# Patient Record
Sex: MALE | Race: WHITE | NOT HISPANIC OR LATINO | Employment: OTHER | URBAN - METROPOLITAN AREA
[De-identification: names, ages, dates, MRNs, and addresses within clinical notes are randomized per-mention and may not be internally consistent; named-entity substitution may affect disease eponyms.]

---

## 2017-01-10 ENCOUNTER — GENERIC CONVERSION - ENCOUNTER (OUTPATIENT)
Dept: OTHER | Facility: OTHER | Age: 72
End: 2017-01-10

## 2017-03-07 DIAGNOSIS — D64.9 ANEMIA: ICD-10-CM

## 2017-03-07 DIAGNOSIS — M85.80 OTHER SPECIFIED DISORDERS OF BONE DENSITY AND STRUCTURE, UNSPECIFIED SITE: ICD-10-CM

## 2017-03-07 DIAGNOSIS — Z12.5 ENCOUNTER FOR SCREENING FOR MALIGNANT NEOPLASM OF PROSTATE: ICD-10-CM

## 2017-03-07 DIAGNOSIS — M45.9 ANKYLOSING SPONDYLITIS OF SITE IN SPINE (HCC): ICD-10-CM

## 2017-03-07 DIAGNOSIS — I10 ESSENTIAL (PRIMARY) HYPERTENSION: ICD-10-CM

## 2017-04-06 ENCOUNTER — ALLSCRIPTS OFFICE VISIT (OUTPATIENT)
Dept: OTHER | Facility: OTHER | Age: 72
End: 2017-04-06

## 2017-05-01 ENCOUNTER — GENERIC CONVERSION - ENCOUNTER (OUTPATIENT)
Dept: OTHER | Facility: OTHER | Age: 72
End: 2017-05-01

## 2017-05-08 ENCOUNTER — GENERIC CONVERSION - ENCOUNTER (OUTPATIENT)
Dept: OTHER | Facility: OTHER | Age: 72
End: 2017-05-08

## 2017-07-12 ENCOUNTER — GENERIC CONVERSION - ENCOUNTER (OUTPATIENT)
Dept: OTHER | Facility: OTHER | Age: 72
End: 2017-07-12

## 2017-09-27 DIAGNOSIS — M25.562 PAIN IN LEFT KNEE: ICD-10-CM

## 2017-09-29 ENCOUNTER — GENERIC CONVERSION - ENCOUNTER (OUTPATIENT)
Dept: OTHER | Facility: OTHER | Age: 72
End: 2017-09-29

## 2017-10-11 ENCOUNTER — GENERIC CONVERSION - ENCOUNTER (OUTPATIENT)
Dept: OTHER | Facility: OTHER | Age: 72
End: 2017-10-11

## 2017-10-18 ENCOUNTER — APPOINTMENT (EMERGENCY)
Dept: RADIOLOGY | Facility: HOSPITAL | Age: 72
DRG: 444 | End: 2017-10-18
Payer: MEDICARE

## 2017-10-18 ENCOUNTER — HOSPITAL ENCOUNTER (INPATIENT)
Facility: HOSPITAL | Age: 72
LOS: 5 days | Discharge: HOME | DRG: 444 | End: 2017-10-24
Attending: EMERGENCY MEDICINE | Admitting: HOSPITALIST
Payer: MEDICARE

## 2017-10-18 DIAGNOSIS — R17 TOTAL BILIRUBIN, ELEVATED: ICD-10-CM

## 2017-10-18 DIAGNOSIS — I16.0 HYPERTENSIVE URGENCY: ICD-10-CM

## 2017-10-18 DIAGNOSIS — K81.9 CHOLECYSTITIS: Primary | ICD-10-CM

## 2017-10-18 DIAGNOSIS — I16.1 HYPERTENSIVE EMERGENCY: ICD-10-CM

## 2017-10-18 DIAGNOSIS — I10 HTN (HYPERTENSION): ICD-10-CM

## 2017-10-18 LAB
ALBUMIN SERPL BCP-MCNC: 3.8 G/DL (ref 3.5–5)
ALP SERPL-CCNC: 97 U/L (ref 46–116)
ALT SERPL W P-5'-P-CCNC: 50 U/L (ref 12–78)
ANION GAP SERPL CALCULATED.3IONS-SCNC: 8 MMOL/L (ref 4–13)
APTT PPP: 31 SECONDS (ref 23–35)
AST SERPL W P-5'-P-CCNC: 35 U/L (ref 5–45)
BASOPHILS # BLD AUTO: 0.01 THOUSANDS/ΜL (ref 0–0.1)
BASOPHILS NFR BLD AUTO: 0 % (ref 0–1)
BILIRUB SERPL-MCNC: 1.24 MG/DL (ref 0.2–1)
BUN SERPL-MCNC: 23 MG/DL (ref 5–25)
CALCIUM SERPL-MCNC: 8.6 MG/DL (ref 8.3–10.1)
CHLORIDE SERPL-SCNC: 105 MMOL/L (ref 100–108)
CO2 SERPL-SCNC: 28 MMOL/L (ref 21–32)
CREAT SERPL-MCNC: 0.87 MG/DL (ref 0.6–1.3)
EOSINOPHIL # BLD AUTO: 0.02 THOUSAND/ΜL (ref 0–0.61)
EOSINOPHIL NFR BLD AUTO: 0 % (ref 0–6)
ERYTHROCYTE [DISTWIDTH] IN BLOOD BY AUTOMATED COUNT: 13.5 % (ref 11.6–15.1)
GFR SERPL CREATININE-BSD FRML MDRD: 85 ML/MIN/1.73SQ M
GLUCOSE SERPL-MCNC: 129 MG/DL (ref 65–140)
HCT VFR BLD AUTO: 41.2 % (ref 36.5–49.3)
HGB BLD-MCNC: 13.8 G/DL (ref 12–17)
INR PPP: 0.99 (ref 0.86–1.16)
LIPASE SERPL-CCNC: 162 U/L (ref 73–393)
LYMPHOCYTES # BLD AUTO: 1.06 THOUSANDS/ΜL (ref 0.6–4.47)
LYMPHOCYTES NFR BLD AUTO: 9 % (ref 14–44)
MCH RBC QN AUTO: 29.7 PG (ref 26.8–34.3)
MCHC RBC AUTO-ENTMCNC: 33.5 G/DL (ref 31.4–37.4)
MCV RBC AUTO: 89 FL (ref 82–98)
MONOCYTES # BLD AUTO: 0.65 THOUSAND/ΜL (ref 0.17–1.22)
MONOCYTES NFR BLD AUTO: 6 % (ref 4–12)
NEUTROPHILS # BLD AUTO: 9.99 THOUSANDS/ΜL (ref 1.85–7.62)
NEUTS SEG NFR BLD AUTO: 85 % (ref 43–75)
NRBC BLD AUTO-RTO: 0 /100 WBCS
PLATELET # BLD AUTO: 173 THOUSANDS/UL (ref 149–390)
PMV BLD AUTO: 11.5 FL (ref 8.9–12.7)
POTASSIUM SERPL-SCNC: 4.3 MMOL/L (ref 3.5–5.3)
PROT SERPL-MCNC: 7.1 G/DL (ref 6.4–8.2)
PROTHROMBIN TIME: 13.1 SECONDS (ref 12.1–14.4)
RBC # BLD AUTO: 4.65 MILLION/UL (ref 3.88–5.62)
SODIUM SERPL-SCNC: 141 MMOL/L (ref 136–145)
SPECIMEN SOURCE: NORMAL
TROPONIN I BLD-MCNC: 0.04 NG/ML (ref 0–0.08)
WBC # BLD AUTO: 11.76 THOUSAND/UL (ref 4.31–10.16)

## 2017-10-18 PROCEDURE — 84443 ASSAY THYROID STIM HORMONE: CPT | Performed by: HOSPITALIST

## 2017-10-18 PROCEDURE — 96375 TX/PRO/DX INJ NEW DRUG ADDON: CPT

## 2017-10-18 PROCEDURE — 36415 COLL VENOUS BLD VENIPUNCTURE: CPT

## 2017-10-18 PROCEDURE — 85025 COMPLETE CBC W/AUTO DIFF WBC: CPT | Performed by: EMERGENCY MEDICINE

## 2017-10-18 PROCEDURE — 80053 COMPREHEN METABOLIC PANEL: CPT | Performed by: EMERGENCY MEDICINE

## 2017-10-18 PROCEDURE — 83690 ASSAY OF LIPASE: CPT | Performed by: EMERGENCY MEDICINE

## 2017-10-18 PROCEDURE — 85610 PROTHROMBIN TIME: CPT | Performed by: EMERGENCY MEDICINE

## 2017-10-18 PROCEDURE — 93005 ELECTROCARDIOGRAM TRACING: CPT | Performed by: EMERGENCY MEDICINE

## 2017-10-18 PROCEDURE — 85730 THROMBOPLASTIN TIME PARTIAL: CPT | Performed by: EMERGENCY MEDICINE

## 2017-10-18 PROCEDURE — 84484 ASSAY OF TROPONIN QUANT: CPT

## 2017-10-18 PROCEDURE — 71020 HB CHEST X-RAY 2VW FRONTAL&LATL: CPT

## 2017-10-18 PROCEDURE — 74177 CT ABD & PELVIS W/CONTRAST: CPT

## 2017-10-18 RX ORDER — MAGNESIUM HYDROXIDE/ALUMINUM HYDROXICE/SIMETHICONE 120; 1200; 1200 MG/30ML; MG/30ML; MG/30ML
30 SUSPENSION ORAL ONCE
Status: COMPLETED | OUTPATIENT
Start: 2017-10-18 | End: 2017-10-18

## 2017-10-18 RX ORDER — ASPIRIN 81 MG/1
324 TABLET, CHEWABLE ORAL ONCE
Status: COMPLETED | OUTPATIENT
Start: 2017-10-18 | End: 2017-10-18

## 2017-10-18 RX ORDER — ENALAPRIL MALEATE 5 MG/1
5 TABLET ORAL 2 TIMES DAILY
COMMUNITY
End: 2017-10-24 | Stop reason: HOSPADM

## 2017-10-18 RX ORDER — METOPROLOL SUCCINATE 50 MG/1
50 TABLET, EXTENDED RELEASE ORAL DAILY
COMMUNITY
End: 2017-10-24 | Stop reason: HOSPADM

## 2017-10-18 RX ORDER — POTASSIUM CHLORIDE 750 MG/1
10 CAPSULE, EXTENDED RELEASE ORAL 2 TIMES DAILY
COMMUNITY
End: 2018-06-25 | Stop reason: SDUPTHER

## 2017-10-18 RX ORDER — LABETALOL HYDROCHLORIDE 5 MG/ML
10 INJECTION, SOLUTION INTRAVENOUS ONCE
Status: COMPLETED | OUTPATIENT
Start: 2017-10-19 | End: 2017-10-19

## 2017-10-18 RX ORDER — CHLORTHALIDONE 25 MG/1
25 TABLET ORAL DAILY
COMMUNITY
End: 2017-10-24 | Stop reason: HOSPADM

## 2017-10-18 RX ORDER — LABETALOL HYDROCHLORIDE 5 MG/ML
10 INJECTION, SOLUTION INTRAVENOUS ONCE
Status: COMPLETED | OUTPATIENT
Start: 2017-10-18 | End: 2017-10-18

## 2017-10-18 RX ADMIN — ASPIRIN 81 MG 324 MG: 81 TABLET ORAL at 21:47

## 2017-10-18 RX ADMIN — IOHEXOL 100 ML: 350 INJECTION, SOLUTION INTRAVENOUS at 23:04

## 2017-10-18 RX ADMIN — LIDOCAINE HYDROCHLORIDE 15 ML: 20 SOLUTION ORAL; TOPICAL at 21:48

## 2017-10-18 RX ADMIN — ALUMINUM HYDROXIDE, MAGNESIUM HYDROXIDE, AND SIMETHICONE 30 ML: 200; 200; 20 SUSPENSION ORAL at 21:48

## 2017-10-18 RX ADMIN — LABETALOL 20 MG/4 ML (5 MG/ML) INTRAVENOUS SYRINGE 10 MG: at 23:08

## 2017-10-19 ENCOUNTER — APPOINTMENT (INPATIENT)
Dept: NON INVASIVE DIAGNOSTICS | Facility: HOSPITAL | Age: 72
DRG: 444 | End: 2017-10-19
Payer: MEDICARE

## 2017-10-19 ENCOUNTER — APPOINTMENT (INPATIENT)
Dept: RADIOLOGY | Facility: HOSPITAL | Age: 72
DRG: 444 | End: 2017-10-19
Payer: MEDICARE

## 2017-10-19 PROBLEM — I65.21 STENOSIS OF RIGHT CAROTID ARTERY: Status: ACTIVE | Noted: 2017-10-19

## 2017-10-19 PROBLEM — K80.10 CHOLECYSTITIS WITH CHOLELITHIASIS: Status: ACTIVE | Noted: 2017-10-19

## 2017-10-19 PROBLEM — R17 TOTAL BILIRUBIN, ELEVATED: Status: ACTIVE | Noted: 2017-10-19

## 2017-10-19 PROBLEM — D72.829 LEUKOCYTOSIS: Status: ACTIVE | Noted: 2017-10-19

## 2017-10-19 PROBLEM — I16.1 HYPERTENSIVE EMERGENCY: Status: ACTIVE | Noted: 2017-10-19

## 2017-10-19 PROBLEM — R07.9 CHEST PAIN: Status: RESOLVED | Noted: 2017-10-19 | Resolved: 2017-10-19

## 2017-10-19 PROBLEM — R10.9 ABDOMINAL PAIN: Status: ACTIVE | Noted: 2017-10-19

## 2017-10-19 PROBLEM — R07.9 CHEST PAIN: Status: ACTIVE | Noted: 2017-10-19

## 2017-10-19 LAB
ALBUMIN SERPL BCP-MCNC: 3.3 G/DL (ref 3.5–5)
ALP SERPL-CCNC: 84 U/L (ref 46–116)
ALT SERPL W P-5'-P-CCNC: 37 U/L (ref 12–78)
ANION GAP SERPL CALCULATED.3IONS-SCNC: 10 MMOL/L (ref 4–13)
AST SERPL W P-5'-P-CCNC: 12 U/L (ref 5–45)
BASOPHILS # BLD AUTO: 0.01 THOUSANDS/ΜL (ref 0–0.1)
BASOPHILS NFR BLD AUTO: 0 % (ref 0–1)
BILIRUB DIRECT SERPL-MCNC: 0.33 MG/DL (ref 0–0.2)
BILIRUB SERPL-MCNC: 1.55 MG/DL (ref 0.2–1)
BUN SERPL-MCNC: 18 MG/DL (ref 5–25)
CALCIUM SERPL-MCNC: 8.1 MG/DL (ref 8.3–10.1)
CHLORIDE SERPL-SCNC: 105 MMOL/L (ref 100–108)
CHOLEST SERPL-MCNC: 161 MG/DL (ref 50–200)
CO2 SERPL-SCNC: 26 MMOL/L (ref 21–32)
CREAT SERPL-MCNC: 0.72 MG/DL (ref 0.6–1.3)
EOSINOPHIL # BLD AUTO: 0.01 THOUSAND/ΜL (ref 0–0.61)
EOSINOPHIL NFR BLD AUTO: 0 % (ref 0–6)
ERYTHROCYTE [DISTWIDTH] IN BLOOD BY AUTOMATED COUNT: 13.6 % (ref 11.6–15.1)
EST. AVERAGE GLUCOSE BLD GHB EST-MCNC: 103 MG/DL
GFR SERPL CREATININE-BSD FRML MDRD: 92 ML/MIN/1.73SQ M
GLUCOSE SERPL-MCNC: 104 MG/DL (ref 65–140)
HBA1C MFR BLD: 5.2 % (ref 4.2–6.3)
HCT VFR BLD AUTO: 37.7 % (ref 36.5–49.3)
HDLC SERPL-MCNC: 49 MG/DL (ref 40–60)
HGB BLD-MCNC: 12.7 G/DL (ref 12–17)
LACTATE SERPL-SCNC: 1.8 MMOL/L (ref 0.5–2)
LDLC SERPL CALC-MCNC: 100 MG/DL (ref 0–100)
LYMPHOCYTES # BLD AUTO: 0.9 THOUSANDS/ΜL (ref 0.6–4.47)
LYMPHOCYTES NFR BLD AUTO: 8 % (ref 14–44)
MAGNESIUM SERPL-MCNC: 2.4 MG/DL (ref 1.6–2.6)
MCH RBC QN AUTO: 30 PG (ref 26.8–34.3)
MCHC RBC AUTO-ENTMCNC: 33.7 G/DL (ref 31.4–37.4)
MCV RBC AUTO: 89 FL (ref 82–98)
MONOCYTES # BLD AUTO: 0.8 THOUSAND/ΜL (ref 0.17–1.22)
MONOCYTES NFR BLD AUTO: 7 % (ref 4–12)
NEUTROPHILS # BLD AUTO: 9.33 THOUSANDS/ΜL (ref 1.85–7.62)
NEUTS SEG NFR BLD AUTO: 85 % (ref 43–75)
NRBC BLD AUTO-RTO: 0 /100 WBCS
PHOSPHATE SERPL-MCNC: 2.9 MG/DL (ref 2.3–4.1)
PLATELET # BLD AUTO: 150 THOUSANDS/UL (ref 149–390)
PMV BLD AUTO: 11.2 FL (ref 8.9–12.7)
POTASSIUM SERPL-SCNC: 3.4 MMOL/L (ref 3.5–5.3)
PROT SERPL-MCNC: 6 G/DL (ref 6.4–8.2)
RBC # BLD AUTO: 4.24 MILLION/UL (ref 3.88–5.62)
SODIUM SERPL-SCNC: 141 MMOL/L (ref 136–145)
TRIGL SERPL-MCNC: 62 MG/DL
TROPONIN I SERPL-MCNC: 0.05 NG/ML
TSH SERPL DL<=0.05 MIU/L-ACNC: 3.05 UIU/ML (ref 0.36–3.74)
WBC # BLD AUTO: 11.08 THOUSAND/UL (ref 4.31–10.16)

## 2017-10-19 PROCEDURE — 80048 BASIC METABOLIC PNL TOTAL CA: CPT | Performed by: NURSE PRACTITIONER

## 2017-10-19 PROCEDURE — 84484 ASSAY OF TROPONIN QUANT: CPT | Performed by: NURSE PRACTITIONER

## 2017-10-19 PROCEDURE — 83605 ASSAY OF LACTIC ACID: CPT | Performed by: NURSE PRACTITIONER

## 2017-10-19 PROCEDURE — 85025 COMPLETE CBC W/AUTO DIFF WBC: CPT | Performed by: SURGERY

## 2017-10-19 PROCEDURE — 99285 EMERGENCY DEPT VISIT HI MDM: CPT

## 2017-10-19 PROCEDURE — 93306 TTE W/DOPPLER COMPLETE: CPT

## 2017-10-19 PROCEDURE — 94760 N-INVAS EAR/PLS OXIMETRY 1: CPT

## 2017-10-19 PROCEDURE — C9113 INJ PANTOPRAZOLE SODIUM, VIA: HCPCS | Performed by: HOSPITALIST

## 2017-10-19 PROCEDURE — 83036 HEMOGLOBIN GLYCOSYLATED A1C: CPT | Performed by: NURSE PRACTITIONER

## 2017-10-19 PROCEDURE — 84100 ASSAY OF PHOSPHORUS: CPT | Performed by: NURSE PRACTITIONER

## 2017-10-19 PROCEDURE — 80076 HEPATIC FUNCTION PANEL: CPT | Performed by: SURGERY

## 2017-10-19 PROCEDURE — 70450 CT HEAD/BRAIN W/O DYE: CPT

## 2017-10-19 PROCEDURE — 83735 ASSAY OF MAGNESIUM: CPT | Performed by: NURSE PRACTITIONER

## 2017-10-19 PROCEDURE — 96376 TX/PRO/DX INJ SAME DRUG ADON: CPT

## 2017-10-19 PROCEDURE — 96365 THER/PROPH/DIAG IV INF INIT: CPT

## 2017-10-19 PROCEDURE — 80061 LIPID PANEL: CPT | Performed by: NURSE PRACTITIONER

## 2017-10-19 PROCEDURE — 96375 TX/PRO/DX INJ NEW DRUG ADDON: CPT

## 2017-10-19 RX ORDER — AMLODIPINE BESYLATE 10 MG/1
10 TABLET ORAL DAILY
Status: DISCONTINUED | OUTPATIENT
Start: 2017-10-19 | End: 2017-10-22

## 2017-10-19 RX ORDER — HYDRALAZINE HYDROCHLORIDE 20 MG/ML
5 INJECTION INTRAMUSCULAR; INTRAVENOUS EVERY 6 HOURS PRN
Status: DISCONTINUED | OUTPATIENT
Start: 2017-10-19 | End: 2017-10-24 | Stop reason: HOSPADM

## 2017-10-19 RX ORDER — ACETAMINOPHEN 325 MG/1
650 TABLET ORAL EVERY 6 HOURS PRN
Status: DISCONTINUED | OUTPATIENT
Start: 2017-10-19 | End: 2017-10-24 | Stop reason: HOSPADM

## 2017-10-19 RX ORDER — ATORVASTATIN CALCIUM 40 MG/1
40 TABLET, FILM COATED ORAL
Status: DISCONTINUED | OUTPATIENT
Start: 2017-10-19 | End: 2017-10-24 | Stop reason: HOSPADM

## 2017-10-19 RX ORDER — POTASSIUM CHLORIDE 750 MG/1
10 TABLET, EXTENDED RELEASE ORAL 2 TIMES DAILY
Status: DISCONTINUED | OUTPATIENT
Start: 2017-10-19 | End: 2017-10-24 | Stop reason: HOSPADM

## 2017-10-19 RX ORDER — CHLORTHALIDONE 25 MG/1
25 TABLET ORAL ONCE
Status: COMPLETED | OUTPATIENT
Start: 2017-10-19 | End: 2017-10-19

## 2017-10-19 RX ORDER — LABETALOL HYDROCHLORIDE 5 MG/ML
10 INJECTION, SOLUTION INTRAVENOUS EVERY 6 HOURS PRN
Status: DISCONTINUED | OUTPATIENT
Start: 2017-10-19 | End: 2017-10-19

## 2017-10-19 RX ORDER — NITROGLYCERIN 0.4 MG/1
0.4 TABLET SUBLINGUAL
Status: DISCONTINUED | OUTPATIENT
Start: 2017-10-19 | End: 2017-10-24 | Stop reason: HOSPADM

## 2017-10-19 RX ORDER — DOCUSATE SODIUM 100 MG/1
100 CAPSULE, LIQUID FILLED ORAL 2 TIMES DAILY
Status: DISCONTINUED | OUTPATIENT
Start: 2017-10-19 | End: 2017-10-24 | Stop reason: HOSPADM

## 2017-10-19 RX ORDER — ENALAPRIL MALEATE 10 MG/1
10 TABLET ORAL 2 TIMES DAILY
Status: DISCONTINUED | OUTPATIENT
Start: 2017-10-19 | End: 2017-10-20

## 2017-10-19 RX ORDER — ATORVASTATIN CALCIUM 40 MG/1
40 TABLET, FILM COATED ORAL
Status: DISCONTINUED | OUTPATIENT
Start: 2017-10-19 | End: 2017-10-19

## 2017-10-19 RX ORDER — ASPIRIN 81 MG/1
81 TABLET, CHEWABLE ORAL DAILY
Status: DISCONTINUED | OUTPATIENT
Start: 2017-10-19 | End: 2017-10-24 | Stop reason: HOSPADM

## 2017-10-19 RX ORDER — CLONIDINE HYDROCHLORIDE 0.2 MG/1
0.2 TABLET ORAL ONCE
Status: COMPLETED | OUTPATIENT
Start: 2017-10-19 | End: 2017-10-19

## 2017-10-19 RX ORDER — METOPROLOL SUCCINATE 50 MG/1
50 TABLET, EXTENDED RELEASE ORAL DAILY
Status: DISCONTINUED | OUTPATIENT
Start: 2017-10-19 | End: 2017-10-20

## 2017-10-19 RX ORDER — SODIUM CHLORIDE 9 MG/ML
50 INJECTION, SOLUTION INTRAVENOUS CONTINUOUS
Status: DISCONTINUED | OUTPATIENT
Start: 2017-10-19 | End: 2017-10-20

## 2017-10-19 RX ORDER — CHLORTHALIDONE 25 MG/1
50 TABLET ORAL DAILY
Status: DISCONTINUED | OUTPATIENT
Start: 2017-10-20 | End: 2017-10-23

## 2017-10-19 RX ORDER — PANTOPRAZOLE SODIUM 40 MG/1
40 INJECTION, POWDER, FOR SOLUTION INTRAVENOUS EVERY 12 HOURS SCHEDULED
Status: DISCONTINUED | OUTPATIENT
Start: 2017-10-19 | End: 2017-10-22

## 2017-10-19 RX ORDER — SENNOSIDES 8.6 MG
1 TABLET ORAL DAILY
Status: DISCONTINUED | OUTPATIENT
Start: 2017-10-19 | End: 2017-10-24 | Stop reason: HOSPADM

## 2017-10-19 RX ORDER — ONDANSETRON 2 MG/ML
4 INJECTION INTRAMUSCULAR; INTRAVENOUS EVERY 4 HOURS PRN
Status: DISCONTINUED | OUTPATIENT
Start: 2017-10-19 | End: 2017-10-24 | Stop reason: HOSPADM

## 2017-10-19 RX ORDER — CHLORTHALIDONE 25 MG/1
25 TABLET ORAL DAILY
Status: DISCONTINUED | OUTPATIENT
Start: 2017-10-19 | End: 2017-10-19

## 2017-10-19 RX ORDER — ENALAPRIL MALEATE 5 MG/1
5 TABLET ORAL 2 TIMES DAILY
Status: DISCONTINUED | OUTPATIENT
Start: 2017-10-19 | End: 2017-10-19

## 2017-10-19 RX ORDER — POLYETHYLENE GLYCOL 3350 17 G/17G
17 POWDER, FOR SOLUTION ORAL DAILY
Status: DISCONTINUED | OUTPATIENT
Start: 2017-10-19 | End: 2017-10-24 | Stop reason: HOSPADM

## 2017-10-19 RX ORDER — SODIUM CHLORIDE 9 MG/ML
75 INJECTION, SOLUTION INTRAVENOUS CONTINUOUS
Status: DISCONTINUED | OUTPATIENT
Start: 2017-10-19 | End: 2017-10-19

## 2017-10-19 RX ADMIN — ENOXAPARIN SODIUM 40 MG: 40 INJECTION SUBCUTANEOUS at 08:38

## 2017-10-19 RX ADMIN — SODIUM CHLORIDE 5 MG/HR: 0.9 INJECTION, SOLUTION INTRAVENOUS at 00:41

## 2017-10-19 RX ADMIN — CEFAZOLIN SODIUM 1000 MG: 1 SOLUTION INTRAVENOUS at 00:39

## 2017-10-19 RX ADMIN — CEFAZOLIN SODIUM 1000 MG: 1 SOLUTION INTRAVENOUS at 17:05

## 2017-10-19 RX ADMIN — POTASSIUM CHLORIDE 10 MEQ: 750 TABLET, EXTENDED RELEASE ORAL at 08:36

## 2017-10-19 RX ADMIN — ENALAPRIL MALEATE 10 MG: 10 TABLET ORAL at 08:40

## 2017-10-19 RX ADMIN — METRONIDAZOLE 500 MG: 500 INJECTION, SOLUTION INTRAVENOUS at 08:39

## 2017-10-19 RX ADMIN — SENNOSIDES 8.6 MG: 8.6 TABLET, FILM COATED ORAL at 08:38

## 2017-10-19 RX ADMIN — ATORVASTATIN CALCIUM 40 MG: 40 TABLET, FILM COATED ORAL at 17:05

## 2017-10-19 RX ADMIN — LABETALOL 20 MG/4 ML (5 MG/ML) INTRAVENOUS SYRINGE 10 MG: at 00:01

## 2017-10-19 RX ADMIN — ASPIRIN 81 MG 81 MG: 81 TABLET ORAL at 08:36

## 2017-10-19 RX ADMIN — METRONIDAZOLE 500 MG: 500 INJECTION, SOLUTION INTRAVENOUS at 01:15

## 2017-10-19 RX ADMIN — PANTOPRAZOLE SODIUM 40 MG: 40 INJECTION, POWDER, FOR SOLUTION INTRAVENOUS at 22:19

## 2017-10-19 RX ADMIN — CHLORTHALIDONE 25 MG: 25 TABLET ORAL at 17:05

## 2017-10-19 RX ADMIN — AMLODIPINE BESYLATE 10 MG: 10 TABLET ORAL at 10:06

## 2017-10-19 RX ADMIN — PANTOPRAZOLE SODIUM 40 MG: 40 INJECTION, POWDER, FOR SOLUTION INTRAVENOUS at 08:39

## 2017-10-19 RX ADMIN — POTASSIUM CHLORIDE 10 MEQ: 750 TABLET, EXTENDED RELEASE ORAL at 17:05

## 2017-10-19 RX ADMIN — PANTOPRAZOLE SODIUM 40 MG: 40 INJECTION, POWDER, FOR SOLUTION INTRAVENOUS at 02:49

## 2017-10-19 RX ADMIN — DOCUSATE SODIUM 100 MG: 100 CAPSULE, LIQUID FILLED ORAL at 17:05

## 2017-10-19 RX ADMIN — DOCUSATE SODIUM 100 MG: 100 CAPSULE, LIQUID FILLED ORAL at 08:35

## 2017-10-19 RX ADMIN — ENALAPRIL MALEATE 10 MG: 10 TABLET ORAL at 22:16

## 2017-10-19 RX ADMIN — SODIUM CHLORIDE 50 ML/HR: 0.9 INJECTION, SOLUTION INTRAVENOUS at 20:19

## 2017-10-19 RX ADMIN — CHLORTHALIDONE 25 MG: 25 TABLET ORAL at 08:40

## 2017-10-19 RX ADMIN — CEFAZOLIN SODIUM 1000 MG: 1 SOLUTION INTRAVENOUS at 08:39

## 2017-10-19 RX ADMIN — SODIUM CHLORIDE 75 ML/HR: 0.9 INJECTION, SOLUTION INTRAVENOUS at 02:38

## 2017-10-19 RX ADMIN — CLONIDINE HYDROCHLORIDE 0.2 MG: 0.2 TABLET ORAL at 02:49

## 2017-10-19 NOTE — PROGRESS NOTES
Progress Note - General Surgery   Dani Prater  74 y o  male MRN: [id number]  Unit/Bed#: [facility] 405-01 Encounter: [id number]    Assessment:  73 yo M with chest pain, ct scan with evidence of distended gb, pericholecystic fluid and elevated T bili     - had been on cardene gtt temporarily currently off   - ct head negative   - currently no abdominal pain or nausea  - t bili 1 55 today      Plan:  - Continue to monitor blood prsesure  - although patien currently with no abdominal pain or nausea, t bili 1 55 with ston in cystic duct  Hypothetically could have passed a stone  Recommend GI consultation, possible MRCP  Continue to trend T bili   - Although patient with Ct evidence of distended GB and stone within cystic duct patients story is not consistent with acute cholecystitis   - Please leave patient NPO while undergoing evaluation by surgery   - will start on antibiotics to cover for acute cholecystitis      Subjective/Objective   Chief Complaint:     Subjective: no overnight events  No nausea/vomiting  No shortness of braeth or chest pain  No headache or changes in vision    Objective:     Blood pressure (!) 157/47, pulse (!) 46, temperature 98 °F (36 7 °C), temperature source Oral, resp  rate 20, height 5' 10" (1 778 m), weight 87 3 kg (192 lb 7 4 oz), SpO2 96 %  ,Body mass index is 27 62 kg/m²  Intake/Output Summary (Last 24 hours) at 10/19/17 0572  Last data filed at 10/19/17 0301   Gross per 24 hour   Intake                0 ml   Output              300 ml   Net             -300 ml       Invasive Devices     Peripheral Intravenous Line            Peripheral IV 10/18/17 Right Forearm less than 1 day    Peripheral IV 10/19/17 Left Antecubital less than 1 day                Physical Exam: General: AAOx3  Respiratory: BS b/l  Abdomen: Soft, NT, no rebound/guarding  Heart: RRR, S1s2  Ext: Warm no cyanosis       Lab, Imaging and other studies:  I have personally reviewed pertinent lab results    , CBC:   Lab Results   Component Value Date    WBC 11 08 (H) 10/19/2017    HGB 12 7 10/19/2017    HCT 37 7 10/19/2017    MCV 89 10/19/2017     10/19/2017    MCH 30 0 10/19/2017    MCHC 33 7 10/19/2017    RDW 13 6 10/19/2017    MPV 11 2 10/19/2017    NRBC 0 10/19/2017   , CMP:   Lab Results   Component Value Date     10/18/2017    K 4 3 10/18/2017     10/18/2017    CO2 28 10/18/2017    ANIONGAP 8 10/18/2017    BUN 23 10/18/2017    CREATININE 0 87 10/18/2017    GLUCOSE 129 10/18/2017    CALCIUM 8 6 10/18/2017    AST 12 10/19/2017    ALT 37 10/19/2017    ALKPHOS 84 10/19/2017    PROT 6 0 (L) 10/19/2017    ALBUMIN 3 3 (L) 10/19/2017    BILITOT 1 55 (H) 10/19/2017    EGFR 85 10/18/2017     VTE Pharmacologic Prophylaxis: Sequential compression device (Venodyne)  and Heparin  VTE Mechanical Prophylaxis: sequential compression device

## 2017-10-19 NOTE — CASE MANAGEMENT
Initial Clinical Review    Admission: Date/Time/Statement: 10/19/17 @ 0119  Orders Placed This Encounter   Procedures    Inpatient Admission     Standing Status:   Standing     Number of Occurrences:   1     Order Specific Question:   Admitting Physician     Answer:   Amador Mancia     Order Specific Question:   Level of Care     Answer:   Level 1 Stepdown [13]     Order Specific Question:   Bed Type     Answer:   [place] [4]     Order Specific Question:   Estimated length of stay     Answer:   More than 2 Midnights     Order Specific Question:   Certification     Answer:   I certify that inpatient services are medically necessary for this patient for a duration of greater than two midnights  See H&P and MD Progress Notes for additional information about the patient's course of treatment  ED: Date/Time/Mode of Arrival:   ED Arrival Information     Expected Arrival Acuity Means of Arrival Escorted By Service Admission Type    - 10/18/2017 21:15 Emergent Walk-In Family Member General Medicine Emergency    Arrival Complaint    chest discomfort        Chief Complaint:   Chief Complaint   Patient presents with    Chest Pain     Patient started with chest pain yesterday which went away and started again today  Pt describes steady mid sternal chest pain, increased burping and gas  denies SOB, N/V      History of Illness:   Jacalyn Saint  is a 74 y o  male with history of HTN, spinal stenosis, Rt carotid artery stenosis who presented to the emergency room for evaluation of acute onset of nonradiating chest pain ( located under xiphoid process), associated with nausea  Patient reports that 1st episode of similar pain noted  yesterday, he thought it might be secondary to indigestion as pain relieved with Mylanta  In the emergency room blood pressure found to be extremely elevated up to 255/149    Patient admits to have so called "white coat syndrome", sometimes his  systolic blood pressure  goes up to 200 in PCP office  Imaging study showed distended gallbladder with calcified gallstones and trace pericholecystic fluid with inflammation of the pericholecystic fat    No CBD dilatation  Total bilirubin 1 24 WBC 11 76, 1st troponin 0 04,  patient remains afebrile  Denies headache vomiting neurologic deficit visual disturbance melena hematemesis fever chills  Given significantly elevated blood pressure patient was started on nicardipine drip, he underwent evaluation by intensivist and general surgeon  Admitted to step-down 1 level on an inpatient basis    ED Vital Signs:   ED Triage Vitals   Temperature Pulse Respirations Blood Pressure SpO2   10/18/17 2122 10/18/17 2122 10/18/17 2122 10/18/17 2137 10/18/17 2122   98 4 °F (36 9 °C) 71 20 (!) 192/90 95 %      Temp Source Heart Rate Source Patient Position - Orthostatic VS BP Location FiO2 (%)   10/19/17 0229 10/18/17 2248 10/18/17 2122 10/18/17 2122 --   Oral Monitor Lying Left arm       Pain Score       10/18/17 2122       4        Wt Readings from Last 1 Encounters:   10/19/17 87 3 kg (192 lb 7 4 oz)     Abnormal Labs/Diagnostic Test Results:     ED Treatment:   Medication Administration from 10/18/2017 2115 to 10/19/2017 1757    Date/Time Order Dose Route Action Action by Comments   10/18/2017 2147 aspirin chewable tablet 324 mg 324 mg Oral Given Belinda Wakefield RN    10/18/2017 2148 lidocaine viscous (XYLOCAINE) 2 % mucosal solution 15 mL 15 mL Swish & Spit Given Jaiden Quintero RN    10/18/2017 2148 aluminum-magnesium hydroxide-simethicone (MYLANTA) 200-200-20 mg/5 mL oral suspension 30 mL 30 mL Oral Given Belinda Wakefield RN    10/18/2017 2308 labetalol (NORMODYNE) injection 10 mg 10 mg Intravenous Given Belinda Wakefield RN    10/18/2017 2304 iohexol (OMNIPAQUE) 350 MG/ML injection (MULTI-DOSE) 100 mL 100 mL Intravenous Given Brina Domingo    10/19/2017 0001 labetalol (NORMODYNE) injection 10 mg 10 mg Intravenous Given Michelle Escamilla RN    10/19/2017 0142 niCARdipine (CARDENE) 25 mg (STANDARD CONCENTRATION) in sodium chloride 0 9% 250 mL 7 5 mg/hr Intravenous Rate/Dose Change Melanie Taylor RN double checked by Vernon Altamirano rn   10/19/2017 0041 niCARdipine (CARDENE) 25 mg (STANDARD CONCENTRATION) in sodium chloride 0 9% 250 mL 5 mg/hr Intravenous Given Melanie Taylor RN    10/19/2017 0039 ceFAZolin (ANCEF) IVPB (premix) 1,000 mg 1,000 mg Intravenous Given Melanie Taylor RN    10/19/2017 0115 metroNIDAZOLE (FLAGYL) IVPB (premix) 500 mg 500 mg Intravenous Given Melanie Taylor RN        Past Medical/Surgical History:   Past Medical History:   Diagnosis Date    Cancer ([facility] [address] )     Hypertension     Spinal stenosis        Admitting Diagnosis: Cholecystitis [K81 9]  Chest pain [R07 9]  Hypertensive urgency [I16 0]  Total bilirubin, elevated [R17]  Hypertensive emergency [I16 1]    Age/Sex: 74 y o  male    Assessment/Plan:   Hypertensive emergency  Active Problems:    Chest pain    Abdominal pain    Cholecystitis with cholelithiasis    Leukocytosis    Total bilirubin, elevated        Plan for the Primary Problem(s):  · Hypertensive emergency  Patient reports being compliant with his home Rx  · Admitted to telemetry, step down 1 level Given need to monitor her nicardipine drip  · Hydralazine and labetalol p r n  · Started on aspirin and statin will check lipid panel hemoglobin A1c  · Monitor trend of troponin  · Will obtain echocardiogram  · ICU follow-up  · Cardiology consult  ? Abnormal CT abdomen findings of cholecystitis with cholelithiasis  Patient afebrile ,WBC elevated   A seen by general surgeon started on IV antibiotic will defer further management to surgical and GI team  ? Keep NPO, IV hydration, antiemetic ,pain management  ? Pantoprazole b i d  IV     Plan for Additional Problems:   · Leukocytosis secondary to cholecystitis    Continue antibiotic  · Elevated TBbe secondary to cholecystitis with cholelithiasis     VTE Prophylaxis: Enoxaparin (Lovenox)  / sequential compression device   Anticipated Length of Stay:  Patient will be admitted on an Inpatient basis with an anticipated length of stay of  > 2 midnights     Justification for Hospital Stay:  IV antibiotic, nicardipine drip, multiple specialist consult       Admission Orders:  Scheduled Meds:   amLODIPine 10 mg Oral Daily   aspirin 81 mg Oral Daily   atorvastatin 40 mg Oral Daily With Dinner   cefazolin 1,000 mg Intravenous Q8H   chlorthalidone 25 mg Oral Daily   docusate sodium 100 mg Oral BID   enalapril 10 mg Oral BID   enoxaparin 40 mg Subcutaneous Daily   metoprolol succinate 50 mg Oral Daily   pantoprazole 40 mg Intravenous Q12H [address]   polyethylene glycol 17 g Oral Daily   potassium chloride 10 mEq Oral BID   senna 1 tablet Oral Daily     Continuous Infusions:   niCARdipine 1-15 mg/hr Last Rate: Stopped (10/19/17 0340)   sodium chloride 50 mL/hr Last Rate: 50 mL/hr (10/19/17 0911)     PRN Meds:   acetaminophen    hydrALAZINE    nitroglycerin    ondansetron

## 2017-10-19 NOTE — PROGRESS NOTES
[facility] Progress Note  Patient: Gill Vázquez  74 y o  male   MRN: [id number]  PCP: Mirza Cross DO  Unit/Bed#: [facility] 405-01 Encounter: [id number]  Date Of Visit: 10/19/17    Assessment:    Principal Problem:    Hypertensive emergency  Active Problems:    Abdominal pain    Cholecystitis with cholelithiasis    Leukocytosis    Total bilirubin, elevated    70% stenosis of right ICA      Plan:    · Hypertensive urgency:  · Not emergency, no end organ damage, CP could be from GI source, trops neg  · Restarted home meds chlorthalidone, enalapril 10 BID, jud from clonidine hence hold home toprol xl 50 mg & give amlodipine 10 mg for now  · Off cardene drip  · Keep step down I today  · Epigastric/chest pain:  · Suspect from biliary etiology  · Trop mild elevation, f/u echo  · NSTEMI type II: from accelerated HTN  · Cholelithiasis with suspected cholecystitis:  · LFT normal, t jonna going up  · CT showing GB inflammation & possible cystic duct stone  · Surgery on board, GI eval pending - f/u rec on further investigation  · Keep NPO till then, cont IV ancef & flagyl  · Symptom free now      VTE Pharmacologic Prophylaxis:   Pharmacologic: Enoxaparin (Lovenox)  Mechanical VTE Prophylaxis in Place: Yes    Patient Centered Rounds: I have performed bedside rounds with nursing staff today  Discussions with Specialists or Other Care Team Provider:     Education and Discussions with Family / Patient: patient    Time Spent for Care: 45 minutes  More than 50% of total time spent on counseling and coordination of care as described above      Current Length of Stay: 0 day(s)    Current Patient Status: Inpatient   Certification Statement: The patient will continue to require additional inpatient hospital stay due to not ready    Discharge Plan / Estimated Discharge Date: based on course    Code Status: Level 1 - Full Code      Subjective:   Feels good now, no more pain or n/v, or SOB    Objective: Vitals:   Temp (24hrs), Av 1 °F (36 7 °C), Min:97 4 °F (36 3 °C), Max:98 4 °F (36 9 °C)    HR:  [46-81] 49  Resp:  [16-22] 18  BP: (125-255)/() 182/61  SpO2:  [93 %-96 %] 93 %  Body mass index is 27 62 kg/m²  Input and Output Summary (last 24 hours): Intake/Output Summary (Last 24 hours) at 10/19/17 1045  Last data filed at 10/19/17 0800   Gross per 24 hour   Intake           571 25 ml   Output              550 ml   Net            21 25 ml       Physical Exam:     Physical Exam   Constitutional: He is oriented to person, place, and time  He appears well-developed and well-nourished  HENT:   Head: Normocephalic and atraumatic  Eyes: Conjunctivae are normal  No scleral icterus  Cardiovascular: Normal rate, regular rhythm and normal heart sounds  Exam reveals no gallop and no friction rub  No murmur heard  Pulmonary/Chest: Effort normal and breath sounds normal  No respiratory distress  He has no wheezes  Abdominal: Soft  He exhibits no distension  There is no tenderness  Musculoskeletal: He exhibits no edema  Neurological: He is alert and oriented to person, place, and time  Additional Data:     Labs:      Results from last 7 days  Lab Units 10/19/17  0441   WBC Thousand/uL 11 08*   HEMOGLOBIN g/dL 12 7   HEMATOCRIT % 37 7   PLATELETS Thousands/uL 150   NEUTROS PCT % 85*   LYMPHS PCT % 8*   MONOS PCT % 7   EOS PCT % 0       Results from last 7 days  Lab Units 10/19/17  0531 10/19/17  0441   SODIUM mmol/L 141  --    POTASSIUM mmol/L 3 4*  --    CHLORIDE mmol/L 105  --    CO2 mmol/L 26  --    BUN mg/dL 18  --    CREATININE mg/dL 0 72  --    CALCIUM mg/dL 8 1*  --    TOTAL PROTEIN g/dL  --  6 0*   BILIRUBIN TOTAL mg/dL  --  1 55*   ALK PHOS U/L  --  84   ALT U/L  --  37   AST U/L  --  12   GLUCOSE RANDOM mg/dL 104  --        Results from last 7 days  Lab Units 10/18/17  2133   INR  0 99       * I Have Reviewed All Lab Data Listed Above    * Additional Pertinent Lab Tests Reviewed: All Labs Within Last 24 Hours Reviewed    Imaging:    Imaging Reports Reviewed Today Include:   Imaging Personally Reviewed by Myself Includes:      Recent Cultures (last 7 days):           Last 24 Hours Medication List:     amLODIPine 10 mg Oral Daily   aspirin 81 mg Oral Daily   atorvastatin 40 mg Oral Daily With Dinner   cefazolin 1,000 mg Intravenous Q8H   chlorthalidone 25 mg Oral Daily   docusate sodium 100 mg Oral BID   enalapril 10 mg Oral BID   enoxaparin 40 mg Subcutaneous Daily   metoprolol succinate 50 mg Oral Daily   pantoprazole 40 mg Intravenous Q12H [address]   polyethylene glycol 17 g Oral Daily   potassium chloride 10 mEq Oral BID   senna 1 tablet Oral Daily        Today, Patient Was Seen By: Don Aguirre MD    ** Please Note: This note has been constructed using a voice recognition system   **

## 2017-10-19 NOTE — H&P
History and Physical - [facility] Internal Medicine    Patient Information: Lito Martínez  74 y o  male MRN: [id number]  Unit/Bed#: ED 01 Encounter: [id number]  Admitting Physician: Marysol Lew MD  PCP: Juaquin Alex DO  Date of Admission:  10/19/17    Assessment/Plan:    Hospital Problem List:     Principal Problem:    Hypertensive emergency  Active Problems:    Chest pain    Abdominal pain    Cholecystitis with cholelithiasis    Leukocytosis    Total bilirubin, elevated      Plan for the Primary Problem(s):  · Hypertensive emergency  Patient reports being compliant with his home Rx  · Admitted to telemetry, step down 1 level Given need to monitor her nicardipine drip  · Hydralazine and labetalol p r n  · Started on aspirin and statin will check lipid panel hemoglobin A1c  · Monitor trend of troponin  · Will obtain echocardiogram  · ICU follow-up  · Cardiology consult  · Abnormal CT abdomen findings of cholecystitis with cholelithiasis  Patient afebrile ,WBC elevated   A seen by general surgeon started on IV antibiotic will defer further management to surgical and GI team  · Keep NPO, IV hydration, antiemetic ,pain management  · Pantoprazole b i d  IV    Plan for Additional Problems:   · Leukocytosis secondary to cholecystitis  Continue antibiotic  · Elevated TBbe secondary to cholecystitis with cholelithiasis    VTE Prophylaxis: Enoxaparin (Lovenox)  / sequential compression device   Code Status: full  POLST: There is no POLST form on file for this patient (pre-hospital)    Anticipated Length of Stay:  Patient will be admitted on an Inpatient basis with an anticipated length of stay of  > 2 midnights  Justification for Hospital Stay:  IV antibiotic, nicardipine drip, multiple specialist consult    Total Time for Visit, including Counseling / Coordination of Care: 45 minutes  Greater than 50% of this total time spent on direct patient counseling and coordination of care      Chief Complaint: Chest pain  History of Present Illness:    Daya Horan  is a 74 y o  male with history of HTN, spinal stenosis, Rt carotid artery stenosis who presented to the emergency room for evaluation of acute onset of nonradiating chest pain ( located under xiphoid process), associated with nausea  Patient reports that 1st episode of similar pain noted  yesterday, he thought it might be secondary to indigestion as pain relieved with Mylanta  In the emergency room blood pressure found to be extremely elevated up to 255/149  Patient admits to have so called "white coat syndrome", sometimes his  systolic blood pressure  goes up to 200 in PCP office  Imaging study showed distended gallbladder with calcified gallstones and trace pericholecystic fluid with inflammation of the pericholecystic fat  No CBD dilatation  Total bilirubin 1 24 WBC 11 76, 1st troponin 0 04,  patient remains afebrile  Denies headache vomiting neurologic deficit visual disturbance melena hematemesis fever chills  Given significantly elevated blood pressure patient was started on nicardipine drip, he underwent evaluation by intensivist and general surgeon  Admitted to step-down 1 level on an inpatient basis        Review of Systems:    Review of Systems   Cardiovascular: Positive for chest pain  Gastrointestinal: Positive for abdominal pain  Past Medical and Surgical History:     Past Medical History:   Diagnosis Date    Cancer ([facility] [address] )     Hypertension     Spinal stenosis        History reviewed  No pertinent surgical history  Meds/Allergies:    Prior to Admission medications    Medication Sig Start Date End Date Taking?  Authorizing Provider   chlorthalidone 25 mg tablet Take 25 mg by mouth daily   Yes Historical Provider, MD   enalapril (VASOTEC) 5 mg tablet Take 5 mg by mouth 2 (two) times a day     Yes Historical Provider, MD   metoprolol succinate (TOPROL-XL) 50 mg 24 hr tablet Take 50 mg by mouth daily   Yes Historical Provider, MD   potassium chloride (MICRO-K) 10 MEQ CR capsule Take 10 mEq by mouth 2 (two) times a day   Yes Historical Provider, MD     I have reviewed home medications with a medical source (PCP, Pharmacy, other)  Allergies: No Known Allergies    Social History:     Marital Status: /Civil Union   Occupation: none  Patient Pre-hospital Living Situation: home  Patient Pre-hospital Level of Mobility: reg  Patient Pre-hospital Diet Restrictions: reg  Substance Use History:   History   Alcohol Use    Yes     Comment: social      History   Smoking Status    Never Smoker   Smokeless Tobacco    Not on file     History   Drug Use No       Family History:    non-contributory    Physical Exam:     Vitals:   Blood Pressure: (!) 203/84 (10/19/17 0141)  Pulse: 77 (10/19/17 0141)  Temperature: 98 4 °F (36 9 °C) (10/18/17 2122)  Respirations: 16 (10/19/17 0141)  Height: 5' 10" (177 8 cm) (10/18/17 2122)  Weight - Scale: 83 9 kg (185 lb) (10/18/17 2122)  SpO2: 95 % (10/19/17 0141)    Physical Exam   Constitutional: He is oriented to person, place, and time  He appears well-developed  HENT:   Head: Normocephalic  Eyes: Pupils are equal, round, and reactive to light  Neck: Normal range of motion  Cardiovascular: Regular rhythm  Pulmonary/Chest: No respiratory distress  Abdominal: There is tenderness  There is no rebound and no guarding  Musculoskeletal: He exhibits no edema  Neurological: He is alert and oriented to person, place, and time  No cranial nerve deficit  Coordination normal    Skin:   Flushing face   Psychiatric: He has a normal mood and affect  Additional Data:     Lab Results: I have personally reviewed pertinent reports          Results from last 7 days  Lab Units 10/18/17  2133   WBC Thousand/uL 11 76*   HEMOGLOBIN g/dL 13 8   HEMATOCRIT % 41 2   PLATELETS Thousands/uL 173   NEUTROS PCT % 85*   LYMPHS PCT % 9*   MONOS PCT % 6   EOS PCT % 0       Results from last 7 days  Lab Units 10/18/17  2133   SODIUM mmol/L 141   POTASSIUM mmol/L 4 3   CHLORIDE mmol/L 105   CO2 mmol/L 28   BUN mg/dL 23   CREATININE mg/dL 0 87   CALCIUM mg/dL 8 6   TOTAL PROTEIN g/dL 7 1   BILIRUBIN TOTAL mg/dL 1 24*   ALK PHOS U/L 97   ALT U/L 50   AST U/L 35   GLUCOSE RANDOM mg/dL 129       Results from last 7 days  Lab Units 10/18/17  2133   INR  0 99       Imaging: I have personally reviewed pertinent reports  Ct Abdomen Pelvis With Contrast    Result Date: 10/18/2017  Narrative: CT ABDOMEN AND PELVIS WITH IV CONTRAST INDICATION:  Epigastric abdominal pain  History of Crohn's disease  COMPARISON: None  TECHNIQUE:  CT examination of the abdomen and pelvis was performed  Reformatted images were created in axial, sagittal, and coronal planes  Radiation dose length product (DLP) for this visit:  573 7 mGy-cm   This examination, like all CT scans performed in the [facility], was performed utilizing techniques to minimize radiation dose exposure, including the use of iterative reconstruction and automated exposure control  IV Contrast:  100 mL of iohexol (OMNIPAQUE)  350 Enteric Contrast:  Enteric contrast was not administered  FINDINGS: ABDOMEN LOWER CHEST:  No significant abnormalities identified in the lower chest  LIVER/BILIARY TREE:  Mildly enlarged measuring more than 18 cm craniocaudally  No discrete hepatic lesions identified  No intrahepatic ductal dilatation  Common bile duct measures up to 7 mm, normal for patient's age  GALLBLADDER:  Gallbladder is slightly distended measuring more than 10 cm in length x 4 4 cm diameter  Trace pericholecystic fluid  Several tiny gallstones are seen layering dependently  Slight inflammation of pericholecystic fat  There appears to be  a 2 mm calculus in the cystic duct image 2/24/ and image 601/75 SPLEEN:  Unremarkable  PANCREAS:  Unremarkable  ADRENAL GLANDS:  Unremarkable  KIDNEYS/URETERS:  Right side interpolar cortical cyst measuring 8mm  STOMACH AND BOWEL:  Limited evaluation of GI tract without oral contrast   Stomach is mostly collapsed  Gastric wall appears somewhat thickened which is probably due to incomplete distention  The small bowel appears average caliber  There is a surgical anastomosis in the midabdomen anteriorly which this probably patent  Colonic diverticulosis without evidence of diverticulitis  APPENDIX:  No findings to suggest appendicitis  ABDOMINOPELVIC CAVITY:  No ascites or free intraperitoneal air  No lymphadenopathy  VESSELS:  Calcified atherosclerotic plaque  No aneurysm or dissection  PELVIS REPRODUCTIVE ORGANS:  Enlarged prostate indenting urinary bladder base  Seminal vesicles are symmetric  Partially obscured by beam hardening artifact from patient's left hip arthroplasty  URINARY BLADDER:  Partially obscured by beam hardening artifact from patient's left hip arthroplasty  ABDOMINAL WALL/INGUINAL REGIONS:  Small fat-containing inguinal hernias  OSSEOUS STRUCTURES:  Advanced degenerative changes right hip  Total left hip arthroplasty in satisfactory position  Degenerative changes throughout the lumbar spine  Impression: The gallbladder is mildly distended with several calcified gallstones visible as well as trace pericholecystic fluid and slight inflammation of the pericholecystic fat  There also appears to be a 2 mm calculus in the cystic duct image 2/24  Suspect cholecystitis  Biliary ducts are normal caliber for patient's age  Please correlate clinically  HIDA scan would be helpful for confirmation  ##cfslh I personally discussed this result with Solomon Soares on 10/18/2017 11:16 PM  ## Limited evaluation of GI tract without oral contrast   Gastric wall thickening which may be due to incomplete distention  Consider gastritis as a potential etiology  Small bowel anastomosis in the midabdomen appears unremarkable to the limits of visualization  No obvious active Crohn's disease  Mild hepatomegaly  Workstation performed: [id number]       EKG, Pathology, and Other Studies Reviewed on Admission:   · EKG: sinus    Allscripts / Epic Records Reviewed: Yes     ** Please Note: This note has been constructed using a voice recognition system   **

## 2017-10-19 NOTE — CONSULTS
Consultation - General Surgery   Shahram Rasheed  74 y o  male MRN: [id number]  Unit/Bed#: ED 01 Encounter: [id number]    Assessment/Plan     Assessment:  73 yo M with chest pain, ct scan with evidence of distended gb, pericholecystic fluid and elevated T bili    Patient also currently with HTN urgency, BP recorded SBP>250    Plan:  Admit to medicine, patient will need control of blood pressure and in the event that patient would need surgical intervention will need medicine and cardiology clearance  - Although patient with Ct evidence of distended GB and stone within cystic duct patients story is not consistent with acute cholecystitis   - Monitor LFTs in am   - Please leave patient NPO while undergoing evaluation by surgery   - will start on antibiotics to cover for acute cholecystitis   - patient would benefit from HIDA scan prior to any planned surgical intervention   - trend troponins         History of Present Illness HPI:  Shahram Rasheed  is a 74 y o  male who presents with chest pain of 1 day  Pain began earlier this evening, although he does report a brief similar episode yesterday  Pain was not associated with any nausea or vomiting  No changes in bowel habits  Patient denies any other abdominal pain  He denies ever having pain like this in the past    No headaches or changes in vision  Patient does report having high blood pressure occasionally, more consistant with "white coat syndrome" reports having blood pressures systolics near 469 when he geos to the PCP, however after a period of time the blood pressures decrease  In ED patient was found to have elevated blood pressures Systolics >528  Given labetalol x 2  Also given "GI cocktail" at the time of my examination patients pain has completely subsided, however he remains hypertensive  Consults    Review of Systems   Constitutional: Negative for appetite change and fever  HENT: Negative for congestion      Respiratory: Positive for chest tightness  Negative for cough and shortness of breath  Cardiovascular: Positive for chest pain  Negative for palpitations and leg swelling  Gastrointestinal: Negative for abdominal pain, constipation, diarrhea and nausea  Musculoskeletal: Negative for back pain  Neurological: Negative for headaches  Historical Information   Past Medical History:   Diagnosis Date    Cancer ([facility] [address]     Hypertension     Spinal stenosis      History reviewed  No pertinent surgical history  Social History   History   Alcohol Use    Yes     Comment: social      History   Drug Use No     History   Smoking Status    Never Smoker   Smokeless Tobacco    Not on file     Family History: non-contributory    Meds/Allergies   all current active meds have been reviewed and current meds:   Current Facility-Administered Medications   Medication Dose Route Frequency    niCARdipine (CARDENE) 25 mg (STANDARD CONCENTRATION) in sodium chloride 0 9% 250 mL  1-15 mg/hr Intravenous Titrated     No Known Allergies    Objective   First Vitals:   Blood Pressure: (!) 192/90 (10/18/17 2137)  Pulse: 71 (10/18/17 2122)  Temperature: 98 4 °F (36 9 °C) (10/18/17 2122)  Respirations: 20 (10/18/17 2122)  Height: 5' 10" (177 8 cm) (10/18/17 2122)  Weight - Scale: 83 9 kg (185 lb) (10/18/17 2122)  SpO2: 95 % (10/18/17 2122)    Current Vitals:   Blood Pressure: (!) 247/108 (10/18/17 2345)  Pulse: 69 (10/18/17 2345)  Temperature: 98 4 °F (36 9 °C) (10/18/17 2122)  Respirations: 16 (10/18/17 2345)  Height: 5' 10" (177 8 cm) (10/18/17 2122)  Weight - Scale: 83 9 kg (185 lb) (10/18/17 2122)  SpO2: 95 % (10/18/17 2345)    No intake or output data in the 24 hours ending 10/19/17 0016    Invasive Devices     Peripheral Intravenous Line            Peripheral IV 10/18/17 Right Forearm less than 1 day    Peripheral IV 10/19/17 Left Antecubital less than 1 day                Physical Exam   Constitutional: He is oriented to person, place, and time   He appears well-developed and well-nourished  HENT:   Head: Normocephalic  Eyes: Pupils are equal, round, and reactive to light  Neck: Normal range of motion  Cardiovascular: Normal rate and regular rhythm  Pulmonary/Chest: Effort normal    Abdominal: Soft  He exhibits no distension  There is no tenderness  There is no rebound  Musculoskeletal: Normal range of motion  Neurological: He is alert and oriented to person, place, and time  Skin: Skin is warm  Lab Results:   I have personally reviewed pertinent lab results  , CBC:   Lab Results   Component Value Date    WBC 11 76 (H) 10/18/2017    HGB 13 8 10/18/2017    HCT 41 2 10/18/2017    MCV 89 10/18/2017     10/18/2017    MCH 29 7 10/18/2017    MCHC 33 5 10/18/2017    RDW 13 5 10/18/2017    MPV 11 5 10/18/2017    NRBC 0 10/18/2017   , CMP:   Lab Results   Component Value Date     10/18/2017    K 4 3 10/18/2017     10/18/2017    CO2 28 10/18/2017    ANIONGAP 8 10/18/2017    BUN 23 10/18/2017    CREATININE 0 87 10/18/2017    GLUCOSE 129 10/18/2017    CALCIUM 8 6 10/18/2017    AST 35 10/18/2017    ALT 50 10/18/2017    ALKPHOS 97 10/18/2017    PROT 7 1 10/18/2017    ALBUMIN 3 8 10/18/2017    BILITOT 1 24 (H) 10/18/2017    EGFR 85 10/18/2017     Imaging: I have personally reviewed pertinent reports  EKG, Pathology, and Other Studies: I have personally reviewed pertinent reports  Counseling / Coordination of Care  Total floor / unit time spent today 30 minutes  Greater than 50% of total time was spent with the patient and / or family counseling and / or coordination of care  A description of the counseling / coordination of care: 30

## 2017-10-19 NOTE — CONSULTS
Consultation - GI   Delgado Hand  74 y o  male MRN: [id number]  Unit/Bed#: [facility] 405-01 Encounter: [id number]      Assessment/Plan     Assessment:  Patient is a 57-year-old male presents to the hospital with hypertensive emergency and the incidental finding of gallbladder distention and 2 mm stone in cystic duct  Plan:  Cholelithiasis/choledocholithiasis-2 mm stone seen and cystic duct with a mildly distended gallbladder, trace pericholecystic fluid, and slight inflammation seen on CT scan  Surgery following and started on antibiotics, made NPO  Patient without pain, only has an elevated bilirubin at 1 55  Possibility of a passed stone, may need further imaging with MRCP  History of Crohn's-says he does not take any medications for this and does not have any symptoms, has history of small-bowel resection due to disease  Last colonoscopy over 10 years ago  Hypertensive emergency-cardiology  following, still hypertensive with systolics of 338E   Primary care per [facility]    History of Present Illness   Physician Requesting Consult: Don Aguirre MD  Reason for Consult / Principal Problem: choledocholithiasis    HPI: Delgado Hand  is a 74y o  year old male with PMH hypertension, right carotid artery stenosis, 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 who presents with less than one day of full epigastric pain  He had a bout of this pain two days prior which was resolved with Pepto-Bismol  Upon eating yesterday the dull pain returned and was steady and was not relieved with antacids  It is not associated with nausea, or vomiting, the pain did not radiate, the pain subsided once he was in the emergency department and has not returned  He was found to be hypertensive to 255/100 in the emergency department  He does not have any right upper quadrant pain, nor has he ever  He has no history of gallbladder disease  He has not had any nausea, vomiting, constipation, diarrhea, bloody stools  He has a history of Crohn's disease, denies taking any medications for it  Had a small-bowel resection in the past due to an obstruction  Last colonoscopy was over 10 years ago and says his colon was perforated  CT scan was done and a 2 mm stone was seen in the cystic duct with a mildly distended gallbladder, trace pericholecystic fluid, with slight inflammation  Inpatient consult to gastroenterology  Performed by: Carin Lino  Authorized by: Cassondra Rinne           Review of Systems   Constitutional: Negative for appetite change, chills and fever  Respiratory: Negative for chest tightness and shortness of breath  Cardiovascular: Negative for chest pain and leg swelling  Gastrointestinal: Negative for abdominal distention, abdominal pain, blood in stool, constipation, diarrhea, nausea and vomiting  Skin: Negative for color change  Neurological: Negative for dizziness  Psychiatric/Behavioral: Negative for agitation  Historical Information   Past Medical History:   Diagnosis Date    Cancer ([facility] [address]     Hypertension     Spinal stenosis      History reviewed  No pertinent surgical history  Social History   History   Alcohol Use    Yes     Comment: social      History   Drug Use No     History   Smoking Status    Former Smoker    Packs/day: 2 00    Years: 3 00    Quit date: 1966   Smokeless Tobacco    Not on file     Family History: non-contributory    Meds/Allergies   all current active meds have been reviewed    No Known Allergies    Objective       Intake/Output Summary (Last 24 hours) at 10/19/17 0909  Last data filed at 10/19/17 0655   Gross per 24 hour   Intake              490 ml   Output              550 ml   Net              -60 ml       Invasive Devices:   Peripheral IV 10/18/17 Right Forearm (Active)   Site Assessment Clean;Dry; Intact 10/19/2017  3:00 AM   Dressing Type Transparent 10/19/2017  3:00 AM   Line Status Infusing 10/19/2017  3:00 AM   Dressing Status Clean; Intact;Dry 10/19/2017  3:00 AM       Peripheral IV 10/19/17 Left Antecubital (Active)   Site Assessment Clean;Dry; Intact 10/19/2017  3:00 AM   Dressing Type Transparent 10/19/2017  3:00 AM   Line Status Flushed; Infusing 10/19/2017  3:00 AM   Dressing Status Clean;Dry; Intact 10/19/2017  3:00 AM       Physical Exam   Constitutional: He is oriented to person, place, and time  He appears well-developed and well-nourished  HENT:   Head: Normocephalic and atraumatic  Eyes: Pupils are equal, round, and reactive to light  Cardiovascular: Normal rate and regular rhythm  hypertensive   Pulmonary/Chest: Effort normal  No respiratory distress  No chest wall tenderness   Abdominal: Soft  He exhibits no distension and no mass  There is no tenderness  There is no rebound and no guarding  Neurological: He is alert and oriented to person, place, and time  Skin: Skin is warm and dry  Psychiatric: He has a normal mood and affect  Lab Results: I have personally reviewed pertinent reports  Imaging Studies: I have personally reviewed pertinent reports  EKG, Pathology, and Other Studies: I have personally reviewed pertinent reports        VTE Prophylaxis: SCDs

## 2017-10-19 NOTE — CONSULTS
Consult - Critical Care Resource Service    Katie Angeles  74 y o  male MRN: [id number]  Unit/Bed#: ED 01 Encounter: [id number]    Inpatient consult to Karli Peguero performed by: Wilber Gill ordered by: Dusty Ledezma      Physician Requesting Consult: Priya Oden MD    Chief Complaint: "It hurt right over my breast bone, but it's gone now"    History of Present Illness      Katie Angeles  is a 74 y o  male who presents with a < 1 day history of chest pain and nausea  When asked to point to where the pain is the most severe, he points directly over his xiphoid process  He was incidentally noted to be severely hypertensive in the emergency department with systolics > 417  He states that he is compliant with his home antihypertensive medications, and took them today  He denies nausea, vomiting, shortness of breath, palpitations, fever, headache, blurry vision, or weakness  A CT of his abdomen was obtained which showed a distended gallbladder and pericholecystic fluid  The patient has a past medical history significant for hypertension (baseline -140), spinal stenosis, and right carotid stenosis which was diagnosed in 2014  When asked about this, the patient denies knowledge of being told he had carotid disease, and has not followed up  History obtained from chart review and the patient   ---------------------------------------------------------------------------------------------------------  Assessment and Plan  Principal Problem:    Hypertensive emergency  Active Problems:    Cholecystitis with cholelithiasis    Leukocytosis    Total bilirubin, elevated    70% stenosis of right ICA    Abdominal pain  Resolved Problems:    Chest pain      Neuro:   · 70% stenosis of right ICA- no focal deficits at this time  Patient was diagnosed in 2014, but denies knowledge of this and did not follow up    He was seen by Dr Rachele Glez at the time, and she recommended aspirin, statin, and repeat imaging in 6 months  Check CT head now to rule out evolving infarct  Q2hr neuro checks  May benefit from repeat CTA neck this admission  But also recommend starting statin and continuing aspirin at discharge      CV:   · Hypertensive emergency- resume home antihypertensive medications: Chlorthalidone, enalapril, and metoprolol  Cardene drip started in the emergency department, continue to maintain systolic blood pressures [phone] for tonight to avoid symptomatic hypotension  Labetalol and hydralazine ordered PRN  Echocardiogram ordered by Internal Medicine  Continue close telemetry monitoring  Pulm:  · No acute issues  Maintain SpO2 >92%  Continue pulmonary hygiene  Incentive spirometer q1h while awake, encourage coughing and deep breathing  GI:   · Acute calculous cholecystitis- general surgery consulted, appreciate recommendations  Continue antibiotics detail below  Patient given GI cocktail which alleviated abdominal pain  Continue BID Protonix for now, but can likely decrease to daily pending GI evaluation  Possible HIDA scan this admission  Continue to monitor LFTs and abdominal exam        :   · No acute issues  Last known creatinine 0 97 in 2014  No Quiroz   Strict q4h I/O monitoring  Continue to follow renal function tests  F/E/N:   · Gentle maintenance IV fluids  · Electrolytes WNL, repeat BMP in morning  · NPO at this time    Heme/Onc:   · No acute issues  Continue to trend hemoglobin and platelets  · PPx- Lovenox Q 24 hours and SCDs to bilateral lower extremities    Endo:   · No acute issues  Last A1c 5 2% in 2014  Check A1c in AM  Goal -180  ID:   · Leukocytosis secondary to of the acute cholecystitis- cefazolin and Flagyl  Continue to monitor fever and WBC curve  MSK/Skin:   · Reposition q2h, eliminate pressures points  · Close skin surveillance   · PT OT      Dispo:  Step-down level 1    Discussed the plan of care with patient and his wife  All questions addressed and answered  Critical Care will continue to follow until the patient blood pressure is better maintained off of Cardene infusion  Code Status: Level 1 - Full Code  ---------------------------------------------------------------------------------------------------------  Historical Information   Past Medical History:   Diagnosis Date    Cancer ([facility] [address] )     Hypertension     Spinal stenosis      History reviewed  No pertinent surgical history    Social History   History   Alcohol Use    Yes     Comment: social      History   Drug Use No     History   Smoking Status    Never Smoker   Smokeless Tobacco    Not on file     Exercise History: Independent ADL  Family History:   Family History   Problem Relation Age of Onset    Arthritis Mother     Heart attack Father        Meds/Allergies     (Not in a hospital admission)  Current Facility-Administered Medications   Medication Dose Route Frequency    acetaminophen (TYLENOL) tablet 650 mg  650 mg Oral Q6H PRN    aspirin chewable tablet 81 mg  81 mg Oral Daily    ceFAZolin (ANCEF) IVPB (premix) 1,000 mg  1,000 mg Intravenous Q8H    chlorthalidone tablet 25 mg  25 mg Oral Daily    cloNIDine (CATAPRES) tablet 0 2 mg  0 2 mg Oral Once    docusate sodium (COLACE) capsule 100 mg  100 mg Oral BID    enalapril (VASOTEC) tablet 10 mg  10 mg Oral BID    enoxaparin (LOVENOX) subcutaneous injection 40 mg  40 mg Subcutaneous Daily    metoprolol succinate (TOPROL-XL) 24 hr tablet 50 mg  50 mg Oral Daily    metroNIDAZOLE (FLAGYL) IVPB (premix) 500 mg  500 mg Intravenous Q8H    niCARdipine (CARDENE) 25 mg (STANDARD CONCENTRATION) in sodium chloride 0 9% 250 mL  1-15 mg/hr Intravenous Titrated    niCARdipine (CARDENE) 25 mg (STANDARD CONCENTRATION) in sodium chloride 0 9% 250 mL  1-15 mg/hr Intravenous Titrated    nitroglycerin (NITROSTAT) SL tablet 0 4 mg  0 4 mg Sublingual Q5 Min PRN    ondansetron (ZOFRAN) injection 4 mg  4 mg Intravenous Q4H PRN    pantoprazole (PROTONIX) injection 40 mg  40 mg Intravenous Q12H [address]    polyethylene glycol (MIRALAX) packet 17 g  17 g Oral Daily    potassium chloride (K-DUR,KLOR-CON) CR tablet 10 mEq  10 mEq Oral BID    senna (SENOKOT) tablet 8 6 mg  1 tablet Oral Daily    sodium chloride 0 9 % infusion  75 mL/hr Intravenous Continuous      No Known Allergies    Review of Systems   Constitutional: Negative  Negative for appetite change, chills, diaphoresis, fatigue and fever  HENT: Negative  Eyes: Negative  Negative for photophobia and visual disturbance  Respiratory: Positive for chest tightness  Negative for cough and shortness of breath  Cardiovascular: Positive for chest pain  Negative for palpitations and leg swelling  Gastrointestinal: Positive for abdominal pain  Negative for constipation, diarrhea, nausea and vomiting  Endocrine: Negative  Genitourinary: Negative  Musculoskeletal: Negative  Negative for neck pain and neck stiffness  Skin: Negative  Allergic/Immunologic: Negative  Neurological: Negative  Negative for dizziness, facial asymmetry, weakness, light-headedness, numbness and headaches  Hematological: Negative  Psychiatric/Behavioral: Negative          Laboratory and Diagnostics:    Results from last 7 days  Lab Units 10/18/17  2133   WBC Thousand/uL 11 76*   HEMOGLOBIN g/dL 13 8   HEMATOCRIT % 41 2   PLATELETS Thousands/uL 173   NEUTROS PCT % 85*   MONOS PCT % 6       Results from last 7 days  Lab Units 10/18/17  2133   SODIUM mmol/L 141   POTASSIUM mmol/L 4 3   CHLORIDE mmol/L 105   CO2 mmol/L 28   BUN mg/dL 23   CREATININE mg/dL 0 87   CALCIUM mg/dL 8 6   TOTAL PROTEIN g/dL 7 1   BILIRUBIN TOTAL mg/dL 1 24*   ALK PHOS U/L 97   ALT U/L 50   AST U/L 35   GLUCOSE RANDOM mg/dL 129       Results from last 7 days  Lab Units 10/18/17  2133   INR  0 99   PTT seconds 31       EKG: NSR  Imaging: I have personally reviewed pertinent films in PACS 10/18 CT A/P: GB mildly distended with several calcified gallstones visible with trace pericholecystic fluid and slight inflammation of pericholecystic fat  2 mm calculus in cystic duct  Suspect cholecystitis  Biliary ducts normal caliber  Vitals:   Vitals:    10/19/17 0052 10/19/17 0115 10/19/17 0130 10/19/17 0141   BP: (!) 196/89 (!) 205/91 (!) 206/94 (!) 203/84   Pulse: 66 66 74 77   Resp: 18 22 16 16   Temp:       SpO2: 94% 93% 95% 95%   Weight:       Height:         Temp  Min: 98 4 °F (36 9 °C)  Max: 98 4 °F (36 9 °C)  IBW: 73 kg  Body mass index is 26 54 kg/m²  Height: 5' 10" (177 8 cm)      Physical Exam   Constitutional: He is oriented to person, place, and time  He appears well-developed and well-nourished  He is cooperative  No distress  HENT:   Head: Normocephalic and atraumatic  Mouth/Throat: Mucous membranes are normal    Eyes: EOM are normal  Pupils are equal, round, and reactive to light  No scleral icterus  Neck: Neck supple  Normal carotid pulses and no JVD present  Carotid bruit is not present  Cardiovascular: Normal rate, regular rhythm and normal heart sounds  Pulses:       Radial pulses are 2+ on the right side, and 2+ on the left side  Dorsalis pedis pulses are 2+ on the right side, and 2+ on the left side  Posterior tibial pulses are 2+ on the right side, and 2+ on the left side  No peripheral edema    Pulmonary/Chest: Effort normal  No tachypnea  No respiratory distress  He has no decreased breath sounds  He has no wheezes  He has no rhonchi  He has no rales  Abdominal: Soft  Bowel sounds are normal  He exhibits no distension  There is no tenderness  Neurological: He is alert and oriented to person, place, and time  He has normal strength  He is not disoriented  No sensory deficit  GCS eye subscore is 4  GCS verbal subscore is 5  GCS motor subscore is 6  Non-focal    Skin: Skin is warm, dry and intact  Capillary refill takes less than 2 seconds   He is not diaphoretic  Nursing note and vitals reviewed  Counseling / Coordination of Care  Total Critical Care time spent 10 minutes excluding procedures, teaching and family updates  Paolo Maurer

## 2017-10-19 NOTE — ED ATTENDING ATTESTATION
Amina Leavitt MD, saw and evaluated the patient  I have discussed the patient with the resident/non-physician practitioner and agree with the resident's/non-physician practitioner's findings, Plan of Care, and MDM as documented in the resident's/non-physician practitioner's note, except where noted  All available labs and Radiology studies were reviewed  At this point I agree with the current assessment done in the Emergency Department  I have conducted an independent evaluation of this patient a history and physical is as follows:   Lower sternal  upper abd pain    For a day   No sob   Dull   No known history of cad     No  precip factors  Had episode yest relief with pepto bismal    Today episode steady  Until  Arrival in ED    Nausea and burping  psh colitis  With bowel resection  "chrons"   Still has galbladder     No radiation of pain to the back  No fever or urinary symptoms     Has been using advil for last 2 weeks for arthritis   pmh htn    Exam  NAD anicteric neck supple normal carotids   Lungs clear heart rrr no m abd soft mild epigastric tenderness nd pos bs  Ext normal pulses symmetric  EKG  No acute ischemia  RBBB   CXR NAD      Imp chest upper abd pain    ACS vs gastritis vs gb dz vs pancreatitis  Vs pud    Plan labs trop     rx elevated bp  Critical Care Time  CritCare Time

## 2017-10-19 NOTE — CONSULTS
Consultation - Cardiology   Jose Miguel Cordon  74 y o  male MRN: [id number]  Unit/Bed#: [facility] 405-01 Encounter: [id number]      Assessment:  Principal Problem:    Hypertensive emergency  Active Problems:    Abdominal pain    Cholecystitis with cholelithiasis    Leukocytosis    Total bilirubin, elevated    70% stenosis of right ICA      Plan:  1  Hypertensive urgency-  - on amlodipine 10 mg daily, chlorthalidone 25mg daily- increase to 50mg daily, enalapril 10mg twice daily, toprol 50mg daily  If still uncontrolled, we will change metoprolol to carvedilol   - will review 2D echo to evaluate for LVH, EF and valvular function  - discussed with the patient the importance of being compliant with his medications and eating a low-salt diet  2   Mild troponin elevation-  - in the setting of hypertension   - on aspirin statin  3  Dyslipidemia-  - Lipid panel reviewed- 10yr ASCVD>10%  - atorvastatin 40 mg daily    History of Present Illness   Physician Requesting Consult: Dasha Vargas MD  Reason for Consult / Principal Problem: Hypertension  HPI: Jose Miguel Cordon  is a 74y o  year old male with past medical history of hypertension, spinal stenosis presented to the ED with a chief complaint of right-sided chest pain located under the xiphoid process  Patient had a similar episode of pain yesterday which improved with Mylanta and he thought it was secondary to indigestion  However he had S similar episode yesterday evening which was associated with bloating and belching  He took Mylanta with no relief and then presented to the ED  In the ED, patient was given a GI cocktail which improved his symptoms significantly  Since then he had no further episodes of chest discomfort  However in the ED his blood pressure was noted to be significantly elevated at 255/100 and he was started on IV nicardipine drip    Patient reports to having white coat hypertension even in the past, however on all scripts review I do not see any high blood pressure readings  He was also found to have a 2 mm stone in the cystic duct with cholelithiasis and has been NPO and started on antibiotics and is being followed by General surgery and Gastroenterology  For his hypertension, he was given IV labetalol and started on a nicardipine drip which was weaned off the at 3:40 a m  this morning  Patient was restarted on home medications of enalapril 10 mg daily, chlorthalidone 25 mg daily and metoprolol  He was also started on amlodipine 10 mg daily  Currently the patient is asymptomatic and denies any symptoms of chest pain palpitations or shortness of breath  On further questioning, he says he sometimes skips his evening doses of antihypertensives  Also eat a lot of salt  Able to do his daily activities with no limitations  No smoking or alcohol history  Father with  with MI at age 79  EKG-sinus rhythm at a rate of 70  RBBB and LAFB  Telemetry-sinus rhythm with episodes of bradycardia to 45  Inpatient consult to Cardiology  Performed by: Earnestine Avila  Authorized by: Krystian Abbott           Review of Systems:  Review of Systems   Constitutional: Negative for activity change, appetite change, chills, diaphoresis, fatigue and fever  Respiratory: Negative for cough, chest tightness and shortness of breath  Cardiovascular: Positive for chest pain  Negative for palpitations and leg swelling  Gastrointestinal: Positive for abdominal pain  Negative for nausea and vomiting  Genitourinary: Negative for dysuria  Skin: Negative for pallor  Neurological: Negative for dizziness, syncope, facial asymmetry, light-headedness, numbness and headaches  14 systems reviewed and negative with the exception of the above and the following    Historical Information   Past Medical History:   Diagnosis Date    Cancer ([facility] [address]     Hypertension     Spinal stenosis      History reviewed   No pertinent surgical history  History   Alcohol Use    Yes     Comment: social      History   Drug Use No     History   Smoking Status    Former Smoker    Packs/day: 2 00    Years: 3 00    Quit date:    Smokeless Tobacco    Not on file     Family History:  Father with  with MI at age 79  Meds/Allergies   all current active meds have been reviewed  No Known Allergies    Objective   Vitals: Blood pressure (!) 182/61, pulse (!) 49, temperature (!) 97 4 °F (36 3 °C), temperature source Oral, resp  rate 18, height 5' 10" (1 778 m), weight 87 3 kg (192 lb 7 4 oz), SpO2 93 %  , Body mass index is 27 62 kg/m² , Orthostatic Blood Pressures    Flowsheet Row Most Recent Value   Blood Pressure   182/61 filed at 10/19/2017 4781   Patient Position - Orthostatic VS  Lying filed at 10/19/2017 4008            Intake/Output Summary (Last 24 hours) at 10/19/17 1042  Last data filed at 10/19/17 0800   Gross per 24 hour   Intake           571 25 ml   Output              550 ml   Net            21 25 ml       Invasive Devices     Peripheral Intravenous Line            Peripheral IV 10/18/17 Right Forearm less than 1 day    Peripheral IV 10/19/17 Left Antecubital less than 1 day                    Physical Exam:  Physical Exam   Constitutional: He is oriented to person, place, and time  He appears well-developed and well-nourished  No distress  HENT:   Head: Normocephalic and atraumatic  Eyes: Conjunctivae are normal  No scleral icterus  Neck: Neck supple  No JVD present  Cardiovascular: Normal rate, regular rhythm and intact distal pulses  Exam reveals no gallop and no friction rub  Murmur heard  2/6 ESM in the aortic area   Pulmonary/Chest: Effort normal and breath sounds normal  He has no wheezes  He has no rales  Abdominal: Soft  Bowel sounds are normal  There is no tenderness  There is no rebound  Musculoskeletal: Normal range of motion  He exhibits no edema     Neurological: He is alert and oriented to person, place, and time  Skin: Skin is warm and dry  Psychiatric: He has a normal mood and affect  Lab Results:     Lab Results   Component Value Date    TROPONINI 0 05 (H) 10/19/2017       Lab Results   Component Value Date    GLUCOSE 104 10/19/2017    CALCIUM 8 1 (L) 10/19/2017     10/19/2017    K 3 4 (L) 10/19/2017    CO2 26 10/19/2017     10/19/2017    BUN 18 10/19/2017    CREATININE 0 72 10/19/2017       Lab Results   Component Value Date    WBC 11 08 (H) 10/19/2017    HGB 12 7 10/19/2017    HCT 37 7 10/19/2017    MCV 89 10/19/2017     10/19/2017       Lab Results   Component Value Date    CHOL 161 10/19/2017     Lab Results   Component Value Date    HDL 49 10/19/2017     Lab Results   Component Value Date    LDLCALC 100 10/19/2017     Lab Results   Component Value Date    TRIG 62 10/19/2017       Lab Results   Component Value Date    ALT 37 10/19/2017    AST 12 10/19/2017         Results from last 7 days  Lab Units 10/18/17  2133   INR  0 99       Cardiac testing:   No results found for this or any previous visit  No results found for this or any previous visit  No procedure found  No results found for this or any previous visit  Imaging: I have personally reviewed pertinent reports  X-ray Chest 2 Views    Result Date: 10/19/2017  Narrative: CHEST - DUAL ENERGY INDICATION:  Chest pain  COMPARISON:  None VIEWS:  PA (including soft tissue/bone algorithms) and lateral projections IMAGES:  5 FINDINGS:     Cardiomediastinal silhouette appears unremarkable  The lungs are clear  No pneumothorax or pleural effusion  Visualized osseous structures appear within normal limits for the patient's age  Impression: No active pulmonary disease  Workstation performed: [id number]     Ct Head Wo Contrast    Result Date: 10/19/2017  Narrative: CT BRAIN - WITHOUT CONTRAST INDICATION:  Evolving infarct or hemorrhage  Hypertensive urgency   COMPARISON:  February 18, 2014 TECHNIQUE:  CT examination of the brain was performed  In addition to axial images, coronal reformatted images were created and submitted for interpretation  Radiation dose length product (DLP) for this visit:  1370 49 mGy-cm   This examination, like all CT scans performed in the [facility], was performed utilizing techniques to minimize radiation dose exposure, including the use of iterative reconstruction and automated exposure control  IMAGE QUALITY:  Initial exam excluded portion of the frontal lobes  Imaging was repeated  FINDINGS:  PARENCHYMA:  No intracranial mass, mass effect or midline shift  No CT signs of acute infarction  There is no parenchymal hemorrhage  VENTRICLES AND EXTRA-AXIAL SPACES:  Normal for patient's age  VISUALIZED ORBITS AND PARANASAL SINUSES:  Unremarkable  CALVARIUM AND EXTRACRANIAL SOFT TISSUES:   Normal      Impression: No acute intracranial abnormality  Findings are consistent with the preliminary report from [facility] which was provided shortly after completion of the exam              Workstation performed: [id number]     Ct Abdomen Pelvis With Contrast    Result Date: 10/18/2017  Narrative: CT ABDOMEN AND PELVIS WITH IV CONTRAST INDICATION:  Epigastric abdominal pain  History of Crohn's disease  COMPARISON: None  TECHNIQUE:  CT examination of the abdomen and pelvis was performed  Reformatted images were created in axial, sagittal, and coronal planes  Radiation dose length product (DLP) for this visit:  573 7 mGy-cm   This examination, like all CT scans performed in the [facility], was performed utilizing techniques to minimize radiation dose exposure, including the use of iterative reconstruction and automated exposure control  IV Contrast:  100 mL of iohexol (OMNIPAQUE)  350 Enteric Contrast:  Enteric contrast was not administered   FINDINGS: ABDOMEN LOWER CHEST:  No significant abnormalities identified in the lower chest  LIVER/BILIARY TREE:  Mildly enlarged measuring more than 18 cm craniocaudally  No discrete hepatic lesions identified  No intrahepatic ductal dilatation  Common bile duct measures up to 7 mm, normal for patient's age  GALLBLADDER:  Gallbladder is slightly distended measuring more than 10 cm in length x 4 4 cm diameter  Trace pericholecystic fluid  Several tiny gallstones are seen layering dependently  Slight inflammation of pericholecystic fat  There appears to be  a 2 mm calculus in the cystic duct image 2/24/ and image 601/75 SPLEEN:  Unremarkable  PANCREAS:  Unremarkable  ADRENAL GLANDS:  Unremarkable  KIDNEYS/URETERS:  Right side interpolar cortical cyst measuring 8mm  STOMACH AND BOWEL:  Limited evaluation of GI tract without oral contrast   Stomach is mostly collapsed  Gastric wall appears somewhat thickened which is probably due to incomplete distention  The small bowel appears average caliber  There is a surgical anastomosis in the midabdomen anteriorly which this probably patent  Colonic diverticulosis without evidence of diverticulitis  APPENDIX:  No findings to suggest appendicitis  ABDOMINOPELVIC CAVITY:  No ascites or free intraperitoneal air  No lymphadenopathy  VESSELS:  Calcified atherosclerotic plaque  No aneurysm or dissection  PELVIS REPRODUCTIVE ORGANS:  Enlarged prostate indenting urinary bladder base  Seminal vesicles are symmetric  Partially obscured by beam hardening artifact from patient's left hip arthroplasty  URINARY BLADDER:  Partially obscured by beam hardening artifact from patient's left hip arthroplasty  ABDOMINAL WALL/INGUINAL REGIONS:  Small fat-containing inguinal hernias  OSSEOUS STRUCTURES:  Advanced degenerative changes right hip  Total left hip arthroplasty in satisfactory position  Degenerative changes throughout the lumbar spine       Impression: The gallbladder is mildly distended with several calcified gallstones visible as well as trace pericholecystic fluid and slight inflammation of the pericholecystic fat  There also appears to be a 2 mm calculus in the cystic duct image 2/24  Suspect cholecystitis  Biliary ducts are normal caliber for patient's age  Please correlate clinically  HIDA scan would be helpful for confirmation  ##cfslh I personally discussed this result with Meeta Cm on 10/18/2017 11:16 PM  ## Limited evaluation of GI tract without oral contrast   Gastric wall thickening which may be due to incomplete distention  Consider gastritis as a potential etiology  Small bowel anastomosis in the midabdomen appears unremarkable to the limits of visualization  No obvious active Crohn's disease  Mild hepatomegaly   Workstation performed: [id number]

## 2017-10-19 NOTE — SOCIAL WORK
CM met w/ pt to obtain info  Pt reported he resides w/ his wife only in a 1-story ranch house w/ 12 steps to basement and 2 steps to enter house  Pt's wife Padmini Miller (c: [phone]) is primary contact  Pt reported he is independent at baseline w/ ambulating and performing his ADLS  Pt reported having DME in home consisting of hand rails in bath, unused cane, unused rolling walker, unused rollator, unused wheelchair, and unused portable commode  Pt reported no recent hx of [address] services  Pt reported no hx of i/p rehab placements  Pt reported his PCP is Dr Lynnette Kruse through [facility] located in [facility]  Pt reported he uses [facility] located in [facility] for his Rx needs  Pt reported he is retired and receives a pension along w/ SS benefits as his sources of income  Pt is enrolled in Medicare for healthcare coverage and has supplemental insurance through [facility]) for secondary coverage and Rx benefits  Pt reported he does not have a legally pre-designated medical POA appointed for himself  Pt reported his wife can provide transportation for him at time of d/c     CM reviewed d/c planning process including the following: identifying help at home, patient preference for d/c planning needs, Discharge Lounge, [facility] Meds to Bed program, availability of treatment team to discuss questions or concerns patient and/or family may have regarding understanding medications and recognizing signs and symptoms once discharged  CM also encouraged patient to follow up with all recommended appointments after discharge  Patient advised of importance for patient and family to participate in managing patients medical well being  CM will reassess pt for d/c needs once recommendations for his aftercare are made by the tx team  CM to follow

## 2017-10-19 NOTE — RESPIRATORY THERAPY NOTE
RT Protocol Note  Jose Miguel Cordon  74 y o  male MRN: [id number]  Unit/Bed#: [facility] 405-01 Encounter: [id number]    Assessment     Principal Problem:    Hypertensive emergency  Active Problems:    Abdominal pain    Cholecystitis with cholelithiasis    Leukocytosis    Total bilirubin, elevated    70% stenosis of right ICA      Home Pulmonary Medications:  reviewed    Past Medical History:   Diagnosis Date    Cancer ([address] )     Hypertension     Spinal stenosis      Social History     Social History    Marital status: /Civil Union     Spouse name: N/A    Number of children: N/A    Years of education: N/A     Social History Main Topics    Smoking status: Never Smoker    Smokeless tobacco: None    Alcohol use Yes      Comment: social     Drug use: No    Sexual activity: Not Asked     Other Topics Concern    None     Social History Narrative    None       Subjective          Objective     Physical Exam:   Assessment Type: Assess only  General Appearance: Awake, Alert  Respiratory Pattern: Normal  Chest Assessment: Chest expansion symmetrical  Bilateral Breath Sounds: Clear  R Breath Sounds: Clear  L Breath Sounds: Clear  Cough: None    Vitals:  Blood pressure (!) 175/67, pulse 81, temperature 98 4 °F (36 9 °C), temperature source Oral, resp  rate 18, height 5' 10" (1 778 m), weight 87 3 kg (192 lb 7 4 oz), SpO2 96 %  Imaging and other studies: I have personally reviewed pertinent reports  Plan     Respiratory Plan: Discontinue Protocol, No distress/Pulmonary history        Resp Comments: Pt assessed per respiratory protocol  Pt currently not in acute resp distress, pattern regular/unlabored and denies dyspnea  No pulmonary history or resp medication usage per pt  Nebulizer therapy or respiratory management isnt indicated at this time  Protocol discontinued

## 2017-10-19 NOTE — ED PROVIDER NOTES
History  Chief Complaint   Patient presents with    Chest Pain     Patient started with chest pain yesterday which went away and started again today  Pt describes steady mid sternal chest pain, increased burping and gas  denies SOB, N/V      Patient presenting for evaluation of chest pain, nausea  Onset of symptoms was 10 hours ago  Patient describes a constant, achy chest pain  Radiation of pain is not present  Pain is 4/10 in severity  Patient has associated symptoms of nausea, abdominal pain, abd discomfort  Patient does not have symptoms of vomiting, diaphoresis, shortness of breath, lightheadedness, leg swelling, leg pain  Aggravating symptoms include nothing  Treatment prior to arrival includes nothing  Patient has had no recent travel, no leg swelling/pain  Patient has history of hypertension  Chest Pain   Associated symptoms: no abdominal pain, no fatigue, no fever, no nausea and not vomiting        Prior to Admission Medications   Prescriptions Last Dose Informant Patient Reported? Taking?   chlorthalidone 25 mg tablet 10/17/2017 at [address] Yes Yes   Sig: Take 25 mg by mouth daily   enalapril (VASOTEC) 5 mg tablet 10/18/2017 at [address] Yes Yes   Sig: Take 5 mg by mouth 2 (two) times a day     metoprolol succinate (TOPROL-XL) 50 mg 24 hr tablet 10/18/2017 at [address] Self Yes Yes   Sig: Take 50 mg by mouth daily   potassium chloride (MICRO-K) 10 MEQ CR capsule 10/18/2017 at [address] Self Yes Yes   Sig: Take 10 mEq by mouth 2 (two) times a day      Facility-Administered Medications: None       Past Medical History:   Diagnosis Date    [address] [address] )     Hypertension     Spinal stenosis        History reviewed  No pertinent surgical history  Family History   Problem Relation Age of Onset    Arthritis Mother     Heart attack Father      I have reviewed and agree with the history as documented      Social History   Substance Use Topics    Smoking status: Former Smoker     Packs/day: 2 00     Years: 3 00     Quit date: 1966    Smokeless tobacco: Not on file    Alcohol use Yes      Comment: social         Review of Systems   Constitutional: Negative for activity change, appetite change, chills, fatigue and fever  HENT: Negative  Eyes: Negative  Respiratory: Negative  Cardiovascular: Positive for chest pain  Gastrointestinal: Negative for abdominal distention, abdominal pain, blood in stool, constipation, diarrhea, nausea and vomiting  Endocrine: Negative  Genitourinary: Negative for decreased urine volume, difficulty urinating, dysuria, enuresis, flank pain, frequency, hematuria, penile swelling, scrotal swelling, testicular pain and urgency  Musculoskeletal: Negative  Skin: Negative  Allergic/Immunologic: Negative  Neurological: Negative  Hematological: Negative  Psychiatric/Behavioral: Negative  All other systems reviewed and are negative  Physical Exam  ED Triage Vitals   Temperature Pulse Respirations Blood Pressure SpO2   10/18/17 2122 10/18/17 2122 10/18/17 2122 10/18/17 2137 10/18/17 2122   98 4 °F (36 9 °C) 71 20 (!) 192/90 95 %      Temp Source Heart Rate Source Patient Position - Orthostatic VS BP Location FiO2 (%)   10/19/17 0229 10/18/17 2248 10/18/17 2122 10/18/17 2122 --   Oral Monitor Lying Left arm       Pain Score       10/18/17 2122       4           Physical Exam   Constitutional: He is oriented to person, place, and time  He appears well-developed and well-nourished  HENT:   Head: Normocephalic and atraumatic  Right Ear: External ear normal    Left Ear: External ear normal    Nose: Nose normal    Mouth/Throat: Oropharynx is clear and moist    Eyes: Conjunctivae and EOM are normal  Pupils are equal, round, and reactive to light  Neck: Normal range of motion  Neck supple  No JVD present  No tracheal deviation present  No thyromegaly present  Cardiovascular: Normal rate, regular rhythm, normal heart sounds and intact distal pulses    Exam reveals no gallop and no friction rub  No murmur heard  Pulmonary/Chest: Effort normal and breath sounds normal  No stridor  No respiratory distress  He has no wheezes  He has no rales  He exhibits no tenderness  Abdominal: Soft  Bowel sounds are normal  He exhibits no distension and no mass  There is tenderness  There is no rebound and no guarding  No hernia  Musculoskeletal: Normal range of motion  He exhibits no edema, tenderness or deformity  Lymphadenopathy:     He has no cervical adenopathy  Neurological: He is alert and oriented to person, place, and time  He has normal reflexes  He displays normal reflexes  No cranial nerve deficit  He exhibits normal muscle tone  Coordination normal    Skin: Skin is warm  No rash noted  No erythema  No pallor  Psychiatric: He has a normal mood and affect  His behavior is normal  Judgment and thought content normal    Nursing note and vitals reviewed        ED Medications  Medications   ceFAZolin (ANCEF) IVPB (premix) 1,000 mg (1,000 mg Intravenous New Bag 10/21/17 1652)   potassium chloride (K-DUR,KLOR-CON) CR tablet 10 mEq (10 mEq Oral Given 10/21/17 1700)   docusate sodium (COLACE) capsule 100 mg (100 mg Oral Given 10/21/17 1700)   senna (SENOKOT) tablet 8 6 mg (8 6 mg Oral Given 10/21/17 0809)   polyethylene glycol (MIRALAX) packet 17 g (17 g Oral Not Given 10/21/17 0809)   ondansetron (ZOFRAN) injection 4 mg (not administered)   enoxaparin (LOVENOX) subcutaneous injection 40 mg (40 mg Subcutaneous Given 10/21/17 0809)   acetaminophen (TYLENOL) tablet 650 mg (not administered)   aspirin chewable tablet 81 mg (81 mg Oral Given 10/21/17 0809)   nitroglycerin (NITROSTAT) SL tablet 0 4 mg (not administered)   pantoprazole (PROTONIX) injection 40 mg (40 mg Intravenous Given 10/21/17 0809)   hydrALAZINE (APRESOLINE) injection 5 mg (5 mg Intravenous Given 10/20/17 2349)   atorvastatin (LIPITOR) tablet 40 mg (40 mg Oral Given 10/21/17 1652)   amLODIPine (NORVASC) tablet 10 mg (10 mg Oral Given 10/21/17 0809)   chlorthalidone tablet 50 mg (50 mg Oral Given 10/21/17 0808)   metroNIDAZOLE (FLAGYL) IVPB (premix) 500 mg (500 mg Intravenous New Bag 10/21/17 1230)   niCARdipine (CARDENE) 25 mg (STANDARD CONCENTRATION) in sodium chloride 0 9% 250 mL (0 mg/hr Intravenous Stopped 10/20/17 1000)   enalapril (VASOTEC) tablet 20 mg (20 mg Oral Given 10/21/17 0808)   carvedilol (COREG) tablet 12 5 mg (12 5 mg Oral Given 10/21/17 1652)   aspirin chewable tablet 324 mg (324 mg Oral Given 10/18/17 2147)   lidocaine viscous (XYLOCAINE) 2 % mucosal solution 15 mL (15 mL Swish & Spit Given 10/18/17 2148)   aluminum-magnesium hydroxide-simethicone (MYLANTA) 200-200-20 mg/5 mL oral suspension 30 mL (30 mL Oral Given 10/18/17 2148)   labetalol (NORMODYNE) injection 10 mg (10 mg Intravenous Given 10/18/17 2308)   iohexol (OMNIPAQUE) 350 MG/ML injection (MULTI-DOSE) 100 mL (100 mL Intravenous Given 10/18/17 2304)   labetalol (NORMODYNE) injection 10 mg (10 mg Intravenous Given 10/19/17 0001)   cloNIDine (CATAPRES) tablet 0 2 mg (0 2 mg Oral Given 10/19/17 0249)   chlorthalidone tablet 25 mg (25 mg Oral Given 10/19/17 1705)   labetalol (NORMODYNE) injection 10 mg (10 mg Intravenous Given 10/20/17 0543)   cloNIDine (CATAPRES) tablet 0 1 mg (0 1 mg Oral Given 10/21/17 0450)   carvedilol (COREG) tablet 6 25 mg (6 25 mg Oral Given 10/21/17 0906)       Diagnostic Studies  Labs Reviewed   COMPREHENSIVE METABOLIC PANEL - Abnormal        Result Value Ref Range Status    Total Bilirubin 1 24 (*) 0 20 - 1 00 mg/dL Final    Sodium 141  136 - 145 mmol/L Final    Potassium 4 3  3 5 - 5 3 mmol/L Final    Comment: Slightly Hemolyzed;  Results May be Affected    Chloride 105  100 - 108 mmol/L Final    CO2 28  21 - 32 mmol/L Final    Anion Gap 8  4 - 13 mmol/L Final    BUN 23  5 - 25 mg/dL Final    Creatinine 0 87  0 60 - 1 30 mg/dL Final    Comment: Standardized to IDMS reference method    Glucose 129  65 - 140 mg/dL Final    Comment:   If the patient is fasting, the ADA then defines impaired fasting glucose as > 100 mg/dL and diabetes as > or equal to 123 mg/dL  Specimen collection should occur prior to Sulfasalazine administration due to the potential for falsely depressed results  Specimen collection should occur prior to Sulfapyridine administration due to the potential for falsely elevated results  Calcium 8 6  8 3 - 10 1 mg/dL Final    AST 35  5 - 45 U/L Final    Comment: Slightly Hemolyzed; Results May be Affected  Specimen collection should occur prior to Sulfasalazine administration due to the potential for falsely depressed results  ALT 50  12 - 78 U/L Final    Comment:   Specimen collection should occur prior to Sulfasalazine and/or Sulfapyridine administration due to the potential for falsely depressed results  Alkaline Phosphatase 97  46 - 116 U/L Final    Total Protein 7 1  6 4 - 8 2 g/dL Final    Albumin 3 8  3 5 - 5 0 g/dL Final    eGFR 85  ml/min/1 73sq m Final    Narrative:     National Kidney Disease Education Program recommendations are as follows:  GFR calculation is accurate only with a steady state creatinine  Chronic Kidney disease less than 60 ml/min/1 73 sq  meters  Kidney failure less than 15 ml/min/1 73 sq  meters     CBC AND DIFFERENTIAL - Abnormal     WBC 11 76 (*) 4 31 - 10 16 Thousand/uL Final    Neutrophils Relative 85 (*) 43 - 75 % Final    Lymphocytes Relative 9 (*) 14 - 44 % Final    Neutrophils Absolute 9 99 (*) 1 85 - 7 62 Thousands/µL Final    RBC 4 65  3 88 - 5 62 Million/uL Final    Hemoglobin 13 8  12 0 - 17 0 g/dL Final    Hematocrit 41 2  36 5 - 49 3 % Final    MCV 89  82 - 98 fL Final    MCH 29 7  26 8 - 34 3 pg Final    MCHC 33 5  31 4 - 37 4 g/dL Final    RDW 13 5  11 6 - 15 1 % Final    MPV 11 5  8 9 - 12 7 fL Final    Platelets 121  857 - 390 Thousands/uL Final    nRBC 0  /100 WBCs Final    Monocytes Relative 6  4 - 12 % Final    Eosinophils Relative 0  0 - 6 % Final Basophils Relative 0  0 - 1 % Final    Lymphocytes Absolute 1 06  0 60 - 4 47 Thousands/µL Final    Monocytes Absolute 0 65  0 17 - 1 22 Thousand/µL Final    Eosinophils Absolute 0 02  0 00 - 0 61 Thousand/µL Final    Basophils Absolute 0 01  0 00 - 0 10 Thousands/µL Final   PROTIME-INR - Normal    Protime 13 1  12 1 - 14 4 seconds Final    INR 0 99  0 86 - 1 16 Final   APTT - Normal    PTT 31  23 - 35 seconds Final    Narrative: Therapeutic Heparin Range = 60-90 seconds   LIPASE - Normal    Lipase 162  73 - 393 u/L Final   TSH, 3RD GENERATION - Normal    TSH 3RD GENERATON 3 050  0 358 - 3 740 uIU/mL Final    Narrative:     Patients undergoing fluorescein dye angiography may retain small amounts of fluorescein in the body for 48-72 hours post procedure  Samples containing fluorescein can produce falsely depressed TSH values  If the patient had this procedure,a specimen should be resubmitted post fluorescein clearance  POCT TROPONIN - Normal    POC Troponin I 0 04  0 00 - 0 08 ng/ml Final    Specimen Type VENOUS   Final    Narrative:     [employer] i-Stat handheld analyzer 99% cutoff is > 0 08ng/mL in [facility] Emergency Departments    o cTnI 99% cutoff is useful only when applied to patients in the clinical setting of myocardial ischemia  o cTnI 99% cutoff should be interpreted in the context of clinical history, ECG findings and possibly cardiac imaging to establish correct diagnosis  o cTnI 99% cutoff may be suggestive but clearly not indicative of a coronary event without the clinical setting of myocardial ischemia  X-ray chest 2 views   Final Result      No active pulmonary disease  Workstation performed: [id number]         CT head wo contrast   Final Result   No acute intracranial abnormality        Findings are consistent with the preliminary report from [facility] which was provided shortly after completion of the exam                       Workstation performed: [id number]         CT abdomen pelvis with contrast   Final Result   The gallbladder is mildly distended with several calcified gallstones visible as well as trace pericholecystic fluid and slight inflammation of the pericholecystic fat  There also appears to be a 2 mm calculus in the cystic duct image 2/24  Suspect    cholecystitis  Biliary ducts are normal caliber for patient's age  Please correlate clinically  HIDA scan would be helpful for confirmation  ##cfslh   I personally discussed this result with Jose Kimbrough on 10/18/2017 11:16 PM    ##      Limited evaluation of GI tract without oral contrast   Gastric wall thickening which may be due to incomplete distention  Consider gastritis as a potential etiology  Small bowel anastomosis in the midabdomen appears unremarkable to the limits of    visualization  No obvious active Crohn's disease  Mild hepatomegaly  Workstation performed: [id number]         MRI abdomen w wo contrast and mrcp    (Results Pending)       Procedures  Procedures      Phone Consults  ED Phone Contact    ED Course  ED Course as of Oct 21 2108   Wed Oct 18, 2017   2244 Gallstones are visualized on visualized ultrasound  Will obtain CT abdomen pelvis with IV contrast   Patient remains hypertensive  Will give 10 of labetalol IV  After GI cocktail patient is currently pain-free  2316 WBC: (!) 11 76   2334 Given CT scan findings will consult General Surgery     2346 Total Bilirubin: (!) 1 24   2357 Blood pressure remains markedly elevated will try 2nd bolus of labetalol will proceed with Cardene drip if patient's blood pressure remains elevated greater than 220 systolic            Identification of Seniors at [facility] Most Recent Value   (ISAR) Identification of Seniors at Risk   Before the illness or injury that brought you to the Emergency, did you need someone to help you on a regular basis?   0 Filed at: 10/18/2017 2127   In the last 24 hours, have you needed more help than usual?  1 Filed at: 10/18/2017 2127   Have you been hospitalized for one or more nights during the past 6 months? 0 Filed at: 10/18/2017 2127   In general, do you see well?  0 Filed at: 10/18/2017 2127   In general, do you have serious problems with your memory? 0 Filed at: 10/18/2017 2127   Do you take more than three different medications every day? 1 Filed at: 10/18/2017 2127   ISAR Score  2 Filed at: 10/18/2017 2127                          MDM  Number of Diagnoses or Management Options  Cholecystitis: new and requires workup  Hypertensive urgency: new and requires workup     Amount and/or Complexity of Data Reviewed  Clinical lab tests: ordered and reviewed  Tests in the radiology section of CPT®: ordered and reviewed  Tests in the medicine section of CPT®: reviewed and ordered  Decide to obtain previous medical records or to obtain history from someone other than the patient: yes  Independent visualization of images, tracings, or specimens: yes    Patient Progress  Patient progress: stable    CritCare Time    Disposition  Final diagnoses:   Cholecystitis   Hypertensive urgency     ED Disposition     ED Disposition Condition Comment    Admit  Case was discussed with Hamilton Johnston and the patient's admission status was agreed to be Admission Status: inpatient status to the service of Dr Hamilton Johnston   Follow-up Information    None       Current Discharge Medication List      CONTINUE these medications which have NOT CHANGED    Details   chlorthalidone 25 mg tablet Take 25 mg by mouth daily      enalapril (VASOTEC) 5 mg tablet Take 5 mg by mouth 2 (two) times a day        metoprolol succinate (TOPROL-XL) 50 mg 24 hr tablet Take 50 mg by mouth daily      potassium chloride (MICRO-K) 10 MEQ CR capsule Take 10 mEq by mouth 2 (two) times a day           No discharge procedures on file  ED Provider  Attending physically available and evaluated Nirmal Petersen I managed the patient along with the ED Attending      Electronically Signed by       Paul Almanza DO  Resident  10/21/17 7693

## 2017-10-20 LAB
ALBUMIN SERPL BCP-MCNC: 3.6 G/DL (ref 3.5–5)
ALP SERPL-CCNC: 97 U/L (ref 46–116)
ALT SERPL W P-5'-P-CCNC: 32 U/L (ref 12–78)
ANION GAP SERPL CALCULATED.3IONS-SCNC: 7 MMOL/L (ref 4–13)
AST SERPL W P-5'-P-CCNC: 10 U/L (ref 5–45)
ATRIAL RATE: 68 BPM
BASOPHILS # BLD AUTO: 0.01 THOUSANDS/ΜL (ref 0–0.1)
BASOPHILS NFR BLD AUTO: 0 % (ref 0–1)
BILIRUB DIRECT SERPL-MCNC: 0.39 MG/DL (ref 0–0.2)
BILIRUB SERPL-MCNC: 2.21 MG/DL (ref 0.2–1)
BUN SERPL-MCNC: 15 MG/DL (ref 5–25)
CALCIUM SERPL-MCNC: 9.1 MG/DL (ref 8.3–10.1)
CHLORIDE SERPL-SCNC: 105 MMOL/L (ref 100–108)
CO2 SERPL-SCNC: 28 MMOL/L (ref 21–32)
CREAT SERPL-MCNC: 0.74 MG/DL (ref 0.6–1.3)
EOSINOPHIL # BLD AUTO: 0.05 THOUSAND/ΜL (ref 0–0.61)
EOSINOPHIL NFR BLD AUTO: 1 % (ref 0–6)
ERYTHROCYTE [DISTWIDTH] IN BLOOD BY AUTOMATED COUNT: 13.5 % (ref 11.6–15.1)
GFR SERPL CREATININE-BSD FRML MDRD: 91 ML/MIN/1.73SQ M
GLUCOSE SERPL-MCNC: 111 MG/DL (ref 65–140)
HCT VFR BLD AUTO: 41.8 % (ref 36.5–49.3)
HGB BLD-MCNC: 14.3 G/DL (ref 12–17)
LYMPHOCYTES # BLD AUTO: 1.2 THOUSANDS/ΜL (ref 0.6–4.47)
LYMPHOCYTES NFR BLD AUTO: 15 % (ref 14–44)
MCH RBC QN AUTO: 30.2 PG (ref 26.8–34.3)
MCHC RBC AUTO-ENTMCNC: 34.2 G/DL (ref 31.4–37.4)
MCV RBC AUTO: 88 FL (ref 82–98)
MONOCYTES # BLD AUTO: 0.6 THOUSAND/ΜL (ref 0.17–1.22)
MONOCYTES NFR BLD AUTO: 7 % (ref 4–12)
NEUTROPHILS # BLD AUTO: 6.26 THOUSANDS/ΜL (ref 1.85–7.62)
NEUTS SEG NFR BLD AUTO: 77 % (ref 43–75)
NRBC BLD AUTO-RTO: 0 /100 WBCS
P AXIS: 62 DEGREES
PLATELET # BLD AUTO: 156 THOUSANDS/UL (ref 149–390)
PMV BLD AUTO: 11.4 FL (ref 8.9–12.7)
POTASSIUM SERPL-SCNC: 3.7 MMOL/L (ref 3.5–5.3)
PR INTERVAL: 182 MS
PROT SERPL-MCNC: 6.9 G/DL (ref 6.4–8.2)
QRS AXIS: -19 DEGREES
QRSD INTERVAL: 138 MS
QT INTERVAL: 406 MS
QTC INTERVAL: 431 MS
RBC # BLD AUTO: 4.73 MILLION/UL (ref 3.88–5.62)
SODIUM SERPL-SCNC: 140 MMOL/L (ref 136–145)
T WAVE AXIS: 53 DEGREES
VENTRICULAR RATE: 68 BPM
WBC # BLD AUTO: 8.14 THOUSAND/UL (ref 4.31–10.16)

## 2017-10-20 PROCEDURE — C9113 INJ PANTOPRAZOLE SODIUM, VIA: HCPCS | Performed by: HOSPITALIST

## 2017-10-20 PROCEDURE — G8978 MOBILITY CURRENT STATUS: HCPCS

## 2017-10-20 PROCEDURE — G8979 MOBILITY GOAL STATUS: HCPCS

## 2017-10-20 PROCEDURE — 85025 COMPLETE CBC W/AUTO DIFF WBC: CPT | Performed by: NURSE PRACTITIONER

## 2017-10-20 PROCEDURE — 97163 PT EVAL HIGH COMPLEX 45 MIN: CPT

## 2017-10-20 PROCEDURE — 80076 HEPATIC FUNCTION PANEL: CPT | Performed by: NURSE PRACTITIONER

## 2017-10-20 PROCEDURE — 80048 BASIC METABOLIC PNL TOTAL CA: CPT | Performed by: HOSPITALIST

## 2017-10-20 RX ORDER — ENALAPRIL MALEATE 10 MG/1
20 TABLET ORAL 2 TIMES DAILY
Status: DISCONTINUED | OUTPATIENT
Start: 2017-10-20 | End: 2017-10-24 | Stop reason: HOSPADM

## 2017-10-20 RX ORDER — LABETALOL HYDROCHLORIDE 5 MG/ML
10 INJECTION, SOLUTION INTRAVENOUS ONCE
Status: COMPLETED | OUTPATIENT
Start: 2017-10-20 | End: 2017-10-20

## 2017-10-20 RX ORDER — CARVEDILOL 6.25 MG/1
6.25 TABLET ORAL 2 TIMES DAILY WITH MEALS
Status: DISCONTINUED | OUTPATIENT
Start: 2017-10-20 | End: 2017-10-21

## 2017-10-20 RX ADMIN — ENOXAPARIN SODIUM 40 MG: 40 INJECTION SUBCUTANEOUS at 08:42

## 2017-10-20 RX ADMIN — METOPROLOL SUCCINATE 50 MG: 50 TABLET, EXTENDED RELEASE ORAL at 08:43

## 2017-10-20 RX ADMIN — ENALAPRIL MALEATE 20 MG: 10 TABLET ORAL at 12:24

## 2017-10-20 RX ADMIN — ENALAPRIL MALEATE 20 MG: 10 TABLET ORAL at 21:01

## 2017-10-20 RX ADMIN — HYDRALAZINE HYDROCHLORIDE 5 MG: 20 INJECTION INTRAMUSCULAR; INTRAVENOUS at 04:34

## 2017-10-20 RX ADMIN — METRONIDAZOLE 500 MG: 500 INJECTION, SOLUTION INTRAVENOUS at 06:16

## 2017-10-20 RX ADMIN — DOCUSATE SODIUM 100 MG: 100 CAPSULE, LIQUID FILLED ORAL at 17:07

## 2017-10-20 RX ADMIN — CEFAZOLIN SODIUM 1000 MG: 1 SOLUTION INTRAVENOUS at 08:42

## 2017-10-20 RX ADMIN — ASPIRIN 81 MG 81 MG: 81 TABLET ORAL at 08:43

## 2017-10-20 RX ADMIN — POTASSIUM CHLORIDE 10 MEQ: 750 TABLET, EXTENDED RELEASE ORAL at 08:42

## 2017-10-20 RX ADMIN — POTASSIUM CHLORIDE 10 MEQ: 750 TABLET, EXTENDED RELEASE ORAL at 17:07

## 2017-10-20 RX ADMIN — LABETALOL 20 MG/4 ML (5 MG/ML) INTRAVENOUS SYRINGE 10 MG: at 05:43

## 2017-10-20 RX ADMIN — METRONIDAZOLE 500 MG: 500 INJECTION, SOLUTION INTRAVENOUS at 13:19

## 2017-10-20 RX ADMIN — HYDRALAZINE HYDROCHLORIDE 5 MG: 20 INJECTION INTRAMUSCULAR; INTRAVENOUS at 23:49

## 2017-10-20 RX ADMIN — CHLORTHALIDONE 50 MG: 25 TABLET ORAL at 08:44

## 2017-10-20 RX ADMIN — ATORVASTATIN CALCIUM 40 MG: 40 TABLET, FILM COATED ORAL at 17:07

## 2017-10-20 RX ADMIN — SODIUM CHLORIDE 2.5 MG/HR: 0.9 INJECTION, SOLUTION INTRAVENOUS at 06:50

## 2017-10-20 RX ADMIN — AMLODIPINE BESYLATE 10 MG: 10 TABLET ORAL at 08:43

## 2017-10-20 RX ADMIN — PANTOPRAZOLE SODIUM 40 MG: 40 INJECTION, POWDER, FOR SOLUTION INTRAVENOUS at 21:01

## 2017-10-20 RX ADMIN — CEFAZOLIN SODIUM 1000 MG: 1 SOLUTION INTRAVENOUS at 23:49

## 2017-10-20 RX ADMIN — CEFAZOLIN SODIUM 1000 MG: 1 SOLUTION INTRAVENOUS at 00:23

## 2017-10-20 RX ADMIN — CEFAZOLIN SODIUM 1000 MG: 1 SOLUTION INTRAVENOUS at 17:07

## 2017-10-20 RX ADMIN — METRONIDAZOLE 500 MG: 500 INJECTION, SOLUTION INTRAVENOUS at 21:01

## 2017-10-20 RX ADMIN — SENNOSIDES 8.6 MG: 8.6 TABLET, FILM COATED ORAL at 08:43

## 2017-10-20 RX ADMIN — CARVEDILOL 6.25 MG: 6.25 TABLET, FILM COATED ORAL at 17:07

## 2017-10-20 RX ADMIN — PANTOPRAZOLE SODIUM 40 MG: 40 INJECTION, POWDER, FOR SOLUTION INTRAVENOUS at 08:42

## 2017-10-20 NOTE — PLAN OF CARE
Problem: Potential for Falls  Goal: Patient will remain free of falls  INTERVENTIONS:  - Assess patient frequently for physical needs  -  Identify cognitive and physical deficits and behaviors that affect risk of falls    -  [place] fall precautions as indicated by assessment   - Educate patient/family on patient safety including physical limitations  - Instruct patient to call for assistance with activity based on assessment  - Modify environment to reduce risk of injury  - Consider OT/PT consult to assist with strengthening/mobility    Outcome: Progressing      Problem: PAIN - ADULT  Goal: Verbalizes/displays adequate comfort level or baseline comfort level  Interventions:  - Encourage patient to monitor pain and request assistance  - Assess pain using appropriate pain scale  - Administer analgesics based on type and severity of pain and evaluate response  - Implement non-pharmacological measures as appropriate and evaluate response  - Consider cultural and social influences on pain and pain management  - Notify physician/advanced practitioner if interventions unsuccessful or patient reports new pain   Outcome: Progressing      Problem: INFECTION - ADULT  Goal: Absence or prevention of progression during hospitalization  INTERVENTIONS:  - Assess and monitor for signs and symptoms of infection  - Monitor lab/diagnostic results  - Monitor all insertion sites, i e  indwelling lines, tubes, and drains  - Monitor endotracheal (as able) and nasal secretions for changes in amount and color  - [place] appropriate cooling/warming therapies per order  - Administer medications as ordered  - Instruct and encourage patient and family to use good hand hygiene technique  - Identify and instruct in appropriate isolation precautions for identified infection/condition   Outcome: Progressing    Goal: Absence of fever/infection during neutropenic period  INTERVENTIONS:  - Monitor WBC  - Implement neutropenic guidelines   Outcome: Progressing      Problem: SAFETY ADULT  Goal: Maintain or return to baseline ADL function  INTERVENTIONS:  -  Assess patient's ability to carry out ADLs; assess patient's baseline for ADL function and identify physical deficits which impact ability to perform ADLs (bathing, care of mouth/teeth, toileting, grooming, dressing, etc )  - Assess/evaluate cause of self-care deficits   - Assess range of motion  - Assess patient's mobility; develop plan if impaired  - Assess patient's need for assistive devices and provide as appropriate  - Encourage maximum independence but intervene and supervise when necessary  ¯ Involve family in performance of ADLs  ¯ Assess for home care needs following discharge   ¯ Request OT consult to assist with ADL evaluation and planning for discharge  ¯ Provide patient education as appropriate   Outcome: Progressing    Goal: Maintain or return mobility status to optimal level  INTERVENTIONS:  - Assess patient's baseline mobility status (ambulation, transfers, stairs, etc )    - Identify cognitive and physical deficits and behaviors that affect mobility  - Identify mobility aids required to assist with transfers and/or ambulation (gait belt, sit-to-stand, lift, walker, cane, etc )  - [place] fall precautions as indicated by assessment  - Record patient progress and toleration of activity level on Mobility SBAR; progress patient to next Phase/Stage  - Instruct patient to call for assistance with activity based on assessment  - Request Rehabilitation consult to assist with strengthening/weightbearing, etc    Outcome: Progressing    Goal: Patient will remain free of falls  INTERVENTIONS:  - Assess patient frequently for physical needs  -  Identify cognitive and physical deficits and behaviors that affect risk of falls    -  [place] fall precautions as indicated by assessment   - Educate patient/family on patient safety including physical limitations  - Instruct patient to call for assistance with activity based on assessment  - Modify environment to reduce risk of injury  - Consider OT/PT consult to assist with strengthening/mobility    Outcome: Progressing      Problem: DISCHARGE PLANNING  Goal: Discharge to home or other facility with appropriate resources  INTERVENTIONS:  - Identify barriers to discharge w/patient and caregiver  - Arrange for needed discharge resources and transportation as appropriate  - Identify discharge learning needs (meds, wound care, etc )  - Arrange for interpretive services to assist at discharge as needed  - Refer to Case Management Department for coordinating discharge planning if the patient needs post-hospital services based on physician/advanced practitioner order or complex needs related to functional status, cognitive ability, or social support system   Outcome: Progressing      Problem: Knowledge Deficit  Goal: Patient/family/caregiver demonstrates understanding of disease process, treatment plan, medications, and discharge instructions  Complete learning assessment and assess knowledge base    Interventions:  - Provide teaching at level of understanding  - Provide teaching via preferred learning methods   Outcome: Progressing      Problem: DISCHARGE PLANNING - CARE MANAGEMENT  Goal: Discharge to post-acute care or home with appropriate resources  INTERVENTIONS:  - Conduct assessment to determine patient/family and health care team treatment goals, and need for post-acute services based on payer coverage, community resources, and patient preferences, and barriers to discharge  - Address psychosocial, clinical, and financial barriers to discharge as identified in assessment in conjunction with the patient/family and health care team  - Arrange appropriate level of post-acute services according to patient's   needs and preference and payer coverage in collaboration with the physician and health care team  - Communicate with and update the patient/family, physician, and health care team regarding progress on the discharge plan  - Arrange appropriate transportation to post-acute venues   Outcome: Progressing

## 2017-10-20 NOTE — PROGRESS NOTES
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    Assessment:  57-year-old male presented to the hospital with hypertensive emergency and epigastric pain and finding of gallbladder distention a 2 mm stone in the cystic duct 2 days ago on CT scan  Plan: 1  Cholelithiasis/choledocholithiasis:    · 2 mm stone seen at the cystic duct with mild distended gallbladder, trace pericholecystic fluid, and slight inflammation seen on CT scan 2 days ago  · Surgery following currently on antibiotics  · Patient currently complains of no abdominal pain, nausea, vomiting  Patient is tolerating full diet without any nausea  White counts within normal range the patient is afebrile  · Of note bilirubin trended up from 1 55 to 2 21    · It was deemed reasonable to check MRCP to evaluate the biliary anatomy  · MRCP to be done today or tomorrow  2  History of Crohn's  · States does not take any medications for this and does not have any symptoms at this time  · History of small-bowel resection due to the disease  Last colonoscopy over 10 years ago  Possibly would benefit from another colonoscopy  3  Hypertensive emergency  · Cardiology following, on presentation systolic blood pressure greater than 234O now systolic blood pressure 613'W    Subjective:   Patient seen and examined  No acute overnight events  Patent patient states that he is feeling pretty good right now  He has eating a full diet without any nausea or vomiting  He denies any abdominal pain, constipation, diarrhea, melena, hematochezia, hematemesis, fevers, chills  Patient scheduled to have an MRCP today tomorrow  Vitals: Temp (24hrs), Av 3 °F (36 8 °C), Min:98 °F (36 7 °C), Max:98 5 °F (36 9 °C)  Current: Temperature: 98 5 °F (36 9 °C)  Vitals:    10/20/17 0751 10/20/17 0800 10/20/17 1117 10/20/17 1518   BP: 168/65 156/60 (!) 171/67 (!) 174/66   Pulse: 82  (!) 52 (!) 52   Resp: 20  18 21   Temp: 98 5 °F (36 9 °C)  98 4 °F (36 9 °C) 98 5 °F (36 9 °C)   TempSrc: Oral  Oral Oral   SpO2: 97%  97% 95%   Weight:       Height:        Body mass index is 27 62 kg/m²  I/O last 24 hours: In: 2442 1 [P O :1120; I V :1122 1; IV Piggyback:200]  Out: 5225 [Urine:5225]      Physical Exam: BP (!) 174/66 Comment: 129  Pulse (!) 52   Temp 98 5 °F (36 9 °C) (Oral)   Resp 21   Ht 5' 10" (1 778 m)   Wt 87 3 kg (192 lb 7 4 oz)   SpO2 95%   BMI 27 62 kg/m²     General Appearance:    Alert, cooperative, no distress, appears stated age   Head:    Normocephalic, without obvious abnormality, atraumatic   Eyes:    PERRL, conjunctiva/corneas clear, EOM's intact       Nose:   Nares normal, septum midline, mucosa normal, no drainage    or sinus tenderness   Throat:   Lips, mucosa, and tongue normal; teeth and gums normal   Neck:   Supple, symmetrical, trachea midline, no adenopathy;        no carotid bruit or JVD   Back:     Symmetric, no curvature, no CVA tenderness   Lungs:     Clear to auscultation bilaterally, respirations unlabored   Chest wall:    No tenderness or deformity   Heart:    Regular rate and rhythm, S1 and S2 normal, no murmur, rub   or gallop   Abdomen:     Soft, non-tender, bowel sounds active all four quadrants,     no masses, no organomegaly           Extremities:   Extremities normal, atraumatic, no cyanosis or edema   Pulses:   2+ and symmetric all extremities   Skin:   Skin color, texture, turgor normal, no rashes or lesions   Lymph nodes:   Cervical, supraclavicular, and axillary nodes normal   Neurologic:   CNII-XII intact   Normal strength, sensation and reflexes       throughout        Invasive Devices Peripheral Intravenous Line            Peripheral IV 10/18/17 Right Forearm 1 day    Peripheral IV 10/19/17 Left Antecubital 1 day                          Labs:   Recent Results (from the past 24 hour(s))   CBC and differential    Collection Time: 10/20/17  6:11 AM   Result Value Ref Range    WBC 8 14 4 31 - 10 16 Thousand/uL    RBC 4 73 3 88 - 5 62 Million/uL    Hemoglobin 14 3 12 0 - 17 0 g/dL    Hematocrit 41 8 36 5 - 49 3 %    MCV 88 82 - 98 fL    MCH 30 2 26 8 - 34 3 pg    MCHC 34 2 31 4 - 37 4 g/dL    RDW 13 5 11 6 - 15 1 %    MPV 11 4 8 9 - 12 7 fL    Platelets 391 029 - 567 Thousands/uL    nRBC 0 /100 WBCs    Neutrophils Relative 77 (H) 43 - 75 %    Lymphocytes Relative 15 14 - 44 %    Monocytes Relative 7 4 - 12 %    Eosinophils Relative 1 0 - 6 %    Basophils Relative 0 0 - 1 %    Neutrophils Absolute 6 26 1 85 - 7 62 Thousands/µL    Lymphocytes Absolute 1 20 0 60 - 4 47 Thousands/µL    Monocytes Absolute 0 60 0 17 - 1 22 Thousand/µL    Eosinophils Absolute 0 05 0 00 - 0 61 Thousand/µL    Basophils Absolute 0 01 0 00 - 0 10 Thousands/µL   Hepatic function panel    Collection Time: 10/20/17  6:11 AM   Result Value Ref Range    Total Bilirubin 2 21 (H) 0 20 - 1 00 mg/dL    Bilirubin, Direct 0 39 (H) 0 00 - 0 20 mg/dL    Alkaline Phosphatase 97 46 - 116 U/L    AST 10 5 - 45 U/L    ALT 32 12 - 78 U/L    Total Protein 6 9 6 4 - 8 2 g/dL    Albumin 3 6 3 5 - 5 0 g/dL   Basic metabolic panel    Collection Time: 10/20/17  6:11 AM   Result Value Ref Range    Sodium 140 136 - 145 mmol/L    Potassium 3 7 3 5 - 5 3 mmol/L    Chloride 105 100 - 108 mmol/L    CO2 28 21 - 32 mmol/L    Anion Gap 7 4 - 13 mmol/L    BUN 15 5 - 25 mg/dL    Creatinine 0 74 0 60 - 1 30 mg/dL    Glucose 111 65 - 140 mg/dL    Calcium 9 1 8 3 - 10 1 mg/dL    eGFR 91 ml/min/1 73sq m       Radiology Results: I have personally reviewed pertinent reports  Other Diagnostic Testing:   I have personally reviewed pertinent reports          Active Meds:   Current Facility-Administered Medications   Medication Dose Route Frequency    acetaminophen (TYLENOL) tablet 650 mg  650 mg Oral Q6H PRN    amLODIPine (NORVASC) tablet 10 mg  10 mg Oral Daily    aspirin chewable tablet 81 mg  81 mg Oral Daily    atorvastatin (LIPITOR) tablet 40 mg  40 mg Oral Daily With Dinner    carvedilol (COREG) tablet 6 25 mg  6 25 mg Oral BID With Meals    ceFAZolin (ANCEF) IVPB (premix) 1,000 mg  1,000 mg Intravenous Q8H    chlorthalidone tablet 50 mg  50 mg Oral Daily    docusate sodium (COLACE) capsule 100 mg  100 mg Oral BID    enalapril (VASOTEC) tablet 20 mg  20 mg Oral BID    enoxaparin (LOVENOX) subcutaneous injection 40 mg  40 mg Subcutaneous Daily    hydrALAZINE (APRESOLINE) injection 5 mg  5 mg Intravenous Q6H PRN    metroNIDAZOLE (FLAGYL) IVPB (premix) 500 mg  500 mg Intravenous Q8H    niCARdipine (CARDENE) 25 mg (STANDARD CONCENTRATION) in sodium chloride 0 9% 250 mL  1-15 mg/hr Intravenous Titrated    nitroglycerin (NITROSTAT) SL tablet 0 4 mg  0 4 mg Sublingual Q5 Min PRN    ondansetron (ZOFRAN) injection 4 mg  4 mg Intravenous Q4H PRN    pantoprazole (PROTONIX) injection 40 mg  40 mg Intravenous Q12H IRMA    polyethylene glycol (MIRALAX) packet 17 g  17 g Oral Daily    potassium chloride (K-DUR,KLOR-CON) CR tablet 10 mEq  10 mEq Oral BID    senna (SENOKOT) tablet 8 6 mg  1 tablet Oral Daily         VTE Pharmacologic Prophylaxis: Enoxaparin (Lovenox)  VTE Mechanical Prophylaxis: sequential compression device    Victor M Floyd MD

## 2017-10-20 NOTE — PROGRESS NOTES
Progress Note - General Surgery   Loren Cabrera  74 y o  male MRN: [id number]  Unit/Bed#: [facility] 405-01 Encounter: [id number]    Assessment:  74 M with hypertensive urgency, and possible cholecystitis    Plan:  Trending LFTs  Will follow-up MRCP  Continue Ancef/Flagyl  Likely outpatient cholecystectomy  Hypertension per primary/cardiology      Subjective/Objective     Subjective: No complaints  Blood pressure persistently elevated in the 200s, 230's this morning  Asymtomatic from this  Tolerated diet yesterday without nausea or vomiting  Passing gas but no bowel movement    Objective:    Blood pressure (!) 180/90, pulse (!) 45, temperature 98 °F (36 7 °C), temperature source Oral, resp  rate 17, height 5' 10" (1 778 m), weight 87 3 kg (192 lb 7 4 oz), SpO2 96 %  ,Body mass index is 27 62 kg/m²        Intake/Output Summary (Last 24 hours) at 10/20/17 0558  Last data filed at 10/20/17 0552   Gross per 24 hour   Intake          1742 09 ml   Output             4125 ml   Net         -2382 91 ml       Invasive Devices     Peripheral Intravenous Line            Peripheral IV 10/18/17 Right Forearm 1 day    Peripheral IV 10/19/17 Left Antecubital 1 day                Physical Exam:   General: NAD, AAOx3  CV: RRR +S1/S2  Chest: breath sounds bilaterally  Abdomen: soft, non-distended, minimally tender  Extremities: atraumatic, no edema        Results from last 7 days  Lab Units 10/19/17  0441 10/18/17  2133   WBC Thousand/uL 11 08* 11 76*   HEMOGLOBIN g/dL 12 7 13 8   HEMATOCRIT % 37 7 41 2   PLATELETS Thousands/uL 150 173       Results from last 7 days  Lab Units 10/19/17  0531 10/18/17  2133   SODIUM mmol/L 141 141   POTASSIUM mmol/L 3 4* 4 3   CHLORIDE mmol/L 105 105   CO2 mmol/L 26 28   BUN mg/dL 18 23   CREATININE mg/dL 0 72 0 87   GLUCOSE RANDOM mg/dL 104 129   CALCIUM mg/dL 8 1* 8 6       Results from last 7 days  Lab Units 10/18/17  2133   INR  0 99   PTT seconds 31

## 2017-10-20 NOTE — PROGRESS NOTES
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      Plan:    · Hypertensive urgency:  · S/p chlorthalidone 50 mg, enalapril 10 BID, amlodipine 10 mg yesterday - BP again rising, back on cardene drip  · Cont chlorthalidone 50 mg, increase enalapril to 20 BID, started on home toprol XL 50 mg, continue amlo 10 mg  · Keep step down I today  · Epigastric/chest pain:  · Suspect from biliary etiology  · Trop mild elevation, echo - no WMA  · NSTEMI type II: from accelerated HTN  · Cholelithiasis with suspected cholecystitis:  · LFT normal, t jonna going up - may have passed a stone  · Symptom free now  · CT showing GB inflammation & possible cystic duct stone  · MRCP per GI - is pending  · Surgery on board - may consider outpt cholecytectomy  · cont IV ancef & flagyl D-3    VTE Pharmacologic Prophylaxis:   Pharmacologic: Enoxaparin (Lovenox)  Mechanical VTE Prophylaxis in Place: Yes    Patient Centered Rounds: I have performed bedside rounds with nursing staff today  Discussions with Specialists or Other Care Team Provider:     Education and Discussions with Family / Patient: pateint    Time Spent for Care: 45 minutes  More than 50% of total time spent on counseling and coordination of care as described above      Current Length of Stay: 1 day(s)    Current Patient Status: Inpatient   Certification Statement: The patient will continue to require additional inpatient hospital stay due to control BP, MRCP    Discharge Plan / Estimated Discharge Date: based on course 1-3 days    Code Status: Level 1 - Full Code      Subjective:   Feels good, no CP/SOB/n/v, tolerating food    Objective:     Vitals:   Temp (24hrs), Av 1 °F (36 7 °C), Min:97 7 °F (36 5 °C), Max:98 5 °F (36 9 °C)    HR:  [44-82] 82  Resp:  [14-24] 20  BP: (150-223)/(46-90) 156/60  SpO2:  [94 %-98 %] 97 %  Body mass index is 27 62 kg/m²  Input and Output Summary (last 24 hours): Intake/Output Summary (Last 24 hours) at 10/20/17 0841  Last data filed at 10/20/17 5390   Gross per 24 hour   Intake          2460 84 ml   Output             4515 ml   Net         -2054 16 ml       Physical Exam:     Physical Exam   Constitutional: He is oriented to person, place, and time  He appears well-developed and well-nourished  HENT:   Head: Normocephalic and atraumatic  Eyes: Conjunctivae are normal  No scleral icterus  Cardiovascular: Normal rate, regular rhythm and normal heart sounds  Exam reveals no gallop and no friction rub  No murmur heard  Pulmonary/Chest: Effort normal and breath sounds normal  No respiratory distress  He has no wheezes  Abdominal: Soft  He exhibits no distension  There is no tenderness  Musculoskeletal: He exhibits no edema  Neurological: He is alert and oriented to person, place, and time  Additional Data:     Labs:      Results from last 7 days  Lab Units 10/20/17  0611   WBC Thousand/uL 8 14   HEMOGLOBIN g/dL 14 3   HEMATOCRIT % 41 8   PLATELETS Thousands/uL 156   NEUTROS PCT % 77*   LYMPHS PCT % 15   MONOS PCT % 7   EOS PCT % 1       Results from last 7 days  Lab Units 10/20/17  0611   SODIUM mmol/L 140   POTASSIUM mmol/L 3 7   CHLORIDE mmol/L 105   CO2 mmol/L 28   BUN mg/dL 15   CREATININE mg/dL 0 74   CALCIUM mg/dL 9 1   TOTAL PROTEIN g/dL 6 9   BILIRUBIN TOTAL mg/dL 2 21*   ALK PHOS U/L 97   ALT U/L 32   AST U/L 10   GLUCOSE RANDOM mg/dL 111       Results from last 7 days  Lab Units 10/18/17  2133   INR  0 99       * I Have Reviewed All Lab Data Listed Above  * Additional Pertinent Lab Tests Reviewed:  All Labs Within Last 24 Hours Reviewed    Imaging:    Imaging Reports Reviewed Today Include:   Imaging Personally Reviewed by Myself Includes:      Recent Cultures (last 7 days):           Last 24 Hours Medication List:     amLODIPine 10 mg Oral Daily   aspirin 81 mg Oral Daily   atorvastatin 40 mg Oral Daily With Dinner   cefazolin 1,000 mg Intravenous Q8H   chlorthalidone 50 mg Oral Daily   docusate sodium 100 mg Oral BID   enalapril 20 mg Oral BID   enoxaparin 40 mg Subcutaneous Daily   metoprolol succinate 50 mg Oral Daily   metroNIDAZOLE 500 mg Intravenous Q8H   pantoprazole 40 mg Intravenous Q12H IRMA   polyethylene glycol 17 g Oral Daily   potassium chloride 10 mEq Oral BID   senna 1 tablet Oral Daily        Today, Patient Was Seen By: Kamryn Jo MD    ** Please Note: This note has been constructed using a voice recognition system   **

## 2017-10-20 NOTE — PHYSICAL THERAPY NOTE
Physical Therapy Evaluation    Patient's Name: Tenisha Hernández  Admitting Diagnosis  Cholecystitis [K81 9]  Chest pain [R07 9]  Hypertensive urgency [I16 0]  Total bilirubin, elevated [R17]  Hypertensive emergency [I16 1]    Problem List  Patient Active Problem List   Diagnosis    Hypertensive emergency    Abdominal pain    Cholecystitis with cholelithiasis    Leukocytosis    Total bilirubin, elevated    70% stenosis of right ICA       Past Medical History  Past Medical History:   Diagnosis Date    Cancer ([facility] [address] )     Hypertension     Spinal stenosis        Past Surgical History  History reviewed  No pertinent surgical history  10/20/17 7158   Note Type   Note type Eval only   Pain Assessment   Pain Assessment No/denies pain   Home Living   Type of [address] [facility] One level  (2 [place]; 12 steps down to the basement)   Home Equipment Walker;Cane;Wheelchair-manual  [facility]; rollator)   Prior Function   Level of [place] Independent with ADLs and functional mobility  (amb w/o AD)   Lives With Spouse   Receives Help From Family   Restrictions/Precautions   Braces or Orthoses (denies)   Other Precautions Telemetry; Fall Risk   General   Additional Pertinent History cleared for assessment (spoke to nsg)   Cognition   Overall Cognitive Status WFL   Arousal/Participation Alert   Orientation Level Oriented to person;Oriented to place;Oriented to situation   Memory Within functional limits   Following Commands Follows one step commands without difficulty   Comments Pt is in bed; agreeable to try mobilizing OOB; pleasant and cooperative;   RUE Assessment   RUE Assessment WFL  (AROM)   LUE Assessment   LUE Assessment WFL  (AROM)   RLE Assessment   RLE Assessment WFL  (AROM)   Strength RLE   RLE Overall Strength 4-/5   LLE Assessment   LLE Assessment WFL  (AROM)   Strength LLE   LLE Overall Strength 4-/5   Bed Mobility   Supine to Sit 5  Supervision   Additional items Assist x 1;Verbal cues Transfers   Sit to Stand 4  Minimal assistance   Additional items Assist x 1;Verbal cues   Stand to Sit 4  Minimal assistance   Additional items Assist x 1;Verbal cues   Stand pivot 4  Minimal assistance   Additional items Verbal cues; Assist x 1  (bed to chair to the (R) side)   Additional Comments Pillows placed for (B) UE and head support   Ambulation/Elevation   Gait pattern Not appropriate; Not tested  (elevated BP while on Cardene drip)   Assistive Device None   Balance   Static Sitting Good   Static Standing Fair -   Dynamic Standing Poor +   Activity Tolerance   Activity Tolerance Patient limited by fatigue;Treatment limited secondary to medical complications (Comment)  (resting BP = 177/72)   Nurse Made Aware spoke to Romayne Milian, RN   Assessment   Prognosis Good   Problem List Decreased strength;Decreased endurance; Impaired balance;Decreased mobility   Assessment Pt is 74 y o  admitted with hx of CP and Dx of Hypertensive emergency, Epigastric/chest pain, NSTEMI, and Cholelithiasis with suspected cholecystitis  Pt 's comorbidities affecting POC include:  HTN, spinal stenosis, Rt carotid artery stenosis, 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 and personal factors of: [place]  Pt's clinical presentation is currently unstable/unpredictable which is evident in elevated BP while on Cardene drip, ongoing telem monitoring, abn lab values being monitored/trending and additional testing pending (MRI abd)  Pt presents w/ generalized weakness, decreased functional endurance, decreased activity tolerance, min impaired balance, inability to progress to amb due to med status and fall risk  Will cont to follow pt in PT for progressive mobilization to address above functional deficits and to max level of (I), endurance, and safety   Otherwise, anticipate pt will return home w/ family support upon D/C provided he cont improving w/ mobility skills, safety, and endurance and when medically cleared; home PT follow up may need to be considered  Will cont to follow until then  Barriers to Discharge (PARIS)   Goals   Patient Goals none expressed   STG Expiration Date 10/27/17   Short Term Goal #1 5-7 days  Pt will amb 2 x 150 ft w/ no AD, mod (I)  Pt will negotiate 2 steps w/ no hand rail,  mod (I)  Pt will achieve (I) level w/ bed mob  Pt will perform transfers w/ mod (I)  Plan   Treatment/Interventions Functional transfer training;LE strengthening/ROM; Elevations; Therapeutic exercise; Endurance training;Bed mobility;Gait training;Spoke to nursing   PT Frequency 5x/wk   Recommendation   Recommendation Other (Comment)  (likely home w/ family; r/o home PT)   Modified Cristian Scale   Modified Merrick Scale 4   Barthel Index   Feeding 10   Bathing 5   Grooming Score 5   Dressing Score 5   Bladder Score 10   Bowels Score 10   Toilet Use Score 5   Transfers (Bed/Chair) Score 10   Mobility (Level Surface) Score 0   Stairs Score 0   Barthel Index Score 60       Carlos A Patiño, PT

## 2017-10-20 NOTE — PLAN OF CARE
Problem: PHYSICAL THERAPY ADULT  Goal: Performs mobility at highest level of function for planned discharge setting  See evaluation for individualized goals  Treatment/Interventions: Functional transfer training, LE strengthening/ROM, Elevations, Therapeutic exercise, Endurance training, Bed mobility, Gait training, Spoke to nursing          See flowsheet documentation for full assessment, interventions and recommendations  Prognosis: Good  Problem List: Decreased strength, Decreased endurance, Impaired balance, Decreased mobility  Assessment: Pt is 74 y o  admitted with hx of CP and Dx of Hypertensive emergency, Epigastric/chest pain, NSTEMI, and Cholelithiasis with suspected cholecystitis  Pt 's comorbidities affecting POC include:  HTN, spinal stenosis, Rt carotid artery stenosis, 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 and personal factors of: [place]  Pt's clinical presentation is currently unstable/unpredictable which is evident in elevated BP while on Cardene drip, ongoing telem monitoring, abn lab values being monitored/trending and additional testing pending (MRI abd)  Pt presents w/ generalized weakness, decreased functional endurance, decreased activity tolerance, min impaired balance, inability to progress to amb due to med status and fall risk  Will cont to follow pt in PT for progressive mobilization to address above functional deficits and to max level of (I), endurance, and safety  Otherwise, anticipate pt will return home w/ family support upon D/C provided he cont improving w/ mobility skills, safety, and endurance and when medically cleared; home PT follow up may need to be considered  Will cont to follow until then  Barriers to Discharge:  ([place])     Recommendation: Other (Comment) (likely home w/ family; r/o home PT)          See flowsheet documentation for full assessment

## 2017-10-20 NOTE — PROGRESS NOTES
Cardiology Progress Note - Bibiana Templeton  74 y o  male MRN: [id number]    Unit/Bed#: [facility] 405-01 Encounter: [id number]      Assessment:  Principal Problem:    Hypertensive emergency  Active Problems:    Abdominal pain    Cholecystitis with cholelithiasis    Leukocytosis    Total bilirubin, elevated    70% stenosis of right ICA      Plan:  1  Hypertensive urgency-  - on amlodipine 10 mg daily, chlorthalidone 50mg daily, enalapril 10mg twice daily, toprol 50mg daily-will change to carvedilol 6 25 mg b i d  Lori Martínez Cardene drip as tolerated  It is still uncontrolled, may change amlodipine to nifedipine for better control   - echo reviewed-EF 60-65% with mild concentric LVH  Grade 2 diastolic dysfunction  Mild aortic stenosis         2  Mild troponin elevation- NSTEMI type II-  - in the setting of hypertension   - on aspirin statin      3  Dyslipidemia-  - Lipid panel reviewed- 10yr ASCVD>10%  - atorvastatin 40 mg daily       Subjective:   Patient seen and examined  Patient was hypertensive overnight requiring Cardene drip  Denies chest pain, palpitations or shortness of breath  Telemetry-sinus rhythm with episodes of sinus bradycardia to as low as 40  Objective:     Vitals: Blood pressure 156/60, pulse 82, temperature 98 5 °F (36 9 °C), temperature source Oral, resp  rate 20, height 5' 10" (1 778 m), weight 87 3 kg (192 lb 7 4 oz), SpO2 97 %  , Body mass index is 27 62 kg/m² , Orthostatic Blood Pressures    Flowsheet Row Most Recent Value   Blood Pressure  156/60 filed at 10/20/2017 0800   Patient Position - Orthostatic VS  Sitting filed at 10/20/2017 0751            Intake/Output Summary (Last 24 hours) at 10/20/17 1101  Last data filed at 10/20/17 0752   Gross per 24 hour   Intake          2160 84 ml   Output             4775 ml   Net         -2614 16 ml       Physical Exam:    GEN: Bibiana Templeton   appears well, alert and oriented x 3, pleasant and cooperative   HEENT: anicteric, mucous membranes moist  NECK:  Supple   HEART: regular rhythm, normal S1 and S2, 2/6 ejection systolic murmur in the aortic area   LUNGS: clear to auscultation bilaterally; no wheezes, rales, or rhonchi   ABDOMEN: normal bowel sounds, soft, no tenderness, no distention  EXTREMITIES: peripheral pulses normal; no clubbing, cyanosis, or edema  NEURO: no focal findings   SKIN: normal without suspicious lesions on exposed skin      Current Facility-Administered Medications:     acetaminophen (TYLENOL) tablet 650 mg, 650 mg, Oral, Q6H PRN, Kimber Oneill MD    amLODIPine (NORVASC) tablet 10 mg, 10 mg, Oral, Daily, Barbie Thompson MD, 10 mg at 10/20/17 0843    aspirin chewable tablet 81 mg, 81 mg, Oral, Daily, Kimber Oneill MD, 81 mg at 10/20/17 0843    atorvastatin (LIPITOR) tablet 40 mg, 40 mg, Oral, Daily With Maxwell Matta MD, 40 mg at 10/19/17 1705    carvedilol (COREG) tablet 6 25 mg, 6 25 mg, Oral, BID With Meals, Christian Jimenez MD    ceFAZolin (ANCEF) IVPB (premix) 1,000 mg, 1,000 mg, Intravenous, Q8H, Aspen Lime, Last Rate: 100 mL/hr at 10/20/17 0842, 1,000 mg at 10/20/17 0842    chlorthalidone tablet 50 mg, 50 mg, Oral, Daily, Christian Jimenez MD, 50 mg at 10/20/17 0844    docusate sodium (COLACE) capsule 100 mg, 100 mg, Oral, BID, Kimber Oneill MD, 100 mg at 10/19/17 1705    enalapril (VASOTEC) tablet 20 mg, 20 mg, Oral, BID, Adelia Willson MD    enoxaparin (LOVENOX) subcutaneous injection 40 mg, 40 mg, Subcutaneous, Daily, Kimber Oneill MD, 40 mg at 10/20/17 8054    hydrALAZINE (APRESOLINE) injection 5 mg, 5 mg, Intravenous, Q6H PRN, oRn Raines, MARIPOSANP, 5 mg at 10/20/17 0434    metroNIDAZOLE (FLAGYL) IVPB (premix) 500 mg, 500 mg, Intravenous, Q8H, Sid Georges MD, Last Rate: 200 mL/hr at 10/20/17 0616, 500 mg at 10/20/17 0616    niCARdipine (CARDENE) 25 mg (STANDARD CONCENTRATION) in sodium chloride 0 9% 250 mL, 1-15 mg/hr, Intravenous, Titrated, Magdalene Santillan PA-C, Last Rate: 25 mL/hr at 10/20/17 0650, 2 5 mg/hr at 10/20/17 0650    nitroglycerin (NITROSTAT) SL tablet 0 4 mg, 0 4 mg, Sublingual, Q5 Min PRN, Jesus Dean MD    ondansetron [facility]) injection 4 mg, 4 mg, Intravenous, Q4H PRN, Jesus Dean MD    pantoprazole (PROTONIX) injection 40 mg, 40 mg, Intravenous, Q12H [address], Jesus Dean MD, 40 mg at 10/20/17 0842    polyethylene glycol (MIRALAX) packet 17 g, 17 g, Oral, Daily, Jesus Dean MD, Stopped at 10/19/17 0838    potassium chloride (K-DUR,KLOR-CON) CR tablet 10 mEq, 10 mEq, Oral, BID, Jesus Dean MD, 10 mEq at 10/20/17 3340    senna (SENOKOT) tablet 8 6 mg, 1 tablet, Oral, Daily, Jesus Dean MD, 8 6 mg at 10/20/17 0843    Labs & Results:    Lab Results   Component Value Date    TROPONINI 0 05 (H) 10/19/2017       Lab Results   Component Value Date    GLUCOSE 111 10/20/2017    CALCIUM 9 1 10/20/2017     10/20/2017    K 3 7 10/20/2017    CO2 28 10/20/2017     10/20/2017    BUN 15 10/20/2017    CREATININE 0 74 10/20/2017       Lab Results   Component Value Date    WBC 8 14 10/20/2017    HGB 14 3 10/20/2017    HCT 41 8 10/20/2017    MCV 88 10/20/2017     10/20/2017       Results from last 7 days  Lab Units 10/18/17  2133   INR  0 99       Lab Results   Component Value Date    CHOL 161 10/19/2017     Lab Results   Component Value Date    HDL 49 10/19/2017     Lab Results   Component Value Date    LDLCALC 100 10/19/2017     Lab Results   Component Value Date    TRIG 62 10/19/2017       Lab Results   Component Value Date    ALT 32 10/20/2017    AST 10 10/20/2017

## 2017-10-21 LAB
ALBUMIN SERPL BCP-MCNC: 3.6 G/DL (ref 3.5–5)
ALP SERPL-CCNC: 88 U/L (ref 46–116)
ALT SERPL W P-5'-P-CCNC: 26 U/L (ref 12–78)
ANION GAP SERPL CALCULATED.3IONS-SCNC: 7 MMOL/L (ref 4–13)
AST SERPL W P-5'-P-CCNC: 10 U/L (ref 5–45)
BASOPHILS # BLD AUTO: 0.01 THOUSANDS/ΜL (ref 0–0.1)
BASOPHILS NFR BLD AUTO: 0 % (ref 0–1)
BILIRUB SERPL-MCNC: 1.77 MG/DL (ref 0.2–1)
BUN SERPL-MCNC: 21 MG/DL (ref 5–25)
CALCIUM SERPL-MCNC: 8.9 MG/DL (ref 8.3–10.1)
CHLORIDE SERPL-SCNC: 103 MMOL/L (ref 100–108)
CO2 SERPL-SCNC: 30 MMOL/L (ref 21–32)
CREAT SERPL-MCNC: 0.83 MG/DL (ref 0.6–1.3)
EOSINOPHIL # BLD AUTO: 0.08 THOUSAND/ΜL (ref 0–0.61)
EOSINOPHIL NFR BLD AUTO: 1 % (ref 0–6)
ERYTHROCYTE [DISTWIDTH] IN BLOOD BY AUTOMATED COUNT: 13.7 % (ref 11.6–15.1)
GFR SERPL CREATININE-BSD FRML MDRD: 87 ML/MIN/1.73SQ M
GLUCOSE SERPL-MCNC: 98 MG/DL (ref 65–140)
HCT VFR BLD AUTO: 43.8 % (ref 36.5–49.3)
HGB BLD-MCNC: 14.7 G/DL (ref 12–17)
LYMPHOCYTES # BLD AUTO: 1.02 THOUSANDS/ΜL (ref 0.6–4.47)
LYMPHOCYTES NFR BLD AUTO: 12 % (ref 14–44)
MCH RBC QN AUTO: 30.1 PG (ref 26.8–34.3)
MCHC RBC AUTO-ENTMCNC: 33.6 G/DL (ref 31.4–37.4)
MCV RBC AUTO: 90 FL (ref 82–98)
MONOCYTES # BLD AUTO: 0.7 THOUSAND/ΜL (ref 0.17–1.22)
MONOCYTES NFR BLD AUTO: 8 % (ref 4–12)
NEUTROPHILS # BLD AUTO: 6.88 THOUSANDS/ΜL (ref 1.85–7.62)
NEUTS SEG NFR BLD AUTO: 79 % (ref 43–75)
NRBC BLD AUTO-RTO: 0 /100 WBCS
PLATELET # BLD AUTO: 164 THOUSANDS/UL (ref 149–390)
PMV BLD AUTO: 11.2 FL (ref 8.9–12.7)
POTASSIUM SERPL-SCNC: 3.8 MMOL/L (ref 3.5–5.3)
PROT SERPL-MCNC: 6.8 G/DL (ref 6.4–8.2)
RBC # BLD AUTO: 4.89 MILLION/UL (ref 3.88–5.62)
SODIUM SERPL-SCNC: 140 MMOL/L (ref 136–145)
WBC # BLD AUTO: 8.71 THOUSAND/UL (ref 4.31–10.16)

## 2017-10-21 PROCEDURE — 85025 COMPLETE CBC W/AUTO DIFF WBC: CPT | Performed by: HOSPITALIST

## 2017-10-21 PROCEDURE — C9113 INJ PANTOPRAZOLE SODIUM, VIA: HCPCS | Performed by: HOSPITALIST

## 2017-10-21 PROCEDURE — 80053 COMPREHEN METABOLIC PANEL: CPT | Performed by: HOSPITALIST

## 2017-10-21 RX ORDER — CARVEDILOL 6.25 MG/1
6.25 TABLET ORAL 2 TIMES DAILY WITH MEALS
Status: COMPLETED | OUTPATIENT
Start: 2017-10-21 | End: 2017-10-21

## 2017-10-21 RX ORDER — CARVEDILOL 12.5 MG/1
12.5 TABLET ORAL 2 TIMES DAILY WITH MEALS
Status: DISCONTINUED | OUTPATIENT
Start: 2017-10-21 | End: 2017-10-22

## 2017-10-21 RX ORDER — CLONIDINE HYDROCHLORIDE 0.1 MG/1
0.1 TABLET ORAL ONCE
Status: COMPLETED | OUTPATIENT
Start: 2017-10-21 | End: 2017-10-21

## 2017-10-21 RX ADMIN — POTASSIUM CHLORIDE 10 MEQ: 750 TABLET, EXTENDED RELEASE ORAL at 08:09

## 2017-10-21 RX ADMIN — CEFAZOLIN SODIUM 1000 MG: 1 SOLUTION INTRAVENOUS at 16:52

## 2017-10-21 RX ADMIN — ENALAPRIL MALEATE 20 MG: 10 TABLET ORAL at 08:08

## 2017-10-21 RX ADMIN — ENOXAPARIN SODIUM 40 MG: 40 INJECTION SUBCUTANEOUS at 08:09

## 2017-10-21 RX ADMIN — AMLODIPINE BESYLATE 10 MG: 10 TABLET ORAL at 08:09

## 2017-10-21 RX ADMIN — HYDRALAZINE HYDROCHLORIDE 5 MG: 20 INJECTION INTRAMUSCULAR; INTRAVENOUS at 23:50

## 2017-10-21 RX ADMIN — CEFAZOLIN SODIUM 1000 MG: 1 SOLUTION INTRAVENOUS at 08:09

## 2017-10-21 RX ADMIN — POTASSIUM CHLORIDE 10 MEQ: 750 TABLET, EXTENDED RELEASE ORAL at 17:00

## 2017-10-21 RX ADMIN — METRONIDAZOLE 500 MG: 500 INJECTION, SOLUTION INTRAVENOUS at 12:30

## 2017-10-21 RX ADMIN — CARVEDILOL 6.25 MG: 6.25 TABLET, FILM COATED ORAL at 08:09

## 2017-10-21 RX ADMIN — ASPIRIN 81 MG 81 MG: 81 TABLET ORAL at 08:09

## 2017-10-21 RX ADMIN — CARVEDILOL 6.25 MG: 6.25 TABLET, FILM COATED ORAL at 09:06

## 2017-10-21 RX ADMIN — METRONIDAZOLE 500 MG: 500 INJECTION, SOLUTION INTRAVENOUS at 06:09

## 2017-10-21 RX ADMIN — PANTOPRAZOLE SODIUM 40 MG: 40 INJECTION, POWDER, FOR SOLUTION INTRAVENOUS at 21:32

## 2017-10-21 RX ADMIN — SENNOSIDES 8.6 MG: 8.6 TABLET, FILM COATED ORAL at 08:09

## 2017-10-21 RX ADMIN — ENALAPRIL MALEATE 20 MG: 10 TABLET ORAL at 21:32

## 2017-10-21 RX ADMIN — DOCUSATE SODIUM 100 MG: 100 CAPSULE, LIQUID FILLED ORAL at 17:00

## 2017-10-21 RX ADMIN — METRONIDAZOLE 500 MG: 500 INJECTION, SOLUTION INTRAVENOUS at 21:33

## 2017-10-21 RX ADMIN — CARVEDILOL 12.5 MG: 12.5 TABLET, FILM COATED ORAL at 16:52

## 2017-10-21 RX ADMIN — CHLORTHALIDONE 50 MG: 25 TABLET ORAL at 08:08

## 2017-10-21 RX ADMIN — DOCUSATE SODIUM 100 MG: 100 CAPSULE, LIQUID FILLED ORAL at 08:09

## 2017-10-21 RX ADMIN — PANTOPRAZOLE SODIUM 40 MG: 40 INJECTION, POWDER, FOR SOLUTION INTRAVENOUS at 08:09

## 2017-10-21 RX ADMIN — ATORVASTATIN CALCIUM 40 MG: 40 TABLET, FILM COATED ORAL at 16:52

## 2017-10-21 RX ADMIN — CLONIDINE HYDROCHLORIDE 0.1 MG: 0.1 TABLET ORAL at 04:50

## 2017-10-21 NOTE — PROGRESS NOTES
[facility] Internal Medicine Progress Note  Patient: Garett Alfredo  74 y o  male   MRN: [id number]  PCP: Rosana Slade DO  Unit/Bed#: [facility] 405-01 Encounter: [id number]  Date Of Visit: 10/21/17    Assessment:    Principal Problem:    Hypertensive emergency  Active Problems:    Abdominal pain    Cholecystitis with cholelithiasis    Leukocytosis    Total bilirubin, elevated    70% stenosis of right ICA      Plan:    · Hypertensive urgency:  ? Still some BP readings of 180-190  ? Cont chlorthalidone 50 mg, enalapril 20 BID, amlodipine 10 mg, toprol changed to coreg by cardio yesterday & increased to 12 5 BID today  off cardene drip for 24 hours  ? Monitor BP next 24 hours  · Epigastric/chest pain:  ? Suspect from biliary etiology  ? Trop mild elevation, echo - no WMA  · NSTEMI type II: from accelerated HTN  · Cholelithiasis with suspected cholecystitis:  ? LFT normal, t jonna downtending now  ? Continues to be Symptom free, suspect passed a stone, MRCP pending  ? Possible outpt cholecystectomy  ? GI & Sx on board  ? cont IV ancef & flagyl D-4      VTE Pharmacologic Prophylaxis:   Pharmacologic: Enoxaparin (Lovenox)  Mechanical VTE Prophylaxis in Place: Yes    Patient Centered Rounds: I have performed bedside rounds with nursing staff today  Discussions with Specialists or Other Care Team Provider:     Education and Discussions with Family / Patient: patient    Time Spent for Care: 45 minutes  More than 50% of total time spent on counseling and coordination of care as described above      Current Length of Stay: 2 day(s)    Current Patient Status: Inpatient   Certification Statement: The patient will continue to require additional inpatient hospital stay due to monitor BP, MRCP    Discharge Plan / Estimated Discharge Date: 2-3 dfays    Code Status: Level 1 - Full Code      Subjective:   Feels good, no CP/SOB/diziness or abdo pain/n/v    Objective:     Vitals:   Temp (24hrs), Av 1 °F (36 7 °C), Min:97 6 °F (36 4 °C), Max:98 5 °F (36 9 °C)    HR:  [46-53] 46  Resp:  [18-21] 18  BP: (142-191)/(51-84) 150/67  SpO2:  [94 %-97 %] 96 %  Body mass index is 27 62 kg/m²  Input and Output Summary (last 24 hours): Intake/Output Summary (Last 24 hours) at 10/21/17 0906  Last data filed at 10/21/17 0818   Gross per 24 hour   Intake             1220 ml   Output             1400 ml   Net             -180 ml       Physical Exam:     Physical Exam   Constitutional: He is oriented to person, place, and time  He appears well-developed and well-nourished  HENT:   Head: Normocephalic and atraumatic  Eyes: Conjunctivae are normal  No scleral icterus  Cardiovascular: Normal rate, regular rhythm and normal heart sounds  Exam reveals no gallop and no friction rub  No murmur heard  Pulmonary/Chest: Effort normal and breath sounds normal  No respiratory distress  He has no wheezes  Abdominal: Soft  He exhibits no distension  There is no tenderness  Musculoskeletal: He exhibits no edema  Neurological: He is alert and oriented to person, place, and time  Additional Data:     Labs:      Results from last 7 days  Lab Units 10/21/17  0442   WBC Thousand/uL 8 71   HEMOGLOBIN g/dL 14 7   HEMATOCRIT % 43 8   PLATELETS Thousands/uL 164   NEUTROS PCT % 79*   LYMPHS PCT % 12*   MONOS PCT % 8   EOS PCT % 1       Results from last 7 days  Lab Units 10/21/17  0442   SODIUM mmol/L 140   POTASSIUM mmol/L 3 8   CHLORIDE mmol/L 103   CO2 mmol/L 30   BUN mg/dL 21   CREATININE mg/dL 0 83   CALCIUM mg/dL 8 9   TOTAL PROTEIN g/dL 6 8   BILIRUBIN TOTAL mg/dL 1 77*   ALK PHOS U/L 88   ALT U/L 26   AST U/L 10   GLUCOSE RANDOM mg/dL 98       Results from last 7 days  Lab Units 10/18/17  2133   INR  0 99       * I Have Reviewed All Lab Data Listed Above  * Additional Pertinent Lab Tests Reviewed:  All Labs Within Last 24 Hours Reviewed    Imaging:    Imaging Reports Reviewed Today Include:   Imaging Personally Reviewed by Myself Includes:      Recent Cultures (last 7 days):           Last 24 Hours Medication List:     amLODIPine 10 mg Oral Daily   aspirin 81 mg Oral Daily   atorvastatin 40 mg Oral Daily With Dinner   carvedilol 12 5 mg Oral BID With Meals   carvedilol 6 25 mg Oral BID With Meals   cefazolin 1,000 mg Intravenous Q8H   chlorthalidone 50 mg Oral Daily   docusate sodium 100 mg Oral BID   enalapril 20 mg Oral BID   enoxaparin 40 mg Subcutaneous Daily   metroNIDAZOLE 500 mg Intravenous Q8H   pantoprazole 40 mg Intravenous Q12H [address]   polyethylene glycol 17 g Oral Daily   potassium chloride 10 mEq Oral BID   senna 1 tablet Oral Daily        Today, Patient Was Seen By: Marcia Mullins MD    ** Please Note: This note has been constructed using a voice recognition system   **

## 2017-10-21 NOTE — PROGRESS NOTES
Progress Note - General Surgery   Tenisha Hernández  74 y o  male MRN: [id number]  Unit/Bed#: [facility] 405-01 Encounter: [id number]    Assessment:  74 M with hypertensive urgency, and possible cholecystitis    Plan:  - follow up MRCP  - continue antibiotics  - trend LFTs  - HTN management per SLIM  - diet as tolerated    Subjective/Objective     Subjective: Patient pain free since admission  Tolerating regular diet  Objective:     Vitals: Blood pressure 142/51, pulse (!) 46, temperature 97 6 °F (36 4 °C), temperature source Oral, resp  rate 18, height 5' 10" (1 778 m), weight 87 3 kg (192 lb 7 4 oz), SpO2 96 %  ,Body mass index is 27 62 kg/m²  I/O       10/19 0701 - 10/20 0700 10/20 0701 - 10/21 0700    P  O  720 1120    I V  (mL/kg) 1122 1 (12 9)     IV Piggyback 200     Total Intake(mL/kg) 2042 1 (23 4) 1120 (12 8)    Urine (mL/kg/hr) 4275 (2) 1900 (0 9)    Total Output 4275 1900    Net -2237 9 -988                Physical Exam:  GEN: NAD  HEENT: MMM  CV: RRR  Lung: Normal effort  Ab: Soft, NT/ND  Extrem: No CCE  Neuro:  A+Ox3    Lab, Imaging and other studies:   CBC with diff:   Lab Results   Component Value Date    WBC 8 71 10/21/2017    HGB 14 7 10/21/2017    HCT 43 8 10/21/2017    MCV 90 10/21/2017     10/21/2017    MCH 30 1 10/21/2017    MCHC 33 6 10/21/2017    RDW 13 7 10/21/2017    MPV 11 2 10/21/2017    NRBC 0 10/21/2017   , BMP/CMP:   Lab Results   Component Value Date     10/21/2017    K 3 8 10/21/2017     10/21/2017    CO2 30 10/21/2017    ANIONGAP 7 10/21/2017    BUN 21 10/21/2017    CREATININE 0 83 10/21/2017    GLUCOSE 98 10/21/2017    CALCIUM 8 9 10/21/2017    AST 10 10/21/2017    ALT 26 10/21/2017    ALKPHOS 88 10/21/2017    PROT 6 8 10/21/2017    ALBUMIN 3 6 10/21/2017    BILITOT 1 77 (H) 10/21/2017    EGFR 87 10/21/2017   , Magnesium: No components found for: MAG  VTE Pharmacologic Prophylaxis: Enoxaparin (Lovenox)  VTE Mechanical Prophylaxis: sequential compression device

## 2017-10-21 NOTE — PROGRESS NOTES
Cardiology Progress Note - Daya Horan  74 y o  male MRN: [id number]    Unit/Bed#: [facility] 405-01 Encounter: [id number]      Assessment/Recommendations:  1) hypertensive urgency:  With continued elevated blood pressures, however improved from prior  Will continue to up titrate Coreg  Continue on amlodipine, chlorthalidone, and enalapril  Cardene drip as also been initiated, we will increase the Coreg and we will hope to titrate this off over the course of the day  2   Mild troponin elevation:  Non STEMI type 2, likely in the setting of hypertensive urgency  3   Dyslipidemia:  Continued on statin  4   Choledocholithiasis    Subjective:   Patient seen and examined  No significant events overnight   ; pertinent negatives - chest pain, chest pressure/discomfort, dyspnea, irregular heart beat and palpitations  Objective:     Vitals: Blood pressure 150/67, pulse (!) 46, temperature 97 8 °F (36 6 °C), temperature source Oral, resp  rate 18, height 5' 10" (1 778 m), weight 87 3 kg (192 lb 7 4 oz), SpO2 96 %  , Body mass index is 27 62 kg/m² , Orthostatic Blood Pressures    Flowsheet Row Most Recent Value   Blood Pressure  150/67 [Map 97] filed at 10/21/2017 0717   Patient Position - Orthostatic VS  Sitting filed at 10/21/2017 0717            Intake/Output Summary (Last 24 hours) at 10/21/17 0833  Last data filed at 10/21/17 0818   Gross per 24 hour   Intake             1220 ml   Output             1400 ml   Net             -180 ml       TELE: No significant arrhythmias seen  Physical Exam:    GEN: Daya Horan   appears well, alert and oriented x 3, pleasant and cooperative   HEENT: pupils equal, round, and reactive to light; extraocular muscles intact  NECK: supple, no carotid bruits   HEART: regular rhythm, normal S1 and S2, 2/6 systolic ejection murmur, no clicks, gallops or rubs   LUNGS: clear to auscultation bilaterally; no wheezes, rales, or rhonchi   ABDOMEN: normal bowel sounds, soft, no tenderness, no distention  EXTREMITIES: peripheral pulses normal; no clubbing, cyanosis, or edema  NEURO: no focal findings   SKIN: normal without suspicious lesions on exposed skin    Medications:      Current Facility-Administered Medications:     acetaminophen (TYLENOL) tablet 650 mg, 650 mg, Oral, Q6H PRN, Madeleine Chen MD    amLODIPine (NORVASC) tablet 10 mg, 10 mg, Oral, Daily, Oliver Garcia MD, 10 mg at 10/21/17 0809    aspirin chewable tablet 81 mg, 81 mg, Oral, Daily, Madeleine Chen MD, 81 mg at 10/21/17 0809    atorvastatin (LIPITOR) tablet 40 mg, 40 mg, Oral, Daily With Nilesh Craft MD, 40 mg at 10/20/17 1707    carvedilol (COREG) tablet 6 25 mg, 6 25 mg, Oral, BID With Meals, Daya Peterson MD, 6 25 mg at 10/21/17 0809    ceFAZolin (ANCEF) IVPB (premix) 1,000 mg, 1,000 mg, Intravenous, Q8H, Kavya Fierro, Last Rate: 100 mL/hr at 10/21/17 0809, 1,000 mg at 10/21/17 0809    chlorthalidone tablet 50 mg, 50 mg, Oral, Daily, Daya Peterson MD, 50 mg at 10/21/17 0808    docusate sodium (COLACE) capsule 100 mg, 100 mg, Oral, BID, Madeleine Chen MD, 100 mg at 10/21/17 0809    enalapril (VASOTEC) tablet 20 mg, 20 mg, Oral, BID, Oliver Garcia MD, 20 mg at 10/21/17 0808    enoxaparin (LOVENOX) subcutaneous injection 40 mg, 40 mg, Subcutaneous, Daily, Madeleine Chen MD, 40 mg at 10/21/17 0809    hydrALAZINE (APRESOLINE) injection 5 mg, 5 mg, Intravenous, Q6H PRN, MYNOR Olvera, 5 mg at 10/20/17 2349    metroNIDAZOLE (FLAGYL) IVPB (premix) 500 mg, 500 mg, Intravenous, Q8H, Nava Huerta MD, Last Rate: 200 mL/hr at 10/21/17 0609, 500 mg at 10/21/17 0609    niCARdipine (CARDENE) 25 mg (STANDARD CONCENTRATION) in sodium chloride 0 9% 250 mL, 1-15 mg/hr, Intravenous, Titrated, Mac Santillan PA-C, Last Rate: 25 mL/hr at 10/20/17 0650, 2 5 mg/hr at 10/20/17 0650    nitroglycerin (NITROSTAT) SL tablet 0 4 mg, 0 4 mg, Sublingual, Q5 Min PRN, Pippa Dickerson MD    ondansetron [facility]) injection 4 mg, 4 mg, Intravenous, Q4H PRN, Pippa Dickerson MD    pantoprazole (PROTONIX) injection 40 mg, 40 mg, Intravenous, Q12H [facility], Pippa Dickerson MD, 40 mg at 10/21/17 0809    polyethylene glycol (MIRALAX) packet 17 g, 17 g, Oral, Daily, Pippa Dickerson MD, Stopped at 10/19/17 7954    potassium chloride (K-DUR,KLOR-CON) CR tablet 10 mEq, 10 mEq, Oral, BID, Pippa Dickerson MD, 10 mEq at 10/21/17 0809    senna (SENOKOT) tablet 8 6 mg, 1 tablet, Oral, Daily, Pippa Dickerson MD, 8 6 mg at 10/21/17 0809     Labs & Results:      Results from last 7 days  Lab Units 10/19/17  0247   TROPONIN I ng/mL 0 05*       Results from last 7 days  Lab Units 10/21/17  0442 10/20/17  0611 10/19/17  0441   WBC Thousand/uL 8 71 8 14 11 08*   HEMOGLOBIN g/dL 14 7 14 3 12 7   HEMATOCRIT % 43 8 41 8 37 7   PLATELETS Thousands/uL 164 156 150       Results from last 7 days  Lab Units 10/19/17  0531   CHOLESTEROL mg/dL 161   TRIGLYCERIDES mg/dL 62   HDL mg/dL 49       Results from last 7 days  Lab Units 10/21/17  0442 10/20/17  0611 10/19/17  0531 10/19/17  0441   SODIUM mmol/L 140 140 141  --    POTASSIUM mmol/L 3 8 3 7 3 4*  --    CHLORIDE mmol/L 103 105 105  --    CO2 mmol/L 30 28 26  --    BUN mg/dL 21 15 18  --    CREATININE mg/dL 0 83 0 74 0 72  --    CALCIUM mg/dL 8 9 9 1 8 1*  --    TOTAL PROTEIN g/dL 6 8 6 9  --  6 0*   BILIRUBIN TOTAL mg/dL 1 77* 2 21*  --  1 55*   ALK PHOS U/L 88 97  --  84   ALT U/L 26 32  --  37   AST U/L 10 10  --  12   GLUCOSE RANDOM mg/dL 98 111 104  --        Results from last 7 days  Lab Units 10/18/17  2133   INR  0 99   PTT seconds 31       Results from last 7 days  Lab Units 10/19/17  0531   MAGNESIUM mg/dL 2 4       Echo:  Personally reviewed-normal ejection fraction grade 2 diastolic dysfunction, mild mild to moderately dilated left atrium      EKG personally reviewed by Mariano Mooney MD      Counseling / Coordination of Care  Total floor / unit time spent today 30 minutes  Greater than 50% of total time was spent with the patient and / or family counseling and / or coordination of care

## 2017-10-21 NOTE — PROGRESS NOTES
Spoke with Stu Moore in MRI to schedule MRCP  She stated that she may not be able to schedule for today since there are MICU MRI's, but maybe tomorrow

## 2017-10-22 ENCOUNTER — APPOINTMENT (INPATIENT)
Dept: RADIOLOGY | Facility: HOSPITAL | Age: 72
DRG: 444 | End: 2017-10-22
Payer: MEDICARE

## 2017-10-22 LAB
ALBUMIN SERPL BCP-MCNC: 3.8 G/DL (ref 3.5–5)
ALP SERPL-CCNC: 99 U/L (ref 46–116)
ALT SERPL W P-5'-P-CCNC: 26 U/L (ref 12–78)
AST SERPL W P-5'-P-CCNC: 14 U/L (ref 5–45)
BILIRUB DIRECT SERPL-MCNC: 0.34 MG/DL (ref 0–0.2)
BILIRUB SERPL-MCNC: 1.59 MG/DL (ref 0.2–1)
PROT SERPL-MCNC: 7.4 G/DL (ref 6.4–8.2)

## 2017-10-22 PROCEDURE — 80076 HEPATIC FUNCTION PANEL: CPT | Performed by: HOSPITALIST

## 2017-10-22 PROCEDURE — 74181 MRI ABDOMEN W/O CONTRAST: CPT

## 2017-10-22 PROCEDURE — 76376 3D RENDER W/INTRP POSTPROCES: CPT

## 2017-10-22 PROCEDURE — C9113 INJ PANTOPRAZOLE SODIUM, VIA: HCPCS | Performed by: HOSPITALIST

## 2017-10-22 RX ORDER — NIFEDIPINE 60 MG/1
60 TABLET, EXTENDED RELEASE ORAL DAILY
Status: DISCONTINUED | OUTPATIENT
Start: 2017-10-22 | End: 2017-10-23

## 2017-10-22 RX ORDER — CARVEDILOL 25 MG/1
25 TABLET ORAL 2 TIMES DAILY WITH MEALS
Status: DISCONTINUED | OUTPATIENT
Start: 2017-10-22 | End: 2017-10-24 | Stop reason: HOSPADM

## 2017-10-22 RX ORDER — HYDRALAZINE HYDROCHLORIDE 20 MG/ML
5 INJECTION INTRAMUSCULAR; INTRAVENOUS ONCE
Status: COMPLETED | OUTPATIENT
Start: 2017-10-22 | End: 2017-10-22

## 2017-10-22 RX ORDER — PANTOPRAZOLE SODIUM 40 MG/1
40 TABLET, DELAYED RELEASE ORAL
Status: DISCONTINUED | OUTPATIENT
Start: 2017-10-22 | End: 2017-10-24 | Stop reason: HOSPADM

## 2017-10-22 RX ADMIN — METRONIDAZOLE 500 MG: 500 INJECTION, SOLUTION INTRAVENOUS at 20:56

## 2017-10-22 RX ADMIN — HYDRALAZINE HYDROCHLORIDE 5 MG: 20 INJECTION INTRAMUSCULAR; INTRAVENOUS at 04:53

## 2017-10-22 RX ADMIN — DOCUSATE SODIUM 100 MG: 100 CAPSULE, LIQUID FILLED ORAL at 09:10

## 2017-10-22 RX ADMIN — METRONIDAZOLE 500 MG: 500 INJECTION, SOLUTION INTRAVENOUS at 13:05

## 2017-10-22 RX ADMIN — PANTOPRAZOLE SODIUM 40 MG: 40 INJECTION, POWDER, FOR SOLUTION INTRAVENOUS at 09:10

## 2017-10-22 RX ADMIN — ENOXAPARIN SODIUM 40 MG: 40 INJECTION SUBCUTANEOUS at 09:10

## 2017-10-22 RX ADMIN — POTASSIUM CHLORIDE 10 MEQ: 750 TABLET, EXTENDED RELEASE ORAL at 09:10

## 2017-10-22 RX ADMIN — CHLORTHALIDONE 50 MG: 25 TABLET ORAL at 09:10

## 2017-10-22 RX ADMIN — SENNOSIDES 8.6 MG: 8.6 TABLET, FILM COATED ORAL at 09:10

## 2017-10-22 RX ADMIN — DOCUSATE SODIUM 100 MG: 100 CAPSULE, LIQUID FILLED ORAL at 17:02

## 2017-10-22 RX ADMIN — CARVEDILOL 25 MG: 25 TABLET, FILM COATED ORAL at 17:02

## 2017-10-22 RX ADMIN — ENALAPRIL MALEATE 20 MG: 10 TABLET ORAL at 09:10

## 2017-10-22 RX ADMIN — NIFEDIPINE 60 MG: 60 TABLET, FILM COATED, EXTENDED RELEASE ORAL at 09:10

## 2017-10-22 RX ADMIN — HYDRALAZINE HYDROCHLORIDE 5 MG: 20 INJECTION INTRAMUSCULAR; INTRAVENOUS at 07:47

## 2017-10-22 RX ADMIN — ASPIRIN 81 MG 81 MG: 81 TABLET ORAL at 09:10

## 2017-10-22 RX ADMIN — CEFAZOLIN SODIUM 1000 MG: 1 SOLUTION INTRAVENOUS at 17:02

## 2017-10-22 RX ADMIN — CARVEDILOL 25 MG: 25 TABLET, FILM COATED ORAL at 07:34

## 2017-10-22 RX ADMIN — METRONIDAZOLE 500 MG: 500 INJECTION, SOLUTION INTRAVENOUS at 05:25

## 2017-10-22 RX ADMIN — CEFAZOLIN SODIUM 1000 MG: 1 SOLUTION INTRAVENOUS at 09:18

## 2017-10-22 RX ADMIN — ENALAPRIL MALEATE 20 MG: 10 TABLET ORAL at 20:56

## 2017-10-22 RX ADMIN — PANTOPRAZOLE SODIUM 40 MG: 40 TABLET, DELAYED RELEASE ORAL at 20:56

## 2017-10-22 RX ADMIN — POLYETHYLENE GLYCOL 3350 17 G: 17 POWDER, FOR SOLUTION ORAL at 09:10

## 2017-10-22 RX ADMIN — CEFAZOLIN SODIUM 1000 MG: 1 SOLUTION INTRAVENOUS at 00:52

## 2017-10-22 RX ADMIN — ATORVASTATIN CALCIUM 40 MG: 40 TABLET, FILM COATED ORAL at 17:02

## 2017-10-22 RX ADMIN — POTASSIUM CHLORIDE 10 MEQ: 750 TABLET, EXTENDED RELEASE ORAL at 17:02

## 2017-10-22 NOTE — PROGRESS NOTES
Pt's BP still high and hydralazine PRN not due yet so cardiology fellow, Dr Bozena Seymour, ordered to give a OT dose of 5mg iv hydralazine  Will monitor closely

## 2017-10-22 NOTE — PROGRESS NOTES
Cardiology Progress Note - Jacalyn Saint  74 y o  male MRN: [id number]    Unit/Bed#: [facility] 405-01 Encounter: [id number]      Assessment/Recommendations:  1) hypertensive urgency:  With continued elevated blood pressures, however improved from prior  Will continue to uptitrate Coreg  Will switch amlodipine to nifedipine, and continue chlorthalidone, and enalapril  2   Mild troponin elevation:  Non STEMI type 2, likely in the setting of hypertensive urgency  3   Dyslipidemia:  Continued on statin  4   Choledocholithiasis    Subjective:   Patient seen and examined  High BPs overnight requiring IV hydralazine  ; pertinent negatives - chest pain, chest pressure/discomfort, dyspnea, irregular heart beat and palpitations  Objective:     Vitals: Blood pressure (!) 173/81, pulse 62, temperature 98 °F (36 7 °C), resp  rate 18, height 5' 10" (1 778 m), weight 87 3 kg (192 lb 7 4 oz), SpO2 97 %  , Body mass index is 27 62 kg/m² , Orthostatic Blood Pressures    Flowsheet Row Most Recent Value   Blood Pressure   173/81 filed at 10/22/2017 3679   Patient Position - Orthostatic VS  Sitting filed at 10/22/2017 0629            Intake/Output Summary (Last 24 hours) at 10/22/17 0709  Last data filed at 10/22/17 4499   Gross per 24 hour   Intake             2000 ml   Output             1050 ml   Net              950 ml       TELE: No significant arrhythmias seen  Physical Exam:    GEN: Jacalyn Saint   appears well, alert and oriented x 3, pleasant and cooperative   HEENT: pupils equal, round, and reactive to light; extraocular muscles intact  NECK: supple, no carotid bruits   HEART: regular rhythm, normal S1 and S2, 2/6 systolic murmur, no clicks, gallops or rubs   LUNGS: clear to auscultation bilaterally; no wheezes, rales, or rhonchi   ABDOMEN: normal bowel sounds, soft, no tenderness, no distention  EXTREMITIES: peripheral pulses normal; no clubbing, cyanosis, or edema  NEURO: no focal findings   SKIN: normal without suspicious lesions on exposed skin    Medications:      Current Facility-Administered Medications:     acetaminophen (TYLENOL) tablet 650 mg, 650 mg, Oral, Q6H PRN, Alejandra Castro MD    amLODIPine (NORVASC) tablet 10 mg, 10 mg, Oral, Daily, Carolina Henley MD, 10 mg at 10/21/17 0809    aspirin chewable tablet 81 mg, 81 mg, Oral, Daily, Alejandra Castro MD, 81 mg at 10/21/17 0809    atorvastatin (LIPITOR) tablet 40 mg, 40 mg, Oral, Daily With Scout Mir MD, 40 mg at 10/21/17 1652    carvedilol (COREG) tablet 12 5 mg, 12 5 mg, Oral, BID With Meals, Niesha Rinaldi MD, 12 5 mg at 10/21/17 1652    ceFAZolin (ANCEF) IVPB (premix) 1,000 mg, 1,000 mg, Intravenous, Q8H, Natalia Valdez, Last Rate: 100 mL/hr at 10/22/17 0052, 1,000 mg at 10/22/17 0052    chlorthalidone tablet 50 mg, 50 mg, Oral, Daily, Jerardo Acharya MD, 50 mg at 10/21/17 0808    docusate sodium (COLACE) capsule 100 mg, 100 mg, Oral, BID, Alejandra Castro MD, 100 mg at 10/21/17 1700    enalapril (VASOTEC) tablet 20 mg, 20 mg, Oral, BID, Carolina Henley MD, 20 mg at 10/21/17 2132    enoxaparin (LOVENOX) subcutaneous injection 40 mg, 40 mg, Subcutaneous, Daily, Alejandra Castro MD, 40 mg at 10/21/17 0809    hydrALAZINE (APRESOLINE) injection 5 mg, 5 mg, Intravenous, Q6H PRN, MYNOR Perez, 5 mg at 10/21/17 2350    metroNIDAZOLE (FLAGYL) IVPB (premix) 500 mg, 500 mg, Intravenous, Q8H, Checo Scott MD, Stopped at 10/22/17 1341    niCARdipine (CARDENE) 25 mg (STANDARD CONCENTRATION) in sodium chloride 0 9% 250 mL, 1-15 mg/hr, Intravenous, Titrated, Mac Santillan PA-C, Stopped at 10/20/17 1000    nitroglycerin (NITROSTAT) SL tablet 0 4 mg, 0 4 mg, Sublingual, Q5 Min PRN, Alejandra Castro MD    ondansetron [facility]) injection 4 mg, 4 mg, Intravenous, Q4H PRN, Alejandra Castro MD    pantoprazole (PROTONIX) injection 40 mg, 40 mg, Intravenous, Q12H [address], Alejandra Castro MD, 40 mg at 10/21/17 2132    polyethylene glycol (MIRALAX) packet 17 g, 17 g, Oral, Daily, Murray Brand MD, Stopped at 10/19/17 2970    potassium chloride (K-DUR,KLOR-CON) CR tablet 10 mEq, 10 mEq, Oral, BID, Murray Brand MD, 10 mEq at 10/21/17 1700    senna (SENOKOT) tablet 8 6 mg, 1 tablet, Oral, Daily, Murray Brand MD, 8 6 mg at 10/21/17 0809     Labs & Results:      Results from last 7 days  Lab Units 10/19/17  0247   TROPONIN I ng/mL 0 05*       Results from last 7 days  Lab Units 10/21/17  0442 10/20/17  0611 10/19/17  0441   WBC Thousand/uL 8 71 8 14 11 08*   HEMOGLOBIN g/dL 14 7 14 3 12 7   HEMATOCRIT % 43 8 41 8 37 7   PLATELETS Thousands/uL 164 156 150       Results from last 7 days  Lab Units 10/19/17  0531   CHOLESTEROL mg/dL 161   TRIGLYCERIDES mg/dL 62   HDL mg/dL 49       Results from last 7 days  Lab Units 10/22/17  0455 10/21/17  0442 10/20/17  0611 10/19/17  0531   SODIUM mmol/L  --  140 140 141   POTASSIUM mmol/L  --  3 8 3 7 3 4*   CHLORIDE mmol/L  --  103 105 105   CO2 mmol/L  --  30 28 26   BUN mg/dL  --  21 15 18   CREATININE mg/dL  --  0 83 0 74 0 72   CALCIUM mg/dL  --  8 9 9 1 8 1*   TOTAL PROTEIN g/dL 7 4 6 8 6 9  --    BILIRUBIN TOTAL mg/dL 1 59* 1 77* 2 21*  --    ALK PHOS U/L 99 88 97  --    ALT U/L 26 26 32  --    AST U/L 14 10 10  --    GLUCOSE RANDOM mg/dL  --  98 111 104       Results from last 7 days  Lab Units 10/18/17  2133   INR  0 99   PTT seconds 31       Results from last 7 days  Lab Units 10/19/17  0531   MAGNESIUM mg/dL 2 4       Echo: Personally reviewed-normal ejection fraction grade 2 diastolic dysfunction, mild mild to moderately dilated left atrium  EKG personally reviewed by Mike Morgan MD      Counseling / Coordination of Care  Total floor / unit time spent today 30 minutes  Greater than 50% of total time was spent with the patient and / or family counseling and / or coordination of care

## 2017-10-22 NOTE — PROGRESS NOTES
Progress Note - General Surgery   Sin Espinoza  74 y o  male MRN: [id number]  Unit/Bed#: [facility] 405-01 Encounter: [id number]    Assessment:  74 M with hypertensive urgency, and possible cholecystitis    Plan:  Cardiac diet  Trend LFTs  Ancef/flagyl  GI-await MRCP  Primary care per slim    Subjective/Objective     Subjective: blood pressure continues to be elevated to systolic's high 162Q   No N/V  No abdominal pain  Tolerating PO, no N/V  Objective:     Vitals: Blood pressure 162/79, pulse 57, temperature 97 5 °F (36 4 °C), temperature source Oral, resp  rate 18, height 5' 10" (1 778 m), weight 87 3 kg (192 lb 7 4 oz), SpO2 96 %  ,Body mass index is 27 62 kg/m²  I/O       10/19 0701 - 10/20 0700 10/20 0701 - 10/21 0700    P  O  720 1120    I V  (mL/kg) 1122 1 (12 9)     IV Piggyback 200     Total Intake(mL/kg) 2042 1 (23 4) 1120 (12 8)    Urine (mL/kg/hr) 4275 (2) 1900 (0 9)    Total Output 4275 1900    Net -2232 9 -780                Physical Exam:  AAOX3  NAD  RRR  Normal respiratory effort  Soft, NT, ND  No c/c/e    Lab, Imaging and other studies:   CBC with diff:   Lab Results   Component Value Date    WBC 8 71 10/21/2017    HGB 14 7 10/21/2017    HCT 43 8 10/21/2017    MCV 90 10/21/2017     10/21/2017    MCH 30 1 10/21/2017    MCHC 33 6 10/21/2017    RDW 13 7 10/21/2017    MPV 11 2 10/21/2017    NRBC 0 10/21/2017   , BMP/CMP:   Lab Results   Component Value Date     10/21/2017    K 3 8 10/21/2017     10/21/2017    CO2 30 10/21/2017    ANIONGAP 7 10/21/2017    BUN 21 10/21/2017    CREATININE 0 83 10/21/2017    GLUCOSE 98 10/21/2017    CALCIUM 8 9 10/21/2017    AST 10 10/21/2017    ALT 26 10/21/2017    ALKPHOS 88 10/21/2017    PROT 6 8 10/21/2017    ALBUMIN 3 6 10/21/2017    BILITOT 1 77 (H) 10/21/2017    EGFR 87 10/21/2017   , Magnesium: No components found for: MAG  VTE Pharmacologic Prophylaxis: Enoxaparin (Lovenox)  VTE Mechanical Prophylaxis: sequential compression device

## 2017-10-22 NOTE — PROGRESS NOTES
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 Still elevated BP readings of > 180  ? Cont chlorthalidone 50 mg, enalapril 20 BID, coreg increased to 25 BID, amlo chanegd to procardia XL 60 mg  ? Monitor BP next 24 hours  · Epigastric/chest pain:  ? Suspect from biliary etiology  ? Trop mild elevation, echo - no WMA  · NSTEMI type II: from accelerated HTN  · Cholelithiasis with suspected cholecystitis:  ? LFT normal, t jonna downtending now  ? Continues to be Symptom free, suspect passed a stone, MRCP pending  ? Possible outpt cholecystectomy  ? GI & Sx on board  ? cont IV ancef & flagyl D-5      VTE Pharmacologic Prophylaxis:   Pharmacologic: Enoxaparin (Lovenox)  Mechanical VTE Prophylaxis in Place: Yes    Patient Centered Rounds: I have performed bedside rounds with nursing staff today  Discussions with Specialists or Other Care Team Provider:     Education and Discussions with Family / Patient: patient    Time Spent for Care: 45 minutes  More than 50% of total time spent on counseling and coordination of care as described above      Current Length of Stay: 3 day(s)    Current Patient Status: Inpatient   Certification Statement: The patient will continue to require additional inpatient hospital stay due to monitor BP    Discharge Plan / Estimated Discharge Date: once BP stable    Code Status: Level 1 - Full Code      Subjective:   Feels good, no CP/SOB/abdo pain/HA/diziness    Objective:     Vitals:   Temp (24hrs), Av 1 °F (36 7 °C), Min:97 5 °F (36 4 °C), Max:98 6 °F (37 °C)    HR:  [49-71] 71  Resp:  [18-28] 28  BP: (146-212)/() 170/78  SpO2:  [96 %-97 %] 97 %  Body mass index is 27 62 kg/m²  Input and Output Summary (last 24 hours): Intake/Output Summary (Last 24 hours) at 10/22/17 1020  Last data filed at 10/22/17 6305   Gross per 24 hour   Intake             2000 ml   Output             1050 ml   Net              950 ml       Physical Exam:     Physical Exam   Constitutional: He is oriented to person, place, and time  He appears well-developed and well-nourished  HENT:   Head: Normocephalic and atraumatic  Eyes: Conjunctivae are normal  No scleral icterus  Cardiovascular: Normal rate, regular rhythm and normal heart sounds  Exam reveals no gallop and no friction rub  No murmur heard  Pulmonary/Chest: Effort normal and breath sounds normal  No respiratory distress  He has no wheezes  Abdominal: Soft  He exhibits no distension  There is no tenderness  Musculoskeletal: He exhibits no edema  Neurological: He is alert and oriented to person, place, and time  Additional Data:     Labs:      Results from last 7 days  Lab Units 10/21/17  0442   WBC Thousand/uL 8 71   HEMOGLOBIN g/dL 14 7   HEMATOCRIT % 43 8   PLATELETS Thousands/uL 164   NEUTROS PCT % 79*   LYMPHS PCT % 12*   MONOS PCT % 8   EOS PCT % 1       Results from last 7 days  Lab Units 10/22/17  0455 10/21/17  0442   SODIUM mmol/L  --  140   POTASSIUM mmol/L  --  3 8   CHLORIDE mmol/L  --  103   CO2 mmol/L  --  30   BUN mg/dL  --  21   CREATININE mg/dL  --  0 83   CALCIUM mg/dL  --  8 9   TOTAL PROTEIN g/dL 7 4 6 8   BILIRUBIN TOTAL mg/dL 1 59* 1 77*   ALK PHOS U/L 99 88   ALT U/L 26 26   AST U/L 14 10   GLUCOSE RANDOM mg/dL  --  98       Results from last 7 days  Lab Units 10/18/17  2133   INR  0 99       * I Have Reviewed All Lab Data Listed Above  * Additional Pertinent Lab Tests Reviewed:  All Labs Within Last 24 Hours Reviewed    Imaging:    Imaging Reports Reviewed Today Include:   Imaging Personally Reviewed by Myself Includes:      Recent Cultures (last 7 days):           Last 24 Hours Medication List:     aspirin 81 mg Oral Daily   atorvastatin 40 mg Oral Daily With Dinner   carvedilol 25 mg Oral BID With Meals   cefazolin 1,000 mg Intravenous Q8H   chlorthalidone 50 mg Oral Daily   docusate sodium 100 mg Oral BID   enalapril 20 mg Oral BID   enoxaparin 40 mg Subcutaneous Daily   metroNIDAZOLE 500 mg Intravenous Q8H   NIFEdipine 60 mg Oral Daily   pantoprazole 40 mg Intravenous Q12H [address]   polyethylene glycol 17 g Oral Daily   potassium chloride 10 mEq Oral BID   senna 1 tablet Oral Daily        Today, Patient Was Seen By: Danielle Manzano MD    ** Please Note: This note has been constructed using a voice recognition system   **

## 2017-10-23 LAB
ALBUMIN SERPL BCP-MCNC: 4.3 G/DL (ref 3.5–5)
ALP SERPL-CCNC: 102 U/L (ref 46–116)
ALT SERPL W P-5'-P-CCNC: 35 U/L (ref 12–78)
AST SERPL W P-5'-P-CCNC: 31 U/L (ref 5–45)
BILIRUB DIRECT SERPL-MCNC: 0.38 MG/DL (ref 0–0.2)
BILIRUB SERPL-MCNC: 1.94 MG/DL (ref 0.2–1)
PROT SERPL-MCNC: 7.3 G/DL (ref 6.4–8.2)

## 2017-10-23 PROCEDURE — 83835 ASSAY OF METANEPHRINES: CPT | Performed by: HOSPITALIST

## 2017-10-23 PROCEDURE — 84244 ASSAY OF RENIN: CPT | Performed by: HOSPITALIST

## 2017-10-23 PROCEDURE — 82088 ASSAY OF ALDOSTERONE: CPT | Performed by: HOSPITALIST

## 2017-10-23 PROCEDURE — 80076 HEPATIC FUNCTION PANEL: CPT | Performed by: INTERNAL MEDICINE

## 2017-10-23 RX ORDER — HYDRALAZINE HYDROCHLORIDE 20 MG/ML
10 INJECTION INTRAMUSCULAR; INTRAVENOUS ONCE
Status: COMPLETED | OUTPATIENT
Start: 2017-10-23 | End: 2017-10-23

## 2017-10-23 RX ORDER — CHLORTHALIDONE 25 MG/1
100 TABLET ORAL DAILY
Status: DISCONTINUED | OUTPATIENT
Start: 2017-10-24 | End: 2017-10-24 | Stop reason: HOSPADM

## 2017-10-23 RX ORDER — NIFEDIPINE 90 MG/1
90 TABLET, EXTENDED RELEASE ORAL DAILY
Status: DISCONTINUED | OUTPATIENT
Start: 2017-10-24 | End: 2017-10-24 | Stop reason: HOSPADM

## 2017-10-23 RX ORDER — NIFEDIPINE 30 MG/1
30 TABLET, EXTENDED RELEASE ORAL ONCE
Status: COMPLETED | OUTPATIENT
Start: 2017-10-23 | End: 2017-10-23

## 2017-10-23 RX ORDER — CHLORTHALIDONE 25 MG/1
50 TABLET ORAL ONCE
Status: COMPLETED | OUTPATIENT
Start: 2017-10-23 | End: 2017-10-23

## 2017-10-23 RX ADMIN — PANTOPRAZOLE SODIUM 40 MG: 40 TABLET, DELAYED RELEASE ORAL at 17:09

## 2017-10-23 RX ADMIN — CARVEDILOL 25 MG: 25 TABLET, FILM COATED ORAL at 07:45

## 2017-10-23 RX ADMIN — METRONIDAZOLE 500 MG: 500 INJECTION, SOLUTION INTRAVENOUS at 05:50

## 2017-10-23 RX ADMIN — CEFAZOLIN SODIUM 1000 MG: 1 SOLUTION INTRAVENOUS at 01:08

## 2017-10-23 RX ADMIN — POLYETHYLENE GLYCOL 3350 17 G: 17 POWDER, FOR SOLUTION ORAL at 08:13

## 2017-10-23 RX ADMIN — HYDRALAZINE HYDROCHLORIDE 10 MG: 20 INJECTION INTRAMUSCULAR; INTRAVENOUS at 03:45

## 2017-10-23 RX ADMIN — SENNOSIDES 8.6 MG: 8.6 TABLET, FILM COATED ORAL at 08:05

## 2017-10-23 RX ADMIN — ASPIRIN 81 MG 81 MG: 81 TABLET ORAL at 08:03

## 2017-10-23 RX ADMIN — METRONIDAZOLE 500 MG: 500 INJECTION, SOLUTION INTRAVENOUS at 21:54

## 2017-10-23 RX ADMIN — ATORVASTATIN CALCIUM 40 MG: 40 TABLET, FILM COATED ORAL at 17:09

## 2017-10-23 RX ADMIN — PANTOPRAZOLE SODIUM 40 MG: 40 TABLET, DELAYED RELEASE ORAL at 06:05

## 2017-10-23 RX ADMIN — CARVEDILOL 25 MG: 25 TABLET, FILM COATED ORAL at 17:09

## 2017-10-23 RX ADMIN — NIFEDIPINE 60 MG: 60 TABLET, FILM COATED, EXTENDED RELEASE ORAL at 08:06

## 2017-10-23 RX ADMIN — CEFAZOLIN SODIUM 1000 MG: 1 SOLUTION INTRAVENOUS at 07:41

## 2017-10-23 RX ADMIN — ENALAPRIL MALEATE 20 MG: 10 TABLET ORAL at 21:54

## 2017-10-23 RX ADMIN — NIFEDIPINE 30 MG: 30 TABLET, FILM COATED, EXTENDED RELEASE ORAL at 11:04

## 2017-10-23 RX ADMIN — POTASSIUM CHLORIDE 10 MEQ: 750 TABLET, EXTENDED RELEASE ORAL at 08:06

## 2017-10-23 RX ADMIN — POTASSIUM CHLORIDE 10 MEQ: 750 TABLET, EXTENDED RELEASE ORAL at 17:10

## 2017-10-23 RX ADMIN — DOCUSATE SODIUM 100 MG: 100 CAPSULE, LIQUID FILLED ORAL at 08:03

## 2017-10-23 RX ADMIN — CHLORTHALIDONE 50 MG: 25 TABLET ORAL at 11:04

## 2017-10-23 RX ADMIN — CEFAZOLIN SODIUM 1000 MG: 1 SOLUTION INTRAVENOUS at 17:10

## 2017-10-23 RX ADMIN — CHLORTHALIDONE 50 MG: 25 TABLET ORAL at 08:04

## 2017-10-23 RX ADMIN — ENALAPRIL MALEATE 20 MG: 10 TABLET ORAL at 08:03

## 2017-10-23 RX ADMIN — DOCUSATE SODIUM 100 MG: 100 CAPSULE, LIQUID FILLED ORAL at 17:09

## 2017-10-23 RX ADMIN — ENOXAPARIN SODIUM 40 MG: 40 INJECTION SUBCUTANEOUS at 08:03

## 2017-10-23 RX ADMIN — METRONIDAZOLE 500 MG: 500 INJECTION, SOLUTION INTRAVENOUS at 13:10

## 2017-10-23 NOTE — CASE MANAGEMENT
Continued Stay Review    Date: 10/23/2017    Vital Signs: /59   Pulse 55   Temp 97 8 °F (36 6 °C) (Oral)   Resp 18   Ht 5' 10" (1 778 m)   Wt 87 3 kg (192 lb 7 4 oz)   SpO2 96%   BMI 27 62 kg/m²     Medications:   Scheduled Meds:   aspirin 81 mg Oral Daily   atorvastatin 40 mg Oral Daily With Dinner   carvedilol 25 mg Oral BID With Meals   cefazolin 1,000 mg Intravenous Q8H   [START ON 10/24/2017] chlorthalidone 100 mg Oral Daily   docusate sodium 100 mg Oral BID   enalapril 20 mg Oral BID   enoxaparin 40 mg Subcutaneous Daily   metroNIDAZOLE 500 mg Intravenous Q8H   [START ON 10/24/2017] NIFEdipine 90 mg Oral Daily   pantoprazole 40 mg Oral BID AC   polyethylene glycol 17 g Oral Daily   potassium chloride 10 mEq Oral BID   senna 1 tablet Oral Daily     Continuous Infusions:   niCARdipine 1-15 mg/hr Last Rate: Stopped (10/20/17 1000)     PRN Meds:   acetaminophen    hydrALAZINE    nitroglycerin    ondansetron    Abnormal Labs/Diagnostic Results:     Age/Sex: 74 y o  male     Assessment/Plan:    Hypertensive emergency  Active Problems:    Abdominal pain    Cholecystitis with cholelithiasis    Leukocytosis    Total bilirubin, elevated    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 Still elevated BP readings of > 180  ? Cont chlorthalidone 50 mg - increase to 100 mg, enalapril 20 BID, coreg increased to 25 BID, amlo chanegd to procardia XL - increase it to 90 mg  ? Monitor BP next 24 hours  · Epigastric/chest pain:  ? Suspect from biliary etiology  ? Trop mild elevation, echo - no WMA  · NSTEMI type II: from accelerated HTN  · Cholelithiasis with suspected cholecystitis:  ? LFT normal, t jonna downtending now  ? Continues to be Symptom free, suspect passed a stone, MRCP doesnot show CBD stones, shows cholecystitis  ? F/u GI & Sx rec based on MRCP  ?  cont IV ancef & flagyl D-6        VTE Pharmacologic Prophylaxis:   Pharmacologic: Enoxaparin (Lovenox)  Mechanical VTE Prophylaxis in Place: Yes     Patient Centered Rounds: I have performed bedside rounds with nursing staff today      Discussions with Specialists or Other Care Team Provider:      Education and Discussions with Family / Patient: patient     Time Spent for Care: 45 minutes    More than 50% of total time spent on counseling and coordination of care as described above      Current Length of Stay: 4 day(s)     Current Patient Status: Inpatient   Certification Statement: The patient will continue to require additional inpatient hospital stay due to monitor BP     Discharge Plan / Estimated Discharge Date: ocne BP stable, & plan in place about GB

## 2017-10-23 NOTE — PROGRESS NOTES
Received phone call from radiology stating MRCP showed significant findings  And results are in epic  [facility] phoned and made aware

## 2017-10-23 NOTE — PROGRESS NOTES
Cardiology Progress Note - Mary Zayas  74 y o  male MRN: [id number]    Unit/Bed#: [facility] 405-01 Encounter: [id number]      Assessment/Recommendations:  1) hypertensive urgency:  With continued elevated blood pressures, however improved from prior   Will continue Coreg   Switched amlodipine to nifedipine, and continued on chlorthalidone, and enalapril   Will increase nifedipine to 90mg today  2   Mild troponin elevation:  Non STEMI type 2, likely in the setting of hypertensive urgency  3   Dyslipidemia:  Continued on statin     4   Choledocholithiasis    Subjective:   Patient seen and examined  No significant events overnight   ; pertinent negatives - chest pain, chest pressure/discomfort, dyspnea, irregular heart beat and palpitations  Objective:     Vitals: Blood pressure (!) 172/79, pulse 72, temperature 98 2 °F (36 8 °C), temperature source Oral, resp  rate 18, height 5' 10" (1 778 m), weight 87 3 kg (192 lb 7 4 oz), SpO2 96 %  , Body mass index is 27 62 kg/m² , Orthostatic Blood Pressures    Flowsheet Row Most Recent Value   Blood Pressure   172/79 [Map 113] filed at 10/23/2017 0700   Patient Position - Orthostatic VS  Lying filed at 10/23/2017 0700            Intake/Output Summary (Last 24 hours) at 10/23/17 0944  Last data filed at 10/23/17 0901   Gross per 24 hour   Intake              820 ml   Output              400 ml   Net              420 ml       TELE: No significant arrhythmias seen  Physical Exam:    GEN: Mary Zayas   appears well, alert and oriented x 3, pleasant and cooperative   HEENT: pupils equal, round, and reactive to light; extraocular muscles intact  NECK: supple, no carotid bruits   HEART: regular rhythm, normal S1 and S2, no murmurs, clicks, gallops or rubs   LUNGS: clear to auscultation bilaterally; no wheezes, rales, or rhonchi   ABDOMEN: normal bowel sounds, soft, no tenderness, no distention  EXTREMITIES: peripheral pulses normal; no clubbing, cyanosis, or edema  NEURO: no focal findings   SKIN: normal without suspicious lesions on exposed skin    Medications:      Current Facility-Administered Medications:     acetaminophen (TYLENOL) tablet 650 mg, 650 mg, Oral, Q6H PRN, Ollen Homans, MD    aspirin chewable tablet 81 mg, 81 mg, Oral, Daily, Ollen Homans, MD, 81 mg at 10/23/17 0803    atorvastatin (LIPITOR) tablet 40 mg, 40 mg, Oral, Daily With Raheem Franklin MD, 40 mg at 10/22/17 1702    carvedilol (COREG) tablet 25 mg, 25 mg, Oral, BID With Meals, Zulma Branch MD, 25 mg at 10/23/17 0745    ceFAZolin (ANCEF) IVPB (premix) 1,000 mg, 1,000 mg, Intravenous, Q8H, Christina Ponce, Last Rate: 100 mL/hr at 10/23/17 0741, 1,000 mg at 10/23/17 0741    [START ON 10/24/2017] chlorthalidone tablet 100 mg, 100 mg, Oral, Daily, Satish Mays MD    chlorthalidone tablet 50 mg, 50 mg, Oral, Once, Satish Mays MD    docusate sodium (COLACE) capsule 100 mg, 100 mg, Oral, BID, Ollen Homans, MD, 100 mg at 10/23/17 0803    enalapril (VASOTEC) tablet 20 mg, 20 mg, Oral, BID, Satish Mays MD, 20 mg at 10/23/17 0803    enoxaparin (LOVENOX) subcutaneous injection 40 mg, 40 mg, Subcutaneous, Daily, Ollen Homans, MD, 40 mg at 10/23/17 0803    hydrALAZINE (APRESOLINE) injection 5 mg, 5 mg, Intravenous, Q6H PRN, MYNOR Canseco, 5 mg at 10/22/17 0747    metroNIDAZOLE (FLAGYL) IVPB (premix) 500 mg, 500 mg, Intravenous, Q8H, Gladis Martin MD, Last Rate: 200 mL/hr at 10/23/17 0550, 500 mg at 10/23/17 0550    niCARdipine (CARDENE) 25 mg (STANDARD CONCENTRATION) in sodium chloride 0 9% 250 mL, 1-15 mg/hr, Intravenous, Titrated, Mac Santillan PA-C, Stopped at 10/20/17 1000    NIFEdipine (PROCARDIA XL) 24 hr tablet 30 mg, 30 mg, Oral, Once, Satish Mays MD    [START ON 10/24/2017] NIFEdipine (PROCARDIA XL) 24 hr tablet 90 mg, 90 mg, Oral, Daily, Adelia Willson MD    nitroglycerin (NITROSTAT) SL tablet 0 4 mg, 0 4 mg, Sublingual, Q5 Min PRN, Manoj Peguero MD    ondansetron [facility]) injection 4 mg, 4 mg, Intravenous, Q4H PRN, Manoj Peguero MD    pantoprazole (PROTONIX) EC tablet 40 mg, 40 mg, Oral, BID AC, Charlotte Amador MD, 40 mg at 10/23/17 0605    polyethylene glycol (MIRALAX) packet 17 g, 17 g, Oral, Daily, Manoj Peguero MD, 17 g at 10/23/17 0813    potassium chloride (K-DUR,KLOR-CON) CR tablet 10 mEq, 10 mEq, Oral, BID, Manoj Peguero MD, 10 mEq at 10/23/17 0806    senna (SENOKOT) tablet 8 6 mg, 1 tablet, Oral, Daily, Manoj Peguero MD, 8 6 mg at 10/23/17 0805     Labs & Results:      Results from last 7 days  Lab Units 10/19/17  0247   TROPONIN I ng/mL 0 05*       Results from last 7 days  Lab Units 10/21/17  0442 10/20/17  0611 10/19/17  0441   WBC Thousand/uL 8 71 8 14 11 08*   HEMOGLOBIN g/dL 14 7 14 3 12 7   HEMATOCRIT % 43 8 41 8 37 7   PLATELETS Thousands/uL 164 156 150       Results from last 7 days  Lab Units 10/19/17  0531   CHOLESTEROL mg/dL 161   TRIGLYCERIDES mg/dL 62   HDL mg/dL 49       Results from last 7 days  Lab Units 10/22/17  0455 10/21/17  0442 10/20/17  0611 10/19/17  0531   SODIUM mmol/L  --  140 140 141   POTASSIUM mmol/L  --  3 8 3 7 3 4*   CHLORIDE mmol/L  --  103 105 105   CO2 mmol/L  --  30 28 26   BUN mg/dL  --  21 15 18   CREATININE mg/dL  --  0 83 0 74 0 72   CALCIUM mg/dL  --  8 9 9 1 8 1*   TOTAL PROTEIN g/dL 7 4 6 8 6 9  --    BILIRUBIN TOTAL mg/dL 1 59* 1 77* 2 21*  --    ALK PHOS U/L 99 88 97  --    ALT U/L 26 26 32  --    AST U/L 14 10 10  --    GLUCOSE RANDOM mg/dL  --  98 111 104       Results from last 7 days  Lab Units 10/18/17  2133   INR  0 99   PTT seconds 31       Results from last 7 days  Lab Units 10/19/17  0531   MAGNESIUM mg/dL 2 4       Echo:Personally reviewed-normal ejection fraction grade 2 diastolic dysfunction, mild mild to moderately dilated left atrium      EKG personally reviewed by Zaida Engle MD      Counseling / Coordination of Care  Total floor / unit time spent today 30 minutes  Greater than 50% of total time was spent with the patient and / or family counseling and / or coordination of care

## 2017-10-23 NOTE — PROGRESS NOTES
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      Assessment:  68-year-old male presented to the hospital with hypertensive emergency and epigastric pain and finding of gallbladder distention a 2 mm stone in the cystic duct 2 days ago on CT scan being evaluated due to gallstones with elevated bilirubin      Plan:     1  Gallstones with elevated bilirubin     · 2 mm stone seen at the cystic duct with mild distended gallbladder, trace pericholecystic fluid, and slight inflammation seen on CT scan on 10/18/17  · Surgery following currently on antibiotics  · Patient currently complains of no abdominal pain, nausea, vomiting  Patient is tolerating full diet without any nausea  White counts within normal range and the patient is afebrile  · Total Bilirubin trending down from 2 21 2 days ago now to 1 59    · MRCP performed yesterday showed no common bile duct stones, no dilation of the common bile duct  But showed distended gallbladder with pericholecystic fluid and cholelithiasis  Also of note there is dilation and clubbing of the pancreatic side branches in the region of the body and tail of the pancreas suggesting chronic pancreatitis   · Ultrasound of the abdomen with Doppler pending     2  History of Crohn's  · States does not take any medications for this and does not have any symptoms at this time  · History of small-bowel resection due to the disease  Last colonoscopy over 10 years ago  Possibly would benefit from another colonoscopy      3   Hypertensive emergency  · Cardiology following, on presentation systolic blood pressure greater than 792X now systolic blood pressure 170's        Subjective:   Patient seen examined  No acute overnight events  Patient without any complaints at this time  Denies fevers, chills, abdominal pain, nausea, vomiting, diarrhea, constipation, melena, hematochezia, hematemesis  Is tolerating diet without no problems  Vitals: Temp (24hrs), Av 3 °F (36 8 °C), Min:97 4 °F (36 3 °C), Max:98 7 °F (37 1 °C)  Current: Temperature: 98 2 °F (36 8 °C)  Vitals:    10/22/17 2300 10/23/17 0300 10/23/17 0457 10/23/17 0700   BP: (!) 180/81 (!) 226/101 (!) 175/77 (!) 172/79   Pulse: 61 66 72 72   Resp: 18 19  18   Temp: (!) 97 4 °F (36 3 °C) 98 4 °F (36 9 °C)  98 2 °F (36 8 °C)   TempSrc: Oral Oral  Oral   SpO2: 96% 96%  96%   Weight:       Height:        Body mass index is 27 62 kg/m²  I/O last 24 hours:   In: 1400 [P O :1100; IV Piggyback:300]  Out: 400 [Urine:400]      Physical Exam: BP (!) 172/79 Comment: Map 113  Pulse 72   Temp 98 2 °F (36 8 °C) (Oral)   Resp 18   Ht 5' 10" (1 778 m)   Wt 87 3 kg (192 lb 7 4 oz)   SpO2 96%   BMI 27 62 kg/m²     General Appearance:    Alert, cooperative, no distress, appears stated age   Head:    Normocephalic, without obvious abnormality, atraumatic   Eyes:    PERRL, conjunctiva/corneas clear, EOM's intact,           Nose:   Nares normal, septum midline, mucosa normal, no drainage    or sinus tenderness   Throat:   Lips, mucosa, and tongue normal; teeth and gums normal   Neck:   Supple, symmetrical, trachea midline, no adenopathy;      no carotid bruit or JVD   Back:     Symmetric, no curvature, no CVA tenderness   Lungs:     Clear to auscultation bilaterally, respirations unlabored   Chest wall:    No tenderness or deformity   Heart:    Regular rate and rhythm, S1 and S2 normal, no murmur, rub   or gallop   Abdomen:     Soft, non-tender, bowel sounds active all four quadrants,     no masses, no organomegaly           Extremities:   Extremities normal, atraumatic, no cyanosis or edema Pulses:   2+ and symmetric all extremities   Skin:   Skin color, texture, turgor normal, no rashes or lesions   Lymph nodes:   Cervical, supraclavicular, and axillary nodes normal   Neurologic:   CNII-XII intact  Normal strength, sensation and reflexes       throughout        Invasive Devices     Peripheral Intravenous Line            Peripheral IV 10/23/17 Right Forearm less than 1 day                          Labs: No results found for this or any previous visit (from the past 24 hour(s))  Radiology Results: I have personally reviewed pertinent reports  Other Diagnostic Testing:   I have personally reviewed pertinent reports          Active Meds:   Current Facility-Administered Medications   Medication Dose Route Frequency    acetaminophen (TYLENOL) tablet 650 mg  650 mg Oral Q6H PRN    aspirin chewable tablet 81 mg  81 mg Oral Daily    atorvastatin (LIPITOR) tablet 40 mg  40 mg Oral Daily With Dinner    carvedilol (COREG) tablet 25 mg  25 mg Oral BID With Meals    ceFAZolin (ANCEF) IVPB (premix) 1,000 mg  1,000 mg Intravenous Q8H    [START ON 10/24/2017] chlorthalidone tablet 100 mg  100 mg Oral Daily    chlorthalidone tablet 50 mg  50 mg Oral Once    docusate sodium (COLACE) capsule 100 mg  100 mg Oral BID    enalapril (VASOTEC) tablet 20 mg  20 mg Oral BID    enoxaparin (LOVENOX) subcutaneous injection 40 mg  40 mg Subcutaneous Daily    hydrALAZINE (APRESOLINE) injection 5 mg  5 mg Intravenous Q6H PRN    metroNIDAZOLE (FLAGYL) IVPB (premix) 500 mg  500 mg Intravenous Q8H    niCARdipine (CARDENE) 25 mg (STANDARD CONCENTRATION) in sodium chloride 0 9% 250 mL  1-15 mg/hr Intravenous Titrated    NIFEdipine (PROCARDIA XL) 24 hr tablet 30 mg  30 mg Oral Once    [START ON 10/24/2017] NIFEdipine (PROCARDIA XL) 24 hr tablet 90 mg  90 mg Oral Daily    nitroglycerin (NITROSTAT) SL tablet 0 4 mg  0 4 mg Sublingual Q5 Min PRN    ondansetron (ZOFRAN) injection 4 mg  4 mg Intravenous Q4H PRN    pantoprazole (PROTONIX) EC tablet 40 mg  40 mg Oral BID AC    polyethylene glycol (MIRALAX) packet 17 g  17 g Oral Daily    potassium chloride (K-DUR,KLOR-CON) CR tablet 10 mEq  10 mEq Oral BID    senna (SENOKOT) tablet 8 6 mg  1 tablet Oral Daily         VTE Pharmacologic Prophylaxis: Enoxaparin (Lovenox)  VTE Mechanical Prophylaxis: sequential compression device    Swathi Malik MD

## 2017-10-23 NOTE — PROGRESS NOTES
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 Still elevated BP readings of > 180  ? Cont chlorthalidone 50 mg - increase to 100 mg, enalapril 20 BID, coreg increased to 25 BID, amlo chanegd to procardia XL - increase it to 90 mg  ? Monitor BP next 24 hours  · Epigastric/chest pain:  ? Suspect from biliary etiology  ? Trop mild elevation, echo - no WMA  · NSTEMI type II: from accelerated HTN  · Cholelithiasis with suspected cholecystitis:  ? LFT normal, t jonna downtending now  ? Continues to be Symptom free, suspect passed a stone, MRCP doesnot show CBD stones, shows cholecystitis  ? F/u GI & Sx rec based on MRCP  ? cont IV ancef & flagyl D-6      VTE Pharmacologic Prophylaxis:   Pharmacologic: Enoxaparin (Lovenox)  Mechanical VTE Prophylaxis in Place: Yes    Patient Centered Rounds: I have performed bedside rounds with nursing staff today  Discussions with Specialists or Other Care Team Provider:     Education and Discussions with Family / Patient: patient    Time Spent for Care: 45 minutes  More than 50% of total time spent on counseling and coordination of care as described above      Current Length of Stay: 4 day(s)    Current Patient Status: Inpatient   Certification Statement: The patient will continue to require additional inpatient hospital stay due to monitor BP    Discharge Plan / Estimated Discharge Date: ocne BP stable, & plan in place about GB    Code Status: Level 1 - Full Code      Subjective:   Feels good, no symptoms    Objective:     Vitals:   Temp (24hrs), Av 2 °F (36 8 °C), Min:97 4 °F (36 3 °C), Max:98 7 °F (37 1 °C)    HR:  [59-72] 72  Resp:  [18-20] 18  BP: (159-226)/() 172/79  SpO2:  [96 %] 96 %  Body mass index is 27 62 kg/m²  Input and Output Summary (last 24 hours): Intake/Output Summary (Last 24 hours) at 10/23/17 1059  Last data filed at 10/23/17 0901   Gross per 24 hour   Intake             1100 ml   Output              400 ml   Net              700 ml       Physical Exam:     Physical Exam   Constitutional: He is oriented to person, place, and time  He appears well-developed and well-nourished  HENT:   Head: Normocephalic and atraumatic  Eyes: Conjunctivae are normal  No scleral icterus  Cardiovascular: Normal rate, regular rhythm and normal heart sounds  Exam reveals no gallop and no friction rub  No murmur heard  Pulmonary/Chest: Effort normal and breath sounds normal  No respiratory distress  He has no wheezes  Abdominal: Soft  He exhibits no distension  There is no tenderness  Musculoskeletal: He exhibits no edema  Neurological: He is alert and oriented to person, place, and time  Additional Data:     Labs:      Results from last 7 days  Lab Units 10/21/17  0442   WBC Thousand/uL 8 71   HEMOGLOBIN g/dL 14 7   HEMATOCRIT % 43 8   PLATELETS Thousands/uL 164   NEUTROS PCT % 79*   LYMPHS PCT % 12*   MONOS PCT % 8   EOS PCT % 1       Results from last 7 days  Lab Units 10/23/17  1041  10/21/17  0442   SODIUM mmol/L  --   --  140   POTASSIUM mmol/L  --   --  3 8   CHLORIDE mmol/L  --   --  103   CO2 mmol/L  --   --  30   BUN mg/dL  --   --  21   CREATININE mg/dL  --   --  0 83   CALCIUM mg/dL  --   --  8 9   TOTAL PROTEIN g/dL 7 3  < > 6 8   BILIRUBIN TOTAL mg/dL 1 94*  < > 1 77*   ALK PHOS U/L 102  < > 88   ALT U/L 35  < > 26   AST U/L 31  < > 10   GLUCOSE RANDOM mg/dL  --   --  98   < > = values in this interval not displayed  Results from last 7 days  Lab Units 10/18/17  2133   INR  0 99       * I Have Reviewed All Lab Data Listed Above    * Additional Pertinent Lab Tests Reviewed: All Labs Within Last 24 Hours Reviewed    Imaging:    Imaging Reports Reviewed Today Include:   Imaging Personally Reviewed by Myself Includes:      Recent Cultures (last 7 days):           Last 24 Hours Medication List:     aspirin 81 mg Oral Daily   atorvastatin 40 mg Oral Daily With Dinner   carvedilol 25 mg Oral BID With Meals   cefazolin 1,000 mg Intravenous Q8H   [START ON 10/24/2017] chlorthalidone 100 mg Oral Daily   chlorthalidone 50 mg Oral Once   docusate sodium 100 mg Oral BID   enalapril 20 mg Oral BID   enoxaparin 40 mg Subcutaneous Daily   metroNIDAZOLE 500 mg Intravenous Q8H   NIFEdipine 30 mg Oral Once   [START ON 10/24/2017] NIFEdipine 90 mg Oral Daily   pantoprazole 40 mg Oral BID AC   polyethylene glycol 17 g Oral Daily   potassium chloride 10 mEq Oral BID   senna 1 tablet Oral Daily        Today, Patient Was Seen By: Kristyn Lazcano MD    ** Please Note: This note has been constructed using a voice recognition system   **

## 2017-10-23 NOTE — PROGRESS NOTES
Progress Note - General Surgery   Gill Vázquez  74 y o  male MRN: [id number]  Unit/Bed#: [facility] 405-01 Encounter: [id number]    Assessment:  74 M with hypertensive urgency, choledocholithiasis with possible cholecystitis    Plan:  F/u LFTs  MRCP done yesterday, awaiting results  Continue Antibiotics  Outpatient cholecystectomy  HTN control per primary    Subjective/Objective     Subjective: No acute events  Hypertensive overnight but asymptomatic  Toleraing diet, no nausea/vomiting  Denies abdominal pain  Objective:    Blood pressure (!) 175/77, pulse 72, temperature 98 4 °F (36 9 °C), temperature source Oral, resp  rate 19, height 5' 10" (1 778 m), weight 87 3 kg (192 lb 7 4 oz), SpO2 96 %  ,Body mass index is 27 62 kg/m²        Intake/Output Summary (Last 24 hours) at 10/23/17 0603  Last data filed at 10/23/17 0138   Gross per 24 hour   Intake             1310 ml   Output              400 ml   Net              910 ml       Invasive Devices     Peripheral Intravenous Line            Peripheral IV 10/23/17 Right Forearm less than 1 day                Physical Exam:   General: NAD, AAOx3  CV: RRR +S1/S2  Chest: breath sounds bilaterally  Abdomen: soft, NT ND  Extremities: atraumatic, no edema        Results from last 7 days  Lab Units 10/21/17  0442 10/20/17  0611 10/19/17  0441   WBC Thousand/uL 8 71 8 14 11 08*   HEMOGLOBIN g/dL 14 7 14 3 12 7   HEMATOCRIT % 43 8 41 8 37 7   PLATELETS Thousands/uL 164 156 150       Results from last 7 days  Lab Units 10/21/17  0442 10/20/17  0611 10/19/17  0531   SODIUM mmol/L 140 140 141   POTASSIUM mmol/L 3 8 3 7 3 4*   CHLORIDE mmol/L 103 105 105   CO2 mmol/L 30 28 26   BUN mg/dL 21 15 18   CREATININE mg/dL 0 83 0 74 0 72   GLUCOSE RANDOM mg/dL 98 111 104   CALCIUM mg/dL 8 9 9 1 8 1*       Results from last 7 days  Lab Units 10/18/17  2133   INR  0 99   PTT seconds 31

## 2017-10-24 ENCOUNTER — APPOINTMENT (INPATIENT)
Dept: NON INVASIVE DIAGNOSTICS | Facility: HOSPITAL | Age: 72
DRG: 444 | End: 2017-10-24
Payer: MEDICARE

## 2017-10-24 VITALS
BODY MASS INDEX: 27.55 KG/M2 | TEMPERATURE: 98 F | DIASTOLIC BLOOD PRESSURE: 77 MMHG | HEART RATE: 56 BPM | SYSTOLIC BLOOD PRESSURE: 175 MMHG | WEIGHT: 192.46 LBS | HEIGHT: 70 IN | OXYGEN SATURATION: 96 % | RESPIRATION RATE: 18 BRPM

## 2017-10-24 PROBLEM — I16.1 HYPERTENSIVE EMERGENCY: Status: RESOLVED | Noted: 2017-10-19 | Resolved: 2017-10-24

## 2017-10-24 PROBLEM — R10.9 ABDOMINAL PAIN: Status: RESOLVED | Noted: 2017-10-19 | Resolved: 2017-10-24

## 2017-10-24 PROBLEM — D72.829 LEUKOCYTOSIS: Status: RESOLVED | Noted: 2017-10-19 | Resolved: 2017-10-24

## 2017-10-24 LAB
ANION GAP SERPL CALCULATED.3IONS-SCNC: 7 MMOL/L (ref 4–13)
BUN SERPL-MCNC: 32 MG/DL (ref 5–25)
CALCIUM SERPL-MCNC: 8.4 MG/DL (ref 8.3–10.1)
CHLORIDE SERPL-SCNC: 102 MMOL/L (ref 100–108)
CO2 SERPL-SCNC: 27 MMOL/L (ref 21–32)
CREAT SERPL-MCNC: 1.17 MG/DL (ref 0.6–1.3)
GFR SERPL CREATININE-BSD FRML MDRD: 61 ML/MIN/1.73SQ M
GLUCOSE SERPL-MCNC: 102 MG/DL (ref 65–140)
POTASSIUM SERPL-SCNC: 4.3 MMOL/L (ref 3.5–5.3)
SODIUM SERPL-SCNC: 136 MMOL/L (ref 136–145)

## 2017-10-24 PROCEDURE — G8987 SELF CARE CURRENT STATUS: HCPCS

## 2017-10-24 PROCEDURE — 97116 GAIT TRAINING THERAPY: CPT

## 2017-10-24 PROCEDURE — 93975 VASCULAR STUDY: CPT

## 2017-10-24 PROCEDURE — 97165 OT EVAL LOW COMPLEX 30 MIN: CPT

## 2017-10-24 PROCEDURE — G8988 SELF CARE GOAL STATUS: HCPCS

## 2017-10-24 PROCEDURE — G8979 MOBILITY GOAL STATUS: HCPCS

## 2017-10-24 PROCEDURE — G8989 SELF CARE D/C STATUS: HCPCS

## 2017-10-24 PROCEDURE — G8980 MOBILITY D/C STATUS: HCPCS

## 2017-10-24 PROCEDURE — G8978 MOBILITY CURRENT STATUS: HCPCS

## 2017-10-24 PROCEDURE — 80048 BASIC METABOLIC PNL TOTAL CA: CPT | Performed by: HOSPITALIST

## 2017-10-24 RX ORDER — METRONIDAZOLE 500 MG/1
500 TABLET ORAL EVERY 8 HOURS SCHEDULED
Qty: 15 TABLET | Refills: 0 | Status: SHIPPED | OUTPATIENT
Start: 2017-10-24 | End: 2017-10-29

## 2017-10-24 RX ORDER — ENALAPRIL MALEATE 20 MG/1
20 TABLET ORAL 2 TIMES DAILY
Qty: 60 TABLET | Refills: 0 | Status: SHIPPED | OUTPATIENT
Start: 2017-10-24 | End: 2018-12-18 | Stop reason: HOSPADM

## 2017-10-24 RX ORDER — ASPIRIN 81 MG/1
81 TABLET, CHEWABLE ORAL DAILY
Qty: 30 TABLET | Refills: 0 | Status: SHIPPED | OUTPATIENT
Start: 2017-10-25 | End: 2018-12-16

## 2017-10-24 RX ORDER — NIFEDIPINE 90 MG/1
90 TABLET, EXTENDED RELEASE ORAL DAILY
Qty: 30 TABLET | Refills: 0 | Status: SHIPPED | OUTPATIENT
Start: 2017-10-25 | End: 2018-12-24 | Stop reason: ALTCHOICE

## 2017-10-24 RX ORDER — CARVEDILOL 25 MG/1
25 TABLET ORAL 2 TIMES DAILY WITH MEALS
Qty: 60 TABLET | Refills: 0 | Status: SHIPPED | OUTPATIENT
Start: 2017-10-24 | End: 2018-09-06 | Stop reason: SDUPTHER

## 2017-10-24 RX ORDER — PANTOPRAZOLE SODIUM 40 MG/1
40 TABLET, DELAYED RELEASE ORAL DAILY
Qty: 30 TABLET | Refills: 0 | Status: SHIPPED | OUTPATIENT
Start: 2017-10-24 | End: 2018-09-06 | Stop reason: SDUPTHER

## 2017-10-24 RX ORDER — ATORVASTATIN CALCIUM 40 MG/1
40 TABLET, FILM COATED ORAL
Qty: 30 TABLET | Refills: 0 | Status: SHIPPED | OUTPATIENT
Start: 2017-10-24 | End: 2017-11-30 | Stop reason: ALTCHOICE

## 2017-10-24 RX ORDER — CEPHALEXIN 500 MG/1
500 CAPSULE ORAL EVERY 12 HOURS SCHEDULED
Qty: 10 CAPSULE | Refills: 0 | Status: SHIPPED | OUTPATIENT
Start: 2017-10-24 | End: 2017-10-29

## 2017-10-24 RX ADMIN — METRONIDAZOLE 500 MG: 500 INJECTION, SOLUTION INTRAVENOUS at 06:05

## 2017-10-24 RX ADMIN — ASPIRIN 81 MG 81 MG: 81 TABLET ORAL at 09:58

## 2017-10-24 RX ADMIN — SENNOSIDES 8.6 MG: 8.6 TABLET, FILM COATED ORAL at 09:58

## 2017-10-24 RX ADMIN — PANTOPRAZOLE SODIUM 40 MG: 40 TABLET, DELAYED RELEASE ORAL at 09:58

## 2017-10-24 RX ADMIN — CHLORTHALIDONE 100 MG: 25 TABLET ORAL at 08:00

## 2017-10-24 RX ADMIN — POTASSIUM CHLORIDE 10 MEQ: 750 TABLET, EXTENDED RELEASE ORAL at 09:58

## 2017-10-24 RX ADMIN — ENOXAPARIN SODIUM 40 MG: 40 INJECTION SUBCUTANEOUS at 09:58

## 2017-10-24 RX ADMIN — CEFAZOLIN SODIUM 1000 MG: 1 SOLUTION INTRAVENOUS at 01:15

## 2017-10-24 RX ADMIN — POLYETHYLENE GLYCOL 3350 17 G: 17 POWDER, FOR SOLUTION ORAL at 09:58

## 2017-10-24 RX ADMIN — ENALAPRIL MALEATE 20 MG: 10 TABLET ORAL at 08:00

## 2017-10-24 RX ADMIN — NIFEDIPINE 90 MG: 90 TABLET, FILM COATED, EXTENDED RELEASE ORAL at 08:01

## 2017-10-24 RX ADMIN — PANTOPRAZOLE SODIUM 40 MG: 40 TABLET, DELAYED RELEASE ORAL at 06:05

## 2017-10-24 RX ADMIN — DOCUSATE SODIUM 100 MG: 100 CAPSULE, LIQUID FILLED ORAL at 09:58

## 2017-10-24 RX ADMIN — CEFAZOLIN SODIUM 1000 MG: 1 SOLUTION INTRAVENOUS at 07:56

## 2017-10-24 RX ADMIN — CARVEDILOL 25 MG: 25 TABLET, FILM COATED ORAL at 07:57

## 2017-10-24 NOTE — PROGRESS NOTES
Addendum to morning note:  Surgery will be signing off for now  Follow up in 2 weeks with surgery as an outpatient to schedule elective procedure  Continue antibiotics for a total of 10 days

## 2017-10-24 NOTE — DISCHARGE INSTRUCTIONS
Gallstones   WHAT YOU NEED TO KNOW:   Gallstones, also called cholelithiasis, are hard substances that form in your gallbladder or bile duct  Your gallbladder and bile duct are located on the right side of your abdomen, near your liver  Your gallbladder stores bile, which helps break down the fat that you eat  Your gallbladder also helps remove certain chemicals from your body  DISCHARGE INSTRUCTIONS:   Medicines:   · Prescription pain medicine  may be given  Ask your healthcare provider how to take this medicine safely  · Take your medicine as directed  Contact your healthcare provider if you think your medicine is not helping or if you have side effects  Tell him if you are allergic to any medicine  Keep a list of the medicines, vitamins, and herbs you take  Include the amounts, and when and why you take them  Bring the list or the pill bottles to follow-up visits  Carry your medicine list with you in case of an emergency  Follow up with your healthcare provider as directed:  Write down your questions so you remember to ask them during your visits  Eat a variety of healthy foods: This may help you have more energy and heal faster  Healthy foods include fruit, vegetables, whole-grain breads, low-fat dairy products, beans, lean meat, and fish  Ask if you need to be on a special diet  Exercise as directed:  Talk to your healthcare provider about the best exercise plan for you  Exercise can help you lose weight and improve your health  Contact your healthcare provider if:   · You have nausea and are vomiting  · Your urine is dark  · You have meli-colored bowel movements  · You have questions or concerns about your condition or care  Seek care immediately or call 911 if:   · You have a fever and chills  · Your skin or eyes turn yellow  · You have severe pain in your upper abdomen, just below the right ribcage    © 2017 [employer] Information is for End User's use only and may not be sold, redistributed or otherwise used for commercial purposes  All illustrations and images included in CareNotes® are the copyrighted property of [employer]  or Matt Hurst  The above information is an  only  It is not intended as medical advice for individual conditions or treatments  Talk to your doctor, nurse or pharmacist before following any medical regimen to see if it is safe and effective for you  Hypertension   WHAT YOU NEED TO KNOW:   Hypertension is high blood pressure (BP)  Your BP is the force of your blood moving against the walls of your arteries  Normal BP is less than 120/80  Prehypertension is between 120/80 and 139/89  Hypertension is 140/90 or higher  Hypertension causes your BP to get so high that your heart has to work much harder than normal  This can damage your heart  You can control hypertension with a healthy lifestyle or medicines  A controlled blood pressure helps protect your organs, such as your heart, lungs, brain, and kidneys  DISCHARGE INSTRUCTIONS:   Call 911 for any of the following:   · You have discomfort in your chest that feels like squeezing, pressure, fullness, or pain  · You become confused or have difficulty speaking  · You suddenly feel lightheaded or have trouble breathing  · You have pain or discomfort in your back, neck, jaw, stomach, or arm  Seek care immediately if:   · You have a severe headache or vision loss  · You have weakness in an arm or leg  Contact your healthcare provider if:   · You feel faint, dizzy, confused, or drowsy  · You have been taking your BP medicine and your BP is still higher than your healthcare provider says it should be  · You have questions or concerns about your condition or care  Medicines: You may  need any of the following:  · Medicine  may be used to help lower your BP  You may need more than one type of medicine  Take the medicine exactly as directed  · Diuretics  help decrease extra fluid that collects in your body  This will help lower your BP  You may urinate more often while you take this medicine  · Cholesterol medicine  helps lower your cholesterol level  A low cholesterol level helps prevent heart disease and makes it easier to control your blood pressure  · Take your medicine as directed  Contact your healthcare provider if you think your medicine is not helping or if you have side effects  Tell him or her if you are allergic to any medicine  Keep a list of the medicines, vitamins, and herbs you take  Include the amounts, and when and why you take them  Bring the list or the pill bottles to follow-up visits  Carry your medicine list with you in case of an emergency  Follow up with your healthcare provider as directed: You will need to return to have your BP checked and to have other lab tests done  Write down your questions so you remember to ask them during your visits  Manage hypertension:  Talk with your healthcare provider about these and other ways to manage hypertension:  · Check your BP at home  Sit and rest for 5 minutes before you take your BP  Extend your arm and support it on a flat surface  Your arm should be at the same level as your heart  Follow the directions that came with your BP monitor  If possible, take at least 2 BP readings each time  Take your BP at least twice a day at the same times each day, such as morning and evening  Keep a record of your BP readings and bring it to your follow-up visits  Ask your healthcare provider what your BP should be  · Limit sodium (salt) as directed  Too much sodium can affect your fluid balance  Check labels to find low-sodium or no-salt-added foods  Some low-sodium foods use potassium salts for flavor  Too much potassium can also cause health problems  Your healthcare provider will tell you how much sodium and potassium are safe for you to have in a day   He or she may recommend that you limit sodium to 2,300 mg a day  · Follow the meal plan recommended by your healthcare provider  A dietitian or your provider can give you more information on low-sodium plans or the DASH (Dietary Approaches to Stop Hypertension) eating plan  The DASH plan is low in sodium, unhealthy fats, and total fat  It is high in potassium, calcium, and fiber  · Exercise to maintain a healthy weight  Exercise at least 30 minutes per day, on most days of the week  This will help decrease your blood pressure  Ask your healthcare provider about the best exercise plan for you  · Decrease stress  This may help lower your BP  Learn ways to relax, such as deep breathing or listening to music  · Limit alcohol  Women should limit alcohol to 1 drink a day  Men should limit alcohol to 2 drinks a day  A drink of alcohol is 12 ounces of beer, 5 ounces of wine, or 1½ ounces of liquor  · Do not smoke  Nicotine and other chemicals in cigarettes and cigars can increase your BP and also cause lung damage  Ask your healthcare provider for information if you currently smoke and need help to quit  E-cigarettes or smokeless tobacco still contain nicotine  Talk to your healthcare provider before you use these products  · Manage any other health conditions you have  Health conditions such as diabetes can increase your risk for hypertension  Follow your healthcare provider's instructions and take all your medicines as directed  © 2017 [employer] Information is for End User's use only and may not be sold, redistributed or otherwise used for commercial purposes  All illustrations and images included in CareNotes® are the copyrighted property of [employer]  or [employer] Hurst  The above information is an  only  It is not intended as medical advice for individual conditions or treatments   Talk to your doctor, nurse or pharmacist before following any medical regimen to see if it is safe and effective for you

## 2017-10-24 NOTE — PHYSICAL THERAPY NOTE
PT TREATMENT       10/24/17 1125   Pain Assessment   Pain Assessment 0-10   Pain Score No Pain   General   Chart Reviewed Yes   Response to Previous Treatment Patient with no complaints from previous session  Family/Caregiver Present No   Cognition   Overall Cognitive Status WFL   Bed Mobility   Supine to Sit 6  Modified independent   Transfers   Sit to Stand 7  Independent   Stand to Sit 7  Independent   Ambulation/Elevation   Gait pattern WNL   Gait Assistance 7  Independent   Assistive Device None   Distance 80 feet x2   Stair Management Assistance 7  Independent   Stair Management Technique One rail R;Foreward;Reciprocal   Number of Stairs 7   Balance   Static Sitting Good   Static Standing Fair +   Ambulatory Fair +   Endurance Deficit   Endurance Deficit No   Activity Tolerance   Activity Tolerance Patient tolerated treatment well   Nurse Made Aware ERNESTO TO SEE PER LEIGHTON URENA   Assessment   Prognosis Good   Problem List Impaired balance   Assessment PT INITIATED TREATMENT SESSION IN ORDER TO ASSIST PATIENT IN ACHIEIVING GOALS OF IMPROVED AMBULATION AND STAIR NAVIGATION IN ORDER TO FACILIATE TIMELY AND SAFE D/C HOME WHEN MED ERNESTO  UPON ARRIVAL PATIENT SUPINE PRESENTING W/O PAIN  HE PERFORMED BED MOBILITY MOD-I W/ HAND RAILS AND WAS I FOR SIT<-->STAND TRANSFER, AMBULATION, AND STAIR NAVIGATION  HE AMBULATED 160 FEET W/O AD, WNL (INCREASED TRUNK FLEXION HOWEVER THIS IS CHRONIC)  HE NAVIGATED 7 STEPS RECIPROCALLY W/O DIFFICULTY  PATIENT DEMONSTRATES SAFE/EFFECTIVE MOBILITY AND PRESENTS WITHOUT QUESTIONS/CONCERNS ABOUT D/C HOME WHEN MED ERNESTO  RECOMMEND CONTINUED SELF AMBULATION THIS ADMISSION  D/C INPT PT SERVICES      Goals   Patient Goals TO WALK    Treatment Day 0   Plan   Progress Discontinue PT   Recommendation   Recommendation Home with family support   Equipment Recommended (NONE)   PT - OK to Discharge Yes  (HOME W/ FAMILY SUPPORT PRN WHEN MED ERNESTO )   Chencho Mancera, PT

## 2017-10-24 NOTE — PLAN OF CARE
Problem: PHYSICAL THERAPY ADULT  Goal: Performs mobility at highest level of function for planned discharge setting  See evaluation for individualized goals  Treatment/Interventions: Functional transfer training, LE strengthening/ROM, Elevations, Therapeutic exercise, Endurance training, Bed mobility, Gait training, Spoke to nursing          See flowsheet documentation for full assessment, interventions and recommendations  Outcome: Completed Date Met: 10/24/17  Prognosis: Good  Problem List: Impaired balance  Assessment: PT INITIATED TREATMENT SESSION IN ORDER TO ASSIST PATIENT IN ACHIEIVING GOALS OF IMPROVED AMBULATION AND STAIR NAVIGATION IN ORDER TO FACILIATE TIMELY AND SAFE D/C HOME WHEN MED ERNESTO  UPON ARRIVAL PATIENT SUPINE PRESENTING W/O PAIN  HE PERFORMED BED MOBILITY MOD-I W/ HAND RAILS AND WAS I FOR SIT<-->STAND TRANSFER, AMBULATION, AND STAIR NAVIGATION  HE AMBULATED 160 FEET W/O AD, WNL (INCREASED TRUNK FLEXION HOWEVER THIS IS CHRONIC)  HE NAVIGATED 7 STEPS RECIPROCALLY W/O DIFFICULTY  PATIENT DEMONSTRATES SAFE/EFFECTIVE MOBILITY AND PRESENTS WITHOUT QUESTIONS/CONCERNS ABOUT D/C HOME WHEN MED ERNESTO  RECOMMEND CONTINUED SELF AMBULATION THIS ADMISSION  D/C INPT PT SERVICES  Barriers to Discharge:  (PARIS)     Recommendation: (S) Home with family support     PT - OK to Discharge: (S) Yes (HOME W/ FAMILY SUPPORT PRN WHEN MED ERNESTO )    See flowsheet documentation for full assessment     Vince Khan, PT

## 2017-10-24 NOTE — OCCUPATIONAL THERAPY NOTE
Occupational Therapy Evaluation      [facility]     10/24/2017    Patient Active Problem List   Diagnosis    Hypertensive emergency    Abdominal pain    Cholecystitis with cholelithiasis    Leukocytosis    Total bilirubin, elevated    70% stenosis of right ICA       Past Medical History:   Diagnosis Date    Cancer ([address] )     Hypertension     Spinal stenosis        History reviewed  No pertinent surgical history  10/24/17 0940   Note Type   Note type Eval only   Pain Assessment   Pain Assessment 0-10   Pain Score No Pain   Home Living   Type of [address] [place] [address] One level; Work area in basement;Able to live on main level with bedroom/bathroom   Bathroom Toilet Standard   Bathroom Equipment Grab bars in shower   Additional Comments pt did not use any DME    Prior Function   Level of [place] Independent with ADLs and functional mobility   Lives With [place] Help From Family   ADL Assistance Independent   IADLs Independent   Falls in the last 6 months 0   Vocational Retired   Lifestyle   Autonomy fully independent w self care, mobiltiy, home management as well as driving   Reciprocal Relationships supportive family   Lives w wife, son a couple of homes away   Intrinsic Gratification Keeping busy   Psychosocial   Psychosocial (WDL) WDL   ADL   Eating Assistance 7  Independent   Grooming Assistance 7  Independent   UB Bathing Assistance 7  Independent   LB [address] 7  Independent   UB Dressing Assistance 7  Independent    [address] 6  Modified independent   [address]  6  Modified independent   Bed Mobility   Rolling R 7  Independent   Rolling L 7  Independent   Supine to Sit 6  Modified independent   Sit to Supine 6  Modified independent   Transfers   Sit to Stand 7  Independent   Stand to Sit 7  Independent   Stand pivot 7  Independent   Toilet transfer 6  Modified independent   Functional Mobility   Functional Mobility 7  Independent   Balance Static Sitting Good   Dynamic Sitting Good   Static Standing Fair +   Dynamic Standing Fair +   Activity Tolerance   Activity Tolerance Patient tolerated treatment well   Nurse Made Aware LEIGHTON Fiore cleared for OT assessment   RUE Assessment   RUE Assessment WFL   LUE Assessment   LUE Assessment WFL   Hand Function   Gross Motor Coordination Functional   Fine Motor Coordination Functional   Sensation   Light Touch No apparent deficits   Vision - Complex Assessment   Tracking Able to track stimulus in all quads without difficulty   Additional Comments pt wears bifocal glasses  Has 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 spinal stenosis  Unable to turn head side to side  approximately 5-10 degrees in either direction from midline  Unable to flex head, unable to see own feet   Cognition   Overall Cognitive Status [facility]   Arousal/Participation Alert; Cooperative   Attention Within functional limits   Orientation Level Oriented X4   Memory Within functional limits   Following Commands Follows all commands and directions without difficulty   Assessment   Assessment Pt is a 74 y o  male seen for OT evaluation s/p admit to [address] [place] [address] on 10/18/2017 w/ Hypertensive emergency and acute onset chest pain  In the ED, BP was high 255/149  Comorbidities affecting pt's functional performance at time of assessment include: HTN and spinal stenosis, R carotid artery stenosis  Pt lives at home w his wife in a ranch style home w finished basement  Prior to admission, pt was fully independent w all self care, mobility, driving, home management etc  Pt does have limited neck mobility (5-10* ROM from midline)  He has dealt w this for many many years and is able to modify activities at home  He uses a sock aid for donning socks, and has extra mirrors for driving  Upon evaluation pt demonstrated good safety awareness, ability to complete self care independently, able to complete functional mobility in room without assistive device   At this time, further OT intervention is not indicated while in acute care  DC OT  Pt scored  90 /100 on the barthel index  Based on findings from OT evaluation and functional performance deficits, pt has been identified as a low complexity evaluation  From OT standpoint, recommendation at time of d/c would be home w family support      Plan   OT Frequency Eval only   Recommendation   OT Discharge Recommendation Home with family support   OT - OK to Discharge Yes   Barthel Index   Feeding 10   Bathing 5   Grooming Score 5   Dressing Score 10   Bladder Score 10   Bowels Score 10   Toilet Use Score 10   Transfers (Bed/Chair) Score 15   Mobility (Level Surface) Score 15   Stairs Score 0   Barthel Index Score 90

## 2017-10-24 NOTE — PROGRESS NOTES
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      Plan:  No significant issues overnight  The general surgery note is appreciated  Patient continues with hypertension but is now on an aggressive oral regimen and will give this time to work therapeutically  I have made no change today in his medical regimen  He is likely okay for discharge planning from my point of view  I would be happy to see him as an outpatient at this would be helpful otherwise he will follow-up with his primary physician Dr Tinnie Olszewski  He did have a renal artery Doppler today and the preliminary finding on this did not suggest significant renal artery stenosis but final report is pending  Subjective:   Patient seen and examined  No significant events overnight   negative  Objective:     Vitals: Blood pressure (!) 175/77, pulse 56, temperature 98 °F (36 7 °C), temperature source Oral, resp  rate 18, height 5' 10" (1 778 m), weight 87 3 kg (192 lb 7 4 oz), SpO2 96 %  , Body mass index is 27 62 kg/m² , Orthostatic Blood Pressures    Flowsheet Row Most Recent Value   Blood Pressure   175/77 [Map 111] filed at 10/24/2017 0701   Patient Position - Orthostatic VS  Lying filed at 10/24/2017 0701      ,      Intake/Output Summary (Last 24 hours) at 10/24/17 0910  Last data filed at 10/24/17 0718   Gross per 24 hour   Intake              910 ml   Output                0 ml   Net              910 ml         Physical Exam:    GEN: Benjamin Parrish   appears well, alert and oriented x 3, pleasant and cooperative   NECK: supple, no carotid bruits, no JVD or HJR  HEART: normal rate, regular rhythm, normal S1 and S2, no murmurs, clicks, gallops or rubs   LUNGS: clear to auscultation bilaterally; no wheezes, rales, or rhonchi   EXTREMITIES: peripheral pulses normal; no clubbing, cyanosis, or edema  SKIN: warm and well perfused, no suspicious lesions on exposed skin    Labs & Results:    Admission on 10/18/2017   Component Date Value    Sodium 10/18/2017 141     Potassium 10/18/2017 4 3     Chloride 10/18/2017 105     CO2 10/18/2017 28     Anion Gap 10/18/2017 8     BUN 10/18/2017 23     Creatinine 10/18/2017 0 87     Glucose 10/18/2017 129     Calcium 10/18/2017 8 6     AST 10/18/2017 35     ALT 10/18/2017 50     Alkaline Phosphatase 10/18/2017 97     Total Protein 10/18/2017 7 1     Albumin 10/18/2017 3 8     Total Bilirubin 10/18/2017 1 24*    eGFR 10/18/2017 85     WBC 10/18/2017 11 76*    RBC 10/18/2017 4 65     Hemoglobin 10/18/2017 13 8     Hematocrit 10/18/2017 41 2     MCV 10/18/2017 89     MCH 10/18/2017 29 7     MCHC 10/18/2017 33 5     RDW 10/18/2017 13 5     MPV 10/18/2017 11 5     Platelets 98/79/9170 173     nRBC 10/18/2017 0     Neutrophils Relative 10/18/2017 85*    Lymphocytes Relative 10/18/2017 9*    Monocytes Relative 10/18/2017 6     Eosinophils Relative 10/18/2017 0     Basophils Relative 10/18/2017 0     Neutrophils Absolute 10/18/2017 9 99*    Lymphocytes Absolute 10/18/2017 1 06     Monocytes Absolute 10/18/2017 0 65     Eosinophils Absolute 10/18/2017 0 02     Basophils Absolute 10/18/2017 0 01     Ventricular Rate 10/20/2017 68     Atrial Rate 10/20/2017 68     IN Interval 10/20/2017 182     QRSD Interval 10/20/2017 138     QT Interval 10/20/2017 406     QTC Interval 10/20/2017 431     P Axis 10/20/2017 62     QRS Axis 10/20/2017 -19     T Wave Axis 10/20/2017 53     Protime 10/18/2017 13 1     INR 10/18/2017 0 99     PTT 10/18/2017 31     POC Troponin I 10/18/2017 0 04     Specimen Type 10/18/2017 VENOUS     Lipase 10/18/2017 162     TSH 3RD GENERATON 10/19/2017 3 050     Cholesterol 10/19/2017 161     Triglycerides 10/19/2017 62     HDL, Direct 10/19/2017 49     LDL Calculated 10/19/2017 100     Sodium 10/19/2017 141     Potassium 10/19/2017 3 4*    Chloride 10/19/2017 105     CO2 10/19/2017 26     Anion Gap 10/19/2017 10     BUN 10/19/2017 18     Creatinine 10/19/2017 0 72     Glucose 10/19/2017 104     Calcium 10/19/2017 8 1*    eGFR 10/19/2017 92     Phosphorus 10/19/2017 2 9     Magnesium 10/19/2017 2 4     Hemoglobin A1C 10/19/2017 5 2     EAG 10/19/2017 103     LACTIC ACID 10/19/2017 1 8     Troponin I 10/19/2017 0 05*    Total Bilirubin 10/19/2017 1 55*    Bilirubin, Direct 10/19/2017 0 33*    Alkaline Phosphatase 10/19/2017 84     AST 10/19/2017 12     ALT 10/19/2017 37     Total Protein 10/19/2017 6 0*    Albumin 10/19/2017 3 3*    WBC 10/19/2017 11 08*    RBC 10/19/2017 4 24     Hemoglobin 10/19/2017 12 7     Hematocrit 10/19/2017 37 7     MCV 10/19/2017 89     MCH 10/19/2017 30 0     MCHC 10/19/2017 33 7     RDW 10/19/2017 13 6     MPV 10/19/2017 11 2     Platelets 73/44/9953 150     nRBC 10/19/2017 0     Neutrophils Relative 10/19/2017 85*    Lymphocytes Relative 10/19/2017 8*    Monocytes Relative 10/19/2017 7     Eosinophils Relative 10/19/2017 0     Basophils Relative 10/19/2017 0     Neutrophils Absolute 10/19/2017 9 33*    Lymphocytes Absolute 10/19/2017 0 90     Monocytes Absolute 10/19/2017 0 80     Eosinophils Absolute 10/19/2017 0 01     Basophils Absolute 10/19/2017 0 01     WBC 10/20/2017 8 14     RBC 10/20/2017 4 73     Hemoglobin 10/20/2017 14 3     Hematocrit 10/20/2017 41 8     MCV 10/20/2017 88     MCH 10/20/2017 30 2     MCHC 10/20/2017 34 2     RDW 10/20/2017 13 5     MPV 10/20/2017 11 4     Platelets 10/61/9579 156     nRBC 10/20/2017 0     Neutrophils Relative 10/20/2017 77*    Lymphocytes Relative 10/20/2017 15     Monocytes Relative 10/20/2017 7     Eosinophils Relative 10/20/2017 1     Basophils Relative 10/20/2017 0     Neutrophils Absolute 10/20/2017 6 26     Lymphocytes Absolute 10/20/2017 1 20     Monocytes Absolute 10/20/2017 0 60     Eosinophils Absolute 10/20/2017 0 05     Basophils Absolute 10/20/2017 0 01     Total Bilirubin 10/20/2017 2 21*    Bilirubin, Direct 10/20/2017 0 39*    Alkaline Phosphatase 10/20/2017 97     AST 10/20/2017 10     ALT 10/20/2017 32     Total Protein 10/20/2017 6 9     Albumin 10/20/2017 3 6     Sodium 10/20/2017 140     Potassium 10/20/2017 3 7     Chloride 10/20/2017 105     CO2 10/20/2017 28     Anion Gap 10/20/2017 7     BUN 10/20/2017 15     Creatinine 10/20/2017 0 74     Glucose 10/20/2017 111     Calcium 10/20/2017 9 1     eGFR 10/20/2017 91     Sodium 10/21/2017 140     Potassium 10/21/2017 3 8     Chloride 10/21/2017 103     CO2 10/21/2017 30     Anion Gap 10/21/2017 7     BUN 10/21/2017 21     Creatinine 10/21/2017 0 83     Glucose 10/21/2017 98     Calcium 10/21/2017 8 9     AST 10/21/2017 10     ALT 10/21/2017 26     Alkaline Phosphatase 10/21/2017 88     Total Protein 10/21/2017 6 8     Albumin 10/21/2017 3 6     Total Bilirubin 10/21/2017 1 77*    eGFR 10/21/2017 87     WBC 10/21/2017 8 71     RBC 10/21/2017 4 89     Hemoglobin 10/21/2017 14 7     Hematocrit 10/21/2017 43 8     MCV 10/21/2017 90     MCH 10/21/2017 30 1     MCHC 10/21/2017 33 6     RDW 10/21/2017 13 7     MPV 10/21/2017 11 2     Platelets 87/45/0157 164     nRBC 10/21/2017 0     Neutrophils Relative 10/21/2017 79*    Lymphocytes Relative 10/21/2017 12*    Monocytes Relative 10/21/2017 8     Eosinophils Relative 10/21/2017 1     Basophils Relative 10/21/2017 0     Neutrophils Absolute 10/21/2017 6 88     Lymphocytes Absolute 10/21/2017 1 02     Monocytes Absolute 10/21/2017 0 70     Eosinophils Absolute 10/21/2017 0 08     Basophils Absolute 10/21/2017 0 01     Total Bilirubin 10/22/2017 1 59*    Bilirubin, Direct 10/22/2017 0 34*  Alkaline Phosphatase 10/22/2017 99     AST 10/22/2017 14     ALT 10/22/2017 26     Total Protein 10/22/2017 7 4     Albumin 10/22/2017 3 8     Total Bilirubin 10/23/2017 1 94*    Bilirubin, Direct 10/23/2017 0 38*    Alkaline Phosphatase 10/23/2017 102     AST 10/23/2017 31     ALT 10/23/2017 35     Total Protein 10/23/2017 7 3     Albumin 10/23/2017 4 3     Sodium 10/24/2017 136     Potassium 10/24/2017 4 3     Chloride 10/24/2017 102     CO2 10/24/2017 27     Anion Gap 10/24/2017 7     BUN 10/24/2017 32*    Creatinine 10/24/2017 1 17     Glucose 10/24/2017 102     Calcium 10/24/2017 8 4     eGFR 10/24/2017 61        X-ray Chest 2 Views    Result Date: 10/19/2017  Narrative: CHEST - DUAL ENERGY INDICATION:  Chest pain  COMPARISON:  None VIEWS:  PA (including soft tissue/bone algorithms) and lateral projections IMAGES:  5 FINDINGS:     Cardiomediastinal silhouette appears unremarkable  The lungs are clear  No pneumothorax or pleural effusion  Visualized osseous structures appear within normal limits for the patient's age  Impression: No active pulmonary disease  Workstation performed: [id number]     Ct Head Wo Contrast    Result Date: 10/19/2017  Narrative: CT BRAIN - WITHOUT CONTRAST INDICATION:  Evolving infarct or hemorrhage  Hypertensive urgency  COMPARISON:  February 18, 2014 TECHNIQUE:  CT examination of the brain was performed  In addition to axial images, coronal reformatted images were created and submitted for interpretation  Radiation dose length product (DLP) for this visit:  1370 49 mGy-cm   This examination, like all CT scans performed in the [facility], was performed utilizing techniques to minimize radiation dose exposure, including the use of iterative reconstruction and automated exposure control  IMAGE QUALITY:  Initial exam excluded portion of the frontal lobes  Imaging was repeated   FINDINGS:  PARENCHYMA:  No intracranial mass, mass effect or midline shift  No CT signs of acute infarction  There is no parenchymal hemorrhage  VENTRICLES AND EXTRA-AXIAL SPACES:  Normal for patient's age  VISUALIZED ORBITS AND PARANASAL SINUSES:  Unremarkable  CALVARIUM AND EXTRACRANIAL SOFT TISSUES:   Normal      Impression: No acute intracranial abnormality  Findings are consistent with the preliminary report from [facility] which was provided shortly after completion of the exam              Workstation performed: [id number]     Mri Abdomen Wo Contrast And Mrcp    Result Date: 10/23/2017  Narrative: MRI OF THE ABDOMEN WITHOUT CONTRAST WITH MRCP INDICATION: Cystic duct stone COMPARISON: October 18, 2017 TECHNIQUE:  The following pulse sequences were obtained on a 1 5 T scanner: 3D MRCP with radial thick slabs and projections  Coronal and axial T2 with TE of 90 and 180 respectively, axial T1-weighted in-and-out-of phase, axial DWI/ADC, axial T2 with fat  saturation, axial FIESTA fat-sat, and axial T1 with fat saturation  3D rendering was performed from the acquisition scanner  Evaluation of the solid abdominal viscera is limited without intravenous contrast  FINDINGS: BILIARY/PANCREATIC DUCTS:  The common bile duct measures about 6 5 mm in its distal portion  There is no dilation of the intrahepatic ducts seen  There is tapering of the common bile duct noted  There is no evidence of choledocholithiasis LIVER:  Mild fatty infiltration of the liver noted  No focal liver lesion seen GALLBLADDER: There is distended There is gallbladder wall edema  There is pericholecystic fluid  There are multiple gallstones noted PANCREAS: There is no peripancreatic fluid collection  There is dilatation of the pancreatic side branches with clubbing predominantly involving the distal body and tail of the pancreas compatible with chronic pancreatitis  There is mild irregularity of the dorsal pancreatic duct  The dorsal pancreatic duct measures 3 3 mm   ADRENAL GLANDS: Normal  SPLEEN: Normal  KIDNEYS:  A T2 hyperintensity seen in the lower pole of the right kidney suggest cyst ABDOMINAL CAVITY:  No lymphadenopathy or mass  No Ascites  BOWEL:  No abnormal dilation of the bowel loops seen OSSEOUS STRUCTURES:  No acute compression collapse of the vertebra seen VASCULAR STRUCTURES:  No aneurysm  ABDOMINAL WALL:  A rounded T2 hyperintensity seen within the lower right anterior chest chest wall measuring about 1 7 cm probably a dermal inclusion cyst LOWER CHEST:  Unremarkable MRI appearance  Impression: No finding to suggest choledocholithiasis No dilation of the common bile duct seen Dilation and clubbing of the pancreatic side branches in the region of the body and tail of the pancreas suggest a chronic pancreatitis Distended gallbladder with pericholecystic fluid and cholelithiasis, evaluate for acute cholecystitis ##sigslh##sigslh Workstation performed: [id number]     Ct Abdomen Pelvis With Contrast    Result Date: 10/18/2017  Narrative: CT ABDOMEN AND PELVIS WITH IV CONTRAST INDICATION:  Epigastric abdominal pain  History of Crohn's disease  COMPARISON: None  TECHNIQUE:  CT examination of the abdomen and pelvis was performed  Reformatted images were created in axial, sagittal, and coronal planes  Radiation dose length product (DLP) for this visit:  573 7 mGy-cm   This examination, like all CT scans performed in the [facility], was performed utilizing techniques to minimize radiation dose exposure, including the use of iterative reconstruction and automated exposure control  IV Contrast:  100 mL of iohexol (OMNIPAQUE)  350 Enteric Contrast:  Enteric contrast was not administered  FINDINGS: ABDOMEN LOWER CHEST:  No significant abnormalities identified in the lower chest  LIVER/BILIARY TREE:  Mildly enlarged measuring more than 18 cm craniocaudally  No discrete hepatic lesions identified  No intrahepatic ductal dilatation    Common bile duct measures up to 7 mm, normal for patient's age  GALLBLADDER:  Gallbladder is slightly distended measuring more than 10 cm in length x 4 4 cm diameter  Trace pericholecystic fluid  Several tiny gallstones are seen layering dependently  Slight inflammation of pericholecystic fat  There appears to be  a 2 mm calculus in the cystic duct image 2/24/ and image 601/75 SPLEEN:  Unremarkable  PANCREAS:  Unremarkable  ADRENAL GLANDS:  Unremarkable  KIDNEYS/URETERS:  Right side interpolar cortical cyst measuring 8mm  STOMACH AND BOWEL:  Limited evaluation of GI tract without oral contrast   Stomach is mostly collapsed  Gastric wall appears somewhat thickened which is probably due to incomplete distention  The small bowel appears average caliber  There is a surgical anastomosis in the midabdomen anteriorly which this probably patent  Colonic diverticulosis without evidence of diverticulitis  APPENDIX:  No findings to suggest appendicitis  ABDOMINOPELVIC CAVITY:  No ascites or free intraperitoneal air  No lymphadenopathy  VESSELS:  Calcified atherosclerotic plaque  No aneurysm or dissection  PELVIS REPRODUCTIVE ORGANS:  Enlarged prostate indenting urinary bladder base  Seminal vesicles are symmetric  Partially obscured by beam hardening artifact from patient's left hip arthroplasty  URINARY BLADDER:  Partially obscured by beam hardening artifact from patient's left hip arthroplasty  ABDOMINAL WALL/INGUINAL REGIONS:  Small fat-containing inguinal hernias  OSSEOUS STRUCTURES:  Advanced degenerative changes right hip  Total left hip arthroplasty in satisfactory position  Degenerative changes throughout the lumbar spine  Impression: The gallbladder is mildly distended with several calcified gallstones visible as well as trace pericholecystic fluid and slight inflammation of the pericholecystic fat  There also appears to be a 2 mm calculus in the cystic duct image 2/24  Suspect cholecystitis    Biliary ducts are normal caliber for patient's age  Please correlate clinically  HIDA scan would be helpful for confirmation  ##cfslh I personally discussed this result with Zak Marcelo on 10/18/2017 11:16 PM  ## Limited evaluation of GI tract without oral contrast   Gastric wall thickening which may be due to incomplete distention  Consider gastritis as a potential etiology  Small bowel anastomosis in the midabdomen appears unremarkable to the limits of visualization  No obvious active Crohn's disease  Mild hepatomegaly  Workstation performed: [id number]       EKG personally reviewed by Miki Haskins MD      Counseling / Coordination of Care  Total floor / unit time spent today 30 minutes  Greater than 50% of total time was spent with the patient and / or family counseling and / or coordination of care

## 2017-10-25 NOTE — DISCHARGE SUMMARY
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  Resolved Problems:    Chest pain    Abdominal pain    Leukocytosis      Consultations During Hospital Stay:  · Dr Ila De Paz - surg  · Dr Sheila Woodruff - ICU  · Dr Manuel Dunbar - GI  · Dr Fabiola Giles - cardio    Procedures Performed:     · none    Significant Findings / Test Results:     · CTA&P: cholelithiasis w/ possible cholecysititis  · CThead: WNL  · MRCP: choleliothiasis, no choledocolithiasis, CBD not dilated, possible cholecystitis  · Renal dopplers WNL    Incidental Findings:   · none     Test Results Pending at Discharge (will require follow up): · Renin to aldosterone ratio  · Plasma metanephrines     Outpatient Tests Requested:  · CMP in 1 week    Complications:  none    Hospital Course:     Mary Zayas  is a 74 y o  male patient who originally presented to the hospital on 10/18/2017 due to hypertensive urgency  Originally on cardene GTT which was eventually stopped  Chlorthalidone increased, enalapril increased, Top[rolol XL switched to Coreg, and Procardia XL added  Eventually, cardene GTT weaned off  At d/c, SBP < 180 for 24 hours  2ndary HTN workup ordered as above and is pending  For cholelithiaisis and possible cholecystitis, pt tx conservatively with antibiotics  At d/c, he was tolerating regular diet, and was d/c with oral abx to complete 10 day course  Will need outpt elective radha when BP stable  Condition at Discharge: stable     Discharge Day Visit / Exam:     Subjective:  PT seen and examined  No acute complaints  Tolerating regular diet  Vitals: Blood Pressure: (!) 175/77 (Map 111) (10/24/17 0701)  Pulse: 56 (10/24/17 0701)  Temperature: 98 °F (36 7 °C) (10/24/17 0701)  Temp Source: Oral (10/24/17 0701)  Respirations: 18 (10/24/17 0701)  Height: 5' 10" (177 8 cm) (10/19/17 0229)  Weight - Scale: 87 3 kg (192 lb 7 4 oz) (10/19/17 0229)  SpO2: 96 % (10/24/17 0701)  Exam:   Physical Exam   Constitutional: He is oriented to person, place, and time  He appears well-developed and well-nourished  HENT:   Head: Normocephalic and atraumatic  Eyes: Conjunctivae and EOM are normal    Neck: Normal range of motion  Neck supple  Cardiovascular: Normal rate and regular rhythm  Pulmonary/Chest: Effort normal and breath sounds normal  He has no wheezes  He has no rales  Abdominal: Soft  Bowel sounds are normal  He exhibits no distension  There is no tenderness  Musculoskeletal: Normal range of motion  He exhibits no edema  Neurological: He is alert and oriented to person, place, and time  Skin: Skin is warm and dry  Discussion with Family: pt    Discharge instructions/Information to patient and family:   See after visit summary for information provided to patient and family  Provisions for Follow-Up Care:  See after visit summary for information related to follow-up care and any pertinent home health orders  Disposition:     Home    For Discharges to [facility]:   · Not Applicable to this Patient - Not Applicable to this Patient    Planned Readmission: possibly for elective lap radha     Discharge Statement:  I spent 40 minutes discharging the patient  This time was spent on the day of discharge  I had direct contact with the patient on the day of discharge   Greater than 50% of the total time was spent examining patient, answering all patient questions, arranging and discussing plan of care with patient as well as directly providing post-discharge instructions  Additional time then spent on discharge activities  Discharge Medications:  See after visit summary for reconciled discharge medications provided to patient and family        ** Please Note: This note has been constructed using a voice recognition system **

## 2017-10-26 ENCOUNTER — TRANSCRIBE ORDERS (OUTPATIENT)
Dept: LAB | Facility: CLINIC | Age: 72
End: 2017-10-26

## 2017-10-26 ENCOUNTER — APPOINTMENT (OUTPATIENT)
Dept: LAB | Facility: CLINIC | Age: 72
End: 2017-10-26
Payer: MEDICARE

## 2017-10-26 DIAGNOSIS — R17 TOTAL BILIRUBIN, ELEVATED: ICD-10-CM

## 2017-10-26 LAB
ALBUMIN SERPL BCP-MCNC: 3.7 G/DL (ref 3.5–5)
ALDOST SERPL-MCNC: 18.7 NG/DL (ref 0–30)
ALP SERPL-CCNC: 84 U/L (ref 46–116)
ALT SERPL W P-5'-P-CCNC: 28 U/L (ref 12–78)
ANION GAP SERPL CALCULATED.3IONS-SCNC: 6 MMOL/L (ref 4–13)
AST SERPL W P-5'-P-CCNC: 24 U/L (ref 5–45)
BILIRUB SERPL-MCNC: 1.12 MG/DL (ref 0.2–1)
BUN SERPL-MCNC: 37 MG/DL (ref 5–25)
CALCIUM SERPL-MCNC: 8.7 MG/DL (ref 8.3–10.1)
CHLORIDE SERPL-SCNC: 102 MMOL/L (ref 100–108)
CO2 SERPL-SCNC: 30 MMOL/L (ref 21–32)
CREAT SERPL-MCNC: 1.23 MG/DL (ref 0.6–1.3)
GFR SERPL CREATININE-BSD FRML MDRD: 57 ML/MIN/1.73SQ M
GLUCOSE SERPL-MCNC: 108 MG/DL (ref 65–140)
METANEPH FREE SERPL-MCNC: 47 PG/ML (ref 0–62)
NORMETANEPHRINE SERPL-MCNC: 170 PG/ML (ref 0–145)
POTASSIUM SERPL-SCNC: 4.5 MMOL/L (ref 3.5–5.3)
PROT SERPL-MCNC: 6.8 G/DL (ref 6.4–8.2)
SODIUM SERPL-SCNC: 138 MMOL/L (ref 136–145)

## 2017-10-26 PROCEDURE — 80053 COMPREHEN METABOLIC PANEL: CPT

## 2017-10-26 PROCEDURE — 36415 COLL VENOUS BLD VENIPUNCTURE: CPT

## 2017-10-27 ENCOUNTER — ALLSCRIPTS OFFICE VISIT (OUTPATIENT)
Dept: OTHER | Facility: OTHER | Age: 72
End: 2017-10-27

## 2017-11-02 ENCOUNTER — ALLSCRIPTS OFFICE VISIT (OUTPATIENT)
Dept: OTHER | Facility: OTHER | Age: 72
End: 2017-11-02

## 2017-11-02 DIAGNOSIS — I10 ESSENTIAL (PRIMARY) HYPERTENSION: ICD-10-CM

## 2017-11-02 NOTE — PROGRESS NOTES
Surgery Clinic Note  Nitza Riley  74 y o  male MRN: [id number]  Unit/Bed#:  Encounter: [id number]    Assessment:  75 yo M recently admitted for hypertensive urgency found to have cholelithiasis on CT scan  No abdominal pain at this time but would like to have gallbladder removed  BP controlled on medical regimen  Plan: Will schedule for laparoscopic cholecystectomy  Consent obtained, discussed risks/benefits/alternatives  Will have anesthesia preop eval completed w/ EKG prior to surgery  Continue BP meds, continue f/u with medical team    Subjective/Objective   Chief Complaint: "Here to schedule surgery"    Subjective: The patient is a 64yo M recently admitted 10/18-10/24 for epigastric/sternal pain and hypertensive emergency  He was found to have a distended gallbladder with cholelithiasis on CT imaging  Due to his elevated total bilirubin, he had undergone MRCP which was negative for choledocholithiasis  He returns today to discuss elective cholecystectomy  He states that since his discharge he has been adherent to his medication regimen for HTN and his BP has been well controlled, with systolic pressures in the 120s-130s  He is currently on Carvedilol (25 mg BID), Chlorthalidone (100mg daily), Enalapril (20mg BID), and Nifedipine (90 mg daily) for his blood pressure  He has not had any abdominal pain since discharge, he does take Protonix daily as well  Eating well  He completed his course of Ancef/Flagyl as prescribed  Objective: There were no vitals taken for this visit  ,There is no height or weight on file to calculate BMI  Physical Exam:   Gen: A&O, NAD  Cardio: RRR  Lungs: CTAB  Abd: Soft, non distended, non tender  Negative Larry's sign  No guarding    Ext: Warm, dry  Vasc: Intact b/l    Lab, Imaging and other studies:     10/26/2017 11:26   eGFR 57   Sodium 138   Potassium 4 5   Chloride 102   CO2 30   Anion Gap 6   BUN 37 (H)   Creatinine 1 23   Glucose 108   Calcium 8 7   AST 24   ALT 28   Alkaline Phosphatase 84   Total Protein 6 8   Albumin 3 7   Total Bilirubin 1 12 (H)     10/23: Normetanephrines free: 170 (elevated, normal 0-145)  10/23 Metanephrines: 42 (normal)  Renin assay: pending

## 2017-11-03 LAB — RENIN PLAS-CCNC: 13.14 NG/ML/HR (ref 0.17–5.38)

## 2017-11-03 NOTE — CONSULTS
Assessment  1  Hypertension (401 9) (I10)    Plan  Hypertension    · (1) CATECHOLAMINES, FRACTIONATED, URINARY FREE, RANDOM URINE;  Status:Active; Requested YYV:[id number];    Perform:[facility]; AOZ:04QIQ8981; Ordered;For:Hypertension; Ordered By:Reanna Katz;   · (1) METANEPHRINE, FRACTIONED, URINE, 24 HOUR; Status:Active; Requested  IBS:[id number];    Perform:[facility]; VFN:[id number]; Ordered;For:Hypertension; Ordered By:Reanna Katz;   · Follow-up visit in 3 months Evaluation and Treatment  Follow-up  Status: Complete   Done: 11BPY3050   Ordered; For: Hypertension; Ordered By: Katie Tolentino Performed:  Due: [id number]; Last Updated By: Aleksandr Pereira; 11/2/2017 1:18:37 PM   · Follow-Up With Advanced Practitioner Evaluation and Treatment  Follow-up  Status:  Complete  Done: [id number]   Ordered; For: Hypertension; Ordered By: Katie Tolentino Performed:  Due: 98AUH7593; Last Updated By: Aleksandr Pereira; 11/2/2017 1:18:37 PM    Discussion/Summary  Discussion Summary:   1  Hypertension-he did have an elevated plasma metanephrine while he was hospitalized  I suspect this is a false positive  In order to be certain, I have ordered a 24 urine catecholamines and metanephrines  Most likely, these will be normal and he can proceed with the cholecystectomy  Counseling Documentation With Imm: The patient was counseled regarding diagnostic results,-- impressions  Medication SE Review and Pt Understands Tx: The treatment plan was reviewed with the patient/guardian  The patient/guardian understands and agrees with the treatment plan      Chief Complaint  Chief Complaint Free Text Note Form: Consult      History of Present Illness  HPI: 66-year-old male who was being evaluated for cholecystectomy earlier today was referred for possible pheochromocytoma  He was hospitalized at [address] about two weeks ago for severe hypertension   At that time, his medications were changed and his blood pressure has improved  During the hospitalization, he had plasma metanephrines done which were slightly elevated  This prompted an evaluation by us for pheochromocytoma  He denies any headaches, chest pain, visual changes and flushing  He has had hypertension for several years  Review of Systems  Endo Adult ROS Male New Patient:   Constitutional/General: no change in ring size,-- no change in shoe size,-- no chills,-- no dizziness,-- no fainting,-- no fatigue,-- no fever,-- no forgetfulness,-- no headache,-- no loss of sleep,-- no recent weight loss,-- no nervousness,-- no numbness,-- no temperature intolerance,-- no excessive sweating-- and-- no weight gain  Muscle/Joint/Bone: no arm pain,-- no back pain,-- no hip pain,-- no leg pain,-- no foot pain,-- no neck pain,-- no hand pain,-- no shoulder pain,-- no arm weakness,-- no back weakness,-- no hip weakness,-- no leg weakness,-- no foot weakness,-- no neck weakness,-- no hand weakness,-- no shoulder weakness,-- no arm numbness,-- no back numbness,-- no hip numbness,-- no leg numbness,-- no foot numbness,-- no neck numbness,-- no hand numbness-- and-- no shoulder numbness  Gastrointestinal: no constipation,-- no diarrhea,-- no excessive hunger,-- no excessive thirst,-- no nausea,-- no poor appetite,-- no rectal bleeding,-- no stomach pain,-- no vomiting-- and-- no vomiting blood  Cardiovascular: no chest pain,-- no hypertension,-- no irregular heart beat,-- no hypotension,-- no poor circulation,-- no rapid heart beat-- and-- no ankle swelling  Eye/Ear/Nose/Throat: no bleeding gums,-- no blurred vision,-- no difficulty swallowing,-- no double vision,-- no gritty eyes,-- no hoarseness,-- no hearing loss,-- no persistent cough,-- no sinus problems,-- not seeing flashes-- and-- not seeing halos  Skin: no easy bruising,-- no hives,-- no itching,-- no change in a mole,-- no rashes,-- no scar-- and-- no slow healing sores     Genitourinary no blood in urine,-- no frequent urination,-- no night time urination-- and-- no painful urination  Genitourinary - Reproductive no breast lump-- and-- no erection difficulties  ROS Reviewed:   ROS reviewed  Active Problems  1  Accelerated hypertension (401 0) (I10)   2  Anemia (285 9) (D64 9)   3  Ankylosing spondylitis (720 0) (M45 9)   4  Asymptomatic carotid artery stenosis (433 10) (I65 29)   5  Cholecystitis with cholelithiasis (574 10) (K80 10)   6  Degenerative joint disease of right lower extremity (715 98) (M19 90)   7  Encounter for prostate cancer screening (V76 44) (Z12 5)   8  Esophageal reflux (530 81) (K21 9)   9  Exercise counseling (V65 41) (Z71 82)   10  Hypertension (401 9) (I10)   11  More than 50 percent stenosis of right internal carotid artery (433 10) (I65 21)   12  Osteopenia (733 90) (M85 80)    Past Medical History  Active Problems And Past Medical History Reviewed: The active problems and past medical history were reviewed and updated today  Surgical History  1  History of Diagnostic Esophagogastroduodenoscopy   2  History of Small Bowel Resection  Surgical History Reviewed: The surgical history was reviewed and updated today  Family History  Mother    1  Family history of Arthritis (716 90) (M19 90)  Father    2  Family history of Heart attack (410 90) (I21 9)  Family History    3  Family history of Coronary Artery Disease (V17 49)   4  Family history of Diabetes Mellitus (V18 0)  Family History Reviewed: The family history was reviewed and updated today  Social History   · Caffeine Use   · Dental care, regularly   · Feels safe at home   · Former smoker (I21 21) (H82 014)   · Lives independently with spouse   · No illicit drug use   · Social alcohol use (Z78 9)   · Sun Protection   · Uses Safety Equipment - Seatbelts  Social History Reviewed: The social history was reviewed and updated today  Current Meds   1   Aspirin 81 MG Oral Tablet Chewable; CHEW AND SWALLOW 1 TABLET DAILY; Therapy: (Recorded:35Ywh7781) to Recorded   2  Carvedilol 25 MG Oral Tablet; TAKE 1 TABLET TWICE DAILY WITH MEALS; Therapy: (Recorded:13Uuh7567) to Recorded   3  Chlorthalidone 100 MG TABS; TAKE 1 TABLET DAILY; Therapy: (DAJPSQSC:49QEO9277) to Recorded   4  Enalapril Maleate 20 MG Oral Tablet; TAKE 1 TABLET TWICE DAILY; Therapy: (RIAQWURP:64QFT8234) to Recorded   5  Potassium Chloride 10 MEQ TBCR; TAKE 1 TABLET TWICE DAILY; Therapy: (JFZZGLBC:00ZTK9322) to Recorded   6  Procardia XL 90 MG Oral Tablet Extended Release 24 Hour; TAKE 1 TABLET DAILY; Therapy: (HZYWZSIP:91ZII6909) to Recorded   7  Protonix 40 MG Oral Tablet Delayed Release; TAKE 1 TABLET DAILY; Therapy: (VRMAPHSS:62TKR0650) to Recorded   8  Vitamin D TABS; Therapy: (Recorded:47Ypo5755) to Recorded  Medication List Reviewed: The medication list was reviewed and updated today  Allergies  1  No Known Drug Allergies  2  No Known Environmental Allergies    Vitals  Vital Signs    Recorded: 42LHJ5963 12:46PM   Heart Rate 64   Systolic 457   Diastolic 50   Height 5 ft 9 5 in   Weight 182 lb    BMI Calculated 26 49   BSA Calculated 2     Physical Exam    Constitutional   General appearance: No acute distress, well appearing and well nourished  Eyes   Conjunctiva and lids: No swelling, erythema, or discharge  Pupils: Equal, round and reactive to light  The sclera are anicteric  Extraocular movements are intact  Ears, Nose, Mouth, and Throat   External inspection of ears, nose and lips: Normal     Oropharynx: Normal with no erythema, edema, exudate or lesions  Exam of Head: The head is atraumatic and normocephalic  Neck: The neck is supple  The thyroid is normal in size with no palpable nodules  Pulmonary   Auscultation of lungs: Clear to auscultation bilaterally with normal chest expansion  Cardiovascular   Auscultation of heart: Normal rate and rhythm with no murmurs, gallops or rubs      Abdomen   Abdomen: Abdomen is soft, non-tender with normal bowel sounds  Lymphatic   Palpation of lymph nodes: No supraclavicular or suboccipital lymphadenopathy  Musculoskeletal   Inspection/palpation of joints, bones, and muscles: Muscle bulk and tone is normal     Skin   Skin and subcutaneous tissue: Normal skin temperature and color  Neurologic   Reflexes: 2+ and symmetric  Motor Strength: Strength is 5/5 bilaterally  Psychiatric   Orientation to person, place and time: Normal     Mood and affect: Affect and attention span are normal        Results/Data  Office Record Review: I have reviewed the office records and my summary is as follows: I reviewed the notes from general surgery which talks about workup for pheochromocytoma  Prior notes dating back to 2014 from primary care physician talks about hypertension in the treatment of this  (1) ALDOSTERONE, BLOOD 23Oct2017 09:56AM EPIC, Provider   Test ordered by: Karan Ford     Test Name Result Flag Reference   ALDOSTER, BLOOD 18 7 ng/dL  0 0 - 30 0   This test was developed and its performance characteristics  determined by [facility]  It has not been cleared or approved  by the Food and Drug Administration  Performed at:  [address]  [id number]  : Letty Gutierrez MD, Phone:  [phone]     (1) Mary Jo De Witt, PLASMA [id number] 09:56AM EPIC, Provider   Test ordered by: Karan Ford     Test Name Result Flag Reference   METANEPHRINE, PLASMA 47 pg/mL  0 - 62   Concentrations of Normetanephrine between 146 and 487 pg/mL, and  Metanephrine between 63 and 255 pg/mL are considered indeterminate  Follow-up biochemical testing is recommended when patient levels fall  within this indeterminate range  These tests include repeat testing of  plasma/urinary fractionated metanephrines and plasma catecholamines    Performed at:  [address]  [id number]  : Gardenia Oseguera Ismael SAMSON, Phone:  [phone]   NORMETANEPHRINE, PLASMA 170 pg/mL H 0 - 145     Future Appointments    Date/Time Provider Specialty Site   02/05/2018 09:30 AM Ankita Foley [facility] Endocrinology [facility] ENDOCRINOLOGY     Signatures   Electronically signed by : LIONEL Yao ; Nov 2 2017  2:01PM EST                       (Author)

## 2017-11-07 ENCOUNTER — TRANSCRIBE ORDERS (OUTPATIENT)
Dept: LAB | Facility: CLINIC | Age: 72
End: 2017-11-07

## 2017-11-07 ENCOUNTER — APPOINTMENT (OUTPATIENT)
Dept: LAB | Facility: CLINIC | Age: 72
End: 2017-11-07
Payer: MEDICARE

## 2017-11-07 DIAGNOSIS — I10 ESSENTIAL (PRIMARY) HYPERTENSION: ICD-10-CM

## 2017-11-07 PROCEDURE — 82384 ASSAY THREE CATECHOLAMINES: CPT

## 2017-11-07 PROCEDURE — 83835 ASSAY OF METANEPHRINES: CPT

## 2017-11-07 PROCEDURE — 82570 ASSAY OF URINE CREATININE: CPT

## 2017-11-12 LAB
CREAT UR-MCNC: 90.1 MG/DL
DOPAMINE UR-MCNC: 136 UG/L
DOPAMINE/CREAT UR: 151 UG/G CREAT (ref 0–348)
EPINEPH UR-MCNC: 3 UG/L
EPINEPH/CREAT UR: 3 UG/G CREAT (ref 0–19)
NOREPINEPH UR-MCNC: 31 UG/L
NOREPINEPH/CREAT UR: 34 UG/G CREAT (ref 0–111)

## 2017-11-13 ENCOUNTER — GENERIC CONVERSION - ENCOUNTER (OUTPATIENT)
Dept: OTHER | Facility: OTHER | Age: 72
End: 2017-11-13

## 2017-11-13 LAB
METANEPH 24H UR-MRATE: 172 UG/24 HR (ref 45–290)
METANEPHS 24H UR-MCNC: 80 UG/L
NORMETANEPHRINE 24H UR-MCNC: 205 UG/L
NORMETANEPHRINE 24H UR-MRATE: 441 UG/24 HR (ref 82–500)

## 2017-11-14 ENCOUNTER — GENERIC CONVERSION - ENCOUNTER (OUTPATIENT)
Dept: OTHER | Facility: OTHER | Age: 72
End: 2017-11-14

## 2017-11-17 ENCOUNTER — GENERIC CONVERSION - ENCOUNTER (OUTPATIENT)
Dept: OTHER | Facility: OTHER | Age: 72
End: 2017-11-17

## 2017-11-22 ENCOUNTER — GENERIC CONVERSION - ENCOUNTER (OUTPATIENT)
Dept: OTHER | Facility: OTHER | Age: 72
End: 2017-11-22

## 2017-11-22 ENCOUNTER — GENERIC CONVERSION - ENCOUNTER (OUTPATIENT)
Dept: INTERNAL MEDICINE CLINIC | Facility: CLINIC | Age: 72
End: 2017-11-22

## 2017-11-25 NOTE — PROGRESS NOTES
Progress Note - General Surgery   Laceymaashley Iha  74 y o  male MRN: [id number]  Unit/Bed#: [facility] 405-01 Encounter: [id number]    Assessment:  73 yo M w/ hypertensive urgency and CT findings suspicious for acute cholecysitis/choledocholithiasis  No clinical evidence for either at this time  - MRCP negative   - No abdominal pain  - Off cardene gtt    Plan:  Continue regular diet  Complete course of Ancef/Flagyl  Elective outpatient lap radha  Hypertension tx per SLIM  GI input appreciated- repeat Cscope indicated      Subjective/Objective   Chief Complaint: None    Subjective: No complaints, states he is doing well  No pain, tolerating diet, having bowel function    Objective:     Blood pressure 148/70, pulse 64, temperature 98 2 °F (36 8 °C), temperature source Oral, resp  rate 18, height 5' 10" (1 778 m), weight 87 3 kg (192 lb 7 4 oz), SpO2 95 %  ,Body mass index is 27 62 kg/m²        Intake/Output Summary (Last 24 hours) at 10/24/17 0533  Last data filed at 10/24/17 0145   Gross per 24 hour   Intake             1340 ml   Output                0 ml   Net             1340 ml       Invasive Devices     Peripheral Intravenous Line            Peripheral IV 10/23/17 Right Forearm 1 day              Physical Exam:   Gen: A&O, NAD  Cardio: RRR  Lungs: CTAB  Abd: Soft, non distended, non tender      Lab, Imaging and other studies:  CBC: No results found for: WBC, HGB, HCT, MCV, PLT, ADJUSTEDWBC, MCH, MCHC, RDW, MPV, NRBC, CMP:   Lab Results   Component Value Date    AST 31 10/23/2017    ALT 35 10/23/2017    ALKPHOS 102 10/23/2017    PROT 7 3 10/23/2017    ALBUMIN 4 3 10/23/2017    BILITOT 1 94 (H) 10/23/2017     VTE Pharmacologic Prophylaxis: Enoxaparin (Lovenox)  VTE Mechanical Prophylaxis: sequential compression device 200

## 2017-11-30 ENCOUNTER — ANESTHESIA EVENT (OUTPATIENT)
Dept: PERIOP | Facility: HOSPITAL | Age: 72
End: 2017-11-30
Payer: MEDICARE

## 2017-11-30 NOTE — PRE-PROCEDURE INSTRUCTIONS
Pre-Surgery Instructions:   Medication Instructions    aspirin 81 mg chewable tablet Patient was instructed per "E-Preop"    carvedilol (COREG) 25 mg tablet Patient was instructed per "E-Preop"    chlorthalidone (HYGROTEN) 100 MG tablet Patient was instructed per "E-Preop"    enalapril (VASOTEC) 20 mg tablet Patient was instructed per "E-Preop"    NIFEdipine (PROCARDIA XL) 90 mg 24 hr tablet Patient was instructed per "E-Preop"    pantoprazole (PROTONIX) 40 mg tablet Patient was instructed per "E-Preop"    potassium chloride (MICRO-K) 10 MEQ CR capsule Patient was instructed per "E-Preop"      Medication Instruction (Aspirin - Blood Thinners)    Your surgeon may have you stop aspirin up to a week early if you are having intracranial, spine, middle ear, posterior eye or prostate surgery  If not please continue to take this medication on your normal schedule  If you take this in the morning you may do so with a sip of water  Aspirin 81 MG Oral Tablet Chewable            Calcium Blocker (Blood Pressure Medication)    Please continue to take this medication on your normal schedule  If this is an oral medication and you take in the morning, you may do so with a sip of water  Procardia XL 90 MG Oral Tablet Extended Release 24 Hour          Medication Instruction (Diuretics - Water Pills)    Please continue the following medications up to the evening before surgery, but do not take it on the day of surgery  Chlorthalidone 100 MG TABS          Medication Instruction (Antacids)    Please continue to take this medication on your normal schedule  If this is an oral medication and you take in the morning, you may do so with a sip of water  Protonix 40 MG Oral Tablet Delayed Release          NPO Instructions    Please do not eat or drink anything prior to your surgery as follows: For AM Surgery times = stop at midnight the night before   For PM Surgery times = Midnight to 8AM clear liquids only (Jello, broth, 7up, Sprite, apple juice, black coffee, black tea, Gatorade)  If you are supposed to take any of your medications, do so with a sip of water  Failure to follow these instructions can lead to an increased risk of lung complications and may result in a delay or cancellation of your procedure  If you have any questions, contact your institution for further instructions          Medical Procedure Risk

## 2017-12-05 ENCOUNTER — ANESTHESIA (OUTPATIENT)
Dept: PERIOP | Facility: HOSPITAL | Age: 72
End: 2017-12-05
Payer: MEDICARE

## 2017-12-05 ENCOUNTER — HOSPITAL ENCOUNTER (OUTPATIENT)
Facility: HOSPITAL | Age: 72
Setting detail: OUTPATIENT SURGERY
Discharge: HOME | End: 2017-12-05
Attending: SURGERY | Admitting: SURGERY
Payer: MEDICARE

## 2017-12-05 VITALS
BODY MASS INDEX: 26.2 KG/M2 | RESPIRATION RATE: 16 BRPM | DIASTOLIC BLOOD PRESSURE: 77 MMHG | OXYGEN SATURATION: 97 % | TEMPERATURE: 98.1 F | HEART RATE: 88 BPM | SYSTOLIC BLOOD PRESSURE: 151 MMHG | WEIGHT: 183 LBS | HEIGHT: 70 IN

## 2017-12-05 DIAGNOSIS — K80.10 CALCULUS OF GALLBLADDER WITH CHRONIC CHOLECYSTITIS WITHOUT OBSTRUCTION: ICD-10-CM

## 2017-12-05 PROCEDURE — 51798 US URINE CAPACITY MEASURE: CPT | Performed by: SURGERY

## 2017-12-05 PROCEDURE — 88304 TISSUE EXAM BY PATHOLOGIST: CPT | Performed by: SURGERY

## 2017-12-05 RX ORDER — MIDAZOLAM HYDROCHLORIDE 1 MG/ML
INJECTION INTRAMUSCULAR; INTRAVENOUS AS NEEDED
Status: DISCONTINUED | OUTPATIENT
Start: 2017-12-05 | End: 2017-12-05 | Stop reason: SURG

## 2017-12-05 RX ORDER — MEPERIDINE HYDROCHLORIDE 25 MG/ML
12.5 INJECTION INTRAMUSCULAR; INTRAVENOUS; SUBCUTANEOUS AS NEEDED
Status: DISCONTINUED | OUTPATIENT
Start: 2017-12-05 | End: 2017-12-05 | Stop reason: HOSPADM

## 2017-12-05 RX ORDER — FENTANYL CITRATE/PF 50 MCG/ML
25 SYRINGE (ML) INJECTION
Status: DISCONTINUED | OUTPATIENT
Start: 2017-12-05 | End: 2017-12-05 | Stop reason: HOSPADM

## 2017-12-05 RX ORDER — LABETALOL HYDROCHLORIDE 5 MG/ML
5 INJECTION, SOLUTION INTRAVENOUS
Status: DISCONTINUED | OUTPATIENT
Start: 2017-12-05 | End: 2017-12-05 | Stop reason: HOSPADM

## 2017-12-05 RX ORDER — GLYCOPYRROLATE 0.2 MG/ML
INJECTION INTRAMUSCULAR; INTRAVENOUS AS NEEDED
Status: DISCONTINUED | OUTPATIENT
Start: 2017-12-05 | End: 2017-12-05 | Stop reason: SURG

## 2017-12-05 RX ORDER — ACETAMINOPHEN 325 MG/1
650 TABLET ORAL EVERY 6 HOURS PRN
Status: DISCONTINUED | OUTPATIENT
Start: 2017-12-05 | End: 2017-12-05 | Stop reason: HOSPADM

## 2017-12-05 RX ORDER — ROPIVACAINE HYDROCHLORIDE 5 MG/ML
INJECTION, SOLUTION EPIDURAL; INFILTRATION; PERINEURAL AS NEEDED
Status: DISCONTINUED | OUTPATIENT
Start: 2017-12-05 | End: 2017-12-05

## 2017-12-05 RX ORDER — ROPIVACAINE HYDROCHLORIDE 2 MG/ML
INJECTION, SOLUTION EPIDURAL; INFILTRATION; PERINEURAL AS NEEDED
Status: DISCONTINUED | OUTPATIENT
Start: 2017-12-05 | End: 2017-12-05 | Stop reason: SURG

## 2017-12-05 RX ORDER — PROPOFOL 10 MG/ML
INJECTION, EMULSION INTRAVENOUS AS NEEDED
Status: DISCONTINUED | OUTPATIENT
Start: 2017-12-05 | End: 2017-12-05 | Stop reason: SURG

## 2017-12-05 RX ORDER — ONDANSETRON 2 MG/ML
4 INJECTION INTRAMUSCULAR; INTRAVENOUS ONCE AS NEEDED
Status: DISCONTINUED | OUTPATIENT
Start: 2017-12-05 | End: 2017-12-05 | Stop reason: HOSPADM

## 2017-12-05 RX ORDER — OXYCODONE HYDROCHLORIDE 10 MG/1
10 TABLET ORAL EVERY 4 HOURS PRN
Status: DISCONTINUED | OUTPATIENT
Start: 2017-12-05 | End: 2017-12-05 | Stop reason: HOSPADM

## 2017-12-05 RX ORDER — SODIUM CHLORIDE, SODIUM LACTATE, POTASSIUM CHLORIDE, CALCIUM CHLORIDE 600; 310; 30; 20 MG/100ML; MG/100ML; MG/100ML; MG/100ML
125 INJECTION, SOLUTION INTRAVENOUS CONTINUOUS
Status: DISCONTINUED | OUTPATIENT
Start: 2017-12-05 | End: 2017-12-05 | Stop reason: HOSPADM

## 2017-12-05 RX ORDER — ONDANSETRON 2 MG/ML
INJECTION INTRAMUSCULAR; INTRAVENOUS AS NEEDED
Status: DISCONTINUED | OUTPATIENT
Start: 2017-12-05 | End: 2017-12-05 | Stop reason: SURG

## 2017-12-05 RX ORDER — ROCURONIUM BROMIDE 10 MG/ML
INJECTION, SOLUTION INTRAVENOUS AS NEEDED
Status: DISCONTINUED | OUTPATIENT
Start: 2017-12-05 | End: 2017-12-05 | Stop reason: SURG

## 2017-12-05 RX ORDER — BUPIVACAINE HYDROCHLORIDE AND EPINEPHRINE 5; 5 MG/ML; UG/ML
INJECTION, SOLUTION PERINEURAL AS NEEDED
Status: DISCONTINUED | OUTPATIENT
Start: 2017-12-05 | End: 2017-12-05 | Stop reason: HOSPADM

## 2017-12-05 RX ORDER — LIDOCAINE HYDROCHLORIDE 10 MG/ML
INJECTION, SOLUTION INFILTRATION; PERINEURAL AS NEEDED
Status: DISCONTINUED | OUTPATIENT
Start: 2017-12-05 | End: 2017-12-05 | Stop reason: SURG

## 2017-12-05 RX ORDER — ONDANSETRON 2 MG/ML
4 INJECTION INTRAMUSCULAR; INTRAVENOUS EVERY 6 HOURS PRN
Status: DISCONTINUED | OUTPATIENT
Start: 2017-12-05 | End: 2017-12-05 | Stop reason: HOSPADM

## 2017-12-05 RX ORDER — OXYCODONE HYDROCHLORIDE 5 MG/1
5 TABLET ORAL EVERY 4 HOURS PRN
Status: DISCONTINUED | OUTPATIENT
Start: 2017-12-05 | End: 2017-12-05 | Stop reason: HOSPADM

## 2017-12-05 RX ORDER — ALBUTEROL SULFATE 2.5 MG/3ML
2.5 SOLUTION RESPIRATORY (INHALATION) ONCE AS NEEDED
Status: DISCONTINUED | OUTPATIENT
Start: 2017-12-05 | End: 2017-12-05 | Stop reason: HOSPADM

## 2017-12-05 RX ORDER — FENTANYL CITRATE 50 UG/ML
INJECTION, SOLUTION INTRAMUSCULAR; INTRAVENOUS AS NEEDED
Status: DISCONTINUED | OUTPATIENT
Start: 2017-12-05 | End: 2017-12-05 | Stop reason: SURG

## 2017-12-05 RX ORDER — MAGNESIUM HYDROXIDE 1200 MG/15ML
LIQUID ORAL AS NEEDED
Status: DISCONTINUED | OUTPATIENT
Start: 2017-12-05 | End: 2017-12-05 | Stop reason: HOSPADM

## 2017-12-05 RX ADMIN — ROCURONIUM BROMIDE 50 MG: 10 INJECTION INTRAVENOUS at 08:02

## 2017-12-05 RX ADMIN — FENTANYL CITRATE 50 MCG: 50 INJECTION, SOLUTION INTRAMUSCULAR; INTRAVENOUS at 09:18

## 2017-12-05 RX ADMIN — ROPIVACAINE HYDROCHLORIDE 40 ML: 2 INJECTION, SOLUTION EPIDURAL; INFILTRATION at 09:43

## 2017-12-05 RX ADMIN — SODIUM CHLORIDE, SODIUM LACTATE, POTASSIUM CHLORIDE, AND CALCIUM CHLORIDE: 600; 310; 30; 20 INJECTION, SOLUTION INTRAVENOUS at 09:46

## 2017-12-05 RX ADMIN — DEXAMETHASONE SODIUM PHOSPHATE 5 MG: 10 INJECTION INTRAMUSCULAR; INTRAVENOUS at 08:31

## 2017-12-05 RX ADMIN — PROPOFOL 200 MG: 10 INJECTION, EMULSION INTRAVENOUS at 08:02

## 2017-12-05 RX ADMIN — FENTANYL CITRATE 50 MCG: 50 INJECTION, SOLUTION INTRAMUSCULAR; INTRAVENOUS at 08:43

## 2017-12-05 RX ADMIN — FENTANYL CITRATE 100 MCG: 50 INJECTION, SOLUTION INTRAMUSCULAR; INTRAVENOUS at 08:02

## 2017-12-05 RX ADMIN — SODIUM CHLORIDE, SODIUM LACTATE, POTASSIUM CHLORIDE, AND CALCIUM CHLORIDE: 600; 310; 30; 20 INJECTION, SOLUTION INTRAVENOUS at 07:25

## 2017-12-05 RX ADMIN — NEOSTIGMINE METHYLSULFATE 3 MG: 1 INJECTION, SOLUTION INTRAMUSCULAR; INTRAVENOUS; SUBCUTANEOUS at 09:42

## 2017-12-05 RX ADMIN — MIDAZOLAM HYDROCHLORIDE 2 MG: 1 INJECTION, SOLUTION INTRAMUSCULAR; INTRAVENOUS at 07:55

## 2017-12-05 RX ADMIN — GLYCOPYRROLATE 0.2 MG: 0.2 INJECTION, SOLUTION INTRAMUSCULAR; INTRAVENOUS at 08:18

## 2017-12-05 RX ADMIN — GLYCOPYRROLATE 0.4 MG: 0.2 INJECTION, SOLUTION INTRAMUSCULAR; INTRAVENOUS at 09:42

## 2017-12-05 RX ADMIN — LIDOCAINE HYDROCHLORIDE 50 MG: 10 INJECTION, SOLUTION INFILTRATION; PERINEURAL at 08:02

## 2017-12-05 RX ADMIN — ONDANSETRON 4 MG: 2 INJECTION INTRAMUSCULAR; INTRAVENOUS at 09:20

## 2017-12-05 RX ADMIN — CEFAZOLIN SODIUM 2000 MG: 2 SOLUTION INTRAVENOUS at 08:11

## 2017-12-05 NOTE — ANESTHESIA PROCEDURE NOTES
Peripheral Block    Patient location during procedure: holding area  Start time: 12/5/2017 9:30 AM  Reason for block: at surgeon's request and post-op pain management  Staffing  Anesthesiologist: Tyler Knight  Performed: anesthesiologist   Preanesthetic Checklist  Completed: patient identified, site marked, surgical consent, pre-op evaluation, timeout performed, IV checked, risks and benefits discussed and monitors and equipment checked  Peripheral Block  Patient position: supine  Prep: ChloraPrep  Patient monitoring: continuous pulse ox, frequent blood pressure checks, cardiac monitor and heart rate  Block type: TAP  Laterality: bilateral  Injection technique: single-shot  Procedures: ultrasound guided  ultrasound permanent image saved      Needle  Needle type: Stimuplex   Needle gauge: 22 G  Needle length: 4    Needle localization: ultrasound guidance  Needle insertion depth: 2 cm  Assessment  Injection assessment: incremental injection, local visualized surrounding nerve on ultrasound, negative aspiration for heme and no paresthesia on injection  Paresthesia pain: none  Heart rate change: no  Slow fractionated injection: yes  Post-procedure:  site cleaned  patient tolerated the procedure well with no immediate complications

## 2017-12-05 NOTE — OP NOTE
OPERATIVE REPORT  PATIENT NAME: Verner Body  :  1945  MRN: [id number]  Pt Location: [facility] OR ROOM 08    SURGERY DATE: 2017    Surgeon(s) and Role:     * Albin Chavez MD - Primary     * Alison Carrizales MD - Assisting    Preop Diagnosis:  Calculus of gallbladder with chronic cholecystitis without obstruction [K80 10]    Post-Op Diagnosis Codes:     * Calculus of gallbladder with chronic cholecystitis without obstruction [K80 10]    Procedure(s) (LRB):  CHOLECYSTECTOMY LAPAROSCOPIC (N/A), lysis of adhesions    Specimen(s):  ID Type Source Tests Collected by Time Destination   1 :  Tissue Gallbladder TISSUE EXAM Albin Chavez MD 2017 [id number]        Estimated Blood Loss:   Minimal    Drains:  None    Anesthesia Type:   General    Operative Indications:  Calculus of gallbladder with chronic cholecystitis without obstruction [K80 10]  Intraabdominal adhesions    Operative Findings:  Chronically inflamed gallbladder  Periumbilical adhesions from previous ex lap    Complications:   None    Procedure and Technique:  The patient was identified in the preoperative holding area by name and armband  He was taken to the operating room, where general anesthesia was induced, and an endotracheal tube was placed by the anesthesiology team  He was placed in the supine position  His abdomen was prepped and draped in the usual sterile fashion  A brief time out was called, and all in the room were in agreement  Local anesthesia was used to infiltrate an area inferior to the umbilicus  An 11-blade was used to make a longitudinal incision, but no infraumbilical hernia was found  Therefore, a small incision was made in the left upper quadrant  A Veress needle was used to safely enter the abdomen with positive syringe test  The abdomen was inflated to a pressure of 15 mmHg  A 30-degree scope was inserted into the abdomen, and a moderate amount of periumbilical adhesions were noted   A right mid abdominal trocar was placed, and a grasper with cautery was used to gently lyse adhesions from the anterior abdominal wall  A 12-trocar was then placed safely through the infraumbilical incision  Additional 5-trocars were place in the right upper abdomen and subxyphoid areas  The patient was placed in the reverse Trendelenberg position  Locking graspers were used to retract the liver superiorly and the gallbladder anteriorly and laterally  Carmen graspers were used to strip the peritoneum off the gallbladder  The common bile duct was exposed, as well as the cystic artery with a posterior branch  The critical view of safety had been achieved  Two proximal and 1 distal clip was then applied to the common bile duct, and the common bile duct was transected using laparoscopic scissors  The cystic artery and posterior branch were divided in a similar fashion  The gallbladder was then retracted superiorly, in hook electrocautery was used to dissect the gallbladder off the liver bed  Hemostasis has been achieved with hook electrocautery  The gallbladder was then removed from the abdominal cavity using an Endo-Catch bag  The gallbladder bed was irrigated, with no further areas of bleeding noted the abdomen was then deflated  The fascia of the 12 trocar site was closed with an 0 Vicryl suture  Skin incisions were closed using 4 O Monocryl suture and Histoacryl  All sponge, instrument, and needle counts were correct x2  Dr Jenniffer Reynolds was present for the entire procedure      Patient Disposition:  PACU     SIGNATURE: Asif Paige  DATE: December 5, 2017  TIME: 9:39 AM

## 2017-12-05 NOTE — ANESTHESIA PREPROCEDURE EVALUATION
Review of Systems/Medical History  Patient summary reviewed  Chart reviewed      Cardiovascular  Hypertension controlled, CAD, ,    Pulmonary       GI/Hepatic            Endo/Other     GYN       Hematology   Musculoskeletal       Neurology   Psychology           Physical Exam    Airway    Mallampati score: II  TM Distance: >3 FB  Neck ROM: limited     Dental   No notable dental hx     Cardiovascular      Pulmonary      Other Findings        Anesthesia Plan  ASA Score- 2       Anesthesia Type- general with ASA Monitors  Additional Monitors:   Airway Plan: ETT  Comment: Patient seen and examined  History reviewed  Patient to be done under general anesthesia with ETT and routine monitors  Subcostal TAP block for post op pain  Risks discussed with the patient  Consent obtained          Induction- intravenous  Informed Consent- Anesthetic plan and risks discussed with patient  I personally reviewed this patient with the CRNA  Discussed and agreed on the Anesthesia Plan with the CRNA  Meena De La Fuente

## 2017-12-05 NOTE — PERIOPERATIVE NURSING NOTE
Report received by Nadine Sahu rn, pt needed to be str  cathed per dr Danny Santo verbal order and keep pt till voids  str cathed for 900 ml cl quinten urine  carraffe of tea and water given   Pt mariza procedure well w/o discomfort

## 2017-12-05 NOTE — DISCHARGE INSTRUCTIONS
Postoperative Instructions    Activity:    Do not lift more than 10 pounds (a gallon of milk) for 1-2 weeks post-operatively    Walking is encouraged  Normal daily activities including climbing steps are okay  Do not engage in strenuous activity or contact sports for 4-6 weeks post-operatively    Diet:    You may return to your normal heart healthy diet    Wound Care: Your wound is closed with Histoacryl  It is okay to shower  Wash incision gently with soap and water and pat dry  Do not soak incisions in bath water or swim for two weeks  Do not apply any creams or ointments  Ice as needed    Pain Medication:    Please take as directed  No driving while taking narcotic pain medications    Other:  If you have questions after discharge please call the office    If you have increased pain, fever >101 5, increased drainage, redness or a bad smell at your surgery site, please call us immediately or come directly to the Emergency Room

## 2017-12-05 NOTE — PERIOPERATIVE NURSING NOTE
Pt up and oob  Feels good  Attempted to void   Unable will drink fluid  Instructions reviewed in depth with pt and wife   Ready for disc]harge when voids

## 2018-01-12 NOTE — PROGRESS NOTES
Assessment    1  Never a smoker   2  Anemia (285 9) (D64 9)   3  Ankylosing spondylitis (720 0) (M45 9)   4  Hypertension (401 9) (I10)   5  Asymptomatic carotid artery stenosis (433 10) (I65 29)    Plan  Anemia    · (1) CBC/PLT/DIFF; Status:Active; Requested for:06Apr2017;    · (1) IRON; Status:Active; Requested for:06Apr2017; Ankylosing spondylitis, Esophageal reflux, Hypertension    · Follow-up visit in 1 year Evaluation and Treatment  Follow-up  Status: Hold For -  Scheduling  Requested for: 06Apr2017  Ankylosing spondylitis, Hypertension    · (1) COMPREHENSIVE METABOLIC PANEL; Status:Active; Requested for:06Apr2017;   Encounter for prostate cancer screening    · (1) PSA (SCREEN) (Dx V76 44 Screen for Prostate Cancer); Status:Active; Requested  for:06Apr2017; Health Maintenance    · *VB - Fall Risk Assessment  (Dx Z13 89 Screen for Neurologic Disorder);  Status:Complete;   Done: 07LYF8747 02:05PM   · *VB - Urinary Incontinence Screen (Dx Z13 89 Screen for UI); Status:Complete;   Done:  99XTV5430 02:05PM   · *VB-Depression Screening; Status:Complete;   Done: 45CBS1082 02:05PM   · Medicare Annual Wellness Visit ; every 1 year; Last 62SJK3522; Next 31ZWW7095;  Status:Active  Hypertension    · (1) LIPID PANEL, FASTING; Status:Active; Requested for:06Apr2017;   Osteopenia    · (1) VITAMIN D 25-HYDROXY; Status:Active; Requested for:06Apr2017;   Refused influenza vaccine    · Stop: Fluzone Quadrivalent 0 5 ML Intramuscular Suspension  Refused pneumococcal vaccination    · Medicare Annual Wellness Visit; Status:Active; Requested for:06Apr2017;    · Stop: Pneumo (Pneumovax)    Discussion/Summary    Self Referrals: No      Chief Complaint  Pt presents for Well Exam today  Pt feels well and without complaint at this time  No falls  No refills needed today  Appetite is normal per patient        Advance Directives  Advance Directive [facility]:   The patient is in agreement to receive the Advance Care Planning service YES - Patient has an advance health care directive  The patient has a living will located  in patient's home  Capacity/Competence: This patient has full decision making capacity for discussion of advance care planning and This patient has full decision making competency for discussion of advance care planning  History of Present Illness  Welcome to Medicare and Wellness Visits: The patient is being seen for the subsequent annual wellness visit  Drug and Alcohol Use: The patient has never smoked cigarettes  The patient reports never drinking alcohol  He has never used illicit drugs  Diet and Physical Activity: Current diet includes well balanced meals  He exercises daily  Exercise: walking  Advance Directives: Advance directives: living will and advance directives  Co-Managers and Medical Equipment/Suppliers: See Patient Care Team   Reviewed Updated [employer]:   Last Medicare Wellness Visit Information was reviewed, patient interviewed, no change since last AWV  Preventive Quality Program 65 and Older: Falls Risk: The patient fell 0 times in the past 12 months  The patient is currently asymptomatic Symptoms Include:  Associated symptoms:  No associated symptoms are reported  The patient currently has no urinary incontinence symptoms  Patient Care Team    Care Team Member Role Specialty Office Number   Neita Goodpasture MD  Colon and Rectal Surgery [phone]   Eileen ARGUETA MD  Rheumatology [phone]   [facility]  Vascular Surgery [phone]   [facility]  Internal Medicine [phone]     Active Problems    1  Anemia (285 9) (D64 9)   2  Ankylosing spondylitis (720 0) (M45 9)   3  Asymptomatic carotid artery stenosis (433 10) (I65 29)   4  Degenerative joint disease of right lower extremity (715 98) (M19 90)   5  Encounter for prostate cancer screening (V76 44) (Z12 5)   6  Esophageal reflux (530 81) (K21 9)   7  Hypertension (401 9) (I10)   8   Osteopenia (733 90) (M85 80)    Past Medical History    · History of Allergic rhinitis (477 9) (J30 9)   · History of Cough (786 2) (R05)   · History of Encounter for prostate cancer screening (V76 44) (Z12 5)   · History of Encounter for screening colonoscopy (V76 51) (Z12 11)   · History of Frequent bowel movements (787 99) (R19 4)    The active problems and past medical history were reviewed and updated today  Surgical History    · History of Diagnostic Esophagogastroduodenoscopy   · History of Small Bowel Resection    The surgical history was reviewed and updated today  Family History  Mother    · Family history of Arthritis (716 90) (M19 90)  Father    · Family history of Heart attack (410 90) (I21 3)  Family History    · Family history of Coronary Artery Disease (V17 49)   · Family history of Diabetes Mellitus (V18 0)    The family history was reviewed and updated today  Social History    · Caffeine Use   · Never a smoker   · Sun Protection   · Uses Safety Equipment - Seatbelts  The social history was reviewed and updated today  The social history was reviewed and is unchanged  Current Meds   1  Aspirin 325 MG Oral Tablet; Therapy: (Recorded:09Mah2701) to Recorded   2  Atenolol-Chlorthalidone 50-25 MG Oral Tablet; Take 1 tablet daily  Requested for:   61ING5065; Last Rx:30Mar2016 Ordered   3  Enalapril Maleate 5 MG Oral Tablet; take one tablet by mouth twice daily; Therapy: 99ASJ1183 to (Evaluate:25Jun2017)  Requested for: 62RYD5773; Last   Rx:22Sns8791 Ordered   4  Klor-Con M10 10 MEQ Oral Tablet Extended Release; TAKE ONE TABLET BY MOUTH   ONCE DAILY; Therapy: 10Acq1054 to (Evaluate:16Jan2017)  Requested for: 42Ujs3753; Last   Rx:38Hds4542 Ordered   5  Omeprazole 20 MG Oral Capsule Delayed Release; TAKE 1 CAPSULE DAILY    Requested for: 24DDU6123; Last XF:04GQZ2393 Ordered   6  Vitamin D TABS; Therapy: (Recorded:56Kko5156) to Recorded    The medication list was reviewed and updated today  Allergies    1  No Known Drug Allergies    2  No Known Environmental Allergies    Vitals  Signs    Systolic: 591  Diastolic: 72   Temperature: 97 7 F  Heart Rate: 74  Height: 5 ft 9 5 in  Weight: 207 lb 6 08 oz  BMI Calculated: 30 19  BSA Calculated: 2 11  O2 Saturation: 96    Results/Data  PHQ-2 Adult Depression Screening 84JKZ4372 02:08PM User, Ahs     Test Name Result Flag Reference   PHQ-2 Adult Depression Score 0     Over the last two weeks, how often have you been bothered by any of the following problems? Little interest or pleasure in doing things: Not at all - 0  Feeling down, depressed, or hopeless: Not at all - 0   PHQ-2 Adult Depression Screening Negative       Falls Risk Assessment (Dx Z13 89 Screen for Neurologic Disorder) 06Apr2017 02:08PM User, Ahs     Test Name Result Flag Reference   Falls Risk      No falls in the past year     Medicare Annual Wellness Visit 06Apr2017 02:07PM Abida Tsai     Test Name Result Flag Reference   [address] VISIT 14HUR2661       *VB - Fall Risk Assessment  (Dx Z13 89 Screen for Neurologic Disorder) 06Apr2017 02:05PM Abida Tsai     Test Name Result Flag Reference   Falls Risk      No falls in the past year     *VB - Urinary Incontinence Screen (Dx Z13 89 Screen for UI) 06Apr2017 02:05PM Abida Tsai     Test Name Result Flag Reference   Urinary Incontinence Assessment 06Apr2017       *VB-Depression Screening 06Apr2017 02:05PM Abida Tsai     Test Name Result Flag Reference   Depression Scale Result      Depression Screen - Negative For Symptoms       Signatures   Electronically signed by : Krystina Vargas DO;  Apr 6 2017  2:36PM EST                       (Author)

## 2018-01-13 ENCOUNTER — OFFICE VISIT (OUTPATIENT)
Dept: URGENT CARE | Facility: CLINIC | Age: 73
End: 2018-01-13
Payer: MEDICARE

## 2018-01-13 PROCEDURE — G0463 HOSPITAL OUTPT CLINIC VISIT: HCPCS

## 2018-01-13 PROCEDURE — 99213 OFFICE O/P EST LOW 20 MIN: CPT

## 2018-01-14 ENCOUNTER — HOSPITAL ENCOUNTER (INPATIENT)
Facility: HOSPITAL | Age: 73
LOS: 4 days | Discharge: HOME | DRG: 390 | End: 2018-01-18
Attending: EMERGENCY MEDICINE | Admitting: SURGERY
Payer: MEDICARE

## 2018-01-14 ENCOUNTER — APPOINTMENT (EMERGENCY)
Dept: RADIOLOGY | Facility: HOSPITAL | Age: 73
DRG: 390 | End: 2018-01-14
Payer: MEDICARE

## 2018-01-14 VITALS
BODY MASS INDEX: 26.48 KG/M2 | WEIGHT: 185 LBS | HEART RATE: 64 BPM | TEMPERATURE: 97.2 F | RESPIRATION RATE: 12 BRPM | HEIGHT: 70 IN | SYSTOLIC BLOOD PRESSURE: 122 MMHG | DIASTOLIC BLOOD PRESSURE: 56 MMHG

## 2018-01-14 VITALS
HEIGHT: 70 IN | WEIGHT: 182 LBS | BODY MASS INDEX: 26.05 KG/M2 | DIASTOLIC BLOOD PRESSURE: 50 MMHG | HEART RATE: 64 BPM | SYSTOLIC BLOOD PRESSURE: 122 MMHG

## 2018-01-14 VITALS
OXYGEN SATURATION: 96 % | HEIGHT: 70 IN | DIASTOLIC BLOOD PRESSURE: 72 MMHG | BODY MASS INDEX: 29.69 KG/M2 | HEART RATE: 74 BPM | WEIGHT: 207.38 LBS | SYSTOLIC BLOOD PRESSURE: 124 MMHG | TEMPERATURE: 97.7 F

## 2018-01-14 DIAGNOSIS — E86.0 DEHYDRATION: ICD-10-CM

## 2018-01-14 DIAGNOSIS — R11.2 NAUSEA & VOMITING: ICD-10-CM

## 2018-01-14 DIAGNOSIS — E87.6 HYPOKALEMIA: ICD-10-CM

## 2018-01-14 DIAGNOSIS — K56.609 SMALL BOWEL OBSTRUCTION (HCC): Primary | ICD-10-CM

## 2018-01-14 LAB
ALBUMIN SERPL BCP-MCNC: 4.1 G/DL (ref 3.5–5)
ALP SERPL-CCNC: 103 U/L (ref 46–116)
ALT SERPL W P-5'-P-CCNC: 32 U/L (ref 12–78)
ANION GAP SERPL CALCULATED.3IONS-SCNC: 7 MMOL/L (ref 4–13)
ANION GAP SERPL CALCULATED.3IONS-SCNC: 8 MMOL/L (ref 4–13)
AST SERPL W P-5'-P-CCNC: 25 U/L (ref 5–45)
BASOPHILS # BLD AUTO: 0.02 THOUSANDS/ΜL (ref 0–0.1)
BASOPHILS NFR BLD AUTO: 0 % (ref 0–1)
BILIRUB SERPL-MCNC: 2.57 MG/DL (ref 0.2–1)
BUN SERPL-MCNC: 32 MG/DL (ref 5–25)
BUN SERPL-MCNC: 35 MG/DL (ref 5–25)
CALCIUM SERPL-MCNC: 8.7 MG/DL (ref 8.3–10.1)
CALCIUM SERPL-MCNC: 9.7 MG/DL (ref 8.3–10.1)
CHLORIDE SERPL-SCNC: 83 MMOL/L (ref 100–108)
CHLORIDE SERPL-SCNC: 90 MMOL/L (ref 100–108)
CO2 SERPL-SCNC: 39 MMOL/L (ref 21–32)
CO2 SERPL-SCNC: 43 MMOL/L (ref 21–32)
CREAT SERPL-MCNC: 0.84 MG/DL (ref 0.6–1.3)
CREAT SERPL-MCNC: 1.06 MG/DL (ref 0.6–1.3)
EOSINOPHIL # BLD AUTO: 0.01 THOUSAND/ΜL (ref 0–0.61)
EOSINOPHIL NFR BLD AUTO: 0 % (ref 0–6)
ERYTHROCYTE [DISTWIDTH] IN BLOOD BY AUTOMATED COUNT: 13.3 % (ref 11.6–15.1)
GFR SERPL CREATININE-BSD FRML MDRD: 69 ML/MIN/1.73SQ M
GFR SERPL CREATININE-BSD FRML MDRD: 86 ML/MIN/1.73SQ M
GLUCOSE SERPL-MCNC: 101 MG/DL (ref 65–140)
GLUCOSE SERPL-MCNC: 143 MG/DL (ref 65–140)
HCT VFR BLD AUTO: 41 % (ref 36.5–49.3)
HGB BLD-MCNC: 15.3 G/DL (ref 12–17)
LACTATE SERPL-SCNC: 1.4 MMOL/L (ref 0.5–2)
LIPASE SERPL-CCNC: 118 U/L (ref 73–393)
LYMPHOCYTES # BLD AUTO: 1.2 THOUSANDS/ΜL (ref 0.6–4.47)
LYMPHOCYTES NFR BLD AUTO: 17 % (ref 14–44)
MCH RBC QN AUTO: 31.5 PG (ref 26.8–34.3)
MCHC RBC AUTO-ENTMCNC: 37.3 G/DL (ref 31.4–37.4)
MCV RBC AUTO: 85 FL (ref 82–98)
MONOCYTES # BLD AUTO: 0.75 THOUSAND/ΜL (ref 0.17–1.22)
MONOCYTES NFR BLD AUTO: 11 % (ref 4–12)
NEUTROPHILS # BLD AUTO: 5.17 THOUSANDS/ΜL (ref 1.85–7.62)
NEUTS SEG NFR BLD AUTO: 72 % (ref 43–75)
NRBC BLD AUTO-RTO: 0 /100 WBCS
PLATELET # BLD AUTO: 229 THOUSANDS/UL (ref 149–390)
PMV BLD AUTO: 10.6 FL (ref 8.9–12.7)
POTASSIUM SERPL-SCNC: 2.6 MMOL/L (ref 3.5–5.3)
POTASSIUM SERPL-SCNC: 2.6 MMOL/L (ref 3.5–5.3)
PROT SERPL-MCNC: 8.1 G/DL (ref 6.4–8.2)
RBC # BLD AUTO: 4.85 MILLION/UL (ref 3.88–5.62)
SODIUM SERPL-SCNC: 134 MMOL/L (ref 136–145)
SODIUM SERPL-SCNC: 136 MMOL/L (ref 136–145)
WBC # BLD AUTO: 7.16 THOUSAND/UL (ref 4.31–10.16)

## 2018-01-14 PROCEDURE — 96361 HYDRATE IV INFUSION ADD-ON: CPT

## 2018-01-14 PROCEDURE — 96365 THER/PROPH/DIAG IV INF INIT: CPT

## 2018-01-14 PROCEDURE — 83605 ASSAY OF LACTIC ACID: CPT | Performed by: EMERGENCY MEDICINE

## 2018-01-14 PROCEDURE — 96376 TX/PRO/DX INJ SAME DRUG ADON: CPT

## 2018-01-14 PROCEDURE — C9113 INJ PANTOPRAZOLE SODIUM, VIA: HCPCS | Performed by: SURGERY

## 2018-01-14 PROCEDURE — 83735 ASSAY OF MAGNESIUM: CPT | Performed by: SURGERY

## 2018-01-14 PROCEDURE — 96375 TX/PRO/DX INJ NEW DRUG ADDON: CPT

## 2018-01-14 PROCEDURE — 85025 COMPLETE CBC W/AUTO DIFF WBC: CPT | Performed by: EMERGENCY MEDICINE

## 2018-01-14 PROCEDURE — 99285 EMERGENCY DEPT VISIT HI MDM: CPT

## 2018-01-14 PROCEDURE — 80048 BASIC METABOLIC PNL TOTAL CA: CPT | Performed by: SURGERY

## 2018-01-14 PROCEDURE — 83690 ASSAY OF LIPASE: CPT | Performed by: EMERGENCY MEDICINE

## 2018-01-14 PROCEDURE — 36415 COLL VENOUS BLD VENIPUNCTURE: CPT | Performed by: EMERGENCY MEDICINE

## 2018-01-14 PROCEDURE — 74177 CT ABD & PELVIS W/CONTRAST: CPT

## 2018-01-14 PROCEDURE — 96366 THER/PROPH/DIAG IV INF ADDON: CPT

## 2018-01-14 PROCEDURE — 80053 COMPREHEN METABOLIC PANEL: CPT | Performed by: EMERGENCY MEDICINE

## 2018-01-14 RX ORDER — ONDANSETRON 2 MG/ML
4 INJECTION INTRAMUSCULAR; INTRAVENOUS ONCE
Status: COMPLETED | OUTPATIENT
Start: 2018-01-14 | End: 2018-01-14

## 2018-01-14 RX ORDER — METOCLOPRAMIDE HYDROCHLORIDE 5 MG/ML
10 INJECTION INTRAMUSCULAR; INTRAVENOUS ONCE
Status: COMPLETED | OUTPATIENT
Start: 2018-01-14 | End: 2018-01-14

## 2018-01-14 RX ORDER — POTASSIUM CHLORIDE 14.9 MG/ML
20 INJECTION INTRAVENOUS
Status: COMPLETED | OUTPATIENT
Start: 2018-01-14 | End: 2018-01-15

## 2018-01-14 RX ORDER — PANTOPRAZOLE SODIUM 40 MG/1
40 INJECTION, POWDER, FOR SOLUTION INTRAVENOUS
Status: DISCONTINUED | OUTPATIENT
Start: 2018-01-14 | End: 2018-01-15

## 2018-01-14 RX ORDER — HEPARIN SODIUM 5000 [USP'U]/ML
5000 INJECTION, SOLUTION INTRAVENOUS; SUBCUTANEOUS EVERY 8 HOURS SCHEDULED
Status: DISCONTINUED | OUTPATIENT
Start: 2018-01-14 | End: 2018-01-18 | Stop reason: HOSPADM

## 2018-01-14 RX ORDER — SODIUM CHLORIDE, SODIUM LACTATE, POTASSIUM CHLORIDE, CALCIUM CHLORIDE 600; 310; 30; 20 MG/100ML; MG/100ML; MG/100ML; MG/100ML
125 INJECTION, SOLUTION INTRAVENOUS CONTINUOUS
Status: DISCONTINUED | OUTPATIENT
Start: 2018-01-14 | End: 2018-01-15

## 2018-01-14 RX ORDER — METOPROLOL TARTRATE 5 MG/5ML
5 INJECTION INTRAVENOUS EVERY 6 HOURS
Status: DISCONTINUED | OUTPATIENT
Start: 2018-01-14 | End: 2018-01-18 | Stop reason: HOSPADM

## 2018-01-14 RX ORDER — POTASSIUM CHLORIDE 14.9 MG/ML
20 INJECTION INTRAVENOUS
Status: COMPLETED | OUTPATIENT
Start: 2018-01-14 | End: 2018-01-14

## 2018-01-14 RX ORDER — LABETALOL HYDROCHLORIDE 5 MG/ML
10 INJECTION, SOLUTION INTRAVENOUS EVERY 4 HOURS PRN
Status: DISCONTINUED | OUTPATIENT
Start: 2018-01-14 | End: 2018-01-16

## 2018-01-14 RX ORDER — ONDANSETRON 2 MG/ML
INJECTION INTRAMUSCULAR; INTRAVENOUS
Status: COMPLETED
Start: 2018-01-14 | End: 2018-01-14

## 2018-01-14 RX ADMIN — PANTOPRAZOLE SODIUM 40 MG: 40 INJECTION, POWDER, FOR SOLUTION INTRAVENOUS at 13:49

## 2018-01-14 RX ADMIN — POTASSIUM CHLORIDE 20 MEQ: 200 INJECTION, SOLUTION INTRAVENOUS at 21:47

## 2018-01-14 RX ADMIN — POTASSIUM CHLORIDE 20 MEQ: 200 INJECTION, SOLUTION INTRAVENOUS at 05:08

## 2018-01-14 RX ADMIN — ONDANSETRON 4 MG: 2 INJECTION INTRAMUSCULAR; INTRAVENOUS at 03:08

## 2018-01-14 RX ADMIN — POTASSIUM CHLORIDE 20 MEQ: 200 INJECTION, SOLUTION INTRAVENOUS at 23:57

## 2018-01-14 RX ADMIN — POTASSIUM CHLORIDE 20 MEQ: 200 INJECTION, SOLUTION INTRAVENOUS at 07:31

## 2018-01-14 RX ADMIN — Medication 1 SPRAY: at 12:48

## 2018-01-14 RX ADMIN — SODIUM CHLORIDE, SODIUM LACTATE, POTASSIUM CHLORIDE, AND CALCIUM CHLORIDE 125 ML/HR: .6; .31; .03; .02 INJECTION, SOLUTION INTRAVENOUS at 19:43

## 2018-01-14 RX ADMIN — SODIUM CHLORIDE, SODIUM LACTATE, POTASSIUM CHLORIDE, AND CALCIUM CHLORIDE 125 ML/HR: .6; .31; .03; .02 INJECTION, SOLUTION INTRAVENOUS at 09:58

## 2018-01-14 RX ADMIN — HEPARIN SODIUM 5000 UNITS: 5000 INJECTION, SOLUTION INTRAVENOUS; SUBCUTANEOUS at 13:49

## 2018-01-14 RX ADMIN — IOHEXOL 100 ML: 350 INJECTION, SOLUTION INTRAVENOUS at 05:29

## 2018-01-14 RX ADMIN — SODIUM CHLORIDE 1000 ML: 0.9 INJECTION, SOLUTION INTRAVENOUS at 03:08

## 2018-01-14 RX ADMIN — ONDANSETRON 4 MG: 2 INJECTION INTRAMUSCULAR; INTRAVENOUS at 04:19

## 2018-01-14 RX ADMIN — Medication 1 SPRAY: at 14:48

## 2018-01-14 RX ADMIN — HEPARIN SODIUM 5000 UNITS: 5000 INJECTION, SOLUTION INTRAVENOUS; SUBCUTANEOUS at 21:48

## 2018-01-14 RX ADMIN — METOCLOPRAMIDE 10 MG: 5 INJECTION, SOLUTION INTRAMUSCULAR; INTRAVENOUS at 05:00

## 2018-01-14 NOTE — ED NOTES
Patient complains of raw feeling in throat    Chloraseptic spray in orders- requested dose from pharmacy- patient aware        Matt Hull RN  01/14/18 0555

## 2018-01-14 NOTE — ED PROVIDER NOTES
History  Chief Complaint   Patient presents with    Vomiting     patient states he had the flu earlier in the week and ever since he has not been eating and vomiting; seen at Urgent care yesterday and given Zofran that is no longer working     HPI  This is a 10-year-old male that presents today with vomiting since Thursday  Patient states his family is sick with nausea vomiting and diarrhea  He states he started with vomiting and then had a few bouts of diarrhea  Did have a fever on Thursday and Friday which resolved  He did go to urgent care and received Zofran which is no longer working  He did follow up with his PCP on the 13th  Patient states today he just feels terrible and has not been able to keep any food or water down  He states he tried to drink some water and just vomits  He states no fevers currently  Passing gas  No longer diarrhea  No chest pain or shortness of breath  Denies any abdominal pain  10-year-old male presenting with vomiting  At bedside patient does not exhibit any tenderness to palpation  Wife states he usually does not have any tenderness even even when he had a bowel obstruction  Will do a CT scan of the abdomen labs and symptomatic treatment  Prior to Admission Medications   Prescriptions Last Dose Informant Patient Reported? Taking?    NIFEdipine (PROCARDIA XL) 90 mg 24 hr tablet   No No   Sig: Take 1 tablet by mouth daily   aspirin 81 mg chewable tablet   No No   Sig: Chew 1 tablet daily   carvedilol (COREG) 25 mg tablet   No No   Sig: Take 1 tablet by mouth 2 (two) times a day with meals   chlorthalidone (HYGROTEN) 100 MG tablet   No No   Sig: Take 1 tablet by mouth daily   enalapril (VASOTEC) 20 mg tablet   No No   Sig: Take 1 tablet by mouth 2 (two) times a day   pantoprazole (PROTONIX) 40 mg tablet   No No   Sig: Take 1 tablet by mouth daily   potassium chloride (MICRO-K) 10 MEQ CR capsule  Self Yes No   Sig: Take 10 mEq by mouth 2 (two) times a day Facility-Administered Medications: None       Past Medical History:   Diagnosis Date    Cholecystitis     Coronary artery disease     Hypertension     Spinal stenosis        Past Surgical History:   Procedure Laterality Date    CHOLECYSTECTOMY      COLON SURGERY      bwel resection    COLONOSCOPY      [place] LAP,CHOLECYSTECTOMY N/A 12/5/2017    Procedure: CHOLECYSTECTOMY LAPAROSCOPIC;  Surgeon: Shantal Ramirez MD;  Location: [facility];  Service: General       Family History   Problem Relation Age of Onset    Arthritis Mother     Heart attack Father      I have reviewed and agree with the history as documented      Social History   Substance Use Topics    Smoking status: Former Smoker     Packs/day: 2 00     Years: 3 00     Quit date: 1966    Smokeless tobacco: Never Used    Alcohol use Yes      Comment: social         Review of Systems  REVIEW OF SYSTEMS  Constitutional:  Denies fever or chills   Eyes:  Denies change in visual acuity   HENT:  Denies nasal congestion or sore throat   Respiratory:  Denies cough or shortness of breath   Cardiovascular:  Denies chest pain or edema   GI:  + nausea, vomiting, Denies: abdominal pain, bloody stools or diarrhea   :  Denies dysuria   Musculoskeletal:  Denies back pain or joint pain   Integument:  Denies rash   Neurologic:  Denies headache, focal weakness or sensory changes   Endocrine:  Denies polyuria or polydipsia   Lymphatic:  Denies swollen glands   Psychiatric:  Denies depression or anxiety     Physical Exam  ED Triage Vitals   Temperature Pulse Respirations Blood Pressure SpO2   01/14/18 0221 01/14/18 0221 01/14/18 0221 01/14/18 0221 01/14/18 0221   98 7 °F (37 1 °C) 82 18 166/78 97 %      Temp Source Heart Rate Source Patient Position - Orthostatic VS BP Location FiO2 (%)   01/14/18 0221 01/14/18 0341 01/14/18 0341 01/14/18 0341 --   Oral Monitor Lying Right arm       Pain Score       01/14/18 0221       No Pain           Orthostatic Vital Signs  Vitals: 01/14/18 1415 01/14/18 1445 01/14/18 1604 01/14/18 2300   BP: (!) 192/78 157/72 141/67 161/77   Pulse: 70 68 72 55   Patient Position - Orthostatic VS:   Lying Lying       Physical Exam  Constitutional:  Well developed, well nourished, moderate distress   distress, non-toxic appearance   Eyes:  PERRL, conjunctiva normal   HENT:  Atraumatic, external ears normal, nose normal, oropharynx moist, no pharyngeal exudates  Neck- normal range of motion, no tenderness, supple   Respiratory: no wheezes, no rhonchi and normal symmetric air entry normal breath sounds bilaterally,   Cardiovascular:  normal rate, normal rhythm, no murmurs, no gallops, no rubs   GI:  nontender, no rebound or guarding  , no organomegaly, no mass,    Musculoskeletal:  no CVA tenderness, no tenderness, no deformities   Back- no tenderness  Integument:  Well hydrated, no rash   Lymphatic:  No lymphadenopathy noted   Neurologic:  Alert & oriented x 3, CN 2-12 normal, normal motor function, normal sensory function, no focal deficits noted   Psychiatric:  Speech and behavior appropriate       ED Medications  Medications   heparin (porcine) subcutaneous injection 5,000 Units (5,000 Units Subcutaneous Given 1/14/18 2148)   metoprolol (LOPRESSOR) injection 5 mg (5 mg Intravenous Not Given 1/14/18 1802)   labetalol (NORMODYNE) injection 10 mg (not administered)   pantoprazole (PROTONIX) injection 40 mg (40 mg Intravenous Given 1/14/18 1349)   phenol (CHLORASEPTIC) 1 4 % mucosal liquid 1 spray (1 spray Mouth/Throat Given 1/14/18 1448)   lactated ringers infusion (125 mL/hr Intravenous New Bag 1/1943)   potassium chloride 20 mEq IVPB (premix) (20 mEq Intravenous New Bag 1/14/18 2147)   sodium chloride 0 9 % bolus 1,000 mL (0 mL Intravenous Stopped 1/14/18 0500)   ondansetron (ZOFRAN) injection 4 mg (4 mg Intravenous Given 1/14/18 0308)   ondansetron (ZOFRAN) injection 4 mg (4 mg Intravenous Given 1/14/18 0419)   metoclopramide (REGLAN) injection 10 mg (10 mg Intravenous Given 1/14/18 0500)   potassium chloride 20 mEq IVPB (premix) (0 mEq Intravenous Stopped 1/14/18 1123)   iohexol (OMNIPAQUE) 350 MG/ML injection (MULTI-DOSE) 100 mL (100 mL Intravenous Given 1/14/18 0558)       Diagnostic Studies  Results Reviewed     Procedure Component Value Units Date/Time    Platelet count [[id number]]     Lab Status:  No result Specimen:  Blood     Lactic acid, plasma [[id number]]  (Normal) Collected:  01/14/18 0741    Lab Status:  Final result Specimen:  Blood from Arm, Right Updated:  01/14/18 1185     LACTIC ACID 1 4 mmol/L     Narrative:         Result may be elevated if tourniquet was used during collection  Comprehensive metabolic panel [[id number]]  (Abnormal) Collected:  01/14/18 0308    Lab Status:  Final result Specimen:  Blood from Arm, Right Updated:  01/14/18 0453     Sodium 134 (L) mmol/L      Potassium 2 6 (LL) mmol/L      Chloride 83 (L) mmol/L      CO2 43 (H) mmol/L      Anion Gap 8 mmol/L      BUN 35 (H) mg/dL      Creatinine 1 06 mg/dL      Glucose 143 (H) mg/dL      Calcium 9 7 mg/dL      AST 25 U/L      ALT 32 U/L      Alkaline Phosphatase 103 U/L      Total Protein 8 1 g/dL      Albumin 4 1 g/dL      Total Bilirubin 2 57 (H) mg/dL      eGFR 69 ml/min/1 73sq m     Narrative:         National Kidney Disease Education Program recommendations are as follows:  GFR calculation is accurate only with a steady state creatinine  Chronic Kidney disease less than 60 ml/min/1 73 sq  meters  Kidney failure less than 15 ml/min/1 73 sq  meters      Lipase [[id number]]  (Normal) Collected:  01/14/18 0308    Lab Status:  Final result Specimen:  Blood from Arm, Right Updated:  01/14/18 0437     Lipase 118 u/L     CBC and differential [[id number]]  (Normal) Collected:  01/14/18 0308    Lab Status:  Final result Specimen:  Blood from Arm, Right Updated:  01/14/18 0314     WBC 7 16 Thousand/uL      RBC 4 85 Million/uL      Hemoglobin 15 3 g/dL      Hematocrit 41 0 %      MCV 85 fL MCH 31 5 pg      MCHC 37 3 g/dL      RDW 13 3 %      MPV 10 6 fL      Platelets 548 Thousands/uL      nRBC 0 /100 WBCs      Neutrophils Relative 72 %      Lymphocytes Relative 17 %      Monocytes Relative 11 %      Eosinophils Relative 0 %      Basophils Relative 0 %      Neutrophils Absolute 5 17 Thousands/µL      Lymphocytes Absolute 1 20 Thousands/µL      Monocytes Absolute 0 75 Thousand/µL      Eosinophils Absolute 0 01 Thousand/µL      Basophils Absolute 0 02 Thousands/µL                  CT abdomen pelvis with contrast   Final Result by Shane Lou MD (01/14 1023)   1  Dilated proximal and mid small bowel loops with decompressed distal loops, most compatible with obstruction  Possible pneumatosis in several loops which would be concerning for ischemia  Possible transition point in the right lower quadrant  On    series 2 image 49, there is a small high density focus along the posterior wall of a small bowel loop, of uncertain etiology, either intraluminal debris , enhancing mucosal lesion, or possibly extravasating contrast               I personally discussed this study with the ER staff on 1/14/2018 6:50 AM          Workstation performed: [id number]               Procedures  Procedures      Phone Consults  ED Phone Contact    ED Course  ED Course as of Jan 14 2330   Stewart Deleon Jan 14, 2018   0818 Paged REd surgery and spoke with Dr Dancia Le            Identification of Seniors at [facility] Most Recent Value   (ISAR) Identification of Seniors at Risk   Before the illness or injury that brought you to the Emergency, did you need someone to help you on a regular basis? 0 Filed at: 01/14/2018 0223   In the last 24 hours, have you needed more help than usual?  1 Filed at: 01/14/2018 5071   Have you been hospitalized for one or more nights during the past 6 months?   1 Filed at: 01/14/2018 5390   In general, do you see well?  0 Filed at: 01/14/2018 3424   In general, do you have serious problems with your memory? 0 Filed at: 01/14/2018 7088   Do you take more than three different medications every day? 1 Filed at: 01/14/2018 6870   ISAR Score  3 Filed at: 01/14/2018 0223                          [facility]  CritCare Time    Disposition  Final diagnoses:   Small bowel obstruction   Nausea & vomiting   Hypokalemia   Dehydration     Time reflects when diagnosis was documented in both MDM as applicable and the Disposition within this note     Time User Action Codes Description Comment    1/14/2018  6:54 AM Yoni Kub Add [F24 449] Small bowel obstruction     1/14/2018  6:54 AM MarcieRosalia Herkhdip Add [R11 2] Nausea & vomiting     1/14/2018  6:54 AM MarcieRosalia Herkhdip Add [E87 6] Hypokalemia     1/14/2018  6:56 AM MarcieRosalia Herkhdip Add [E86 0] Dehydration       ED Disposition     ED Disposition Condition Comment    Admit  Case was discussed with general surgery and the patient's admission status was agreed to be Admission Status: inpatient status to the service of Dr Suraj Kaur   Follow-up Information    None       Current Discharge Medication List      CONTINUE these medications which have NOT CHANGED    Details   aspirin 81 mg chewable tablet Chew 1 tablet daily  Qty: 30 tablet, Refills: 0      carvedilol (COREG) 25 mg tablet Take 1 tablet by mouth 2 (two) times a day with meals  Qty: 60 tablet, Refills: 0      chlorthalidone (HYGROTEN) 100 MG tablet Take 1 tablet by mouth daily  Qty: 30 tablet, Refills: 0      enalapril (VASOTEC) 20 mg tablet Take 1 tablet by mouth 2 (two) times a day  Qty: 60 tablet, Refills: 0      NIFEdipine (PROCARDIA XL) 90 mg 24 hr tablet Take 1 tablet by mouth daily  Qty: 30 tablet, Refills: 0      pantoprazole (PROTONIX) 40 mg tablet Take 1 tablet by mouth daily  Qty: 30 tablet, Refills: 0      potassium chloride (MICRO-K) 10 MEQ CR capsule Take 10 mEq by mouth 2 (two) times a day           No discharge procedures on file  ED Provider  Attending physically available and evaluated Jessica Nicole I managed the patient along with the ED Attending      Electronically Signed by         Diamond Vallejo MD  01/14/18 0840

## 2018-01-14 NOTE — ED NOTES
NGT draining much less bile from initial insertion  100ml in canister       Davian Reyes RN  01/14/18 4199

## 2018-01-14 NOTE — H&P
H&P Exam - General Surgery   Jessica Nicole  74 y o  male MRN: [id number]  Unit/Bed#: ED 01 Encounter: [id number]    Assessment/Plan     Assessment:  73 yo M w/ SBO with TP in RLQ likely secondary to adhesive disease  Plan:  NPO w/ NGT (placed in ED- 1600 cc bilious fluid immediately)  NS @ 125  Monitor abdominal exam  Low suspicion for ischemic disease given completely non-tender exam  Conservative measures at this time- if fails to improve may have to consider surgical intervention      History of Present Illness     HPI:  Jessica Nicole  is a 74 y o  male who presents with 4 days of intractable nausea and vomiting  His last BM was 2 days ago after taking a laxitive, but he still feels a little bloated from noraml  He denies any abdominal pain at any time, just intractable nausea  No fever/chills  He had these symptoms 7-8 yrs ago when he was treated conservatively with NGT decompression  1 year later his symptoms recurred and he underwent operative resection (he is unsure if it was SB or colon)  He is unsure but at some point it was mentioned the etiology may have been Crohns (?)   His last C-scope was > 10 yrs ago      Surg hx:  Ex lap, bowel resection (approx 6 years ago)  Lap radha (Dec 2017, Migel)    Review of Systems    Historical Information   Past Medical History:   Diagnosis Date    Cholecystitis     Coronary artery disease     Hypertension     Spinal stenosis      Past Surgical History:   Procedure Laterality Date    CHOLECYSTECTOMY      COLON SURGERY      bwel resection    COLONOSCOPY      IN LAP,CHOLECYSTECTOMY N/A 12/5/2017    Procedure: CHOLECYSTECTOMY LAPAROSCOPIC;  Surgeon: Toby Gonsales MD;  Location: [facility];  Service: General     Social History   History   Alcohol Use    Yes     Comment: social      History   Drug Use No     History   Smoking Status    Former Smoker    Packs/day: 2 00    Years: 3 00    Quit date: 1966   Smokeless Tobacco    Never Used     Family History:   Family History   Problem Relation Age of Onset    Arthritis Mother     Heart attack Father        Meds/Allergies   all medications and allergies reviewed  No Known Allergies    Objective   First Vitals:   Blood Pressure: 166/78 (01/14/18 0221)  Pulse: 82 (01/14/18 0221)  Temperature: 98 7 °F (37 1 °C) (01/14/18 0221)  Temp Source: Oral (01/14/18 0221)  Respirations: 18 (01/14/18 0221)  Weight - Scale: 80 3 kg (177 lb) (01/14/18 0221)  SpO2: 97 % (01/14/18 0221)    Current Vitals:   Blood Pressure: 150/64 (01/14/18 0800)  Pulse: 70 (01/14/18 0800)  Temperature: 98 7 °F (37 1 °C) (01/14/18 0221)  Temp Source: Oral (01/14/18 0221)  Respirations: (!) 25 (01/14/18 0800)  Weight - Scale: 80 3 kg (177 lb) (01/14/18 0221)  SpO2: 97 % (01/14/18 0800)      Intake/Output Summary (Last 24 hours) at 01/14/18 0818  Last data filed at 01/14/18 0752   Gross per 24 hour   Intake             1060 ml   Output             1600 ml   Net             -540 ml       Invasive Devices     Peripheral Intravenous Line            Peripheral IV 01/14/18 Right Antecubital less than 1 day          Drain            NG/OG/Enteral Tube Nasogastric 16 Fr Right nares -- days                Physical Exam     Gen: A&O, NAD  Neuro: GCS 15  Cardio: RRR  Lungs: CTAB  Abd: Soft, moderately distended, non tender in any quadrant    Ext: Warm, Dry  Vasc: Intact      Lab Results:   CBC:   Lab Results   Component Value Date    WBC 7 16 01/14/2018    HGB 15 3 01/14/2018    HCT 41 0 01/14/2018    MCV 85 01/14/2018     01/14/2018    MCH 31 5 01/14/2018    MCHC 37 3 01/14/2018    RDW 13 3 01/14/2018    MPV 10 6 01/14/2018    NRBC 0 01/14/2018   , CMP:   Lab Results   Component Value Date     (L) 01/14/2018    K 2 6 (LL) 01/14/2018    CL 83 (L) 01/14/2018    CO2 43 (H) 01/14/2018    ANIONGAP 8 01/14/2018    BUN 35 (H) 01/14/2018    CREATININE 1 06 01/14/2018    GLUCOSE 143 (H) 01/14/2018    CALCIUM 9 7 01/14/2018    AST 25 01/14/2018    ALT 32 01/14/2018    ALKPHOS 103 01/14/2018    PROT 8 1 01/14/2018    ALBUMIN 4 1 01/14/2018    BILITOT 2 57 (H) 01/14/2018    EGFR 69 01/14/2018   , Coagulation: No results found for: PT, INR, APTT  Imaging: I have personally reviewed pertinent reports  CT abdomen pelvis with contrast   Final Result by Mya Lerner MD (81/45 9870)   1  Dilated proximal and mid small bowel loops with decompressed distal loops, most compatible with obstruction  Possible pneumatosis in several loops which would be concerning for ischemia  Possible transition point in the right lower quadrant  On    series 2 image 49, there is a small high density focus along the posterior wall of a small bowel loop, of uncertain etiology, either intraluminal debris , enhancing mucosal lesion, or possibly extravasating contrast               I personally discussed this study with the ER staff on 1/14/2018 6:50 AM          Workstation performed: [id number]               EKG, Pathology, and Other Studies: I have personally reviewed pertinent reports  Code Status: Prior  Advance Directive and Living Will:      Power of :    POLST:      Counseling / Coordination of Care  Total floor / unit time spent today 30 minutes  Greater than 50% of total time was spent with the patient and / or family counseling and / or coordination of care

## 2018-01-14 NOTE — PROGRESS NOTES
Assessment   1  Gastroenteritis, acute (558 9) (K52 9)    Plan   Gastroenteritis, acute    · Ondansetron 4 MG Oral Tablet Disintegrating (Zofran ODT); TAKE 4 MG 3 times    daily PRN    Discussion/Summary   Discussion Summary:    I discussed mechanism of gastroenteritis and the use of Zofran patient will monitor his temperature and continue with small amounts of fluids of and no large amounts as this will cause him to continually vomit  Medication Side Effects Reviewed: Possible side effects of new medications were reviewed with the patient/guardian today  Understands and agrees with treatment plan: The treatment plan was reviewed with the patient/guardian  The patient/guardian understands and agrees with the treatment plan    Counseling Documentation With Imm: The patient was counseled regarding diagnostic results,-- instructions for management,-- prognosis  Follow Up Instructions: Follow Up with your Primary Care Provider in 2-3 days  If your symptoms worsen, go to the nearest [place] [address] Emergency Department  Chief Complaint   1  Nausea  Chief Complaint Free Text Note Form: Pt c/o nausea and a fever for three days  History of Present Illness   HPI: Patient is a 68-year-old gentleman exposed to his family who had a GI virus with vomiting nausea and fever  He began on Thursday with a fever but none on Friday or today and developed nausea and vomiting starting on Thursday and Friday and vomited while he was here today on Saturday  He denies abdominal pain but his stomach is sore from vomiting  Denies sore throat cough ear pain  He has had his gallbladder out but no history of appendectomy in the past    Hospital Based Practices Required Assessment:      Abuse And Domestic Violence Screen       Yes, the patient is safe at home  -- The patient states no one is hurting them  Depression And Suicide Screen  No, the patient has not had thoughts of hurting themself        No, the patient has not felt depressed in the past 7 days  Prefered Language is  [place]  Primary Language is  English  Readiness To Learn: Receptive  Barriers To Learning: none  Education Completed: disease/condition      Teaching Method: verbal      Person Taught: patient      Evaluation Of Learning: verbalized/demonstrated understanding      Review of Systems   Focused-Male:      Constitutional: feeling poorly,-- chills-- and-- feeling tired, but-- no fever  ENT: no complaints of earache, no loss of hearing, no nosebleeds or nasal discharge, no sore throat or hoarseness  Cardiovascular: no complaints of slow or fast heart rate, no chest pain, no palpitations, no leg claudication or lower extremity edema  Respiratory: no complaints of shortness of breath, no wheezing or cough, no dyspnea on exertion, no orthopnea or PND  Gastrointestinal: as noted in HPI  Musculoskeletal: no complaints of arthralgia, no myalgia, no joint swelling or stiffness, no limb pain or swelling  Integumentary: no complaints of skin rash or lesion, no itching or dry skin, no skin wounds  Neurological: no complaints of headache, no confusion, no numbness or tingling, no dizziness or fainting  ROS Reviewed:    ROS reviewed  Active Problems   1  Accelerated hypertension (401 0) (I10)   2  Anemia (285 9) (D64 9)   3  Ankylosing spondylitis (720 0) (M45 9)   4  Asymptomatic carotid artery stenosis (433 10) (I65 29)   5  Chronic cholecystitis (575 11) (K81 1)   6  Degenerative joint disease of right lower extremity (715 98) (M19 90)   7  Encounter for prostate cancer screening (V76 44) (Z12 5)   8  Esophageal reflux (530 81) (K21 9)   9  Exercise counseling (V65 41) (Z71 82)   10  Hypertension (401 9) (I10)   11  More than 50 percent stenosis of right internal carotid artery (433 10) (I65 21)   12  Osteopenia (733 90) (M85 80)    Past Medical History   1   History of Cholecystitis with cholelithiasis (574 10) (K80 10)  Active Problems And Past Medical History Reviewed: The active problems and past medical history were reviewed and updated today  Family History   Mother    1  Family history of Arthritis (716 90) (M19 90)  Father    2  Family history of Heart attack (410 90) (I21 9)  Family History    3  Family history of Coronary Artery Disease (V17 49)   4  Family history of Diabetes Mellitus (V18 0)  Family History Reviewed: The family history was reviewed and updated today  Social History    · Caffeine Use   · Dental care, regularly   · Feels safe at home   · Former smoker (L52 65) (D76 045)   · Lives independently with spouse   · No illicit drug use   · Social alcohol use (Z78 9)   · Sun Protection   · Uses Safety Equipment - Seatbelts  Social History Reviewed: The social history was reviewed and updated today  Surgical History   1  History of Cholecystectomy Laparoscopic   2  History of Diagnostic Esophagogastroduodenoscopy   3  History of Small Bowel Resection  Surgical History Reviewed: The surgical history was reviewed and updated today  Current Meds    1  Aspirin 81 MG Oral Tablet Chewable; CHEW AND SWALLOW 1 TABLET DAILY; Therapy: (Recorded:25Oct2017) to Recorded   2  Carvedilol 25 MG Oral Tablet; TAKE 1 TABLET TWICE DAILY WITH MEALS; Therapy: (Recorded:25Oct2017) to Recorded   3  Carvedilol 25 MG Oral Tablet; TAKE ONE (1) TABLET BY MOUTH TWICE DAILY; Therapy: [id number] to (Last Rx:22Nov2017)  Requested for: 22Nov2017 Ordered   4  Chlorthalidone 50 MG Oral Tablet; take 2 tablet daily  Requested for: 22Nov2017; Last     Rx:22Nov2017 Ordered   5  Enalapril Maleate 20 MG Oral Tablet; TAKE 1 TABLET TWICE DAILY; Therapy: (PFISSPCO:[id number]) to Recorded   6  Klor-Con 10 10 MEQ Oral Tablet Extended Release; TAKE 1 TABLET TWICE DAILY; Therapy: (Recorded:17Nov2017) to Recorded   7   NIFEdipine ER Osmotic Release 90 MG Oral Tablet Extended Release 24 Hour; TAKE 1 TABLET DAILY  Requested for: 22Nov2017; Last Rx:22Nov2017 Ordered   8  Pantoprazole Sodium 40 MG Oral Tablet Delayed Release; TAKE 1 TABLET DAILY      Requested for: [id number]; Last Rx:22Nov2017 Ordered   9  Potassium Chloride 10 MEQ TBCR; TAKE 1 TABLET TWICE DAILY; Therapy: (Recorded:25Oct2017) to Recorded   10  Vitamin D TABS; Therapy: (Recorded:82Suo7482) to Recorded    Allergies   1  No Known Drug Allergies  2  No Known Environmental Allergies    Vitals   Signs   Recorded: 61CXS8131 02:58PM   Temperature: 97 4 F  Heart Rate: 97  Respiration: 18  Systolic: 188  Diastolic: 82  O2 Saturation: 98    Physical Exam        Constitutional      General appearance: No acute distress, well appearing and well nourished  Eyes      Conjunctiva and lids: No swelling, erythema, or discharge  Pupils and irises: Equal, round and reactive to light  Ears, Nose, Mouth, and Throat      External inspection of ears and nose: Normal        Otoscopic examination: Tympanic membrance translucent with normal light reflex  Canals patent without erythema  Nasal mucosa, septum, and turbinates: Normal without edema or erythema  Oropharynx: Normal with no erythema, edema, exudate or lesions  Pulmonary      Auscultation of lungs: Clear to auscultation  Cardiovascular      Auscultation of heart: Normal rate and rhythm, normal S1 and S2, without murmurs  Skin      Skin and subcutaneous tissue: Normal without rashes or lesions         Psychiatric      Orientation to person, place and time: Normal        Mood and affect: Normal        Future Appointments      Date/Time Provider Specialty Site   01/23/2018 08:15 AM Halina Paget, DO Internal Medicine [facility]     Signatures    Electronically signed by : Debi Ferreira DO; Jan 13 2018  3:19PM EST                       (Author)

## 2018-01-14 NOTE — ED ATTENDING ATTESTATION
I, [address], DO, saw and evaluated the patient  I have discussed the patient with the resident/non-physician practitioner and agree with the resident's/non-physician practitioner's findings, Plan of Care, and MDM as documented in the resident's/non-physician practitioner's note, except where noted  All available labs and Radiology studies were reviewed  At this point I agree with the current assessment done in the Emergency Department  I have conducted an independent evaluation of this patient a history and physical is as follows:    66yo male presents with nausea and vomiting since Thursday  Had subjective fever on Thursday and multiple sick contacts  Started vomiting Thursday  Today did not tolerate po and has no appetite  Multiple episodes of dark greenish vomit  Wife reports he had similar symptoms in past with obstruction that required surgery and was diag with Crohns at that time  Denies pain  On exam - nad, heart reg, lungs clear, abd soft and nt    Plan - zofran, ivf, check labs and ct abd    Critical Care Time  CritCare Time    Procedures

## 2018-01-15 PROBLEM — E87.6 HYPOKALEMIA: Status: ACTIVE | Noted: 2018-01-15

## 2018-01-15 LAB
ANION GAP SERPL CALCULATED.3IONS-SCNC: 5 MMOL/L (ref 4–13)
ANION GAP SERPL CALCULATED.3IONS-SCNC: 6 MMOL/L (ref 4–13)
BUN SERPL-MCNC: 23 MG/DL (ref 5–25)
BUN SERPL-MCNC: 30 MG/DL (ref 5–25)
CALCIUM SERPL-MCNC: 8.4 MG/DL (ref 8.3–10.1)
CALCIUM SERPL-MCNC: 8.5 MG/DL (ref 8.3–10.1)
CHLORIDE SERPL-SCNC: 95 MMOL/L (ref 100–108)
CHLORIDE SERPL-SCNC: 98 MMOL/L (ref 100–108)
CO2 SERPL-SCNC: 33 MMOL/L (ref 21–32)
CO2 SERPL-SCNC: 36 MMOL/L (ref 21–32)
CREAT SERPL-MCNC: 0.62 MG/DL (ref 0.6–1.3)
CREAT SERPL-MCNC: 0.69 MG/DL (ref 0.6–1.3)
ERYTHROCYTE [DISTWIDTH] IN BLOOD BY AUTOMATED COUNT: 13.7 % (ref 11.6–15.1)
GFR SERPL CREATININE-BSD FRML MDRD: 94 ML/MIN/1.73SQ M
GFR SERPL CREATININE-BSD FRML MDRD: 98 ML/MIN/1.73SQ M
GLUCOSE SERPL-MCNC: 102 MG/DL (ref 65–140)
GLUCOSE SERPL-MCNC: 87 MG/DL (ref 65–140)
HCT VFR BLD AUTO: 33.9 % (ref 36.5–49.3)
HGB BLD-MCNC: 11.9 G/DL (ref 12–17)
MAGNESIUM SERPL-MCNC: 2.7 MG/DL (ref 1.6–2.6)
MAGNESIUM SERPL-MCNC: 2.8 MG/DL (ref 1.6–2.6)
MCH RBC QN AUTO: 30.7 PG (ref 26.8–34.3)
MCHC RBC AUTO-ENTMCNC: 35.1 G/DL (ref 31.4–37.4)
MCV RBC AUTO: 88 FL (ref 82–98)
PLATELET # BLD AUTO: 180 THOUSANDS/UL (ref 149–390)
PMV BLD AUTO: 11.1 FL (ref 8.9–12.7)
POTASSIUM SERPL-SCNC: 2.9 MMOL/L (ref 3.5–5.3)
POTASSIUM SERPL-SCNC: 3.4 MMOL/L (ref 3.5–5.3)
RBC # BLD AUTO: 3.87 MILLION/UL (ref 3.88–5.62)
SODIUM SERPL-SCNC: 136 MMOL/L (ref 136–145)
SODIUM SERPL-SCNC: 137 MMOL/L (ref 136–145)
WBC # BLD AUTO: 7.24 THOUSAND/UL (ref 4.31–10.16)

## 2018-01-15 PROCEDURE — 80048 BASIC METABOLIC PNL TOTAL CA: CPT | Performed by: SURGERY

## 2018-01-15 PROCEDURE — C9113 INJ PANTOPRAZOLE SODIUM, VIA: HCPCS | Performed by: SURGERY

## 2018-01-15 PROCEDURE — 83735 ASSAY OF MAGNESIUM: CPT | Performed by: SURGERY

## 2018-01-15 PROCEDURE — 85027 COMPLETE CBC AUTOMATED: CPT | Performed by: SURGERY

## 2018-01-15 RX ORDER — POTASSIUM CHLORIDE 14.9 MG/ML
20 INJECTION INTRAVENOUS
Status: DISCONTINUED | OUTPATIENT
Start: 2018-01-15 | End: 2018-01-15

## 2018-01-15 RX ORDER — POTASSIUM CHLORIDE 14.9 MG/ML
20 INJECTION INTRAVENOUS
Status: COMPLETED | OUTPATIENT
Start: 2018-01-15 | End: 2018-01-15

## 2018-01-15 RX ORDER — DEXTROSE, SODIUM CHLORIDE, AND POTASSIUM CHLORIDE 5; .45; .15 G/100ML; G/100ML; G/100ML
50 INJECTION INTRAVENOUS CONTINUOUS
Status: DISCONTINUED | OUTPATIENT
Start: 2018-01-15 | End: 2018-01-18

## 2018-01-15 RX ORDER — POTASSIUM CHLORIDE 14.9 MG/ML
20 INJECTION INTRAVENOUS
Status: COMPLETED | OUTPATIENT
Start: 2018-01-15 | End: 2018-01-17

## 2018-01-15 RX ADMIN — HEPARIN SODIUM 5000 UNITS: 5000 INJECTION, SOLUTION INTRAVENOUS; SUBCUTANEOUS at 13:51

## 2018-01-15 RX ADMIN — HEPARIN SODIUM 5000 UNITS: 5000 INJECTION, SOLUTION INTRAVENOUS; SUBCUTANEOUS at 21:34

## 2018-01-15 RX ADMIN — POTASSIUM CHLORIDE 20 MEQ: 200 INJECTION, SOLUTION INTRAVENOUS at 23:53

## 2018-01-15 RX ADMIN — HEPARIN SODIUM 5000 UNITS: 5000 INJECTION, SOLUTION INTRAVENOUS; SUBCUTANEOUS at 05:09

## 2018-01-15 RX ADMIN — POTASSIUM CHLORIDE 20 MEQ: 200 INJECTION, SOLUTION INTRAVENOUS at 15:58

## 2018-01-15 RX ADMIN — POTASSIUM CHLORIDE 20 MEQ: 200 INJECTION, SOLUTION INTRAVENOUS at 02:26

## 2018-01-15 RX ADMIN — POTASSIUM CHLORIDE 20 MEQ: 200 INJECTION, SOLUTION INTRAVENOUS at 20:53

## 2018-01-15 RX ADMIN — METOPROLOL TARTRATE 5 MG: 1 INJECTION, SOLUTION INTRAVENOUS at 11:59

## 2018-01-15 RX ADMIN — POTASSIUM CHLORIDE 20 MEQ: 200 INJECTION, SOLUTION INTRAVENOUS at 08:13

## 2018-01-15 RX ADMIN — METOPROLOL TARTRATE 5 MG: 1 INJECTION, SOLUTION INTRAVENOUS at 17:11

## 2018-01-15 RX ADMIN — METOPROLOL TARTRATE 5 MG: 1 INJECTION, SOLUTION INTRAVENOUS at 04:52

## 2018-01-15 RX ADMIN — POTASSIUM CHLORIDE 20 MEQ: 200 INJECTION, SOLUTION INTRAVENOUS at 13:51

## 2018-01-15 RX ADMIN — PANTOPRAZOLE SODIUM 40 MG: 40 INJECTION, POWDER, FOR SOLUTION INTRAVENOUS at 08:13

## 2018-01-15 RX ADMIN — DEXTROSE, SODIUM CHLORIDE, AND POTASSIUM CHLORIDE 125 ML/HR: 5; .45; .15 INJECTION INTRAVENOUS at 10:32

## 2018-01-15 RX ADMIN — SODIUM CHLORIDE, SODIUM LACTATE, POTASSIUM CHLORIDE, AND CALCIUM CHLORIDE 125 ML/HR: .6; .31; .03; .02 INJECTION, SOLUTION INTRAVENOUS at 04:12

## 2018-01-15 RX ADMIN — METOPROLOL TARTRATE 5 MG: 1 INJECTION, SOLUTION INTRAVENOUS at 00:11

## 2018-01-15 RX ADMIN — DEXTROSE, SODIUM CHLORIDE, AND POTASSIUM CHLORIDE 125 ML/HR: 5; .45; .15 INJECTION INTRAVENOUS at 18:28

## 2018-01-15 RX ADMIN — POTASSIUM CHLORIDE 20 MEQ: 200 INJECTION, SOLUTION INTRAVENOUS at 10:31

## 2018-01-15 NOTE — RESULT NOTES
Discussion/Summary   No evidence of pheochromocytoma  Verified Results  (1) CATECHOLAMINES, FRACTIONATED, URINARY FREE, RANDOM URINE [id number] 09:27AM Ladarius Smith Order Number: [id number]     Test Name Result Flag Reference   CREATININE, URINE 90 1 mg/dL  Not Estab     EPINEPHRINE, RAND UR 3 ug/L  Undefined   NOREPINEPH, RAND UR 31 ug/L  Undefined   NOREPINEPHRINE 34 ug/g Creat  0 - 111   DOPAMINE, RAND  ug/L  Undefined   DOPAMINE 151 ug/g Creat  0 - 348   EPINEPHRINE URINE 3 ug/g Creat  0 - 19   Performed at:  [address]  [id number]  : Osito Javier MD, Phone:  [phone]

## 2018-01-15 NOTE — PLAN OF CARE
Problem: DISCHARGE PLANNING - CARE MANAGEMENT  Goal: Discharge to post-acute care or home with appropriate resources  INTERVENTIONS:  - Conduct assessment to determine patient/family and health care team treatment goals, and need for post-acute services based on payer coverage, community resources, and patient preferences, and barriers to discharge  - Address psychosocial, clinical, and financial barriers to discharge as identified in assessment in conjunction with the patient/family and health care team  - Arrange appropriate level of post-acute services according to patient's   needs and preference and payer coverage in collaboration with the physician and health care team  - Communicate with and update the patient/family, physician, and health care team regarding progress on the discharge plan  - Arrange appropriate transportation to post-acute venues  When medically clear will discharge home with family    Outcome: Progressing

## 2018-01-15 NOTE — SOCIAL WORK
Social Work        []Hide copied text  []Irineo for attribution information  CM met w/ pt to obtain info  Pt reported he resides w/ his wife only in a 1-story ranch house w/ 12 steps to basement and 2 steps to enter house  Pt's wife Rosanna Lim (c: [phone]) is primary contact  Pt reported he is independent at baseline w/ ambulating and performing his ADLS  Pt reported having DME in home consisting of hand rails in bath, unused cane, unused rolling walker, unused rollator, unused wheelchair, and unused portable commode  Pt reported no recent hx of [address] services  Pt reported no hx of i/p rehab placements  Pt reported his PCP is Dr Dellar Fabry through [facility] located in [facility]  Pt reported he uses [address] located in [facility] for his Rx needs  Pt reported he is retired and receives a pension along w/ SS benefits as his sources of income  Pt is enrolled in Medicare for healthcare coverage and has supplemental insurance through [facility]) for secondary coverage and Rx benefits  Pt reported he does not have a legally pre-designated medical POA appointed for himself  Pt reported his wife can provide transportation for him at time of d/c      CM reviewed d/c planning process including the following: identifying help at home, patient preference for d/c planning needs, Discharge Lounge, [facility] Meds to Bed program, availability of treatment team to discuss questions or concerns patient and/or family may have regarding understanding medications and recognizing signs and symptoms once discharged  CM also encouraged patient to follow up with all recommended appointments after discharge  Patient advised of importance for patient and family to participate in managing patients medical well being      CM will reassess pt for d/c needs once recommendations for his aftercare are made by the tx team  CM to follow

## 2018-01-15 NOTE — CASE MANAGEMENT
Initial Clinical Review    Admission: Date/Time/Statement: 1/14/18 @ 0657     Orders Placed This Encounter   Procedures    Inpatient Admission (expected length of stay for this patient is greater than two midnights)     Standing Status:   Standing     Number of Occurrences:   1     Order Specific Question:   Admitting Physician     Answer:   Eddie Fields     Order Specific Question:   Level of Care     Answer:   Med Surg [16]     Order Specific Question:   Estimated length of stay     Answer:   More than 2 Midnights     Order Specific Question:   Certification     Answer:   I certify that inpatient services are medically necessary for this patient for a duration of greater than two midnights  See H&P and MD Progress Notes for additional information about the patient's course of treatment  ED: Date/Time/Mode of Arrival:   ED Arrival Information     Expected Arrival Acuity Means of Arrival Escorted By Service Admission Type    - 1/14/2018 02:12 Urgent Walk-In Family Member Surgery-General Urgent    Arrival Complaint    Vomiting        Chief Complaint:   Chief Complaint   Patient presents with    Vomiting     patient states he had the flu earlier in the week and ever since he has not been eating and vomiting; seen at Urgent care yesterday and given Zofran that is no longer working     History of Illness: Jessica Nicole  is a 74 y o  male who presents with 4 days of intractable nausea and vomiting  His last BM was 2 days ago after taking a laxitive, but he still feels a little bloated from noraml  He denies any abdominal pain at any time, just intractable nausea  No fever/chills  He had these symptoms 7-8 yrs ago when he was treated conservatively with NGT decompression  1 year later his symptoms recurred and he underwent operative resection (he is unsure if it was SB or colon)  He is unsure but at some point it was mentioned the etiology may have been Crohns (?)  His last C-scope was > 10 yrs ago  Surg hx:  Ex lap, bowel resection (approx 6 years ago)  Lap radha (Dec 2017, [facility])     ED Vital Signs:   ED Triage Vitals   Temperature Pulse Respirations Blood Pressure SpO2   01/14/18 0221 01/14/18 0221 01/14/18 0221 01/14/18 0221 01/14/18 0221   98 7 °F (37 1 °C) 82 18 166/78 97 %      Temp Source Heart Rate Source Patient Position - Orthostatic VS BP Location FiO2 (%)   01/14/18 0221 01/14/18 0341 01/14/18 0341 01/14/18 0341 --   Oral Monitor Lying Right arm       Pain Score       01/14/18 0221       No Pain        Wt Readings from Last 1 Encounters:   01/14/18 80 3 kg (177 lb)     Vital Signs (abnormal):     01/15/18 0011  --   51  --  --  --  --  --   01/14/18 1415  --  70  21   192/78  100 %  --  --   01/14/18 1315  --  70  16   189/83  95 %  --  --   01/14/18 0800  --  70   25  150/64  97 %  --  --     Abnormal Labs:   1/14 1/15    Sodium 134 136    137      Potassium 2 6 2 6 2 9   Chloride 83 90 95   CO2 43 39 36   BUN 35 32 30   Glucose 143 101 87   Total Bilirubin 2 57     Magnesium   2 8     Diagnostic Test Results:   1/15  RBC 3 88 - 5 62 Million/uL 3 87     Hemoglobin 12 0 - 17 0 g/dL 11 9     Hematocrit 36 5 - 49 3 % 33 9       ED Treatment:   Medication Administration from 01/14/2018 0212 to 01/14/2018 1542    Date/Time Order Dose Route Action   01/14/2018 0308 sodium chloride 0 9 % bolus 1,000 mL 1,000 mL Intravenous New Bag   01/14/2018 0308 ondansetron (ZOFRAN) injection 4 mg 4 mg Intravenous Given   01/14/2018 0419 ondansetron (ZOFRAN) injection 4 mg 4 mg Intravenous Given   01/14/2018 0500 metoclopramide (REGLAN) injection 10 mg 10 mg Intravenous Given   01/14/2018 0731 potassium chloride 20 mEq IVPB (premix) 20 mEq Intravenous New Bag   01/14/2018 0508 potassium chloride 20 mEq IVPB (premix) 20 mEq Intravenous New Bag   01/14/2018 0529 iohexol (OMNIPAQUE) 350 MG/ML injection (MULTI-DOSE) 100 mL 100 mL Intravenous Given   01/14/2018 1349 heparin (porcine) subcutaneous injection 5,000 Units 5,000 Units Subcutaneous Given   01/14/2018 1349 pantoprazole (PROTONIX) injection 40 mg 40 mg Intravenous Given   01/14/2018 1448 phenol (CHLORASEPTIC) 1 4 % mucosal liquid 1 spray 1 spray Mouth/Throat Given   01/14/2018 1248 phenol (CHLORASEPTIC) 1 4 % mucosal liquid 1 spray 1 spray Mouth/Throat Given   01/14/2018 0958 lactated ringers infusion 125 mL/hr Intravenous New Bag        Past Medical/Surgical History:    Active Ambulatory Problems     Diagnosis Date Noted    Cholecystitis with cholelithiasis 10/19/2017    Total bilirubin, elevated 10/19/2017    70% stenosis of right ICA 10/19/2017     Resolved Ambulatory Problems     Diagnosis Date Noted    Hypertensive emergency 10/19/2017    Chest pain 10/19/2017    Abdominal pain 10/19/2017    Leukocytosis 10/19/2017     Past Medical History:   Diagnosis Date    Cholecystitis     Coronary artery disease     Hypertension     Spinal stenosis      Admitting Diagnosis: Dehydration [E86 0]  Hypokalemia [E87 6]  Vomiting [R11 10]  Small bowel obstruction [K56 609]  Nausea & vomiting [R11 2]    Age/Sex: 74 y o  male    Assessment/Plan:     75 yo M w/ SBO with TP in RLQ likely secondary to adhesive disease      Plan:  NPO w/ NGT (placed in ED- 1600 cc bilious fluid immediately)  NS @ 125  Monitor abdominal exam  Low suspicion for ischemic disease given completely non-tender exam  Conservative measures at this time- if fails to improve may have to consider surgical intervention     Admission Orders:  Scheduled Meds:   heparin (porcine) 5,000 Units Subcutaneous Q8H [facility] HOME   metoprolol 5 mg Intravenous Q6H   pantoprazole 40 mg Intravenous Q24H [facility]   potassium chloride 20 mEq Intravenous Q2H     Continuous Infusions:   dextrose 5 % and sodium chloride 0 45 % with KCl 20 mEq/L 125 mL/hr Last Rate: 125 mL/hr (01/15/18 1032)     PRN Meds: labetalol    phenol  x2    Diet NPO   NGT to LCWS   OOP as mariza   SCDs   _______________________________  1/15 Surgery Progress Note  75 yo M w/ SBO     - NGT 1952  - K 2 6     Plan:  NPO/NGT to continuous low suction  OOB ambulation- very important  Lopressor 5 q6 scheduled while NPO/NGT (home meds held)  Will use hydralazine PRN if needed given low HR  Severe hypoK- repleted overnight, f/u AM value, likely needs further repletion  SQH DVT ppx

## 2018-01-15 NOTE — PROGRESS NOTES
Progress Note - General Surgery   Quentin Guzman  74 y o  male MRN: [id number]  [facility] Encounter: [id number]    Assessment:  75 yo M w/ SBO    - NGT 1950  - K 2 6    Plan:  NPO/NGT to continuous low suction  OOB ambulation- very important  Lopressor 5 q6 scheduled while NPO/NGT (home meds held)  Will use hydralazine PRN if needed given low HR  Severe hypoK- repleted overnight, f/u AM value, likely needs further repletion  SQH DVT ppx        Subjective/Objective   Chief Complaint: None    Subjective: No complaints, feels well  + flatus (more than prior)  No BM  Does not feel bloated    Objective:     Blood pressure 161/77, pulse (!) 51, temperature 98 °F (36 7 °C), temperature source Oral, resp  rate 18, height 5' 10" (1 778 m), weight 80 3 kg (177 lb), SpO2 96 %  ,Body mass index is 25 4 kg/m²  Intake/Output Summary (Last 24 hours) at 01/15/18 0649  Last data filed at 01/15/18 0410   Gross per 24 hour   Intake          2328 75 ml   Output             2250 ml   Net            78 75 ml       Invasive Devices     Peripheral Intravenous Line            Peripheral IV 01/14/18 Right Antecubital 1 day          Drain            NG/OG/Enteral Tube Nasogastric 16 Fr Right nares -- days                Physical Exam:   Gen: A&O, NAD  NGT- bilious  Cardio: RRR  Lungs: CTAB  Abd: Soft, non distended, mildly tympanic   Non tender      Lab, Imaging and other studies:  CBC: No results found for: WBC, HGB, HCT, MCV, PLT, ADJUSTEDWBC, MCH, MCHC, RDW, MPV, NRBC, CMP:   Lab Results   Component Value Date     01/14/2018    K 2 6 (LL) 01/14/2018    CL 90 (L) 01/14/2018    CO2 39 (H) 01/14/2018    ANIONGAP 7 01/14/2018    BUN 32 (H) 01/14/2018    CREATININE 0 84 01/14/2018    GLUCOSE 101 01/14/2018    CALCIUM 8 7 01/14/2018    EGFR 86 01/14/2018     VTE Pharmacologic Prophylaxis: Heparin  VTE Mechanical Prophylaxis: sequential compression device

## 2018-01-15 NOTE — PLAN OF CARE
Nutrition/Hydration-ADULT     Nutrient/Hydration intake appropriate for improving, restoring or maintaining nutritional needs Progressing        PAIN - ADULT     Verbalizes/displays adequate comfort level or baseline comfort level Progressing

## 2018-01-16 ENCOUNTER — APPOINTMENT (INPATIENT)
Dept: RADIOLOGY | Facility: HOSPITAL | Age: 73
DRG: 390 | End: 2018-01-16
Payer: MEDICARE

## 2018-01-16 LAB
ANION GAP SERPL CALCULATED.3IONS-SCNC: 4 MMOL/L (ref 4–13)
BASOPHILS # BLD AUTO: 0.01 THOUSANDS/ΜL (ref 0–0.1)
BASOPHILS NFR BLD AUTO: 0 % (ref 0–1)
BUN SERPL-MCNC: 16 MG/DL (ref 5–25)
CALCIUM SERPL-MCNC: 8.3 MG/DL (ref 8.3–10.1)
CHLORIDE SERPL-SCNC: 100 MMOL/L (ref 100–108)
CO2 SERPL-SCNC: 32 MMOL/L (ref 21–32)
CREAT SERPL-MCNC: 0.63 MG/DL (ref 0.6–1.3)
EOSINOPHIL # BLD AUTO: 0.09 THOUSAND/ΜL (ref 0–0.61)
EOSINOPHIL NFR BLD AUTO: 1 % (ref 0–6)
ERYTHROCYTE [DISTWIDTH] IN BLOOD BY AUTOMATED COUNT: 13.3 % (ref 11.6–15.1)
GFR SERPL CREATININE-BSD FRML MDRD: 97 ML/MIN/1.73SQ M
GLUCOSE SERPL-MCNC: 116 MG/DL (ref 65–140)
HCT VFR BLD AUTO: 34.2 % (ref 36.5–49.3)
HGB BLD-MCNC: 12.1 G/DL (ref 12–17)
LYMPHOCYTES # BLD AUTO: 0.93 THOUSANDS/ΜL (ref 0.6–4.47)
LYMPHOCYTES NFR BLD AUTO: 12 % (ref 14–44)
MCH RBC QN AUTO: 30.5 PG (ref 26.8–34.3)
MCHC RBC AUTO-ENTMCNC: 35.4 G/DL (ref 31.4–37.4)
MCV RBC AUTO: 86 FL (ref 82–98)
MONOCYTES # BLD AUTO: 0.81 THOUSAND/ΜL (ref 0.17–1.22)
MONOCYTES NFR BLD AUTO: 10 % (ref 4–12)
NEUTROPHILS # BLD AUTO: 6.18 THOUSANDS/ΜL (ref 1.85–7.62)
NEUTS SEG NFR BLD AUTO: 77 % (ref 43–75)
NRBC BLD AUTO-RTO: 0 /100 WBCS
PLATELET # BLD AUTO: 177 THOUSANDS/UL (ref 149–390)
PMV BLD AUTO: 10.5 FL (ref 8.9–12.7)
POTASSIUM SERPL-SCNC: 3.4 MMOL/L (ref 3.5–5.3)
RBC # BLD AUTO: 3.97 MILLION/UL (ref 3.88–5.62)
SODIUM SERPL-SCNC: 136 MMOL/L (ref 136–145)
WBC # BLD AUTO: 8.04 THOUSAND/UL (ref 4.31–10.16)

## 2018-01-16 PROCEDURE — 85025 COMPLETE CBC W/AUTO DIFF WBC: CPT | Performed by: SURGERY

## 2018-01-16 PROCEDURE — 74022 RADEX COMPL AQT ABD SERIES: CPT

## 2018-01-16 PROCEDURE — 80048 BASIC METABOLIC PNL TOTAL CA: CPT | Performed by: SURGERY

## 2018-01-16 RX ORDER — POTASSIUM CHLORIDE 14.9 MG/ML
20 INJECTION INTRAVENOUS
Status: DISCONTINUED | OUTPATIENT
Start: 2018-01-16 | End: 2018-01-16

## 2018-01-16 RX ORDER — HYDRALAZINE HYDROCHLORIDE 20 MG/ML
5 INJECTION INTRAMUSCULAR; INTRAVENOUS EVERY 6 HOURS PRN
Status: DISCONTINUED | OUTPATIENT
Start: 2018-01-16 | End: 2018-01-18 | Stop reason: HOSPADM

## 2018-01-16 RX ORDER — POTASSIUM CHLORIDE 14.9 MG/ML
20 INJECTION INTRAVENOUS
Status: COMPLETED | OUTPATIENT
Start: 2018-01-16 | End: 2018-01-16

## 2018-01-16 RX ADMIN — HEPARIN SODIUM 5000 UNITS: 5000 INJECTION, SOLUTION INTRAVENOUS; SUBCUTANEOUS at 05:41

## 2018-01-16 RX ADMIN — POTASSIUM CHLORIDE 20 MEQ: 200 INJECTION, SOLUTION INTRAVENOUS at 06:07

## 2018-01-16 RX ADMIN — HYDRALAZINE HYDROCHLORIDE 5 MG: 20 INJECTION INTRAMUSCULAR; INTRAVENOUS at 07:46

## 2018-01-16 RX ADMIN — METOPROLOL TARTRATE 5 MG: 1 INJECTION, SOLUTION INTRAVENOUS at 16:51

## 2018-01-16 RX ADMIN — POTASSIUM CHLORIDE 20 MEQ: 200 INJECTION, SOLUTION INTRAVENOUS at 08:21

## 2018-01-16 RX ADMIN — METOPROLOL TARTRATE 5 MG: 1 INJECTION, SOLUTION INTRAVENOUS at 05:41

## 2018-01-16 RX ADMIN — HEPARIN SODIUM 5000 UNITS: 5000 INJECTION, SOLUTION INTRAVENOUS; SUBCUTANEOUS at 23:00

## 2018-01-16 RX ADMIN — DEXTROSE, SODIUM CHLORIDE, AND POTASSIUM CHLORIDE 125 ML/HR: 5; .45; .15 INJECTION INTRAVENOUS at 02:24

## 2018-01-16 RX ADMIN — POTASSIUM CHLORIDE 20 MEQ: 200 INJECTION, SOLUTION INTRAVENOUS at 10:30

## 2018-01-16 RX ADMIN — METOPROLOL TARTRATE 5 MG: 1 INJECTION, SOLUTION INTRAVENOUS at 23:08

## 2018-01-16 RX ADMIN — HEPARIN SODIUM 5000 UNITS: 5000 INJECTION, SOLUTION INTRAVENOUS; SUBCUTANEOUS at 14:11

## 2018-01-16 RX ADMIN — DEXTROSE, SODIUM CHLORIDE, AND POTASSIUM CHLORIDE 125 ML/HR: 5; .45; .15 INJECTION INTRAVENOUS at 10:29

## 2018-01-16 RX ADMIN — METOPROLOL TARTRATE 5 MG: 1 INJECTION, SOLUTION INTRAVENOUS at 11:58

## 2018-01-16 RX ADMIN — DEXTROSE, SODIUM CHLORIDE, AND POTASSIUM CHLORIDE 125 ML/HR: 5; .45; .15 INJECTION INTRAVENOUS at 18:44

## 2018-01-16 NOTE — PLAN OF CARE

## 2018-01-16 NOTE — PROGRESS NOTES
Progress Note - General Surgery   Andres Choi  74 y o  male MRN: [id number]  Unit/Bed#: [facility] 819-01 Encounter: [id number]    Assessment:  73 yo M w/ recurrent SBO likely 2/2 adhesions    -     Plan:  Clamp trial today for 4 hours  OOB, ambulation- okay to have SCDs off when ambulating  K+ 3 4 today (2 9 yesterday)- give 60 meQ KCL  Hold home meds while NPO/NGT, will restart if tube removed  Lopressor Q6, hydralazine PRN for BP  SQH DVT ppx    Subjective/Objective   Chief Complaint: None    Subjective: No complaints, feels very well  Wondering when the NGT will come out  Passing lots of flatus, no BM  Denies abd pain    Objective:     Blood pressure (!) 186/78, pulse 56, temperature 98 7 °F (37 1 °C), temperature source Oral, resp  rate 18, height 5' 10" (1 778 m), weight 80 3 kg (177 lb), SpO2 93 %  ,Body mass index is 25 4 kg/m²        Intake/Output Summary (Last 24 hours) at 01/16/18 0547  Last data filed at 01/16/18 0300   Gross per 24 hour   Intake          2766 67 ml   Output             1525 ml   Net          1241 67 ml       Invasive Devices     Peripheral Intravenous Line            Peripheral IV 01/14/18 Right Antecubital 2 days          Drain            NG/OG/Enteral Tube Nasogastric 16 Fr Right nares -- days                Physical Exam:   Gen: A&O, NAD  Cardio: RRR  Lungs: CTAB  Abd: Soft, non distended, non tender      Lab, Imaging and other studies:  CBC:   Lab Results   Component Value Date    WBC 8 04 01/16/2018    HGB 12 1 01/16/2018    HCT 34 2 (L) 01/16/2018    MCV 86 01/16/2018     01/16/2018    MCH 30 5 01/16/2018    MCHC 35 4 01/16/2018    RDW 13 3 01/16/2018    MPV 10 5 01/16/2018    NRBC 0 01/16/2018   , CMP:   Lab Results   Component Value Date     01/16/2018    K 3 4 (L) 01/16/2018     01/16/2018    CO2 32 01/16/2018    ANIONGAP 4 01/16/2018    BUN 16 01/16/2018    CREATININE 0 63 01/16/2018    GLUCOSE 116 01/16/2018    CALCIUM 8 3 01/16/2018    EGFR 97 01/16/2018     VTE Pharmacologic Prophylaxis: Heparin  VTE Mechanical Prophylaxis: sequential compression device

## 2018-01-16 NOTE — PROGRESS NOTES
Patient resting comfortably in bed  Alert and oriented x4  No complaints of pain or shortness of breath  Lung sounds and heart sounds within normal limits  BL radial and pedal pulses +2  R nare NG tube measuring at 33cm  IV intact and infusing  Call bell within reach

## 2018-01-16 NOTE — MISCELLANEOUS
Assessment   1  Former smoker (V15 82) (D83 518)  2  Exercise counseling (V65 41) (Z71 82)  3  Accelerated hypertension (401 0) (I10)  4  Ankylosing spondylitis (720 0) (M45 9)  5  Cholecystitis with cholelithiasis (574 10) (K80 10)  6  Asymptomatic carotid artery stenosis (433 10) (I65 29)    Plan  Accelerated hypertension    · Eat a low fat and low cholesterol diet ; Status:Complete;   Done: [id number]  Ordered; For:Accelerated hypertension; Ordered By:Pop Macedo;    Discussion/Summary  Discussion Summary:   Patient BP more stable on present Rx  Increase fluids  He can monitor BP at home  Has surgery appt next week   Recent labs stable  increase fluids, bland diet  he will call after sx appt for followup  Medication SE Review and Pt Understands Tx: Possible side effects of new medications were reviewed with the patient/guardian today  The treatment plan was reviewed with the patient/guardian  The patient/guardian understands and agrees with the treatment plan   Self Referrals:   Self Referrals: No      Chief Complaint  Chief Complaint Free Text Note Form: Pt presents for [facility] f/up exam  Pt states he is feeling well since he is home  Denies headaches or SOB since he is home  No refills needed today  Appetite is normal per patient  Flu vaccine refused today  History of Present Illness  TCM Communication [facility]: The patient is being contacted for follow-up after hospitalization  Hospital records were reviewed  He was hospitalized at [facility]  The dates of hospitalization:, October 24, 2017 thru October 27, 2017  Diagnosis: hypertension, cholelithiasis with possible cholecystitis  He was discharged to home  Medications reviewed and updated today  He scheduled a follow up appointment  Follow-up appointments with other specialists: cardiology, gastroenterologist  The patient is currently asymptomatic     Communication performed and completed by   HPI: Went to ER for indigestion and was admitted for accelerated hypertension  He was admitted and neds adjusted  Also found to have chokeycystitus but sx held due to bp  He has followup w sx next week  His bp trend at home has been stable and pt asxs  No cp  Some upper abdominall discomfort  Review of Systems  Complete-Male:   Constitutional: no fever, no chills and not feeling tired  Eyes: No complaints of eye pain, no red eyes, no discharge from eyes, no itchy eyes  ENT: no complaints of earache, no hearing loss, no nosebleeds, no nasal discharge, no sore throat, no hoarseness  Cardiovascular: No complaints of slow heart rate, no fast heart rate, no chest pain, no palpitations, no leg claudication, no lower extremity  Respiratory: No complaints of shortness of breath, no wheezing, no cough, no SOB on exertion, no orthopnea or PND  Gastrointestinal: abdominal pain, but as noted in HPI  Genitourinary: No complaints of dysuria, no incontinence, no hesitancy, no nocturia, no genital lesion, no testicular pain  Musculoskeletal: arthralgias and myalgias  Integumentary: No complaints of skin rash or skin lesions, no itching, no skin wound, no dry skin  Neurological: No compliants of headache, no confusion, no convulsions, no numbness or tingling, no dizziness or fainting, no limb weakness, no difficulty walking  Psychiatric: Is not suicidal, no sleep disturbances, no anxiety or depression, no change in personality, no emotional problems  Endocrine: No complaints of proptosis, no hot flashes, no muscle weakness, no erectile dysfunction, no deepening of the voice, no feelings of weakness  Hematologic/Lymphatic: No complaints of swollen glands, no swollen glands in the neck, does not bleed easily, no easy bruising  Active Problems   1  Anemia (285 9) (D64 9)  2  Ankylosing spondylitis (720 0) (M45 9)  3  Asymptomatic carotid artery stenosis (433 10) (I65 29)  4   Degenerative joint disease of right lower extremity (715 98) (M19 90)  5  Encounter for prostate cancer screening (V76 44) (Z12 5)  6  Esophageal reflux (530 81) (K21 9)  7  Hypertension (401 9) (I10)  8  Osteopenia (733 90) (M85 80)    Surgical History   1  History of Diagnostic Esophagogastroduodenoscopy  2  History of Small Bowel Resection  Surgical History Reviewed: The surgical history was reviewed and updated today  Family History  Mother   1  Family history of Arthritis (716 90) (M19 90)  Father   2  Family history of Heart attack (410 90) (I21 9)  Family History   3  Family history of Coronary Artery Disease (V17 49)  4  Family history of Diabetes Mellitus (V18 0)  Family History Reviewed: The family history was reviewed and updated today  Social History     · Caffeine Use   · Dental care, regularly   · Feels safe at home   · Lives independently with spouse   · No illicit drug use   · Sun Protection   · Uses Safety Equipment - Seatbelts  Social History Reviewed: The social history was reviewed and updated today  The social history was reviewed and is unchanged  Never a smoker             Current Meds  1  Aspirin 325 MG Oral Tablet; Therapy: (Recorded:90Awc1811) to Recorded  2  Enalapril Maleate 5 MG Oral Tablet; take one tablet by mouth twice daily; Therapy: 49NRP9341 to (Mckenzie Pear)  Requested for: 02LGW9634; Last   Rx:12Oct2017 Ordered  3  Klor-Con M10 10 MEQ Oral Tablet Extended Release; TAKE ONE TABLET BY MOUTH   ONCE DAILY; Therapy: 02Div0015 to (Mckenzie Pear)  Requested for: 96JVO0746; Last   Rx:53Jto8095 Ordered  4  Metoprolol Succinate ER 50 MG Oral Tablet Extended Release 24 Hour; TAKE 1 TABLET   DAILY; Therapy: 63IAA2117 to (Evaluate:78Cbp8326)  Requested for: 10Dcy7447; Last   Rx:77Lgz7942 Ordered  5  Omeprazole 20 MG Oral Capsule Delayed Release; TAKE 1 CAPSULE DAILY    Requested for: 05AQL7593; Last IS:13ZQW2733 Ordered  6  Vitamin D TABS; Therapy: (Recorded:24Nye2603) to Recorded  Medication List Reviewed:    The medication list was reviewed and updated today  Allergies   1  No Known Drug Allergies   2  No Known Environmental Allergies    Vitals  Signs   Recorded: 50AQG0079 86:32XN   Systolic: 823  Diastolic: 72  Recorded: 85EFZ8415 12:30PM   Temperature: 94 4 F, Tympanic  Heart Rate: 53  Height: 5 ft 9 5 in  Weight: 185 lb 6 oz  BMI Calculated: 26 98  BSA Calculated: 2 01  O2 Saturation: 99, RA  Pain Scale: 0    Physical Exam    Constitutional   General appearance: No acute distress, well appearing and well nourished  comfortable appearing  Eyes   Conjunctiva and lids: No swelling, erythema, or discharge  Pupils and irises: Equal, round and reactive to light  Ears, Nose, Mouth, and Throat   External inspection of ears and nose: Normal     Otoscopic examination: Tympanic membrance translucent with normal light reflex  Canals patent without erythema  Nasal mucosa, septum, and turbinates: Normal without edema or erythema  Oropharynx: Normal with no erythema, edema, exudate or lesions  Pulmonary   Respiratory effort: No increased work of breathing or signs of respiratory distress  Auscultation of lungs: Clear to auscultation, equal breath sounds bilaterally, no wheezes, no rales, no rhonci  Cardiovascular   Palpation of heart: Normal PMI, no thrills  Auscultation of heart: Normal rate and rhythm, normal S1 and S2, without murmurs  Examination of extremities for edema and/or varicosities: Normal     Carotid pulses: Normal     Abdomen   Abdomen: Abnormal   mild ruq ttp  Liver and spleen: No hepatomegaly or splenomegaly  Lymphatic   Palpation of lymph nodes in neck: No lymphadenopathy  Musculoskeletal   Gait and station: Normal     Digits and nails: Normal without clubbing or cyanosis  Inspection/palpation of joints, bones, and muscles: Abnormal   soinal deformity  Skin   Skin and subcutaneous tissue: Normal without rashes or lesions      Neurologic   Cranial nerves: Cranial nerves 2-12 intact  Reflexes: 2+ and symmetric  Sensation: No sensory loss  Psychiatric   Orientation to person, place and time: Normal     Mood and affect: Normal          Health Management  Health Maintenance   Medicare Annual Wellness Visit; every 1 year; Last 44ODR6952; Next Due: 93RVF0776;  Active    Future Appointments    Date/Time Provider Specialty Site   11/02/2017 10:15 AM General Surgery, Schedule  [facility]     Signatures   Electronically signed by : Betty Morrow, ; Oct 25 2017  9:44AM EST                       (Co-author)    Electronically signed by : Russ Mcnamara DO; Oct 28 2017  8:50AM EST                       (Author)    Electronically signed by : Russ Mcnamara DO; Oct 28 2017  8:50AM EST                       (Author)

## 2018-01-16 NOTE — RESULT NOTES
Discussion/Summary   No evidence of pheochromocytoma    The testing is normal      Verified Results  (1) METANEPHRINE, FRACTIONED, URINE, 24 HOUR 21DLD8126 09:27AM Yana Kramer Order Number: [id number]     Test Name Result Flag Reference   METANEPHRINES TOTAL 24 HOUR URINE 172 ug/24 hr  45 - 290   (Hypertensive) >17 years 11 months:     35 -  460   METANEPHRINES URINE 80 ug/L  Undefined   NORMETANEPHRINE URINE 205 ug/L  Undefined   Total Volume: 2150 mL   NORMETANEPHRINE 24 HOUR URINE 441 ug/24 hr  82 - 500   (Hypertensive) >17 years 11 months:    110 - 1050    Performed at:  [address]  [id number]  : Marty Majano MD, Phone:  [phone]

## 2018-01-17 LAB
ANION GAP SERPL CALCULATED.3IONS-SCNC: 6 MMOL/L (ref 4–13)
BUN SERPL-MCNC: 9 MG/DL (ref 5–25)
CALCIUM SERPL-MCNC: 8.8 MG/DL (ref 8.3–10.1)
CHLORIDE SERPL-SCNC: 101 MMOL/L (ref 100–108)
CO2 SERPL-SCNC: 31 MMOL/L (ref 21–32)
CREAT SERPL-MCNC: 0.63 MG/DL (ref 0.6–1.3)
GFR SERPL CREATININE-BSD FRML MDRD: 97 ML/MIN/1.73SQ M
GLUCOSE SERPL-MCNC: 112 MG/DL (ref 65–140)
POTASSIUM SERPL-SCNC: 3.7 MMOL/L (ref 3.5–5.3)
SODIUM SERPL-SCNC: 138 MMOL/L (ref 136–145)

## 2018-01-17 PROCEDURE — 80048 BASIC METABOLIC PNL TOTAL CA: CPT | Performed by: SURGERY

## 2018-01-17 RX ORDER — ACETAMINOPHEN 325 MG/1
650 TABLET ORAL EVERY 4 HOURS PRN
Status: DISCONTINUED | OUTPATIENT
Start: 2018-01-17 | End: 2018-01-18 | Stop reason: HOSPADM

## 2018-01-17 RX ADMIN — METOPROLOL TARTRATE 5 MG: 1 INJECTION, SOLUTION INTRAVENOUS at 22:35

## 2018-01-17 RX ADMIN — METOPROLOL TARTRATE 5 MG: 1 INJECTION, SOLUTION INTRAVENOUS at 18:17

## 2018-01-17 RX ADMIN — METOPROLOL TARTRATE 5 MG: 1 INJECTION, SOLUTION INTRAVENOUS at 11:05

## 2018-01-17 RX ADMIN — HEPARIN SODIUM 5000 UNITS: 5000 INJECTION, SOLUTION INTRAVENOUS; SUBCUTANEOUS at 13:48

## 2018-01-17 RX ADMIN — METOPROLOL TARTRATE 5 MG: 1 INJECTION, SOLUTION INTRAVENOUS at 06:09

## 2018-01-17 RX ADMIN — HEPARIN SODIUM 5000 UNITS: 5000 INJECTION, SOLUTION INTRAVENOUS; SUBCUTANEOUS at 06:09

## 2018-01-17 RX ADMIN — Medication 1 SPRAY: at 10:58

## 2018-01-17 RX ADMIN — DEXTROSE, SODIUM CHLORIDE, AND POTASSIUM CHLORIDE 125 ML/HR: 5; .45; .15 INJECTION INTRAVENOUS at 10:58

## 2018-01-17 RX ADMIN — HEPARIN SODIUM 5000 UNITS: 5000 INJECTION, SOLUTION INTRAVENOUS; SUBCUTANEOUS at 22:35

## 2018-01-17 RX ADMIN — DEXTROSE, SODIUM CHLORIDE, AND POTASSIUM CHLORIDE 125 ML/HR: 5; .45; .15 INJECTION INTRAVENOUS at 02:50

## 2018-01-17 NOTE — PLAN OF CARE
DISCHARGE PLANNING - CARE MANAGEMENT     Discharge to post-acute care or home with appropriate resources Progressing        GASTROINTESTINAL - ADULT     Minimal or absence of nausea and/or vomiting Progressing     Maintains or returns to baseline bowel function Progressing     Maintains adequate nutritional intake Progressing        METABOLIC, FLUID AND ELECTROLYTES - ADULT     Electrolytes maintained within normal limits Progressing     Fluid balance maintained Progressing        Nutrition/Hydration-ADULT     Nutrient/Hydration intake appropriate for improving, restoring or maintaining nutritional needs Progressing        PAIN - ADULT     Verbalizes/displays adequate comfort level or baseline comfort level Progressing

## 2018-01-17 NOTE — PROGRESS NOTES
Progress Note - General Surgery   Quentin Guzman  74 y o  male MRN: [id number]  Unit/Bed#: [facility] 819-01 Encounter: [id number]    Assessment:  75 yo M w/ SBO secondary to adhesive disease    -   - Obs series showing improved/resolving SBO    Plan:  Monitor NGT through the AM - has put out only 50 in past 3 hours  Hopefully attempt clamp trial in PM  Continue OOB, ambulation  F/U AM BMP, replete electrolytes as necessary    Subjective/Objective   Chief Complaint: None    Subjective: No complaints- feels well and wants NGT out  + flatus, no BM    Objective:     Blood pressure 162/68, pulse 58, temperature 98 1 °F (36 7 °C), temperature source Oral, resp  rate 17, height 5' 10" (1 778 m), weight 80 3 kg (177 lb), SpO2 93 %  ,Body mass index is 25 4 kg/m²        Intake/Output Summary (Last 24 hours) at 01/17/18 0615  Last data filed at 01/17/18 0300   Gross per 24 hour   Intake           3397 1 ml   Output             3200 ml   Net            197 1 ml       Invasive Devices     Peripheral Intravenous Line            Peripheral IV 01/14/18 Right Antecubital 3 days    Peripheral IV 01/16/18 Left Forearm less than 1 day          Drain            NG/OG/Enteral Tube Nasogastric 16 Fr Right nares -- days                Physical Exam:   Gen: A&O, NAD  HEENT- NGT bilious  Cardio: RRR  Lungs: CTAB  Abd: Soft, non distended, non tender      Lab, Imaging and other studies:CBC: No results found for: WBC, HGB, HCT, MCV, PLT, ADJUSTEDWBC, MCH, MCHC, RDW, MPV, NRBC, CMP: No results found for: NA, K, CL, CO2, ANIONGAP, BUN, CREATININE, GLUCOSE, CALCIUM, AST, ALT, ALKPHOS, PROT, ALBUMIN, BILITOT, EGFR  VTE Pharmacologic Prophylaxis: Heparin  VTE Mechanical Prophylaxis: sequential compression device

## 2018-01-18 VITALS
TEMPERATURE: 98 F | HEART RATE: 56 BPM | BODY MASS INDEX: 25.34 KG/M2 | OXYGEN SATURATION: 95 % | RESPIRATION RATE: 16 BRPM | HEIGHT: 70 IN | SYSTOLIC BLOOD PRESSURE: 162 MMHG | DIASTOLIC BLOOD PRESSURE: 64 MMHG | WEIGHT: 177 LBS

## 2018-01-18 RX ADMIN — METOPROLOL TARTRATE 5 MG: 1 INJECTION, SOLUTION INTRAVENOUS at 10:30

## 2018-01-18 RX ADMIN — HEPARIN SODIUM 5000 UNITS: 5000 INJECTION, SOLUTION INTRAVENOUS; SUBCUTANEOUS at 05:13

## 2018-01-18 RX ADMIN — DEXTROSE, SODIUM CHLORIDE, AND POTASSIUM CHLORIDE 50 ML/HR: 5; .45; .15 INJECTION INTRAVENOUS at 04:19

## 2018-01-18 RX ADMIN — METOPROLOL TARTRATE 5 MG: 1 INJECTION, SOLUTION INTRAVENOUS at 05:13

## 2018-01-18 NOTE — DISCHARGE SUMMARY
Discharge Summary - General Surgery   Gayle Hernández  74 y o  male MRN: [id number]  Unit/Bed#: [facility] 189-61 Encounter: [id number]    Admission Date: 1/14/2018     Discharge Date: 1/18/18    Admitting Diagnosis: Dehydration [E86 0]  Hypokalemia [E87 6]  Vomiting [R11 10]  Small bowel obstruction [K56 609]  Nausea & vomiting [R11 2]    Discharge Diagnosis: Same as above      Attending: Dr Matthew Swain Physician(s): None     Procedures Performed: No orders of the defined types were placed in this encounter  Pathology: N/A    Hospital Course: Gayle Hernández  is a 74 y o  male who presented on 1/14/18 with 4 days of intractable nausea and vomiting  His last BM was 2 days prior to arrival after taking a laxitive, but he still felt bloated  He denied any abdominal pain, fevers or chills at any time his major complaint was intractable nausea  The patient said had these symptoms roughly 7-8 yrs ago at which time he was treated conservatively with NGT decompression  1 year later his symptoms recurred and he underwent operative resection (he is unsure if it was SB or colon)  At some point he mentioned the possible etiology of his previous bowel obstruction being due to Crohns although unlikely as this was a one time occurance and he has never been treated for Crohns disease since that one episode  His last C-scope was > 10 yrs ago  While in the emergency department a NG tube was placed and 1 6 L of bilious fluid was immediately evacuated from the patient's stomach  He was made NPO and was resuscitated with IV fluids  The  Team perform serial abdominal exams on the patient and continued with conservative management  He had an obstruction series preformed on 1/16/18 which showed a resolving small bowel obstruction  On 1/17/18 the patient felt much better, still denied abdominal pain and his nausea / vomiting had resolved   A clamp trial was preformed which he passed therefore the NG tube was pulled and he was started on a clear liquid diet  By the morning of 1/18/18 the patient was having normal bowel function  He was started on a regular diet which he was tolerating without nausea or vomiting  He was deemed stable for discharge  His disposition is to his home where he resides with his wife  He is to follow up with [facility] if needed  Condition at Discharge: stable     Discharge instructions/Information to patient and family:   See after visit summary for information provided to patient and family  Provisions for Follow-Up Care:  See after visit summary for information related to follow-up care and any pertinent home health orders  Disposition: Home    Planned Readmission: No     Discharge Statement   I spent 30 minutes discharging the patient  This time was spent on the day of discharge  I had direct contact with the patient on the day of discharge  Additional documentation is required if more than 30 minutes were spent on discharge  Discharge Medications:  See after visit summary for reconciled discharge medications provided to patient and family

## 2018-01-18 NOTE — DISCHARGE INSTRUCTIONS
Please call the office to schedule an appointment if you start to experience abdominal pain  Take tylenol as needed for pain

## 2018-01-18 NOTE — PROGRESS NOTES
Progress Note - General Surgery   Michael Bejarano  74 y o  male MRN: [id number]  Unit/Bed#: [facility] 819-01 Encounter: [id number]    Assessment:  73 yo M w/ recurrent SBO likely 2/2 adhesions    Plan:  - advance diet today  - OOB, ambulate  - IVF resuscitation  - replete lytes PRN  - DVT ppx -- SQH    Subjective/Objective     Subjective: Patient feels well  Hungry today  Passing gas, no BM  Denies pain at this time  Objective:     Vitals: Blood pressure 162/75, pulse 72, temperature 97 9 °F (36 6 °C), temperature source Oral, resp  rate 16, height 5' 10" (1 778 m), weight 80 3 kg (177 lb), SpO2 93 %  ,Body mass index is 25 4 kg/m²  I/O       01/16 0701 - 01/17 0700 01/17 0701 - 01/18 0700    P  O  0 0    I V  (mL/kg) 3047 9 (38) 2011 3 (25)    IV Piggyback 349 2     Total Intake(mL/kg) 3397 1 (42 3) 2011 3 (25)    Urine (mL/kg/hr) 2400 (1 2) 3325 (1 7)    Emesis/NG output 800 (0 4) 140 (0 1)    Stool 0 (0)     Total Output 3200 3465    Net +197 1 -1453 8          Unmeasured Stool Occurrence 0 x           Physical Exam:  GEN: NAD  HEENT: MMM  CV: RRR  Lung: Normal effort  Ab: Soft, NT/ND  Extrem: No CCE  Neuro:  A+Ox3    Lab, Imaging and other studies: CBC with diff: No results found for: WBC, HGB, HCT, MCV, PLT, ADJUSTEDWBC, MCH, MCHC, RDW, MPV, NRBC, BMP/CMP: No results found for: NA, K, CL, CO2, ANIONGAP, BUN, CREATININE, GLUCOSE, CALCIUM, AST, ALT, ALKPHOS, PROT, ALBUMIN, BILITOT, EGFR, Magnesium: No results found for: MAG  VTE Pharmacologic Prophylaxis: Heparin  VTE Mechanical Prophylaxis: sequential compression device

## 2018-01-19 ENCOUNTER — GENERIC CONVERSION - ENCOUNTER (OUTPATIENT)
Dept: OTHER | Facility: OTHER | Age: 73
End: 2018-01-19

## 2018-01-22 VITALS
HEIGHT: 70 IN | HEART RATE: 53 BPM | WEIGHT: 185.38 LBS | BODY MASS INDEX: 26.54 KG/M2 | SYSTOLIC BLOOD PRESSURE: 126 MMHG | DIASTOLIC BLOOD PRESSURE: 72 MMHG | TEMPERATURE: 94.4 F | OXYGEN SATURATION: 99 %

## 2018-01-22 VITALS
OXYGEN SATURATION: 96 % | TEMPERATURE: 95.2 F | HEIGHT: 70 IN | HEART RATE: 55 BPM | WEIGHT: 182.13 LBS | BODY MASS INDEX: 26.07 KG/M2

## 2018-01-22 VITALS
BODY MASS INDEX: 26.48 KG/M2 | DIASTOLIC BLOOD PRESSURE: 58 MMHG | WEIGHT: 185 LBS | SYSTOLIC BLOOD PRESSURE: 108 MMHG | SYSTOLIC BLOOD PRESSURE: 120 MMHG | HEIGHT: 70 IN | DIASTOLIC BLOOD PRESSURE: 70 MMHG

## 2018-01-23 ENCOUNTER — ALLSCRIPTS OFFICE VISIT (OUTPATIENT)
Dept: OTHER | Facility: OTHER | Age: 73
End: 2018-01-23

## 2018-01-23 VITALS
OXYGEN SATURATION: 98 % | TEMPERATURE: 97.4 F | DIASTOLIC BLOOD PRESSURE: 82 MMHG | RESPIRATION RATE: 18 BRPM | SYSTOLIC BLOOD PRESSURE: 145 MMHG | HEART RATE: 97 BPM

## 2018-01-24 NOTE — MISCELLANEOUS
Assessment    1  Gastroenteritis, acute (558 9) (K52 9)   2  Ankylosing spondylitis (720 0) (M45 9)   3  S/P cholecystectomy (V45 79) (Z90 49)   4  Partial small bowel obstruction (560 9) (K56 600)    Plan  Ankylosing spondylitis    · Follow-up visit in 3 months Evaluation and Treatment  Follow-up  Status: Hold For -  Scheduling  Requested for: [id number]   Ordered; For: Ankylosing spondylitis; Ordered By: Vi Tamayo Performed:  Due: 05JVZ8880    Discussion/Summary  Discussion Summary:   Increase water intake and exercise as able  Handicap placard  Continue present BP rx and scripts sent  He will do some home exercises for neck  RTo 3 months  Medication SE Review and Pt Understands Tx: Possible side effects of new medications were reviewed with the patient/guardian today  The treatment plan was reviewed with the patient/guardian  The patient/guardian understands and agrees with the treatment plan   Self Referrals:   Self Referrals: No      Chief Complaint  Chief Complaint Free Text Note Form: Pt presents for hosp f/up exam today  Pt states he is feeling better today  States his appetite is better and he is eating and drinking now  Med list updated and refills done as prescribed  No falls  Flu vaccine refused  History of Present Illness  TCM Communication [facility]: The patient is being contacted for follow-up after hospitalization  Hospital records were reviewed  He was hospitalized at [facility]  The dates of hospitalization: January 14, 2018 thru January 18, 2018  Diagnosis: nausea, vomiting, dehydration, hypokalemia, small bowel obstruction  He was discharged to home  Medications reviewed and updated today  He scheduled a follow up appointment  Counseling was provided to the patient and patient's family  Communication performed and completed by   HPI: Patient doing better now  He had his gallbladder out and then was readmitted with partial bowel obstruction   He had to have NGT placed and was in the hospital for several days  No further sxs now and dc from the surgeon  His BP has been stable and med list was updated today with patient  No chest pain or sob  Appetite getting better  Bowels normal       Review of Systems  Preventive Quality 65 Older:   Preventive Quality 65 and Older: Falls Risk: The patient fell 0 times in the past 12 months  The patient is currently asymptomatic Symptoms Include:  Associated symptoms:  No associated symptoms are reported  Active Problems    1  Anemia (285 9) (D64 9)   2  Asymptomatic carotid artery stenosis (433 10) (I65 29)   3  Chronic cholecystitis (575 11) (K81 1)   4  Degenerative joint disease of right lower extremity (715 98) (M19 90)   5  Encounter for prostate cancer screening (V76 44) (Z12 5)   6  Esophageal reflux (530 81) (K21 9)   7  Exercise counseling (V65 41) (Z71 82)   8  Gastroenteritis, acute (558 9) (K52 9)   9  More than 50 percent stenosis of right internal carotid artery (433 10) (I65 21)   10  Nausea and vomiting (787 01) (R11 2)   11  Osteopenia (733 90) (M85 80)    Past Medical History    1  History of Cholecystitis with cholelithiasis (574 10) (K80 10)    Surgical History    1  History of Cholecystectomy Laparoscopic   2  History of Diagnostic Esophagogastroduodenoscopy   3  History of Small Bowel Resection  Surgical History Reviewed: The surgical history was reviewed and updated today  Family History  Mother    1  Family history of Arthritis (716 90) (M19 90)  Father    2  Family history of Heart attack (410 90) (I21 9)  Family History    3  Family history of Coronary Artery Disease (V17 49)   4  Family history of Diabetes Mellitus (V18 0)  Family History Reviewed: The family history was reviewed and updated today         Social History    · Caffeine Use   · Dental care, regularly   · Feels safe at home   · Former smoker (D14 70) (A81 704)   · Lives independently with spouse   · No illicit drug use   · Social alcohol use (Z78 9)   · Sun Protection   · Uses Safety Equipment - Seatbelts  Social History Reviewed: The social history was reviewed and updated today  The social history was reviewed and is unchanged  Current Meds   1  Aspirin 81 MG Oral Tablet Chewable; CHEW AND SWALLOW 1 TABLET DAILY; Therapy: (Recorded:25Oct2017) to Recorded   2  Carvedilol 25 MG Oral Tablet; TAKE ONE (1) TABLET BY MOUTH TWICE DAILY; Therapy: 48YIE7077 to (Last Rx:22Nov2017)  Requested for: 22Nov2017 Ordered   3  Chlorthalidone 50 MG Oral Tablet; take 2 tablet daily  Requested for: 22Nov2017; Last   Rx:22Nov2017 Ordered   4  Enalapril Maleate 20 MG Oral Tablet; TAKE 1 TABLET TWICE DAILY; Therapy: (FGHMUMFE:93KCZ5420) to Recorded   5  Klor-Con 10 10 MEQ Oral Tablet Extended Release; TAKE 1 TABLET TWICE DAILY; Therapy: (Recorded:17Nov2017) to Recorded   6  NIFEdipine ER Osmotic Release 90 MG Oral Tablet Extended Release 24 Hour; TAKE 1   TABLET DAILY  Requested for: 22Nov2017; Last Rx:22Nov2017 Ordered   7  Pantoprazole Sodium 40 MG Oral Tablet Delayed Release; TAKE 1 TABLET DAILY    Requested for: 64HUS6615; Last Rx:22Nov2017 Ordered   8  Potassium Chloride 10 MEQ TBCR; TAKE 1 TABLET TWICE DAILY; Therapy: (ZIUUPZVS:07BCZ2642) to Recorded   9  Vitamin D TABS; Therapy: (Recorded:82Ycj0491) to Recorded  Medication List Reviewed: The medication list was reviewed and updated today  Allergies    1  No Known Drug Allergies    2  No Known Environmental Allergies    Vitals  Signs   Recorded: 71QKI8862 08:07AM   Temperature: 95 8 F, Tympanic  Heart Rate: 56  Height: 5 ft 9 5 in  Weight: 178 lb 2 oz  BMI Calculated: 25 93  BSA Calculated: 1 98  O2 Saturation: 92, RA    Physical Exam    Constitutional   General appearance: No acute distress, well appearing and well nourished   looks well  Eyes   Conjunctiva and lids: No swelling, erythema, or discharge  Pupils and irises: Equal, round and reactive to light      Ears, Nose, Mouth, and Throat External inspection of ears and nose: Normal     Otoscopic examination: Tympanic membrance translucent with normal light reflex  Canals patent without erythema  Nasal mucosa, septum, and turbinates: Normal without edema or erythema  Oropharynx: Normal with no erythema, edema, exudate or lesions  Pulmonary   Respiratory effort: No increased work of breathing or signs of respiratory distress  Auscultation of lungs: Clear to auscultation, equal breath sounds bilaterally, no wheezes, no rales, no rhonci  Cardiovascular   Palpation of heart: Normal PMI, no thrills  Auscultation of heart: Normal rate and rhythm, normal S1 and S2, without murmurs  Examination of extremities for edema and/or varicosities: Normal     Carotid pulses: Normal     Abdomen   Abdomen: Non-tender, no masses  Liver and spleen: No hepatomegaly or splenomegaly  Lymphatic   Palpation of lymph nodes in neck: No lymphadenopathy  Musculoskeletal   Gait and station: Abnormal     Digits and nails: Normal without clubbing or cyanosis  Inspection/palpation of joints, bones, and muscles: Abnormal   spinal deformity LS spine and restricted rom  Skin   Skin and subcutaneous tissue: Normal without rashes or lesions  Neurologic   Cranial nerves: Cranial nerves 2-12 intact  Reflexes: 2+ and symmetric  Sensation: No sensory loss  Psychiatric   Orientation to person, place and time: Normal     Mood and affect: Normal          Results/Data  PHQ-2 Adult Depression Screening 23Jan2018 08:17AM User, [employer]     Test Name Result Flag Reference   PHQ-2 Adult Depression Score 0     Over the last two weeks, how often have you been bothered by any of the following problems?   Little interest or pleasure in doing things: Not at all - 0  Feeling down, depressed, or hopeless: Not at all - 0   PHQ-2 Adult Depression Screening Negative       Falls Risk Assessment (Dx Z13 89 Screen for Neurologic Disorder) [id number] 08:16AM User, [employer] Test Name Result Flag Reference   Falls Risk      No falls in the past year     *VB - Fall Risk Assessment  (Dx Z13 89 Screen for Neurologic Disorder) 23Jan2018 08:16AM Paulineugadalupe Garciais     Test Name Result Flag Reference   Falls Risk      No falls in the past year     *VB-Depression Screening 23Jan2018 08:16AM Keren Donohue     Test Name Result Flag Reference   Depression Scale Result      Depression Screen - Negative For Symptoms       Health Management  Health Maintenance   Medicare Annual Wellness Visit; every 1 year; Last [id number]; Next Due: 60VAS8755;  Active    Signatures   Electronically signed by : Ck Ashley DO; Jan 23 2018  8:41AM EST                       (Author)

## 2018-03-12 ENCOUNTER — TELEPHONE (OUTPATIENT)
Dept: INTERNAL MEDICINE CLINIC | Facility: CLINIC | Age: 73
End: 2018-03-12

## 2018-03-12 DIAGNOSIS — R63.4 WEIGHT LOSS, UNINTENTIONAL: Primary | ICD-10-CM

## 2018-03-13 ENCOUNTER — APPOINTMENT (OUTPATIENT)
Dept: LAB | Facility: CLINIC | Age: 73
End: 2018-03-13
Payer: MEDICARE

## 2018-03-13 DIAGNOSIS — R63.4 WEIGHT LOSS, UNINTENTIONAL: ICD-10-CM

## 2018-03-13 LAB
BASOPHILS # BLD AUTO: 0.02 THOUSANDS/ΜL (ref 0–0.1)
BASOPHILS NFR BLD AUTO: 0 % (ref 0–1)
CEA SERPL-MCNC: <0.5 NG/ML (ref 0–3)
EOSINOPHIL # BLD AUTO: 0.06 THOUSAND/ΜL (ref 0–0.61)
EOSINOPHIL NFR BLD AUTO: 1 % (ref 0–6)
ERYTHROCYTE [DISTWIDTH] IN BLOOD BY AUTOMATED COUNT: 12.9 % (ref 11.6–15.1)
HCT VFR BLD AUTO: 39.9 % (ref 36.5–49.3)
HGB BLD-MCNC: 13.5 G/DL (ref 12–17)
IRON SERPL-MCNC: 89 UG/DL (ref 65–175)
LYMPHOCYTES # BLD AUTO: 1.01 THOUSANDS/ΜL (ref 0.6–4.47)
LYMPHOCYTES NFR BLD AUTO: 19 % (ref 14–44)
MCH RBC QN AUTO: 30 PG (ref 26.8–34.3)
MCHC RBC AUTO-ENTMCNC: 33.8 G/DL (ref 31.4–37.4)
MCV RBC AUTO: 89 FL (ref 82–98)
MONOCYTES # BLD AUTO: 0.41 THOUSAND/ΜL (ref 0.17–1.22)
MONOCYTES NFR BLD AUTO: 8 % (ref 4–12)
NEUTROPHILS # BLD AUTO: 3.8 THOUSANDS/ΜL (ref 1.85–7.62)
NEUTS SEG NFR BLD AUTO: 72 % (ref 43–75)
NRBC BLD AUTO-RTO: 0 /100 WBCS
PLATELET # BLD AUTO: 149 THOUSANDS/UL (ref 149–390)
PMV BLD AUTO: 11.2 FL (ref 8.9–12.7)
RBC # BLD AUTO: 4.5 MILLION/UL (ref 3.88–5.62)
TSH SERPL DL<=0.05 MIU/L-ACNC: 1.61 UIU/ML (ref 0.36–3.74)
VIT B12 SERPL-MCNC: 228 PG/ML (ref 100–900)
WBC # BLD AUTO: 5.31 THOUSAND/UL (ref 4.31–10.16)

## 2018-03-13 PROCEDURE — 84443 ASSAY THYROID STIM HORMONE: CPT

## 2018-03-13 PROCEDURE — 83540 ASSAY OF IRON: CPT

## 2018-03-13 PROCEDURE — 82607 VITAMIN B-12: CPT

## 2018-03-13 PROCEDURE — 82378 CARCINOEMBRYONIC ANTIGEN: CPT

## 2018-03-13 PROCEDURE — 85025 COMPLETE CBC W/AUTO DIFF WBC: CPT

## 2018-03-13 PROCEDURE — 36415 COLL VENOUS BLD VENIPUNCTURE: CPT

## 2018-03-15 RX ORDER — ONDANSETRON 4 MG/1
TABLET, ORALLY DISINTEGRATING ORAL EVERY 8 HOURS
COMMUNITY
Start: 2018-01-13 | End: 2018-04-18 | Stop reason: ALTCHOICE

## 2018-03-16 ENCOUNTER — IMMUNIZATION (OUTPATIENT)
Dept: INTERNAL MEDICINE CLINIC | Facility: CLINIC | Age: 73
End: 2018-03-16
Payer: MEDICARE

## 2018-03-16 DIAGNOSIS — D51.0 PERNICIOUS ANEMIA: ICD-10-CM

## 2018-03-16 DIAGNOSIS — D51.0 PERNICIOUS ANEMIA: Primary | ICD-10-CM

## 2018-03-16 PROCEDURE — 96372 THER/PROPH/DIAG INJ SC/IM: CPT

## 2018-03-16 RX ORDER — CYANOCOBALAMIN 1000 UG/ML
1000 INJECTION INTRAMUSCULAR; SUBCUTANEOUS
Status: DISCONTINUED | OUTPATIENT
Start: 2018-03-16 | End: 2020-10-13 | Stop reason: CLARIF

## 2018-03-16 RX ADMIN — CYANOCOBALAMIN 1000 MCG: 1000 INJECTION INTRAMUSCULAR; SUBCUTANEOUS at 08:25

## 2018-03-16 NOTE — PROGRESS NOTES
Pt presents today to begin monthly B12 injections  Pt tolerated the first injection well, and would like to continue with injections

## 2018-04-18 ENCOUNTER — OFFICE VISIT (OUTPATIENT)
Dept: INTERNAL MEDICINE CLINIC | Facility: CLINIC | Age: 73
End: 2018-04-18
Payer: MEDICARE

## 2018-04-18 VITALS
HEART RATE: 55 BPM | SYSTOLIC BLOOD PRESSURE: 128 MMHG | OXYGEN SATURATION: 98 % | DIASTOLIC BLOOD PRESSURE: 70 MMHG | HEIGHT: 70 IN | WEIGHT: 188.25 LBS | TEMPERATURE: 96.5 F | BODY MASS INDEX: 26.95 KG/M2

## 2018-04-18 DIAGNOSIS — D50.9 IRON DEFICIENCY ANEMIA, UNSPECIFIED IRON DEFICIENCY ANEMIA TYPE: ICD-10-CM

## 2018-04-18 DIAGNOSIS — M45.0 ANKYLOSING SPONDYLITIS OF MULTIPLE SITES IN SPINE (HCC): ICD-10-CM

## 2018-04-18 DIAGNOSIS — D51.0 PERNICIOUS ANEMIA: ICD-10-CM

## 2018-04-18 DIAGNOSIS — I10 ESSENTIAL HYPERTENSION: Primary | ICD-10-CM

## 2018-04-18 PROBLEM — K56.600 PARTIAL SMALL BOWEL OBSTRUCTION (HCC): Status: ACTIVE | Noted: 2018-01-14

## 2018-04-18 PROCEDURE — 99214 OFFICE O/P EST MOD 30 MIN: CPT | Performed by: INTERNAL MEDICINE

## 2018-04-18 PROCEDURE — 96372 THER/PROPH/DIAG INJ SC/IM: CPT | Performed by: INTERNAL MEDICINE

## 2018-04-18 RX ADMIN — CYANOCOBALAMIN 1000 MCG: 1000 INJECTION INTRAMUSCULAR; SUBCUTANEOUS at 08:36

## 2018-04-18 NOTE — PROGRESS NOTES
Assessment/Plan:         Diagnoses and all orders for this visit:    Essential hypertension  BP stable Continue present rx    Pernicious anemia  Continue B12 supplementation - he has had 2 injections thus far and thinks it has helped    Ankylosing spondylitis of multiple sites in spine [facility])  More limited due to pain and structural restriction    Iron deficiency anemia, unspecified iron deficiency anemia type  Hgb stable ,eating a more iron rich diet now and tolerating well    Labs reviewed and no changes other than B12 restarted  Rto 4 months       Patient ID: Gill Vázquez  is a 74 y o  male  HPI   Pt doing better overall  Patient restarted B12 injections and thinks they have helped  Appetite better and he has been gaining weight  Bowels better  No chest pain or sob  No falls  No dizzy spells  He is more functionally limited due to spinal restriction and pain in neck and back  No chest pain  BP has been stable  The following portions of the patient's history were reviewed and updated as appropriate:   Past Medical History:   Diagnosis Date    Cholecystitis     Coronary artery disease     Hypertension     Spinal stenosis      Past Surgical History:   Procedure Laterality Date    CHOLECYSTECTOMY      COLON SURGERY      bwel resection    COLONOSCOPY      ESOPHAGOGASTRODUODENOSCOPY      02/07/2007  ONSET    [place] LAP,CHOLECYSTECTOMY N/A 12/5/2017    Procedure: CHOLECYSTECTOMY LAPAROSCOPIC;  Surgeon: Jeanne Friedman MD;  Location: [facility];  Service: General    SMALL INTESTINE SURGERY      [address] [facility] [address] [facility] [address] 2013     Allergies  Social History     Social History    Marital status: /Civil Union     Spouse name: N/A    Number of children: N/A    Years of education: N/A     Occupational History    Not on file       Social History Main Topics    Smoking status: Former Smoker     Packs/day: 2 00     Years: 3 00     Quit date: 1966    Smokeless tobacco: Never Used    Alcohol use Yes      Comment: social     Drug use: No    Sexual activity: Not on file     Other Topics Concern    Not on file     Social History Narrative    CAFFEINE USE    DENTAL CARE,REGULARLY    FEELS SAFE  [address] PROTECTION      Family History   Problem Relation Age of Onset    Arthritis Mother     Heart attack Father     Coronary artery disease Family     Diabetes Family          Review of Systems   Constitutional: Negative  HENT: Negative  Eyes: Negative  Respiratory: Negative  Cardiovascular: Negative  Gastrointestinal: Negative  Endocrine: Negative  Genitourinary: Negative  Musculoskeletal: Positive for back pain, myalgias and neck pain  Skin: Negative  Allergic/Immunologic: Negative  Neurological: Negative for dizziness and light-headedness  Hematological: Negative  Psychiatric/Behavioral: Negative  /70   Pulse 55   Temp (!) 96 5 °F (35 8 °C) (Tympanic)   Ht 5' 10" (1 778 m)   Wt 85 4 kg (188 lb 4 oz)   SpO2 98%   BMI 27 01 kg/m²      Physical Exam   Constitutional: He is oriented to person, place, and time  He appears well-developed and well-nourished  No distress  HENT:   Head: Normocephalic and atraumatic  Right Ear: External ear normal    Left Ear: External ear normal    Nose: Nose normal    Mouth/Throat: Oropharynx is clear and moist  No oropharyngeal exudate  Eyes: Conjunctivae and EOM are normal  Pupils are equal, round, and reactive to light  No scleral icterus  Neck: Normal range of motion  Neck supple  No JVD present  Cardiovascular: Normal rate, regular rhythm, normal heart sounds and intact distal pulses  Pulmonary/Chest: Effort normal and breath sounds normal    Abdominal: Soft  Bowel sounds are normal  He exhibits no distension  There is no tenderness  There is no rebound and no guarding  Musculoskeletal: Normal range of motion  He exhibits tenderness and deformity   He exhibits no edema  Restricted rom of cervical spine and tenderness with movement forward and limited extension   Lymphadenopathy:     He has no cervical adenopathy  Neurological: He is alert and oriented to person, place, and time  He has normal reflexes  He displays normal reflexes  No cranial nerve deficit  He exhibits abnormal muscle tone  Coordination normal    Skin: Skin is warm and dry  No rash noted  He is not diaphoretic  No erythema  No pallor  Psychiatric: He has a normal mood and affect  His behavior is normal  Judgment and thought content normal    Nursing note and vitals reviewed

## 2018-04-30 DIAGNOSIS — I10 ESSENTIAL HYPERTENSION: Primary | ICD-10-CM

## 2018-04-30 RX ORDER — CHLORTHALIDONE 50 MG/1
50 TABLET ORAL 2 TIMES DAILY
Qty: 60 TABLET | Refills: 5 | Status: SHIPPED | OUTPATIENT
Start: 2018-04-30 | End: 2018-06-25 | Stop reason: SDUPTHER

## 2018-05-23 ENCOUNTER — CLINICAL SUPPORT (OUTPATIENT)
Dept: INTERNAL MEDICINE CLINIC | Facility: CLINIC | Age: 73
End: 2018-05-23
Payer: MEDICARE

## 2018-05-23 DIAGNOSIS — D51.0 PERNICIOUS ANEMIA: ICD-10-CM

## 2018-05-23 PROCEDURE — 96372 THER/PROPH/DIAG INJ SC/IM: CPT | Performed by: INTERNAL MEDICINE

## 2018-05-23 RX ADMIN — CYANOCOBALAMIN 1000 MCG: 1000 INJECTION INTRAMUSCULAR; SUBCUTANEOUS at 08:36

## 2018-05-23 NOTE — PROGRESS NOTES
Pt presents today for a monthly B12 injection  Pt feels well and would like to continue with injections

## 2018-06-14 ENCOUNTER — TELEPHONE (OUTPATIENT)
Dept: INTERNAL MEDICINE CLINIC | Facility: CLINIC | Age: 73
End: 2018-06-14

## 2018-06-14 NOTE — TELEPHONE ENCOUNTER
Are there other dates available - Monday is fairly full  Only time we could add at this time would be 7:30 if staff is here to do that

## 2018-06-25 DIAGNOSIS — E87.6 POTASSIUM DEFICIENCY: Primary | ICD-10-CM

## 2018-06-25 DIAGNOSIS — I10 ESSENTIAL HYPERTENSION: ICD-10-CM

## 2018-06-25 RX ORDER — POTASSIUM CHLORIDE 750 MG/1
10 CAPSULE, EXTENDED RELEASE ORAL 2 TIMES DAILY
Qty: 60 CAPSULE | Refills: 5 | Status: SHIPPED | OUTPATIENT
Start: 2018-06-25 | End: 2019-04-01 | Stop reason: SDUPTHER

## 2018-06-25 RX ORDER — CHLORTHALIDONE 50 MG/1
50 TABLET ORAL 2 TIMES DAILY
Qty: 60 TABLET | Refills: 0 | Status: SHIPPED | OUTPATIENT
Start: 2018-06-25 | End: 2018-12-16

## 2018-07-24 ENCOUNTER — APPOINTMENT (OUTPATIENT)
Dept: LAB | Facility: MEDICAL CENTER | Age: 73
End: 2018-07-24
Payer: MEDICARE

## 2018-07-24 ENCOUNTER — OFFICE VISIT (OUTPATIENT)
Dept: INTERNAL MEDICINE CLINIC | Facility: CLINIC | Age: 73
End: 2018-07-24
Payer: MEDICARE

## 2018-07-24 VITALS
HEIGHT: 70 IN | WEIGHT: 184.38 LBS | HEART RATE: 47 BPM | TEMPERATURE: 95.7 F | BODY MASS INDEX: 26.4 KG/M2 | OXYGEN SATURATION: 99 %

## 2018-07-24 DIAGNOSIS — E87.6 HYPOKALEMIA: ICD-10-CM

## 2018-07-24 DIAGNOSIS — D50.8 IRON DEFICIENCY ANEMIA SECONDARY TO INADEQUATE DIETARY IRON INTAKE: ICD-10-CM

## 2018-07-24 DIAGNOSIS — Z00.00 MEDICARE ANNUAL WELLNESS VISIT, SUBSEQUENT: Primary | ICD-10-CM

## 2018-07-24 DIAGNOSIS — D51.0 PERNICIOUS ANEMIA: ICD-10-CM

## 2018-07-24 PROBLEM — M75.30 CALCIFIC TENDINITIS OF SHOULDER: Status: ACTIVE | Noted: 2018-07-24

## 2018-07-24 PROBLEM — M22.40 CHONDROMALACIA PATELLAE: Status: ACTIVE | Noted: 2018-07-24

## 2018-07-24 PROBLEM — M54.12 CERVICAL RADICULOPATHY: Status: ACTIVE | Noted: 2018-07-24

## 2018-07-24 PROBLEM — M47.817 LUMBOSACRAL SPONDYLOSIS WITHOUT MYELOPATHY: Status: ACTIVE | Noted: 2018-07-24

## 2018-07-24 LAB
HGB BLD-MCNC: 13.6 G/DL (ref 12–17)
IRON SERPL-MCNC: 79 UG/DL (ref 65–175)
POTASSIUM SERPL-SCNC: 3.4 MMOL/L (ref 3.5–5.3)

## 2018-07-24 PROCEDURE — 85018 HEMOGLOBIN: CPT

## 2018-07-24 PROCEDURE — 36415 COLL VENOUS BLD VENIPUNCTURE: CPT

## 2018-07-24 PROCEDURE — 83540 ASSAY OF IRON: CPT

## 2018-07-24 PROCEDURE — 84132 ASSAY OF SERUM POTASSIUM: CPT

## 2018-07-24 PROCEDURE — G0439 PPPS, SUBSEQ VISIT: HCPCS | Performed by: INTERNAL MEDICINE

## 2018-07-24 PROCEDURE — 96372 THER/PROPH/DIAG INJ SC/IM: CPT | Performed by: INTERNAL MEDICINE

## 2018-07-24 RX ORDER — TRIPROLIDINE/PSEUDOEPHEDRINE 2.5MG-60MG
TABLET ORAL
COMMUNITY
Start: 2018-06-18 | End: 2018-12-16

## 2018-07-24 RX ORDER — FERROUS SULFATE 325(65) MG
325 TABLET ORAL
COMMUNITY

## 2018-07-24 RX ORDER — BROMFENAC SODIUM 0.7 MG/ML
SOLUTION/ DROPS OPHTHALMIC
COMMUNITY
Start: 2018-06-18 | End: 2018-12-16

## 2018-07-24 RX ORDER — BESIFLOXACIN 6 MG/ML
SUSPENSION OPHTHALMIC
COMMUNITY
Start: 2018-06-18 | End: 2018-12-16

## 2018-07-24 RX ADMIN — CYANOCOBALAMIN 1000 MCG: 1000 INJECTION INTRAMUSCULAR; SUBCUTANEOUS at 07:54

## 2018-07-24 NOTE — PROGRESS NOTES
Assessment and Plan:    Problem List Items Addressed This Visit     Hypokalemia    Relevant Orders    Potassium    Anemia    Relevant Orders    Hemoglobin    Iron    Medicare annual wellness visit, subsequent - Primary      Other Visit Diagnoses     Pernicious anemia            Health Maintenance Due   Topic Date Due    Medicare Annual Wellness Visit (AWV)  04/06/2018     Requested copy of AD from patient  Labs today   Did discuss nature of neuropathy and caution when walking and do not walk barefoot and caution for falls  Discussed shingrix   Rto 4months  B12 monthly    HPI:  Tabitha Guerra  is a 74 y o  male here for his Subsequent Wellness Visit      Patient Active Problem List   Diagnosis    Cholecystitis with cholelithiasis    Total bilirubin, elevated    70% stenosis of right ICA    Partial small bowel obstruction (HCC)    Hypokalemia    Anemia    Ankylosing spondylitis (HCC)    Asymptomatic carotid artery stenosis    Esophageal reflux    Hypertension    Osteopenia    Calcific tendinitis of shoulder    Cervical radiculopathy    Chondromalacia patellae    Lumbosacral spondylosis without myelopathy    Medicare annual wellness visit, subsequent     Past Medical History:   Diagnosis Date    Cholecystitis     Coronary artery disease     Hypertension     Spinal stenosis      Past Surgical History:   Procedure Laterality Date    CHOLECYSTECTOMY      COLON SURGERY      bwel resection    COLONOSCOPY      ESOPHAGOGASTRODUODENOSCOPY      02/07/2007  ONSET    ID LAP,CHOLECYSTECTOMY N/A 12/5/2017    Procedure: CHOLECYSTECTOMY LAPAROSCOPIC;  Surgeon: Marcellus Rushing MD;  [facility];  Service: General    SMALL INTESTINE SURGERY      ONSEC [address]     Family History   Problem Relation Age of Onset    Arthritis Mother     Heart attack Father     Coronary artery disease Family     Diabetes Family      History   Smoking Status    Former Smoker    Packs/day: 2 00    Years: 3 00    Quit date: 1966   Smokeless Tobacco    Never Used     History   Alcohol Use    Yes     Comment: social       History   Drug Use No       Current Outpatient Prescriptions   Medication Sig Dispense Refill    aspirin 81 mg chewable tablet Chew 1 tablet daily 30 tablet 0    BESIVANCE 0 6 % SUSP       carvedilol (COREG) 25 mg tablet Take 1 tablet by mouth 2 (two) times a day with meals 60 tablet 0    chlorthalidone (HYGROTEN) 50 MG tablet Take 1 tablet (50 mg total) by mouth 2 (two) times a day for 30 days 60 tablet 0    cholecalciferol (VITAMIN D3) 1,000 units tablet Take by mouth      DUREZOL 0 05 % EMUL       enalapril (VASOTEC) 20 mg tablet Take 1 tablet by mouth 2 (two) times a day 60 tablet 0    ferrous sulfate 325 (65 Fe) mg tablet Take 325 mg by mouth daily with breakfast      pantoprazole (PROTONIX) 40 mg tablet Take 1 tablet by mouth daily 30 tablet 0    potassium chloride (MICRO-K) 10 MEQ CR capsule Take 1 capsule (10 mEq total) by mouth 2 (two) times a day 60 capsule 5    PROLENSA 0 07 % SOLN       NIFEdipine (PROCARDIA XL) 90 mg 24 hr tablet Take 1 tablet by mouth daily 30 tablet 0     Current Facility-Administered Medications   Medication Dose Route Frequency Provider Last Rate Last Dose    cyanocobalamin injection 1,000 mcg  1,000 mcg Intramuscular Q30 Days Harshil Gagnon DO   1,000 mcg at 07/24/18 0754     No Known Allergies  Immunization History   Administered Date(s) Administered    Influenza 01/09/2006, 11/21/2007, 10/27/2008, 11/21/2009, 10/26/2010, 02/06/2012, 12/20/2012, 09/06/2013       Patient Care Team:  Harshil Gagnon DO as PCP - General  DO Josefa Perez MD Rolfe Pearl, MD (Ophthalmology)      Medicare Screening Tests and Risk Assessments:  AWV Clinical     ISAR:   Previous hospitalizations?:  Yes   How many hospitalizations have you had in the last year?:  1-2       Once in a Lifetime Medicare Screening:   EKG performed?:  No    AAA screening performed? (if performed, please add date to Health Maintenance):  No       Medicare Screening Tests and Risk Assessment:   AAA Risk Assessment    None Indicated:  Yes    Osteoporosis Risk Assessment    None indicated:  Yes    HIV Risk Assessment    None indicated:  Yes        Drug and Alcohol Use:   Tobacco use    Cigarettes:  never smoker    Smokeless:  never used smokeless tobacco    Tobacco use duration    Tobacco Cessation Readiness    Alcohol use    Alcohol use:  never    Alcohol Treatment Readiness   Illicit Drug Use    Drug use:  never    Drug type:  no sedative use       Diet & Exercise:   Diet   What is your diet?:  Regular   How many servings a day of the following:   Fruits and Vegetables:  3-4 Meat:  1-2   Whole Grains:  2 Simple Carbs:  1   Dairy:  3 Soda:  0   Coffee:  3 Tea:  1   Exercise    Do you currently exercise?:  yes    Frequency:  occasional   Times per week:  7     Type of exercise:  walking       Cognitive Impairment Screening:   Depression screening preformed:  Yes Depression screen score:  0    PHQ-9 Depression scale score:  0   Depression screening results:  negative for symptoms   Cognitive Impairment Screening    Do you have difficulty learning or retaining new information?:  No Do you have difficulty handling new tasks?:  No   Do you have difficulty with reasoning?:  No Do you have difficulty with spatial ability and orientation?:  No   Do you have difficulty with language?:  No Do you have difficulty with behavior?:  No       Functional Ability/Level of Safety:   Hearing    Hearing difficulties:  Yes Bilateral:  slightly decreased   Hearing aid:  No    Hearing Impairment Assessment    Hearing status:  Hearing loss in left ear, Hearing loss in right ear   Do your family members ever complain that you turn on the radio or T V  too loudly?:  Yes Do you find that other people have to repeat themselves when talking to you?:  Yes   Do you have difficulty hearing while talking on the phone?:  No Has anyone ever told you that you are speaking too loudly when talking with them?:  No   Do you have trouble hearing the doorbell or phone ringing?:  No Do you have difficulty hearing such that you feel frustrated talking to people?:  No   Do you feel sad because you cannot hear well?:  No Do you feel inconvienced due to your hearing problem?:  No   Do you think you would be a happier person if you could hear better?:  No Would you be willing to go for a hearing aid fitting if suggested?:  No   Current Activities    Status:  unlimited ADL's, unlimited driving, unlimited IADL's, unlimited social activities   Help needed with the folllowing:    Using the phone:  No Transportation:  No   Shopping:  No Preparing Meals:  No   Doing Housework:  No Doing Laundry:  No   Managing Medications:  No Managing Money:  No   ADL    Feeding:  Independant   Oral hygiene and Facial grooming:  Independant   Bathing:  Independant   Upper Body Dressing:  Independant   Lower Body Dressing:  Independant   Toileting:  Independant   Bed Mobility:  Independant   Fall Risk   Have you fallen in the last 12 months?:  No Are you unsteady on your feet?:  No    Are you taking any medications that may cause fatigue or dizziness?:  No    Do you rush to the bathroom potentially risking a fall?:  No   Injury History   Polypharmacy:  No Antidepressant Use:  No   Sedative Use:  No Antihypertensive Use:  No   Previous Fall:  No Alcohol Use:  No   Deconditioning:  No Visual Impairment:  No   Cogitive Impairment:  No Mmobility Impairment:  No   Postural Hypotension:  No Urinary Incontinence:  No       Home Safety:   Are there hazards in your environment?:  No   If you fell, would you need help to get back up from the ground?:  No Do you have problems or concerns getting in/out of a bed, chair, tub, or toilet?:  No   Do you feel unsteady when walking?:  Yes Is your activity limited by pain?:  No   Do you have handrails and grab-bars in the home?:  Yes Are emergency numbers kept by the phone and regularly updated?:  Yes   Are you and/or family members aware of the dangers of smoking in bed?:  Yes    Do you have working smoke alarms and fire extinguisher?:  Yes Do all household members know how to use them?:  Yes   Have you left the stove on unsupervised?:  No    Home Safety Risk Factors   Unfamilar with surroundings:  No Uneven floors:  No   Stairs or handrail saftey risk:  No Loose rugs:  No   Household clutter:  No Poor household lighting:  No   No grab bars in bathroom:  No Further evaluation needed:  No       Advanced Directives:   Advanced Directives    Living Will:  Yes Durable POA for healthcare:   Yes   Advanced directive:  Yes    Patient's End of Life Decisions    Reviewed with patient:  Yes Provider agrees with end of life decisions:  Yes   End of life decisions comments:  Patient has AD and requested copy for our chart       Urinary Incontinence:   Do you have urinary incontinence?:  No Do you have incomplete emptying?:  No   Do you urinate frequently?:  No Do you have urinary urgency?:  No   Do you have urinary hesitancy?:  No Do you have dysuria (painful and/or difficult urination)?:  No   Do you have nocturia (waking up to urinate)?:  No Do you strain when urinating (have to push to urinate)?:  No   Do you have a weak stream when urinating?:  No Do you have intermittent streaming when urinating?:  No   Do you dribble urine after finishing?:  No        Glaucoma:            Provider Screening     Preventative Screening/Counseling:   Cardiovascular Screening/Counseling:   (Labs Q5 years, EKG optional one-time)   General:  Screening Current Counseling:  Healthy Diet, Healthy Weight, Improve Cholesterol          Diabetes Screening/Counseling:   (2 tests/year if Pre-Diabetes or 1 test/year if no Diabetes)   General:  Screening Current Counseling:  Healthy Diet, Healthy Weight          Colorectal Cancer Screening/Counseling:   (FOBT Q1 yr; Flex Sig Q4 yrs or Q10 yrs after Screening Colonoscopy; Screening Colonoscpy Q2 yrs High Risk or Q10 yrs Low Risk; Barium Enema Q2 yrs High Risk or Q4 yrs Low Risk)   General:  Screening Not Indicated Counseling:  high fiber diet          Prostate Cancer Screening/Counseling:   (Annual)    General:  Screening Not Indicated          Breast Cancer Screening/Counseling:   (Baseline Age 28 - 43; Annual Age 36+)         Cervical Cancer Screening/Counseling:   (Annual for High Risk or Childbearing Age with Abnormal Pap in Last 3 yrs; Every 2 all others)         Osteoporosis Screening/Counseling:   (Every 2 Yrs if at risk or more if medically necessary)   General:  Screening Current Counseling:  Calcium and Vitamin D Intake, Regular Weightbearing Exercise          AAA Screening/Counseling:   (Once per Lifetime with risk factors)    General:  Screening Current           Glaucoma Screening/Counseling:   (Annual)   General:  Screening Current          HIV Screening/Counseling:   (Voluntary;  Once annually for high risk OR 3 times for Pregnancy at diagnosis of IUP; 3rd trimester; and at Labor   General:  Screening Not Indicated           Hepatitis C Screening:             Immunizations:   Influenza (annual):  Patient Declines   Pneumococcal (Once in a Lifetime):  Patient Declines   Hepatitis B Series (low risk patients):  Series Not Indicated   Zostavax (Medicare D Coverage, Pt >66 yo):  Risks & Benefits Discussed   TD (Non-Medicare Wellness  Visit required):  Vaccine Status Unknown   Tdap (Non-Medicare Wellness Visit required):  Vaccine Status Unknown       Other Preventative Couseling (Non-Medicare Wellness Visit Required):   fall prevention education provided       Referrals (Non-Medicare Wellness Visit Required):       Medical Equipment/Suppliers:

## 2018-07-24 NOTE — PATIENT INSTRUCTIONS

## 2018-09-06 DIAGNOSIS — I25.119 CORONARY ARTERY DISEASE WITH ANGINA PECTORIS, UNSPECIFIED VESSEL OR LESION TYPE, UNSPECIFIED WHETHER NATIVE OR TRANSPLANTED HEART (HCC): ICD-10-CM

## 2018-09-06 DIAGNOSIS — K21.9 GASTROESOPHAGEAL REFLUX DISEASE, ESOPHAGITIS PRESENCE NOT SPECIFIED: Primary | ICD-10-CM

## 2018-09-06 RX ORDER — CARVEDILOL 25 MG/1
25 TABLET ORAL 2 TIMES DAILY WITH MEALS
Qty: 60 TABLET | Refills: 5 | Status: SHIPPED | OUTPATIENT
Start: 2018-09-06 | End: 2019-09-18 | Stop reason: SDUPTHER

## 2018-09-06 RX ORDER — PANTOPRAZOLE SODIUM 40 MG/1
40 TABLET, DELAYED RELEASE ORAL DAILY
Qty: 30 TABLET | Refills: 5 | Status: SHIPPED | OUTPATIENT
Start: 2018-09-06 | End: 2018-12-16

## 2018-09-25 ENCOUNTER — CLINICAL SUPPORT (OUTPATIENT)
Dept: INTERNAL MEDICINE CLINIC | Facility: CLINIC | Age: 73
End: 2018-09-25
Payer: MEDICARE

## 2018-09-25 DIAGNOSIS — D50.9 IRON DEFICIENCY ANEMIA, UNSPECIFIED IRON DEFICIENCY ANEMIA TYPE: ICD-10-CM

## 2018-09-25 PROCEDURE — 96372 THER/PROPH/DIAG INJ SC/IM: CPT

## 2018-09-25 RX ADMIN — CYANOCOBALAMIN 1000 MCG: 1000 INJECTION INTRAMUSCULAR; SUBCUTANEOUS at 08:25

## 2018-11-15 ENCOUNTER — OFFICE VISIT (OUTPATIENT)
Dept: INTERNAL MEDICINE CLINIC | Facility: CLINIC | Age: 73
End: 2018-11-15
Payer: MEDICARE

## 2018-11-15 VITALS
SYSTOLIC BLOOD PRESSURE: 126 MMHG | BODY MASS INDEX: 27.08 KG/M2 | DIASTOLIC BLOOD PRESSURE: 68 MMHG | HEART RATE: 54 BPM | TEMPERATURE: 96.9 F | HEIGHT: 70 IN | WEIGHT: 189.13 LBS | OXYGEN SATURATION: 99 %

## 2018-11-15 DIAGNOSIS — M45.0 ANKYLOSING SPONDYLITIS OF MULTIPLE SITES IN SPINE (HCC): ICD-10-CM

## 2018-11-15 DIAGNOSIS — I10 ESSENTIAL HYPERTENSION: ICD-10-CM

## 2018-11-15 DIAGNOSIS — I65.29 ASYMPTOMATIC CAROTID ARTERY STENOSIS, UNSPECIFIED LATERALITY: ICD-10-CM

## 2018-11-15 DIAGNOSIS — E78.2 MIXED HYPERLIPIDEMIA: Primary | ICD-10-CM

## 2018-11-15 DIAGNOSIS — D50.9 IRON DEFICIENCY ANEMIA, UNSPECIFIED IRON DEFICIENCY ANEMIA TYPE: ICD-10-CM

## 2018-11-15 PROCEDURE — 99214 OFFICE O/P EST MOD 30 MIN: CPT | Performed by: INTERNAL MEDICINE

## 2018-11-15 PROCEDURE — 96372 THER/PROPH/DIAG INJ SC/IM: CPT

## 2018-11-15 RX ORDER — CHLORTHALIDONE 50 MG/1
50 TABLET ORAL DAILY
COMMUNITY
Start: 2018-11-13 | End: 2020-02-14 | Stop reason: SDUPTHER

## 2018-11-15 RX ADMIN — CYANOCOBALAMIN 1000 MCG: 1000 INJECTION INTRAMUSCULAR; SUBCUTANEOUS at 07:58

## 2018-11-15 NOTE — PROGRESS NOTES
Assessment/Plan:         Diagnoses and all orders for this visit:    Essential hypertension  Bp stable on present rx and he does go to the gym three times/week continue the same    Asymptomatic carotid artery stenosis, unspecified laterality  Rx for carotid doppler to follow right stenosis    Ankylosing spondylitis of multiple sites in spine [facility])  He has cervical restriction but no new sxs other than right hand numbness for which he has follow up    Iron deficiency anemia, unspecified iron deficiency anemia type  Re check hgb although in recent past has been stable    Other orders  -     chlorthalidone (HYGROTEN) 50 MG tablet;     Rto 4 months with labs       Patient ID: Gayle Hernánedz  is a 74 y o  male  HPI  Pt has been feeling well  No chest pain or sob  BP stable at home per patient  [facility]  He has had numbness to part of right hand - he thinks from his neck and has chiropractor appt upcoming  No worsening neck sxs  No headaches  No abdominal pain  No vision changes He has not had recent carotid study  Review of Systems   Constitutional: Negative  HENT: Negative  Eyes: Negative  Respiratory: Negative  Cardiovascular: Negative  Gastrointestinal: Negative  Endocrine: Negative  Genitourinary: Negative  Musculoskeletal: Positive for myalgias and neck pain  Skin: Negative  Allergic/Immunologic: Negative  Neurological: Negative  Hematological: Negative  Psychiatric/Behavioral: Negative        Past Medical History:   Diagnosis Date    Cholecystitis     Coronary artery disease     Hypertension     Spinal stenosis      Past Surgical History:   Procedure Laterality Date    CHOLECYSTECTOMY      COLON SURGERY      bwel resection    COLONOSCOPY      ESOPHAGOGASTRODUODENOSCOPY      02/07/2007  ONSET    AK LAP,CHOLECYSTECTOMY N/A 12/5/2017    Procedure: CHOLECYSTECTOMY LAPAROSCOPIC;  Surgeon: Dasia Pruett MD;  Location: [facility] OR;  Service: General    SMALL INTESTINE SURGERY      ONSEC DEC 2011     Social History     Social History    Marital status: /Civil Union     Spouse name: N/A    Number of children: N/A    Years of education: N/A     Occupational History    Not on file  Social History Main Topics    Smoking status: Former Smoker     Packs/day: 2 00     Years: 3 00     Quit date: 1966    Smokeless tobacco: Never Used    Alcohol use Yes      Comment: social     Drug use: No    Sexual activity: Not on file     Other Topics Concern    Not on file     Social History Narrative    CAFFEINE USE    DENTAL CARE,REGULARLY    FEELS SAFE AT HOME    LIVES INDEPENDENTLY WITH SPOUSE    USES SAFETY EQUIPMENT    SUN PROTECTION      No Known Allergies          /68   Pulse (!) 54   Temp (!) 96 9 °F (36 1 °C) (Tympanic)   Ht 5' 10" (1 778 m)   Wt 85 8 kg (189 lb 2 oz)   SpO2 99%   BMI 27 14 kg/m²          Physical Exam   Constitutional: He is oriented to person, place, and time  He appears well-developed and well-nourished  No distress  HENT:   Head: Normocephalic and atraumatic  Right Ear: External ear normal    Left Ear: External ear normal    Nose: Nose normal    Mouth/Throat: Oropharynx is clear and moist  No oropharyngeal exudate  Eyes: Pupils are equal, round, and reactive to light  Conjunctivae and EOM are normal  No scleral icterus  Neck: Normal range of motion  Neck supple  No JVD present  Cardiovascular: Normal rate, regular rhythm and intact distal pulses  Murmur heard  Pulmonary/Chest: Effort normal and breath sounds normal    Abdominal: Soft  Bowel sounds are normal    Musculoskeletal: Normal range of motion  He exhibits tenderness and deformity  He exhibits no edema  Decreased rom and tenderness base of neck   Lymphadenopathy:     He has no cervical adenopathy  Neurological: He is alert and oriented to person, place, and time  He displays abnormal reflex  No cranial nerve deficit   He exhibits abnormal muscle tone  Coordination abnormal    Skin: Skin is warm and dry  He is not diaphoretic  Psychiatric: He has a normal mood and affect  His behavior is normal  Judgment and thought content normal    Nursing note and vitals reviewed

## 2018-11-15 NOTE — PATIENT INSTRUCTIONS
Carotid Artery Disease   AMBULATORY CARE:   Carotid artery disease  is a condition that causes narrow or blocked carotid arteries  Your carotid arteries are the blood vessels that supply your brain with most of the blood it needs to work  You have 2 carotid arteries, one on each side of your neck  Call 911 for any of the following:   · You have any of the following signs of a stroke:      ¨ Numbness or drooping on one side of your face     ¨ Weakness in an arm or leg    ¨ Confusion or difficulty speaking    ¨ Dizziness, a severe headache, or vision loss    · You have any of the following signs of a heart attack:      ¨ Squeezing, pressure, or pain in your chest that lasts longer than 5 minutes or returns    ¨ Discomfort or pain in your back, neck, jaw, stomach, or arm     ¨ Trouble breathing    ¨ Nausea or vomiting    ¨ Lightheadedness or a sudden cold sweat, especially with chest pain or trouble breathing  Contact your healthcare provider if:   · You have questions or concerns about your condition or care  Signs and symptoms of carotid artery disease: You may have no signs or symptoms  Most commonly, carotid artery disease causes transient ischemic attacks (TIAs), or mini-strokes  You may have numbness, weakness, lack of movement, or vision or speech problems  A TIA goes away quickly and does not cause permanent damage  A TIA may be a warning sign that you are about to have a stroke  If you have any symptoms of a TIA or stroke, seek care immediately  Warning signs of a stroke: The word F A S T  can help you remember and recognize warning signs of a stroke  · F = Face:  One side of the face droops  · A = Arms:  One arm starts to drop when both arms are raised  · S = Speech:  Speech is slurred or sounds different than usual     · T = Time:  A person who is having a stroke needs to be seen immediately  A stroke is a medical emergency that needs immediate treatment   Some medicines and treatments work best if given within a few hours of a stroke  Treatment  for carotid artery disease depends on how narrow your arteries have become, your symptoms, and your general health  The goal of treatment is to lower your risk for a stroke  You may need any of the following:  · Take aspirin if directed  Your healthcare provider may suggest that you take an aspirin a day to prevent blood clots from forming in the carotid arteries  If your healthcare provider wants you to take aspirin daily, do not take acetaminophen or ibuprofen instead  · Control risk factors  High blood pressure, high cholesterol, heart disease, diabetes, and being overweight increase your risk for atherosclerosis  · Procedures can help open blocked arteries  A carotid endarterectomy is used to cut plaque out of the artery  An angioplasty is used to push the plaque against the artery wall with a balloon device  Sometimes a stent is placed during an angioplasty  A stent is a metal mesh tube that is placed in the artery to keep it open  Manage carotid artery disease:   · Eat a variety of healthy foods  Healthy foods include fruit, vegetables, whole-grain breads, low-fat dairy products, lean meat, and fish  Choose fish that are high in omega-3 fatty acids, such as salmon and fresh tuna  Ask your healthcare provider for more information on a heart healthy diet and the DASH eating plan  · Limit sodium (salt)  Sodium may increase your blood pressure  Add less table salt to your foods  Read food labels and choose foods that are low in sodium  Your healthcare provider may suggest you follow a low sodium diet  · Reach or maintain a healthy weight  Extra weight makes your heart work harder  Ask your healthcare provider how much you should weight  He can help you create a safe weight loss plan  Even a weight loss of 10% of your body weight can help your heart function better  · Exercise as directed    Exercise helps improve heart function and can help you manage your weight  Exercise can also help lower your cholesterol and blood sugar levels  Try to get at least 30 minutes of exercise at least 5 times each week  Try to be active every day  Your healthcare provider can help you create an exercise plan that works best for you  · Limit alcohol  Alcohol can increase your blood pressure and triglyceride levels  Men should limit alcohol to 2 drinks per day  Women should limit alcohol to 1 drink per day  A drink of alcohol is 12 ounces of beer, 5 ounces of wine, or 1½ ounces of liquor  · Do not smoke  Nicotine and other chemicals in cigarettes and cigars can cause heart and lung damage  Ask your healthcare provider for information if you currently smoke and need help to quit  E-cigarettes or smokeless tobacco still contain nicotine  Talk to your healthcare provider before you use these products  Follow up with your healthcare provider as directed:  Write down your questions so you remember to ask them during your visits  © 2017 [facility] Information is for End User's use only and may not be sold, redistributed or otherwise used for commercial purposes  All illustrations and images included in CareNotes® are the copyrighted property of [employer]  or Matt Hurst  The above information is an  only  It is not intended as medical advice for individual conditions or treatments  Talk to your doctor, nurse or pharmacist before following any medical regimen to see if it is safe and effective for you

## 2018-11-26 ENCOUNTER — APPOINTMENT (OUTPATIENT)
Dept: LAB | Facility: CLINIC | Age: 73
End: 2018-11-26
Payer: MEDICARE

## 2018-11-26 DIAGNOSIS — D50.9 IRON DEFICIENCY ANEMIA, UNSPECIFIED IRON DEFICIENCY ANEMIA TYPE: ICD-10-CM

## 2018-11-26 DIAGNOSIS — I10 ESSENTIAL HYPERTENSION: ICD-10-CM

## 2018-11-26 DIAGNOSIS — E78.2 MIXED HYPERLIPIDEMIA: ICD-10-CM

## 2018-11-26 LAB
ALBUMIN SERPL BCP-MCNC: 3.6 G/DL (ref 3.5–5)
ALP SERPL-CCNC: 77 U/L (ref 46–116)
ALT SERPL W P-5'-P-CCNC: 22 U/L (ref 12–78)
ANION GAP SERPL CALCULATED.3IONS-SCNC: 5 MMOL/L (ref 4–13)
AST SERPL W P-5'-P-CCNC: 15 U/L (ref 5–45)
BILIRUB SERPL-MCNC: 0.99 MG/DL (ref 0.2–1)
BUN SERPL-MCNC: 20 MG/DL (ref 5–25)
CALCIUM SERPL-MCNC: 8.8 MG/DL (ref 8.3–10.1)
CHLORIDE SERPL-SCNC: 103 MMOL/L (ref 100–108)
CHOLEST SERPL-MCNC: 238 MG/DL (ref 50–200)
CO2 SERPL-SCNC: 32 MMOL/L (ref 21–32)
CREAT SERPL-MCNC: 0.93 MG/DL (ref 0.6–1.3)
GFR SERPL CREATININE-BSD FRML MDRD: 80 ML/MIN/1.73SQ M
GLUCOSE P FAST SERPL-MCNC: 92 MG/DL (ref 65–99)
HDLC SERPL-MCNC: 48 MG/DL (ref 40–60)
HGB BLD-MCNC: 13 G/DL (ref 12–17)
LDLC SERPL CALC-MCNC: 162 MG/DL (ref 0–100)
NONHDLC SERPL-MCNC: 190 MG/DL
POTASSIUM SERPL-SCNC: 3.6 MMOL/L (ref 3.5–5.3)
PROT SERPL-MCNC: 6.8 G/DL (ref 6.4–8.2)
SODIUM SERPL-SCNC: 140 MMOL/L (ref 136–145)
TRIGL SERPL-MCNC: 139 MG/DL

## 2018-11-26 PROCEDURE — 36415 COLL VENOUS BLD VENIPUNCTURE: CPT

## 2018-11-26 PROCEDURE — 80061 LIPID PANEL: CPT

## 2018-11-26 PROCEDURE — 80053 COMPREHEN METABOLIC PANEL: CPT

## 2018-11-26 PROCEDURE — 85018 HEMOGLOBIN: CPT

## 2018-12-04 ENCOUNTER — HOSPITAL ENCOUNTER (OUTPATIENT)
Dept: NON INVASIVE DIAGNOSTICS | Facility: HOSPITAL | Age: 73
Discharge: HOME | End: 2018-12-04
Payer: MEDICARE

## 2018-12-04 DIAGNOSIS — I65.29 ASYMPTOMATIC CAROTID ARTERY STENOSIS, UNSPECIFIED LATERALITY: ICD-10-CM

## 2018-12-04 PROCEDURE — 93880 EXTRACRANIAL BILAT STUDY: CPT

## 2018-12-05 ENCOUNTER — TELEPHONE (OUTPATIENT)
Dept: INTERNAL MEDICINE CLINIC | Facility: CLINIC | Age: 73
End: 2018-12-05

## 2018-12-05 PROCEDURE — 93880 EXTRACRANIAL BILAT STUDY: CPT | Performed by: SURGERY

## 2018-12-05 NOTE — TELEPHONE ENCOUNTER
There does not appear to be change from prior - has blockage on one side as prior and they recommend recheck 6 months

## 2018-12-16 ENCOUNTER — HOSPITAL ENCOUNTER (INPATIENT)
Facility: HOSPITAL | Age: 73
LOS: 1 days | Discharge: HOME | DRG: 068 | End: 2018-12-18
Attending: EMERGENCY MEDICINE | Admitting: INTERNAL MEDICINE
Payer: MEDICARE

## 2018-12-16 ENCOUNTER — APPOINTMENT (EMERGENCY)
Dept: RADIOLOGY | Facility: HOSPITAL | Age: 73
DRG: 068 | End: 2018-12-16
Payer: MEDICARE

## 2018-12-16 DIAGNOSIS — R42 DIZZINESS AND GIDDINESS: ICD-10-CM

## 2018-12-16 DIAGNOSIS — I65.02 VERTEBRAL ARTERY STENOSIS, LEFT: ICD-10-CM

## 2018-12-16 DIAGNOSIS — I65.29 CAROTID ARTERY STENOSIS: ICD-10-CM

## 2018-12-16 DIAGNOSIS — I10 ACCELERATED HYPERTENSION: ICD-10-CM

## 2018-12-16 DIAGNOSIS — R29.90 STROKE-LIKE SYMPTOM: Primary | ICD-10-CM

## 2018-12-16 DIAGNOSIS — I10 HYPERTENSION: ICD-10-CM

## 2018-12-16 DIAGNOSIS — I65.21 STENOSIS OF RIGHT CAROTID ARTERY: ICD-10-CM

## 2018-12-16 DIAGNOSIS — I65.21 ASYMPTOMATIC STENOSIS OF RIGHT CAROTID ARTERY: ICD-10-CM

## 2018-12-16 DIAGNOSIS — I10 ESSENTIAL HYPERTENSION: ICD-10-CM

## 2018-12-16 LAB
ANION GAP BLD CALC-SCNC: 19 MMOL/L (ref 4–13)
ANION GAP SERPL CALCULATED.3IONS-SCNC: 6 MMOL/L (ref 4–13)
APTT PPP: 32 SECONDS (ref 26–38)
ATRIAL RATE: 52 BPM
BASE EXCESS BLDA CALC-SCNC: 3 MMOL/L (ref -2–3)
BUN BLD-MCNC: 15 MG/DL (ref 5–25)
BUN SERPL-MCNC: 16 MG/DL (ref 5–25)
CA-I BLD-SCNC: 1.18 MMOL/L (ref 1.12–1.32)
CA-I BLD-SCNC: 1.2 MMOL/L (ref 1.12–1.32)
CALCIUM SERPL-MCNC: 8.9 MG/DL (ref 8.3–10.1)
CHLORIDE BLD-SCNC: 100 MMOL/L (ref 100–108)
CHLORIDE SERPL-SCNC: 103 MMOL/L (ref 100–108)
CO2 SERPL-SCNC: 29 MMOL/L (ref 21–32)
CREAT BLD-MCNC: 0.8 MG/DL (ref 0.6–1.3)
CREAT SERPL-MCNC: 0.93 MG/DL (ref 0.6–1.3)
ERYTHROCYTE [DISTWIDTH] IN BLOOD BY AUTOMATED COUNT: 12.9 % (ref 11.6–15.1)
GFR SERPL CREATININE-BSD FRML MDRD: 80 ML/MIN/1.73SQ M
GFR SERPL CREATININE-BSD FRML MDRD: 87 ML/MIN/1.73SQ M
GLUCOSE SERPL-MCNC: 107 MG/DL (ref 65–140)
GLUCOSE SERPL-MCNC: 113 MG/DL (ref 65–140)
GLUCOSE SERPL-MCNC: 116 MG/DL (ref 65–140)
GLUCOSE SERPL-MCNC: 120 MG/DL (ref 65–140)
HCO3 BLDA-SCNC: 28.2 MMOL/L (ref 24–30)
HCT VFR BLD AUTO: 38.3 % (ref 36.5–49.3)
HCT VFR BLD CALC: 38 % (ref 36.5–49.3)
HCT VFR BLD CALC: 38 % (ref 36.5–49.3)
HGB BLD-MCNC: 13.1 G/DL (ref 12–17)
HGB BLDA-MCNC: 12.9 G/DL (ref 12–17)
HGB BLDA-MCNC: 12.9 G/DL (ref 12–17)
INR PPP: 0.95 (ref 0.86–1.17)
MCH RBC QN AUTO: 30.2 PG (ref 26.8–34.3)
MCHC RBC AUTO-ENTMCNC: 34.2 G/DL (ref 31.4–37.4)
MCV RBC AUTO: 88 FL (ref 82–98)
P AXIS: 56 DEGREES
PCO2 BLD: 27 MMOL/L (ref 21–32)
PCO2 BLD: 30 MMOL/L (ref 21–32)
PCO2 BLD: 45.9 MM HG (ref 42–50)
PH BLD: 7.4 [PH] (ref 7.3–7.4)
PLATELET # BLD AUTO: 160 THOUSANDS/UL (ref 149–390)
PMV BLD AUTO: 10.7 FL (ref 8.9–12.7)
PO2 BLD: 42 MM HG (ref 35–45)
POTASSIUM BLD-SCNC: 3.6 MMOL/L (ref 3.5–5.3)
POTASSIUM BLD-SCNC: 3.6 MMOL/L (ref 3.5–5.3)
POTASSIUM SERPL-SCNC: 3.5 MMOL/L (ref 3.5–5.3)
PR INTERVAL: 224 MS
PROTHROMBIN TIME: 12.8 SECONDS (ref 11.8–14.2)
QRS AXIS: -21 DEGREES
QRSD INTERVAL: 148 MS
QT INTERVAL: 472 MS
QTC INTERVAL: 438 MS
RBC # BLD AUTO: 4.34 MILLION/UL (ref 3.88–5.62)
SAO2 % BLD FROM PO2: 76 % (ref 95–98)
SODIUM BLD-SCNC: 141 MMOL/L (ref 136–145)
SODIUM BLD-SCNC: 142 MMOL/L (ref 136–145)
SODIUM SERPL-SCNC: 138 MMOL/L (ref 136–145)
SPECIMEN SOURCE: ABNORMAL
SPECIMEN SOURCE: ABNORMAL
T WAVE AXIS: 38 DEGREES
TROPONIN I SERPL-MCNC: <0.02 NG/ML
VENTRICULAR RATE: 52 BPM
WBC # BLD AUTO: 6.72 THOUSAND/UL (ref 4.31–10.16)

## 2018-12-16 PROCEDURE — 84295 ASSAY OF SERUM SODIUM: CPT

## 2018-12-16 PROCEDURE — 99285 EMERGENCY DEPT VISIT HI MDM: CPT

## 2018-12-16 PROCEDURE — 85730 THROMBOPLASTIN TIME PARTIAL: CPT | Performed by: EMERGENCY MEDICINE

## 2018-12-16 PROCEDURE — 85014 HEMATOCRIT: CPT

## 2018-12-16 PROCEDURE — 84484 ASSAY OF TROPONIN QUANT: CPT | Performed by: EMERGENCY MEDICINE

## 2018-12-16 PROCEDURE — 82330 ASSAY OF CALCIUM: CPT

## 2018-12-16 PROCEDURE — 71045 X-RAY EXAM CHEST 1 VIEW: CPT

## 2018-12-16 PROCEDURE — 70498 CT ANGIOGRAPHY NECK: CPT

## 2018-12-16 PROCEDURE — 82803 BLOOD GASES ANY COMBINATION: CPT

## 2018-12-16 PROCEDURE — 93010 ELECTROCARDIOGRAM REPORT: CPT | Performed by: INTERNAL MEDICINE

## 2018-12-16 PROCEDURE — 99223 1ST HOSP IP/OBS HIGH 75: CPT | Performed by: INTERNAL MEDICINE

## 2018-12-16 PROCEDURE — 85610 PROTHROMBIN TIME: CPT | Performed by: EMERGENCY MEDICINE

## 2018-12-16 PROCEDURE — 84132 ASSAY OF SERUM POTASSIUM: CPT

## 2018-12-16 PROCEDURE — 82947 ASSAY GLUCOSE BLOOD QUANT: CPT

## 2018-12-16 PROCEDURE — 70496 CT ANGIOGRAPHY HEAD: CPT

## 2018-12-16 PROCEDURE — 80048 BASIC METABOLIC PNL TOTAL CA: CPT | Performed by: EMERGENCY MEDICINE

## 2018-12-16 PROCEDURE — 1124F ACP DISCUSS-NO DSCNMKR DOCD: CPT | Performed by: INTERNAL MEDICINE

## 2018-12-16 PROCEDURE — 85027 COMPLETE CBC AUTOMATED: CPT | Performed by: EMERGENCY MEDICINE

## 2018-12-16 PROCEDURE — 99205 OFFICE O/P NEW HI 60 MIN: CPT | Performed by: PSYCHIATRY & NEUROLOGY

## 2018-12-16 PROCEDURE — 93005 ELECTROCARDIOGRAM TRACING: CPT

## 2018-12-16 PROCEDURE — 82948 REAGENT STRIP/BLOOD GLUCOSE: CPT

## 2018-12-16 PROCEDURE — 80047 BASIC METABLC PNL IONIZED CA: CPT

## 2018-12-16 PROCEDURE — 36415 COLL VENOUS BLD VENIPUNCTURE: CPT | Performed by: EMERGENCY MEDICINE

## 2018-12-16 RX ORDER — DOCUSATE SODIUM 100 MG/1
100 CAPSULE, LIQUID FILLED ORAL 2 TIMES DAILY
Status: DISCONTINUED | OUTPATIENT
Start: 2018-12-16 | End: 2018-12-18 | Stop reason: HOSPADM

## 2018-12-16 RX ORDER — LORAZEPAM 2 MG/ML
0.5 INJECTION INTRAMUSCULAR DAILY PRN
Status: DISCONTINUED | OUTPATIENT
Start: 2018-12-16 | End: 2018-12-18 | Stop reason: HOSPADM

## 2018-12-16 RX ORDER — ONDANSETRON 2 MG/ML
4 INJECTION INTRAMUSCULAR; INTRAVENOUS EVERY 6 HOURS PRN
Status: DISCONTINUED | OUTPATIENT
Start: 2018-12-16 | End: 2018-12-18 | Stop reason: HOSPADM

## 2018-12-16 RX ORDER — CARVEDILOL 25 MG/1
25 TABLET ORAL 2 TIMES DAILY WITH MEALS
Status: DISCONTINUED | OUTPATIENT
Start: 2018-12-17 | End: 2018-12-18 | Stop reason: HOSPADM

## 2018-12-16 RX ORDER — ACETAMINOPHEN 325 MG/1
650 TABLET ORAL EVERY 4 HOURS PRN
Status: DISCONTINUED | OUTPATIENT
Start: 2018-12-16 | End: 2018-12-18 | Stop reason: HOSPADM

## 2018-12-16 RX ORDER — ATORVASTATIN CALCIUM 40 MG/1
40 TABLET, FILM COATED ORAL EVERY EVENING
Status: DISCONTINUED | OUTPATIENT
Start: 2018-12-16 | End: 2018-12-18 | Stop reason: HOSPADM

## 2018-12-16 RX ORDER — CLOPIDOGREL BISULFATE 75 MG/1
75 TABLET ORAL DAILY
Status: DISCONTINUED | OUTPATIENT
Start: 2018-12-16 | End: 2018-12-18 | Stop reason: HOSPADM

## 2018-12-16 RX ORDER — LISINOPRIL 20 MG/1
40 TABLET ORAL DAILY
Status: DISCONTINUED | OUTPATIENT
Start: 2018-12-17 | End: 2018-12-16

## 2018-12-16 RX ORDER — ASPIRIN 81 MG/1
324 TABLET ORAL ONCE
Status: DISCONTINUED | OUTPATIENT
Start: 2018-12-16 | End: 2018-12-16

## 2018-12-16 RX ORDER — LISINOPRIL 20 MG/1
40 TABLET ORAL DAILY
Status: DISCONTINUED | OUTPATIENT
Start: 2018-12-16 | End: 2018-12-18 | Stop reason: HOSPADM

## 2018-12-16 RX ORDER — MELATONIN
1000 DAILY
Status: DISCONTINUED | OUTPATIENT
Start: 2018-12-17 | End: 2018-12-18 | Stop reason: HOSPADM

## 2018-12-16 RX ORDER — POTASSIUM CHLORIDE 750 MG/1
10 TABLET, EXTENDED RELEASE ORAL 2 TIMES DAILY
Status: DISCONTINUED | OUTPATIENT
Start: 2018-12-16 | End: 2018-12-18 | Stop reason: HOSPADM

## 2018-12-16 RX ORDER — CHLORTHALIDONE 25 MG/1
50 TABLET ORAL DAILY
Status: DISCONTINUED | OUTPATIENT
Start: 2018-12-17 | End: 2018-12-18 | Stop reason: HOSPADM

## 2018-12-16 RX ORDER — CALCIUM CARBONATE 200(500)MG
1000 TABLET,CHEWABLE ORAL DAILY PRN
Status: DISCONTINUED | OUTPATIENT
Start: 2018-12-16 | End: 2018-12-18 | Stop reason: HOSPADM

## 2018-12-16 RX ORDER — ASPIRIN 81 MG/1
81 TABLET, CHEWABLE ORAL DAILY
Status: DISCONTINUED | OUTPATIENT
Start: 2018-12-17 | End: 2018-12-18 | Stop reason: HOSPADM

## 2018-12-16 RX ORDER — ASPIRIN 81 MG/1
324 TABLET, CHEWABLE ORAL ONCE
Status: COMPLETED | OUTPATIENT
Start: 2018-12-16 | End: 2018-12-16

## 2018-12-16 RX ADMIN — DOCUSATE SODIUM 100 MG: 100 CAPSULE, LIQUID FILLED ORAL at 20:05

## 2018-12-16 RX ADMIN — POTASSIUM CHLORIDE 10 MEQ: 750 TABLET, EXTENDED RELEASE ORAL at 20:06

## 2018-12-16 RX ADMIN — ASPIRIN 324 MG: 81 TABLET, CHEWABLE ORAL at 13:34

## 2018-12-16 RX ADMIN — ATORVASTATIN CALCIUM 40 MG: 40 TABLET, FILM COATED ORAL at 20:05

## 2018-12-16 RX ADMIN — CLOPIDOGREL BISULFATE 75 MG: 75 TABLET ORAL at 13:20

## 2018-12-16 RX ADMIN — LISINOPRIL 40 MG: 20 TABLET ORAL at 20:05

## 2018-12-16 RX ADMIN — IOHEXOL 85 ML: 350 INJECTION, SOLUTION INTRAVENOUS at 11:02

## 2018-12-16 NOTE — ED ATTENDING ATTESTATION
Nghia Carlson MD, saw and evaluated the patient  I have discussed the patient with the resident/non-physician practitioner and agree with the resident's/non-physician practitioner's findings, Plan of Care, and MDM as documented in the resident's/non-physician practitioner's note, except where noted  All available labs and Radiology studies were reviewed  At this point I agree with the current assessment done in the Emergency Department  I have conducted an independent evaluation of this patient a history and physical is as follows:      Critical Care Time  CritCare Time    Procedures     Patient with the sensation of unsteadiness no his feet  He notes these symptoms for the past month  Patient also reports that the symptoms have worsened today  He feels like he is wildly and on a boat  No fevers, chills, sweats  No head trauma  No falls  He does have a history of hypertension and coronary artery disease  No fevers, chills, sweats, headache, vision changes, neck stiffness  Other than the unsteadiness on his feet, no focal neurological symptoms  He does have chronic lower extremity numbness which may be the cause of the symptoms but we cannot rule out a posterior stroke  Medical decision making:  Well-appearing 25-year-old male, will admit for stroke rule out

## 2018-12-16 NOTE — ASSESSMENT & PLAN NOTE
Noted on CTA today to be 80% stenosis  Consult vascular surgery  Check lipid panel    Continue aspirin, Plavix, statin

## 2018-12-16 NOTE — ED PROVIDER NOTES
History  Chief Complaint   Patient presents with    Dizziness     Pt states that he has been having dizziness on/off for about a month pt was worked up by PCP for it  HPI     15-year-old male presenting with chief complaint of unsteady gait  Patient states this been a problem for last 1 month  Patient states it became worse today  Patient felt like he was on a "boat"  Patient felt like he was swelling and had difficult time walking  Patient states he was walking off to the side  Patient states this was the worst and hemo spell  Patient needs to hold onto the side of his bed and his wife comes emergency room  Patient states he has seen his primary care physician had a workup for this  Patient states that symptoms are worse  Currently patient does not have a symptoms when he is sitting  Patient denies trauma head strike LOC  Patient did not fall  Patient states he has a history of hypertension CAD, spinal stenosis  Patient denies fever chills rigors headache vision changes neck pain neck stiffness chest pain palpitations shortness of breath cough pleurisy abdominal pain nausea vomiting diarrhea constipation urinary symptoms  Patient states that he has chronic lower extremity numbness and tingling that he has had for many years  Prior to Admission Medications   Prescriptions Last Dose Informant Patient Reported? Taking?    NIFEdipine (PROCARDIA XL) 90 mg 24 hr tablet 12/15/2018 at Unknown time  No Yes   Sig: Take 1 tablet by mouth daily   carvedilol (COREG) 25 mg tablet 12/15/2018 at Unknown time  No Yes   Sig: Take 1 tablet (25 mg total) by mouth 2 (two) times a day with meals   chlorthalidone (HYGROTEN) 50 MG tablet Unknown at Unknown time  Yes No   Sig: Take 50 mg by mouth daily     cholecalciferol (VITAMIN D3) 1,000 units tablet Past Week at Unknown time  Yes Yes   Sig: Take 1,000 Units by mouth daily     enalapril (VASOTEC) 20 mg tablet 12/15/2018 at Unknown time  No Yes   Sig: Take 1 tablet by mouth 2 (two) times a day   ferrous sulfate 325 (65 Fe) mg tablet 12/15/2018 at Unknown time Self Yes Yes   Sig: Take 325 mg by mouth daily with breakfast   potassium chloride (MICRO-K) 10 MEQ CR capsule 12/15/2018 at Unknown time  No Yes   Sig: Take 1 capsule (10 mEq total) by mouth 2 (two) times a day      Facility-Administered Medications Last Administration Doses Remaining   cyanocobalamin injection 1,000 mcg 11/15/2018  7:58 AM           Past Medical History:   Diagnosis Date    Arthritis     Cholecystitis     Coronary artery disease     Hypertension     Spinal stenosis        Past Surgical History:   Procedure Laterality Date    CHOLECYSTECTOMY      COLON SURGERY      bwel resection    COLONOSCOPY      ESOPHAGOGASTRODUODENOSCOPY      02/07/2007  ONSET    HI LAP,CHOLECYSTECTOMY N/A 12/5/2017    Procedure: Sean Francisco;  Surgeon: Marianna Rose MD;  Location: [facility];  Service: General    SMALL INTESTINE SURGERY      ON[facility] [address] [facility] [address] 2011       Family History   Problem Relation Age of Onset    Arthritis Mother     Heart attack Father     Coronary artery disease Family     Diabetes Family      I have reviewed and agree with the history as documented  Social History   Substance Use Topics    Smoking status: Former Smoker     Packs/day: 2 00     Years: 3 00     Quit date: 1966    Smokeless tobacco: Never Used    Alcohol use Yes      Comment: social         Review of Systems   Musculoskeletal: Positive for gait problem  Neurological: Positive for numbness  Negative for dizziness, tremors, seizures, syncope, facial asymmetry, speech difficulty, weakness, light-headedness and headaches  All other systems reviewed and are negative        Physical Exam  ED Triage Vitals [12/16/18 0936]   Temperature Pulse Respirations Blood Pressure SpO2   97 9 °F (36 6 °C) 57 18 (!) 198/92 96 %      Temp Source Heart Rate Source Patient Position - Orthostatic VS BP Location FiO2 (%)   Tympanic Monitor Sitting Right arm --      Pain Score       No Pain           Orthostatic Vital Signs  Vitals:    12/16/18 1900 12/16/18 1915 12/16/18 1916 12/16/18 1917   BP: 162/81 (!) 188/81 (!) 230/78 (!) 219/84   Pulse: (!) 54 60     Patient Position - Orthostatic VS: Lying Lying - Orthostatic VS Sitting - Orthostatic VS Standing - Orthostatic VS       Physical Exam   Constitutional: He is oriented to person, place, and time  He appears well-developed and well-nourished  No distress  HENT:   Head: Normocephalic and atraumatic  Right Ear: External ear normal    Left Ear: External ear normal    Nose: Nose normal    Mouth/Throat: Oropharynx is clear and moist  No oropharyngeal exudate  Eyes: Pupils are equal, round, and reactive to light  Conjunctivae and EOM are normal  Right eye exhibits no discharge  Left eye exhibits no discharge  No scleral icterus  Neck: Normal range of motion  Neck supple  No JVD present  No tracheal deviation present  No thyromegaly present  Cardiovascular: Normal rate, regular rhythm, normal heart sounds and intact distal pulses  No murmur heard  Pulmonary/Chest: Effort normal and breath sounds normal  No stridor  No respiratory distress  He has no wheezes  He has no rales  Abdominal: Soft  Bowel sounds are normal  He exhibits no distension and no mass  There is no tenderness  There is no rebound and no guarding  Musculoskeletal: Normal range of motion  He exhibits no edema, tenderness or deformity  Lymphadenopathy:     He has no cervical adenopathy  Neurological: He is alert and oriented to person, place, and time  No cranial nerve deficit or sensory deficit  He exhibits normal muscle tone  Coordination normal  GCS eye subscore is 4  GCS verbal subscore is 5  GCS motor subscore is 6  Reflex Scores:       Tricep reflexes are 2+ on the right side and 2+ on the left side  Bicep reflexes are 2+ on the right side and 2+ on the left side         Patellar reflexes are 2+ on the right side and 2+ on the left side  Achilles reflexes are 2+ on the right side and 2+ on the left side  Patient is 5/5 strength in upper lower extremities, sensation intact throughout, no pronator drift  Cerebellar testing including finger-to-nose heel-to-shin rapid alternating movements are intact  Patient Romberg positive  Patient has unsteady gait  Visual fields are intact  Speech is articulate  Follows commands without difficulty  Alert oriented x3  No lateralizing signs  No neglect on exam    Skin: Skin is warm and dry  No rash noted  He is not diaphoretic  No erythema  Psychiatric: He has a normal mood and affect  His behavior is normal  Judgment and thought content normal    Nursing note and vitals reviewed        ED Medications  Medications   clopidogrel (PLAVIX) tablet 75 mg (75 mg Oral Given 12/16/18 1320)   carvedilol (COREG) tablet 25 mg (not administered)   chlorthalidone tablet 50 mg (not administered)   cholecalciferol (VITAMIN D3) tablet 1,000 Units (not administered)   lisinopril (ZESTRIL) tablet 40 mg (not administered)   NIFEdipine (PROCARDIA XL) 24 hr tablet 90 mg (not administered)   acetaminophen (TYLENOL) tablet 650 mg (not administered)   ondansetron (ZOFRAN) injection 4 mg (not administered)   docusate sodium (COLACE) capsule 100 mg (not administered)   calcium carbonate (TUMS) chewable tablet 1,000 mg (not administered)   atorvastatin (LIPITOR) tablet 40 mg (not administered)   aspirin chewable tablet 81 mg (not administered)   enoxaparin (LOVENOX) subcutaneous injection 40 mg (not administered)   LORazepam (ATIVAN) 2 mg/mL injection 0 5 mg (not administered)   potassium chloride (K-DUR,KLOR-CON) CR tablet 10 mEq (not administered)   iohexol (OMNIPAQUE) 350 MG/ML injection (MULTI-DOSE) 85 mL (85 mL Intravenous Given 12/16/18 1102)   aspirin chewable tablet 324 mg (324 mg Oral Given 12/16/18 1334)       Diagnostic Studies  Results Reviewed     Procedure Component Value Units Date/Time    Troponin I [[id number]]  (Normal) Collected:  12/16/18 1024    Lab Status:  Final result Specimen:  Blood from Arm, Right Updated:  12/16/18 1103     Troponin I <0 02 ng/mL     Basic metabolic panel [[id number]] Collected:  12/16/18 1024    Lab Status:  Final result Specimen:  Blood from Arm, Right Updated:  12/16/18 1052     Sodium 138 mmol/L      Potassium 3 5 mmol/L      Chloride 103 mmol/L      CO2 29 mmol/L      ANION GAP 6 mmol/L      BUN 16 mg/dL      Creatinine 0 93 mg/dL      Glucose 113 mg/dL      Calcium 8 9 mg/dL      eGFR 80 ml/min/1 73sq m     Narrative:         National Kidney Disease Education Program recommendations are as follows:  GFR calculation is accurate only with a steady state creatinine  Chronic Kidney disease less than 60 ml/min/1 73 sq  meters  Kidney failure less than 15 ml/min/1 73 sq  meters      APTT [[id number]]  (Normal) Collected:  12/16/18 1024    Lab Status:  Final result Specimen:  Blood from Arm, Right Updated:  12/16/18 1048     PTT 32 seconds     Protime-INR [[id number]]  (Normal) Collected:  12/16/18 1024    Lab Status:  Final result Specimen:  Blood from Arm, Right Updated:  12/16/18 1048     Protime 12 8 seconds      INR 0 95    POCT Blood Gas (CG8+) [[id number]]  (Abnormal) Collected:  12/16/18 1031    Lab Status:  Final result Updated:  12/16/18 1036     ph, Catr[place]ito ISTAT 7 397     pCO2, [place] i-STAT 45 9 mm HG      pO2, Cat[place] i-STAT 42 0 mm HG      BE, i-STAT 3 mmol/L      HCO3, Catr[place] i-STAT 28 2 mmol/L      CO2, i-STAT 30 mmol/L      O2 Sat, i-STAT 76 (L) %      SODIUM, I-STAT 141 mmol/l      Potassium, i-STAT 3 6 mmol/L      Calcium, Ionized i-STAT 1 18 mmol/L      Hct, i-STAT 38 %      Hgb, i-STAT 12 9 g/dl      Glucose, i-STAT 116 mg/dl      Specimen Type VENOUS    POCT Chem 8+ [[id number]]  (Abnormal) Collected:  12/16/18 1030    Lab Status:  Final result Updated:  12/16/18 1034     SODIUM, I-STAT 142 mmol/l      Potassium, i-STAT 3 6 mmol/L      Chloride, istat 100 mmol/L      CO2, i-STAT 27 mmol/L      Anion Gap, i-STAT 19 (H) mmol/L      Calcium, Ionized i-STAT 1 20 mmol/L      BUN, I-STAT 15 mg/dl      Creatinine, i-STAT 0 8 mg/dl      eGFR 87 ml/min/1 73sq m      Glucose, i-STAT 120 mg/dl      Hct, i-STAT 38 %      Hgb, i-STAT 12 9 g/dl      Specimen Type VENOUS    CBC [[id number]]  (Normal) Collected:  12/16/18 1024    Lab Status:  Final result Specimen:  Blood from Arm, Right Updated:  12/16/18 1033     WBC 6 72 Thousand/uL      RBC 4 34 Million/uL      Hemoglobin 13 1 g/dL      Hematocrit 38 3 %      MCV 88 fL      MCH 30 2 pg      MCHC 34 2 g/dL      RDW 12 9 %      Platelets 500 Thousands/uL      MPV 10 7 fL     Fingerstick Glucose (POCT) [[id number]]  (Normal) Collected:  12/16/18 1022    Lab Status:  Final result Updated:  12/16/18 1025     POC Glucose 107 mg/dl     POCT urinalysis dipstick [[id number]]     Lab Status:  No result Specimen:  Urine                  CTA head and neck w wo contrast   Final Result by Cris Garcia MD (12/16 1231)         1  Hemodynamically significant, 80% stenosis of the proximal right internal carotid artery, 1 cm distal to the origin  Stenosis has progressed slightly from the prior study  Vascular surgery assessment recommended  2   Mild, chronic microangiopathy and chronic appearing bilateral lacunar infarctions are new from the prior study  3   Moderate intradural vertebral artery atherosclerotic disease in the dominant left vertebral artery and scattered moderate calcified plaque in the cavernous segments of both internal cerebral arteries noted, similar to the prior study  No large    vessel occlusion or vascular cut off                    Workstation performed: [id number]         X-ray chest 1 view portable   ED Interpretation by Elena Iyer DO (12/16 1228)   No effusions or consolidations bilaterally      MRI Inpatient Order    (Results Pending)         Procedures  Procedures      Phone Consults  ED Phone Contact    ED Course  ED Course as of Dec 16 1935   Rebeca Ocasio Dec 16, 2018   1149     EKG documentation my interpretation  Vent  rate 52 BPM  OK interval 224 ms  QRS duration 148 ms  QT/QTc 472/438 ms  P-R-T axes 56 -21 38  Sinus bradycardia with 1st degree A-V block  Right bundle branch block  Abnormal ECG  When compared with ECG of 18-OCT-2017 21:25,  OK interval has increased  Nonspecific T wave abnormality, worse in Lateral leads            Identification of Seniors at Risk      Most Recent Value   (ISAR) Identification of Seniors at Risk   Before the illness or injury that brought you to the Emergency, did you need someone to help you on a regular basis? 0 Filed at: 12/16/2018 0937   In the last 24 hours, have you needed more help than usual?  0 Filed at: 12/16/2018 1075   Have you been hospitalized for one or more nights during the past 6 months? 0 Filed at: 12/16/2018 0937   In general, do you see well?  0 Filed at: 12/16/2018 3330   In general, do you have serious problems with your memory? 0 Filed at: 12/16/2018 0113   Do you take more than three different medications every day? 1 Filed at: 12/16/2018 0552   ISAR Score  1 Filed at: 12/16/2018 1612              Stroke Assessment     Row Name 12/16/18 1148             NIH Stroke Scale    Interval Baseline      Level of Consciousness (1a ) 0      LOC Questions (1b ) 0      LOC Commands (1c ) 0      Best Gaze (2 ) 0      Visual (3 ) 0      Facial Palsy (4 ) 0      Motor Arm, Left (5a ) 0      Motor Arm, Right (5b ) 0      Motor Leg, Left (6a ) 0      Motor Leg, Right (6b ) 0      Limb Ataxia (7 ) 0      Sensory (8 ) 0      Best Language (9 ) 0      Dysarthria (10 ) 0      Extinction and Inattention (11 ) (Formerly Neglect) 0      Total 0                First Filed Value   TPA Decision  Patient not a TPA candidate  Patient is not a candidate options  Unclear time of onset outside appropriate time window                          MDM  Number of Diagnoses or Management Options  Asymptomatic stenosis of right carotid artery:   Dizziness and giddiness:   Hypertension:   Stroke-like symptom:   Vertebral artery stenosis, left:   Diagnosis management comments: The 40-year-old male presenting with unsteady gait  Patient has posterior findings on exam   On exam vital signs are significant for elevated blood pressure  Patient has unsteady gait  Patient has positive Romberg exam   Differential diagnosis includes vertebral basilar insufficiency versus posterior CVA intracranial hemorrhage, brain stab hemorrhage, hypertensive emergency  Differential diagnosis includes but not limited to  CTA head and neck showed the left vertebral artery stenosis dominant  Patient given aspirin Plavix Neurology contacted  Agreed with aspirin Plavix  Patient will be admitted to stroke pathway  Patient is not a tPA candidate secondary to time frame of symptoms have been going on for 1 month  NIH stroke scale of 0 on admission  Posterior circulation CNS symptoms  Patient admitted to stroke pathway under [facility] Internal Medicine service      CritCare Time    Disposition  Final diagnoses:   Stroke-like symptom   Dizziness and giddiness   Asymptomatic stenosis of right carotid artery   Hypertension   Vertebral artery stenosis, left     Time reflects when diagnosis was documented in both MDM as applicable and the Disposition within this note     Time User Action Codes Description Comment    12/16/2018  1:28 PM Bindu Griffith Add [R29 90] Stroke-like symptom     12/16/2018  2:32 PM Jaqueline Redd [address] Essential hypertension     12/16/2018  2:33 PM Bubba Krabbe Add [I65 29] Carotid artery stenosis     12/16/2018  7:32 PM Abram Venegas Add [R42] Dizziness and giddiness     12/16/2018  7:32 PM Bindu Griffith Add [I65 21] Asymptomatic stenosis of right carotid artery     12/16/2018  7:33 PM Bindu Griffith Add [I10] Hypertension     12/16/2018  7:33 PM Diaz Venegas [I65 02] Vertebral artery stenosis, left       ED Disposition     ED Disposition Condition Comment    Admit  Case was discussed with LAINEY and the patient's admission status was agreed to be Admission Status: observation status to the service of [facility]   Follow-up Information    None         Current Discharge Medication List      CONTINUE these medications which have NOT CHANGED    Details   carvedilol (COREG) 25 mg tablet Take 1 tablet (25 mg total) by mouth 2 (two) times a day with meals  Qty: 60 tablet, Refills: 5    Associated Diagnoses: Coronary artery disease with angina pectoris, unspecified vessel or lesion type, unspecified whether native or transplanted heart (HCC)      cholecalciferol (VITAMIN D3) 1,000 units tablet Take 1,000 Units by mouth daily        enalapril (VASOTEC) 20 mg tablet Take 1 tablet by mouth 2 (two) times a day  Qty: 60 tablet, Refills: 0      ferrous sulfate 325 (65 Fe) mg tablet Take 325 mg by mouth daily with breakfast      NIFEdipine (PROCARDIA XL) 90 mg 24 hr tablet Take 1 tablet by mouth daily  Qty: 30 tablet, Refills: 0      potassium chloride (MICRO-K) 10 MEQ CR capsule Take 1 capsule (10 mEq total) by mouth 2 (two) times a day  Qty: 60 capsule, Refills: 5    Associated Diagnoses: Potassium deficiency      chlorthalidone (HYGROTEN) 50 MG tablet Take 50 mg by mouth daily             No discharge procedures on file  ED Provider  Attending physically available and evaluated Janes Dana SHULTZ managed the patient along with the ED Attending      Electronically Signed by         Zayra Salcedo DO  12/16/18 9660

## 2018-12-16 NOTE — CONSULTS
PATIENT NAME: Michael Bejarano  YOB: 1945   MEDICAL RECORD NUMBER: [id number]  Chief Complaint/Reason for Consult: dizziness     HPI: Michael Bejarano  is a 74 y o  male with history of spinal stenosis, CAD, hypertensive urgency and HTN, right carotid stenosis who presented to the hospital after waking up and getting up feeling things moving and being very unsteady  No light headedness or visual changes  He has had this before much less severe when bending down  He has significant cervical stenosis with limitation in head movement  He has chronic lower extremity sensory loss and some limitation in movement in the 1st to 3rd right digits  He had this symptom last for some time and when he got up to go to the hospital it recurred  He was better now when amulating in the hallways  He had no other associated acute neurological findings  PAST MEDICAL HISTORY  Past Medical History:   Diagnosis Date    Arthritis     Cholecystitis     Coronary artery disease     Hypertension     Spinal stenosis         PAST SURGICAL HISTORY  Past Surgical History:   Procedure Laterality Date    CHOLECYSTECTOMY      COLON SURGERY      bwel resection    COLONOSCOPY      ESOPHAGOGASTRODUODENOSCOPY      02/07/2007  ONSET    DC LAP,CHOLECYSTECTOMY N/A 12/5/2017    Procedure: CHOLECYSTECTOMY LAPAROSCOPIC;  Surgeon: Judge Richa MD;  [facility];  Service: General    SMALL INTESTINE SURGERY      [facility] [address] [place] [address] [address]        ALLERGIES: Patient has no known allergies  CURRENT MEDICATIONS  Scheduled Meds:  Current Facility-Administered Medications:  clopidogrel 75 mg Oral Daily Clifton Venegas, DO   cyanocobalamin 1,000 mcg Intramuscular Q30 Days Kosta Tompkins, DO   LORazepam 0 5 mg Intravenous Daily PRN Aysha Avina MD   potassium chloride 10 mEq Oral BID Aysha Avina MD     Continuous Infusions:   PRN Meds:  LORazepam     SOCIAL HISTORY   reports that he quit smoking about 52 years ago   He has a 6 00 pack-year smoking history  He has never used smokeless tobacco  He reports that he drinks alcohol  He reports that he does not use drugs  FAMILY HISTORY  Family History   Problem Relation Age of Onset    Arthritis Mother     Heart attack Father     Coronary artery disease Family     Diabetes Family         REVIEW OF SYSTEMS   Extensive 12 point ROS conducted, negative except for HPI  PHYSICAL EXAMINATION  Temp:  [97 9 °F (36 6 °C)] 97 9 °F (36 6 °C)  HR:  [46-78] 60  Resp:  [15-20] 18  BP: (161-198)/(72-92) 161/72   General Examination: In no apparent distress, well developed and well nourished, and cooperative   HEENT: Normocephalic, Atraumatic  Moist mucus membranes  Anicteric  PPC    CVS: Regular rate and rhythm  S1 S2 noted  No audible murmurs  No carotids bruits  Peripheral pulses palpable throughout   Lungs: Clear to auscultation bilaterally  No rales, rhonchi, wheezing  Abdomen: Bowel sounds positive  Non- tender  Non-distended  No organomegaly  Ext: No edema   Psych: Thought content - No VH/AH  No delusions  Though Process - logical    Skin - No rash    Neurological Examination:   Mental Status: The patient was awake, alert, attentive, oriented to person, place, and time  Recent and remote memory intact to conversation with no evidence of language dysfunction  Satisfactory fund of knowledge  Able to name, repeat  Cranial Nerves:   I: smell Not tested   II: visual fields Full to confrontation  Pupils equal, round, reactive to light with normal accomodation  Fundus: benign fundus  III,IV,VI: extraocular muscles EOMI, + right beat end gaze nystagmus when looking to the right  V: masseter and pterygoid strength full  Sensation in the V1 through V3 distributions intact to   light touch bilaterally  VII: mild left NLF effacement but no volitional weakness  VIII: Audition intact to finger rub bilaterally  IX/X: Uvula midline  Soft palate elevation symmetric     XI: Trapezius and SCM strength 5/5 B/L  XII: Tongue midline with no atrophy or fasciculations with appropriate movement  Motor Examination:   No pronator drift  Bulk: Normal  No atrophy Tone: Normal  Fasciculations: None  Deltoid Biceps Triceps WE   WF   FF IO     Right        5         5          5         5      5      5   5        Left           5        5          5          5      5     5   5                       IP        Quad   Ham     TA       Gastroc   Right      5            5          5         5                5  Left         5            5         5         5                5       Reflexes:   Brisker on the left than right with suprapatellar on the left noted  Toes down  Coordination: Patient able to perform normal finger-to-nose and foot tapping with intention tremor, no dysmetria  Normal rapid alternating movements  Sensory: Non lateralizing  Gait: mildly hunched  Normal otherwise base, stridge length  Able to stand with feet together with no sway and eyes open       Labs:  Recent Results (from the past 24 hour(s))   Fingerstick Glucose (POCT)    Collection Time: 12/16/18 10:22 AM   Result Value Ref Range    POC Glucose 107 65 - 140 mg/dl   APTT    Collection Time: 12/16/18 10:24 AM   Result Value Ref Range    PTT 32 26 - 38 seconds   Basic metabolic panel    Collection Time: 12/16/18 10:24 AM   Result Value Ref Range    Sodium 138 136 - 145 mmol/L    Potassium 3 5 3 5 - 5 3 mmol/L    Chloride 103 100 - 108 mmol/L    CO2 29 21 - 32 mmol/L    ANION GAP 6 4 - 13 mmol/L    BUN 16 5 - 25 mg/dL    Creatinine 0 93 0 60 - 1 30 mg/dL    Glucose 113 65 - 140 mg/dL    Calcium 8 9 8 3 - 10 1 mg/dL    eGFR 80 ml/min/1 73sq m   CBC    Collection Time: 12/16/18 10:24 AM   Result Value Ref Range    WBC 6 72 4 31 - 10 16 Thousand/uL    RBC 4 34 3 88 - 5 62 Million/uL    Hemoglobin 13 1 12 0 - 17 0 g/dL    Hematocrit 38 3 36 5 - 49 3 %    MCV 88 82 - 98 fL    MCH 30 2 26 8 - 34 3 pg    MCHC 34 2 31 4 - 37 4 g/dL RDW 12 9 11 6 - 15 1 %    Platelets 338 783 - 979 Thousands/uL    MPV 10 7 8 9 - 12 7 fL   Protime-INR    Collection Time: 12/16/18 10:24 AM   Result Value Ref Range    Protime 12 8 11 8 - 14 2 seconds    INR 0 95 0 86 - 1 17   Troponin I    Collection Time: 12/16/18 10:24 AM   Result Value Ref Range    Troponin I <0 02 <=0 04 ng/mL   ECG 12 lead    Collection Time: 12/16/18 10:24 AM   Result Value Ref Range    Ventricular Rate 52 BPM    Atrial Rate 52 BPM    KY Interval 224 ms    QRSD Interval 148 ms    QT Interval 472 ms    QTC Interval 438 ms    P Axis 56 degrees    QRS Axis -21 degrees    T Wave Axis 38 degrees   POCT Chem 8+    Collection Time: 12/16/18 10:30 AM   Result Value Ref Range    SODIUM, I-STAT 142 136 - 145 mmol/l    Potassium, i-STAT 3 6 3 5 - 5 3 mmol/L    Chloride, istat 100 100 - 108 mmol/L    CO2, i-STAT 27 21 - 32 mmol/L    Anion Gap, i-STAT 19 (H) 4 - 13 mmol/L    Calcium, Ionized i-STAT 1 20 1  12 - 1 32 mmol/L    BUN, I-STAT 15 5 - 25 mg/dl    Creatinine, i-STAT 0 8 0 6 - 1 3 mg/dl    eGFR 87 ml/min/1 73sq m    Glucose, i-STAT 120 65 - 140 mg/dl    Hct, i-STAT 38 36 5 - 49 3 %    Hgb, i-STAT 12 9 12 0 - 17 0 g/dl    Specimen Type VENOUS    POCT Blood Gas (CG8+)    Collection Time: 12/16/18 10:31 AM   Result Value Ref Range    ph, [place] ISTAT 7 397 7 300 - 7 400    pCO2, [place] i-STAT 45 9 42 0 - 50 0 mm HG    pO2, [place] i-STAT 42 0 35 0 - 45 0 mm HG    BE, i-STAT 3 -2 - 3 mmol/L    HCO3, [place] i-STAT 28 2 24 0 - 30 0 mmol/L    CO2, i-STAT 30 21 - 32 mmol/L    O2 Sat, i-STAT 76 (L) 95 - 98 %    SODIUM, I-STAT 141 136 - 145 mmol/l    Potassium, i-STAT 3 6 3 5 - 5 3 mmol/L    Calcium, Ionized i-STAT 1 18 1 12 - 1 32 mmol/L    Hct, i-STAT 38 36 5 - 49 3 %    Hgb, i-STAT 12 9 12 0 - 17 0 g/dl    Glucose, i-STAT 116 65 - 140 mg/dl    Specimen Type VENOUS             panel  Results from last 7 days  Lab Units 12/16/18  1031  12/16/18  1024   POTASSIUM mmol/L  --   --  3 5   CHLORIDE mmol/L  --   --  103 GLUCOSE, ISTAT mg/dl 116  < >  --    BUN mg/dL  --   --  16   CREATININE mg/dL  --   --  0 93   < > = values in this interval not displayed  Results from last 7 days  Lab Units 12/16/18  1031  12/16/18  1024   HEMATOCRIT %  --   --  38 3   HEMATOCRIT, ISTAT % 38  < >  --    HEMOGLOBIN g/dL  --   --  13 1   I STAT HEMOGLOBIN g/dl 12 9  < >  --    MCH pg  --   --  30 2   MCHC g/dL  --   --  34 2   MCV fL  --   --  88   MPV fL  --   --  10 7   PLATELETS Thousands/uL  --   --  160   RDW %  --   --  12 9   WBC Thousand/uL  --   --  6 72   < > = values in this interval not displayed  Results from last 7 days  Lab Units 12/16/18  1024   INR  0 95   PTT seconds 32      Results from last 7 days  Lab Units 12/16/18  1031 12/16/18  1030  12/16/18  1024   SODIUM mmol/L  --   --   --  138   ISTAT CHLORIDE mmol/L  --  100  --   --    CO2 mmol/L  --   --   --  29   CO2, I-STAT mmol/L 30 27  < >  --    BUN mg/dL  --   --   --  16   CREATININE mg/dL  --   --   --  0 93   GLUCOSE, ISTAT mg/dl 116 120  < >  --    < > = values in this interval not displayed  Lab Results   Component Value Date    ALT 22 11/26/2018    AST 15 11/26/2018    ALKPHOS 77 11/26/2018    TBILI 0 99 11/26/2018          Invalid input(s): TRIGLYCERIDE Lab Results   Component Value Date    HGBA1C 5 2 10/19/2017    TSH i: No results found for: TSH Imaging: CT head         CTA h/n - 1  Hemodynamically significant, 80% stenosis of the proximal right internal carotid artery, 1 cm distal to the origin  Stenosis has progressed slightly from the prior study  Vascular surgery assessment recommended  2   Mild, chronic microangiopathy and chronic appearing bilateral lacunar infarctions are new from the prior study  3   Moderate intradural vertebral artery atherosclerotic disease in the dominant left vertebral artery and scattered moderate calcified plaque in the cavernous segments of both internal cerebral arteries noted, similar to the prior study    No large vessel occlusion or vascular cut off        CT head - No acute pathology  Multiple chronic lacunar strokes  ASSESSMENT/PLAN  75 yo male with cerebrovascular disease with dizziness which is more likely peripheral although cervical issues limited manuevers to help differentiate this definitively  1  Dizziness  - improved  Unlikely VBI as left vert still has pretty good flow  Admit for MRI brain, start aspirin, atorvastatin 40, stroke pathway  2  Right ICA disease - progressed a bit since last scan --> will see on MRI if any strokes in distribution, if so would definitely plan on non urgent CEA  3  Vertebral artery diease- not critical stenosis, still has flow  Should be on aspirin 81, atorvastatin 40, SBP goal, < 140  4  Chronic lacunar infarcts - management as per above  SBP goal < 140  Please call with questions

## 2018-12-16 NOTE — H&P
H&P- Callie Art 1945, 74 y o  male MRN: [id number]    Unit/Bed#: CRB Encounter: [id number]    Primary Care Provider: Ramón Campa DO   Date and time admitted to hospital: 12/16/2018  9:38 AM        * Dizziness and giddiness   Assessment & Plan    Possible CVA given his carotid artery disease  Place in observation status  Monitor on telemetry  BP control  Neuro checks  Check lipid panel  Check MRI  Neurology evaluation  Vascular surgery evaluation  Aspirin  Plavix     Hypertension   Assessment & Plan    Poor control today, denies taking his medications this morning  Restart home meds     Stenosis of right carotid artery   Assessment & Plan    Noted on CTA today to be 80% stenosis  Consult vascular surgery  Check lipid panel  Continue aspirin, Plavix, statin             History of Present Illness   HPI:  Callie Art  is a 74 y o  male who presents with dizziness  Patient states that symptoms have been present for approximately 1 month  Denies any change in vision, hearing loss, headache  He does report chronic neck pain and stiffness which is not new  He does reports some paresthesias in the right hand which is chronic as well  Denies any other focal numbness is weakness  States his symptoms are only present when he is up standing  Review of Systems   All other systems reviewed and are negative        Historical Information   Past Medical History:   Diagnosis Date    Arthritis     Cholecystitis     Coronary artery disease     Hypertension     Spinal stenosis      Past Surgical History:   Procedure Laterality Date    CHOLECYSTECTOMY      COLON SURGERY      bwel resection    COLONOSCOPY      ESOPHAGOGASTRODUODENOSCOPY      02/07/2007  [facility]    GA LAP,CHOLECYSTECTOMY N/A 12/5/2017    Procedure: CHOLECYSTECTOMY LAPAROSCOPIC;  Surgeon: Josselyn Veliz MD;  [facility];  Service: General    SMALL INTESTINE SURGERY      [address] [facility] [address] [facility] [address]     Social History   History Alcohol Use    Yes     Comment: social      History   Drug Use No     History   Smoking Status    Former Smoker    Packs/day: 2 00    Years: 3 00    Quit date: 1966   Smokeless Tobacco    Never Used     Family History: non-contributory    Meds/Allergies   all medications and allergies reviewed  No Known Allergies    Objective   Vitals: Blood pressure (!) 176/76, pulse (!) 52, temperature 97 9 °F (36 6 °C), temperature source Tympanic, resp  rate 15, weight 85 7 kg (189 lb), SpO2 97 %  No intake or output data in the 24 hours ending 12/16/18 1437    Invasive Devices     Peripheral Intravenous Line            Peripheral IV 12/16/18 Right Antecubital less than 1 day                Physical Exam   Constitutional: He is oriented to person, place, and time  He appears well-developed and well-nourished  HENT:   Head: Normocephalic and atraumatic  Eyes: Pupils are equal, round, and reactive to light  EOM are normal  No scleral icterus  Neck: No JVD present  No tracheal deviation present  Cardiovascular: Normal rate and regular rhythm  Pulmonary/Chest: Effort normal  He has no wheezes  He has no rales  Abdominal: Soft  There is no tenderness  There is no rebound  Musculoskeletal: Normal range of motion  He exhibits no edema  Neurological: He is alert and oriented to person, place, and time  No cranial nerve deficit  No facial asymmetry  Tongue is midline  No pronator drift  Normal finger to nose test   Normal heel to shin test   Skin: Skin is warm and dry  Psychiatric: He has a normal mood and affect  His behavior is normal        Lab Results: I have personally reviewed pertinent reports  Imaging: I have personally reviewed pertinent reports     and I have personally reviewed pertinent films in PACS  EKG, Pathology, and Other Studies: I have personally reviewed pertinent films in PACS    Code Status: Prior  Advance Directive and Living Will:      Power of :    POLST: Counseling / Coordination of Care  Total floor / unit time spent today 45 minutes  Greater than 50% of total time was spent with the patient and / or family counseling and / or coordination of care  A description of the counseling / coordination of care:  Discussed plan of care with the patient the bedside

## 2018-12-16 NOTE — ASSESSMENT & PLAN NOTE
Possible CVA given his carotid artery disease    Place in observation status  Monitor on telemetry  BP control  Neuro checks  Check lipid panel  Check MRI  Neurology evaluation  Vascular surgery evaluation  Aspirin  Plavix

## 2018-12-16 NOTE — CONSULTS
Consultation - Vascular Surgery   Luh Lainez  74 y o  male MRN: [id number]  Unit/Bed#: CRB Encounter: [id number]      Assessment/Plan      Assessment:  73M w/R ICA stenosis, found on CTA of the head/neck to have 80% stenosis, slightly increased from prior 70% in 2014  Plan:  - symptoms do not seem to be secondary to carotid stenosis  - continue ASA/plavix  - start lipitor  - will f/u outpatient      History of Present Illness   Physician Requesting Consult: Florecita You MD  Reason for Consult / Principal Problem: right carotid stenosis    HPI: Luh Lainez  is a 74y o  year old male who presents with dizziness which he states started earlier today  He has had intermittent symptoms of dizziness/vertigo over the last month but it became unbearable today  He denies any other symptoms  His right carotid stenosis has been known since 2014, when his last CTA was performed; at that time it demonstrated 70% stenosis  An ultrasound 6 mos ago demonstrated 70% stenosis as well, relatively unchanged from present  Inpatient consult to Vascular Surgery     Performed by  Mireille Kwok by Rodolfo Mackenzie              Review of Systems   Constitutional: Negative for chills and fever  HENT: Negative  Negative for trouble swallowing  Eyes: Negative  Negative for visual disturbance  Respiratory: Negative  Negative for cough and shortness of breath  Cardiovascular: Negative  Negative for chest pain and palpitations  Gastrointestinal: Negative  Negative for abdominal pain, constipation, diarrhea, nausea and vomiting  Endocrine: Negative  Genitourinary: Negative  Musculoskeletal: Negative  Skin: Negative  Allergic/Immunologic: Negative  Neurological: Positive for dizziness, weakness and light-headedness  Negative for headaches  Hematological: Negative  Psychiatric/Behavioral: Negative          Historical Information   Past Medical History:   Diagnosis Date    Arthritis     Cholecystitis     Coronary artery disease     Hypertension     Spinal stenosis      Past Surgical History:   Procedure Laterality Date    CHOLECYSTECTOMY      COLON SURGERY      bwel resection    COLONOSCOPY      ESOPHAGOGASTRODUODENOSCOPY      02/07/2007  [facility]    ME LAP,CHOLECYSTECTOMY N/A 12/5/2017    Procedure: CHOLECYSTECTOMY LAPAROSCOPIC;  Surgeon: Lb Monge MD;  [facility];  Service: General    SMALL INTESTINE SURGERY      [facility] [address] [facility] [address] [address]     Social History   History   Alcohol Use    Yes     Comment: social      History   Drug Use No     History   Smoking Status    Former Smoker    Packs/day: 2 00    Years: 3 00    Quit date: 1966   Smokeless Tobacco    Never Used     Family History: non-contributory}    Meds/Allergies   all current active meds have been reviewed  No Known Allergies    Objective   Vitals: Blood pressure (!) 178/83, pulse 78, temperature 97 9 °F (36 6 °C), temperature source Tympanic, resp  rate 18, weight 85 7 kg (189 lb), SpO2 97 %  ,Body mass index is 27 12 kg/m²  No intake or output data in the 24 hours ending 12/16/18 1612  Invasive Devices     Peripheral Intravenous Line            Peripheral IV 12/16/18 Right Antecubital less than 1 day                Physical Exam   Constitutional: He is oriented to person, place, and time  He appears well-developed and well-nourished  No distress  HENT:   Head: Normocephalic and atraumatic  Eyes: Pupils are equal, round, and reactive to light  EOM are normal  No scleral icterus  Neck: No JVD present  Cardiovascular: Normal rate, regular rhythm and normal heart sounds  Pulmonary/Chest: Effort normal and breath sounds normal  No stridor  No respiratory distress  Abdominal: Soft  He exhibits no distension  There is no tenderness  There is no rebound and no guarding  Musculoskeletal: He exhibits no edema  Neurological: He is alert and oriented to person, place, and time   No cranial nerve deficit  No focal neurological deficits   Skin: Skin is warm and dry  He is not diaphoretic  Psychiatric: He has a normal mood and affect  Lab Results:   CBC with diff:   Lab Results   Component Value Date    WBC 6 72 12/16/2018    HGB 12 9 12/16/2018    HCT 38 12/16/2018    MCV 88 12/16/2018     12/16/2018    MCH 30 2 12/16/2018    MCHC 34 2 12/16/2018    RDW 12 9 12/16/2018    MPV 10 7 12/16/2018   , BMP/CMP:   Lab Results   Component Value Date    SODIUM 138 12/16/2018    K 3 5 12/16/2018     12/16/2018    CO2 30 12/16/2018    BUN 16 12/16/2018    CREATININE 0 93 12/16/2018    GLUCOSE 116 12/16/2018    CALCIUM 8 9 12/16/2018    EGFR 87 12/16/2018     Imaging Studies: I have personally reviewed pertinent reports  Cta Head And Neck W Wo Contrast    Result Date: 12/16/2018  Impression: 1  Hemodynamically significant, 80% stenosis of the proximal right internal carotid artery, 1 cm distal to the origin  Stenosis has progressed slightly from the prior study  Vascular surgery assessment recommended  2   Mild, chronic microangiopathy and chronic appearing bilateral lacunar infarctions are new from the prior study  3   Moderate intradural vertebral artery atherosclerotic disease in the dominant left vertebral artery and scattered moderate calcified plaque in the cavernous segments of both internal cerebral arteries noted, similar to the prior study  No large vessel occlusion or vascular cut off  Workstation performed: [id number]     EKG, Pathology, and Other Studies: I have personally reviewed pertinent reports      VTE Prophylaxis: Enoxaparin (Lovenox)     Code Status: Prior  Advance Directive and Living Will:      Power of :    POLST:      Counseling / Coordination of Care  None

## 2018-12-17 ENCOUNTER — APPOINTMENT (INPATIENT)
Dept: RADIOLOGY | Facility: HOSPITAL | Age: 73
DRG: 068 | End: 2018-12-17
Payer: MEDICARE

## 2018-12-17 LAB
ANION GAP SERPL CALCULATED.3IONS-SCNC: 8 MMOL/L (ref 4–13)
BASOPHILS # BLD AUTO: 0.02 THOUSANDS/ΜL (ref 0–0.1)
BASOPHILS NFR BLD AUTO: 0 % (ref 0–1)
BUN SERPL-MCNC: 13 MG/DL (ref 5–25)
CALCIUM SERPL-MCNC: 9.1 MG/DL (ref 8.3–10.1)
CHLORIDE SERPL-SCNC: 103 MMOL/L (ref 100–108)
CHOLEST SERPL-MCNC: 230 MG/DL (ref 50–200)
CO2 SERPL-SCNC: 29 MMOL/L (ref 21–32)
CREAT SERPL-MCNC: 0.92 MG/DL (ref 0.6–1.3)
CREAT UR-MCNC: 84.4 MG/DL
EOSINOPHIL # BLD AUTO: 0.04 THOUSAND/ΜL (ref 0–0.61)
EOSINOPHIL NFR BLD AUTO: 1 % (ref 0–6)
ERYTHROCYTE [DISTWIDTH] IN BLOOD BY AUTOMATED COUNT: 12.8 % (ref 11.6–15.1)
EST. AVERAGE GLUCOSE BLD GHB EST-MCNC: 120 MG/DL
GFR SERPL CREATININE-BSD FRML MDRD: 81 ML/MIN/1.73SQ M
GLUCOSE SERPL-MCNC: 92 MG/DL (ref 65–140)
HBA1C MFR BLD: 5.8 % (ref 4.2–6.3)
HCT VFR BLD AUTO: 38.4 % (ref 36.5–49.3)
HDLC SERPL-MCNC: 38 MG/DL (ref 40–60)
HGB BLD-MCNC: 13 G/DL (ref 12–17)
IMM GRANULOCYTES # BLD AUTO: 0.01 THOUSAND/UL (ref 0–0.2)
IMM GRANULOCYTES NFR BLD AUTO: 0 % (ref 0–2)
LDLC SERPL CALC-MCNC: 156 MG/DL (ref 0–100)
LYMPHOCYTES # BLD AUTO: 1.02 THOUSANDS/ΜL (ref 0.6–4.47)
LYMPHOCYTES NFR BLD AUTO: 19 % (ref 14–44)
MAGNESIUM SERPL-MCNC: 2.2 MG/DL (ref 1.6–2.6)
MCH RBC QN AUTO: 30 PG (ref 26.8–34.3)
MCHC RBC AUTO-ENTMCNC: 33.9 G/DL (ref 31.4–37.4)
MCV RBC AUTO: 89 FL (ref 82–98)
MONOCYTES # BLD AUTO: 0.44 THOUSAND/ΜL (ref 0.17–1.22)
MONOCYTES NFR BLD AUTO: 8 % (ref 4–12)
NEUTROPHILS # BLD AUTO: 3.95 THOUSANDS/ΜL (ref 1.85–7.62)
NEUTS SEG NFR BLD AUTO: 72 % (ref 43–75)
NRBC BLD AUTO-RTO: 0 /100 WBCS
PLATELET # BLD AUTO: 163 THOUSANDS/UL (ref 149–390)
PMV BLD AUTO: 11.1 FL (ref 8.9–12.7)
POTASSIUM SERPL-SCNC: 3.3 MMOL/L (ref 3.5–5.3)
POTASSIUM UR-SCNC: 43.7 MMOL/L (ref 1–300)
RBC # BLD AUTO: 4.34 MILLION/UL (ref 3.88–5.62)
SODIUM SERPL-SCNC: 140 MMOL/L (ref 136–145)
TRIGL SERPL-MCNC: 178 MG/DL
WBC # BLD AUTO: 5.48 THOUSAND/UL (ref 4.31–10.16)

## 2018-12-17 PROCEDURE — 80048 BASIC METABOLIC PNL TOTAL CA: CPT | Performed by: INTERNAL MEDICINE

## 2018-12-17 PROCEDURE — 97166 OT EVAL MOD COMPLEX 45 MIN: CPT

## 2018-12-17 PROCEDURE — 83036 HEMOGLOBIN GLYCOSYLATED A1C: CPT | Performed by: INTERNAL MEDICINE

## 2018-12-17 PROCEDURE — G8979 MOBILITY GOAL STATUS: HCPCS

## 2018-12-17 PROCEDURE — 99221 1ST HOSP IP/OBS SF/LOW 40: CPT | Performed by: INTERNAL MEDICINE

## 2018-12-17 PROCEDURE — 99232 SBSQ HOSP IP/OBS MODERATE 35: CPT | Performed by: INTERNAL MEDICINE

## 2018-12-17 PROCEDURE — 83735 ASSAY OF MAGNESIUM: CPT | Performed by: INTERNAL MEDICINE

## 2018-12-17 PROCEDURE — 99222 1ST HOSP IP/OBS MODERATE 55: CPT | Performed by: SURGERY

## 2018-12-17 PROCEDURE — 84133 ASSAY OF URINE POTASSIUM: CPT | Performed by: INTERNAL MEDICINE

## 2018-12-17 PROCEDURE — 82570 ASSAY OF URINE CREATININE: CPT | Performed by: INTERNAL MEDICINE

## 2018-12-17 PROCEDURE — 97163 PT EVAL HIGH COMPLEX 45 MIN: CPT

## 2018-12-17 PROCEDURE — G8987 SELF CARE CURRENT STATUS: HCPCS

## 2018-12-17 PROCEDURE — 99222 1ST HOSP IP/OBS MODERATE 55: CPT | Performed by: INTERNAL MEDICINE

## 2018-12-17 PROCEDURE — 80061 LIPID PANEL: CPT | Performed by: INTERNAL MEDICINE

## 2018-12-17 PROCEDURE — G8988 SELF CARE GOAL STATUS: HCPCS

## 2018-12-17 PROCEDURE — 85025 COMPLETE CBC W/AUTO DIFF WBC: CPT | Performed by: INTERNAL MEDICINE

## 2018-12-17 PROCEDURE — G8978 MOBILITY CURRENT STATUS: HCPCS

## 2018-12-17 PROCEDURE — 70551 MRI BRAIN STEM W/O DYE: CPT

## 2018-12-17 RX ORDER — HYDRALAZINE HYDROCHLORIDE 20 MG/ML
10 INJECTION INTRAMUSCULAR; INTRAVENOUS EVERY 6 HOURS PRN
Status: DISCONTINUED | OUTPATIENT
Start: 2018-12-17 | End: 2018-12-18 | Stop reason: HOSPADM

## 2018-12-17 RX ADMIN — CARVEDILOL 25 MG: 25 TABLET, FILM COATED ORAL at 17:21

## 2018-12-17 RX ADMIN — NIFEDIPINE 90 MG: 60 TABLET, FILM COATED, EXTENDED RELEASE ORAL at 08:50

## 2018-12-17 RX ADMIN — CHLORTHALIDONE 50 MG: 25 TABLET ORAL at 08:49

## 2018-12-17 RX ADMIN — CLOPIDOGREL BISULFATE 75 MG: 75 TABLET ORAL at 08:47

## 2018-12-17 RX ADMIN — CARVEDILOL 25 MG: 25 TABLET, FILM COATED ORAL at 07:26

## 2018-12-17 RX ADMIN — VITAMIN D, TAB 1000IU (100/BT) 1000 UNITS: 25 TAB at 08:47

## 2018-12-17 RX ADMIN — POTASSIUM CHLORIDE 10 MEQ: 750 TABLET, EXTENDED RELEASE ORAL at 08:47

## 2018-12-17 RX ADMIN — ATORVASTATIN CALCIUM 40 MG: 40 TABLET, FILM COATED ORAL at 17:21

## 2018-12-17 RX ADMIN — DOCUSATE SODIUM 100 MG: 100 CAPSULE, LIQUID FILLED ORAL at 08:47

## 2018-12-17 RX ADMIN — POTASSIUM CHLORIDE 10 MEQ: 750 TABLET, EXTENDED RELEASE ORAL at 17:21

## 2018-12-17 RX ADMIN — HYDRALAZINE HYDROCHLORIDE 10 MG: 20 INJECTION INTRAMUSCULAR; INTRAVENOUS at 08:47

## 2018-12-17 RX ADMIN — ASPIRIN 81 MG 81 MG: 81 TABLET ORAL at 08:47

## 2018-12-17 RX ADMIN — ENOXAPARIN SODIUM 40 MG: 40 INJECTION SUBCUTANEOUS at 08:46

## 2018-12-17 RX ADMIN — DOCUSATE SODIUM 100 MG: 100 CAPSULE, LIQUID FILLED ORAL at 17:20

## 2018-12-17 RX ADMIN — LISINOPRIL 40 MG: 20 TABLET ORAL at 08:47

## 2018-12-17 NOTE — ASSESSMENT & PLAN NOTE
· Noted on CTA today to be 80% stenosis    · Continue aspirin, Plavix, statin (started on admission)  · Vascular does not feel there is any current need for surgical intervention

## 2018-12-17 NOTE — PHYSICAL THERAPY NOTE
Physical Therapy Evaluation     Patient Name: Dell Laboy  Today's Date: 12/17/2018     Problem List  Patient Active Problem List   Diagnosis    Stenosis of right carotid artery    Hypokalemia    Ankylosing spondylitis (HCC)    Esophageal reflux    Accelerated hypertension    Calcific tendinitis of shoulder    Cervical radiculopathy    Chondromalacia patellae    Lumbosacral spondylosis without myelopathy    Dizziness and giddiness        Past Medical History  Past Medical History:   Diagnosis Date    Arthritis     Cholecystitis     Coronary artery disease     Hypertension     Spinal stenosis         Past Surgical History  Past Surgical History:   Procedure Laterality Date    CHOLECYSTECTOMY      COLON SURGERY      bwel resection    COLONOSCOPY      ESOPHAGOGASTRODUODENOSCOPY      02/07/2007  ONSET    [place] LAP,CHOLECYSTECTOMY N/A 12/5/2017    Procedure: CHOLECYSTECTOMY LAPAROSCOPIC;  Surgeon: Verle Epley, MD;  [facility]: [facility];  Service: General    SMALL INTESTINE SURGERY      [address] [facility] [address] [facility] [address] 2011 12/17/18 1018   Note Type   Note type Eval only   Pain Assessment   Pain Assessment No/denies pain   Pain Score No Pain   Home Living   Type of [place] to enter with rails; One level; Laundry in basement  (1 PARIS- finished basement w/ PARIS)   Home Equipment Breana Nicely  (was his mother in laws- not using PTA)   Additional Comments pt lives w/ spouse in a ranch w/ finished basement- pt reports very active; gardening and working outside- (+) falls associated w/ spinal stenosis and prolonged standing - however not in 6 month period per pt-   PTA pt was not using AD ; was I and driving- both he and spouse are retired- I w/ ADL's 0 DME PTA    Prior Function   Level of [place] Independent with ADLs and functional mobility   Lives With [place]ka Help From Family   ADL Assistance Independent   IADLs Independent   Falls in the last 6 months 0   Vocational Retired   Comments see above- pt reports I and active PTA- hs hx of outpt rehab "awhile ago"    Restrictions/Precautions   Weight Bearing Precautions Per Order No   Other Precautions Impulsive; Chair Alarm;Multiple lines; Fall Risk   General   Additional Pertinent History MRI (P)    Family/Caregiver Present No   Cognition   Overall Cognitive Status WFL   Arousal/Participation Alert   Orientation Level Oriented X4   Memory Within functional limits;Decreased recall of precautions   Following Commands Follows all commands and directions without difficulty   Comments pt is impulsive- which is most likely baseline behavior per his report- he reports he is always doing things "fast" and is often told to slow down by spouse  -   RUE Assessment   RUE Assessment WFL   LUE Assessment   LUE Assessment WFL   RLE Assessment   RLE Assessment WFL  (5/5)   LLE Assessment   LLE Assessment WFL  (5/5)   Coordination   Movements are Fluid and Coordinated 1   Light Touch   RLE Light Touch Grossly intact   LLE Light Touch Grossly intact   Bed Mobility   Supine to Sit 5  Supervision   Transfers   Sit to Stand 4  Minimal assistance  (CGA for safety only - 0 dizziness reported on standing)   Stand to Sit 5  Supervision  (cues for safty adn controlled descent )   Ambulation/Elevation   Gait pattern Foward flexed;Decreased foot clearance; Forward Flexion  (fast sandoval- )   Gait Assistance 5  Supervision   Additional items Assist x 1;Verbal cues   Assistive Device Rolling walker  (pt unsteady w/ LOB when not using AD- RW then trialed)   Distance 360' w/ RW and SBA   Balance   Static Sitting Good   Dynamic Sitting Fair +   Static Standing Fair -   Dynamic Standing Poor +   Ambulatory Poor +  (rw)   Endurance Deficit   Endurance Deficit Yes   Activity Tolerance   Activity Tolerance Patient limited by fatigue  (distances  limited at baseline 2* hx of stenosis  )   Medical Staff Made Aware yes- CM/ MSW updated Nurse Made Aware yes- updated    Assessment   Prognosis Good   Problem List Decreased endurance; Impaired balance;Decreased mobility; Decreased coordination;Decreased cognition   Assessment Pt is 74 y o  male seen for PT evaluation s/p admit to [facility] on 12/16/2018  Pt presenting w/ dizziness w/ feeling that he is "on a boat"- dizziness has been going on for approx 1 month and he was being seen by PCP for same- sx were acutely worse today and presented to ED  MRI is pending ; CTA showing 80% stenosis of proximal R ICA; mild B/L lacunar infarcts- new from prior study  Current dx/ problem list includes: r/o CVA; stroke pathway  PT now consulted for assessment of mobility and d/c needs  PMhx is as above and significant for: spinal stenosis, CAD, hypertensive urgency and HTN, right carotid stenosis and  personal factors currently affecting pt's physical performance include: steps in home environment (1); limited home support- spouse unable to physically assist; advanced age; sensory loss B/L LE's chronic- declining use of AD on d/c; declining outpt therapies on d/c; impulsive at baseline  Prior to admission, pt reports living w/ elderly spouse in a ranch home w/ 1 PAR[place]; pt was I and not using AD; hx of falls >6 month ago - mostly while gardening; I w/ ADL's and driving  Upon evaluation, pt currently is requiring S A for bed skills; S for functional transfers and S for ambulation w/ ' - pt noted to be impulsive (most likely baseline) fast sandoval w/ LOB when not using AD- PT recommending RW use- to which pt is reluctant- but eventually agreeable    Pt presents functioning below baseline and currently w/ overall mobility deficits 2* to:limited flexibility; LE sensory deficits (chronic issues from spinal stenosis);  generalized weakness/ deconditioning; decreased endurance; decreased activity tolerance;  impaired balance; gait deviations; decreased safety awareness; impulsive;  limited insight into current deficits; bed/ chair alarms; multiple lines; hearing impairment/ visual impairments; Pt currently at risk for falls  (Please find additional objective findings from PT assessment regarding body systems outlined above ) Pt will continue to benefit from skilled PT interventions to address stated impairments; to maximize functional potential; for ongoing pt/ family training; and DME needs  Anticipate that with continued progress pt to d/c home with family support when medically cleared  PT did recommending outpt PT/ balance- however pt refusing at this time  Chair alarm activated post session    Barriers to Discharge Comments 1 PARIS   Goals   Patient Goals go home later    STG Expiration Date 12/27/18   Treatment Day 0   Plan   Treatment/Interventions Functional transfer training;LE strengthening/ROM; Elevations; Therapeutic exercise; Endurance training;Patient/family training;Equipment eval/education; Bed mobility;Gait training;Spoke to nursing;Spoke to case management;OT   PT Frequency (3-6x/wk )   Recommendation   Recommendation Outpatient PT  (RW- pt declining at present despite education )   Equipment Recommended Walker   Additional Comments chair alarm activated post sesssion-    Modified Hermitage Scale   Modified Cristian Scale 4   Barthel Index   Feeding 10   Bathing 5   Grooming Score 5   Dressing Score 5   Bladder Score 10   Bowels Score 10   Toilet Use Score 5   Transfers (Bed/Chair) Score 10   Mobility (Level Surface) Score 10   Stairs Score 5   Barthel Index Score 75        India Padilla, PT

## 2018-12-17 NOTE — SOCIAL WORK
CM met with pt at bedside and explained CM role  Pt lives with his wife in a 1 story home with 1 step to enter  PTA pt was independent with ADL's and did not require the use of DME for ambulation  Pt owns a cane, RW, WC and Rolator  Pt reports no hx of VNA/STR  Pt is able to drive and uses [address] in [place]  Pt sees PCP Dr Jeffrey Bassett  Pt reports no hx of drug/alcohol rehabilitation or mental health issues  Pt's primary contact is his wife Jim Iqbal [phone]  CM reviewed d/c planning process including the following: identifying help at home, patient preference for d/c planning needs, Discharge Lounge, [facility] Meds to Bed program, availability of treatment team to discuss questions or concerns patient and/or family may have regarding understanding medications and recognizing signs and symptoms once discharged  CM also encouraged patient to follow up with all recommended appointments after discharge  Patient advised of importance for patient and family to participate in managing patients medical well being

## 2018-12-17 NOTE — UTILIZATION REVIEW
Initial Clinical Review    ADMIT OBS  On   12/16  @  1328    CHANGED to INPT   Status on  12/17  @  [phone]      ED: Date/Time/Mode of Arrival:   ED Arrival Information     Expected Arrival Acuity Means of Arrival Escorted By Service Admission Type    - 12/16/2018 09:32 Urgent Wheelchair Family Member Hospitalist Urgent    Arrival Complaint    dizzy          Chief Complaint:   Chief Complaint   Patient presents with    Dizziness     Pt states that he has been having dizziness on/off for about a month pt was worked up by PCP for it  History of Illness:   74 y o  male who presents with dizziness  Patient states that symptoms have been present for approximately 1 month    Does report chronic neck pain and stiffness (which is not new)      some paresthesias in the right hand which is chronic as well  -  States his symptoms are only present when he is up standing      ED Vital Signs:   97 9  57   18   198/92    96%  ra   wgt 85 kg (187 lb 6 3 oz)    12/16/18 2200  98 3 °F (36 8 °C)  56  18   181/75  93 %  None (Room air)  Lying   12/16/18 2100  --  62  --   190/92  --  --  Lying   12/16/18 2000  --  57  --   203/74  --  --  --   12/16/18 1930  --  55  --   195/84  --  --  --   12/16/18 1917  --  --  --   219/84  --  --  Standing - Orthostatic VS   12/16/18 1916  --  --  --   230/78  --  --  Sitting - Orthostatic VS   12/16/18 1915  97 5 °F (36 4 °C)  60  18   188/81  92 %  None (Room air)  Lying - Orthostatic VS     Abnormal Labs/Diagnostic Test Results:   cta head/neck  cxr - nad  K  3 5  3 3  Chol  230  Tri  178  HCL  38  LDL  156  An gap  19    vBG  7 397/  45 9/  42 0/  28 2/  Oxygen sat 76    ED Treatment: plavix and ASA    Past Medical/Surgical History:    Active Ambulatory Problems     Diagnosis Date Noted    Total bilirubin, elevated 10/19/2017    Stenosis of right carotid artery 10/19/2017    Hypokalemia 01/15/2018    Ankylosing spondylitis ([facility] [address] ) 06/25/2015    Asymptomatic carotid artery stenosis 01/20/2014    Esophageal reflux 05/15/2012    Hypertension 05/15/2012    Osteopenia 06/25/2015    Calcific tendinitis of shoulder 07/24/2018    Cervical radiculopathy 07/24/2018    Chondromalacia patellae 07/24/2018    Lumbosacral spondylosis without myelopathy 07/24/2018    Medicare annual wellness visit, subsequent 07/24/2018     Resolved Ambulatory Problems     Diagnosis Date Noted    Hypertensive emergency 10/19/2017    Chest pain 10/19/2017    Abdominal pain 10/19/2017    Leukocytosis 10/19/2017    Anemia 05/15/2012     Past Medical History:   Diagnosis Date    Arthritis     Cholecystitis     Coronary artery disease     Hypertension     Spinal stenosis        Admitting Diagnosis: Carotid artery stenosis [I65 29]  Essential hypertension [I10]  Dizzy [R42]  Stroke-like symptom [R29 90]    Assessment/Plan:   * Dizziness and giddiness   Assessment & Plan     Possible CVA given his carotid artery disease  Place in observation status  Monitor on telemetry  BP control  Neuro checks  Check lipid panel  Check MRI  Neurology evaluation  Vascular surgery evaluation  Aspirin  Plavix      Hypertension   Assessment & Plan     Poor control today, denies taking his medications this morning  Restart home meds      Stenosis of right carotid artery   Assessment & Plan     Noted on CTA today to be 80% stenosis  Consult vascular surgery  Check lipid panel    Continue aspirin, Plavix, statin          Admission Orders:  Admit  Tele w/continuous pulse ox  VS/Neuro cks q 1 hr x4;  q 2 hr x4;  q 4 hrs   Dysphagia assessment prior to po  Baseline NIH scale  Orthostatic bp's  CTA head neck   CT head   MRI brain   MRA head/neck  I/O q shift   ECHO  Up w/assist  PT/OT eval and treat  Consults:  Cardiology, vascular surgery,  nephrology, neurology, nutrition  Oxygen prn   Sequential compression device to b/l LE      12/16  VASCULAR SURGERY  R ICA stenosis, found on CTA of the head/neck to have 80% stenosis, slightly increased from prior 70% in 2014  - symptoms do not seem to be secondary to carotid stenosis  - continue ASA/plavix,  Start lipitor,  fup outpatient     12/16  NEUROLOGY  73 yo male with cerebrovascular disease with dizziness which is more likely peripheral although cervical issues limited manuevers to help differentiate this definitively     1  Dizziness  - improved  Unlikely VBI as left vert still has pretty good flow  Admit for MRI brain, start aspirin, atorvastatin 40, stroke pathway  2  Right ICA disease - progressed a bit since last scan --> will see on MRI if any strokes in distribution, if so would definitely plan on non urgent CEA  3  Vertebral artery diease- not critical stenosis, still has flow  Should be on aspirin 81, atorvastatin 40, SBP goal, < 140  4  Chronic lacunar infarcts - management as per above   SBP goal < 140      12/17/2018    IV Hydralazine 10 mg given  @  91 11 64     12/17/2018  INTERNAL MEDICINE  Ataxia -  Vascular does not feel there is any current need for CEA-consider outpatient CEA pending MRI brain results   Accelerated HTN -  Poorly controlled Bps  is on 4 maximum dosed medications, Will ask for nephrology input  Cervical radiculopathy -  Chronic, cont OP fup  Hypokalemia - cont bid supplementation    Scheduled Meds:   Current Facility-Administered Medications:  acetaminophen 650 mg Oral Q4H PRN Xavier Sol MD   aspirin 81 mg Oral Daily Xavier Sol MD   atorvastatin 40 mg Oral QPM Xavier Sol MD   calcium carbonate 1,000 mg Oral Daily PRN Xavier Sol MD   carvedilol 25 mg Oral BID With Meals Xavier Sol MD   chlorthalidone 50 mg Oral Daily Xavier Sol MD   cholecalciferol 1,000 Units Oral Daily Xavier Sol MD   clopidogrel 75 mg Oral Daily Clifton Venegas DO   docusate sodium 100 mg Oral BID Xavier Sol MD   enoxaparin 40 mg Subcutaneous Daily Xavier Sol MD   hydrALAZINE 10 mg Intravenous Q6H PRN Kayy Willis PA-C   lisinopril 40 mg Oral Daily Roda Eisenmenger, PA-C   LORazepam 0 5 mg Intravenous Daily PRN Tray Abreu MD   NIFEdipine 90 mg Oral Daily Tray Abreu MD   ondansetron 4 mg Intravenous Q6H PRN Tray Abreu MD   potassium chloride 10 mEq Oral BID Tray Abreu MD

## 2018-12-17 NOTE — PLAN OF CARE
Problem: PHYSICAL THERAPY ADULT  Goal: Performs mobility at highest level of function for planned discharge setting  See evaluation for individualized goals  Treatment/Interventions: Functional transfer training, LE strengthening/ROM, Elevations, Therapeutic exercise, Endurance training, Patient/family training, Equipment eval/education, Bed mobility, Gait training, Spoke to nursing, Spoke to case management, OT  Equipment Recommended: Irish Socks       See flowsheet documentation for full assessment, interventions and recommendations  Prognosis: Good  Problem List: Decreased endurance, Impaired balance, Decreased mobility, Decreased coordination, Decreased cognition  Assessment: Pt is 74 y o  male seen for PT evaluation s/p admit to [facility] on 12/16/2018  Pt presenting w/ dizziness w/ feeling that he is "on a boat"- dizziness has been going on for approx 1 month and he was being seen by PCP for same- sx were acutely worse today and presented to ED  MRI is pending ; CTA showing 80% stenosis of proximal R ICA; mild B/L lacunar infarcts- new from prior study  Current dx/ problem list includes: r/o CVA; stroke pathway  PT now consulted for assessment of mobility and d/c needs  PMhx is as above and significant for: spinal stenosis, CAD, hypertensive urgency and HTN, right carotid stenosis and  personal factors currently affecting pt's physical performance include: steps in home environment (1); limited home support- spouse unable to physically assist; advanced age; sensory loss B/L LE's chronic- declining use of AD on d/c; declining outpt therapies on d/c; impulsive at baseline  Prior to admission, pt reports living w/ elderly spouse in a ranch home w/ 1 [place]; pt was I and not using AD; hx of falls >6 month ago - mostly while gardening; I w/ ADL's and driving   Upon evaluation, pt currently is requiring S A for bed skills; S for functional transfers and S for ambulation w/ ' - pt noted to be impulsive (most likely baseline) fast sandoval w/ LOB when not using AD- PT recommending RW use- to which pt is reluctant- but eventually agreeable  Pt presents functioning below baseline and currently w/ overall mobility deficits 2* to:limited flexibility; LE sensory deficits (chronic issues from spinal stenosis);  generalized weakness/ deconditioning; decreased endurance; decreased activity tolerance;  impaired balance; gait deviations; decreased safety awareness; impulsive;  limited insight into current deficits; bed/ chair alarms; multiple lines; hearing impairment/ visual impairments; Pt currently at risk for falls  (Please find additional objective findings from PT assessment regarding body systems outlined above ) Pt will continue to benefit from skilled PT interventions to address stated impairments; to maximize functional potential; for ongoing pt/ family training; and DME needs  Anticipate that with continued progress pt to d/c home with family support when medically cleared  PT did recommending outpt PT/ balance- however pt refusing at this time  Chair alarm activated post session      Barriers to Discharge Comments: 1 [place]  Recommendation: Outpatient PT (RW- pt declining at present despite education )          See flowsheet documentation for full assessment

## 2018-12-17 NOTE — RESTORATIVE TECHNICIAN NOTE
Restorative Specialist Mobility Note       Activity: Ambulate in tatum, Ambulate in room, Bathroom privileges, Chair, Dangle, Stand at bedside (Educated/encouraged pt to ambulate with assistance 3-4 x's/day  Bed alarm on   Pt callbell, phone/tray within reach )     Assistive Device: Front wheel walker       Olivia DIAS, Restorative Technician, [employer] [facility]

## 2018-12-17 NOTE — ASSESSMENT & PLAN NOTE
· Presented with unsteady sensation after awakening yesterday  No other associated symptoms  · Neurology and vascular following, input is appreciated  Vascular does not feel there is any current need for CEA-consider outpatient CEA pending MRI brain results   · CTA head and neck:   · 1  Hemodynamically significant, 80% stenosis of the proximal right internal carotid artery, 1 cm distal to the origin  Stenosis has progressed slightly from the prior study  Vascular surgery assessment recommended  2   Mild, chronic microangiopathy and chronic appearing bilateral lacunar infarctions are new from the prior study  3   Moderate intradural vertebral artery atherosclerotic disease in the dominant left vertebral artery and scattered moderate calcified plaque in the cavernous segments of both internal cerebral arteries noted, similar to the prior study  No large vessel occlusion or vascular cut off  · MRI brain is pending  · Continue asa/plavix/statin for now  · BP control difficult, ideally goal is SBP < 140, given that he is already on 4 antihypertensives at max dose, will ask for nephrology input   May need secondary HTN workup

## 2018-12-17 NOTE — PHYSICIAN ADVISOR
Current patient class: Inpatient  The patient is currently on Hospital Day: 2 at [facility]      The patient was admitted to the hospital at [phone] on 12/17/18 for the following diagnosis:  Carotid artery stenosis [I65 29]  Essential hypertension [I10]  Dizzy [R42]  Stroke-like symptom [R29 90]       There is documentation in the medical record of an expected length of stay of at least 2 midnights  The patient is therefore expected to satisfy the 2 midnight benchmark and given the 2 midnight presumption is appropriate for INPATIENT ADMISSION  Given this expectation of a satisfying stay, CMS instructs us that the patient is most often appropriate for inpatient admission under part A provided medical necessity is documented in the chart  After review of the relevant documentation, labs, vital signs and test results, the patient is appropriate for INPATIENT ADMISSION  Admission to the hospital as an inpatient is a complex decision making process which requires the practitioner to consider the patients presenting complaint, history and physical examination and all relevant testing  With this in mind, in this case, the patient was deemed appropriate for INPATIENT ADMISSION  After review of the documentation and testing available at the time of the admission I concur with this clinical determination of medical necessity  Rationale is as follows: The patient is a 74 yrs old Male who presented to the ED at 12/16/2018  9:38 AM with a chief complaint of Dizziness (Pt states that he has been having dizziness on/off for about a month pt was worked up by PCP for it )     Patient admitted with a report of dizziness and right hand paresthesias  There was a concern for a CVA  He has a medical history of HTN and stenosis of the right internal carotid artery  A CTA of the H/N revealed hemodynamically significant progressing right internal carotid stenosis    The patient was seen in consult by Vascular and they are considering a right carotid endarterectomy  He was also seen by Neurology who recommended he be on a stroke pathway and obtain an MRI  As noted, further ongoing acute treatment is ordered for this patient as well as attempting to control his elevated BPand a two night admission status to the hospital would be considered appropriate for him      The patients vitals on arrival were ED Triage Vitals [12/16/18 0936]   Temperature Pulse Respirations Blood Pressure SpO2   97 9 °F (36 6 °C) 57 18 (!) 198/92 96 %      Temp Source Heart Rate Source Patient Position - Orthostatic VS BP Location FiO2 (%)   Tympanic Monitor Sitting Right arm --      Pain Score       No Pain           Past Medical History:   Diagnosis Date    Arthritis     Cholecystitis     Coronary artery disease     Hypertension     Spinal stenosis      Past Surgical History:   Procedure Laterality Date    CHOLECYSTECTOMY      COLON SURGERY      bwel resection    COLONOSCOPY      ESOPHAGOGASTRODUODENOSCOPY      02/07/2007  ONSET    [place] LAP,CHOLECYSTECTOMY N/A 12/5/2017    Procedure: CHOLECYSTECTOMY LAPAROSCOPIC;  Surgeon: Devin Khoury MD;  [facility];  Service: General    SMALL INTESTINE SURGERY      [facility] [address] [facility] [address]           Consults have been placed to:   IP CONSULT TO VASCULAR SURGERY  IP CONSULT TO NEUROLOGY  IP CONSULT TO NEUROLOGY  IP CONSULT TO CASE MANAGEMENT  IP CONSULT TO NUTRITION SERVICES  IP CONSULT TO CASE MANAGEMENT  IP CONSULT TO CARDIOLOGY  IP CONSULT TO NEPHROLOGY    Vitals:    12/17/18 1200 12/17/18 1206 12/17/18 1208 12/17/18 1510   BP: 148/68 138/58 128/52 (!) 117/45   BP Location: Right arm Right arm Right arm Right arm   Pulse: (!) 51   (!) 54   Resp: 16   16   Temp: 98 1 °F (36 7 °C)   97 7 °F (36 5 °C)   TempSrc: Oral   Oral   SpO2: 97%   95%   Weight:       Height:           Most recent labs:    Recent Labs      12/16/18   1024   12/17/18   0607   WBC  6 72   --   5 48 HGB  13 1   < >  13 0   HCT  38 3   < >  38 4   PLT  160   --   163   K  3 5   --   3 3*   CALCIUM  8 9   --   9 1   BUN  16   --   13   CREATININE  0 93   --   0 92   INR  0 95   --    --    TROPONINI  <0 02   --    --     < > = values in this interval not displayed         Scheduled Meds:  Current Facility-Administered Medications:  acetaminophen 650 mg Oral Q4H PRN Yaneth Klein MD   aspirin 81 mg Oral Daily Yaneth Klein MD   atorvastatin 40 mg Oral QPM Yaneth Klein MD   calcium carbonate 1,000 mg Oral Daily PRN Yaneth Klein MD   carvedilol 25 mg Oral BID With Meals Yaneth Klein MD   chlorthalidone 50 mg Oral Daily Yaneth Klein MD   cholecalciferol 1,000 Units Oral Daily Yaneth Klein MD   clopidogrel 75 mg Oral Daily Nadja Hammer DO   docusate sodium 100 mg Oral BID Yaneth Klein MD   enoxaparin 40 mg Subcutaneous Daily Yaneth Klein MD   hydrALAZINE 10 mg Intravenous Q6H PRN Carmela Zafar PA-C   lisinopril 40 mg Oral Daily Alex Kelley PA-C   LORazepam 0 5 mg Intravenous Daily PRN Yaneth Klein MD   NIFEdipine 90 mg Oral Daily Yaneth Klein MD   ondansetron 4 mg Intravenous Q6H PRN Yaneth Klein MD   potassium chloride 10 mEq Oral BID Yaneth Klein MD     Continuous Infusions:   PRN Meds:   acetaminophen    calcium carbonate    hydrALAZINE    LORazepam    ondansetron    Surgical procedures (if appropriate):

## 2018-12-17 NOTE — PHYSICAL THERAPY NOTE
Physical Therapy Evaluation     Patient Name: Jessy Maya  Today's Date: 12/17/2018     Problem List  Patient Active Problem List   Diagnosis    Stenosis of right carotid artery    Hypokalemia    Ankylosing spondylitis (HCC)    Esophageal reflux    Accelerated hypertension    Calcific tendinitis of shoulder    Cervical radiculopathy    Chondromalacia patellae    Lumbosacral spondylosis without myelopathy    Dizziness and giddiness        Past Medical History  Past Medical History:   Diagnosis Date    Arthritis     Cholecystitis     Coronary artery disease     Hypertension     Spinal stenosis         Past Surgical History  Past Surgical History:   Procedure Laterality Date    CHOLECYSTECTOMY      COLON SURGERY      bwel resection    COLONOSCOPY      ESOPHAGOGASTRODUODENOSCOPY      02/07/2007  ONSET    [place] LAP,CHOLECYSTECTOMY N/A 12/5/2017    Procedure: CHOLECYSTECTOMY LAPAROSCOPIC;  Surgeon: Elisabeth Díaz MD;  [facility];  Service: General    SMALL INTESTINE SURGERY      [address] [facility] [address] [facility] [address] 2011 12/17/18 1018   Note Type   Note type Eval only   Pain Assessment   Pain Assessment No/denies pain   Pain Score No Pain   Home Living   Type of [facility] to enter with rails; One level; Laundry in basement  (1 PARIS- finished basement w/ PARIS)   Home Equipment Meghna Saha  (was his mother in laws- not using PTA)   Additional Comments pt lives w/ spouse in a ranch w/ finished basement- pt reports very active; gardening and working outside- (+) falls associated w/ spinal stenosis and prolonged standing - however not in 6 month period per pt-   PTA pt was not using AD ; was I and driving- both he and spouse are retired- I w/ ADL's 0 DME PTA    Prior Function   Level of [place] Independent with ADLs and functional mobility   Lives With Bianka Help From Family   ADL Assistance Independent   IADLs Independent   Falls in the last 6 months 0   Vocational Retired   Comments see above- pt reports I and active PTA- hs hx of outpt rehab "awhile ago"    Restrictions/Precautions   Weight Bearing Precautions Per Order No   Other Precautions Impulsive; Chair Alarm;Multiple lines; Fall Risk   General   Additional Pertinent History MRI (P)    Family/Caregiver Present No   Cognition   Overall Cognitive Status WFL   Arousal/Participation Alert   Orientation Level Oriented X4   Memory Within functional limits;Decreased recall of precautions   Following Commands Follows all commands and directions without difficulty   Comments pt is impulsive- which is most likely baseline behavior per his report- he reports he is always doing things "fast" and is often told to slow down by spouse  -   RUE Assessment   RUE Assessment WFL   LUE Assessment   LUE Assessment WFL   RLE Assessment   RLE Assessment WFL  (5/5)   LLE Assessment   LLE Assessment WFL  (5/5)   Coordination   Movements are Fluid and Coordinated 1   Light Touch   RLE Light Touch Grossly intact   LLE Light Touch Grossly intact   Bed Mobility   Supine to Sit 5  Supervision   Transfers   Sit to Stand 4  Minimal assistance  (CGA for safety only - 0 dizziness reported on standing)   Stand to Sit 5  Supervision  (cues for safty adn controlled descent )   Ambulation/Elevation   Gait pattern Foward flexed;Decreased foot clearance; Forward Flexion  (fast sandoval- )   Gait Assistance 5  Supervision   Additional items Assist x 1;Verbal cues   Assistive Device Rolling walker  (pt unsteady w/ LOB when not using AD- RW then trialed)   Distance 360' w/ RW and SBA   Balance   Static Sitting Good   Dynamic Sitting Fair +   Static Standing Fair -   Dynamic Standing Poor +   Ambulatory Poor +  (rw)   Endurance Deficit   Endurance Deficit Yes   Activity Tolerance   Activity Tolerance Patient limited by fatigue  (distances  limited at baseline 2* hx of stenosis  )   Medical Staff Made Aware yes- CM/ MSW updated Nurse Made Aware yes- updated    Assessment   Prognosis Good   Problem List Decreased endurance; Impaired balance;Decreased mobility; Decreased coordination;Decreased cognition   Assessment Pt is 74 y o  male seen for PT evaluation s/p admit to [facility] on 12/16/2018  Pt presenting w/ dizziness w/ feeling that he is "on a boat"- dizziness has been going on for approx 1 month and he was being seen by PCP for same- sx were acutely worse today and presented to ED  MRI is pending ; CTA showing 80% stenosis of proximal R ICA; mild B/L lacunar infarcts- new from prior study  Current dx/ problem list includes: r/o CVA; stroke pathway  PT now consulted for assessment of mobility and d/c needs  PMhx is as above and significant for: spinal stenosis, CAD, hypertensive urgency and HTN, right carotid stenosis and  personal factors currently affecting pt's physical performance include: steps in home environment (1); limited home support- spouse unable to physically assist; advanced age; sensory loss B/L LE's chronic- declining use of AD on d/c; declining outpt therapies on d/c; impulsive at baseline  Prior to admission, pt reports living w/ elderly spouse in a ranch home w/ 1 [place]; pt was I and not using AD; hx of falls >6 month ago - mostly while gardening; I w/ ADL's and driving  Upon evaluation, pt currently is requiring S A for bed skills; S for functional transfers and S for ambulation w/ ' - pt noted to be impulsive (most likely baseline) fast sandoval w/ LOB when not using AD- PT recommending RW use- to which pt is reluctant- but eventually agreeable    Pt presents functioning below baseline and currently w/ overall mobility deficits 2* to:limited flexibility; LE sensory deficits (chronic issues from spinal stenosis);  generalized weakness/ deconditioning; decreased endurance; decreased activity tolerance;  impaired balance; gait deviations; decreased safety awareness; impulsive;  limited insight into current deficits; bed/ chair alarms; multiple lines; hearing impairment/ visual impairments; Pt currently at risk for falls  (Please find additional objective findings from PT assessment regarding body systems outlined above ) Pt will continue to benefit from skilled PT interventions to address stated impairments; to maximize functional potential; for ongoing pt/ family training; and DME needs  Anticipate that with continued progress pt to d/c home with family support when medically cleared  PT did recommending outpt PT/ balance- however pt refusing at this time  Chair alarm activated post session    Barriers to Discharge Comments 1 [place]   Goals   Patient Goals go home later    STG Expiration Date 12/27/18   Short Term Goal #1 In 10 days pt will complete: 1) Bed mobility skills with indep for safe d/c to home environment 2) Functional transfers with MI/ indep for safe return to home environment I 3) Ambulation with least restrictive AD >500' without LOB and stable vitals on even / uneven surfaces for return to home/ community ambulation 1* goal of being able to return to gardening  4) Stair training up/ down 12 step/s with 1 rail/s  and S for safe access to home and to downstairs recreation room "[facility]"   5) Improve balance grades to Good 6) Improve LE strength grades by 1   7) LE HEP independently  8) PT for ongoing pt and family education; DME needs and D/C planning to promote highest level of function in least restrictive environment  Treatment Day 0   Plan   Treatment/Interventions Functional transfer training;LE strengthening/ROM; Elevations; Therapeutic exercise; Endurance training;Patient/family training;Equipment eval/education; Bed mobility;Gait training;Spoke to nursing;Spoke to case management;OT   PT Frequency (3-6x/wk )   Recommendation   Recommendation Outpatient PT  (RW- pt declining at present despite education )   Equipment Recommended Renaldo Pennington   Additional Comments chair alarm activated post sesssion-    Modified Bureau Scale   Modified Bureau Scale 4   Barthel Index   Feeding 10   Bathing 5   Grooming Score 5   Dressing Score 5   Bladder Score 10   Bowels Score 10   Toilet Use Score 5   Transfers (Bed/Chair) Score 10   Mobility (Level Surface) Score 10   Stairs Score 5   Barthel Index Score 75        Marilyn Blanton, PT

## 2018-12-17 NOTE — CONSULTS
Consultation - Cardiology Team One  Jessy Maya  74 y o  male MRN: [id number]  Unit/Bed#: [facility] 701-01 Encounter: [id number]    Inpatient consult to Cardiology  Consult performed by: [facility]  Consult ordered by: Vignesh Cruz      Physician Requesting Consult: Guero Hurtado MD  Reason for Consult / Principal Problem:  Preop cardiac risk assessment- right carotid endarterectomy    Assessment/ Plan    Pre-operative risk: Pt is an acceptable cardiac risk for possible R CEA  He denies any SOB, palpitations, or anginal symptoms  Patient is able to walk 2 blocks/multiple flights of stairs without chest pain or dyspnea, his physical abilities are only limited by pain secondary to spinal stenosis        HTN- avg 184/79  BP remains uncontrolled since admission, he has received 1 dose of IV hydralazine 10 mg in addition to his usual medications  Per neurology, SBP goal <140  He reports compliance with medications at home however his diet is excessively high in salt- we discussed the need to reduce his salt intake help improve his BP control  Per patient, his blood pressure runs in the 130-140s/60s to 70s at home    Mild aortic stenosis on echo 25/4736  Systolic murmur present on exam  Patient denies symptoms of possible worsening stenosis    Dyslipidemia  TC -230, triglycerides 170, HDL 38,   Started on atorvastatin 40 mg daily    Right carotid stenosis   Patient had previously documented >70% right ICA stenosis, present for the last few years  CTA head and neck performed on admission demonstrated 80% right ICA stenosis  Possible right CEA-per vascular surgery team  On ASA, plavix, statin    Unsteady gait/dizziness  Ongoing for the past month with increased frequency and persistence of symptoms  No focal neurological deficits on exam  Primary team and neurology ruling out CVA- CTA of the head/neck was negative for any large vessel occlusion or vascular cut off, an MRI is ordered    History of Present Illness   HPI: Stephen Cai  is a 74y o  year old male who has a history of hypertension, spinal stenosis, and right carotid stenosis  In October of last year, he presented to [facility] with an NSTEMI type 2 secondary to hypertensive urgency  At this time he was seen by Dr Rakan Ny and an aggressive oral regimen was started to improve his blood pressure control  Renal artery ultrasound also ruled out renal artery stenosis as a cause of his significant hypertension  At home he takes Coreg 25 mg twice a day, enalapril 20 mg twice a day, nifedipine 90 mg daily, and chlorthalidone 50 mg daily  He does not follow with an outpatient cardiologist     He presents with complaints of an unsteady gait and dizziness that has been a problem for the past month but the patient states it became worse yesterday which prompted him to come to the ED  He also reports chronic lower extremity numbness and tingling that he has had for many years  He is currently being worked up for CVA  CTA of the head/neck was negative for any large vessel occlusion or vascular cut off however it did find slight progression of his internal carotid stenosis to 80% as well as mild, chronic microangiopathy and chronic appearing bilateral lacunar infarctions  There was also moderate intradural vertebral artery atherosclerotic disease in dominant left vertebral artery care moderate calcified plaques within cavernous segments both internal cerebral arteries are to the prior study  An MRI is pending and vascular surgery has been consulted regarding the right ICA stenosis  On admission the patient was hypertensive with blood pressure 198/92 which was believed to be because he had not taken any of his home BP meds  However his blood pressure remains uncontrolled despite resumption of these medications  Initial labs unremarkable including CMP, CBC, and troponin    LDL was found to be elevated at 156 and the patient was started on atorvastatin 40 mg daily  Chest x-ray negative for any acute cardiopulmonary disease  Cardiology has been consulted for pre-operative risk evaluation for possible R CEA  Patient lives at home with his wife where he is very independent and ambulates without assistive device  He has a very remote history of tobacco use with a 2-3 pack-year history  He reports drinking 1-2 beers per week  EKG reviewed personally: NSR with first-degree AV block, RBBB     Telemetry reviewed personally:  Normal sinus rhythm, heart rate 60s    Echo 10/19/17- EF 65%, no regional motion abnormalities, mild to moderately increased wall thickness, concentric hypertrophy, grade 2 diastolic dysfunction  LA mild to moderately dilated, RA mildly dilated  Mild AS/AI with valve mean gradient 11 mmHg, estimated BYRON 1 9 centimeter squared, and mild TR    Review of Systems   Constitution: Negative for weakness, malaise/fatigue and weight gain  Cardiovascular: Negative for chest pain, claudication, dyspnea on exertion, irregular heartbeat, leg swelling, orthopnea, palpitations and syncope  Respiratory: Negative for cough, shortness of breath and sputum production  Hematologic/Lymphatic: Negative  Musculoskeletal: Positive for arthritis  Gastrointestinal: Negative for bloating, nausea and vomiting  Genitourinary: Negative  Neurological: Positive for dizziness and loss of balance  Negative for light-headedness and vertigo  Psychiatric/Behavioral: Negative for altered mental status  All other systems reviewed and are negative      Historical Information   Past Medical History:   Diagnosis Date    Arthritis     Cholecystitis     Coronary artery disease     Hypertension     Spinal stenosis      Past Surgical History:   Procedure Laterality Date    CHOLECYSTECTOMY      COLON SURGERY      bwel resection    COLONOSCOPY      ESOPHAGOGASTRODUODENOSCOPY      02/07/2007  ONSET    MS LAP,CHOLECYSTECTOMY N/A 12/5/2017 Procedure: CHOLECYSTECTOMY LAPAROSCOPIC;  Surgeon: Geena Beltran MD;  [facility]: [facility];  Service: General    SMALL INTESTINE SURGERY      ONSEC DEC 2011     History   Alcohol Use    Yes     Comment: social      History   Drug Use No     History   Smoking Status    Former Smoker    Packs/day: 2 00    Years: 3 00    Quit date: 1966   Smokeless Tobacco    Never Used     Family History:   Family History   Problem Relation Age of Onset    Arthritis Mother     Heart attack Father     Coronary artery disease Family     Diabetes Family        Meds/Allergies   all current active meds have been reviewed and current meds:   Current Facility-Administered Medications   Medication Dose Route Frequency    acetaminophen (TYLENOL) tablet 650 mg  650 mg Oral Q4H PRN    aspirin chewable tablet 81 mg  81 mg Oral Daily    atorvastatin (LIPITOR) tablet 40 mg  40 mg Oral QPM    calcium carbonate (TUMS) chewable tablet 1,000 mg  1,000 mg Oral Daily PRN    carvedilol (COREG) tablet 25 mg  25 mg Oral BID With Meals    chlorthalidone tablet 50 mg  50 mg Oral Daily    cholecalciferol (VITAMIN D3) tablet 1,000 Units  1,000 Units Oral Daily    clopidogrel (PLAVIX) tablet 75 mg  75 mg Oral Daily    docusate sodium (COLACE) capsule 100 mg  100 mg Oral BID    enoxaparin (LOVENOX) subcutaneous injection 40 mg  40 mg Subcutaneous Daily    hydrALAZINE (APRESOLINE) injection 10 mg  10 mg Intravenous Q6H PRN    lisinopril (ZESTRIL) tablet 40 mg  40 mg Oral Daily    LORazepam (ATIVAN) 2 mg/mL injection 0 5 mg  0 5 mg Intravenous Daily PRN    NIFEdipine (PROCARDIA XL) 24 hr tablet 90 mg  90 mg Oral Daily    ondansetron (ZOFRAN) injection 4 mg  4 mg Intravenous Q6H PRN    potassium chloride (K-DUR,KLOR-CON) CR tablet 10 mEq  10 mEq Oral BID        No Known Allergies    Objective   Vitals: Blood pressure (!) 195/90, pulse 80, temperature 98 2 °F (36 8 °C), temperature source Oral, resp   rate 16, height 5' 7" (1 702 m), weight 85 kg (187 lb 6 3 oz), SpO2 97 %  ,     Body mass index is 29 35 kg/m²  ,     Systolic (96CFX), JWQ:422 , Min:151 , VTZ:476     Diastolic (70QLY), BMK:09, Min:64, Max:92      Intake/Output Summary (Last 24 hours) at 12/17/18 0908  Last data filed at 12/17/18 5786   Gross per 24 hour   Intake              480 ml   Output              875 ml   Net             -395 ml     Weight (last 2 days)     Date/Time   Weight    12/16/18 2000  85 (187 39)    12/16/18 0936  85 7 (189)            Invasive Devices     Peripheral Intravenous Line            Peripheral IV 12/16/18 Right Antecubital less than 1 day              Physical Exam   Constitutional: He is oriented to person, place, and time  He appears well-developed and well-nourished  Neck: Neck supple  No JVD present  Decreased range of motion present  Cardiovascular: Normal rate, regular rhythm and intact distal pulses  Exam reveals no gallop and no friction rub  Murmur heard  Systolic murmur is present   Pulmonary/Chest: Effort normal  No respiratory distress  He has no rales  Abdominal: Soft  Bowel sounds are normal  He exhibits no distension  Musculoskeletal: He exhibits no edema  Neurological: He is alert and oriented to person, place, and time  Skin: Skin is warm and dry  Psychiatric: He has a normal mood and affect       LABORATORY RESULTS:    Results from last 7 days  Lab Units 12/16/18  1024   TROPONIN I ng/mL <0 02     CBC with diff:   Results from last 7 days  Lab Units 12/17/18  0607 12/16/18  1031 12/16/18  1030 12/16/18  1024   WBC Thousand/uL 5 48  --   --  6 72   HEMOGLOBIN g/dL 13 0  --   --  13 1   I STAT HEMOGLOBIN g/dl  --  12 9 12 9  --    HEMATOCRIT % 38 4  --   --  38 3   HEMATOCRIT, ISTAT %  --  38 38  --    MCV fL 89  --   --  88   PLATELETS Thousands/uL 163  --   --  160   MCH pg 30 0  --   --  30 2   MCHC g/dL 33 9  --   --  34 2   RDW % 12 8  --   --  12 9   MPV fL 11 1  --   --  10 7   NRBC AUTO /100 WBCs 0  --   --   -- CMP:  Results from last 7 days  Lab Units 18  0607 18  1031 18  1030 18  1024   POTASSIUM mmol/L 3 3*  --   --  3 5   CHLORIDE mmol/L 103  --   --  103   CO2 mmol/L 29  --   --  29   CO2, I-STAT mmol/L  --  30 27  --    BUN mg/dL 13  --   --  16   CREATININE mg/dL 0 92  --   --  0 93   GLUCOSE, ISTAT mg/dl  --  116 120  --    CALCIUM mg/dL 9 1  --   --  8 9   EGFR ml/min/1 73sq m 81  --  87 80     BMP:  Results from last 7 days  Lab Units 18  0607 18  1031 18  1030  18  1024   POTASSIUM mmol/L 3 3*  --   --   --  3 5   CHLORIDE mmol/L 103  --   --   --  103   CO2 mmol/L 29  --   --   --  29   CO2, I-STAT mmol/L  --  30 27  --   --    BUN mg/dL 13  --   --   --  16   CREATININE mg/dL 0 92  --   --   --  0 93   GLUCOSE, ISTAT mg/dl  --  116 120  < >  --    CALCIUM mg/dL 9 1  --   --   --  8 9   < > = values in this interval not displayed         Results from last 7 days  Lab Units 18  0607   HEMOGLOBIN A1C % 5 8       Results from last 7 days  Lab Units 18  1024   INR  0 95     Lipid Profile:   No results found for: CHOL  Lab Results   Component Value Date    HDL 38 (L) 2018    HDL 48 2018    HDL 49 10/19/2017     Lab Results   Component Value Date    LDLCALC 156 (H) 2018    LDLCALC 162 (H) 2018    LDLCALC 100 10/19/2017     Lab Results   Component Value Date    TRIG 178 (H) 2018    TRIG 139 2018    TRIG 62 10/19/2017     Cardiac testing:   Results for orders placed during the hospital encounter of 10/18/17   Echo complete with contrast if indicated    Narrative [address]  [phone]    Transthoracic Echocardiogram  2D, M-mode, Doppler, and Color Doppler    Study date:  19-Oct-2017    Patient: Lucinda Flores  MR number: [id number]  Account number: [de-identified]  : 1945  Age: 74 years  Gender: Male  Status: Inpatient  Location: Bedside  Height: 70 in  Weight: 191 6 lb  BP: 157/ 47 mmHg    Indications: Hypertensive Heart Disease    Diagnoses: I11 9 - Hypertensive heart disease without heart failure    Sonographer:  LARISSA Ziegler, RDCS  Primary Physician:  Richard Hernández DO  Referring Physician:  Angela Ching MD  Group:  [id number]  Interpreting Physician:  Adrian Hernandez MD    SUMMARY    LEFT VENTRICLE:  Systolic function was normal  Ejection fraction was estimated to be 65 %  There were no regional wall motion abnormalities  Wall thickness was mildly to moderately increased  There was mild concentric hypertrophy  Features were consistent with a pseudonormal left ventricular filling pattern, with concomitant abnormal relaxation and increased filling pressure (grade 2 diastolic dysfunction)  LEFT ATRIUM:  The atrium was mildly to moderately dilated  RIGHT ATRIUM:  The atrium was mildly dilated  AORTIC VALVE:  The valve was trileaflet  Leaflets exhibited moderately increased thickness, moderate calcification, and lower normal cuspal separation  There was very mild stenosis  There was mild regurgitation  Valve mean gradient was 11 mmHg  Estimated aortic valve area (by Vmax) was 1 99 cm squared  TRICUSPID VALVE:  There was mild regurgitation  HISTORY: PRIOR HISTORY: Hypertensive Emergency, Abdominal Pain, 70% stenosis of ICA    PROCEDURE: The procedure was performed at the bedside  This was a routine study  The transthoracic approach was used  The study included complete 2D imaging, M-mode, complete spectral Doppler, and color Doppler  The heart rate was 50 bpm,  at the start of the study  Images were obtained from the parasternal, apical, subcostal, and suprasternal notch acoustic windows  This was a technically difficult study  LEFT VENTRICLE: Size was normal  Systolic function was normal  Ejection fraction was estimated to be 65 %  There were no regional wall motion abnormalities  Wall thickness was mildly to moderately increased  There was mild concentric  hypertrophy  DOPPLER: Features were consistent with a pseudonormal left ventricular filling pattern, with concomitant abnormal relaxation and increased filling pressure (grade 2 diastolic dysfunction)  RIGHT VENTRICLE: The size was normal  Systolic function was normal  Wall thickness was normal     LEFT ATRIUM: The atrium was mildly to moderately dilated  RIGHT ATRIUM: The atrium was mildly dilated  MITRAL VALVE: Valve structure was normal  There was normal leaflet separation  DOPPLER: The transmitral velocity was within the normal range  There was no evidence for stenosis  There was no regurgitation  AORTIC VALVE: The valve was trileaflet  Leaflets exhibited moderately increased thickness, moderate calcification, and lower normal cuspal separation  DOPPLER: Transaortic velocity was minimally increased  There was very mild stenosis  There was mild regurgitation  TRICUSPID VALVE: The valve structure was normal  There was normal leaflet separation  DOPPLER: The transtricuspid velocity was within the normal range  There was no evidence for stenosis  There was mild regurgitation  Pulmonary artery  systolic pressure was within the normal range  PULMONIC VALVE: Leaflets exhibited normal thickness, no calcification, and normal cuspal separation  DOPPLER: The transpulmonic velocity was within the normal range  There was no regurgitation  PERICARDIUM: There was no pericardial effusion  The pericardium was normal in appearance  AORTA: The root exhibited normal size      MEASUREMENT TABLES    2D MEASUREMENTS  LVOT   (Reference normals)  Diam   23 mm   (--)    DOPPLER MEASUREMENTS  LVOT   (Reference normals)  Peak jean   110 cm/s   (--)  Mean jean   77 cm/s   (--)  VTI   26 cm   (--)  Stroke vol   108 02 ml   (--)  Aortic valve   (Reference normals)  Peak jean   229 cm/s   (--)  Mean jean   153 cm/s   (--)  VTI   46 cm   (--)  Peak gradient   21 mmHg   (--)  Mean gradient   11 mmHg   (--)  Obstr index, VTI   0 57    (--)  Valve area, VTI   2 37 cm squared   (--)  Obstr index, Vmax   0 48    (--)  Valve area, Vmax   1 99 cm squared   (--)  Obstr index, Vmean   0 5    (--)  Valve area, Vmean   2 08 cm squared   (--)    SYSTEM MEASUREMENT TABLES    2D  %FS: 32 98 %  Ao Diam: 2 71 cm  EDV(Teich): 116 48 ml  EF(Teich): 61 29 %  ESV(Teich): 45 09 ml  IVSd: 1 11 cm  LA Area: 21 75 cm2  LA Diam: 4 44 cm  LVEDV MOD A4C: 151 49 ml  LVEF MOD A4C: 59 15 %  LVESV MOD A4C: 61 89 ml  LVIDd: 4 97 cm  LVIDs: 3 33 cm  LVLd A4C: 9 2 cm  LVLs A4C: 7 56 cm  LVOT Diam: 2 25 cm  LVPWd: 0 98 cm  RA Area: 32 57 cm2  RVIDd: 4 98 cm  SV MOD A4C: 89 6 ml  SV(Teich): 71 39 ml    CW  AV Env  Ti: 301 42 ms  AV VTI: 46 11 cm  AV Vmax: 2 29 m/s  AV Vmean: 1 53 m/s  AV maxP 9 mmHg  AV meanP 2 mmHg  TR Vmax: 2 62 m/s  TR maxP 38 mmHg    MM  TAPSE: 3 05 cm    PW  BYRON (VTI): 2 23 cm2  BYRON Vmax: 1 93 cm2  AVC: 369 42 ms  E': 0 04 m/s  E/E': 24 16  LVOT Env  Ti: 336 79 ms  LVOT VTI: 25 81 cm  LVOT Vmax: 1 1 m/s  LVOT Vmean: 0 77 m/s  LVOT maxP 87 mmHg  LVOT meanP 66 mmHg  LVSI Dopp: 50 23 ml/m2  LVSV Dopp: 102 97 ml  MV A Lv: 0 8 m/s  MV Dec [place]: 5 49 m/s2  MV DecT: 185 8 ms  MV E Lv: 1 02 m/s  MV E/A Ratio: 1 28  MV PHT: 53 88 ms  MVA By PHT: 4 08 cm2    [facility]    Prepared and electronically signed by    Kiley Russell MD  Signed 19-Oct-2017 12:48:04       No results found for this or any previous visit  No procedure found  No results found for this or any previous visit  Imaging: I have personally reviewed pertinent reports  and I have personally reviewed pertinent films in PACS  Cta Head And Neck W Wo Contrast    Result Date: 2018  Narrative: CTA NECK AND BRAIN WITH AND WITHOUT CONTRAST INDICATION: Dizziness intermittent dizziness for last month   COMPARISON:   10/19/2017; 2014 TECHNIQUE:  Routine CT imaging of the Brain without contrast   Post contrast imaging was performed after administration of iodinated contrast through the neck and brain  Post contrast axial 0 625 mm images timed to opacify the arterial system  3D rendering was performed on an independent workstation  MIP reconstructions performed  Coronal reconstructions were performed of the noncontrast portion of the brain  Radiation dose length product (DLP) for this visit:  24 [id number] mGy-cm   This examination, like all CT scans performed in the [facility], was performed utilizing techniques to minimize radiation dose exposure, including the use of iterative reconstruction and automated exposure control  IV Contrast:  85 mL of iohexol (OMNIPAQUE)  IMAGE QUALITY:   Diagnostic FINDINGS: NONCONTRAST BRAIN PARENCHYMA:  No acute intracranial hemorrhage  Small bilateral subcortical hypodensities in the frontal centrum semiovale bilaterally, new from the prior study compatible with chronic lacunar infarctions and/or white matter disease  Atherosclerotic calcifications of the cavernous segment of the internal carotid artery are moderate  Dense calcified plaque in both intradural vertebral arteries noted as well    VENTRICLES AND EXTRA-AXIAL SPACES:  Right lens implant noted  VISUALIZED ORBITS AND PARANASAL SINUSES:  Unremarkable  CERVICAL VASCULATURE AORTIC ARCH AND GREAT VESSELS:  Mild athersclerotic disease of the arch, proximal great vessels and visualized subclavian vessels  No significant stenosis  RIGHT VERTEBRAL ARTERY CERVICAL SEGMENT:  Normal origin  The vessel is normal in caliber throughout the neck  LEFT VERTEBRAL ARTERY CERVICAL SEGMENT:  Normal origin  The vessel is normal in caliber throughout the neck  Left vertebral artery is congenitally dominant  RIGHT EXTRACRANIAL CAROTID SEGMENT:  Scattered calcified plaque in the common carotid artery noted, overall mild    In the proximal portion of the right internal carotid artery, approximately 1 cm cephalad to the origin there is dense circumferential atherosclerotic calcified plaque causing a pinpoint luminal stenosis perhaps 0 08 mm  Distally the vessel measures 3 9 mm, generating a stenosis of  approximately 80% by NASCET criteria  This is progressed from the prior CTA  Distally the vessel is patent  LEFT EXTRACRANIAL CAROTID SEGMENT:  Mild atherosclerotic disease of the distal common carotid artery and proximal cervical internal carotid artery without significant stenosis compared to the more distal ICA  NASCET criteria was used to determine the degree of internal carotid artery diameter stenosis  INTRACRANIAL VASCULATURE INTERNAL CAROTID ARTERIES: Atherosclerotic calcifications of the cavernous segment of the internal carotid artery are moderate on the left and mild on the right     Normal ophthalmic artery origins  Normal ICA terminus  ANTERIOR CIRCULATION:  Symmetric A1 segments and anterior cerebral arteries with normal enhancement  Normal anterior communicating artery  MIDDLE CEREBRAL ARTERY CIRCULATION:  M1 segment and middle cerebral artery branches demonstrate normal enhancement bilaterally  DISTAL VERTEBRAL ARTERIES:  Dense calcified plaque intradural left vertebral artery causing moderate stenosis immediately proximal to the left PICA which is patent  Moderate calcified plaque in the distal right vertebral artery noted  Posterior inferior cerebellar artery origins are normal  Normal vertebral basilar junction  BASILAR ARTERY:  Basilar artery is normal in caliber  Normal superior cerebellar arteries  POSTERIOR CEREBRAL ARTERIES: Both posterior cerebral arteries arises from the basilar tip  Both arteries demonstrate normal enhancement  Normal posterior communicating arteries  DURAL VENOUS SINUSES:  Normal  NON VASCULAR ANATOMY BONY STRUCTURES:  Advanced multilevel spondylosis noted    Exuberant anterior osteophyte formation at the C2-C3 articulation  SOFT TISSUES OF THE NECK: Incidental subcentimeter thyroid nodules noted, the largest measuring 5 mm in the right lobe of the thyroid gland identified on this CT  According to guidelines published in the February 2015 white paper on incidental thyroid nodules in the Journal of the [employer] of Radiology [facility]), because the nodule(s) are less than 1 5 cm in size and without suspicious feature, no further evaluation is recommended  THORACIC INLET:  Unremarkable  Impression: 1  Hemodynamically significant, 80% stenosis of the proximal right internal carotid artery, 1 cm distal to the origin  Stenosis has progressed slightly from the prior study  Vascular surgery assessment recommended  2   Mild, chronic microangiopathy and chronic appearing bilateral lacunar infarctions are new from the prior study  3   Moderate intradural vertebral artery atherosclerotic disease in the dominant left vertebral artery and scattered moderate calcified plaque in the cavernous segments of both internal cerebral arteries noted, similar to the prior study  No large vessel occlusion or vascular cut off  Workstation performed: [id number]     X-ray Chest 1 View Portable    Result Date: 12/17/2018  Narrative: CHEST INDICATION:   Strokelike symptoms  COMPARISON:  10/18/2017 EXAM PERFORMED/VIEWS:  XR CHEST PORTABLE FINDINGS:  Monitoring leads and clips project over the chest  Cardiomediastinal silhouette appears unremarkable  The lungs are clear  No pneumothorax or pleural effusion  Osseous structures appear within normal limits for patient age  Impression: No acute cardiopulmonary disease  Workstation performed: [id number]     Vas Carotid Complete Study    Result Date: 12/5/2018  Narrative:  [facility] REPORT CLINICAL: Indications:  Carotid disease w/o CVA [I65 29]  Patient with known >70% carotid stenosis of the right side  Patient is asymptomatic at this time   Operative History: No heart or vascular surgery Risk Factors The patient has history of HTN, CAD and previous smoking (quit >10yrs ago)  The patient current BMI is 27 12, Weight is 189 lb and height is 70 in  Clinical Right Pressure:  138/ mm Hg, Left Pressure:  134/ mm Hg  FINDINGS:  Right        Impression  PSV  EDV (cm/s)  Direction of Flow  Ratio  Dist  ICA                 91          13                      0 88  Mid  ICA     70%+        368          70                      3 56  Prox  ICA    70 - 99%    488         153                      4 73  Dist CCA                  79           9                            Mid CCA                  103          11                      0 94  Prox CCA                 110          11                            Ext Carotid              152           0                      1 47  Prox Vert                 41          10  Antegrade                 Subclavian               182           0                             Left         Impression  PSV  EDV (cm/s)  Direction of Flow  Ratio  Dist  ICA                119          26                      0 83  Mid  ICA                 111          18                      0 77  Prox  ICA    1 - 49%     113          14                      0 79  Dist CCA                 152          18                            Mid CCA                  144          16                      1 03  Prox CCA                 140          18                            Ext Carotid              151          10                      1 05  Prox Vert                 44          10  Antegrade                 Subclavian               249           0                               CONCLUSION:  Impression RIGHT: There is 70-99% stenosis noted in the internal carotid artery  Plaque is heterogenous and irregular  Vertebral artery flow is antegrade  There is no significant subclavian artery disease  LEFT: There is <50% stenosis noted in the internal carotid artery  Plaque is heterogenous and irregular   Vertebral artery flow is antegrade  There is no significant subclavian artery disease  No previous study to compare  Recommend repeat testing in 6 months as per protocol unless otherwise indicated  Called ordering physician's office x2 & left a message to give preliminary results with no call back  Internal carotid artery stenosis determination by consensus criteria from: Misty Stearns et al  Carotid Artery Stenosis: Gray-Scale and Doppler US Diagnosis - [employer] in [address] [place] [address] Drive, Radiology 2003; [phone]  SIGNATURE: Electronically Signed by: Courtney Argueta on 2018-12-05 09:18:50 AM    Assessment  Principal Problem:    Dizziness and giddiness  Active Problems:    Stenosis of right carotid artery    Hypokalemia    Hypertension    Thank you for allowing us to participate in this patient's care  Counseling / Coordination of Care  Total floor / unit time spent today 45 minutes  Greater than 50% of total time was spent with the patient and / or family counseling and / or coordination of care  A description of the counseling / coordination of care: Review of history, current assessment, development of a plan  Code Status: Level 1 - Full Code    ** Please Note: Dragon 360 Dictation voice to text software may have been used in the creation of this document   **

## 2018-12-17 NOTE — CONSULTS
Inpatient Consultation - Nephrology   Brennan Tovar  74 y o  male MRN: [id number]  Unit/Bed#: [facility] 701-01 Encounter: [id number]    ASSESSMENT and PLAN:      1 ) Hypertension  - patient reports his baseline blood pressures anywhere between 127-539 systolic  - blood pressure now most recently has improved ranging between 077-850 systolic  -continue carvedilol 25 mg every 12 hr  -chlorthalidone 50 mg daily  -lisinopril 40 mg daily  -nifedipine 90 mg daily  -will check a secondary workup for hypertension including aldosterone, renin, metanephrines, catecholamines  -check a renal artery Doppler, can be done as outpatient if needed    2 ) Hypokalemia  -he is on a thiazide like diuretic which can cause hypokalemia  -appears to be a persistent issue  -check a magnesium level  -check a urine potassium and urine creatinine  -secondary workup for hypertension as detailed above  -high potassium diet    SUMMARY OF RECOMMENDATIONS:    · Check catecholamines, metanephrines plasma  · Check renal artery Doppler  · Check serum aldosterone and renin  · Continue current antihypertensive regiment as blood pressure has improved  · Urinary studies    HISTORY OF PRESENT ILLNESS:  Requesting Physician: Basilio Smith MD  Reason for Consult:  Hypertension    Brennan Tovar  is a 74 y o  male who was admitted to [facility] on December 16th after presenting with dizziness  A renal consultation is requested today for assistance in the management of hypertension  Left reported dizziness for the past month and was also having some paresthesias in the right hand which has been chronic  He was found to have a CTA that showed stenosis of the right carotid artery around 80%  Vascular surgery has been consulted  Blood pressure was elevated on admission at 198/92 and had been persistently elevated up until today  He is currently maintained on 4 antihypertensives    Patient reports his baseline blood pressures between 161-926 systolic  Nephrology consulted for help management of the hypertension and for secondary workup      PAST MEDICAL HISTORY:  Past Medical History:   Diagnosis Date    Arthritis     Cholecystitis     Coronary artery disease     Hypertension     Spinal stenosis        PAST SURGICAL HISTORY:  Past Surgical History:   Procedure Laterality Date    CHOLECYSTECTOMY      COLON SURGERY      bwel resection    COLONOSCOPY      ESOPHAGOGASTRODUODENOSCOPY      02/07/2007  ONSET    GA LAP,CHOLECYSTECTOMY N/A 12/5/2017    Procedure: CHOLECYSTECTOMY LAPAROSCOPIC;  Surgeon: Nash Reddy MD;  [facility];  Service: General    SMALL INTESTINE SURGERY      ONSEC DEC 2011       ALLERGIES:  No Known Allergies    SOCIAL HISTORY:  History   Alcohol Use    Yes     Comment: social      History   Drug Use No     History   Smoking Status    Former Smoker    Packs/day: 2 00    Years: 3 00    Quit date: 1966   Smokeless Tobacco    Never Used       FAMILY HISTORY:  Family History   Problem Relation Age of Onset    Arthritis Mother     Heart attack Father     Coronary artery disease Family     Diabetes Family        MEDICATIONS:    Current Facility-Administered Medications:     acetaminophen (TYLENOL) tablet 650 mg, 650 mg, Oral, Q4H PRN, Sera Sandhu MD    aspirin chewable tablet 81 mg, 81 mg, Oral, Daily, Sera Sandhu MD, 81 mg at 12/17/18 0847    atorvastatin (LIPITOR) tablet 40 mg, 40 mg, Oral, QPM, Sera Sandhu MD, 40 mg at 12/16/18 2005    calcium carbonate (TUMS) chewable tablet 1,000 mg, 1,000 mg, Oral, Daily PRN, Sera Sandhu MD    carvedilol (COREG) tablet 25 mg, 25 mg, Oral, BID With Meals, Sera Sandhu MD, 25 mg at 12/17/18 0726    chlorthalidone tablet 50 mg, 50 mg, Oral, Daily, Sera Sandhu MD, 50 mg at 12/17/18 0849    cholecalciferol (VITAMIN D3) tablet 1,000 Units, 1,000 Units, Oral, Daily, Sera Sandhu MD, 1,000 Units at 12/17/18 0847    clopidogrel (PLAVIX) tablet 75 mg, 75 mg, Oral, Daily, Clifton Venegas DO, 75 mg at 12/17/18 0847    docusate sodium (COLACE) capsule 100 mg, 100 mg, Oral, BID, Sandy Salgado MD, 100 mg at 12/17/18 0847    enoxaparin (LOVENOX) subcutaneous injection 40 mg, 40 mg, Subcutaneous, Daily, Sandy Salgado MD, 40 mg at 12/17/18 0846    hydrALAZINE (APRESOLINE) injection 10 mg, 10 mg, Intravenous, Q6H PRN, Malu Daugherty PA-C, 10 mg at 12/17/18 0847    lisinopril (ZESTRIL) tablet 40 mg, 40 mg, Oral, Daily, Peri Olmos PA-C, 40 mg at 12/17/18 0847    LORazepam (ATIVAN) 2 mg/mL injection 0 5 mg, 0 5 mg, Intravenous, Daily PRN, Sandy Salgado MD    NIFEdipine (PROCARDIA XL) 24 hr tablet 90 mg, 90 mg, Oral, Daily, Sandy Salgado MD, 90 mg at 12/17/18 0850    ondansetron (ZOFRAN) injection 4 mg, 4 mg, Intravenous, Q6H PRN, Sandy Salgado MD    potassium chloride (K-DUR,KLOR-CON) CR tablet 10 mEq, 10 mEq, Oral, BID, Sandy Salgado MD, 10 mEq at 12/17/18 0847    REVIEW OF SYSTEMS:  Constitutional: Negative for fatigue, anorexia, fever, chills, diaphoresis  HENT: Negative for postnasal drip  Eyes: Negative for visual disturbance  Respiratory: Negative for cough, shortness of breath and wheezing  Cardiovascular: Negative for chest pain, palpitations and leg swelling  Gastrointestinal: Negative for abdominal pain, constipation, diarrhea, nausea and vomiting  Genitourinary: No dysuria, hematuria  Endocrine: Negative for polyuria  Musculoskeletal: Negative for arthralgias, back pain and joint swelling  Skin: Negative for rash  Neurological: Negative for focal weakness, headaches, dizziness  Hematological: Negative for easy bruising or bleeding  Psychiatric/Behavioral: Negative for confusion and sleep disturbance  All the systems were reviewed and were negative except as documented on the HPI      PHYSICAL EXAM:  Current Weight: Weight - Scale: 85 kg (187 lb 6 3 oz)  First Weight: Weight - Scale: 85 7 kg (189 lb)  Vitals: 12/17/18 1100 12/17/18 1200 12/17/18 1206 12/17/18 1208   BP: 130/68 148/68 138/58 128/52   BP Location: Left arm Right arm Right arm Right arm   Pulse: 62 (!) 51     Resp: 18 16     Temp:  98 1 °F (36 7 °C)     TempSrc:  Oral     SpO2:  97%     Weight:       Height:           Intake/Output Summary (Last 24 hours) at 12/17/18 1453  Last data filed at 12/17/18 0752   Gross per 24 hour   Intake              480 ml   Output              875 ml   Net             -395 ml     Physical Exam   Constitutional: He is oriented to person, place, and time  He appears well-developed and well-nourished  No distress  HENT:   Head: Normocephalic and atraumatic  Eyes: Pupils are equal, round, and reactive to light  No scleral icterus  Neck: Normal range of motion  Neck supple  Cardiovascular: Normal rate, regular rhythm and normal heart sounds  Exam reveals no gallop and no friction rub  No murmur heard  Pulmonary/Chest: Effort normal and breath sounds normal  No respiratory distress  He has no wheezes  He has no rales  He exhibits no tenderness  Abdominal: Soft  Bowel sounds are normal  He exhibits no distension  There is no tenderness  There is no rebound  Musculoskeletal: Normal range of motion  He exhibits no edema  Neurological: He is alert and oriented to person, place, and time  Skin: No rash noted  He is not diaphoretic  Psychiatric: He has a normal mood and affect  Nursing note and vitals reviewed          Invasive Devices:      Lab Results:     Results from last 7 days  Lab Units 12/17/18  0607 12/16/18  1031 12/16/18  1030 12/16/18  1024   WBC Thousand/uL 5 48  --   --  6 72   HEMOGLOBIN g/dL 13 0  --   --  13 1   I STAT HEMOGLOBIN g/dl  --  12 9 12 9  --    HEMATOCRIT % 38 4  --   --  38 3   HEMATOCRIT, ISTAT %  --  38 38  --    PLATELETS Thousands/uL 163  --   --  160   POTASSIUM mmol/L 3 3*  --   --  3 5   CHLORIDE mmol/L 103  --   --  103   CO2 mmol/L 29  --   --  29   CO2, I-STAT mmol/L  --  30 27  --    BUN mg/dL 13  --   --  16   CREATININE mg/dL 0 92  --   --  0 93   CALCIUM mg/dL 9 1  --   --  8 9   GLUCOSE, ISTAT mg/dl  --  116 120  --

## 2018-12-17 NOTE — PLAN OF CARE
Problem: OCCUPATIONAL THERAPY ADULT  Goal: Performs self-care activities at highest level of function for planned discharge setting  See evaluation for individualized goals  Treatment Interventions: ADL retraining, Functional transfer training, Endurance training, Patient/family training, Activityengagement          See flowsheet documentation for full assessment, interventions and recommendations  Limitation: Decreased ADL status, Decreased Safe judgement during ADL, Decreased endurance, Decreased self-care trans  Prognosis: Good  Assessment: Pt is a 74 y o  male who was admitted to [facility] on 12/16/2018 with Dizziness and giddiness   Pt's problem list also includes PMH of HTN, previous surgery and arthritis, cholecystitis, cad, spinal stenosis  At baseline pt was completing adls and mobility independently w/o ad - I iadls including driving - shares homemaking with spouse  Pt lives with wife in ranch home with FF to basement  Currently pt requires sba for overall ADLS and sba for functional mobility/transfers  Pt currently presents with impairments in the following categories -steps to enter environment, difficulty performing IADLS  and limited insight into deficits activity tolerance, endurance, standing balance/tolerance, insight and safety   These impairments, as well as pt's fatigue, impulsivity and risk for falls  limit pt's ability to safely engage in all baseline areas of occupation, includingbathing, dressing, toileting, functional mobility/transfers, community mobility, house maintenance, work/volunteer work , social participation  and leisure activities  From OT standpoint, recommend home with family support upon D/C  OT will continue to follow to address the below stated goals        OT Discharge Recommendation: Home with family support

## 2018-12-17 NOTE — OCCUPATIONAL THERAPY NOTE
[address] Evaluation     Patient Name: Luh Lainez  Today's Date: 12/17/2018  Problem List  Patient Active Problem List   Diagnosis    Stenosis of right carotid artery    Hypokalemia    Ankylosing spondylitis (HCC)    Esophageal reflux    Accelerated hypertension    Calcific tendinitis of shoulder    Cervical radiculopathy    Chondromalacia patellae    Lumbosacral spondylosis without myelopathy    Dizziness and giddiness     Past Medical History  Past Medical History:   Diagnosis Date    Arthritis     Cholecystitis     Coronary artery disease     Hypertension     Spinal stenosis      Past Surgical History  Past Surgical History:   Procedure Laterality Date    CHOLECYSTECTOMY      COLON SURGERY      bwel resection    COLONOSCOPY      ESOPHAGOGASTRODUODENOSCOPY      02/07/2007  ONSET    [place] LAP,CHOLECYSTECTOMY N/A 12/5/2017    Procedure: CHOLECYSTECTOMY LAPAROSCOPIC;  Surgeon: Rodger Caceres MD;  [facility];  Service: General    SMALL INTESTINE SURGERY      [address] [facility] [place] [facility] [address] 2011 12/17/18 1015   Note Type   Note type Eval/Treat   Restrictions/Precautions   Weight Bearing Precautions Per Order No   Other Precautions Impulsive; Chair Alarm; Fall Risk   Pain Assessment   Pain Assessment No/denies pain   Home Living   Type of [address] [place] [address] One level;Stairs to enter with rails  (FF TO BASEMENT)   Prior Function   Level of [place] Independent with ADLs and functional mobility   Lives With Spouse   Receives Help From Family   ADL Assistance Independent   IADLs Independent   Falls in the last 6 months 0   Vocational Retired   [facility] - +   Reciprocal Relationships SUPPORTIVE FAMILY   Service to Others RETIRED-CONTINUES TO TEND BAR PT  [facility] PTA   Subjective   Subjective OFFERS NO C/O    ADL   Eating Assistance 7  Independent   Grooming Assistance 5  Supervision/Setup   UB Bathing Assistance 5  Supervision/Setup   LB Bathing Assistance 5  Supervision/Setup   UB Dressing Assistance 5  Supervision/Setup   LB Dressing Assistance 5  Supervision/Setup   Toileting Assistance  5  Supervision/Setup   Bed Mobility   Supine to Sit 5  Supervision   Sit to Supine 5  Supervision   Transfers   Sit to Stand 5  Supervision   Stand to Sit 5  Supervision   Stand pivot 5  Supervision   Functional Mobility   Functional Mobility 5  Supervision   Additional items Rolling walker   Balance   Static Sitting Good   Dynamic Sitting Fair +   Static Standing Fair   Dynamic Standing Fair -   Ambulatory Fair -   Activity Tolerance   Activity Tolerance Patient limited by fatigue;Treatment limited secondary to medical complications (Comment)   RUE Assessment   RUE Assessment WFL   LUE Assessment   LUE Assessment WFL   Cognition   Arousal/Participation Alert   Attention Attends with cues to redirect   Orientation Level Oriented X4   Memory Decreased recall of precautions   Following Commands Follows all commands and directions without difficulty   Comments IMPULSIVE HOWEVER LIKELY BASELINE AS PT REPORTS HE IS ALWAYS "MOVING FAST" AND NEVER "SITS STILL"   Assessment   Limitation Decreased ADL status; Decreased Safe judgement during ADL;Decreased endurance;Decreased self-care trans   Prognosis Good   Assessment Pt is a 74 y o  male who was admitted to [facility] on 12/16/2018 with Dizziness and giddiness   Pt's problem list also includes PMH of HTN, previous surgery and arthritis, cholecystitis, cad, spinal stenosis  At baseline pt was completing adls and mobility independently w/o ad - I iadls including driving - shares homemaking with spouse  Pt lives with wife in ranch home with FF to basement  Currently pt requires sba for overall ADLS and sba for functional mobility/transfers   Pt currently presents with impairments in the following categories -steps to enter environment, difficulty performing IADLS  and limited insight into deficits activity tolerance, endurance, standing balance/tolerance, insight and safety   These impairments, as well as pt's fatigue, impulsivity and risk for falls  limit pt's ability to safely engage in all baseline areas of occupation, includingbathing, dressing, toileting, functional mobility/transfers, community mobility, house maintenance, work/volunteer work , social participation  and leisure activities  From OT standpoint, recommend home with family support upon D/C  OT will continue to follow to address the below stated goals  Goals   Patient Goals go home    LTG Time Frame 7-10   Long Term Goal #1 refer to established goals below   Plan   Treatment Interventions ADL retraining;Functional transfer training; Endurance training;Patient/family training; Activityengagement   Goal Expiration Date 12/27/18   OT Frequency 3-5x/wk   Recommendation   OT Discharge Recommendation Home with family support   Barthel Index   Feeding 10   Bathing 0   Grooming Score 5   Dressing Score 5   Bladder Score 10   Bowels Score 10   Toilet Use Score 5   Transfers (Bed/Chair) Score 10   Mobility (Level Surface) Score 10   Stairs Score 5   Barthel Index Score 70       OCCUPATIONAL THERAPY GOALS:    *MOD I ADLS AFTER SETUP WITH USE OF ADAPTIVE DEVICES PRN  *MOD I TOILETING AND CLOTHING MANAGEMENT   *MOD I FUNCTIONAL MOB AND TRANSFERS TO ALL SURFACES WITH [employer]IR+ TO GOOD BALANCE/SAFETY FOR PARTICIPATION IN DYNAMIC ADLS   *DEMONSTRATE GOOD CARRYOVER WITH SAFE USE OF RW DURING FUNCTIONAL TASKS  *ASSESS DME NEEDS  *INCREASE ACTIVITY TOLERANCE TO 40-45 MIN FOR PARTICIPATION IN ADLS AND ENJOYABLE ACTIVITIES     *PT TO PARTICIPATE IN ONGOING FUNCTIONAL COGNITIVE ASSESSMENT WITH GOOD ATTENTION/PARTICIPATION TO ASSIST WITH SAFE D/C RECOMMENDATIONS     Shar Lara, OT

## 2018-12-17 NOTE — ASSESSMENT & PLAN NOTE
· Poorly controlled BPs  Did not yet receive AM meds today   Check manual BPs  · Patient appears to have difficulty with BP control and is on 4 maximum dosed medications  · Will ask for nephrology input, may consider secondary HTN workup

## 2018-12-17 NOTE — QUICK NOTE
Gayle Hernández  is 74 y o  male with PMH HTN, CAD, cervical stenosis, and right carotid stenosis who originally presented with symptoms of intermittent dizziness and gait instability  Due to 80% stenosis in right ICA and moderate vertebral artery atherosclerosis, patient should follow up with vascular as an outpatient  MRI brain negative for acute abnormalities  Symptoms likely related to BPPV       - Reviewed MRI, negative for acute abnormalities  - Maintain normotensive blood pressures, SBP goal < 140  - Continue Aspirin 81 mg, atorvastatin 40 mg daily   - Discussed with attending neurologist

## 2018-12-17 NOTE — PROGRESS NOTES
Progress Note - Jessica Marino 1945, 74 y o  male MRN: [id number]    Unit/Bed#: [facility] 701-01 Encounter: [id number]    Primary Care Provider: Caitie Blanton DO   Date and time admitted to hospital: 12/16/2018  9:38 AM    * Dizziness and giddiness   Assessment & Plan    · Presented with unsteady sensation after awakening yesterday  No other associated symptoms  · Neurology and vascular following, input is appreciated  Vascular does not feel there is any current need for CEA-consider outpatient CEA pending MRI brain results   · CTA head and neck:   · 1  Hemodynamically significant, 80% stenosis of the proximal right internal carotid artery, 1 cm distal to the origin  Stenosis has progressed slightly from the prior study  Vascular surgery assessment recommended  2   Mild, chronic microangiopathy and chronic appearing bilateral lacunar infarctions are new from the prior study  3   Moderate intradural vertebral artery atherosclerotic disease in the dominant left vertebral artery and scattered moderate calcified plaque in the cavernous segments of both internal cerebral arteries noted, similar to the prior study  No large vessel occlusion or vascular cut off  · MRI brain is pending  · Continue asa/plavix/statin for now  · BP control difficult, ideally goal is SBP < 140, given that he is already on 4 antihypertensives at max dose, will ask for nephrology input  May need secondary HTN workup     Accelerated hypertension   Assessment & Plan    · Poorly controlled BPs  Did not yet receive AM meds today  Check manual BPs  · Patient appears to have difficulty with BP control and is on 4 maximum dosed medications  · Will ask for nephrology input, may consider secondary HTN workup      Stenosis of right carotid artery   Assessment & Plan    · Noted on CTA today to be 80% stenosis    · Continue aspirin, Plavix, statin (started on admission)  · Vascular does not feel there is any current need for surgical intervention     Cervical radiculopathy   Assessment & Plan    · Chronic, continue outpatient f/u       Hypokalemia   Assessment & Plan    · Continue BID supplementation        VTE Pharmacologic Prophylaxis:   Pharmacologic: Enoxaparin (Lovenox)  Mechanical VTE Prophylaxis in Place: Yes    Patient Centered Rounds: I have performed bedside rounds with nursing staff today  Discussions with Specialists or Other Care Team Provider: none, appreciate specialists  Education and Discussions with Family / Patient: patient  Offered to update wife when she arrives later today  Time Spent for Care: 30 minutes  More than 50% of total time spent on counseling and coordination of care as described above  Current Length of Stay: 0 day(s)    Current Patient Status: Observation   Certification Statement: await neuro/vascular input  not medically stable for dc today  will dw UR  Needs better BP control, MRI brain     Discharge Plan: not yet stable  Needs better BP control, MRI brain  Code Status: Level 1 - Full Code      Subjective:   Patient reports feeling much better today  Denies any dizziness, lightheadedness, palpitations, chest pain, shortness of breath, weakness or sensory deficits  Patient states that typically his blood pressure is better when it is taken manually  He states that at home his blood pressure is usually in the 717 systolic we  He states that in the past he has had difficulty with blood pressure control  Objective:     Vitals:   Temp (24hrs), Av °F (36 7 °C), Min:97 5 °F (36 4 °C), Max:98 3 °F (36 8 °C)    Temp:  [97 5 °F (36 4 °C)-98 3 °F (36 8 °C)] 98 2 °F (36 8 °C)  HR:  [46-80] 80  Resp:  [15-20] 16  BP: (151-230)/(64-92) 195/90  SpO2:  [92 %-97 %] 97 %  Body mass index is 29 35 kg/m²  Input and Output Summary (last 24 hours):        Intake/Output Summary (Last 24 hours) at 18 0918  Last data filed at 18 0752   Gross per 24 hour   Intake              480 ml   Output 875 ml   Net             -395 ml       Physical Exam:     Physical Exam   Constitutional: He is oriented to person, place, and time  No distress  Cardiovascular: Normal rate and regular rhythm  Pulmonary/Chest: Effort normal and breath sounds normal  No respiratory distress  He has no wheezes  Abdominal: Soft  Bowel sounds are normal  He exhibits no distension  Musculoskeletal: He exhibits no edema  Neurological: He is alert and oriented to person, place, and time  Skin: Skin is warm and dry  He is not diaphoretic  Psychiatric: He has a normal mood and affect  Nursing note and vitals reviewed  Additional Data:     Labs:      Results from last 7 days  Lab Units 12/17/18  0607   WBC Thousand/uL 5 48   HEMOGLOBIN g/dL 13 0   HEMATOCRIT % 38 4   PLATELETS Thousands/uL 163   NEUTROS PCT % 72   LYMPHS PCT % 19   MONOS PCT % 8   EOS PCT % 1       Results from last 7 days  Lab Units 12/17/18  0607 12/16/18  1031   POTASSIUM mmol/L 3 3*  --    CHLORIDE mmol/L 103  --    CO2 mmol/L 29  --    CO2, I-STAT mmol/L  --  30   BUN mg/dL 13  --    CREATININE mg/dL 0 92  --    CALCIUM mg/dL 9 1  --    GLUCOSE, ISTAT mg/dl  --  116       Results from last 7 days  Lab Units 12/16/18  1024   INR  0 95       * I Have Reviewed All Lab Data Listed Above  * Additional Pertinent Lab Tests Reviewed:  All Labs Within Last 24 Hours Reviewed    Imaging:    Imaging Reports Reviewed Today Include: all  Imaging Personally Reviewed by Myself Includes:  none    Recent Cultures (last 7 days):           Last 24 Hours Medication List:     Current Facility-Administered Medications:  acetaminophen 650 mg Oral Q4H PRN Tray Abreu MD   aspirin 81 mg Oral Daily Tray Abreu MD   atorvastatin 40 mg Oral QPM Tray Abreu MD   calcium carbonate 1,000 mg Oral Daily PRN Tray Abreu MD   carvedilol 25 mg Oral BID With Meals Tray Abreu MD   chlorthalidone 50 mg Oral Daily Tray Abreu MD   cholecalciferol 1,000 Units Oral Daily Sunday MD Cristofer   clopidogrel 75 mg Oral Daily Clifton Venegas DO   docusate sodium 100 mg Oral BID Sunday MD Cristofer   enoxaparin 40 mg Subcutaneous Daily Sunday MD Cristofer   hydrALAZINE 10 mg Intravenous Q6H PRN Donald Collado PA-C   lisinopril 40 mg Oral Daily Ingrid Villatoro PA-C   LORazepam 0 5 mg Intravenous Daily PRN Sunday MD Cristofer   NIFEdipine 90 mg Oral Daily Sunday MD Cristofer   ondansetron 4 mg Intravenous Q6H PRN Sunday MD Cristofer   potassium chloride 10 mEq Oral BID Sunday MD Cristofer        Today, Patient Was Seen By: Donald Collado PA-C    ** Please Note: Dictation voice to text software may have been used in the creation of this document   **

## 2018-12-18 ENCOUNTER — TRANSITIONAL CARE MANAGEMENT (OUTPATIENT)
Dept: INTERNAL MEDICINE CLINIC | Facility: CLINIC | Age: 73
End: 2018-12-18

## 2018-12-18 ENCOUNTER — APPOINTMENT (INPATIENT)
Dept: NON INVASIVE DIAGNOSTICS | Facility: HOSPITAL | Age: 73
DRG: 068 | End: 2018-12-18
Payer: MEDICARE

## 2018-12-18 VITALS
TEMPERATURE: 97.8 F | RESPIRATION RATE: 18 BRPM | SYSTOLIC BLOOD PRESSURE: 128 MMHG | DIASTOLIC BLOOD PRESSURE: 59 MMHG | HEART RATE: 57 BPM | HEIGHT: 67 IN | WEIGHT: 187.39 LBS | OXYGEN SATURATION: 96 % | BODY MASS INDEX: 29.41 KG/M2

## 2018-12-18 PROBLEM — E87.6 HYPOKALEMIA: Status: RESOLVED | Noted: 2018-01-15 | Resolved: 2018-12-18

## 2018-12-18 LAB — TSH SERPL DL<=0.05 MIU/L-ACNC: 2.85 UIU/ML (ref 0.36–3.74)

## 2018-12-18 PROCEDURE — 84244 ASSAY OF RENIN: CPT | Performed by: INTERNAL MEDICINE

## 2018-12-18 PROCEDURE — 93975 VASCULAR STUDY: CPT | Performed by: SURGERY

## 2018-12-18 PROCEDURE — 82088 ASSAY OF ALDOSTERONE: CPT | Performed by: INTERNAL MEDICINE

## 2018-12-18 PROCEDURE — 82384 ASSAY THREE CATECHOLAMINES: CPT | Performed by: INTERNAL MEDICINE

## 2018-12-18 PROCEDURE — 99232 SBSQ HOSP IP/OBS MODERATE 35: CPT | Performed by: INTERNAL MEDICINE

## 2018-12-18 PROCEDURE — 84443 ASSAY THYROID STIM HORMONE: CPT | Performed by: INTERNAL MEDICINE

## 2018-12-18 PROCEDURE — 99232 SBSQ HOSP IP/OBS MODERATE 35: CPT | Performed by: SURGERY

## 2018-12-18 PROCEDURE — 83835 ASSAY OF METANEPHRINES: CPT | Performed by: INTERNAL MEDICINE

## 2018-12-18 PROCEDURE — 99239 HOSP IP/OBS DSCHRG MGMT >30: CPT | Performed by: FAMILY MEDICINE

## 2018-12-18 PROCEDURE — 93975 VASCULAR STUDY: CPT

## 2018-12-18 RX ORDER — CLOPIDOGREL BISULFATE 75 MG/1
75 TABLET ORAL DAILY
Qty: 30 TABLET | Refills: 0 | Status: SHIPPED | OUTPATIENT
Start: 2018-12-19 | End: 2019-01-14 | Stop reason: SDUPTHER

## 2018-12-18 RX ORDER — ASPIRIN 81 MG/1
81 TABLET, CHEWABLE ORAL DAILY
Refills: 0
Start: 2018-12-19 | End: 2019-11-01 | Stop reason: ALTCHOICE

## 2018-12-18 RX ORDER — LISINOPRIL 40 MG/1
40 TABLET ORAL DAILY
Qty: 30 TABLET | Refills: 0 | Status: SHIPPED | OUTPATIENT
Start: 2018-12-19 | End: 2019-01-14 | Stop reason: SDUPTHER

## 2018-12-18 RX ORDER — ATORVASTATIN CALCIUM 40 MG/1
40 TABLET, FILM COATED ORAL EVERY EVENING
Qty: 30 TABLET | Refills: 0 | Status: SHIPPED | OUTPATIENT
Start: 2018-12-18 | End: 2019-01-14 | Stop reason: SDUPTHER

## 2018-12-18 RX ADMIN — POTASSIUM CHLORIDE 10 MEQ: 750 TABLET, EXTENDED RELEASE ORAL at 09:31

## 2018-12-18 RX ADMIN — CHLORTHALIDONE 50 MG: 25 TABLET ORAL at 09:32

## 2018-12-18 RX ADMIN — LISINOPRIL 40 MG: 20 TABLET ORAL at 09:31

## 2018-12-18 RX ADMIN — CLOPIDOGREL BISULFATE 75 MG: 75 TABLET ORAL at 09:31

## 2018-12-18 RX ADMIN — NIFEDIPINE 90 MG: 60 TABLET, FILM COATED, EXTENDED RELEASE ORAL at 09:33

## 2018-12-18 RX ADMIN — VITAMIN D, TAB 1000IU (100/BT) 1000 UNITS: 25 TAB at 09:31

## 2018-12-18 RX ADMIN — ENOXAPARIN SODIUM 40 MG: 40 INJECTION SUBCUTANEOUS at 09:31

## 2018-12-18 RX ADMIN — ASPIRIN 81 MG 81 MG: 81 TABLET ORAL at 09:31

## 2018-12-18 RX ADMIN — CARVEDILOL 25 MG: 25 TABLET, FILM COATED ORAL at 09:31

## 2018-12-18 RX ADMIN — DOCUSATE SODIUM 100 MG: 100 CAPSULE, LIQUID FILLED ORAL at 09:31

## 2018-12-18 NOTE — SOCIAL WORK
CM was informed that pt will benefit from outpt therapy; script and provider list given  Pt for dc to home; pt's wife will transport home

## 2018-12-18 NOTE — NURSING NOTE
IV site dc'd cath tip intact and dressing intact over site, telem dc'd, wife will be coming for him scripts sent to [facility] pharmacy, all d/c instructions reviewed with pt till ehr verbalized understanding of them, teaching provided on his new meds of plavix, lisinopril and atorvastatin, and questions encouraged

## 2018-12-18 NOTE — PROGRESS NOTES
NEPHROLOGY PROGRESS NOTE   Michelle Sullivan  74 y o  male MRN: [id number]  Unit/Bed#: [facility] 701-01 Encounter: [id number]  Reason for Consult: HTN    ASSESSMENT and PLAN:    1 ) Hypertension  - patient reports his baseline blood pressures anywhere between 811-683 systolic  - blood pressure now most recently has improved ranging between 697-492 systolic, goal systolic blood pressure less than 140  -continue carvedilol 25 mg every 12 hr  -chlorthalidone 50 mg daily  -lisinopril 40 mg daily  -nifedipine 90 mg daily  -secondary workup has been completed, results are pending can follow up as an outpatient     2 ) Hypokalemia  -he is on a thiazide like diuretic which can cause hypokalemia  -appears to be a persistent issue  -no blood work to comment on today  -urine potassium is on the higher side at 43 7 and the urine creatinine is 84 4 the fractional excretion of potassium secretion is high but patient is on a diuretic which would be expected  -secondary workup for hypertension as detailed above  -high potassium diet    Can follow up with Nephrology or his primary care physician for the results of his secondary workup  There is no blood work today to comment on, would recommend that he get a BMP at the end of the week or 1 week to be followed by his primary care physician     Blood pressure is much improved stable from a renal standpoint for discharge  At this point we will sign off call for any questions or concerns  Thank you      SUBJECTIVE / INTERVAL HISTORY:    Feeling much better      OBJECTIVE:  Current Weight: Weight - Scale: 85 kg (187 lb 6 3 oz)  Vitals:    12/18/18 0745 12/18/18 0746 12/18/18 0747 12/18/18 1047   BP: 140/65 143/58 149/69 128/59   BP Location: Left arm Left arm Left arm Left arm   Pulse: 61 64 58 57   Resp: 18 18 18 18   Temp: 97 8 °F (36 6 °C)      TempSrc: Oral      SpO2: 96% 97% 97% 96%   Weight:       Height:           Intake/Output Summary (Last 24 hours) at 12/18/18 1155  Last data filed at 12/18/18 0900   Gross per 24 hour   Intake              340 ml   Output                0 ml   Net              340 ml       Review of Systems:    12 point ROS has been reviewed  Physical Exam   Constitutional: He is oriented to person, place, and time  He appears well-developed and well-nourished  No distress  HENT:   Head: Normocephalic and atraumatic  Eyes: Pupils are equal, round, and reactive to light  No scleral icterus  Neck: Normal range of motion  Neck supple  Cardiovascular: Normal rate, regular rhythm and normal heart sounds  Exam reveals no gallop and no friction rub  No murmur heard  Pulmonary/Chest: Effort normal and breath sounds normal  No respiratory distress  He has no wheezes  He has no rales  He exhibits no tenderness  Abdominal: Soft  Bowel sounds are normal  He exhibits no distension  There is no tenderness  There is no rebound  Musculoskeletal: Normal range of motion  He exhibits no edema  Neurological: He is alert and oriented to person, place, and time  Skin: No rash noted  He is not diaphoretic  Psychiatric: He has a normal mood and affect  Nursing note and vitals reviewed        Medications:    Current Facility-Administered Medications:     acetaminophen (TYLENOL) tablet 650 mg, 650 mg, Oral, Q4H PRN, Veena Olguin MD    aspirin chewable tablet 81 mg, 81 mg, Oral, Daily, Veena Olguin MD, 81 mg at 12/18/18 0931    atorvastatin (LIPITOR) tablet 40 mg, 40 mg, Oral, QPM, Veena Olguin MD, 40 mg at 12/17/18 1721    calcium carbonate (TUMS) chewable tablet 1,000 mg, 1,000 mg, Oral, Daily PRN, Veena Olguin MD    carvedilol (COREG) tablet 25 mg, 25 mg, Oral, BID With Meals, Veena Olguin MD, 25 mg at 12/18/18 0931    chlorthalidone tablet 50 mg, 50 mg, Oral, Daily, Veena Olguin MD, 50 mg at 12/18/18 0932    cholecalciferol (VITAMIN D3) tablet 1,000 Units, 1,000 Units, Oral, Daily, Veena Olguin MD, 1,000 Units at 12/18/18 0931    clopidogrel (PLAVIX) tablet 75 mg, 75 mg, Oral, Daily, Clifton Venegas DO, 75 mg at 12/18/18 0931    docusate sodium (COLACE) capsule 100 mg, 100 mg, Oral, BID, Lashanda Rodriguez MD, 100 mg at 12/18/18 0931    enoxaparin (LOVENOX) subcutaneous injection 40 mg, 40 mg, Subcutaneous, Daily, Lashanda Rodriguez MD, 40 mg at 12/18/18 0931    hydrALAZINE (APRESOLINE) injection 10 mg, 10 mg, Intravenous, Q6H PRN, Yumi Winter PA-C, 10 mg at 12/17/18 0847    lisinopril (ZESTRIL) tablet 40 mg, 40 mg, Oral, Daily, Prieto Bunch PA-C, 40 mg at 12/18/18 0931    LORazepam (ATIVAN) 2 mg/mL injection 0 5 mg, 0 5 mg, Intravenous, Daily PRN, Lashanda Rodriguez MD    NIFEdipine (PROCARDIA XL) 24 hr tablet 90 mg, 90 mg, Oral, Daily, Lashanda Rodriguez MD, 90 mg at 12/18/18 0933    ondansetron (ZOFRAN) injection 4 mg, 4 mg, Intravenous, Q6H PRN, Lashanda Rodriguez MD    potassium chloride (K-DUR,KLOR-CON) CR tablet 10 mEq, 10 mEq, Oral, BID, Lashanda Rodriguez MD, 10 mEq at 12/18/18 0931    Laboratory Results:    Results from last 7 days  Lab Units 12/17/18  0607 12/16/18  1031 12/16/18  1030 12/16/18  1024   WBC Thousand/uL 5 48  --   --  6 72   HEMOGLOBIN g/dL 13 0  --   --  13 1   I STAT HEMOGLOBIN g/dl  --  12 9 12 9  --    HEMATOCRIT % 38 4  --   --  38 3   HEMATOCRIT, ISTAT %  --  38 38  --    PLATELETS Thousands/uL 163  --   --  160   POTASSIUM mmol/L 3 3*  --   --  3 5   CHLORIDE mmol/L 103  --   --  103   CO2 mmol/L 29  --   --  29   CO2, I-STAT mmol/L  --  30 27  --    BUN mg/dL 13  --   --  16   CREATININE mg/dL 0 92  --   --  0 93   CALCIUM mg/dL 9 1  --   --  8 9   MAGNESIUM mg/dL 2 2  --   --   --    GLUCOSE, ISTAT mg/dl  --  116 120  --

## 2018-12-18 NOTE — PROGRESS NOTES
Progress Note - Vascular Surgery   Stephen Cai  74 y o  male MRN: [id number]  Unit/Bed#: [facility] 701-01 Encounter: [id number]    Assessment:  73M w/R ICA stenosis, found on CTA of the head/neck to have 80% stenosis, slightly increased from prior 70% in 2014 12/17 MRI Brain - No acute ischemic disease  Mild chronic white matter microangiopathy    12/17 Cardiology Evaluation - Moderate surgical risk for Carotid Endarterectomy    Plan:  Continue care per primary  Appreciate Cardiology evaluation   - Moderate Risk  Continue ASA/Plavix/Statin  Plan for outpatient work-up and follow up for asymptomatic R carotid artery stenosis and elective R Carotid Endarterectomy    Subjective/Objective   Chief Complaint:     Subjective:  No acute events overnight  Patient sitting in chair comfortably  Patient waiting for final test and wanting to go home  Objective:     Blood pressure 126/59, pulse 61, temperature 99 8 °F (37 7 °C), temperature source Oral, resp  rate 16, height 5' 7" (1 702 m), weight 85 kg (187 lb 6 3 oz), SpO2 97 %  ,Body mass index is 29 35 kg/m²  Intake/Output Summary (Last 24 hours) at 12/18/18 0550  Last data filed at 12/17/18 1510   Gross per 24 hour   Intake              460 ml   Output              575 ml   Net             -115 ml       Invasive Devices     Peripheral Intravenous Line            Peripheral IV 12/16/18 Right Antecubital 1 day                Physical Exam: General: AAOx3  Respiratory: BS b/l  Abdomen: Soft, NT, no rebound/guarding  Heart: RRR, S1s2  Ext: B/l UE/LE strength 5/5  Neuro: CN II-XII intact  No focal neurological deficits     Lab, Imaging and other studies:  I have personally reviewed pertinent lab results    , CBC:   Lab Results   Component Value Date    WBC 5 48 12/17/2018    HGB 13 0 12/17/2018    HCT 38 4 12/17/2018    MCV 89 12/17/2018     12/17/2018    MCH 30 0 12/17/2018    MCHC 33 9 12/17/2018    RDW 12 8 12/17/2018    MPV 11 1 12/17/2018    NRBC 0 12/17/2018   , CMP:   Lab Results   Component Value Date    SODIUM 140 12/17/2018    K 3 3 (L) 12/17/2018     12/17/2018    CO2 29 12/17/2018    BUN 13 12/17/2018    CREATININE 0 92 12/17/2018    CALCIUM 9 1 12/17/2018    EGFR 81 12/17/2018     VTE Pharmacologic Prophylaxis: Lovenox  VTE Mechanical Prophylaxis: sequential compression device

## 2018-12-18 NOTE — PLAN OF CARE
Activity Intolerance/Impaired Mobility     Mobility/activity is maintained at optimum level for patient Progressing        Communication Impairment     Ability to express needs and understand communication [address] Discharge to post-acute care or home with appropriate resources Progressing        Neurological Deficit     Neurological status is stable or improving Progressing        Nutrition     Nutrition/Hydration status is improving Progressing        Nutrition/Hydration-ADULT     Nutrient/Hydration intake appropriate for improving, restoring or maintaining nutritional needs Progressing        Potential for Aspiration     Non-ventilated patient's risk of aspiration is minimized Progressing     Ventilated patient's risk of aspiration is minimized Progressing        Potential for Falls     Patient will remain free of falls Progressing

## 2018-12-18 NOTE — PLAN OF CARE
Activity Intolerance/Impaired Mobility     Mobility/activity is maintained at optimum level for patient Adequate for Discharge        Communication Impairment     Ability to express needs and understand communication Adequate for Discharge        Neurological Deficit     Neurological status is stable or improving Adequate for Discharge        Nutrition     Nutrition/Hydration status is improving Adequate for Discharge        Nutrition/Hydration-ADULT     Nutrient/Hydration intake appropriate for improving, restoring or maintaining nutritional needs Adequate for Discharge        Potential for Aspiration     Non-ventilated patient's risk of aspiration is minimized Adequate for Discharge     Ventilated patient's risk of aspiration is minimized Adequate for Discharge        Potential for Falls     Patient will remain free of falls Adequate for Discharge

## 2018-12-18 NOTE — PHYSICAL THERAPY NOTE
Physical Therapy Cancellation Note  Pt is off the floor for renal artery doppler  Will follow as appropriate      Kelley Caceres PTA

## 2018-12-18 NOTE — DISCHARGE INSTRUCTIONS
BMP within  a week  Contact have primary care physician for the blood work  Please call to schedule outpatient physical therapy appointment

## 2018-12-19 NOTE — DISCHARGE SUMMARY
Discharge Summary - [facility] Internal Medicine    Patient Information: Kelly Meraz  74 y o  male MRN: [id number]  Unit/Bed#: [facility] Encounter: [id number]    Discharging Physician / Practitioner: Sabrina Small MD  PCP: Surinder Desai DO  Admission Date: 12/16/2018  Discharge Date: 12/18/18  Disposition:     Home    Reason for Admission:  Dizziness  Discharge Diagnoses:     Principal Problem:    Dizziness and giddiness  Active Problems:    Stenosis of right carotid artery    Accelerated hypertension    Cervical radiculopathy  Resolved Problems:    Hypokalemia      Consultations During Hospital Stay:  Vascular surgery  Nephrology  Neurology  Cardiology    Procedures Performed:         Significant Findings / Test Results:   Renal artery Doppler-no significant stenosis  MRI brain-no acute ischemic disease mild chronic white matter microangiopathy  CT head and neck with contrast-hemodynamically significant 80% stenosis of the proximal right internal carotid artery 1 cm wrist distal to the origin  Stenosis has progressed slightly from the previous study  Mild chronic micro angiopathy and chronic appearing bilateral lacunar infarctions are new from prior study  Moderate intradural vertebral artery atherosclerotic disease in the dominant left vertebral artery and scattered moderate calcified plaque in the cavernous segment of both internal cerebral arteries  Chest x-ray-no acute cardiopulmonary disease  Incidental Findings:   ·     Test Results Pending at Discharge (will require follow up):   ·      Outpatient Tests Requested:  · BMP in a week    Complications:  none    Hospital Course:     Kelly Meraz  is a 74 y o  male patient who originally presented to the hospital on 12/16/2018 due to dizziness and lightheadedness  In the emergency room patient had a CT angiogram and there was concern for right internal carotid artert stenosis of 80%    Patient was evaluated by vascular surgery and Neurosurgery  Patient was evaluated by Neurology and felt that his symptoms are not related to his internal carotid artery stenosis  His MRI was negative for any acute CVA  Patient's symptoms resolved  Patient also found to have high blood pressure at the time of admission  Nephrology followed the patient and his blood pressure medications were adjusted  Patient was taken off of the Vasotec and added on lisinopril with improvement in his blood pressure  Cardiology evaluated the patient and recommended that the patient is at moderate risk for the surgery  Vascular surgery is planning for carotid endarterectomy in the near future  Patient was maintained on aspirin Plavix and statin given his significant atherosclerotic disease as per recommendation of vascular surgery  Patient remained hemodynamically stable and afebrile  Patient was discharged in a stable condition  PT OT evaluated the patient and recommended outpatient PT  For details refer to the chart  Condition at Discharge: good     Discharge Day Visit / Exam:     Subjective:  Patient seen and examined denies any specific complaints and he is eager to go home  Vitals: Blood Pressure: 128/59 (12/18/18 1047)  Pulse: 57 (12/18/18 1047)  Temperature: 97 8 °F (36 6 °C) (12/18/18 0745)  Temp Source: Oral (12/18/18 0745)  Respirations: 18 (12/18/18 1047)  Height: 5' 7" (170 2 cm) (12/16/18 2000)  Weight - Scale: 85 kg (187 lb 6 3 oz) (12/16/18 2000)  SpO2: 96 % (12/18/18 1047)  Exam:   Physical Exam   Constitutional: He appears well-developed and well-nourished  HENT:   Head: Normocephalic and atraumatic  Eyes: Pupils are equal, round, and reactive to light  EOM are normal    Neck: Normal range of motion  Neck supple  No thyromegaly present  Cardiovascular: Normal rate and regular rhythm  No murmur heard  Pulmonary/Chest: Effort normal and breath sounds normal  No respiratory distress  He has no wheezes  Abdominal: Soft   Bowel sounds are normal  He exhibits no distension  There is no tenderness  Musculoskeletal: Normal range of motion  He exhibits no edema  Neurological: He is alert  Skin: Skin is warm  Discussion with Family:  Updated patient    Discharge instructions/Information to patient and family:   See after visit summary for information provided to patient and family  Provisions for Follow-Up Care:  See after visit summary for information related to follow-up care and any pertinent home health orders  Planned Readmission:yes-for carotid endarterectomy     Discharge Statement:  I spent 45  minutes discharging the patient  This time was spent on the day of discharge  I had direct contact with the patient on the day of discharge  Greater than 50% of the total time was spent examining patient, answering all patient questions, arranging and discussing plan of care with patient as well as directly providing post-discharge instructions  Additional time then spent on discharge activities  Discharge Medications:  See after visit summary for reconciled discharge medications provided to patient and family        ** Please Note: This note has been constructed using a voice recognition system **

## 2018-12-21 LAB
DOPAMINE 24H UR-MRATE: <30 PG/ML (ref 0–48)
EPINEPH PLAS-MCNC: 35 PG/ML (ref 0–62)
NOREPINEPH PLAS-MCNC: 741 PG/ML (ref 0–874)

## 2018-12-22 LAB
ALDOST SERPL-MCNC: 10.3 NG/DL (ref 0–30)
METANEPH FREE SERPL-MCNC: 54 PG/ML (ref 0–62)
NORMETANEPHRINE SERPL-MCNC: 105 PG/ML (ref 0–145)

## 2018-12-24 ENCOUNTER — OFFICE VISIT (OUTPATIENT)
Dept: INTERNAL MEDICINE CLINIC | Facility: CLINIC | Age: 73
End: 2018-12-24
Payer: MEDICARE

## 2018-12-24 VITALS
TEMPERATURE: 96.6 F | DIASTOLIC BLOOD PRESSURE: 60 MMHG | OXYGEN SATURATION: 92 % | WEIGHT: 190.4 LBS | BODY MASS INDEX: 29.88 KG/M2 | HEIGHT: 67 IN | SYSTOLIC BLOOD PRESSURE: 134 MMHG | HEART RATE: 57 BPM

## 2018-12-24 DIAGNOSIS — I65.21 STENOSIS OF RIGHT CAROTID ARTERY: ICD-10-CM

## 2018-12-24 DIAGNOSIS — I10 ESSENTIAL HYPERTENSION: ICD-10-CM

## 2018-12-24 DIAGNOSIS — R42 DIZZINESS AND GIDDINESS: Primary | ICD-10-CM

## 2018-12-24 PROCEDURE — 99495 TRANSJ CARE MGMT MOD F2F 14D: CPT | Performed by: INTERNAL MEDICINE

## 2018-12-24 RX ADMIN — CYANOCOBALAMIN 1000 MCG: 1000 INJECTION INTRAMUSCULAR; SUBCUTANEOUS at 08:01

## 2018-12-24 NOTE — PATIENT INSTRUCTIONS
Carotid Artery Disease   AMBULATORY CARE:   Carotid artery disease  is a condition that causes narrow or blocked carotid arteries  Your carotid arteries are the blood vessels that supply your brain with most of the blood it needs to work  You have 2 carotid arteries, one on each side of your neck  Call 911 for any of the following:   · You have any of the following signs of a stroke:      ¨ Numbness or drooping on one side of your face     ¨ Weakness in an arm or leg    ¨ Confusion or difficulty speaking    ¨ Dizziness, a severe headache, or vision loss    · You have any of the following signs of a heart attack:      ¨ Squeezing, pressure, or pain in your chest that lasts longer than 5 minutes or returns    ¨ Discomfort or pain in your back, neck, jaw, stomach, or arm     ¨ Trouble breathing    ¨ Nausea or vomiting    ¨ Lightheadedness or a sudden cold sweat, especially with chest pain or trouble breathing  Contact your healthcare provider if:   · You have questions or concerns about your condition or care  Signs and symptoms of carotid artery disease: You may have no signs or symptoms  Most commonly, carotid artery disease causes transient ischemic attacks (TIAs), or mini-strokes  You may have numbness, weakness, lack of movement, or vision or speech problems  A TIA goes away quickly and does not cause permanent damage  A TIA may be a warning sign that you are about to have a stroke  If you have any symptoms of a TIA or stroke, seek care immediately  Warning signs of a stroke: The word F A S T  can help you remember and recognize warning signs of a stroke  · F = Face:  One side of the face droops  · A = Arms:  One arm starts to drop when both arms are raised  · S = Speech:  Speech is slurred or sounds different than usual     · T = Time:  A person who is having a stroke needs to be seen immediately  A stroke is a medical emergency that needs immediate treatment   Some medicines and treatments work best if given within a few hours of a stroke  Treatment  for carotid artery disease depends on how narrow your arteries have become, your symptoms, and your general health  The goal of treatment is to lower your risk for a stroke  You may need any of the following:  · Take aspirin if directed  Your healthcare provider may suggest that you take an aspirin a day to prevent blood clots from forming in the carotid arteries  If your healthcare provider wants you to take aspirin daily, do not take acetaminophen or ibuprofen instead  · Control risk factors  High blood pressure, high cholesterol, heart disease, diabetes, and being overweight increase your risk for atherosclerosis  · Procedures can help open blocked arteries  A carotid endarterectomy is used to cut plaque out of the artery  An angioplasty is used to push the plaque against the artery wall with a balloon device  Sometimes a stent is placed during an angioplasty  A stent is a metal mesh tube that is placed in the artery to keep it open  Manage carotid artery disease:   · Eat a variety of healthy foods  Healthy foods include fruit, vegetables, whole-grain breads, low-fat dairy products, lean meat, and fish  Choose fish that are high in omega-3 fatty acids, such as salmon and fresh tuna  Ask your healthcare provider for more information on a heart healthy diet and the DASH eating plan  · Limit sodium (salt)  Sodium may increase your blood pressure  Add less table salt to your foods  Read food labels and choose foods that are low in sodium  Your healthcare provider may suggest you follow a low sodium diet  · Reach or maintain a healthy weight  Extra weight makes your heart work harder  Ask your healthcare provider how much you should weight  He can help you create a safe weight loss plan  Even a weight loss of 10% of your body weight can help your heart function better  · Exercise as directed    Exercise helps improve heart function and can help you manage your weight  Exercise can also help lower your cholesterol and blood sugar levels  Try to get at least 30 minutes of exercise at least 5 times each week  Try to be active every day  Your healthcare provider can help you create an exercise plan that works best for you  · Limit alcohol  Alcohol can increase your blood pressure and triglyceride levels  Men should limit alcohol to 2 drinks per day  Women should limit alcohol to 1 drink per day  A drink of alcohol is 12 ounces of beer, 5 ounces of wine, or 1½ ounces of liquor  · Do not smoke  Nicotine and other chemicals in cigarettes and cigars can cause heart and lung damage  Ask your healthcare provider for information if you currently smoke and need help to quit  E-cigarettes or smokeless tobacco still contain nicotine  Talk to your healthcare provider before you use these products  Follow up with your healthcare provider as directed:  Write down your questions so you remember to ask them during your visits  © 2017 [facility] Information is for End User's use only and may not be sold, redistributed or otherwise used for commercial purposes  All illustrations and images included in CareNotes® are the copyrighted property of [employer]  or [employer]  The above information is an  only  It is not intended as medical advice for individual conditions or treatments  Talk to your doctor, nurse or pharmacist before following any medical regimen to see if it is safe and effective for you

## 2018-12-24 NOTE — PROGRESS NOTES
Assessment/Plan:         Diagnoses and all orders for this visit:    Dizziness and giddiness  sxs resolved but known carotid stenosis, right side  Monitor BP at home and continue adjusted rx from [facility]    Stenosis of right carotid artery  Has vascular appt at [facility] on jan 14 with Dr Leni Latham hypertension  No added salt diet and increase water intake  Monitor BP at home    Rto 1 month after vascular appt           Patient ID: Quentin Guzman  is a 74 y o  male  HPI   Pt recently admitted with dizziness and giddiness He has known right carotid stenosis  He has followup with vascular 1/14 at [facility]  He does salt foods and drink coffee often  No recurrent sxs  No chest pain or sob  He has not been checking BP  No falls  No headache or vision change He had recent ultrasound of carotid prior to admit and was to followup with vascular  He feels better today but is being more cautious and trying to eat healthier  Her does not drink much water  Sleeping better recently  Wants b12 injection today  TCM Call (since 11/23/2018)     Date and time call was made  12/18/2018  [facility] reviewed  Records reviewed    Patient was hospitialized at  [facility]    Date of Admission  12/16/18    Date of discharge  12/18/18    Diagnosis  stroke like symptoms, hypertension, carotid artery stenosis, dizziness    Disposition  Home    Were the patients medications reviewed and updated  No    Current Symptoms  None      TCM Call (since 11/23/2018)     Post hospital issues  None    Scheduled for follow up?   Yes    Did you obtain your prescribed medications  Yes    Do you need help managing your prescriptions or medications  No    Is transportation to your appointment needed  No    I have advised the patient to call PCP with any new or worsening symptoms  [facility] [phone] or [facility] other    Support System  Spouse    The type of support provided  Physical    Do you have social support  Yes, as much as I need    Are you recieving any outpatient services  No    Are you recieving home care services  No    Are you using any community resources  No    Current waiver services  No    Have you fallen in the last 12 months  No    Interperter language line needed  No    Counseling  Patient          The following portions of the patient's history were reviewed and updated as appropriate:   Past Medical History:   Diagnosis Date    Arthritis     Cholecystitis     Coronary artery disease     Hypertension     Spinal stenosis      Past Surgical History:   Procedure Laterality Date    CHOLECYSTECTOMY      COLON SURGERY      bwel resection    COLONOSCOPY      ESOPHAGOGASTRODUODENOSCOPY      02/07/2007  [facility]    IN LAP,CHOLECYSTECTOMY N/A 12/5/2017    Procedure: CHOLECYSTECTOMY LAPAROSCOPIC;  Surgeon: Fely Ibrahim MD;  [facility];  Service: General    SMALL INTESTINE SURGERY      [address]     Social History     Social History    Marital status: /Civil Union     Spouse name: N/A    Number of children: N/A    Years of education: N/A     Occupational History    Not on file  Social History Main Topics    Smoking status: Former Smoker     Packs/day: 2 00     Years: 3 00     Quit date: 1966    Smokeless tobacco: Never Used    Alcohol use Yes      Comment: social     Drug use: No    Sexual activity: Not on file     Other Topics Concern    Not on file     Social History Narrative    CAFFEINE USE    DENTAL CARE,REGULARLY    FEELS SAFE  E Milad Ciales      No Known Allergies      Review of Systems   Constitutional: Negative  HENT: Negative  Eyes: Negative  Respiratory: Negative  Cardiovascular: Negative  Gastrointestinal: Negative  Endocrine: Negative  Genitourinary: Negative  Musculoskeletal: Negative  Skin: Negative  Allergic/Immunologic: Negative  Neurological: Negative  Negative for dizziness and light-headedness  Hematological: Negative  Psychiatric/Behavioral: Negative  Past Medical History:   Diagnosis Date    Arthritis     Cholecystitis     Coronary artery disease     Hypertension     Spinal stenosis      Past Surgical History:   Procedure Laterality Date    CHOLECYSTECTOMY      COLON SURGERY      bwel resection    COLONOSCOPY      ESOPHAGOGASTRODUODENOSCOPY      02/07/2007  [facility]    MI LAP,CHOLECYSTECTOMY N/A 12/5/2017    Procedure: CHOLECYSTECTOMY LAPAROSCOPIC;  Surgeon: Ursula Araujo MD;  [facility];  Service: General    SMALL INTESTINE SURGERY      [facility] [address]     Social History     Social History    Marital status: /Civil Union     Spouse name: N/A    Number of children: N/A    Years of education: N/A     Occupational History    Not on file  Social History Main Topics    Smoking status: Former Smoker     Packs/day: 2 00     Years: 3 00     Quit date: 1966    Smokeless tobacco: Never Used    Alcohol use Yes      Comment: social     Drug use: No    Sexual activity: Not on file     Other Topics Concern    Not on file     Social History Narrative    CAFFEINE USE    DENTAL CARE,REGULARLY    FEELS SAFE AT HOME    LIVES INDEPENDENTLY WITH SPOUSE    USES SAFETY EQUIPMENT    SUN PROTECTION      No Known Allergies            /60   Pulse 57   Temp (!) 96 6 °F (35 9 °C) (Tympanic)   Ht 5' 7" (1 702 m)   Wt 86 4 kg (190 lb 6 4 oz)   SpO2 92%   BMI 29 82 kg/m²          Physical Exam   Constitutional: He is oriented to person, place, and time  He appears well-developed and well-nourished  No distress  HENT:   Head: Normocephalic and atraumatic  Right Ear: External ear normal    Left Ear: External ear normal    Nose: Nose normal    Mouth/Throat: Oropharynx is clear and moist  No oropharyngeal exudate  Eyes: Pupils are equal, round, and reactive to light   Conjunctivae and EOM are normal  No scleral icterus  Neck: Normal range of motion  Neck supple  No JVD present  Cardiovascular: Normal rate, regular rhythm, normal heart sounds and intact distal pulses  No murmur heard  Pulmonary/Chest: Effort normal and breath sounds normal    Abdominal: Soft  Bowel sounds are normal    Musculoskeletal: Normal range of motion  He exhibits no edema, tenderness or deformity  Lymphadenopathy:     He has no cervical adenopathy  Neurological: He is alert and oriented to person, place, and time  He has normal reflexes  He displays normal reflexes  No cranial nerve deficit  He exhibits normal muscle tone  Coordination normal    Skin: Skin is warm and dry  He is not diaphoretic  Psychiatric: He has a normal mood and affect  His behavior is normal  Judgment and thought content normal    Nursing note and vitals reviewed

## 2018-12-28 LAB — RENIN PLAS-CCNC: 9.69 NG/ML/HR (ref 0.17–5.38)

## 2019-01-13 PROBLEM — I65.09 VERTEBRAL ARTERY STENOSIS, ASYMPTOMATIC: Status: ACTIVE | Noted: 2019-01-13

## 2019-01-14 ENCOUNTER — OFFICE VISIT (OUTPATIENT)
Dept: VASCULAR SURGERY | Facility: HOSPITAL | Age: 74
End: 2019-01-14
Payer: MEDICARE

## 2019-01-14 VITALS
SYSTOLIC BLOOD PRESSURE: 174 MMHG | DIASTOLIC BLOOD PRESSURE: 85 MMHG | WEIGHT: 190 LBS | RESPIRATION RATE: 18 BRPM | BODY MASS INDEX: 29.82 KG/M2 | HEART RATE: 76 BPM | HEIGHT: 67 IN

## 2019-01-14 DIAGNOSIS — I10 ACCELERATED HYPERTENSION: ICD-10-CM

## 2019-01-14 DIAGNOSIS — I65.03: ICD-10-CM

## 2019-01-14 DIAGNOSIS — R42 DIZZINESS AND GIDDINESS: ICD-10-CM

## 2019-01-14 DIAGNOSIS — I65.21 STENOSIS OF RIGHT CAROTID ARTERY: ICD-10-CM

## 2019-01-14 DIAGNOSIS — M45.0 ANKYLOSING SPONDYLITIS OF MULTIPLE SITES IN SPINE (HCC): ICD-10-CM

## 2019-01-14 DIAGNOSIS — I65.21 STENOSIS OF RIGHT CAROTID ARTERY: Primary | ICD-10-CM

## 2019-01-14 PROCEDURE — 99204 OFFICE O/P NEW MOD 45 MIN: CPT | Performed by: SURGERY

## 2019-01-14 RX ORDER — ATORVASTATIN CALCIUM 40 MG/1
40 TABLET, FILM COATED ORAL EVERY EVENING
Qty: 90 TABLET | Refills: 3 | Status: SHIPPED | OUTPATIENT
Start: 2019-01-14 | End: 2020-03-13

## 2019-01-14 RX ORDER — LISINOPRIL 40 MG/1
40 TABLET ORAL DAILY
Qty: 90 TABLET | Refills: 3 | Status: SHIPPED | OUTPATIENT
Start: 2019-01-14 | End: 2019-12-13 | Stop reason: ALTCHOICE

## 2019-01-14 RX ORDER — CLOPIDOGREL BISULFATE 75 MG/1
75 TABLET ORAL DAILY
Qty: 90 TABLET | Refills: 3 | Status: SHIPPED | OUTPATIENT
Start: 2019-01-14 | End: 2020-01-31

## 2019-01-14 NOTE — PROGRESS NOTES
Assessment/Plan:    Stenosis of right carotid artery   Kaden Bender is a pleasant 78-year-old gentleman who was recently hospitalized for 2 days due to dizziness  During his workup he was noted to have a high-grade right carotid stenosis  He now follows up from his recent hospitalization to discuss carotid intervention  He denies any amaurosis fugax or focal neurologic deficits  He reports his dizziness has improved  He still continues to occasionally get episodes  High-grade asymptomatic right carotid stenosis with velocities  488/153   CTA head and neck reviewed-  Dense calcified plaque distal to the carotid bifurcation  We discussed signs and risk of stroke  We discussed the pros and cons of carotid stenting versus carotid endarterectomy  Unfortunately I do not feel his anatomy is favorable for carotid stenting due to the extensive bulky calcific plaque  Having said that patient has essentially a fixed cervical spine with minimal rotation and no extension  Due to his cervical spine immobility and the location of the carotid stenosis here to be quite challenging to achieve adequate exposure to perform endarterectomy in a safe manner  At this time recommend continued medical therapy with dual antiplatelet therapy and statin  Will review case with partners and if any changes in recommendations will call patient to update him  Vertebral artery stenosis, asymptomatic  Bilateral intradural  vertebral arteries  With atherosclerotic plaque; left vertebral artery with moderate stenosis however carotid duplex demonstrates antegrade flow bilateral  Vertebral arteries  No additional vascular workup / intervention warranted/indicated at present time  Diagnoses and all orders for this visit:    Stenosis of right carotid artery  -     VAS carotid complete study; Future          Subjective:      Patient ID: Arianne Be  is a 74 y o  male    Patient is new to our practice following up from hospital on 12/16/18  Patient had CTA of Head and neck on 12/16/18, CV Study 12/4/18  Patient denies TIA and stroke symptoms  Patient complains of dizziness  Patient has hx of HTN  Patient takes ASA Plavix and Statin  Ольга Mendez is a 33-year-old gentleman with history of hypertension, arthritis who was recently hospitalized due to dizziness  During the workup he was found to have a high-grade right carotid stenosis  He denies any amaurosis fugax or focal neurologic deficits  Patient reports his dizzy spells have improved  The following portions of the patient's history were reviewed and updated as appropriate: allergies, current medications, past family history, past medical history, past social history, past surgical history and problem list     Review of Systems   Constitutional: Negative  HENT: Negative  Eyes: Negative  Respiratory: Negative  Cardiovascular: Negative  Gastrointestinal: Negative  Endocrine: Negative  Genitourinary: Negative  Musculoskeletal: Positive for arthralgias, back pain, gait problem and neck stiffness  Leg pain   Skin: Negative  Allergic/Immunologic: Negative  Hematological: Negative  Psychiatric/Behavioral: Negative  I have personally reviewed the ROS entered by MA and agree as documented  Objective:      BP (!) 174/85 (BP Location: Left arm, Patient Position: Sitting)   Pulse 76   Resp 18   Ht 5' 7" (1 702 m)   Wt 86 2 kg (190 lb)   BMI 29 76 kg/m²          Physical Exam   Constitutional: He is oriented to person, place, and time  He appears well-developed and well-nourished  No distress  HENT:   Head: Normocephalic and atraumatic  Neck: No JVD present  No tracheal deviation present  Cardiovascular: Normal rate and regular rhythm  Pulmonary/Chest: Effort normal and breath sounds normal    Abdominal: Soft  Bowel sounds are normal  He exhibits no distension and no mass  There is no tenderness  There is no rebound  Musculoskeletal: He exhibits no edema  Essentially a fixed cervical spine with little rotation and no extension  Lymphadenopathy:     He has no cervical adenopathy  Neurological: He is alert and oriented to person, place, and time  No cranial nerve deficit  Skin: Skin is warm and dry  He is not diaphoretic  Psychiatric: He has a normal mood and affect  His behavior is normal  Judgment and thought content normal          Imaging:  CTA neck-  IMPRESSION:        1  Hemodynamically significant, 80% stenosis of the proximal right internal carotid artery, 1 cm distal to the origin  Stenosis has progressed slightly from the prior study  Vascular surgery assessment recommended  2   Mild, chronic microangiopathy and chronic appearing bilateral lacunar infarctions are new from the prior study  3   Moderate intradural vertebral artery atherosclerotic disease in the dominant left vertebral artery and scattered moderate calcified plaque in the cavernous segments of both internal cerebral arteries noted, similar to the prior study  No large   vessel occlusion or vascular cut off

## 2019-01-14 NOTE — ASSESSMENT & PLAN NOTE
Manuel Hernandes is a pleasant 77-year-old gentleman with history of ankylosing spondylitis who was recently hospitalized for 2 days due to dizziness  During his workup he was noted to have a high-grade right carotid stenosis  He now follows up from his recent hospitalization to discuss carotid intervention  He denies any amaurosis fugax or focal neurologic deficits  He reports his dizziness has improved  He still continues to occasionally get episodes  High-grade asymptomatic right carotid stenosis with velocities  488/153   CTA head and neck reviewed-  Dense calcified plaque distal to the carotid bifurcation  We discussed signs and risk of stroke  We discussed the pros and cons of carotid stenting versus carotid endarterectomy  Unfortunately I do not feel his anatomy is favorable for carotid stenting due to the extensive bulky calcific plaque  Having said that patient has essentially a fixed cervical spine with minimal rotation and no extension  Due to his cervical spine immobility and the location of the carotid stenosis here to be quite challenging to achieve adequate exposure to perform endarterectomy in a safe manner  At this time recommend continued medical therapy with dual antiplatelet therapy and statin  Will review case with partners and if any changes in recommendations will call patient to update him

## 2019-01-14 NOTE — PATIENT INSTRUCTIONS
Carotid Artery Disease   AMBULATORY CARE:   Carotid artery disease  is a condition that causes narrow or blocked carotid arteries  Your carotid arteries are the blood vessels that supply your brain with most of the blood it needs to work  You have 2 carotid arteries, one on each side of your neck  Call 911 for any of the following:   · You have any of the following signs of a stroke:      ¨ Numbness or drooping on one side of your face     ¨ Weakness in an arm or leg    ¨ Confusion or difficulty speaking    ¨ Dizziness, a severe headache, or vision loss    · You have any of the following signs of a heart attack:      ¨ Squeezing, pressure, or pain in your chest that lasts longer than 5 minutes or returns    ¨ Discomfort or pain in your back, neck, jaw, stomach, or arm     ¨ Trouble breathing    ¨ Nausea or vomiting    ¨ Lightheadedness or a sudden cold sweat, especially with chest pain or trouble breathing  Contact your healthcare provider if:   · You have questions or concerns about your condition or care  Signs and symptoms of carotid artery disease: You may have no signs or symptoms  Most commonly, carotid artery disease causes transient ischemic attacks (TIAs), or mini-strokes  You may have numbness, weakness, lack of movement, or vision or speech problems  A TIA goes away quickly and does not cause permanent damage  A TIA may be a warning sign that you are about to have a stroke  If you have any symptoms of a TIA or stroke, seek care immediately  Warning signs of a stroke: The word F A S T  can help you remember and recognize warning signs of a stroke  · F = Face:  One side of the face droops  · A = Arms:  One arm starts to drop when both arms are raised  · S = Speech:  Speech is slurred or sounds different than usual     · T = Time:  A person who is having a stroke needs to be seen immediately  A stroke is a medical emergency that needs immediate treatment   Some medicines and treatments work best if given within a few hours of a stroke  Treatment  for carotid artery disease depends on how narrow your arteries have become, your symptoms, and your general health  The goal of treatment is to lower your risk for a stroke  You may need any of the following:  · Take aspirin if directed  Your healthcare provider may suggest that you take an aspirin a day to prevent blood clots from forming in the carotid arteries  If your healthcare provider wants you to take aspirin daily, do not take acetaminophen or ibuprofen instead  · Control risk factors  High blood pressure, high cholesterol, heart disease, diabetes, and being overweight increase your risk for atherosclerosis  · Procedures can help open blocked arteries  A carotid endarterectomy is used to cut plaque out of the artery  An angioplasty is used to push the plaque against the artery wall with a balloon device  Sometimes a stent is placed during an angioplasty  A stent is a metal mesh tube that is placed in the artery to keep it open  Manage carotid artery disease:   · Eat a variety of healthy foods  Healthy foods include fruit, vegetables, whole-grain breads, low-fat dairy products, lean meat, and fish  Choose fish that are high in omega-3 fatty acids, such as salmon and fresh tuna  Ask your healthcare provider for more information on a heart healthy diet and the DASH eating plan  · Limit sodium (salt)  Sodium may increase your blood pressure  Add less table salt to your foods  Read food labels and choose foods that are low in sodium  Your healthcare provider may suggest you follow a low sodium diet  · Reach or maintain a healthy weight  Extra weight makes your heart work harder  Ask your healthcare provider how much you should weight  He can help you create a safe weight loss plan  Even a weight loss of 10% of your body weight can help your heart function better  · Exercise as directed    Exercise helps improve heart function and can help you manage your weight  Exercise can also help lower your cholesterol and blood sugar levels  Try to get at least 30 minutes of exercise at least 5 times each week  Try to be active every day  Your healthcare provider can help you create an exercise plan that works best for you  · Limit alcohol  Alcohol can increase your blood pressure and triglyceride levels  Men should limit alcohol to 2 drinks per day  Women should limit alcohol to 1 drink per day  A drink of alcohol is 12 ounces of beer, 5 ounces of wine, or 1½ ounces of liquor  · Do not smoke  Nicotine and other chemicals in cigarettes and cigars can cause heart and lung damage  Ask your healthcare provider for information if you currently smoke and need help to quit  E-cigarettes or smokeless tobacco still contain nicotine  Talk to your healthcare provider before you use these products  Follow up with your healthcare provider as directed:  Write down your questions so you remember to ask them during your visits  © 2017 [facility] Information is for End User's use only and may not be sold, redistributed or otherwise used for commercial purposes  All illustrations and images included in CareNotes® are the copyrighted property of [employer]  or [employer]  The above information is an  only  It is not intended as medical advice for individual conditions or treatments  Talk to your doctor, nurse or pharmacist before following any medical regimen to see if it is safe and effective for you

## 2019-01-14 NOTE — ASSESSMENT & PLAN NOTE
Bilateral intradural  vertebral arteries  With atherosclerotic plaque; left vertebral artery with moderate stenosis however carotid duplex demonstrates antegrade flow bilateral  Vertebral arteries  No additional vascular workup / intervention warranted/indicated at present time    Continue antihypertensive and antiplatelet therapy

## 2019-01-31 ENCOUNTER — OFFICE VISIT (OUTPATIENT)
Dept: INTERNAL MEDICINE CLINIC | Facility: CLINIC | Age: 74
End: 2019-01-31
Payer: MEDICARE

## 2019-01-31 VITALS
WEIGHT: 192.31 LBS | HEART RATE: 55 BPM | BODY MASS INDEX: 30.18 KG/M2 | TEMPERATURE: 95.3 F | SYSTOLIC BLOOD PRESSURE: 126 MMHG | OXYGEN SATURATION: 99 % | DIASTOLIC BLOOD PRESSURE: 68 MMHG | HEIGHT: 67 IN

## 2019-01-31 DIAGNOSIS — M54.12 CERVICAL RADICULOPATHY: ICD-10-CM

## 2019-01-31 DIAGNOSIS — D51.0 PERNICIOUS ANEMIA: ICD-10-CM

## 2019-01-31 DIAGNOSIS — E66.9 OBESITY (BMI 30.0-34.9): ICD-10-CM

## 2019-01-31 DIAGNOSIS — I65.21 STENOSIS OF RIGHT CAROTID ARTERY: Primary | ICD-10-CM

## 2019-01-31 DIAGNOSIS — I10 ESSENTIAL HYPERTENSION: ICD-10-CM

## 2019-01-31 PROBLEM — R42 DIZZINESS AND GIDDINESS: Status: RESOLVED | Noted: 2018-12-16 | Resolved: 2019-01-31

## 2019-01-31 PROCEDURE — 99214 OFFICE O/P EST MOD 30 MIN: CPT | Performed by: INTERNAL MEDICINE

## 2019-01-31 PROCEDURE — 96372 THER/PROPH/DIAG INJ SC/IM: CPT

## 2019-01-31 RX ADMIN — CYANOCOBALAMIN 1000 MCG: 1000 INJECTION INTRAMUSCULAR; SUBCUTANEOUS at 08:13

## 2019-01-31 NOTE — PROGRESS NOTES
Assessment/Plan:         Diagnoses and all orders for this visit:    Stenosis of right carotid artery  Pt saw vascular and at present will monitor  His sxs are resolved - no dizziness and BP better controlled    Cervical radiculopathy  He has been in touch with pain mgmt regarding injection at some point    Essential hypertension  BP stable at present time Continue regimen No added salt diet      Pernicious Anemia  B12 today and monthly    Monthly B12 Rto 3months       Patient ID: Lance Salazar  is a 74 y o  male  HPI   Pt feeling better No dizziness or headache  No chest pain or sob  He did see vascular and they did not feel intervention needed at present time and feel some recent sxs due to cervical radicular sxs  Bp stable He did call pain mgmt and will get injection when sxs flare  No chest pain or sob  No dizziness  B12 today due  No headache or sob  He will have repeat doppler in 6 months unless vascular calls after discussing case  Review of Systems   Constitutional: Negative  HENT: Negative  Eyes: Negative  Respiratory: Negative  Cardiovascular: Negative  Negative for chest pain and leg swelling  Gastrointestinal: Negative  Genitourinary: Negative  Musculoskeletal: Positive for arthralgias and neck pain  Skin: Negative  Neurological: Positive for numbness  Hematological: Negative  Psychiatric/Behavioral: Negative        Past Medical History:   Diagnosis Date    Arthritis     Cholecystitis     Coronary artery disease     Hypertension     Spinal stenosis      Past Surgical History:   Procedure Laterality Date    CHOLECYSTECTOMY      COLON SURGERY      bwel resection    COLONOSCOPY      ESOPHAGOGASTRODUODENOSCOPY      02/07/2007  ONSET    [place] LAP,CHOLECYSTECTOMY N/A 12/5/2017    Procedure: CHOLECYSTECTOMY LAPAROSCOPIC;  Surgeon: Elieser Schafer MD;  [facility];  Service: General    SMALL INTESTINE SURGERY      [address] [facility] [address] [facility] [address]     Social History     Social History    Marital status: /Civil Union     Spouse name: N/A    Number of children: N/A    Years of education: N/A     Occupational History    Not on file  Social History Main Topics    Smoking status: Former Smoker     Packs/day: 2 00     Years: 3 00     Quit date: 1966    Smokeless tobacco: Never Used    Alcohol use Yes      Comment: social     Drug use: No    Sexual activity: Not on file     Other Topics Concern    Not on file     Social History Narrative    CAFFEINE USE    DENTAL CARE,REGULARLY    FEELS SAFE AT HOME    LIVES INDEPENDENTLY WITH SPOUSE    USES SAFETY EQUIPMENT    SUN PROTECTION      No Known Allergies            /68   Pulse 55   Temp (!) 95 3 °F (35 2 °C) (Tympanic)   Ht 5' 7" (1 702 m)   Wt 87 2 kg (192 lb 5 oz)   SpO2 99%   BMI 30 12 kg/m²          Physical Exam   Constitutional: He is oriented to person, place, and time  He appears well-developed and well-nourished  No distress  HENT:   Head: Normocephalic and atraumatic  Mouth/Throat: No oropharyngeal exudate  Eyes: Pupils are equal, round, and reactive to light  Conjunctivae and EOM are normal  No scleral icterus  Neck: Normal range of motion  Neck supple  Cardiovascular: Normal rate, regular rhythm, normal heart sounds and intact distal pulses  No murmur heard  Pulmonary/Chest: Effort normal and breath sounds normal  No respiratory distress  He has no wheezes  He has no rales  Abdominal: Soft  Bowel sounds are normal    Musculoskeletal: Normal range of motion  He exhibits tenderness and deformity  He exhibits no edema  Restricted rom cervical spine limited to right most notable   Neurological: He is alert and oriented to person, place, and time  He displays abnormal reflex  No cranial nerve deficit  He exhibits abnormal muscle tone  Coordination abnormal    Skin: Skin is warm and dry  He is not diaphoretic  Psychiatric: He has a normal mood and affect   His behavior is normal  Judgment and thought content normal    Nursing note and vitals reviewed  BMI Counseling: Body mass index is 30 12 kg/m²  Discussed the patient's BMI with him  The BMI is above average  BMI counseling and education was provided to the patient  Exercise recommendations include moderate aerobic physical activity for 150 minutes/week

## 2019-01-31 NOTE — PATIENT INSTRUCTIONS

## 2019-02-28 ENCOUNTER — CLINICAL SUPPORT (OUTPATIENT)
Dept: INTERNAL MEDICINE CLINIC | Facility: CLINIC | Age: 74
End: 2019-02-28
Payer: MEDICARE

## 2019-02-28 DIAGNOSIS — D51.0 PERNICIOUS ANEMIA: ICD-10-CM

## 2019-02-28 PROCEDURE — 96372 THER/PROPH/DIAG INJ SC/IM: CPT

## 2019-02-28 RX ADMIN — CYANOCOBALAMIN 1000 MCG: 1000 INJECTION INTRAMUSCULAR; SUBCUTANEOUS at 09:40

## 2019-04-01 ENCOUNTER — TELEPHONE (OUTPATIENT)
Dept: INTERNAL MEDICINE CLINIC | Facility: CLINIC | Age: 74
End: 2019-04-01

## 2019-04-01 DIAGNOSIS — E87.6 POTASSIUM DEFICIENCY: ICD-10-CM

## 2019-04-01 RX ORDER — POTASSIUM CHLORIDE 750 MG/1
CAPSULE, EXTENDED RELEASE ORAL
Qty: 60 CAPSULE | Refills: 5 | Status: SHIPPED | OUTPATIENT
Start: 2019-04-01 | End: 2019-12-30 | Stop reason: SDUPTHER

## 2019-04-22 ENCOUNTER — OFFICE VISIT (OUTPATIENT)
Dept: INTERNAL MEDICINE CLINIC | Facility: CLINIC | Age: 74
End: 2019-04-22
Payer: MEDICARE

## 2019-04-22 VITALS
DIASTOLIC BLOOD PRESSURE: 78 MMHG | HEIGHT: 67 IN | WEIGHT: 187.38 LBS | SYSTOLIC BLOOD PRESSURE: 130 MMHG | HEART RATE: 65 BPM | TEMPERATURE: 96.4 F | OXYGEN SATURATION: 99 % | BODY MASS INDEX: 29.41 KG/M2

## 2019-04-22 DIAGNOSIS — I65.21 STENOSIS OF RIGHT CAROTID ARTERY: ICD-10-CM

## 2019-04-22 DIAGNOSIS — I10 ESSENTIAL HYPERTENSION: Primary | ICD-10-CM

## 2019-04-22 DIAGNOSIS — D51.0 PERNICIOUS ANEMIA: ICD-10-CM

## 2019-04-22 DIAGNOSIS — M54.12 CERVICAL RADICULOPATHY: ICD-10-CM

## 2019-04-22 DIAGNOSIS — M45.0 ANKYLOSING SPONDYLITIS OF MULTIPLE SITES IN SPINE (HCC): ICD-10-CM

## 2019-04-22 PROBLEM — M75.30 CALCIFIC TENDINITIS OF SHOULDER: Status: RESOLVED | Noted: 2018-07-24 | Resolved: 2019-04-22

## 2019-04-22 PROCEDURE — 96372 THER/PROPH/DIAG INJ SC/IM: CPT

## 2019-04-22 PROCEDURE — 99214 OFFICE O/P EST MOD 30 MIN: CPT | Performed by: INTERNAL MEDICINE

## 2019-04-22 RX ADMIN — CYANOCOBALAMIN 1000 MCG: 1000 INJECTION INTRAMUSCULAR; SUBCUTANEOUS at 08:20

## 2019-05-01 ENCOUNTER — TELEPHONE (OUTPATIENT)
Dept: INTERNAL MEDICINE CLINIC | Facility: CLINIC | Age: 74
End: 2019-05-01

## 2019-05-01 DIAGNOSIS — R20.0 NUMBNESS OF FINGERS OF BOTH HANDS: Primary | ICD-10-CM

## 2019-05-10 DIAGNOSIS — I10 ESSENTIAL HYPERTENSION: ICD-10-CM

## 2019-05-10 RX ORDER — CHLORTHALIDONE 50 MG/1
TABLET ORAL
Qty: 60 TABLET | Refills: 5 | Status: SHIPPED | OUTPATIENT
Start: 2019-05-10 | End: 2019-11-01 | Stop reason: ALTCHOICE

## 2019-05-20 ENCOUNTER — HOSPITAL ENCOUNTER (OUTPATIENT)
Dept: NEUROLOGY | Facility: AMBULATORY SURGERY CENTER | Age: 74
Discharge: HOME | End: 2019-05-20
Payer: MEDICARE

## 2019-05-20 DIAGNOSIS — R20.0 NUMBNESS OF FINGERS OF BOTH HANDS: ICD-10-CM

## 2019-05-20 PROCEDURE — 95911 NRV CNDJ TEST 9-10 STUDIES: CPT | Performed by: PSYCHIATRY & NEUROLOGY

## 2019-05-20 PROCEDURE — 95886 MUSC TEST DONE W/N TEST COMP: CPT | Performed by: PSYCHIATRY & NEUROLOGY

## 2019-05-29 ENCOUNTER — CLINICAL SUPPORT (OUTPATIENT)
Dept: INTERNAL MEDICINE CLINIC | Facility: CLINIC | Age: 74
End: 2019-05-29
Payer: MEDICARE

## 2019-05-29 DIAGNOSIS — D51.0 PERNICIOUS ANEMIA: ICD-10-CM

## 2019-05-29 PROCEDURE — 96372 THER/PROPH/DIAG INJ SC/IM: CPT

## 2019-05-29 RX ADMIN — CYANOCOBALAMIN 1000 MCG: 1000 INJECTION INTRAMUSCULAR; SUBCUTANEOUS at 09:40

## 2019-07-09 ENCOUNTER — CLINICAL SUPPORT (OUTPATIENT)
Dept: INTERNAL MEDICINE CLINIC | Facility: CLINIC | Age: 74
End: 2019-07-09
Payer: MEDICARE

## 2019-07-09 ENCOUNTER — TELEPHONE (OUTPATIENT)
Dept: INTERNAL MEDICINE CLINIC | Facility: CLINIC | Age: 74
End: 2019-07-09

## 2019-07-09 DIAGNOSIS — D51.0 PERNICIOUS ANEMIA: ICD-10-CM

## 2019-07-09 PROCEDURE — 96372 THER/PROPH/DIAG INJ SC/IM: CPT

## 2019-07-09 RX ADMIN — CYANOCOBALAMIN 1000 MCG: 1000 INJECTION INTRAMUSCULAR; SUBCUTANEOUS at 08:19

## 2019-07-09 NOTE — TELEPHONE ENCOUNTER
There is only one dose of plavix  I do not think he is taking aspirin along with - just confirm that  Also he should watch anitinflammatories like advil or ibuprofen which may further add to bruising with Plavix

## 2019-07-09 NOTE — TELEPHONE ENCOUNTER
Pt was here for B-12 injection and he wanted to ask due to his Plavix he is having bruising on his arms that happen very easy along with just a simple brush through his scrubs and they open and bleed very easily and a lot  He is wondering if that's ok or if maybe he should be cut back on his mediation

## 2019-07-10 NOTE — TELEPHONE ENCOUNTER
Pt is not taking aspirin along with Plavix and watches taking antiinflammatories also  He will try and be more aware and more careful to not bump thing or get scratches

## 2019-07-11 ENCOUNTER — HOSPITAL ENCOUNTER (OUTPATIENT)
Dept: NON INVASIVE DIAGNOSTICS | Facility: CLINIC | Age: 74
Discharge: HOME | End: 2019-07-11
Payer: MEDICARE

## 2019-07-11 DIAGNOSIS — I65.21 STENOSIS OF RIGHT CAROTID ARTERY: ICD-10-CM

## 2019-07-11 PROCEDURE — 93880 EXTRACRANIAL BILAT STUDY: CPT | Performed by: SURGERY

## 2019-07-11 PROCEDURE — 93880 EXTRACRANIAL BILAT STUDY: CPT

## 2019-08-07 ENCOUNTER — OFFICE VISIT (OUTPATIENT)
Dept: URGENT CARE | Facility: CLINIC | Age: 74
End: 2019-08-07
Payer: MEDICARE

## 2019-08-07 ENCOUNTER — APPOINTMENT (OUTPATIENT)
Dept: RADIOLOGY | Facility: CLINIC | Age: 74
End: 2019-08-07
Payer: MEDICARE

## 2019-08-07 VITALS
SYSTOLIC BLOOD PRESSURE: 168 MMHG | DIASTOLIC BLOOD PRESSURE: 72 MMHG | WEIGHT: 175 LBS | HEIGHT: 70 IN | RESPIRATION RATE: 20 BRPM | BODY MASS INDEX: 25.05 KG/M2 | OXYGEN SATURATION: 99 % | TEMPERATURE: 97 F | HEART RATE: 62 BPM

## 2019-08-07 DIAGNOSIS — M25.521 RIGHT ELBOW PAIN: Primary | ICD-10-CM

## 2019-08-07 DIAGNOSIS — M25.521 RIGHT ELBOW PAIN: ICD-10-CM

## 2019-08-07 PROCEDURE — 73080 X-RAY EXAM OF ELBOW: CPT

## 2019-08-07 PROCEDURE — G0463 HOSPITAL OUTPT CLINIC VISIT: HCPCS | Performed by: NURSE PRACTITIONER

## 2019-08-07 PROCEDURE — 99213 OFFICE O/P EST LOW 20 MIN: CPT | Performed by: NURSE PRACTITIONER

## 2019-08-07 RX ORDER — CEPHALEXIN 500 MG/1
500 CAPSULE ORAL 4 TIMES DAILY
Qty: 28 CAPSULE | Refills: 0 | Status: SHIPPED | OUTPATIENT
Start: 2019-08-07 | End: 2019-08-14

## 2019-08-07 RX ORDER — PREDNISONE 10 MG/1
TABLET ORAL
Qty: 15 TABLET | Refills: 0 | Status: SHIPPED | OUTPATIENT
Start: 2019-08-07 | End: 2019-10-17

## 2019-08-07 RX ORDER — PREDNISONE 10 MG/1
TABLET ORAL
Qty: 15 TABLET | Refills: 0 | Status: SHIPPED | OUTPATIENT
Start: 2019-08-07 | End: 2019-08-07

## 2019-08-07 NOTE — PROGRESS NOTES
[facility]        NAME: Dani Prater  is a 66 y o  male  : 1945    MRN: [id number]  DATE: 2019  TIME: 3:13 PM    Assessment and Plan   Right elbow pain [M25 521]  1  Right elbow pain  XR elbow 3+ vw right    predniSONE 10 mg tablet    cephalexin (KEFLEX) 500 mg capsule         Patient Instructions     X-ray of right elbow with extensive arthritic changes  Results were reviewed with patient  Follow up with PCP in 3-5 days  Proceed to  ER if symptoms worsen  Chief Complaint     Chief Complaint   Patient presents with    Joint Swelling     right elbow, red warm to touch since x 2days         History of Present Illness       57-year-old male with a chief complaint of right elbow pain, redness, swelling  He states that he bumped his right elbow about 4 times over the past week and it has been getting increasingly painful, red and swollen  He denies fevers  pain is increased by movement      Review of Systems   Review of Systems   Musculoskeletal: Positive for arthralgias (Right elbow  )           Current Medications       Current Outpatient Medications:     carvedilol (COREG) 25 mg tablet, Take 1 tablet (25 mg total) by mouth 2 (two) times a day with meals, Disp: 60 tablet, Rfl: 5    chlorthalidone (HYGROTEN) 50 MG tablet, Take 50 mg by mouth daily  , Disp: , Rfl:     chlorthalidone (HYGROTEN) 50 MG tablet, TAKE 1 TABLET BY MOUTH TWICE DAILY, Disp: 60 tablet, Rfl: 5    cholecalciferol (VITAMIN D3) 1,000 units tablet, Take 1,000 Units by mouth daily  , Disp: , Rfl:     ferrous sulfate 325 (65 Fe) mg tablet, Take 325 mg by mouth daily with breakfast, Disp: , Rfl:     potassium chloride (MICRO-K) 10 MEQ CR capsule, TAKE 1 CAPSULE BY MOUTH TWICE DAILY, Disp: 60 capsule, Rfl: 5    aspirin 81 mg chewable tablet, Chew 1 tablet (81 mg total) daily (Patient not taking: Reported on 2019), Disp: , Rfl: 0    atorvastatin (LIPITOR) 40 mg tablet, Take 1 tablet (40 mg total) by mouth every evening for 90 days, Disp: 90 tablet, Rfl: 3    cephalexin (KEFLEX) 500 mg capsule, Take 1 capsule (500 mg total) by mouth 4 (four) times a day for 7 days, Disp: 28 capsule, Rfl: 0    clopidogrel (PLAVIX) 75 mg tablet, Take 1 tablet (75 mg total) by mouth daily for 90 days, Disp: 90 tablet, Rfl: 3    lisinopril (ZESTRIL) 40 mg tablet, Take 1 tablet (40 mg total) by mouth daily for 90 days, Disp: 90 tablet, Rfl: 3    predniSONE 10 mg tablet, 3 tabs once daily x 3 days; 2 tabs once daily x 2 days; 1 tab daily x 2 days, Disp: 15 tablet, Rfl: 0    Current Facility-Administered Medications:     cyanocobalamin injection 1,000 mcg, 1,000 mcg, Intramuscular, Q30 Days, Reyes Cowing, DO, 1,000 mcg at 07/09/19 6184    Current Allergies     Allergies as of 08/07/2019    (No Known Allergies)            The following portions of the patient's history were reviewed and updated as appropriate: allergies, current medications, past family history, past medical history, past social history, past surgical history and problem list      Past Medical History:   Diagnosis Date    Arthritis     Cholecystitis     Coronary artery disease     Hypertension     Spinal stenosis        Past Surgical History:   Procedure Laterality Date    CHOLECYSTECTOMY      COLON SURGERY      bwel resection    COLONOSCOPY      ESOPHAGOGASTRODUODENOSCOPY      02/07/2007  ONSET    [place] LAP,CHOLECYSTECTOMY N/A 12/5/2017    Procedure: CHOLECYSTECTOMY LAPAROSCOPIC;  Surgeon: Tatianna Joshi MD;  [facility];  Service: General    SMALL INTESTINE SURGERY      [facility] [address] [facility] [address]       Family History   Problem Relation Age of Onset    Arthritis Mother     Heart attack Father     Coronary artery disease Family     Diabetes Family          Medications have been verified          Objective   /72   Pulse 62   Temp (!) 97 °F (36 1 °C)   Resp 20   Ht 5' 10" (1 778 m)   Wt 79 4 kg (175 lb)   SpO2 99%   BMI 25 11 kg/m² Physical Exam     Physical Exam   Constitutional: He is oriented to person, place, and time  He appears well-developed and well-nourished  No distress  Cardiovascular: Normal rate, regular rhythm and normal heart sounds  Pulmonary/Chest: Effort normal and breath sounds normal    Musculoskeletal:        Right elbow: He exhibits decreased range of motion and swelling (Over olecranon process  )  Tenderness found  Olecranon process tenderness noted  There is warmth and erythema over the posterior right elbow  Neurological: He is alert and oriented to person, place, and time  Skin: Skin is warm and dry  He is not diaphoretic  Nursing note and vitals reviewed

## 2019-08-07 NOTE — PATIENT INSTRUCTIONS
Arthralgia   WHAT YOU NEED TO KNOW:   Arthralgia is pain in one or more joints, with no inflammation  It may be short-term and get better within 6 to 8 weeks  Arthralgia can be an early sign of arthritis  Arthralgia may be caused by a medical condition, such as a hormone disorder or a tumor  It may also be caused by an infection or injury  DISCHARGE INSTRUCTIONS:   Medicines: The following medicines may  be ordered for you:  · Acetaminophen  decreases pain  Ask how much to take and how often to take it  Follow directions  Acetaminophen can cause liver damage if not taken correctly  · NSAIDs  decrease pain and prevent swelling  Ask your healthcare provider which medicine is right for you  Ask how much to take and when to take it  Take as directed  NSAIDs can cause stomach bleeding and kidney problems if not taken correctly  · Pain relief cream  decreases pain  Use this cream as directed  · Take your medicine as directed  Contact your healthcare provider if you think your medicine is not helping or if you have side effects  Tell him of her if you are allergic to any medicine  Keep a list of the medicines, vitamins, and herbs you take  Include the amounts, and when and why you take them  Bring the list or the pill bottles to follow-up visits  Carry your medicine list with you in case of an emergency  Follow up with your healthcare provider or specialist as directed:  Write down your questions so you remember to ask them during your visits  Self-care:   · Apply heat  to help decrease pain  Use a heating pad or heat wrap  Apply heat for 20 to 30 minutes every 2 hours for as many days as directed  · Rest  as much as possible  Avoid activities that cause joint pain  · Apply ice  to help decrease swelling and pain  Ice may also help prevent tissue damage  Use an ice pack, or put crushed ice in a plastic bag   Cover it with a towel and place it on your painful joint for 15 to 20 minutes every hour or as directed  · Support  the joint with a brace or elastic wrap as directed  · Elevate  your joint above the level of your heart as often as you can to help decrease swelling and pain  Prop your painful joint on pillows or blankets to keep it elevated comfortably  · Lose weight  if you are overweight  Extra weight can put pressure on your joints and cause more pain  Ask your healthcare provider how much you should weigh  Ask him to help you create a weight loss plan  · Exercise  regularly to help improve joint movement and to decrease pain  Ask about the best exercise plan for you  Low-impact exercises can help take the pressure off your joints  Examples are walking, swimming, and water aerobics  Physical therapy:  A physical therapist teaches you exercises to help improve movement and strength, and to decrease pain  Ask your healthcare provider if physical therapy is right for you  Contact your healthcare provider or specialist if:   · You have a fever  · You continue to have joint pain that cannot be relieved with heat, ice, or medicine  · You have pain and inflammation around your joint  · You have questions or concerns about your condition or care  Return to the emergency department if:   · You have sudden, severe pain when you move your joint  · You have a fever and shaking chills  · You cannot move your joint  · You lose feeling on the side of your body where you have the painful joint  © 2017 [employer]  Information is for End User's use only and may not be sold, redistributed or otherwise used for commercial purposes  All illustrations and images included in CareNotes® are the copyrighted property of A D A M [employer] Inc  or [employer] Hur[employer]  The above information is an  only  It is not intended as medical advice for individual conditions or treatments   Talk to your doctor, nurse or pharmacist before following any medical regimen to see if it is safe and effective for you

## 2019-08-26 ENCOUNTER — OFFICE VISIT (OUTPATIENT)
Dept: INTERNAL MEDICINE CLINIC | Facility: CLINIC | Age: 74
End: 2019-08-26
Payer: MEDICARE

## 2019-08-26 VITALS
DIASTOLIC BLOOD PRESSURE: 68 MMHG | SYSTOLIC BLOOD PRESSURE: 120 MMHG | BODY MASS INDEX: 25.62 KG/M2 | WEIGHT: 179 LBS | HEART RATE: 51 BPM | TEMPERATURE: 96.5 F | HEIGHT: 70 IN | OXYGEN SATURATION: 97 %

## 2019-08-26 DIAGNOSIS — D51.0 PERNICIOUS ANEMIA: ICD-10-CM

## 2019-08-26 DIAGNOSIS — I10 ESSENTIAL HYPERTENSION: ICD-10-CM

## 2019-08-26 DIAGNOSIS — Z00.00 MEDICARE ANNUAL WELLNESS VISIT, SUBSEQUENT: Primary | ICD-10-CM

## 2019-08-26 DIAGNOSIS — I65.21 STENOSIS OF RIGHT CAROTID ARTERY: ICD-10-CM

## 2019-08-26 DIAGNOSIS — E55.9 VITAMIN D DEFICIENCY: ICD-10-CM

## 2019-08-26 PROBLEM — G56.00 CARPAL TUNNEL SYNDROME: Status: ACTIVE | Noted: 2019-05-22

## 2019-08-26 PROCEDURE — 96372 THER/PROPH/DIAG INJ SC/IM: CPT

## 2019-08-26 PROCEDURE — 1124F ACP DISCUSS-NO DSCNMKR DOCD: CPT | Performed by: INTERNAL MEDICINE

## 2019-08-26 PROCEDURE — G0439 PPPS, SUBSEQ VISIT: HCPCS | Performed by: INTERNAL MEDICINE

## 2019-08-26 RX ADMIN — CYANOCOBALAMIN 1000 MCG: 1000 INJECTION INTRAMUSCULAR; SUBCUTANEOUS at 09:33

## 2019-08-26 NOTE — PROGRESS NOTES
Assessment and Plan:     Problem List Items Addressed This Visit        Cardiovascular and Mediastinum    Stenosis of right carotid artery    Relevant Orders    CBC and Platelet    Comprehensive metabolic panel    Essential hypertension    Relevant Orders    CBC and Platelet    Comprehensive metabolic panel    LDL cholesterol, direct       Other    Pernicious anemia    Relevant Orders    Vitamin B12    Medicare annual wellness visit, subsequent - Primary      Other Visit Diagnoses     Vitamin D deficiency        Relevant Orders    Vitamin D 25 hydroxy         History of Present Illness:   Labs ordered  Adequate hydration  Continue followup with pain mgmt  Flu shot and prevnar deferred  B12 monthly  Rto 3 months    Patient presents for Medicare Annual Wellness visit    Patient Care Team:  Juan Jose Iqbal DO as PCP - General  Barney Gails, DO Amy Brenda Boast, MD Emily Feast, MD (Ophthalmology)     Problem List:     Patient Active Problem List   Diagnosis    Stenosis of right carotid artery    Ankylosing spondylitis ([address] )    Esophageal reflux    Cervical radiculopathy    Chondromalacia patellae    Lumbosacral spondylosis without myelopathy    Vertebral artery stenosis, asymptomatic    Essential hypertension    Pernicious anemia    Bilateral carpal tunnel syndrome    Carpal tunnel syndrome    Medicare annual wellness visit, subsequent      Past Medical and Surgical History:     Past Medical History:   Diagnosis Date    Arthritis     Cholecystitis     Coronary artery disease     Hypertension     Spinal stenosis      Past Surgical History:   Procedure Laterality Date    CHOLECYSTECTOMY      COLON SURGERY      bwel resection    COLONOSCOPY      ESOPHAGOGASTRODUODENOSCOPY      02/07/2007  ONSET    [place] LAP,CHOLECYSTECTOMY N/A 12/5/2017    Procedure: CHOLECYSTECTOMY LAPAROSCOPIC;  Surgeon: David Hagen MD;  [facility];  Service: General    SMALL INTESTINE SURGERY [facility] DEC 2011      Family History:     Family History   Problem Relation Age of Onset    Arthritis Mother     Heart attack Father     Coronary artery disease Family     Diabetes Family       Social History:     Social History     Tobacco Use   Smoking Status Former Smoker    Packs/day: 2 00    Years: 3 00    Pack years: 6 00    Last attempt to quit: 1966    Years since quittin 6   Smokeless Tobacco Never Used     Social History     Substance and Sexual Activity   Alcohol Use Yes    Comment: social      Social History     Substance and Sexual Activity   Drug Use No      Medications and Allergies:     Current Outpatient Medications   Medication Sig Dispense Refill    atorvastatin (LIPITOR) 40 mg tablet Take 1 tablet (40 mg total) by mouth every evening for 90 days 90 tablet 3    carvedilol (COREG) 25 mg tablet Take 1 tablet (25 mg total) by mouth 2 (two) times a day with meals 60 tablet 5    chlorthalidone (HYGROTEN) 50 MG tablet Take 50 mg by mouth daily        cholecalciferol (VITAMIN D3) 1,000 units tablet Take 1,000 Units by mouth daily        clopidogrel (PLAVIX) 75 mg tablet Take 1 tablet (75 mg total) by mouth daily for 90 days 90 tablet 3    ferrous sulfate 325 (65 Fe) mg tablet Take 325 mg by mouth daily with breakfast      lisinopril (ZESTRIL) 40 mg tablet Take 1 tablet (40 mg total) by mouth daily for 90 days 90 tablet 3    potassium chloride (MICRO-K) 10 MEQ CR capsule TAKE 1 CAPSULE BY MOUTH TWICE DAILY 60 capsule 5    aspirin 81 mg chewable tablet Chew 1 tablet (81 mg total) daily (Patient not taking: Reported on 2019)  0    chlorthalidone (HYGROTEN) 50 MG tablet TAKE 1 TABLET BY MOUTH TWICE DAILY (Patient not taking: Reported on 2019) 60 tablet 5    predniSONE 10 mg tablet 3 tabs once daily x 3 days; 2 tabs once daily x 2 days; 1 tab daily x 2 days (Patient not taking: Reported on 2019) 15 tablet 0     Current Facility-Administered Medications   Medication Dose Route Frequency Provider Last Rate Last Dose    cyanocobalamin injection 1,000 mcg  1,000 mcg Intramuscular Q30 Days Jai Fitzgerald DO   1,000 mcg at 07/09/19 0272     No Known Allergies   Immunizations:     Immunization History   Administered Date(s) Administered    H1N1, All Formulations 12/16/2009    INFLUENZA 01/09/2006, 11/21/2007, 10/27/2008, 11/21/2009, 10/26/2010, 02/06/2012, 12/20/2012, 09/06/2013      Medicare Screening Tests and Risk Assessments:     Memo Patino is here for his Subsequent Wellness visit  Last Medicare Wellness visit information reviewed, patient interviewed, no change since last AWV  Health Risk Assessment:  Patient rates overall health as good  Patient feels that their physical health rating is Same  Eyesight was rated as Same  Hearing was rated as Same  Patient feels that their emotional and mental health rating is Same  Pain experienced by patient in the last 7 days has been Some  Patient's pain rating has been 4/10  Patient states that he has experienced no weight loss or gain in last 6 months  Emotional/Mental Health:  Patient has not been feeling nervous/anxious  PHQ-9 Depression Screening:    Frequency of the following problems over the past two weeks:      1  Little interest or pleasure in doing things: 0 - not at all      2  Feeling down, depressed, or hopeless: 0 - not at all  PHQ-2 Score: 0          Broken Bones/Falls: Fall Risk Assessment:    In the past year, patient has experienced: History of falling in past year    Number of falls: 2 or more    Injured during fall: No      Patient feels unsteady when standing or walking     Patient is not worried about falling  Bladder/Bowel:  Patient has not leaked urine accidently in the last six months  Patient reports no loss of bowel control  Immunizations:  Patient has not had a flu vaccination within the last year  Patient has not received a pneumonia shot  Patient has not received a shingles shot    Patient has not received tetanus/diphtheria shot  Home Safety:  Patient does not have trouble with stairs inside or outside of their home  Patient currently reports that there are no safety hazards present in home, working smoke alarms, working carbon monoxide detectors  Preventative Screenings:   no prostate cancer screen performed, no colon cancer screen completed, cholesterol screen completed, no glaucoma eye exam completed    Nutrition:  Current diet: Regular with servings of the following:    Medications:  Patient is currently taking over-the-counter supplements  List of OTC medications includes: see med list  Patient is able to manage medications  Lifestyle Choices:  Patient reports no tobacco use  Patient has smoked or used tobacco in the past   Patient has stopped his tobacco use  Tobacco use quit date: 1969  Patient reports no alcohol use  Patient drives a vehicle  Patient wears seat belt  Current level of exercise of physical activity described by patient as: walking, housework  Activities of Daily Living:  Can get out of bed by his or her self, able to dress self, able to make own meals, able to do own shopping, able to bathe self, can do own laundry/housekeeping, can manage own money, pay bills and track expenses    Previous Hospitalizations:  Hospitalization or ED visit in past 12 months  Number of hospitalizations within the last year: 1-2        Advanced Directives:  Patient has decided on a power of   Patient has spoken to designated power of   Patient has completed advanced directive          Preventative Screening/Counseling:      Cardiovascular:      General: Screening Current      Counseling: Healthy Diet, Healthy Weight and Improve Exercise Tolerance          Diabetes:      General: Screening Current      Counseling: Healthy Diet and Healthy Weight          Colorectal Cancer:      General: Screening Not Indicated      Counseling: high fiber diet          Prostate Cancer:      General: Screening Not Indicated          Osteoporosis:      General: Screening Not Indicated          AAA:      General: Screening Not Indicated          Glaucoma:      General: Screening Current          HIV:      General: Screening Not Indicated          Hepatitis C:      General: Screening Current        Advanced Directives:   Patient has living will for healthcare, has durable POA for healthcare, patient has an advanced directive  End of life assessment reviewed with patient  Provider agrees with end of life decisions   Immunizations:  Patient reviewed and up to date      Influenza: Patient Declines      Pneumococcal: Patient Declines      Shingrix: Risks & Benefits Discussed      Hepatitis B (Low risk patients): Series Not Indicated      Zostavax: Patient Declines      TD: Patient Declines      TDAP: Patient Declines      Other Preventative Counseling (Non-Medicare):   Fall Prevention and Increase physical activity

## 2019-08-28 ENCOUNTER — TELEPHONE (OUTPATIENT)
Dept: INTERNAL MEDICINE CLINIC | Facility: CLINIC | Age: 74
End: 2019-08-28

## 2019-08-28 NOTE — TELEPHONE ENCOUNTER
[facility] is requesting a note to be fax stating to hold Plavix for 7 days for pain management injection  Pls advise

## 2019-09-18 DIAGNOSIS — I25.119 CORONARY ARTERY DISEASE WITH ANGINA PECTORIS, UNSPECIFIED VESSEL OR LESION TYPE, UNSPECIFIED WHETHER NATIVE OR TRANSPLANTED HEART (HCC): ICD-10-CM

## 2019-09-18 RX ORDER — CARVEDILOL 25 MG/1
25 TABLET ORAL 2 TIMES DAILY WITH MEALS
Qty: 60 TABLET | Refills: 5 | Status: SHIPPED | OUTPATIENT
Start: 2019-09-18 | End: 2020-06-05 | Stop reason: SDUPTHER

## 2019-09-21 ENCOUNTER — APPOINTMENT (OUTPATIENT)
Dept: RADIOLOGY | Facility: CLINIC | Age: 74
End: 2019-09-21
Payer: MEDICARE

## 2019-09-21 ENCOUNTER — OFFICE VISIT (OUTPATIENT)
Dept: URGENT CARE | Facility: CLINIC | Age: 74
End: 2019-09-21
Payer: MEDICARE

## 2019-09-21 VITALS
SYSTOLIC BLOOD PRESSURE: 128 MMHG | HEIGHT: 70 IN | DIASTOLIC BLOOD PRESSURE: 60 MMHG | WEIGHT: 179 LBS | BODY MASS INDEX: 25.62 KG/M2

## 2019-09-21 DIAGNOSIS — S49.91XA INJURY OF RIGHT SHOULDER, INITIAL ENCOUNTER: ICD-10-CM

## 2019-09-21 DIAGNOSIS — M25.511 ACUTE PAIN OF RIGHT SHOULDER: Primary | ICD-10-CM

## 2019-09-21 PROCEDURE — 99213 OFFICE O/P EST LOW 20 MIN: CPT | Performed by: PHYSICIAN ASSISTANT

## 2019-09-21 PROCEDURE — G0463 HOSPITAL OUTPT CLINIC VISIT: HCPCS | Performed by: PHYSICIAN ASSISTANT

## 2019-09-21 PROCEDURE — 96372 THER/PROPH/DIAG INJ SC/IM: CPT | Performed by: PHYSICIAN ASSISTANT

## 2019-09-21 PROCEDURE — 73030 X-RAY EXAM OF SHOULDER: CPT

## 2019-09-21 RX ORDER — DEXAMETHASONE SODIUM PHOSPHATE 10 MG/ML
10 INJECTION, SOLUTION INTRAMUSCULAR; INTRAVENOUS ONCE
Status: COMPLETED | OUTPATIENT
Start: 2019-09-21 | End: 2019-09-21

## 2019-09-21 RX ADMIN — DEXAMETHASONE SODIUM PHOSPHATE 10 MG: 10 INJECTION, SOLUTION INTRAMUSCULAR; INTRAVENOUS at 09:29

## 2019-09-21 NOTE — PROGRESS NOTES
[facility]        NAME: Cally Gandara  is a 66 y o  male  : 1945    MRN: [id number]  DATE: 2019  TIME: 9:28 AM    Assessment and Plan   Acute pain of right shoulder [M25 511]  1  Acute pain of right shoulder  dexamethasone (PF) (DECADRON) injection 10 mg   2  Injury of right shoulder, initial encounter  XR shoulder 2+ vw right         Patient Instructions     Do not take an anti-inflammatory since were given injection of steroids  Follow up with Orthopedics if no improvement  Proceed to  ER if symptoms worsen  Chief Complaint     Chief Complaint   Patient presents with    Shoulder Pain     right shoulder pain after a fall yesterday, decreased movement of right arm         History of Present Illness       Patient fell yesterday injuring his right shoulder  He lost his balance and in the process of falling struck his right shoulder on a   The patient states he has minimal pain with resting however when he goes to move the right arm above his head he is unable to do so  He is able to lift the arm with minimal right shoulder pain  Review of Systems   Review of Systems   Constitutional: Negative for fever  Gastrointestinal: Negative for nausea and vomiting  Musculoskeletal: Positive for arthralgias and myalgias  Skin: Positive for wound (Abrasion right elbow)  Neurological: Negative for weakness and numbness  Hematological: Negative for adenopathy           Current Medications       Current Outpatient Medications:     carvedilol (COREG) 25 mg tablet, Take 1 tablet (25 mg total) by mouth 2 (two) times a day with meals, Disp: 60 tablet, Rfl: 5    chlorthalidone (HYGROTEN) 50 MG tablet, Take 50 mg by mouth daily  , Disp: , Rfl:     cholecalciferol (VITAMIN D3) 1,000 units tablet, Take 1,000 Units by mouth daily  , Disp: , Rfl:     ferrous sulfate 325 (65 Fe) mg tablet, Take 325 mg by mouth daily with breakfast, Disp: , Rfl:     potassium chloride (MICRO-K) 10 MEQ CR capsule, TAKE 1 CAPSULE BY MOUTH TWICE DAILY, Disp: 60 capsule, Rfl: 5    aspirin 81 mg chewable tablet, Chew 1 tablet (81 mg total) daily (Patient not taking: Reported on 9/21/2019), Disp: , Rfl: 0    atorvastatin (LIPITOR) 40 mg tablet, Take 1 tablet (40 mg total) by mouth every evening for 90 days, Disp: 90 tablet, Rfl: 3    chlorthalidone (HYGROTEN) 50 MG tablet, TAKE 1 TABLET BY MOUTH TWICE DAILY (Patient not taking: Reported on 8/26/2019), Disp: 60 tablet, Rfl: 5    clopidogrel (PLAVIX) 75 mg tablet, Take 1 tablet (75 mg total) by mouth daily for 90 days, Disp: 90 tablet, Rfl: 3    lisinopril (ZESTRIL) 40 mg tablet, Take 1 tablet (40 mg total) by mouth daily for 90 days, Disp: 90 tablet, Rfl: 3    predniSONE 10 mg tablet, 3 tabs once daily x 3 days; 2 tabs once daily x 2 days; 1 tab daily x 2 days (Patient not taking: Reported on 8/26/2019), Disp: 15 tablet, Rfl: 0    Current Facility-Administered Medications:     cyanocobalamin injection 1,000 mcg, 1,000 mcg, Intramuscular, Q30 Days, Kandy Flow, DO, 1,000 mcg at 08/26/19 0933    dexamethasone (PF) (DECADRON) injection 10 mg, 10 mg, Intramuscular, Once, Ursula Sarkar PA-C    Current Allergies     Allergies as of 09/21/2019    (No Known Allergies)            The following portions of the patient's history were reviewed and updated as appropriate: allergies, current medications, past family history, past medical history, past social history, past surgical history and problem list      Past Medical History:   Diagnosis Date    Arthritis     Cholecystitis     Coronary artery disease     Hypertension     Spinal stenosis        Past Surgical History:   Procedure Laterality Date    CHOLECYSTECTOMY      COLON SURGERY      bwel resection    COLONOSCOPY      ESOPHAGOGASTRODUODENOSCOPY      02/07/2007  [facility]    [place] LAP,CHOLECYSTECTOMY N/A 12/5/2017    Procedure: CHOLECYSTECTOMY LAPAROSCOPIC;  Surgeon: Kristin David MD;  [facility];  Service: General    SMALL INTESTINE SURGERY      [facility] DEC 2011       Family History   Problem Relation Age of Onset    Arthritis Mother     Heart attack Father     Coronary artery disease Family     Diabetes Family          Medications have been verified  Objective   /60   Ht 5' 10" (1 778 m)   Wt 81 2 kg (179 lb)   BMI 25 68 kg/m²        Physical Exam     Physical Exam   Constitutional: He is oriented to person, place, and time  He appears well-developed and well-nourished  HENT:   Head: Normocephalic  Neck:   Significantly reduced range of motion of the cervical spine secondary to degenerative changes   Cardiovascular: Normal rate and regular rhythm  Pulmonary/Chest: Effort normal    Musculoskeletal:   Abrasion right elbow  Full range of motion of right elbow  Right shoulder without any bony or soft tissue tenderness  Significantly reduced extension; positive empty can sign  Neurological: He is alert and oriented to person, place, and time  Skin: Skin is warm and dry  Psychiatric: He has a normal mood and affect  His behavior is normal    Nursing note and vitals reviewed  Right shoulder x-ray viewed by me and independently reviewed by me contemporaneously as no acute bony abnormality, significant degenerative changes

## 2019-09-29 DIAGNOSIS — K21.9 GASTROESOPHAGEAL REFLUX DISEASE, ESOPHAGITIS PRESENCE NOT SPECIFIED: ICD-10-CM

## 2019-09-29 RX ORDER — PANTOPRAZOLE SODIUM 40 MG/1
TABLET, DELAYED RELEASE ORAL
Qty: 30 TABLET | Refills: 5 | Status: SHIPPED | OUTPATIENT
Start: 2019-09-29 | End: 2020-07-22 | Stop reason: SDUPTHER

## 2019-09-30 ENCOUNTER — TELEPHONE (OUTPATIENT)
Dept: INTERNAL MEDICINE CLINIC | Facility: CLINIC | Age: 74
End: 2019-09-30

## 2019-10-17 ENCOUNTER — APPOINTMENT (OUTPATIENT)
Dept: RADIOLOGY | Facility: CLINIC | Age: 74
End: 2019-10-17
Payer: MEDICARE

## 2019-10-17 ENCOUNTER — OFFICE VISIT (OUTPATIENT)
Dept: URGENT CARE | Facility: CLINIC | Age: 74
End: 2019-10-17
Payer: MEDICARE

## 2019-10-17 VITALS
TEMPERATURE: 98.1 F | DIASTOLIC BLOOD PRESSURE: 68 MMHG | SYSTOLIC BLOOD PRESSURE: 155 MMHG | HEART RATE: 53 BPM | OXYGEN SATURATION: 97 % | RESPIRATION RATE: 16 BRPM

## 2019-10-17 DIAGNOSIS — M70.52 BURSITIS OF LEFT KNEE, UNSPECIFIED BURSA: ICD-10-CM

## 2019-10-17 DIAGNOSIS — S89.92XA LEFT KNEE INJURY, INITIAL ENCOUNTER: ICD-10-CM

## 2019-10-17 DIAGNOSIS — S89.92XA LEFT KNEE INJURY, INITIAL ENCOUNTER: Primary | ICD-10-CM

## 2019-10-17 PROCEDURE — G0463 HOSPITAL OUTPT CLINIC VISIT: HCPCS | Performed by: PHYSICIAN ASSISTANT

## 2019-10-17 PROCEDURE — 99213 OFFICE O/P EST LOW 20 MIN: CPT | Performed by: PHYSICIAN ASSISTANT

## 2019-10-17 PROCEDURE — 73564 X-RAY EXAM KNEE 4 OR MORE: CPT

## 2019-10-17 RX ORDER — PREDNISONE 10 MG/1
TABLET ORAL
Qty: 26 TABLET | Refills: 0 | Status: SHIPPED | OUTPATIENT
Start: 2019-10-17 | End: 2019-12-13 | Stop reason: ALTCHOICE

## 2019-10-17 RX ORDER — CEPHALEXIN 500 MG/1
500 CAPSULE ORAL EVERY 8 HOURS SCHEDULED
Qty: 21 CAPSULE | Refills: 0 | Status: SHIPPED | OUTPATIENT
Start: 2019-10-17 | End: 2019-10-24

## 2019-10-17 NOTE — PATIENT INSTRUCTIONS
Arthritic changes noted on xray  No acute fracture  Will follow up with radiologist report when available  Start antibiotic and prednisone for bursitis  If not improving over the next week, follow up with PCP or orthopedics

## 2019-10-17 NOTE — PROGRESS NOTES
[facility]    NAME: Melissa Lopez  is a 66 y o  male  : 1945    MRN: [id number]  DATE: 2019  TIME: 8:52 AM    Assessment and Plan   Left knee injury, initial encounter [S89 [id number]]  1  Left knee injury, initial encounter  XR knee 4+ vw left injury    cephalexin (KEFLEX) 500 mg capsule    predniSONE 10 mg tablet   2  Bursitis of left knee, unspecified bursa         Patient Instructions   Patient Instructions   Arthritic changes noted on xray  No acute fracture  Will follow up with radiologist report when available  Start antibiotic and prednisone for bursitis  If not improving over the next week, follow up with PCP or orthopedics  Chief Complaint     Chief Complaint   Patient presents with    Knee Pain     Pt c/o left knee pain after falling Friday  History of Present Illness   25-year-old male here after falling 6 days ago  Fabien Zavala onto his left knee and bumped it  Anterior aspect of left knee is painful, reddened and warm to touch  States that he normally has bony prominences on both of his knees but now is more swollen and is more tender and reddened  Review of Systems   Review of Systems   Constitutional: Negative for chills and fever  HENT: Negative for congestion  Respiratory: Negative for cough and shortness of breath  Cardiovascular: Negative for chest pain  Musculoskeletal: Positive for arthralgias (left knee pain and redness, swelling and increased warmth)         Current Medications     Current Outpatient Medications:     aspirin 81 mg chewable tablet, Chew 1 tablet (81 mg total) daily (Patient not taking: Reported on 2019), Disp: , Rfl: 0    atorvastatin (LIPITOR) 40 mg tablet, Take 1 tablet (40 mg total) by mouth every evening for 90 days, Disp: 90 tablet, Rfl: 3    carvedilol (COREG) 25 mg tablet, Take 1 tablet (25 mg total) by mouth 2 (two) times a day with meals, Disp: 60 tablet, Rfl: 5    cephalexin (KEFLEX) 500 mg capsule, Take 1 capsule (500 mg total) by mouth every 8 (eight) hours for 7 days, Disp: 21 capsule, Rfl: 0    chlorthalidone (HYGROTEN) 50 MG tablet, Take 50 mg by mouth daily  , Disp: , Rfl:     chlorthalidone (HYGROTEN) 50 MG tablet, TAKE 1 TABLET BY MOUTH TWICE DAILY (Patient not taking: Reported on 8/26/2019), Disp: 60 tablet, Rfl: 5    cholecalciferol (VITAMIN D3) 1,000 units tablet, Take 1,000 Units by mouth daily  , Disp: , Rfl:     clopidogrel (PLAVIX) 75 mg tablet, Take 1 tablet (75 mg total) by mouth daily for 90 days, Disp: 90 tablet, Rfl: 3    ferrous sulfate 325 (65 Fe) mg tablet, Take 325 mg by mouth daily with breakfast, Disp: , Rfl:     lisinopril (ZESTRIL) 40 mg tablet, Take 1 tablet (40 mg total) by mouth daily for 90 days, Disp: 90 tablet, Rfl: 3    pantoprazole (PROTONIX) 40 mg tablet, TAKE 1 TABLET BY MOUTH ONCE DAILY, Disp: 30 tablet, Rfl: 5    potassium chloride (MICRO-K) 10 MEQ CR capsule, TAKE 1 CAPSULE BY MOUTH TWICE DAILY, Disp: 60 capsule, Rfl: 5    predniSONE 10 mg tablet, Take 3 tabs BID X 2 days, 2 tabs BID X 2 days, 1 tab BID X 2 days, 1 tab daily X 2 days, Disp: 26 tablet, Rfl: 0    Current Facility-Administered Medications:     cyanocobalamin injection 1,000 mcg, 1,000 mcg, Intramuscular, Q30 Days, Christiano Quarles DO, 1,000 mcg at 08/26/19 4294    Current Allergies     Allergies as of 10/17/2019    (No Known Allergies)          The following portions of the patient's history were reviewed and updated as appropriate: allergies, current medications, past family history, past medical history, past social history, past surgical history and problem list    Past Medical History:   Diagnosis Date    Arthritis     Cholecystitis     Coronary artery disease     Hypertension     Spinal stenosis      Past Surgical History:   Procedure Laterality Date    CHOLECYSTECTOMY      COLON SURGERY      bwel resection    COLONOSCOPY      ESOPHAGOGASTRODUODENOSCOPY      02/07/2007  [facility]    [place] LAP,CHOLECYSTECTOMY N/A 2017    Procedure: CHOLECYSTECTOMY LAPAROSCOPIC;  Surgeon: Vj Juarez MD;  Location:  MAIN OR;  Service: General    SMALL INTESTINE SURGERY      [facility] DEC 2011     Family History   Problem Relation Age of Onset    Arthritis Mother     Heart attack Father     Coronary artery disease Family     Diabetes Family      Social History     Socioeconomic History    Marital status: /Civil Union     Spouse name: Not on file    Number of children: Not on file    Years of education: Not on file    Highest education level: Not on file   Occupational History    Not on file   Social Needs    Financial resource strain: Not on file    Food insecurity:     Worry: Not on file     Inability: Not on file    Transportation needs:     Medical: Not on file     Non-medical: Not on file   Tobacco Use    Smoking status: Former Smoker     Packs/day: 2 00     Years: 3 00     Pack years: 6 00     Last attempt to quit: 1966     Years since quittin 8    Smokeless tobacco: Never Used   Substance and Sexual Activity    Alcohol use: Yes     Comment: social     Drug use: No    Sexual activity: Not on file   Lifestyle    Physical activity:     Days per week: Not on file     Minutes per session: Not on file    Stress: Not on file   Relationships    Social connections:     Talks on phone: Not on file     Gets together: Not on file     Attends [employer] service: Not on file     Active member of club or organization: Not on file     Attends meetings of clubs or organizations: Not on file     Relationship status: Not on file    Intimate partner violence:     Fear of current or ex partner: Not on file     Emotionally abused: Not on file     Physically abused: Not on file     Forced sexual activity: Not on file   Other Topics Concern    Not on file   Social History Narrative    CAFFEINE USE    DENTAL CARE,REGULARLY    FEELS SAFE AT [facility] EQUIPMENT    SUN PROTECTION      Medications have been verified  Objective   /68   Pulse (!) 53   Temp 98 1 °F (36 7 °C)   Resp 16   SpO2 97%      Physical Exam   Physical Exam   Constitutional: He appears well-developed and well-nourished  No distress  HENT:   Head: Normocephalic and atraumatic  Mouth/Throat: No oropharyngeal exudate  Cardiovascular: Normal rate, regular rhythm and normal heart sounds  No murmur heard  Pulmonary/Chest: Effort normal and breath sounds normal  No respiratory distress  Musculoskeletal:        Left knee: He exhibits decreased range of motion, swelling and erythema  He exhibits no laceration  Tenderness (over patellar bursa with swelling, increased warmth and redness) found  Nursing note and vitals reviewed

## 2019-10-18 ENCOUNTER — TELEPHONE (OUTPATIENT)
Dept: URGENT CARE | Facility: CLINIC | Age: 74
End: 2019-10-18

## 2019-10-18 NOTE — TELEPHONE ENCOUNTER
Called and spoke with patient  He is feeling much better after being on the prednisone and keflex  Advised that if symptoms do not continue to improve then he should follow up with orthopedics  He understands instructions

## 2019-10-31 ENCOUNTER — TELEPHONE (OUTPATIENT)
Dept: INTERNAL MEDICINE CLINIC | Facility: CLINIC | Age: 74
End: 2019-10-31

## 2019-10-31 ENCOUNTER — CLINICAL SUPPORT (OUTPATIENT)
Dept: INTERNAL MEDICINE CLINIC | Facility: CLINIC | Age: 74
End: 2019-10-31
Payer: MEDICARE

## 2019-10-31 DIAGNOSIS — R25.1 SHAKINESS: Primary | ICD-10-CM

## 2019-10-31 DIAGNOSIS — D51.0 PERNICIOUS ANEMIA: ICD-10-CM

## 2019-10-31 PROCEDURE — 96372 THER/PROPH/DIAG INJ SC/IM: CPT

## 2019-10-31 RX ADMIN — CYANOCOBALAMIN 1000 MCG: 1000 INJECTION INTRAMUSCULAR; SUBCUTANEOUS at 11:24

## 2019-10-31 NOTE — TELEPHONE ENCOUNTER
Offer appt tomorrow at 9am    The prednisone likely caused the shaking  Prior to appt ask him to go to lab and have cbc and bmp drawn

## 2019-10-31 NOTE — TELEPHONE ENCOUNTER
Pt states that he was at the urgent care the other week because he fell and they put the pt on an antibiotic and prednisone  Pt states that he started feeling shaky after he started the medications and is still feeling that way even after he finished both meds  Pls advise

## 2019-11-01 ENCOUNTER — APPOINTMENT (OUTPATIENT)
Dept: LAB | Facility: CLINIC | Age: 74
End: 2019-11-01
Payer: MEDICARE

## 2019-11-01 ENCOUNTER — OFFICE VISIT (OUTPATIENT)
Dept: INTERNAL MEDICINE CLINIC | Facility: CLINIC | Age: 74
End: 2019-11-01
Payer: MEDICARE

## 2019-11-01 VITALS
TEMPERATURE: 97.4 F | BODY MASS INDEX: 23.91 KG/M2 | HEART RATE: 55 BPM | HEIGHT: 70 IN | DIASTOLIC BLOOD PRESSURE: 78 MMHG | SYSTOLIC BLOOD PRESSURE: 126 MMHG | OXYGEN SATURATION: 98 % | WEIGHT: 167 LBS

## 2019-11-01 DIAGNOSIS — M54.12 CERVICAL RADICULOPATHY: ICD-10-CM

## 2019-11-01 DIAGNOSIS — M45.0 ANKYLOSING SPONDYLITIS OF MULTIPLE SITES IN SPINE (HCC): ICD-10-CM

## 2019-11-01 DIAGNOSIS — I10 ESSENTIAL HYPERTENSION: ICD-10-CM
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
## 2019-11-01 DIAGNOSIS — R25.1 SHAKINESS: ICD-10-CM
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
## 2019-11-01 LAB
ANION GAP SERPL CALCULATED.3IONS-SCNC: 7 MMOL/L (ref 4–13)
BASOPHILS # BLD AUTO: 0.02 THOUSANDS/ΜL (ref 0–0.1)
BASOPHILS NFR BLD AUTO: 0 % (ref 0–1)
BUN SERPL-MCNC: 18 MG/DL (ref 5–25)
CALCIUM SERPL-MCNC: 9.3 MG/DL (ref 8.3–10.1)
CHLORIDE SERPL-SCNC: 106 MMOL/L (ref 100–108)
CO2 SERPL-SCNC: 29 MMOL/L (ref 21–32)
CREAT SERPL-MCNC: 0.9 MG/DL (ref 0.6–1.3)
EOSINOPHIL # BLD AUTO: 0.06 THOUSAND/ΜL (ref 0–0.61)
EOSINOPHIL NFR BLD AUTO: 1 % (ref 0–6)
ERYTHROCYTE [DISTWIDTH] IN BLOOD BY AUTOMATED COUNT: 13.2 % (ref 11.6–15.1)
GFR SERPL CREATININE-BSD FRML MDRD: 83 ML/MIN/1.73SQ M
GLUCOSE P FAST SERPL-MCNC: 99 MG/DL (ref 65–99)
HCT VFR BLD AUTO: 39.7 % (ref 36.5–49.3)
HGB BLD-MCNC: 13.3 G/DL (ref 12–17)
IMM GRANULOCYTES # BLD AUTO: 0.05 THOUSAND/UL (ref 0–0.2)
IMM GRANULOCYTES NFR BLD AUTO: 1 % (ref 0–2)
LYMPHOCYTES # BLD AUTO: 1.03 THOUSANDS/ΜL (ref 0.6–4.47)
LYMPHOCYTES NFR BLD AUTO: 11 % (ref 14–44)
MCH RBC QN AUTO: 31.2 PG (ref 26.8–34.3)
MCHC RBC AUTO-ENTMCNC: 33.5 G/DL (ref 31.4–37.4)
MCV RBC AUTO: 93 FL (ref 82–98)
MONOCYTES # BLD AUTO: 0.62 THOUSAND/ΜL (ref 0.17–1.22)
MONOCYTES NFR BLD AUTO: 7 % (ref 4–12)
NEUTROPHILS # BLD AUTO: 7.56 THOUSANDS/ΜL (ref 1.85–7.62)
NEUTS SEG NFR BLD AUTO: 80 % (ref 43–75)
NRBC BLD AUTO-RTO: 0 /100 WBCS
PLATELET # BLD AUTO: 149 THOUSANDS/UL (ref 149–390)
PMV BLD AUTO: 10.9 FL (ref 8.9–12.7)
POTASSIUM SERPL-SCNC: 3.6 MMOL/L (ref 3.5–5.3)
RBC # BLD AUTO: 4.26 MILLION/UL (ref 3.88–5.62)
SODIUM SERPL-SCNC: 142 MMOL/L (ref 136–145)
WBC # BLD AUTO: 9.34 THOUSAND/UL (ref 4.31–10.16)

## 2019-11-01 PROCEDURE — 80048 BASIC METABOLIC PNL TOTAL CA: CPT

## 2019-11-01 PROCEDURE — 85025 COMPLETE CBC W/AUTO DIFF WBC: CPT

## 2019-11-01 PROCEDURE — 36415 COLL VENOUS BLD VENIPUNCTURE: CPT

## 2019-11-01 PROCEDURE — 99214 OFFICE O/P EST MOD 30 MIN: CPT | Performed by: INTERNAL MEDICINE

## 2019-11-01 NOTE — PROGRESS NOTES
Assessment/Plan:         Diagnoses and all orders for this visit:    Fall, subsequent encounter  Pt recovering and did see Ortho for his shoulder and it is improving  Use cane for safety Deferred balance therapy    Cervical radiculopathy  He continues with injections thru pain mgmt    Ankylosing spondylitis of multiple sites in spine ([address] )  Pt will try to use cane for support and home stretching exercises    Essential hypertension  No added salt Continue present rx No s/s reported of hypotension    Rto 2 months         Patient ID: Vish Mccall  is a 66 y o  male  HPI   Pt fell recently seen in [facility] fell onto right shoulder after and did see Ortho Xray negative and sxs are improving presently He was put on antibx and prednisone per UC He tripped over his foot getting something out of huis trunk  No vision change or headache he was very shaky and had dark stools which he thinks was due to the antibx her felt very shaky Those sxs are better past 2 days He has a cane but does not use and has noted unsteady gait progressing for awhile which he attributes to his AS and cervical issues No numbness /tingling of legs but his neck rom is very restricted        Review of Systems   Constitutional: Positive for activity change  Negative for chills and fever  HENT: Negative  Respiratory: Negative  Cardiovascular: Negative  Gastrointestinal: Negative  Genitourinary: Negative  Musculoskeletal: Positive for arthralgias, gait problem, neck pain and neck stiffness  Skin: Negative  Neurological: Positive for dizziness and light-headedness  Hematological: Negative  Psychiatric/Behavioral: Negative            Past Medical History:   Diagnosis Date    Arthritis     Cholecystitis     Coronary artery disease     Hypertension     Spinal stenosis      Past Surgical History:   Procedure Laterality Date    CHOLECYSTECTOMY      COLON SURGERY      bwel resection    COLONOSCOPY      ESOPHAGOGASTRODUODENOSCOPY      2007  [facility]    [place] LAP,CHOLECYSTECTOMY N/A 2017    Procedure: CHOLECYSTECTOMY LAPAROSCOPIC;  Surgeon: Ivette Cox MD;  [facility];  Service: General    SMALL INTESTINE SURGERY      [facility] DEC 2011     Social History     Socioeconomic History    Marital status: /Civil Union     Spouse name: Not on file    Number of children: Not on file    Years of education: Not on file    Highest education level: Not on file   Occupational History    Not on file   Social Needs    Financial resource strain: Not on file    Food insecurity:     Worry: Not on file     Inability: Not on file    Transportation needs:     Medical: Not on file     Non-medical: Not on file   Tobacco Use    Smoking status: Former Smoker     Packs/day: 2 00     Years: 3 00     Pack years: 6 00     Last attempt to quit: 1966     Years since quittin 8    Smokeless tobacco: Never Used   Substance and Sexual Activity    Alcohol use: Yes     Comment: social     Drug use: No    Sexual activity: Not on file   Lifestyle    Physical activity:     Days per week: Not on file     Minutes per session: Not on file    Stress: Not on file   Relationships    Social connections:     Talks on phone: Not on file     Gets together: Not on file     Attends Yarsanism service: Not on file     Active member of club or organization: Not on file     Attends meetings of clubs or organizations: Not on file     Relationship status: Not on file    Intimate partner violence:     Fear of current or ex partner: Not on file     Emotionally abused: Not on file     Physically abused: Not on file     Forced sexual activity: Not on file   Other Topics Concern    Not on file   Social History Narrative    CAFFEINE USE    DENTAL CARE,REGULARLY    FEELS SAFE AT HOME    LIVES INDEPENDENTLY WITH SPOUSE    USES SAFETY EQUIPMENT    SUN PROTECTION      No Known Allergies        /78   Pulse 55   Temp (!) 97 4 °F (36 3 °C) (Tympanic)   Ht 5' 10" (1 778 m)   Wt 75 8 kg (167 lb)   SpO2 98%   BMI 23 96 kg/m²          Physical Exam   Constitutional: He is oriented to person, place, and time  He appears well-developed and well-nourished  No distress  HENT:   Head: Normocephalic and atraumatic  Mouth/Throat: Oropharynx is clear and moist  No oropharyngeal exudate  Eyes: Pupils are equal, round, and reactive to light  Conjunctivae and EOM are normal  No scleral icterus  Neck: Neck supple  No JVD present  Cardiovascular: Normal rate and regular rhythm  No murmur heard  Pulmonary/Chest: Effort normal and breath sounds normal  No respiratory distress  He has no wheezes  Abdominal: Soft  Bowel sounds are normal  He exhibits no distension  There is no tenderness  Musculoskeletal: Normal range of motion  He exhibits tenderness and deformity  Lymphadenopathy:     He has no cervical adenopathy  Neurological: He is alert and oriented to person, place, and time  A cranial nerve deficit and sensory deficit is present  He exhibits abnormal muscle tone  Coordination abnormal    Skin: Skin is warm and dry  Capillary refill takes less than 2 seconds  He is not diaphoretic  No erythema  Psychiatric: He has a normal mood and affect  His behavior is normal  Judgment and thought content normal    Nursing note and vitals reviewed  Falls Plan of Care: Balance, strength, and gait training instructions were provided

## 2019-11-01 NOTE — PATIENT INSTRUCTIONS
Fall Prevention   AMBULATORY CARE:   Fall prevention  includes ways to make your home and other areas safer  It also includes ways you can move more carefully to prevent a fall  Health conditions that cause changes in your blood pressure, vision, or muscle strength and coordination may increase your risk for falls  Medicines may also increase your risk for falls if they make you dizzy, weak, or sleepy  Call 911 or have someone else call if:   · You have fallen and are unconscious  · You have fallen and cannot move part of your body  Contact your healthcare provider if:   · You have fallen and have pain or a headache  · You have questions or concerns about your condition or care  Fall prevention tips:   · Stand or sit up slowly  This may help you keep your balance and prevent falls  · Use assistive devices as directed  Your healthcare provider may suggest that you use a cane or walker to help you keep your balance  You may need to have grab bars put in your bathroom near the toilet or in the shower  · Wear shoes that fit well and have soles that   Wear shoes both inside and outside  Use slippers with good   Do not wear shoes with high heels  · Wear a personal alarm  This is a device that allows you to call 911 if you fall and need help  Ask your healthcare provider for more information  · Stay active  Exercise can help strengthen your muscles and improve your balance  Your healthcare provider may recommend water aerobics or walking  He or she may also recommend physical therapy to improve your coordination  Never start an exercise program without talking to your healthcare provider first      · Manage your medical conditions  Keep all appointments with your healthcare providers  Visit your eye doctor as directed  Home safety tips:   · Add items to prevent falls in the bathroom  Put nonslip strips on your bath or shower floor to prevent you from slipping   Use a bath mat if you do not have carpet in the bathroom  This will prevent you from falling when you step out of the bath or shower  Use a shower seat so you do not need to stand while you shower  Sit on the toilet or a chair in your bathroom to dry yourself and put on clothing  This will prevent you from losing your balance from drying or dressing yourself while you are standing  · Keep paths clear  Remove books, shoes, and other objects from walkways and stairs  Place cords for telephones and lamps out of the way so that you do not need to walk over them  Tape them down if you cannot move them  Remove small rugs  If you cannot remove a rug, secure it with double-sided tape  This will prevent you from tripping  · Install bright lights in your home  Use night lights to help light paths to the bathroom or kitchen  Always turn on the light before you start walking  · Keep items you use often on shelves within reach  Do not use a step stool to help you reach an item  · Paint or place reflective tape on the edges of your stairs  This will help you see the stairs better  Follow up with your healthcare provider as directed:  Write down your questions so you remember to ask them during your visits  © 2017 [employer] Information is for End User's use only and may not be sold, redistributed or otherwise used for commercial purposes  All illustrations and images included in CareNotes® are the copyrighted property of [employer] , [employer]  or Matt Hurst  The above information is an  only  It is not intended as medical advice for individual conditions or treatments  Talk to your doctor, nurse or pharmacist before following any medical regimen to see if it is safe and effective for you

## 2019-12-13 ENCOUNTER — OFFICE VISIT (OUTPATIENT)
Dept: INTERNAL MEDICINE CLINIC | Facility: CLINIC | Age: 74
End: 2019-12-13
Payer: MEDICARE

## 2019-12-13 VITALS
DIASTOLIC BLOOD PRESSURE: 70 MMHG | OXYGEN SATURATION: 93 % | HEIGHT: 70 IN | HEART RATE: 60 BPM | TEMPERATURE: 97.1 F | SYSTOLIC BLOOD PRESSURE: 128 MMHG | BODY MASS INDEX: 24.97 KG/M2 | WEIGHT: 174.38 LBS

## 2019-12-13 DIAGNOSIS — I10 ESSENTIAL HYPERTENSION: Primary | ICD-10-CM

## 2019-12-13 DIAGNOSIS — M54.12 CERVICAL RADICULOPATHY: ICD-10-CM

## 2019-12-13 DIAGNOSIS — E55.9 VITAMIN D DEFICIENCY: ICD-10-CM

## 2019-12-13 DIAGNOSIS — D51.0 PERNICIOUS ANEMIA: ICD-10-CM

## 2019-12-13 DIAGNOSIS — E78.5 HYPERLIPIDEMIA, UNSPECIFIED HYPERLIPIDEMIA TYPE: ICD-10-CM

## 2019-12-13 DIAGNOSIS — M45.0 ANKYLOSING SPONDYLITIS OF MULTIPLE SITES IN SPINE (HCC): ICD-10-CM

## 2019-12-13 PROBLEM — G56.00 CARPAL TUNNEL SYNDROME: Status: RESOLVED | Noted: 2019-05-22 | Resolved: 2019-12-13

## 2019-12-13 PROCEDURE — 99214 OFFICE O/P EST MOD 30 MIN: CPT | Performed by: INTERNAL MEDICINE

## 2019-12-13 PROCEDURE — 96372 THER/PROPH/DIAG INJ SC/IM: CPT

## 2019-12-13 RX ADMIN — CYANOCOBALAMIN 1000 MCG: 1000 INJECTION INTRAMUSCULAR; SUBCUTANEOUS at 09:17

## 2019-12-13 NOTE — PROGRESS NOTES
Assessment/Plan:         Diagnoses and all orders for this visit:    Essential hypertension  -     Comprehensive metabolic panel; Future  Pt monitors and BP stable Increase water intake    Cervical radiculopathy  Pt followed by Dr Milan Mar and gets injections every few months with some relief    Pernicious anemia  -     Vitamin B12; Future  -     CBC and differential; Future  B12 today    Ankylosing spondylitis of multiple sites in spine ([address] )  Pt has noted slowing down and he is cutting back on community job duties as well  Fall precautions Continue followup with pain mgmt    Vitamin D deficiency  -     Vitamin D 25 hydroxy; Future  Pt no longer taking vit d Recheck level off supplement    Hyperlipidemia, unspecified hyperlipidemia type  -     Lipid panel; Future  Low fat diet     Rto 4 months       Patient ID: Melissa Lopez  is a 66 y o  male  HPI  Pt doing ok He had 2 injections in neck and back recently with some relief No chest pain or sob   No falls He is cutting back on community duties and notes he is slowing down  No change in bowels No abdominal pain BP stable and he monitors Due for B12 shot today No dizziness  Reviewed patient med list      Review of Systems   Constitutional: Positive for activity change  Negative for chills and fever  Generalized decrease activity   HENT: Negative  Respiratory: Negative  Cardiovascular: Negative  Gastrointestinal: Negative  Genitourinary: Negative  Musculoskeletal: Positive for arthralgias, back pain and gait problem  Skin: Negative  Negative for pallor  Neurological: Negative for dizziness and headaches  Hematological: Negative  Psychiatric/Behavioral: Negative          Past Medical History:   Diagnosis Date    Arthritis     Cholecystitis     Coronary artery disease     Hypertension     Spinal stenosis      Past Surgical History:   Procedure Laterality Date    CHOLECYSTECTOMY      COLON SURGERY      bwel resection    COLONOSCOPY      ESOPHAGOGASTRODUODENOSCOPY      2007  [facility]    CT LAP,CHOLECYSTECTOMY N/A 2017    Procedure: CHOLECYSTECTOMY LAPAROSCOPIC;  Surgeon: Aminata Carrillo MD;  [facility];  Service: General    SMALL INTESTINE SURGERY      [facility] DEC 2011     Social History     Socioeconomic History    Marital status: /Civil Union     Spouse name: Not on file    Number of children: Not on file    Years of education: Not on file    Highest education level: Not on file   Occupational History    Not on file   Social Needs    Financial resource strain: Not on file    Food insecurity:     Worry: Not on file     Inability: Not on file    Transportation needs:     Medical: Not on file     Non-medical: Not on file   Tobacco Use    Smoking status: Former Smoker     Packs/day: 2 00     Years: 3 00     Pack years: 6 00     Last attempt to quit: 1966     Years since quittin 9    Smokeless tobacco: Never Used   Substance and Sexual Activity    Alcohol use: Yes     Comment: social     Drug use: No    Sexual activity: Not on file   Lifestyle    Physical activity:     Days per week: Not on file     Minutes per session: Not on file    Stress: Not on file   Relationships    Social connections:     Talks on phone: Not on file     Gets together: Not on file     Attends [employer] service: Not on file     Active member of club or organization: Not on file     Attends meetings of clubs or organizations: Not on file     Relationship status: Not on file    Intimate partner violence:     Fear of current or ex partner: Not on file     Emotionally abused: Not on file     Physically abused: Not on file     Forced sexual activity: Not on file   Other Topics Concern    Not on file   Social History Narrative    CAFFEINE USE    DENTAL CARE,REGULARLY    FEELS SAFE AT HOME    LIVES INDEPENDENTLY WITH SPOUSE    USES SAFETY EQUIPMENT    SUN PROTECTION      No Known Allergies         /70   Pulse 60 Temp (!) 97 1 °F (36 2 °C) (Tympanic)   Ht 5' 10" (1 778 m)   Wt 79 1 kg (174 lb 6 oz)   SpO2 93%   BMI 25 02 kg/m²          Physical Exam   Constitutional: He is oriented to person, place, and time  He appears well-developed and well-nourished  No distress  HENT:   Head: Normocephalic and atraumatic  Mouth/Throat: Oropharynx is clear and moist  No oropharyngeal exudate  Eyes: Pupils are equal, round, and reactive to light  Conjunctivae and EOM are normal  No scleral icterus  Neck: Normal range of motion  Neck supple  No JVD present  Cardiovascular: Normal rate  Murmur heard  Pulmonary/Chest: Effort normal and breath sounds normal  No respiratory distress  He has no wheezes  Abdominal: Soft  Bowel sounds are normal  He exhibits no distension  There is no tenderness  Musculoskeletal: Normal range of motion  He exhibits deformity  He exhibits no edema  Lymphadenopathy:     He has no cervical adenopathy  Neurological: He is alert and oriented to person, place, and time  No cranial nerve deficit  He exhibits abnormal muscle tone  Coordination normal    Skin: Skin is warm and dry  Capillary refill takes less than 2 seconds  He is not diaphoretic  No erythema  Psychiatric: He has a normal mood and affect  His behavior is normal  Judgment and thought content normal    Nursing note and vitals reviewed

## 2019-12-23 ENCOUNTER — APPOINTMENT (OUTPATIENT)
Dept: LAB | Facility: CLINIC | Age: 74
End: 2019-12-23
Payer: MEDICARE

## 2019-12-23 DIAGNOSIS — E78.5 HYPERLIPIDEMIA, UNSPECIFIED HYPERLIPIDEMIA TYPE: ICD-10-CM

## 2019-12-23 DIAGNOSIS — E55.9 VITAMIN D DEFICIENCY: ICD-10-CM

## 2019-12-23 DIAGNOSIS — I10 ESSENTIAL HYPERTENSION: ICD-10-CM

## 2019-12-23 DIAGNOSIS — D51.0 PERNICIOUS ANEMIA: ICD-10-CM

## 2019-12-23 LAB
25(OH)D3 SERPL-MCNC: 31.7 NG/ML (ref 30–100)
ALBUMIN SERPL BCP-MCNC: 3.5 G/DL (ref 3.5–5)
ALP SERPL-CCNC: 66 U/L (ref 46–116)
ALT SERPL W P-5'-P-CCNC: 32 U/L (ref 12–78)
ANION GAP SERPL CALCULATED.3IONS-SCNC: 3 MMOL/L (ref 4–13)
AST SERPL W P-5'-P-CCNC: 15 U/L (ref 5–45)
BASOPHILS # BLD AUTO: 0.02 THOUSANDS/ΜL (ref 0–0.1)
BASOPHILS NFR BLD AUTO: 0 % (ref 0–1)
BILIRUB SERPL-MCNC: 1.36 MG/DL (ref 0.2–1)
BUN SERPL-MCNC: 32 MG/DL (ref 5–25)
CALCIUM SERPL-MCNC: 8.8 MG/DL (ref 8.3–10.1)
CHLORIDE SERPL-SCNC: 109 MMOL/L (ref 100–108)
CHOLEST SERPL-MCNC: 164 MG/DL (ref 50–200)
CO2 SERPL-SCNC: 33 MMOL/L (ref 21–32)
CREAT SERPL-MCNC: 0.99 MG/DL (ref 0.6–1.3)
EOSINOPHIL # BLD AUTO: 0.02 THOUSAND/ΜL (ref 0–0.61)
EOSINOPHIL NFR BLD AUTO: 0 % (ref 0–6)
ERYTHROCYTE [DISTWIDTH] IN BLOOD BY AUTOMATED COUNT: 13.3 % (ref 11.6–15.1)
GFR SERPL CREATININE-BSD FRML MDRD: 74 ML/MIN/1.73SQ M
GLUCOSE P FAST SERPL-MCNC: 103 MG/DL (ref 65–99)
HCT VFR BLD AUTO: 37.8 % (ref 36.5–49.3)
HDLC SERPL-MCNC: 49 MG/DL
HGB BLD-MCNC: 12.7 G/DL (ref 12–17)
IMM GRANULOCYTES # BLD AUTO: 0.03 THOUSAND/UL (ref 0–0.2)
IMM GRANULOCYTES NFR BLD AUTO: 0 % (ref 0–2)
LDLC SERPL CALC-MCNC: 94 MG/DL (ref 0–100)
LYMPHOCYTES # BLD AUTO: 1.19 THOUSANDS/ΜL (ref 0.6–4.47)
LYMPHOCYTES NFR BLD AUTO: 18 % (ref 14–44)
MCH RBC QN AUTO: 31.7 PG (ref 26.8–34.3)
MCHC RBC AUTO-ENTMCNC: 33.6 G/DL (ref 31.4–37.4)
MCV RBC AUTO: 94 FL (ref 82–98)
MONOCYTES # BLD AUTO: 0.37 THOUSAND/ΜL (ref 0.17–1.22)
MONOCYTES NFR BLD AUTO: 6 % (ref 4–12)
NEUTROPHILS # BLD AUTO: 5.15 THOUSANDS/ΜL (ref 1.85–7.62)
NEUTS SEG NFR BLD AUTO: 76 % (ref 43–75)
NONHDLC SERPL-MCNC: 115 MG/DL
NRBC BLD AUTO-RTO: 0 /100 WBCS
PLATELET # BLD AUTO: 158 THOUSANDS/UL (ref 149–390)
PMV BLD AUTO: 11.3 FL (ref 8.9–12.7)
POTASSIUM SERPL-SCNC: 3.5 MMOL/L (ref 3.5–5.3)
PROT SERPL-MCNC: 6 G/DL (ref 6.4–8.2)
RBC # BLD AUTO: 4.01 MILLION/UL (ref 3.88–5.62)
SODIUM SERPL-SCNC: 145 MMOL/L (ref 136–145)
TRIGL SERPL-MCNC: 104 MG/DL
VIT B12 SERPL-MCNC: 477 PG/ML (ref 100–900)
WBC # BLD AUTO: 6.78 THOUSAND/UL (ref 4.31–10.16)

## 2019-12-23 PROCEDURE — 82607 VITAMIN B-12: CPT

## 2019-12-23 PROCEDURE — 82306 VITAMIN D 25 HYDROXY: CPT

## 2019-12-23 PROCEDURE — 80053 COMPREHEN METABOLIC PANEL: CPT

## 2019-12-23 PROCEDURE — 85025 COMPLETE CBC W/AUTO DIFF WBC: CPT

## 2019-12-23 PROCEDURE — 80061 LIPID PANEL: CPT

## 2019-12-23 PROCEDURE — 36415 COLL VENOUS BLD VENIPUNCTURE: CPT

## 2019-12-30 DIAGNOSIS — E87.6 POTASSIUM DEFICIENCY: ICD-10-CM

## 2019-12-30 RX ORDER — POTASSIUM CHLORIDE 750 MG/1
10 CAPSULE, EXTENDED RELEASE ORAL 2 TIMES DAILY
Qty: 60 CAPSULE | Refills: 5 | Status: SHIPPED | OUTPATIENT
Start: 2019-12-30 | End: 2020-09-22 | Stop reason: SDUPTHER

## 2020-01-31 DIAGNOSIS — I65.21 STENOSIS OF RIGHT CAROTID ARTERY: ICD-10-CM

## 2020-01-31 RX ORDER — CLOPIDOGREL BISULFATE 75 MG/1
TABLET ORAL
Qty: 90 TABLET | Refills: 0 | Status: SHIPPED | OUTPATIENT
Start: 2020-01-31 | End: 2020-06-05 | Stop reason: SDUPTHER

## 2020-02-14 DIAGNOSIS — I10 ESSENTIAL HYPERTENSION: Primary | ICD-10-CM

## 2020-02-14 RX ORDER — CHLORTHALIDONE 50 MG/1
50 TABLET ORAL 2 TIMES DAILY
Qty: 60 TABLET | Refills: 5 | Status: ON HOLD | OUTPATIENT
Start: 2020-02-14 | End: 2020-10-13 | Stop reason: SDUPTHER

## 2020-03-13 DIAGNOSIS — I65.21 STENOSIS OF RIGHT CAROTID ARTERY: ICD-10-CM

## 2020-03-13 RX ORDER — ATORVASTATIN CALCIUM 40 MG/1
TABLET, FILM COATED ORAL
Qty: 90 TABLET | Refills: 0 | Status: SHIPPED | OUTPATIENT
Start: 2020-03-13 | End: 2020-07-09 | Stop reason: SDUPTHER

## 2020-04-17 ENCOUNTER — TELEPHONE (OUTPATIENT)
Dept: INTERNAL MEDICINE CLINIC | Facility: CLINIC | Age: 75
End: 2020-04-17

## 2020-04-17 ENCOUNTER — OFFICE VISIT (OUTPATIENT)
Dept: URGENT CARE | Facility: CLINIC | Age: 75
End: 2020-04-17
Payer: MEDICARE

## 2020-04-17 VITALS
TEMPERATURE: 97.7 F | DIASTOLIC BLOOD PRESSURE: 79 MMHG | WEIGHT: 174 LBS | BODY MASS INDEX: 24.91 KG/M2 | SYSTOLIC BLOOD PRESSURE: 182 MMHG | HEART RATE: 58 BPM | RESPIRATION RATE: 18 BRPM | OXYGEN SATURATION: 96 % | HEIGHT: 70 IN

## 2020-04-17 DIAGNOSIS — K92.1 MELENA: Primary | ICD-10-CM

## 2020-04-17 DIAGNOSIS — D50.8 IRON DEFICIENCY ANEMIA SECONDARY TO INADEQUATE DIETARY IRON INTAKE: Primary | ICD-10-CM

## 2020-04-17 DIAGNOSIS — R19.7 DIARRHEA, UNSPECIFIED TYPE: ICD-10-CM

## 2020-04-17 PROCEDURE — G0463 HOSPITAL OUTPT CLINIC VISIT: HCPCS | Performed by: PHYSICIAN ASSISTANT

## 2020-04-17 PROCEDURE — 99213 OFFICE O/P EST LOW 20 MIN: CPT | Performed by: PHYSICIAN ASSISTANT

## 2020-04-18 ENCOUNTER — APPOINTMENT (EMERGENCY)
Dept: RADIOLOGY | Facility: HOSPITAL | Age: 75
End: 2020-04-18
Payer: MEDICARE

## 2020-04-18 ENCOUNTER — HOSPITAL ENCOUNTER (EMERGENCY)
Facility: HOSPITAL | Age: 75
Discharge: HOME | End: 2020-04-18
Attending: EMERGENCY MEDICINE | Admitting: EMERGENCY MEDICINE
Payer: MEDICARE

## 2020-04-18 VITALS
DIASTOLIC BLOOD PRESSURE: 52 MMHG | HEART RATE: 58 BPM | TEMPERATURE: 98.3 F | SYSTOLIC BLOOD PRESSURE: 111 MMHG | WEIGHT: 174 LBS | RESPIRATION RATE: 17 BRPM | BODY MASS INDEX: 24.97 KG/M2 | OXYGEN SATURATION: 96 %

## 2020-04-18 DIAGNOSIS — R11.0 NAUSEA: ICD-10-CM

## 2020-04-18 DIAGNOSIS — R53.83 FATIGUE: ICD-10-CM

## 2020-04-18 DIAGNOSIS — R19.5 DARK STOOLS: Primary | ICD-10-CM

## 2020-04-18 LAB
ALBUMIN SERPL BCP-MCNC: 3.7 G/DL (ref 3.5–5)
ALP SERPL-CCNC: 94 U/L (ref 46–116)
ALT SERPL W P-5'-P-CCNC: 24 U/L (ref 12–78)
ANION GAP SERPL CALCULATED.3IONS-SCNC: 5 MMOL/L (ref 4–13)
AST SERPL W P-5'-P-CCNC: 19 U/L (ref 5–45)
BASOPHILS # BLD AUTO: 0.01 THOUSANDS/ΜL (ref 0–0.1)
BASOPHILS NFR BLD AUTO: 0 % (ref 0–1)
BILIRUB SERPL-MCNC: 1.84 MG/DL (ref 0.2–1)
BUN SERPL-MCNC: 12 MG/DL (ref 5–25)
CALCIUM SERPL-MCNC: 8.7 MG/DL (ref 8.3–10.1)
CHLORIDE SERPL-SCNC: 102 MMOL/L (ref 100–108)
CO2 SERPL-SCNC: 30 MMOL/L (ref 21–32)
CREAT SERPL-MCNC: 1.23 MG/DL (ref 0.6–1.3)
EOSINOPHIL # BLD AUTO: 0 THOUSAND/ΜL (ref 0–0.61)
EOSINOPHIL NFR BLD AUTO: 0 % (ref 0–6)
ERYTHROCYTE [DISTWIDTH] IN BLOOD BY AUTOMATED COUNT: 13 % (ref 11.6–15.1)
GFR SERPL CREATININE-BSD FRML MDRD: 56 ML/MIN/1.73SQ M
GLUCOSE SERPL-MCNC: 112 MG/DL (ref 65–140)
HCT VFR BLD AUTO: 39 % (ref 36.5–49.3)
HGB BLD-MCNC: 13.3 G/DL (ref 12–17)
IMM GRANULOCYTES # BLD AUTO: 0.01 THOUSAND/UL (ref 0–0.2)
IMM GRANULOCYTES NFR BLD AUTO: 0 % (ref 0–2)
LIPASE SERPL-CCNC: 106 U/L (ref 73–393)
LYMPHOCYTES # BLD AUTO: 0.68 THOUSANDS/ΜL (ref 0.6–4.47)
LYMPHOCYTES NFR BLD AUTO: 15 % (ref 14–44)
MCH RBC QN AUTO: 30.9 PG (ref 26.8–34.3)
MCHC RBC AUTO-ENTMCNC: 34.1 G/DL (ref 31.4–37.4)
MCV RBC AUTO: 91 FL (ref 82–98)
MONOCYTES # BLD AUTO: 0.4 THOUSAND/ΜL (ref 0.17–1.22)
MONOCYTES NFR BLD AUTO: 9 % (ref 4–12)
NEUTROPHILS # BLD AUTO: 3.56 THOUSANDS/ΜL (ref 1.85–7.62)
NEUTS SEG NFR BLD AUTO: 76 % (ref 43–75)
NRBC BLD AUTO-RTO: 0 /100 WBCS
PLATELET # BLD AUTO: 154 THOUSANDS/UL (ref 149–390)
PMV BLD AUTO: 10.7 FL (ref 8.9–12.7)
POTASSIUM SERPL-SCNC: 3.5 MMOL/L (ref 3.5–5.3)
PROT SERPL-MCNC: 6.8 G/DL (ref 6.4–8.2)
RBC # BLD AUTO: 4.31 MILLION/UL (ref 3.88–5.62)
SODIUM SERPL-SCNC: 137 MMOL/L (ref 136–145)
WBC # BLD AUTO: 4.66 THOUSAND/UL (ref 4.31–10.16)

## 2020-04-18 PROCEDURE — 99285 EMERGENCY DEPT VISIT HI MDM: CPT

## 2020-04-18 PROCEDURE — 80053 COMPREHEN METABOLIC PANEL: CPT | Performed by: EMERGENCY MEDICINE

## 2020-04-18 PROCEDURE — 85025 COMPLETE CBC W/AUTO DIFF WBC: CPT | Performed by: EMERGENCY MEDICINE

## 2020-04-18 PROCEDURE — 74177 CT ABD & PELVIS W/CONTRAST: CPT

## 2020-04-18 PROCEDURE — 82272 OCCULT BLD FECES 1-3 TESTS: CPT

## 2020-04-18 PROCEDURE — 96374 THER/PROPH/DIAG INJ IV PUSH: CPT

## 2020-04-18 PROCEDURE — 99284 EMERGENCY DEPT VISIT MOD MDM: CPT | Performed by: EMERGENCY MEDICINE

## 2020-04-18 PROCEDURE — 36415 COLL VENOUS BLD VENIPUNCTURE: CPT | Performed by: EMERGENCY MEDICINE

## 2020-04-18 PROCEDURE — 83690 ASSAY OF LIPASE: CPT | Performed by: EMERGENCY MEDICINE

## 2020-04-18 RX ORDER — ONDANSETRON 4 MG/1
4 TABLET, ORALLY DISINTEGRATING ORAL EVERY 6 HOURS PRN
Qty: 20 TABLET | Refills: 0 | Status: ON HOLD | OUTPATIENT
Start: 2020-04-18 | End: 2020-10-13 | Stop reason: CLARIF

## 2020-04-18 RX ORDER — ONDANSETRON 2 MG/ML
4 INJECTION INTRAMUSCULAR; INTRAVENOUS ONCE
Status: COMPLETED | OUTPATIENT
Start: 2020-04-18 | End: 2020-04-18

## 2020-04-18 RX ORDER — LISINOPRIL 40 MG/1
40 TABLET ORAL DAILY
COMMUNITY
End: 2020-05-10

## 2020-04-18 RX ADMIN — IOHEXOL 100 ML: 350 INJECTION, SOLUTION INTRAVENOUS at 10:11

## 2020-04-18 RX ADMIN — ONDANSETRON 4 MG: 2 INJECTION INTRAMUSCULAR; INTRAVENOUS at 08:49

## 2020-05-10 DIAGNOSIS — I10 ESSENTIAL HYPERTENSION: Primary | ICD-10-CM

## 2020-05-10 RX ORDER — LISINOPRIL 40 MG/1
TABLET ORAL
Qty: 90 TABLET | Refills: 0 | Status: SHIPPED | OUTPATIENT
Start: 2020-05-10 | End: 2020-09-10 | Stop reason: SDUPTHER

## 2020-06-05 DIAGNOSIS — I65.21 STENOSIS OF RIGHT CAROTID ARTERY: ICD-10-CM

## 2020-06-05 DIAGNOSIS — I25.119 CORONARY ARTERY DISEASE WITH ANGINA PECTORIS, UNSPECIFIED VESSEL OR LESION TYPE, UNSPECIFIED WHETHER NATIVE OR TRANSPLANTED HEART (HCC): ICD-10-CM

## 2020-06-05 RX ORDER — CARVEDILOL 25 MG/1
25 TABLET ORAL 2 TIMES DAILY WITH MEALS
Qty: 60 TABLET | Refills: 5 | Status: SHIPPED | OUTPATIENT
Start: 2020-06-05 | End: 2020-10-13 | Stop reason: HOSPADM

## 2020-06-05 RX ORDER — CLOPIDOGREL BISULFATE 75 MG/1
75 TABLET ORAL DAILY
Qty: 90 TABLET | Refills: 0 | Status: SHIPPED | OUTPATIENT
Start: 2020-06-05 | End: 2020-10-30 | Stop reason: SDUPTHER

## 2020-06-25 ENCOUNTER — OFFICE VISIT (OUTPATIENT)
Dept: INTERNAL MEDICINE CLINIC | Facility: CLINIC | Age: 75
End: 2020-06-25
Payer: MEDICARE

## 2020-06-25 VITALS
SYSTOLIC BLOOD PRESSURE: 122 MMHG | TEMPERATURE: 97.6 F | HEART RATE: 57 BPM | DIASTOLIC BLOOD PRESSURE: 78 MMHG | WEIGHT: 176.38 LBS | OXYGEN SATURATION: 91 % | HEIGHT: 70 IN | BODY MASS INDEX: 25.25 KG/M2

## 2020-06-25 DIAGNOSIS — M45.0 ANKYLOSING SPONDYLITIS OF MULTIPLE SITES IN SPINE (HCC): ICD-10-CM

## 2020-06-25 DIAGNOSIS — M54.12 CERVICAL RADICULOPATHY: Primary | ICD-10-CM

## 2020-06-25 DIAGNOSIS — R26.81 GAIT INSTABILITY: ICD-10-CM

## 2020-06-25 DIAGNOSIS — K21.9 GASTROESOPHAGEAL REFLUX DISEASE, ESOPHAGITIS PRESENCE NOT SPECIFIED: ICD-10-CM

## 2020-06-25 PROCEDURE — 3074F SYST BP LT 130 MM HG: CPT | Performed by: INTERNAL MEDICINE

## 2020-06-25 PROCEDURE — 3078F DIAST BP <80 MM HG: CPT | Performed by: INTERNAL MEDICINE

## 2020-06-25 PROCEDURE — 1160F RVW MEDS BY RX/DR IN RCRD: CPT | Performed by: INTERNAL MEDICINE

## 2020-06-25 PROCEDURE — 3008F BODY MASS INDEX DOCD: CPT | Performed by: INTERNAL MEDICINE

## 2020-06-25 PROCEDURE — 1036F TOBACCO NON-USER: CPT | Performed by: INTERNAL MEDICINE

## 2020-06-25 PROCEDURE — 99214 OFFICE O/P EST MOD 30 MIN: CPT | Performed by: INTERNAL MEDICINE

## 2020-07-09 DIAGNOSIS — I65.21 STENOSIS OF RIGHT CAROTID ARTERY: ICD-10-CM

## 2020-07-09 RX ORDER — ATORVASTATIN CALCIUM 40 MG/1
40 TABLET, FILM COATED ORAL EVERY EVENING
Qty: 90 TABLET | Refills: 3 | Status: SHIPPED | OUTPATIENT
Start: 2020-07-09 | End: 2022-04-13 | Stop reason: ALTCHOICE

## 2020-07-22 DIAGNOSIS — K21.9 GASTROESOPHAGEAL REFLUX DISEASE, ESOPHAGITIS PRESENCE NOT SPECIFIED: ICD-10-CM

## 2020-07-22 RX ORDER — PANTOPRAZOLE SODIUM 40 MG/1
40 TABLET, DELAYED RELEASE ORAL DAILY
Qty: 90 TABLET | Refills: 3 | Status: SHIPPED | OUTPATIENT
Start: 2020-07-22 | End: 2021-10-26 | Stop reason: SDUPTHER

## 2020-08-16 ENCOUNTER — APPOINTMENT (OUTPATIENT)
Dept: RADIOLOGY | Facility: CLINIC | Age: 75
End: 2020-08-16
Payer: MEDICARE

## 2020-08-16 ENCOUNTER — OFFICE VISIT (OUTPATIENT)
Dept: URGENT CARE | Facility: CLINIC | Age: 75
End: 2020-08-16
Payer: MEDICARE

## 2020-08-16 VITALS
RESPIRATION RATE: 16 BRPM | OXYGEN SATURATION: 96 % | DIASTOLIC BLOOD PRESSURE: 73 MMHG | TEMPERATURE: 97.6 F | HEART RATE: 54 BPM | SYSTOLIC BLOOD PRESSURE: 176 MMHG

## 2020-08-16 DIAGNOSIS — S69.91XA HAND INJURY, RIGHT, INITIAL ENCOUNTER: ICD-10-CM
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
## 2020-08-16 DIAGNOSIS — S60.221A CONTUSION OF MULTIPLE SITES OF RIGHT HAND AND FINGERS, INITIAL ENCOUNTER: Primary | ICD-10-CM

## 2020-08-16 DIAGNOSIS — S60.00XA CONTUSION OF MULTIPLE SITES OF RIGHT HAND AND FINGERS, INITIAL ENCOUNTER: Primary | ICD-10-CM

## 2020-08-16 PROCEDURE — 73130 X-RAY EXAM OF HAND: CPT

## 2020-08-16 PROCEDURE — G0463 HOSPITAL OUTPT CLINIC VISIT: HCPCS | Performed by: NURSE PRACTITIONER

## 2020-08-16 PROCEDURE — 99213 OFFICE O/P EST LOW 20 MIN: CPT | Performed by: NURSE PRACTITIONER

## 2020-08-16 NOTE — PATIENT INSTRUCTIONS
No fractures seen on x-ray; arthritic changes noted  Radiology does the final read; if they see anything I did not, we will call you  Rest, ice often, elevate at rest, use tylenol as needed for pain  Contusion in Adults   AMBULATORY CARE:   A contusion  is a bruise that appears on your skin after an injury  A bruise happens when small blood vessels tear but skin does not  When blood vessels tear, blood leaks into nearby tissue, such as soft tissue or muscle  Other signs and symptoms you may have with a contusion:   · Pain that increases when you touch the bruise, walk, or use the area around the bruise    · Swelling or a lump at the site of the bruise or near it    · Red, blue, or black skin that may change to green or yellow after a few days           · Stiffness or problems moving the bruised area of your body  Seek care immediately if:   · You have new trouble moving the injured area  · You have tingling or numbness in or near the injured area  · Your hand or foot below the bruise gets cold or turns pale  Contact your healthcare provider if:   · You find a new lump in the injured area  · Your symptoms do not improve with treatment after 4 to 5 days  · You have questions or concerns about your condition or care  Treatment for a contusion  may include any of the following:  · NSAIDs , such as ibuprofen, help decrease swelling, pain, and fever  This medicine is available with or without a doctor's order  NSAIDs can cause stomach bleeding or kidney problems in certain people  If you take blood thinner medicine, always ask your healthcare provider if NSAIDs are safe for you  Always read the medicine label and follow directions  · Prescription pain medicine  may be given  Do not wait until the pain is severe before you take your medicine  · Aspiration  is a procedure used to drain pooled blood in your muscle  This may help prevent increased pressure in the muscle      · Surgery  may be done to repair a tear in the muscle or relieve pressure in the muscle caused by swelling  Help a contusion heal:   · Rest the injured area  or use it less than usual  If you bruised your leg or foot, you may need crutches or a cane to help you walk  This will help you keep weight off your injured body part  · Apply ice  to decrease swelling and pain  Ice may also help prevent tissue damage  Use an ice pack, or put crushed ice in a plastic bag  Cover it with a towel and place it on your bruise for 15 to 20 minutes every hour or as directed  · Use compression  to support the area and decrease swelling  Wrap an elastic bandage around the area over the bruised muscle  Make sure the bandage is not too tight  You should be able to fit 1 finger between the bandage and your skin  · Elevate (raise) your injured body part  above the level of your heart to help decrease pain and swelling  Use pillows, blankets, or rolled towels to elevate the area as often as you can  · Do not drink alcohol  as directed  Alcohol may slow healing  · Do not stretch injured muscles  right after your injury  Ask your healthcare provider when and how you may safely stretch after your injury  Gentle stretches can help increase your flexibility  · Do not massage the area or put heating pads  on the bruise right after your injury  Heat and massage may slow healing  Your healthcare provider may tell you to apply heat after several days  At that time, heat will start to help the injury heal   Follow up with your healthcare provider as directed:  Write down your questions so you remember to ask them during your visits  © 2017 [employer] [address] [employer] Information is for End User's use only and may not be sold, redistributed or otherwise used for commercial purposes  All illustrations and images included in CareNotes® are the copyrighted property of [employer]  or Matt Hurst    The above information is an  only  It is not intended as medical advice for individual conditions or treatments  Talk to your doctor, nurse or pharmacist before following any medical regimen to see if it is safe and effective for you

## 2020-08-16 NOTE — PROGRESS NOTES
[facility]        NAME: Hernan Lemus  is a 66 y o  male  : 1945    MRN: [id number]  DATE: 2020  TIME: 12:18 PM    Assessment and Plan   Contusion of multiple sites of right hand and fingers, initial encounter [S60 221A, S60 00XA]  1  Contusion of multiple sites of right hand and fingers, initial encounter     2  Fall, initial encounter  XR hand 3+ vw right   3  Hand injury, right, initial encounter  XR hand 3+ vw right         Patient Instructions     Patient Instructions     No fractures seen on x-ray; arthritic changes noted  Radiology does the final read; if they see anything I did not, we will call you  Rest, ice often, elevate at rest, use tylenol as needed for pain  Contusion in Adults   AMBULATORY CARE:   A contusion  is a bruise that appears on your skin after an injury  A bruise happens when small blood vessels tear but skin does not  When blood vessels tear, blood leaks into nearby tissue, such as soft tissue or muscle  Other signs and symptoms you may have with a contusion:   · Pain that increases when you touch the bruise, walk, or use the area around the bruise    · Swelling or a lump at the site of the bruise or near it    · Red, blue, or black skin that may change to green or yellow after a few days           · Stiffness or problems moving the bruised area of your body  Seek care immediately if:   · You have new trouble moving the injured area  · You have tingling or numbness in or near the injured area  · Your hand or foot below the bruise gets cold or turns pale  Contact your healthcare provider if:   · You find a new lump in the injured area  · Your symptoms do not improve with treatment after 4 to 5 days  · You have questions or concerns about your condition or care  Treatment for a contusion  may include any of the following:  · NSAIDs , such as ibuprofen, help decrease swelling, pain, and fever   This medicine is available with or without a doctor's order  NSAIDs can cause stomach bleeding or kidney problems in certain people  If you take blood thinner medicine, always ask your healthcare provider if NSAIDs are safe for you  Always read the medicine label and follow directions  · Prescription pain medicine  may be given  Do not wait until the pain is severe before you take your medicine  · Aspiration  is a procedure used to drain pooled blood in your muscle  This may help prevent increased pressure in the muscle  · Surgery  may be done to repair a tear in the muscle or relieve pressure in the muscle caused by swelling  Help a contusion heal:   · Rest the injured area  or use it less than usual  If you bruised your leg or foot, you may need crutches or a cane to help you walk  This will help you keep weight off your injured body part  · Apply ice  to decrease swelling and pain  Ice may also help prevent tissue damage  Use an ice pack, or put crushed ice in a plastic bag  Cover it with a towel and place it on your bruise for 15 to 20 minutes every hour or as directed  · Use compression  to support the area and decrease swelling  Wrap an elastic bandage around the area over the bruised muscle  Make sure the bandage is not too tight  You should be able to fit 1 finger between the bandage and your skin  · Elevate (raise) your injured body part  above the level of your heart to help decrease pain and swelling  Use pillows, blankets, or rolled towels to elevate the area as often as you can  · Do not drink alcohol  as directed  Alcohol may slow healing  · Do not stretch injured muscles  right after your injury  Ask your healthcare provider when and how you may safely stretch after your injury  Gentle stretches can help increase your flexibility  · Do not massage the area or put heating pads  on the bruise right after your injury  Heat and massage may slow healing   Your healthcare provider may tell you to apply heat after several days  At that time, heat will start to help the injury heal   Follow up with your healthcare provider as directed:  Write down your questions so you remember to ask them during your visits  © 2017 [employer] Information is for End User's use only and may not be sold, redistributed or otherwise used for commercial purposes  All illustrations and images included in CareNotes® are the copyrighted property of A D A M [employer] Inc  or [employer] Hur[employer]  The above information is an  only  It is not intended as medical advice for individual conditions or treatments  Talk to your doctor, nurse or pharmacist before following any medical regimen to see if it is safe and effective for you  Follow up with PCP in 3-5 days  Proceed to  ER if symptoms worsen  Chief Complaint     Chief Complaint   Patient presents with    Hand Pain     Pt c/o right hand pain after falling yesterday  History of Present Illness       Patient reports that he fell yesterday onto black top landing on his right hand  He states his hand was slightly curled and his pinky knuckle and outer side of his hand took the main impact  He now has pain there as well as pain over the MCP joint of his thumb  He has a moderate amount of swelling over his entire hand but notes that he has arthritis  He has not taken anything for pain  He presents here to be seen to make sure there is no fracture  Review of Systems   Review of Systems   Musculoskeletal: Positive for arthralgias and joint swelling  All other systems reviewed and are negative          Current Medications       Current Outpatient Medications:     atorvastatin (LIPITOR) 40 mg tablet, Take 1 tablet (40 mg total) by mouth every evening, Disp: 90 tablet, Rfl: 3    carvedilol (COREG) 25 mg tablet, Take 1 tablet (25 mg total) by mouth 2 (two) times a day with meals, Disp: 60 tablet, Rfl: 5    chlorthalidone (HYGROTEN) 50 MG tablet, Take 1 tablet (50 mg total) by mouth 2 (two) times a day, Disp: 60 tablet, Rfl: 5    clopidogrel (PLAVIX) 75 mg tablet, Take 1 tablet (75 mg total) by mouth daily, Disp: 90 tablet, Rfl: 0    ferrous sulfate 325 (65 Fe) mg tablet, Take 325 mg by mouth daily with breakfast, Disp: , Rfl:     lisinopril (ZESTRIL) 40 mg tablet, Take 1 tablet by mouth once daily, Disp: 90 tablet, Rfl: 0    ondansetron (ZOFRAN-ODT) 4 mg disintegrating tablet, Take 1 tablet (4 mg total) by mouth every 6 (six) hours as needed for nausea or vomiting, Disp: 20 tablet, Rfl: 0    pantoprazole (PROTONIX) 40 mg tablet, Take 1 tablet (40 mg total) by mouth daily, Disp: 90 tablet, Rfl: 3    potassium chloride (MICRO-K) 10 MEQ CR capsule, Take 1 capsule (10 mEq total) by mouth 2 (two) times a day, Disp: 60 capsule, Rfl: 5    Current Facility-Administered Medications:     cyanocobalamin injection 1,000 mcg, 1,000 mcg, Intramuscular, Q30 Days, Lesly Grant DO, 1,000 mcg at 12/13/19 3828    Current Allergies     Allergies as of 08/16/2020    (No Known Allergies)            The following portions of the patient's history were reviewed and updated as appropriate: allergies, current medications, past family history, past medical history, past social history, past surgical history and problem list      Past Medical History:   Diagnosis Date    Arthritis     Cholecystitis     Coronary artery disease     Hypertension     Spinal stenosis        Past Surgical History:   Procedure Laterality Date    CHOLECYSTECTOMY      COLON SURGERY      bwel resection    COLONOSCOPY      ESOPHAGOGASTRODUODENOSCOPY      02/07/2007  [facility]    [place] LAP,CHOLECYSTECTOMY N/A 12/5/2017    Procedure: CHOLECYSTECTOMY LAPAROSCOPIC;  Surgeon: Juve Hayward MD;  [facility];  Service: General    SMALL INTESTINE SURGERY       [facility] [address] 2011       Family History   Problem Relation Age of Onset    Arthritis Mother     Heart attack Father     Coronary artery disease Family     Diabetes Family          Medications have been verified  Objective   BP (!) 176/73   Pulse (!) 54   Temp 97 6 °F (36 4 °C)   Resp 16   SpO2 96%        Physical Exam     Physical Exam  Vitals signs and nursing note reviewed  Constitutional:       General: He is not in acute distress  Appearance: He is well-developed  He is not diaphoretic  HENT:      Head: Normocephalic and atraumatic  Neck:      Musculoskeletal: Normal range of motion and neck supple  Pulmonary:      Effort: Pulmonary effort is normal  No respiratory distress  Abdominal:      General: There is no distension  Palpations: Abdomen is soft  Musculoskeletal:      Right hand: He exhibits decreased range of motion, tenderness, bony tenderness and swelling  He exhibits normal two-point discrimination, normal capillary refill, no deformity and no laceration  Normal sensation noted  Comments: Generalized swelling over entire right hand  Pain is primarily over 1st and 5th metacarpals, 1st and 5th MCP joints   Skin:     General: Skin is warm and dry  Capillary Refill: Capillary refill takes less than 2 seconds  Neurological:      Mental Status: He is alert and oriented to person, place, and time  Psychiatric:         Behavior: Behavior normal          Thought Content:  Thought content normal          Judgment: Judgment normal

## 2020-09-10 DIAGNOSIS — I10 ESSENTIAL HYPERTENSION: ICD-10-CM

## 2020-09-10 RX ORDER — LISINOPRIL 40 MG/1
40 TABLET ORAL DAILY
Qty: 90 TABLET | Refills: 3 | Status: SHIPPED | OUTPATIENT
Start: 2020-09-10 | End: 2021-12-13 | Stop reason: SDUPTHER

## 2020-09-16 ENCOUNTER — APPOINTMENT (OUTPATIENT)
Dept: LAB | Facility: CLINIC | Age: 75
End: 2020-09-16
Payer: MEDICARE

## 2020-09-16 ENCOUNTER — TELEPHONE (OUTPATIENT)
Dept: INTERNAL MEDICINE CLINIC | Facility: CLINIC | Age: 75
End: 2020-09-16

## 2020-09-16 DIAGNOSIS — R53.83 FATIGUE, UNSPECIFIED TYPE: ICD-10-CM

## 2020-09-16 DIAGNOSIS — I65.23 BILATERAL CAROTID ARTERY STENOSIS: ICD-10-CM

## 2020-09-16 DIAGNOSIS — D50.8 IRON DEFICIENCY ANEMIA SECONDARY TO INADEQUATE DIETARY IRON INTAKE: ICD-10-CM

## 2020-09-16 DIAGNOSIS — D51.0 PERNICIOUS ANEMIA: ICD-10-CM

## 2020-09-16 DIAGNOSIS — M45.0 ANKYLOSING SPONDYLITIS OF MULTIPLE SITES IN SPINE (HCC): ICD-10-CM

## 2020-09-16 DIAGNOSIS — E55.9 VITAMIN D DEFICIENCY: Primary | ICD-10-CM

## 2020-09-16 LAB
BASOPHILS # BLD AUTO: 0.02 THOUSANDS/ΜL (ref 0–0.1)
BASOPHILS NFR BLD AUTO: 0 % (ref 0–1)
EOSINOPHIL # BLD AUTO: 0.07 THOUSAND/ΜL (ref 0–0.61)
EOSINOPHIL NFR BLD AUTO: 1 % (ref 0–6)
ERYTHROCYTE [DISTWIDTH] IN BLOOD BY AUTOMATED COUNT: 13.1 % (ref 11.6–15.1)
FERRITIN SERPL-MCNC: 60 NG/ML (ref 8–388)
HCT VFR BLD AUTO: 41.3 % (ref 36.5–49.3)
HGB BLD-MCNC: 14.1 G/DL (ref 12–17)
IMM GRANULOCYTES # BLD AUTO: 0.05 THOUSAND/UL (ref 0–0.2)
IMM GRANULOCYTES NFR BLD AUTO: 1 % (ref 0–2)
IRON SATN MFR SERPL: 29 %
IRON SERPL-MCNC: 102 UG/DL (ref 65–175)
LYMPHOCYTES # BLD AUTO: 1.21 THOUSANDS/ΜL (ref 0.6–4.47)
LYMPHOCYTES NFR BLD AUTO: 14 % (ref 14–44)
MCH RBC QN AUTO: 31.8 PG (ref 26.8–34.3)
MCHC RBC AUTO-ENTMCNC: 34.1 G/DL (ref 31.4–37.4)
MCV RBC AUTO: 93 FL (ref 82–98)
MONOCYTES # BLD AUTO: 0.57 THOUSAND/ΜL (ref 0.17–1.22)
MONOCYTES NFR BLD AUTO: 6 % (ref 4–12)
NEUTROPHILS # BLD AUTO: 6.94 THOUSANDS/ΜL (ref 1.85–7.62)
NEUTS SEG NFR BLD AUTO: 78 % (ref 43–75)
NRBC BLD AUTO-RTO: 0 /100 WBCS
PLATELET # BLD AUTO: 170 THOUSANDS/UL (ref 149–390)
PMV BLD AUTO: 11.2 FL (ref 8.9–12.7)
RBC # BLD AUTO: 4.44 MILLION/UL (ref 3.88–5.62)
TIBC SERPL-MCNC: 348 UG/DL (ref 250–450)
WBC # BLD AUTO: 8.86 THOUSAND/UL (ref 4.31–10.16)

## 2020-09-16 PROCEDURE — 85025 COMPLETE CBC W/AUTO DIFF WBC: CPT

## 2020-09-16 PROCEDURE — 83540 ASSAY OF IRON: CPT

## 2020-09-16 PROCEDURE — 36415 COLL VENOUS BLD VENIPUNCTURE: CPT

## 2020-09-16 PROCEDURE — 83550 IRON BINDING TEST: CPT

## 2020-09-16 PROCEDURE — 82728 ASSAY OF FERRITIN: CPT

## 2020-09-16 NOTE — TELEPHONE ENCOUNTER
Cbc, iron panel, vit b12 tsh  Check when he is going and setup appt at least day after so results are available

## 2020-09-18 ENCOUNTER — OFFICE VISIT (OUTPATIENT)
Dept: INTERNAL MEDICINE CLINIC | Facility: CLINIC | Age: 75
End: 2020-09-18
Payer: MEDICARE

## 2020-09-18 VITALS
OXYGEN SATURATION: 98 % | HEART RATE: 65 BPM | WEIGHT: 180 LBS | SYSTOLIC BLOOD PRESSURE: 118 MMHG | BODY MASS INDEX: 25.77 KG/M2 | TEMPERATURE: 97 F | DIASTOLIC BLOOD PRESSURE: 70 MMHG | HEIGHT: 70 IN

## 2020-09-18 DIAGNOSIS — I65.23 BILATERAL CAROTID ARTERY STENOSIS: Primary | ICD-10-CM

## 2020-09-18 DIAGNOSIS — R55 NEAR SYNCOPE: ICD-10-CM

## 2020-09-18 DIAGNOSIS — Z23 NEEDS FLU SHOT: ICD-10-CM

## 2020-09-18 DIAGNOSIS — R42 DIZZINESS: ICD-10-CM

## 2020-09-18 DIAGNOSIS — D51.0 PERNICIOUS ANEMIA: ICD-10-CM

## 2020-09-18 PROCEDURE — 90662 IIV NO PRSV INCREASED AG IM: CPT

## 2020-09-18 PROCEDURE — G0008 ADMIN INFLUENZA VIRUS VAC: HCPCS

## 2020-09-18 PROCEDURE — 96372 THER/PROPH/DIAG INJ SC/IM: CPT | Performed by: INTERNAL MEDICINE

## 2020-09-18 PROCEDURE — 99214 OFFICE O/P EST MOD 30 MIN: CPT | Performed by: INTERNAL MEDICINE

## 2020-09-18 RX ORDER — CYANOCOBALAMIN 1000 UG/ML
1000 INJECTION INTRAMUSCULAR; SUBCUTANEOUS
Status: SHIPPED | OUTPATIENT
Start: 2020-09-18

## 2020-09-18 RX ORDER — AMOXICILLIN 500 MG/1
TABLET, FILM COATED ORAL
COMMUNITY
Start: 2020-09-06 | End: 2020-10-13 | Stop reason: HOSPADM

## 2020-09-18 RX ADMIN — CYANOCOBALAMIN 1000 MCG: 1000 INJECTION INTRAMUSCULAR; SUBCUTANEOUS at 15:10

## 2020-09-18 NOTE — PROGRESS NOTES
Assessment/Plan:         Diagnoses and all orders for this visit:    Bilateral carotid artery stenosis  -     VAS carotid complete study; Future  Known hx of stenosis but now has sxs and unclear if related to stenosis or not   Recheck carotid His right is occluded but unclear what the left is at presently     Needs flu shot  -     influenza vaccine, high-dose, PF 0 7 mL (FLUZONE HIGH-DOSE)  Flu shot today    Dizziness  -     ECG 12 lead; Future  Recheck   He will start moitoring BP and HR at least twice daily and report May need further test like holter or cardio followup  Would decrease hydrochlorithiadone if BP trend low     Near syncope  -     Echo complete with contrast if indicated; Future  Check echo - last echo seems to have been done in the \[facility]\"" R/o valvular heart disease    Pernicious anemia  -     cyanocobalamin injection 1,000 mcg  Hx of pernicious anemia B12 today given recent sxs of fatigue and dizziness      Other orders  -     amoxicillin (AMOXIL) 500 MG tablet; TAKE 1 TABLET BY MOUTH EVERY 6 HOURS UNTIL GONE  On for recent abscess and will have tooth extraction soon      Followup after testing     Patient ID: Michell Lawrence  is a 66 y o  male  HPI   Pt has had dizziness off and on and increased fatigue he has fallen but both falls were because he was doing things alone and likely should not have   No chest pain He has known carotid stenosis He has shoulder pain and does note increased stiffness of neck Had injection recently with temporary relief He cannot turn his head much in any direction now No double vision occasional headaches He is taking ferrex and has some dark stools but no foul smell or stick BMs  He has not passed out but feels woozy martinez in AM Appetite ok He does not dink water much His bp when checked has been normal range but not checking with sxs recently         Review of Systems   Constitutional: Positive for activity change and fatigue  Negative for chills and fever  HENT: Positive for dental problem  Has abcess and will have extraction upcoming   Respiratory: Negative for cough and shortness of breath  Cardiovascular: Negative for chest pain and leg swelling  Gastrointestinal: Negative for abdominal distention, abdominal pain, constipation and diarrhea  Genitourinary: Negative for difficulty urinating, flank pain and frequency  Musculoskeletal: Positive for arthralgias, gait problem, neck pain and neck stiffness  Increased neck stiffness   Skin: Negative for color change, pallor, rash and wound  Neurological: Positive for dizziness and numbness  Negative for light-headedness  Psychiatric/Behavioral: Positive for sleep disturbance         Past Medical History:   Diagnosis Date    Arthritis     Cholecystitis     Coronary artery disease     Hypertension     Spinal stenosis      Past Surgical History:   Procedure Laterality Date    CHOLECYSTECTOMY      COLON SURGERY      bwel resection    COLONOSCOPY      ESOPHAGOGASTRODUODENOSCOPY      2007  [facility]    [place] LAP,CHOLECYSTECTOMY N/A 2017    Procedure: CHOLECYSTECTOMY LAPAROSCOPIC;  Surgeon: Juan Orellana MD;  [facility];  Service: General    SMALL INTESTINE SURGERY      [facility]      Social History     Socioeconomic History    Marital status: /Civil Union     Spouse name: Not on file    Number of children: Not on file    Years of education: Not on file    Highest education level: Not on file   Occupational History    Not on file   Social Needs    Financial resource strain: Not on file    Food insecurity     Worry: Not on file     Inability: Not on file   [employer] needs     Medical: Not on file     Non-medical: Not on file   Tobacco Use    Smoking status: Former Smoker     Packs/day: 2 00     Years: 3 00     Pack years: 6 00     Last attempt to quit: 1966     Years since quittin 7    Smokeless tobacco: Never Used   Substance and Sexual Activity  Alcohol use: Yes     Comment: social     Drug use: No    Sexual activity: Not on file   Lifestyle    Physical activity     Days per week: Not on file     Minutes per session: Not on file    Stress: Not on file   Relationships    Social connections     Talks on phone: Not on file     Gets together: Not on file     Attends [employer] service: Not on file     Active member of club or organization: Not on file     Attends meetings of clubs or organizations: Not on file     Relationship status: Not on file    Intimate partner violence     Fear of current or ex partner: Not on file     Emotionally abused: Not on file     Physically abused: Not on file     Forced sexual activity: Not on file   Other Topics Concern    Not on file   Social History Narrative    CAFFEINE USE    DENTAL CARE,REGULARLY    FEELS SAFE  [address] [place]      No Known Allergies         /70   Pulse 65   Temp (!) 97 °F (36 1 °C) (Temporal)   Ht 5' 10" (1 778 m)   Wt 81 6 kg (180 lb)   SpO2 98%   BMI 25 83 kg/m²          Physical Exam  Vitals signs reviewed  Constitutional:       General: He is not in acute distress  Appearance: Normal appearance  He is normal weight  He is not ill-appearing or toxic-appearing  HENT:      Head: Normocephalic and atraumatic  Right Ear: Tympanic membrane normal       Left Ear: Tympanic membrane normal    Cardiovascular:      Rate and Rhythm: Normal rate and regular rhythm  Neurological:      Mental Status: He is alert         carotid bruit right

## 2020-09-22 ENCOUNTER — OFFICE VISIT (OUTPATIENT)
Dept: LAB | Facility: HOSPITAL | Age: 75
End: 2020-09-22
Payer: MEDICARE

## 2020-09-22 DIAGNOSIS — E87.6 POTASSIUM DEFICIENCY: ICD-10-CM

## 2020-09-22 DIAGNOSIS — R42 DIZZINESS: ICD-10-CM

## 2020-09-22 PROCEDURE — 93005 ELECTROCARDIOGRAM TRACING: CPT

## 2020-09-22 RX ORDER — POTASSIUM CHLORIDE 750 MG/1
10 CAPSULE, EXTENDED RELEASE ORAL 2 TIMES DAILY
Qty: 60 CAPSULE | Refills: 5 | Status: SHIPPED | OUTPATIENT
Start: 2020-09-22 | End: 2020-10-13 | Stop reason: HOSPADM

## 2020-09-23 LAB
ATRIAL RATE: 56 BPM
P AXIS: 94 DEGREES
PR INTERVAL: 212 MS
QRS AXIS: -30 DEGREES
QRSD INTERVAL: 142 MS
QT INTERVAL: 420 MS
QTC INTERVAL: 405 MS
T WAVE AXIS: 23 DEGREES
VENTRICULAR RATE: 56 BPM

## 2020-09-23 PROCEDURE — 93010 ELECTROCARDIOGRAM REPORT: CPT | Performed by: INTERNAL MEDICINE

## 2020-10-02 ENCOUNTER — HOSPITAL ENCOUNTER (OUTPATIENT)
Dept: NON INVASIVE DIAGNOSTICS | Facility: HOSPITAL | Age: 75
Discharge: HOME | End: 2020-10-02
Payer: MEDICARE

## 2020-10-02 DIAGNOSIS — R55 NEAR SYNCOPE: ICD-10-CM

## 2020-10-02 PROCEDURE — 93306 TTE W/DOPPLER COMPLETE: CPT | Performed by: INTERNAL MEDICINE

## 2020-10-02 PROCEDURE — 93306 TTE W/DOPPLER COMPLETE: CPT

## 2020-10-10 ENCOUNTER — HOSPITAL ENCOUNTER (INPATIENT)
Facility: HOSPITAL | Age: 75
LOS: 3 days | Discharge: HOME | DRG: 309 | End: 2020-10-13
Attending: EMERGENCY MEDICINE | Admitting: GENERAL PRACTICE
Payer: MEDICARE

## 2020-10-10 ENCOUNTER — APPOINTMENT (EMERGENCY)
Dept: RADIOLOGY | Facility: HOSPITAL | Age: 75
DRG: 309 | End: 2020-10-10
Payer: MEDICARE

## 2020-10-10 DIAGNOSIS — I48.92 NEW ONSET ATRIAL FLUTTER (HCC): Primary | ICD-10-CM

## 2020-10-10 DIAGNOSIS — K57.32 DIVERTICULITIS OF COLON: ICD-10-CM

## 2020-10-10 DIAGNOSIS — E87.6 POTASSIUM DEFICIENCY: ICD-10-CM

## 2020-10-10 DIAGNOSIS — R00.2 PALPITATIONS: ICD-10-CM

## 2020-10-10 DIAGNOSIS — R42 DIZZINESS: ICD-10-CM

## 2020-10-10 DIAGNOSIS — I10 ESSENTIAL HYPERTENSION: ICD-10-CM

## 2020-10-10 DIAGNOSIS — K57.92 DIVERTICULITIS: ICD-10-CM

## 2020-10-10 PROBLEM — E80.6 HYPERBILIRUBINEMIA: Status: ACTIVE | Noted: 2020-10-10

## 2020-10-10 PROBLEM — R19.7 DIARRHEA: Status: ACTIVE | Noted: 2020-10-10

## 2020-10-10 LAB
ALBUMIN SERPL BCP-MCNC: 3.4 G/DL (ref 3.5–5)
ALP SERPL-CCNC: 76 U/L (ref 46–116)
ALT SERPL W P-5'-P-CCNC: 32 U/L (ref 12–78)
ANION GAP SERPL CALCULATED.3IONS-SCNC: 3 MMOL/L (ref 4–13)
AST SERPL W P-5'-P-CCNC: 13 U/L (ref 5–45)
BASOPHILS # BLD AUTO: 0.03 THOUSANDS/ΜL (ref 0–0.1)
BASOPHILS NFR BLD AUTO: 0 % (ref 0–1)
BILIRUB SERPL-MCNC: 3.21 MG/DL (ref 0.2–1)
BUN SERPL-MCNC: 22 MG/DL (ref 5–25)
CALCIUM ALBUM COR SERPL-MCNC: 8.9 MG/DL (ref 8.3–10.1)
CALCIUM SERPL-MCNC: 8.4 MG/DL (ref 8.3–10.1)
CHLORIDE SERPL-SCNC: 102 MMOL/L (ref 100–108)
CO2 SERPL-SCNC: 34 MMOL/L (ref 21–32)
CREAT SERPL-MCNC: 1.25 MG/DL (ref 0.6–1.3)
EOSINOPHIL # BLD AUTO: 0.02 THOUSAND/ΜL (ref 0–0.61)
EOSINOPHIL NFR BLD AUTO: 0 % (ref 0–6)
ERYTHROCYTE [DISTWIDTH] IN BLOOD BY AUTOMATED COUNT: 13.3 % (ref 11.6–15.1)
GFR SERPL CREATININE-BSD FRML MDRD: 55 ML/MIN/1.73SQ M
GLUCOSE SERPL-MCNC: 133 MG/DL (ref 65–140)
HCT VFR BLD AUTO: 41.4 % (ref 36.5–49.3)
HGB BLD-MCNC: 13.7 G/DL (ref 12–17)
IMM GRANULOCYTES # BLD AUTO: 0.1 THOUSAND/UL (ref 0–0.2)
IMM GRANULOCYTES NFR BLD AUTO: 1 % (ref 0–2)
LYMPHOCYTES # BLD AUTO: 1.51 THOUSANDS/ΜL (ref 0.6–4.47)
LYMPHOCYTES NFR BLD AUTO: 10 % (ref 14–44)
MAGNESIUM SERPL-MCNC: 1.8 MG/DL (ref 1.6–2.6)
MCH RBC QN AUTO: 31.1 PG (ref 26.8–34.3)
MCHC RBC AUTO-ENTMCNC: 33.1 G/DL (ref 31.4–37.4)
MCV RBC AUTO: 94 FL (ref 82–98)
MONOCYTES # BLD AUTO: 0.96 THOUSAND/ΜL (ref 0.17–1.22)
MONOCYTES NFR BLD AUTO: 6 % (ref 4–12)
NEUTROPHILS # BLD AUTO: 12.82 THOUSANDS/ΜL (ref 1.85–7.62)
NEUTS SEG NFR BLD AUTO: 83 % (ref 43–75)
NRBC BLD AUTO-RTO: 0 /100 WBCS
PHOSPHATE SERPL-MCNC: 3 MG/DL (ref 2.3–4.1)
PLATELET # BLD AUTO: 166 THOUSANDS/UL (ref 149–390)
PMV BLD AUTO: 11 FL (ref 8.9–12.7)
POTASSIUM SERPL-SCNC: 3.6 MMOL/L (ref 3.5–5.3)
PROT SERPL-MCNC: 6.2 G/DL (ref 6.4–8.2)
RBC # BLD AUTO: 4.41 MILLION/UL (ref 3.88–5.62)
SODIUM SERPL-SCNC: 139 MMOL/L (ref 136–145)
TROPONIN I SERPL-MCNC: <0.02 NG/ML
WBC # BLD AUTO: 15.44 THOUSAND/UL (ref 4.31–10.16)

## 2020-10-10 PROCEDURE — 71045 X-RAY EXAM CHEST 1 VIEW: CPT

## 2020-10-10 PROCEDURE — 83735 ASSAY OF MAGNESIUM: CPT | Performed by: EMERGENCY MEDICINE

## 2020-10-10 PROCEDURE — 36415 COLL VENOUS BLD VENIPUNCTURE: CPT | Performed by: EMERGENCY MEDICINE

## 2020-10-10 PROCEDURE — 99285 EMERGENCY DEPT VISIT HI MDM: CPT

## 2020-10-10 PROCEDURE — 99285 EMERGENCY DEPT VISIT HI MDM: CPT | Performed by: EMERGENCY MEDICINE

## 2020-10-10 PROCEDURE — 84484 ASSAY OF TROPONIN QUANT: CPT | Performed by: EMERGENCY MEDICINE

## 2020-10-10 PROCEDURE — 85025 COMPLETE CBC W/AUTO DIFF WBC: CPT | Performed by: EMERGENCY MEDICINE

## 2020-10-10 PROCEDURE — G1004 CDSM NDSC: HCPCS

## 2020-10-10 PROCEDURE — 74177 CT ABD & PELVIS W/CONTRAST: CPT

## 2020-10-10 PROCEDURE — 84100 ASSAY OF PHOSPHORUS: CPT | Performed by: EMERGENCY MEDICINE

## 2020-10-10 PROCEDURE — 93005 ELECTROCARDIOGRAM TRACING: CPT

## 2020-10-10 PROCEDURE — 99222 1ST HOSP IP/OBS MODERATE 55: CPT | Performed by: GENERAL PRACTICE

## 2020-10-10 PROCEDURE — 1124F ACP DISCUSS-NO DSCNMKR DOCD: CPT | Performed by: EMERGENCY MEDICINE

## 2020-10-10 PROCEDURE — 80053 COMPREHEN METABOLIC PANEL: CPT | Performed by: EMERGENCY MEDICINE

## 2020-10-10 PROCEDURE — 87040 BLOOD CULTURE FOR BACTERIA: CPT | Performed by: GENERAL PRACTICE

## 2020-10-10 RX ORDER — LISINOPRIL 20 MG/1
40 TABLET ORAL DAILY
Status: DISCONTINUED | OUTPATIENT
Start: 2020-10-11 | End: 2020-10-13 | Stop reason: HOSPADM

## 2020-10-10 RX ORDER — ACETAMINOPHEN 325 MG/1
650 TABLET ORAL EVERY 6 HOURS PRN
Status: DISCONTINUED | OUTPATIENT
Start: 2020-10-10 | End: 2020-10-13 | Stop reason: HOSPADM

## 2020-10-10 RX ORDER — POTASSIUM CHLORIDE 20 MEQ/1
20 TABLET, EXTENDED RELEASE ORAL 2 TIMES DAILY
Status: DISCONTINUED | OUTPATIENT
Start: 2020-10-10 | End: 2020-10-13 | Stop reason: HOSPADM

## 2020-10-10 RX ORDER — ATORVASTATIN CALCIUM 40 MG/1
40 TABLET, FILM COATED ORAL EVERY EVENING
Status: DISCONTINUED | OUTPATIENT
Start: 2020-10-11 | End: 2020-10-13 | Stop reason: HOSPADM

## 2020-10-10 RX ORDER — PANTOPRAZOLE SODIUM 40 MG/1
40 TABLET, DELAYED RELEASE ORAL
Status: DISCONTINUED | OUTPATIENT
Start: 2020-10-11 | End: 2020-10-13 | Stop reason: HOSPADM

## 2020-10-10 RX ORDER — FERROUS SULFATE 325(65) MG
325 TABLET ORAL
Status: DISCONTINUED | OUTPATIENT
Start: 2020-10-11 | End: 2020-10-13 | Stop reason: HOSPADM

## 2020-10-10 RX ORDER — ONDANSETRON 2 MG/ML
4 INJECTION INTRAMUSCULAR; INTRAVENOUS EVERY 6 HOURS PRN
Status: DISCONTINUED | OUTPATIENT
Start: 2020-10-10 | End: 2020-10-13 | Stop reason: HOSPADM

## 2020-10-10 RX ORDER — CARVEDILOL 12.5 MG/1
12.5 TABLET ORAL 2 TIMES DAILY WITH MEALS
Status: DISCONTINUED | OUTPATIENT
Start: 2020-10-10 | End: 2020-10-10

## 2020-10-10 RX ORDER — CHLORTHALIDONE 25 MG/1
25 TABLET ORAL DAILY
Status: DISCONTINUED | OUTPATIENT
Start: 2020-10-11 | End: 2020-10-13 | Stop reason: HOSPADM

## 2020-10-10 RX ORDER — AMOXICILLIN AND CLAVULANATE POTASSIUM 875; 125 MG/1; MG/1
1 TABLET, FILM COATED ORAL ONCE
Status: DISCONTINUED | OUTPATIENT
Start: 2020-10-10 | End: 2020-10-10

## 2020-10-10 RX ORDER — CLOPIDOGREL BISULFATE 75 MG/1
75 TABLET ORAL DAILY
Status: DISCONTINUED | OUTPATIENT
Start: 2020-10-11 | End: 2020-10-13 | Stop reason: HOSPADM

## 2020-10-10 RX ORDER — MAGNESIUM SULFATE HEPTAHYDRATE 40 MG/ML
2 INJECTION, SOLUTION INTRAVENOUS ONCE
Status: COMPLETED | OUTPATIENT
Start: 2020-10-10 | End: 2020-10-10

## 2020-10-10 RX ORDER — METRONIDAZOLE 500 MG/1
500 TABLET ORAL EVERY 8 HOURS SCHEDULED
Status: DISCONTINUED | OUTPATIENT
Start: 2020-10-10 | End: 2020-10-13

## 2020-10-10 RX ADMIN — ENOXAPARIN SODIUM 80 MG: 80 INJECTION SUBCUTANEOUS at 17:40

## 2020-10-10 RX ADMIN — MAGNESIUM SULFATE IN WATER 2 G: 40 INJECTION, SOLUTION INTRAVENOUS at 17:13

## 2020-10-10 RX ADMIN — POTASSIUM CHLORIDE 20 MEQ: 1500 TABLET, EXTENDED RELEASE ORAL at 17:12

## 2020-10-10 RX ADMIN — CARVEDILOL 12.5 MG: 12.5 TABLET, FILM COATED ORAL at 17:12

## 2020-10-10 RX ADMIN — IOHEXOL 100 ML: 350 INJECTION, SOLUTION INTRAVENOUS at 13:55

## 2020-10-10 RX ADMIN — METRONIDAZOLE 500 MG: 500 TABLET ORAL at 16:29

## 2020-10-10 RX ADMIN — METRONIDAZOLE 500 MG: 500 TABLET ORAL at 21:19

## 2020-10-10 RX ADMIN — CEFTRIAXONE SODIUM 1000 MG: 10 INJECTION, POWDER, FOR SOLUTION INTRAVENOUS at 18:52

## 2020-10-11 LAB
ALBUMIN SERPL BCP-MCNC: 3.4 G/DL (ref 3.5–5)
ALP SERPL-CCNC: 75 U/L (ref 46–116)
ALT SERPL W P-5'-P-CCNC: 25 U/L (ref 12–78)
ANION GAP SERPL CALCULATED.3IONS-SCNC: 8 MMOL/L (ref 4–13)
AST SERPL W P-5'-P-CCNC: 10 U/L (ref 5–45)
ATRIAL RATE: 288 BPM
BASOPHILS # BLD AUTO: 0.02 THOUSANDS/ΜL (ref 0–0.1)
BASOPHILS NFR BLD AUTO: 0 % (ref 0–1)
BILIRUB SERPL-MCNC: 3.55 MG/DL (ref 0.2–1)
BUN SERPL-MCNC: 16 MG/DL (ref 5–25)
CALCIUM ALBUM COR SERPL-MCNC: 9.3 MG/DL (ref 8.3–10.1)
CALCIUM SERPL-MCNC: 8.8 MG/DL (ref 8.3–10.1)
CHLORIDE SERPL-SCNC: 101 MMOL/L (ref 100–108)
CO2 SERPL-SCNC: 29 MMOL/L (ref 21–32)
CREAT SERPL-MCNC: 1.02 MG/DL (ref 0.6–1.3)
EOSINOPHIL # BLD AUTO: 0.03 THOUSAND/ΜL (ref 0–0.61)
EOSINOPHIL NFR BLD AUTO: 0 % (ref 0–6)
ERYTHROCYTE [DISTWIDTH] IN BLOOD BY AUTOMATED COUNT: 13.6 % (ref 11.6–15.1)
GFR SERPL CREATININE-BSD FRML MDRD: 71 ML/MIN/1.73SQ M
GLUCOSE SERPL-MCNC: 149 MG/DL (ref 65–140)
HCT VFR BLD AUTO: 36.8 % (ref 36.5–49.3)
HGB BLD-MCNC: 12.6 G/DL (ref 12–17)
IMM GRANULOCYTES # BLD AUTO: 0.06 THOUSAND/UL (ref 0–0.2)
IMM GRANULOCYTES NFR BLD AUTO: 1 % (ref 0–2)
LYMPHOCYTES # BLD AUTO: 0.81 THOUSANDS/ΜL (ref 0.6–4.47)
LYMPHOCYTES NFR BLD AUTO: 8 % (ref 14–44)
MAGNESIUM SERPL-MCNC: 2.2 MG/DL (ref 1.6–2.6)
MCH RBC QN AUTO: 31.7 PG (ref 26.8–34.3)
MCHC RBC AUTO-ENTMCNC: 34.2 G/DL (ref 31.4–37.4)
MCV RBC AUTO: 93 FL (ref 82–98)
MONOCYTES # BLD AUTO: 0.51 THOUSAND/ΜL (ref 0.17–1.22)
MONOCYTES NFR BLD AUTO: 5 % (ref 4–12)
NEUTROPHILS # BLD AUTO: 9.18 THOUSANDS/ΜL (ref 1.85–7.62)
NEUTS SEG NFR BLD AUTO: 86 % (ref 43–75)
NRBC BLD AUTO-RTO: 0 /100 WBCS
P AXIS: 226 DEGREES
PHOSPHATE SERPL-MCNC: 2.5 MG/DL (ref 2.3–4.1)
PLATELET # BLD AUTO: 142 THOUSANDS/UL (ref 149–390)
PMV BLD AUTO: 10.7 FL (ref 8.9–12.7)
POTASSIUM SERPL-SCNC: 3.2 MMOL/L (ref 3.5–5.3)
PROCALCITONIN SERPL-MCNC: <0.05 NG/ML
PROT SERPL-MCNC: 6.5 G/DL (ref 6.4–8.2)
QRS AXIS: -35 DEGREES
QRSD INTERVAL: 136 MS
QT INTERVAL: 450 MS
QTC INTERVAL: 438 MS
RBC # BLD AUTO: 3.98 MILLION/UL (ref 3.88–5.62)
SODIUM SERPL-SCNC: 138 MMOL/L (ref 136–145)
T WAVE AXIS: 43 DEGREES
VENTRICULAR RATE: 57 BPM
WBC # BLD AUTO: 10.61 THOUSAND/UL (ref 4.31–10.16)

## 2020-10-11 PROCEDURE — 83735 ASSAY OF MAGNESIUM: CPT | Performed by: INTERNAL MEDICINE

## 2020-10-11 PROCEDURE — 84100 ASSAY OF PHOSPHORUS: CPT | Performed by: INTERNAL MEDICINE

## 2020-10-11 PROCEDURE — 84145 PROCALCITONIN (PCT): CPT | Performed by: INTERNAL MEDICINE

## 2020-10-11 PROCEDURE — 93010 ELECTROCARDIOGRAM REPORT: CPT | Performed by: INTERNAL MEDICINE

## 2020-10-11 PROCEDURE — 99222 1ST HOSP IP/OBS MODERATE 55: CPT | Performed by: INTERNAL MEDICINE

## 2020-10-11 PROCEDURE — 80053 COMPREHEN METABOLIC PANEL: CPT | Performed by: INTERNAL MEDICINE

## 2020-10-11 PROCEDURE — 99232 SBSQ HOSP IP/OBS MODERATE 35: CPT | Performed by: FAMILY MEDICINE

## 2020-10-11 PROCEDURE — 85025 COMPLETE CBC W/AUTO DIFF WBC: CPT | Performed by: INTERNAL MEDICINE

## 2020-10-11 RX ORDER — POTASSIUM CHLORIDE 20 MEQ/1
40 TABLET, EXTENDED RELEASE ORAL ONCE
Status: COMPLETED | OUTPATIENT
Start: 2020-10-11 | End: 2020-10-11

## 2020-10-11 RX ADMIN — METRONIDAZOLE 500 MG: 500 TABLET ORAL at 14:25

## 2020-10-11 RX ADMIN — POTASSIUM CHLORIDE 40 MEQ: 1500 TABLET, EXTENDED RELEASE ORAL at 14:25

## 2020-10-11 RX ADMIN — POTASSIUM CHLORIDE 20 MEQ: 1500 TABLET, EXTENDED RELEASE ORAL at 17:18

## 2020-10-11 RX ADMIN — METRONIDAZOLE 500 MG: 500 TABLET ORAL at 21:13

## 2020-10-11 RX ADMIN — FERROUS SULFATE TAB 325 MG (65 MG ELEMENTAL FE) 325 MG: 325 (65 FE) TAB at 08:30

## 2020-10-11 RX ADMIN — CLOPIDOGREL BISULFATE 75 MG: 75 TABLET ORAL at 08:30

## 2020-10-11 RX ADMIN — PANTOPRAZOLE SODIUM 40 MG: 40 TABLET, DELAYED RELEASE ORAL at 05:52

## 2020-10-11 RX ADMIN — CEFTRIAXONE SODIUM 1000 MG: 10 INJECTION, POWDER, FOR SOLUTION INTRAVENOUS at 16:41

## 2020-10-11 RX ADMIN — POTASSIUM CHLORIDE 20 MEQ: 1500 TABLET, EXTENDED RELEASE ORAL at 08:30

## 2020-10-11 RX ADMIN — ATORVASTATIN CALCIUM 40 MG: 40 TABLET, FILM COATED ORAL at 17:18

## 2020-10-11 RX ADMIN — LISINOPRIL 40 MG: 20 TABLET ORAL at 08:30

## 2020-10-11 RX ADMIN — METRONIDAZOLE 500 MG: 500 TABLET ORAL at 05:52

## 2020-10-11 RX ADMIN — ENOXAPARIN SODIUM 80 MG: 80 INJECTION SUBCUTANEOUS at 17:18

## 2020-10-11 RX ADMIN — ENOXAPARIN SODIUM 80 MG: 80 INJECTION SUBCUTANEOUS at 05:52

## 2020-10-11 RX ADMIN — CHLORTHALIDONE 25 MG: 25 TABLET ORAL at 08:30

## 2020-10-12 DIAGNOSIS — I48.92 NEW ONSET ATRIAL FLUTTER (HCC): ICD-10-CM

## 2020-10-12 PROBLEM — E87.6 HYPOKALEMIA: Status: RESOLVED | Noted: 2018-01-15 | Resolved: 2020-10-12

## 2020-10-12 PROBLEM — D72.829 LEUKOCYTOSIS: Status: RESOLVED | Noted: 2017-10-19 | Resolved: 2020-10-12

## 2020-10-12 LAB
ALBUMIN SERPL BCP-MCNC: 3.1 G/DL (ref 3.5–5)
ALP SERPL-CCNC: 65 U/L (ref 46–116)
ALT SERPL W P-5'-P-CCNC: 21 U/L (ref 12–78)
ANION GAP SERPL CALCULATED.3IONS-SCNC: 4 MMOL/L (ref 4–13)
AST SERPL W P-5'-P-CCNC: 10 U/L (ref 5–45)
BILIRUB SERPL-MCNC: 2.89 MG/DL (ref 0.2–1)
BUN SERPL-MCNC: 15 MG/DL (ref 5–25)
C DIFF TOX B TCDB STL QL NAA+PROBE: NEGATIVE
CALCIUM ALBUM COR SERPL-MCNC: 9.9 MG/DL (ref 8.3–10.1)
CALCIUM SERPL-MCNC: 9.2 MG/DL (ref 8.3–10.1)
CHLORIDE SERPL-SCNC: 105 MMOL/L (ref 100–108)
CO2 SERPL-SCNC: 31 MMOL/L (ref 21–32)
CREAT SERPL-MCNC: 0.83 MG/DL (ref 0.6–1.3)
ERYTHROCYTE [DISTWIDTH] IN BLOOD BY AUTOMATED COUNT: 13.4 % (ref 11.6–15.1)
GFR SERPL CREATININE-BSD FRML MDRD: 85 ML/MIN/1.73SQ M
GLUCOSE SERPL-MCNC: 97 MG/DL (ref 65–140)
HCT VFR BLD AUTO: 36.6 % (ref 36.5–49.3)
HGB BLD-MCNC: 12.1 G/DL (ref 12–17)
MCH RBC QN AUTO: 30.9 PG (ref 26.8–34.3)
MCHC RBC AUTO-ENTMCNC: 33.1 G/DL (ref 31.4–37.4)
MCV RBC AUTO: 93 FL (ref 82–98)
PLATELET # BLD AUTO: 119 THOUSANDS/UL (ref 149–390)
PMV BLD AUTO: 10.8 FL (ref 8.9–12.7)
POTASSIUM SERPL-SCNC: 3.5 MMOL/L (ref 3.5–5.3)
PROCALCITONIN SERPL-MCNC: <0.05 NG/ML
PROT SERPL-MCNC: 6.3 G/DL (ref 6.4–8.2)
RBC # BLD AUTO: 3.92 MILLION/UL (ref 3.88–5.62)
SODIUM SERPL-SCNC: 140 MMOL/L (ref 136–145)
WBC # BLD AUTO: 5.17 THOUSAND/UL (ref 4.31–10.16)

## 2020-10-12 PROCEDURE — 84145 PROCALCITONIN (PCT): CPT | Performed by: INTERNAL MEDICINE

## 2020-10-12 PROCEDURE — 80053 COMPREHEN METABOLIC PANEL: CPT | Performed by: FAMILY MEDICINE

## 2020-10-12 PROCEDURE — 99233 SBSQ HOSP IP/OBS HIGH 50: CPT | Performed by: INTERNAL MEDICINE

## 2020-10-12 PROCEDURE — 87493 C DIFF AMPLIFIED PROBE: CPT | Performed by: GENERAL PRACTICE

## 2020-10-12 PROCEDURE — 85027 COMPLETE CBC AUTOMATED: CPT | Performed by: FAMILY MEDICINE

## 2020-10-12 PROCEDURE — 97163 PT EVAL HIGH COMPLEX 45 MIN: CPT

## 2020-10-12 PROCEDURE — 99232 SBSQ HOSP IP/OBS MODERATE 35: CPT | Performed by: FAMILY MEDICINE

## 2020-10-12 RX ORDER — AMLODIPINE BESYLATE 5 MG/1
5 TABLET ORAL DAILY
Status: DISCONTINUED | OUTPATIENT
Start: 2020-10-12 | End: 2020-10-13 | Stop reason: HOSPADM

## 2020-10-12 RX ADMIN — AMLODIPINE BESYLATE 5 MG: 5 TABLET ORAL at 15:28

## 2020-10-12 RX ADMIN — CEFTRIAXONE SODIUM 1000 MG: 10 INJECTION, POWDER, FOR SOLUTION INTRAVENOUS at 15:29

## 2020-10-12 RX ADMIN — METRONIDAZOLE 500 MG: 500 TABLET ORAL at 15:28

## 2020-10-12 RX ADMIN — CLOPIDOGREL BISULFATE 75 MG: 75 TABLET ORAL at 09:22

## 2020-10-12 RX ADMIN — ATORVASTATIN CALCIUM 40 MG: 40 TABLET, FILM COATED ORAL at 18:19

## 2020-10-12 RX ADMIN — METRONIDAZOLE 500 MG: 500 TABLET ORAL at 05:43

## 2020-10-12 RX ADMIN — FERROUS SULFATE TAB 325 MG (65 MG ELEMENTAL FE) 325 MG: 325 (65 FE) TAB at 09:28

## 2020-10-12 RX ADMIN — LISINOPRIL 40 MG: 20 TABLET ORAL at 09:22

## 2020-10-12 RX ADMIN — CHLORTHALIDONE 25 MG: 25 TABLET ORAL at 09:28

## 2020-10-12 RX ADMIN — POTASSIUM CHLORIDE 20 MEQ: 1500 TABLET, EXTENDED RELEASE ORAL at 09:22

## 2020-10-12 RX ADMIN — APIXABAN 5 MG: 5 TABLET, FILM COATED ORAL at 18:19

## 2020-10-12 RX ADMIN — ENOXAPARIN SODIUM 80 MG: 80 INJECTION SUBCUTANEOUS at 05:43

## 2020-10-12 RX ADMIN — METRONIDAZOLE 500 MG: 500 TABLET ORAL at 21:08

## 2020-10-12 RX ADMIN — PANTOPRAZOLE SODIUM 40 MG: 40 TABLET, DELAYED RELEASE ORAL at 05:43

## 2020-10-12 RX ADMIN — POTASSIUM CHLORIDE 20 MEQ: 1500 TABLET, EXTENDED RELEASE ORAL at 18:19

## 2020-10-13 ENCOUNTER — TRANSITIONAL CARE MANAGEMENT (OUTPATIENT)
Dept: INTERNAL MEDICINE CLINIC | Facility: CLINIC | Age: 75
End: 2020-10-13

## 2020-10-13 VITALS
RESPIRATION RATE: 18 BRPM | BODY MASS INDEX: 25.05 KG/M2 | SYSTOLIC BLOOD PRESSURE: 132 MMHG | HEART RATE: 80 BPM | TEMPERATURE: 98.2 F | DIASTOLIC BLOOD PRESSURE: 70 MMHG | WEIGHT: 175 LBS | HEIGHT: 70 IN | OXYGEN SATURATION: 99 %

## 2020-10-13 PROCEDURE — 99239 HOSP IP/OBS DSCHRG MGMT >30: CPT | Performed by: NURSE PRACTITIONER

## 2020-10-13 RX ORDER — CEFUROXIME AXETIL 250 MG/1
500 TABLET ORAL EVERY 12 HOURS SCHEDULED
Status: DISCONTINUED | OUTPATIENT
Start: 2020-10-13 | End: 2020-10-13 | Stop reason: HOSPADM

## 2020-10-13 RX ORDER — CEFUROXIME AXETIL 500 MG/1
500 TABLET ORAL EVERY 12 HOURS SCHEDULED
Qty: 6 TABLET | Refills: 0 | Status: SHIPPED | OUTPATIENT
Start: 2020-10-13 | End: 2020-10-16

## 2020-10-13 RX ORDER — CHLORTHALIDONE 50 MG/1
25 TABLET ORAL DAILY
Qty: 60 TABLET | Refills: 0
Start: 2020-10-13 | End: 2021-01-04 | Stop reason: SDUPTHER

## 2020-10-13 RX ORDER — METRONIDAZOLE 500 MG/1
500 TABLET ORAL EVERY 8 HOURS SCHEDULED
Qty: 9 TABLET | Refills: 0 | Status: SHIPPED | OUTPATIENT
Start: 2020-10-13 | End: 2020-10-16

## 2020-10-13 RX ORDER — AMLODIPINE BESYLATE 5 MG/1
5 TABLET ORAL DAILY
Qty: 30 TABLET | Refills: 0 | Status: SHIPPED | OUTPATIENT
Start: 2020-10-14 | End: 2020-10-20 | Stop reason: SDUPTHER

## 2020-10-13 RX ORDER — METRONIDAZOLE 500 MG/1
500 TABLET ORAL EVERY 8 HOURS SCHEDULED
Status: DISCONTINUED | OUTPATIENT
Start: 2020-10-13 | End: 2020-10-13 | Stop reason: HOSPADM

## 2020-10-13 RX ORDER — POTASSIUM CHLORIDE 20 MEQ/1
20 TABLET, EXTENDED RELEASE ORAL 2 TIMES DAILY
Qty: 60 TABLET | Refills: 0 | Status: SHIPPED | OUTPATIENT
Start: 2020-10-13 | End: 2020-10-20 | Stop reason: SDUPTHER

## 2020-10-13 RX ADMIN — METRONIDAZOLE 500 MG: 500 TABLET ORAL at 06:22

## 2020-10-13 RX ADMIN — CHLORTHALIDONE 25 MG: 25 TABLET ORAL at 08:24

## 2020-10-13 RX ADMIN — APIXABAN 5 MG: 5 TABLET, FILM COATED ORAL at 08:23

## 2020-10-13 RX ADMIN — PANTOPRAZOLE SODIUM 40 MG: 40 TABLET, DELAYED RELEASE ORAL at 06:22

## 2020-10-13 RX ADMIN — CLOPIDOGREL BISULFATE 75 MG: 75 TABLET ORAL at 08:23

## 2020-10-13 RX ADMIN — POTASSIUM CHLORIDE 20 MEQ: 1500 TABLET, EXTENDED RELEASE ORAL at 08:23

## 2020-10-13 RX ADMIN — FERROUS SULFATE TAB 325 MG (65 MG ELEMENTAL FE) 325 MG: 325 (65 FE) TAB at 08:24

## 2020-10-13 RX ADMIN — LISINOPRIL 40 MG: 20 TABLET ORAL at 08:23

## 2020-10-13 RX ADMIN — AMLODIPINE BESYLATE 5 MG: 5 TABLET ORAL at 08:23

## 2020-10-16 ENCOUNTER — HOSPITAL ENCOUNTER (OUTPATIENT)
Dept: NON INVASIVE DIAGNOSTICS | Facility: HOSPITAL | Age: 75
Discharge: HOME | End: 2020-10-16
Payer: MEDICARE

## 2020-10-16 DIAGNOSIS — I65.23 BILATERAL CAROTID ARTERY STENOSIS: ICD-10-CM

## 2020-10-16 LAB — BACTERIA BLD CULT: NORMAL

## 2020-10-16 PROCEDURE — 93880 EXTRACRANIAL BILAT STUDY: CPT | Performed by: SURGERY

## 2020-10-16 PROCEDURE — 93880 EXTRACRANIAL BILAT STUDY: CPT

## 2020-10-20 ENCOUNTER — OFFICE VISIT (OUTPATIENT)
Dept: INTERNAL MEDICINE CLINIC | Facility: CLINIC | Age: 75
End: 2020-10-20
Payer: MEDICARE

## 2020-10-20 VITALS
DIASTOLIC BLOOD PRESSURE: 78 MMHG | OXYGEN SATURATION: 98 % | SYSTOLIC BLOOD PRESSURE: 124 MMHG | BODY MASS INDEX: 25.93 KG/M2 | TEMPERATURE: 97.4 F | HEIGHT: 70 IN | WEIGHT: 181.13 LBS | HEART RATE: 78 BPM

## 2020-10-20 DIAGNOSIS — K86.2 PANCREATIC CYST: ICD-10-CM

## 2020-10-20 DIAGNOSIS — I48.92 NEW ONSET ATRIAL FLUTTER (HCC): ICD-10-CM

## 2020-10-20 DIAGNOSIS — I48.92 NEW ONSET ATRIAL FLUTTER (HCC): Primary | ICD-10-CM

## 2020-10-20 DIAGNOSIS — R19.7 DIARRHEA OF PRESUMED INFECTIOUS ORIGIN: ICD-10-CM

## 2020-10-20 DIAGNOSIS — I65.23 BILATERAL CAROTID ARTERY STENOSIS: ICD-10-CM

## 2020-10-20 DIAGNOSIS — I10 ESSENTIAL HYPERTENSION: ICD-10-CM

## 2020-10-20 PROBLEM — R42 DIZZINESS: Status: RESOLVED | Noted: 2018-12-16 | Resolved: 2020-10-20

## 2020-10-20 PROBLEM — R55 NEAR SYNCOPE: Status: RESOLVED | Noted: 2020-09-18 | Resolved: 2020-10-20

## 2020-10-20 PROCEDURE — 99495 TRANSJ CARE MGMT MOD F2F 14D: CPT | Performed by: INTERNAL MEDICINE

## 2020-10-20 RX ORDER — AMLODIPINE BESYLATE 5 MG/1
5 TABLET ORAL DAILY
Qty: 30 TABLET | Refills: 5 | Status: SHIPPED | OUTPATIENT
Start: 2020-10-20 | End: 2021-06-10 | Stop reason: SDUPTHER

## 2020-10-20 RX ORDER — POTASSIUM CHLORIDE 20 MEQ/1
20 TABLET, EXTENDED RELEASE ORAL 2 TIMES DAILY
Qty: 60 TABLET | Refills: 5 | Status: SHIPPED | OUTPATIENT
Start: 2020-10-20 | End: 2021-06-10 | Stop reason: SDUPTHER

## 2020-10-29 ENCOUNTER — OFFICE VISIT (OUTPATIENT)
Dept: CARDIOLOGY CLINIC | Facility: CLINIC | Age: 75
End: 2020-10-29
Payer: MEDICARE

## 2020-10-29 VITALS
OXYGEN SATURATION: 96 % | TEMPERATURE: 97.9 F | WEIGHT: 177.6 LBS | HEIGHT: 70 IN | BODY MASS INDEX: 25.43 KG/M2 | HEART RATE: 82 BPM | DIASTOLIC BLOOD PRESSURE: 58 MMHG | SYSTOLIC BLOOD PRESSURE: 150 MMHG

## 2020-10-29 DIAGNOSIS — I67.9 CEREBRAL ARTERY DISEASE: ICD-10-CM

## 2020-10-29 DIAGNOSIS — I10 ESSENTIAL (PRIMARY) HYPERTENSION: ICD-10-CM

## 2020-10-29 DIAGNOSIS — I48.91 ATRIAL FIBRILLATION, UNSPECIFIED TYPE (HCC): Primary | ICD-10-CM

## 2020-10-29 PROCEDURE — 93000 ELECTROCARDIOGRAM COMPLETE: CPT | Performed by: NURSE PRACTITIONER

## 2020-10-29 PROCEDURE — 99215 OFFICE O/P EST HI 40 MIN: CPT | Performed by: NURSE PRACTITIONER

## 2020-10-30 DIAGNOSIS — I65.21 STENOSIS OF RIGHT CAROTID ARTERY: ICD-10-CM

## 2020-10-30 RX ORDER — CLOPIDOGREL BISULFATE 75 MG/1
75 TABLET ORAL DAILY
Qty: 90 TABLET | Refills: 0 | Status: SHIPPED | OUTPATIENT
Start: 2020-10-30 | End: 2020-12-21

## 2020-11-02 ENCOUNTER — HOSPITAL ENCOUNTER (OUTPATIENT)
Dept: NON INVASIVE DIAGNOSTICS | Facility: HOSPITAL | Age: 75
Discharge: HOME | End: 2020-11-02
Payer: MEDICARE

## 2020-11-02 DIAGNOSIS — I67.9 CEREBRAL ARTERY DISEASE: ICD-10-CM

## 2020-11-02 PROCEDURE — 93225 XTRNL ECG REC<48 HRS REC: CPT

## 2020-11-02 PROCEDURE — 93226 XTRNL ECG REC<48 HR SCAN A/R: CPT

## 2020-11-04 PROCEDURE — 93227 XTRNL ECG REC<48 HR R&I: CPT | Performed by: INTERNAL MEDICINE

## 2020-12-15 ENCOUNTER — OFFICE VISIT (OUTPATIENT)
Dept: INTERNAL MEDICINE CLINIC | Facility: CLINIC | Age: 75
End: 2020-12-15
Payer: MEDICARE

## 2020-12-15 ENCOUNTER — APPOINTMENT (OUTPATIENT)
Dept: LAB | Facility: MEDICAL CENTER | Age: 75
End: 2020-12-15
Payer: MEDICARE

## 2020-12-15 VITALS — WEIGHT: 182.5 LBS | HEIGHT: 70 IN | TEMPERATURE: 97.8 F | BODY MASS INDEX: 26.13 KG/M2

## 2020-12-15 DIAGNOSIS — R53.83 FATIGUE, UNSPECIFIED TYPE: ICD-10-CM

## 2020-12-15 DIAGNOSIS — I65.23 BILATERAL CAROTID ARTERY STENOSIS: ICD-10-CM

## 2020-12-15 DIAGNOSIS — Z11.59 ENCOUNTER FOR HEPATITIS C SCREENING TEST FOR LOW RISK PATIENT: ICD-10-CM

## 2020-12-15 DIAGNOSIS — Z00.00 MEDICARE ANNUAL WELLNESS VISIT, SUBSEQUENT: Primary | ICD-10-CM

## 2020-12-15 DIAGNOSIS — M45.0 ANKYLOSING SPONDYLITIS OF MULTIPLE SITES IN SPINE (HCC): ICD-10-CM

## 2020-12-15 DIAGNOSIS — D51.0 PERNICIOUS ANEMIA: ICD-10-CM

## 2020-12-15 LAB
ANION GAP SERPL CALCULATED.3IONS-SCNC: 9 MMOL/L (ref 4–13)
BASOPHILS # BLD AUTO: 0.03 THOUSANDS/ΜL (ref 0–0.1)
BASOPHILS NFR BLD AUTO: 1 % (ref 0–1)
BUN SERPL-MCNC: 12 MG/DL (ref 5–25)
CALCIUM SERPL-MCNC: 9.9 MG/DL (ref 8.3–10.1)
CHLORIDE SERPL-SCNC: 106 MMOL/L (ref 100–108)
CO2 SERPL-SCNC: 29 MMOL/L (ref 21–32)
CREAT SERPL-MCNC: 0.91 MG/DL (ref 0.6–1.3)
EOSINOPHIL # BLD AUTO: 0.07 THOUSAND/ΜL (ref 0–0.61)
EOSINOPHIL NFR BLD AUTO: 1 % (ref 0–6)
ERYTHROCYTE [DISTWIDTH] IN BLOOD BY AUTOMATED COUNT: 12.4 % (ref 11.6–15.1)
FERRITIN SERPL-MCNC: 58 NG/ML (ref 8–388)
GFR SERPL CREATININE-BSD FRML MDRD: 81 ML/MIN/1.73SQ M
GLUCOSE P FAST SERPL-MCNC: 133 MG/DL (ref 65–99)
HCT VFR BLD AUTO: 39.3 % (ref 36.5–49.3)
HCV AB SER QL: NORMAL
HGB BLD-MCNC: 13.2 G/DL (ref 12–17)
IMM GRANULOCYTES # BLD AUTO: 0.02 THOUSAND/UL (ref 0–0.2)
IMM GRANULOCYTES NFR BLD AUTO: 0 % (ref 0–2)
IRON SATN MFR SERPL: 16 %
IRON SERPL-MCNC: 58 UG/DL (ref 65–175)
LYMPHOCYTES # BLD AUTO: 1.3 THOUSANDS/ΜL (ref 0.6–4.47)
LYMPHOCYTES NFR BLD AUTO: 23 % (ref 14–44)
MAGNESIUM SERPL-MCNC: 1.8 MG/DL (ref 1.6–2.6)
MCH RBC QN AUTO: 30.9 PG (ref 26.8–34.3)
MCHC RBC AUTO-ENTMCNC: 33.6 G/DL (ref 31.4–37.4)
MCV RBC AUTO: 92 FL (ref 82–98)
MONOCYTES # BLD AUTO: 0.38 THOUSAND/ΜL (ref 0.17–1.22)
MONOCYTES NFR BLD AUTO: 7 % (ref 4–12)
NEUTROPHILS # BLD AUTO: 3.82 THOUSANDS/ΜL (ref 1.85–7.62)
NEUTS SEG NFR BLD AUTO: 68 % (ref 43–75)
NRBC BLD AUTO-RTO: 0 /100 WBCS
PLATELET # BLD AUTO: 199 THOUSANDS/UL (ref 149–390)
PMV BLD AUTO: 11.3 FL (ref 8.9–12.7)
POTASSIUM SERPL-SCNC: 3.4 MMOL/L (ref 3.5–5.3)
RBC # BLD AUTO: 4.27 MILLION/UL (ref 3.88–5.62)
SODIUM SERPL-SCNC: 144 MMOL/L (ref 136–145)
T4 FREE SERPL-MCNC: 0.93 NG/DL (ref 0.76–1.46)
TIBC SERPL-MCNC: 365 UG/DL (ref 250–450)
TSH SERPL DL<=0.05 MIU/L-ACNC: 1.2 UIU/ML (ref 0.36–3.74)
VIT B12 SERPL-MCNC: 315 PG/ML (ref 100–900)
WBC # BLD AUTO: 5.62 THOUSAND/UL (ref 4.31–10.16)

## 2020-12-15 PROCEDURE — 80048 BASIC METABOLIC PNL TOTAL CA: CPT | Performed by: NURSE PRACTITIONER

## 2020-12-15 PROCEDURE — 82728 ASSAY OF FERRITIN: CPT

## 2020-12-15 PROCEDURE — 36415 COLL VENOUS BLD VENIPUNCTURE: CPT | Performed by: NURSE PRACTITIONER

## 2020-12-15 PROCEDURE — 82607 VITAMIN B-12: CPT

## 2020-12-15 PROCEDURE — 83735 ASSAY OF MAGNESIUM: CPT | Performed by: NURSE PRACTITIONER

## 2020-12-15 PROCEDURE — 85025 COMPLETE CBC W/AUTO DIFF WBC: CPT

## 2020-12-15 PROCEDURE — 84439 ASSAY OF FREE THYROXINE: CPT | Performed by: NURSE PRACTITIONER

## 2020-12-15 PROCEDURE — 83540 ASSAY OF IRON: CPT

## 2020-12-15 PROCEDURE — 84443 ASSAY THYROID STIM HORMONE: CPT | Performed by: NURSE PRACTITIONER

## 2020-12-15 PROCEDURE — 83550 IRON BINDING TEST: CPT

## 2020-12-15 PROCEDURE — 86803 HEPATITIS C AB TEST: CPT

## 2020-12-15 PROCEDURE — G0439 PPPS, SUBSEQ VISIT: HCPCS | Performed by: INTERNAL MEDICINE

## 2020-12-18 ENCOUNTER — TELEPHONE (OUTPATIENT)
Dept: INTERNAL MEDICINE CLINIC | Facility: CLINIC | Age: 75
End: 2020-12-18

## 2020-12-18 DIAGNOSIS — D51.0 PERNICIOUS ANEMIA: Primary | ICD-10-CM

## 2020-12-21 ENCOUNTER — OFFICE VISIT (OUTPATIENT)
Dept: CARDIOLOGY CLINIC | Facility: CLINIC | Age: 75
End: 2020-12-21
Payer: MEDICARE

## 2020-12-21 VITALS
WEIGHT: 182.5 LBS | HEIGHT: 70 IN | BODY MASS INDEX: 26.13 KG/M2 | HEART RATE: 65 BPM | DIASTOLIC BLOOD PRESSURE: 62 MMHG | TEMPERATURE: 97.3 F | SYSTOLIC BLOOD PRESSURE: 166 MMHG

## 2020-12-21 DIAGNOSIS — I65.23 BILATERAL CAROTID ARTERY STENOSIS: ICD-10-CM

## 2020-12-21 DIAGNOSIS — I48.92 NEW ONSET ATRIAL FLUTTER (HCC): Primary | ICD-10-CM

## 2020-12-21 DIAGNOSIS — I10 ESSENTIAL HYPERTENSION: ICD-10-CM

## 2020-12-21 PROCEDURE — 93000 ELECTROCARDIOGRAM COMPLETE: CPT | Performed by: INTERNAL MEDICINE

## 2020-12-21 PROCEDURE — 99214 OFFICE O/P EST MOD 30 MIN: CPT | Performed by: INTERNAL MEDICINE

## 2020-12-21 RX ORDER — ASPIRIN 81 MG/1
81 TABLET ORAL DAILY
Qty: 90 TABLET | Refills: 3
Start: 2020-12-21

## 2021-01-04 ENCOUNTER — CLINICAL SUPPORT (OUTPATIENT)
Dept: INTERNAL MEDICINE CLINIC | Facility: CLINIC | Age: 76
End: 2021-01-04
Payer: MEDICARE

## 2021-01-04 DIAGNOSIS — D51.0 PERNICIOUS ANEMIA: ICD-10-CM

## 2021-01-04 DIAGNOSIS — I10 ESSENTIAL HYPERTENSION: ICD-10-CM

## 2021-01-04 PROCEDURE — 96372 THER/PROPH/DIAG INJ SC/IM: CPT | Performed by: INTERNAL MEDICINE

## 2021-01-04 RX ORDER — CHLORTHALIDONE 25 MG/1
25 TABLET ORAL DAILY
Qty: 90 TABLET | Refills: 3 | Status: SHIPPED | OUTPATIENT
Start: 2021-01-04 | End: 2022-05-05 | Stop reason: SDUPTHER

## 2021-01-04 RX ADMIN — CYANOCOBALAMIN 1000 MCG: 1000 INJECTION INTRAMUSCULAR; SUBCUTANEOUS at 09:35

## 2021-01-04 NOTE — PROGRESS NOTES
Patient presents for B12 injection  Patient tolerated injection well and would like to continue with monthly injections

## 2021-01-18 RX ORDER — APIXABAN 5 MG/1
TABLET, FILM COATED ORAL
Qty: 60 TABLET | Refills: 0 | OUTPATIENT
Start: 2021-01-18

## 2021-01-20 ENCOUNTER — IMMUNIZATIONS (OUTPATIENT)
Dept: FAMILY MEDICINE CLINIC | Facility: HOSPITAL | Age: 76
End: 2021-01-20

## 2021-01-20 DIAGNOSIS — Z23 ENCOUNTER FOR IMMUNIZATION: Primary | ICD-10-CM

## 2021-01-20 PROCEDURE — 91301 SARS-COV-2 / COVID-19 MRNA VACCINE (MODERNA) 100 MCG: CPT

## 2021-01-20 PROCEDURE — 0011A SARS-COV-2 / COVID-19 MRNA VACCINE (MODERNA) 100 MCG: CPT

## 2021-02-17 ENCOUNTER — IMMUNIZATIONS (OUTPATIENT)
Dept: FAMILY MEDICINE CLINIC | Facility: HOSPITAL | Age: 76
End: 2021-02-17

## 2021-02-17 DIAGNOSIS — Z23 ENCOUNTER FOR IMMUNIZATION: Primary | ICD-10-CM

## 2021-02-17 PROCEDURE — 0012A SARS-COV-2 / COVID-19 MRNA VACCINE (MODERNA) 100 MCG: CPT

## 2021-02-17 PROCEDURE — 91301 SARS-COV-2 / COVID-19 MRNA VACCINE (MODERNA) 100 MCG: CPT

## 2021-06-01 PROBLEM — I27.20 PULMONARY HYPERTENSION (HCC): Status: ACTIVE | Noted: 2021-06-01

## 2021-06-02 ENCOUNTER — OFFICE VISIT (OUTPATIENT)
Dept: INTERNAL MEDICINE CLINIC | Facility: CLINIC | Age: 76
End: 2021-06-02
Payer: MEDICARE

## 2021-06-02 VITALS
BODY MASS INDEX: 25.84 KG/M2 | WEIGHT: 180.5 LBS | TEMPERATURE: 97.6 F | HEIGHT: 70 IN | DIASTOLIC BLOOD PRESSURE: 72 MMHG | OXYGEN SATURATION: 97 % | HEART RATE: 68 BPM | SYSTOLIC BLOOD PRESSURE: 122 MMHG

## 2021-06-02 DIAGNOSIS — D51.0 PERNICIOUS ANEMIA: ICD-10-CM

## 2021-06-02 DIAGNOSIS — I10 ESSENTIAL HYPERTENSION: ICD-10-CM

## 2021-06-02 DIAGNOSIS — H25.9 SENILE CATARACT OF LEFT EYE, UNSPECIFIED AGE-RELATED CATARACT TYPE: Primary | ICD-10-CM

## 2021-06-02 DIAGNOSIS — M45.0 ANKYLOSING SPONDYLITIS OF MULTIPLE SITES IN SPINE (HCC): ICD-10-CM

## 2021-06-02 PROBLEM — R19.7 DIARRHEA: Status: RESOLVED | Noted: 2020-10-10 | Resolved: 2021-06-02

## 2021-06-02 PROBLEM — M22.40 CHONDROMALACIA PATELLAE: Status: RESOLVED | Noted: 2018-07-24 | Resolved: 2021-06-02

## 2021-06-02 PROBLEM — E80.6 HYPERBILIRUBINEMIA: Status: RESOLVED | Noted: 2020-10-10 | Resolved: 2021-06-02

## 2021-06-02 PROCEDURE — 99214 OFFICE O/P EST MOD 30 MIN: CPT | Performed by: INTERNAL MEDICINE

## 2021-06-02 PROCEDURE — 96372 THER/PROPH/DIAG INJ SC/IM: CPT | Performed by: INTERNAL MEDICINE

## 2021-06-02 RX ORDER — BROMFENAC SODIUM 0.7 MG/ML
SOLUTION/ DROPS OPHTHALMIC
COMMUNITY
Start: 2021-05-24

## 2021-06-02 RX ORDER — BESIFLOXACIN 6 MG/ML
SUSPENSION OPHTHALMIC
COMMUNITY
Start: 2021-05-25

## 2021-06-02 RX ORDER — TRIPROLIDINE/PSEUDOEPHEDRINE 2.5MG-60MG
TABLET ORAL
COMMUNITY
Start: 2021-05-24

## 2021-06-02 RX ORDER — FLUOROURACIL 50 MG/G
CREAM TOPICAL
COMMUNITY
Start: 2021-04-08

## 2021-06-02 RX ADMIN — CYANOCOBALAMIN 1000 MCG: 1000 INJECTION INTRAMUSCULAR; SUBCUTANEOUS at 08:52

## 2021-06-02 NOTE — PROGRESS NOTES
BMI Counseling: Body mass index is 25 9 kg/m²  The BMI is above normal  Nutrition recommendations include consuming healthier snacks and moderation in carbohydrate intake  Exercise recommendations include exercising 3-5 times per week  No pharmacotherapy was ordered  Falls Plan of Care: balance, strength, and gait training instructions were provided  Assessment/Plan:         Diagnoses and all orders for this visit:    Senile cataract of left eye, unspecified age-related cataract type  Pt stable for planned procedure  Form faxed     Pernicious anemia  B12 injection today Tolerated well    Ankylosing spondylitis of multiple sites in spine ([address] )  Pt has progressive sxs and is scheduled for MRI today thru pain mgmt     Essential hypertension  Bp stable Continue current rx and take am of cataract surgery     Other orders  -     Besivance 0 6 % SUSP; INSTILL 1 DROP INTO LEFT EYE TWICE DAILY  -     Prolensa 0 07 % SOLN; INSTILL 1 DROP INTO LEFT EYE NIGHTLY  -     Durezol 0 05 % EMUL; INSTILL 1 DROP INTO LEFT EYE TWICE DAILY  -     fluorouracil (EFUDEX) 5 % cream; APPLY ONCE A DAY FOR 4 WEEKS WASH HANDS AFTER APPLICATION WASH OFF IN THE MORNING      Pt refused MRI abdomen No sxs and weight stable He does not feel needed    Patient ID: Andres Choi  is a 64 y o  male  HPI  Pt for cataract surgery left eye 6/14 He has had progressive spinal issues and has MRI today He did well with his right eye surgery and hopes left will help as he does feel not as clear in the left eye with focus His biggest issue is ongoing arthritic sxs and now b/l rotator cuff tear No chest pain or sob No falls but balance not as good Has MRI this afternoon   Review of Systems   Constitutional: Positive for activity change and fatigue  Negative for diaphoresis  HENT: Negative  Eyes: Positive for visual disturbance  Respiratory: Negative for cough, chest tightness and shortness of breath      Cardiovascular: Negative for chest pain, palpitations and leg swelling  Gastrointestinal: Negative for abdominal distention, abdominal pain, constipation and diarrhea  Genitourinary: Negative for difficulty urinating and flank pain  Musculoskeletal: Positive for arthralgias, back pain, gait problem and neck pain  Neurological: Positive for weakness and light-headedness  Negative for dizziness and headaches  Psychiatric/Behavioral: Negative for sleep disturbance  The patient is not nervous/anxious          Past Medical History:   Diagnosis Date    Arthritis     Atrial flutter ([address] )     Cholecystitis     Coronary artery disease     Hypertension     RBBB     Spinal stenosis      Past Surgical History:   Procedure Laterality Date    CHOLECYSTECTOMY      COLON SURGERY      bwel resection    COLONOSCOPY      ESOPHAGOGASTRODUODENOSCOPY      2007  [facility]    AR LAP,CHOLECYSTECTOMY N/A 2017    Procedure: CHOLECYSTECTOMY LAPAROSCOPIC;  Surgeon: Juve Hayward MD;  [facility];  Service: General    SMALL INTESTINE SURGERY      [address]      Social History     Socioeconomic History    Marital status: /Civil Union     Spouse name: Not on file    Number of children: Not on file    Years of education: Not on file    Highest education level: Not on file   Occupational History    Not on file   Social Needs    Financial resource strain: Not on file    Food insecurity     Worry: Not on file     Inability: Not on file   [employer] needs     Medical: Not on file     Non-medical: Not on file   Tobacco Use    Smoking status: Former Smoker     Packs/day: 2 00     Years: 3 00     Pack years: 6 00     Quit date:      Years since quittin 4    Smokeless tobacco: Never Used   Substance and Sexual Activity    Alcohol use: Yes     Comment: social     Drug use: No    Sexual activity: Not on file   Lifestyle    Physical activity     Days per week: Not on file     Minutes per session: Not on file    Stress: Not on file   Relationships    Social connections     Talks on phone: Not on file     Gets together: Not on file     Attends [employer] service: Not on file     Active member of club or organization: Not on file     Attends meetings of clubs or organizations: Not on file     Relationship status: Not on file    Intimate partner violence     Fear of current or ex partner: Not on file     Emotionally abused: Not on file     Physically abused: Not on file     Forced sexual activity: Not on file   Other Topics Concern    Not on file   Social History Narrative    CAFFEINE USE    DENTAL CARE,REGULARLY    FEELS SAFE  E [facility]      No Known Allergies        /72   Pulse 68   Temp 97 6 °F (36 4 °C) (Temporal)   Ht 5' 10" (1 778 m)   Wt 81 9 kg (180 lb 8 oz)   SpO2 97%   BMI 25 90 kg/m²          Physical Exam  Vitals signs reviewed  Constitutional:       General: He is not in acute distress  Appearance: Normal appearance  He is not ill-appearing, toxic-appearing or diaphoretic  HENT:      Head: Normocephalic and atraumatic  Right Ear: Tympanic membrane and external ear normal       Left Ear: Tympanic membrane and external ear normal       Nose: Nose normal       Mouth/Throat:      Mouth: Mucous membranes are dry  Eyes:      General: No scleral icterus  Extraocular Movements: Extraocular movements intact  Conjunctiva/sclera: Conjunctivae normal       Pupils: Pupils are equal, round, and reactive to light  Neck:      Musculoskeletal: Normal range of motion and neck supple  Muscular tenderness present  No neck rigidity  Cardiovascular:      Rate and Rhythm: Normal rate and regular rhythm  Pulses: Normal pulses  Heart sounds: Normal heart sounds  Pulmonary:      Effort: Pulmonary effort is normal  No respiratory distress  Breath sounds: Normal breath sounds  No wheezing     Abdominal: General: Bowel sounds are normal  There is no distension  Palpations: Abdomen is soft  Tenderness: There is no abdominal tenderness  Musculoskeletal: Normal range of motion  General: Tenderness and deformity present  No swelling  Right lower leg: No edema  Left lower leg: No edema  Lymphadenopathy:      Cervical: No cervical adenopathy  Skin:     General: Skin is dry  Coloration: Skin is not jaundiced or pale  Findings: No erythema  Neurological:      Mental Status: He is alert and oriented to person, place, and time  Mental status is at baseline  Cranial Nerves: No cranial nerve deficit  Motor: Weakness present  Coordination: Coordination abnormal       Gait: Gait abnormal       Comments: Upper extremity weakness b/l shoulder restricted rom   lbp ttp    Psychiatric:         Mood and Affect: Mood normal          Behavior: Behavior normal          Thought Content:  Thought content normal          Judgment: Judgment normal

## 2021-06-08 ENCOUNTER — TELEPHONE (OUTPATIENT)
Dept: INTERNAL MEDICINE CLINIC | Facility: CLINIC | Age: 76
End: 2021-06-08

## 2021-06-10 DIAGNOSIS — I10 ESSENTIAL HYPERTENSION: ICD-10-CM

## 2021-06-10 DIAGNOSIS — I48.92 NEW ONSET ATRIAL FLUTTER (HCC): ICD-10-CM

## 2021-06-10 RX ORDER — POTASSIUM CHLORIDE 20 MEQ/1
20 TABLET, EXTENDED RELEASE ORAL 2 TIMES DAILY
Qty: 60 TABLET | Refills: 5 | Status: SHIPPED | OUTPATIENT
Start: 2021-06-10 | End: 2022-02-10 | Stop reason: SDUPTHER

## 2021-06-10 RX ORDER — AMLODIPINE BESYLATE 5 MG/1
5 TABLET ORAL DAILY
Qty: 30 TABLET | Refills: 5 | Status: SHIPPED | OUTPATIENT
Start: 2021-06-10 | End: 2021-06-14

## 2021-06-13 DIAGNOSIS — I10 ESSENTIAL HYPERTENSION: ICD-10-CM

## 2021-06-14 RX ORDER — AMLODIPINE BESYLATE 5 MG/1
TABLET ORAL
Qty: 30 TABLET | Refills: 0 | Status: SHIPPED | OUTPATIENT
Start: 2021-06-14 | End: 2022-02-10 | Stop reason: SDUPTHER

## 2021-06-24 DIAGNOSIS — I48.92 NEW ONSET ATRIAL FLUTTER (HCC): ICD-10-CM

## 2021-06-24 RX ORDER — APIXABAN 5 MG/1
TABLET, FILM COATED ORAL
Qty: 60 TABLET | Refills: 0 | Status: SHIPPED | OUTPATIENT
Start: 2021-06-24 | End: 2021-07-07 | Stop reason: SDUPTHER

## 2021-07-07 DIAGNOSIS — I48.92 NEW ONSET ATRIAL FLUTTER (HCC): ICD-10-CM

## 2021-10-26 DIAGNOSIS — K21.9 GASTROESOPHAGEAL REFLUX DISEASE: ICD-10-CM

## 2021-10-26 RX ORDER — PANTOPRAZOLE SODIUM 40 MG/1
40 TABLET, DELAYED RELEASE ORAL DAILY
Qty: 90 TABLET | Refills: 3 | Status: SHIPPED | OUTPATIENT
Start: 2021-10-26 | End: 2022-04-13

## 2021-11-03 ENCOUNTER — OFFICE VISIT (OUTPATIENT)
Dept: CARDIOLOGY CLINIC | Facility: CLINIC | Age: 76
End: 2021-11-03
Payer: MEDICARE

## 2021-11-03 VITALS
HEART RATE: 83 BPM | WEIGHT: 184 LBS | SYSTOLIC BLOOD PRESSURE: 156 MMHG | HEIGHT: 70 IN | DIASTOLIC BLOOD PRESSURE: 62 MMHG | BODY MASS INDEX: 26.34 KG/M2

## 2021-11-03 DIAGNOSIS — I65.03: ICD-10-CM

## 2021-11-03 DIAGNOSIS — I48.92 NEW ONSET ATRIAL FLUTTER (HCC): ICD-10-CM

## 2021-11-03 DIAGNOSIS — I48.91 ATRIAL FIBRILLATION, UNSPECIFIED TYPE (HCC): ICD-10-CM

## 2021-11-03 DIAGNOSIS — I27.20 PULMONARY HYPERTENSION (HCC): ICD-10-CM

## 2021-11-03 DIAGNOSIS — I10 ESSENTIAL HYPERTENSION: Primary | ICD-10-CM

## 2021-11-03 DIAGNOSIS — I65.23 BILATERAL CAROTID ARTERY STENOSIS: ICD-10-CM

## 2021-11-03 PROCEDURE — 99213 OFFICE O/P EST LOW 20 MIN: CPT | Performed by: INTERNAL MEDICINE

## 2021-11-03 PROCEDURE — 93000 ELECTROCARDIOGRAM COMPLETE: CPT | Performed by: INTERNAL MEDICINE

## 2021-12-13 DIAGNOSIS — I10 ESSENTIAL HYPERTENSION: ICD-10-CM

## 2021-12-13 RX ORDER — LISINOPRIL 40 MG/1
40 TABLET ORAL DAILY
Qty: 90 TABLET | Refills: 3 | Status: SHIPPED | OUTPATIENT
Start: 2021-12-13 | End: 2022-05-05 | Stop reason: SDUPTHER

## 2021-12-21 ENCOUNTER — APPOINTMENT (OUTPATIENT)
Dept: LAB | Facility: MEDICAL CENTER | Age: 76
End: 2021-12-21
Payer: MEDICARE

## 2021-12-21 ENCOUNTER — OFFICE VISIT (OUTPATIENT)
Dept: FAMILY MEDICINE CLINIC | Facility: CLINIC | Age: 76
End: 2021-12-21
Payer: MEDICARE

## 2021-12-21 VITALS
BODY MASS INDEX: 27.23 KG/M2 | TEMPERATURE: 98.1 F | WEIGHT: 190.2 LBS | HEIGHT: 70 IN | RESPIRATION RATE: 18 BRPM | SYSTOLIC BLOOD PRESSURE: 128 MMHG | HEART RATE: 76 BPM | DIASTOLIC BLOOD PRESSURE: 70 MMHG

## 2021-12-21 DIAGNOSIS — E03.9 ACQUIRED HYPOTHYROIDISM: ICD-10-CM

## 2021-12-21 DIAGNOSIS — D50.8 OTHER IRON DEFICIENCY ANEMIA: ICD-10-CM

## 2021-12-21 DIAGNOSIS — Z00.00 MEDICARE ANNUAL WELLNESS VISIT, SUBSEQUENT: ICD-10-CM

## 2021-12-21 DIAGNOSIS — Z00.00 MEDICARE ANNUAL WELLNESS VISIT, SUBSEQUENT: Primary | ICD-10-CM

## 2021-12-21 LAB
ALBUMIN SERPL BCP-MCNC: 4.1 G/DL (ref 3.5–5)
ALP SERPL-CCNC: 92 U/L (ref 46–116)
ALT SERPL W P-5'-P-CCNC: 20 U/L (ref 12–78)
ANION GAP SERPL CALCULATED.3IONS-SCNC: 5 MMOL/L (ref 4–13)
AST SERPL W P-5'-P-CCNC: 18 U/L (ref 5–45)
BASOPHILS # BLD AUTO: 0.02 THOUSANDS/ΜL (ref 0–0.1)
BASOPHILS NFR BLD AUTO: 0 % (ref 0–1)
BILIRUB SERPL-MCNC: 1.51 MG/DL (ref 0.2–1)
BUN SERPL-MCNC: 12 MG/DL (ref 5–25)
CALCIUM SERPL-MCNC: 9 MG/DL (ref 8.3–10.1)
CHLORIDE SERPL-SCNC: 103 MMOL/L (ref 100–108)
CHOLEST SERPL-MCNC: 236 MG/DL
CO2 SERPL-SCNC: 31 MMOL/L (ref 21–32)
CREAT SERPL-MCNC: 1.05 MG/DL (ref 0.6–1.3)
EOSINOPHIL # BLD AUTO: 0.07 THOUSAND/ΜL (ref 0–0.61)
EOSINOPHIL NFR BLD AUTO: 1 % (ref 0–6)
ERYTHROCYTE [DISTWIDTH] IN BLOOD BY AUTOMATED COUNT: 13.5 % (ref 11.6–15.1)
FERRITIN SERPL-MCNC: 27 NG/ML (ref 8–388)
GFR SERPL CREATININE-BSD FRML MDRD: 67 ML/MIN/1.73SQ M
GLUCOSE SERPL-MCNC: 78 MG/DL (ref 65–140)
HCT VFR BLD AUTO: 39.3 % (ref 36.5–49.3)
HDLC SERPL-MCNC: 46 MG/DL
HGB BLD-MCNC: 13.8 G/DL (ref 12–17)
IMM GRANULOCYTES # BLD AUTO: 0.02 THOUSAND/UL (ref 0–0.2)
IMM GRANULOCYTES NFR BLD AUTO: 0 % (ref 0–2)
IRON SATN MFR SERPL: 19 % (ref 20–50)
IRON SERPL-MCNC: 73 UG/DL (ref 65–175)
LDLC SERPL CALC-MCNC: 166 MG/DL (ref 0–100)
LYMPHOCYTES # BLD AUTO: 1.46 THOUSANDS/ΜL (ref 0.6–4.47)
LYMPHOCYTES NFR BLD AUTO: 26 % (ref 14–44)
MCH RBC QN AUTO: 30.8 PG (ref 26.8–34.3)
MCHC RBC AUTO-ENTMCNC: 35.1 G/DL (ref 31.4–37.4)
MCV RBC AUTO: 88 FL (ref 82–98)
MONOCYTES # BLD AUTO: 0.41 THOUSAND/ΜL (ref 0.17–1.22)
MONOCYTES NFR BLD AUTO: 7 % (ref 4–12)
NEUTROPHILS # BLD AUTO: 3.68 THOUSANDS/ΜL (ref 1.85–7.62)
NEUTS SEG NFR BLD AUTO: 66 % (ref 43–75)
NONHDLC SERPL-MCNC: 190 MG/DL
NRBC BLD AUTO-RTO: 0 /100 WBCS
PLATELET # BLD AUTO: 168 THOUSANDS/UL (ref 149–390)
PMV BLD AUTO: 11.6 FL (ref 8.9–12.7)
POTASSIUM SERPL-SCNC: 3.4 MMOL/L (ref 3.5–5.3)
PROT SERPL-MCNC: 7 G/DL (ref 6.4–8.2)
RBC # BLD AUTO: 4.48 MILLION/UL (ref 3.88–5.62)
SODIUM SERPL-SCNC: 139 MMOL/L (ref 136–145)
TIBC SERPL-MCNC: 394 UG/DL (ref 250–450)
TRIGL SERPL-MCNC: 122 MG/DL
TSH SERPL DL<=0.05 MIU/L-ACNC: 1.89 UIU/ML (ref 0.36–3.74)
WBC # BLD AUTO: 5.66 THOUSAND/UL (ref 4.31–10.16)

## 2021-12-21 PROCEDURE — 83550 IRON BINDING TEST: CPT

## 2021-12-21 PROCEDURE — 83540 ASSAY OF IRON: CPT

## 2021-12-21 PROCEDURE — 36415 COLL VENOUS BLD VENIPUNCTURE: CPT

## 2021-12-21 PROCEDURE — 80053 COMPREHEN METABOLIC PANEL: CPT

## 2021-12-21 PROCEDURE — G0438 PPPS, INITIAL VISIT: HCPCS | Performed by: INTERNAL MEDICINE

## 2021-12-21 PROCEDURE — 82728 ASSAY OF FERRITIN: CPT

## 2021-12-21 PROCEDURE — 80061 LIPID PANEL: CPT

## 2021-12-21 PROCEDURE — 84443 ASSAY THYROID STIM HORMONE: CPT

## 2021-12-21 PROCEDURE — 1123F ACP DISCUSS/DSCN MKR DOCD: CPT | Performed by: INTERNAL MEDICINE

## 2021-12-21 PROCEDURE — 85025 COMPLETE CBC W/AUTO DIFF WBC: CPT

## 2022-02-10 DIAGNOSIS — I48.92 NEW ONSET ATRIAL FLUTTER (HCC): ICD-10-CM

## 2022-02-10 DIAGNOSIS — I10 ESSENTIAL HYPERTENSION: ICD-10-CM

## 2022-02-10 RX ORDER — POTASSIUM CHLORIDE 20 MEQ/1
20 TABLET, EXTENDED RELEASE ORAL 2 TIMES DAILY
Qty: 60 TABLET | Refills: 5 | Status: SHIPPED | OUTPATIENT
Start: 2022-02-10

## 2022-02-10 RX ORDER — AMLODIPINE BESYLATE 5 MG/1
5 TABLET ORAL DAILY
Qty: 30 TABLET | Refills: 2 | Status: SHIPPED | OUTPATIENT
Start: 2022-02-10 | End: 2022-07-19

## 2022-04-13 ENCOUNTER — OFFICE VISIT (OUTPATIENT)
Dept: FAMILY MEDICINE CLINIC | Facility: CLINIC | Age: 77
End: 2022-04-13
Payer: MEDICARE

## 2022-04-13 VITALS
RESPIRATION RATE: 18 BRPM | HEART RATE: 76 BPM | SYSTOLIC BLOOD PRESSURE: 124 MMHG | HEIGHT: 70 IN | DIASTOLIC BLOOD PRESSURE: 78 MMHG | BODY MASS INDEX: 26.34 KG/M2 | TEMPERATURE: 97.9 F | WEIGHT: 184 LBS

## 2022-04-13 DIAGNOSIS — R26.81 GAIT INSTABILITY: Primary | ICD-10-CM

## 2022-04-13 DIAGNOSIS — M45.0 ANKYLOSING SPONDYLITIS OF MULTIPLE SITES IN SPINE (HCC): ICD-10-CM

## 2022-04-13 DIAGNOSIS — M54.12 CERVICAL RADICULOPATHY: ICD-10-CM

## 2022-04-13 DIAGNOSIS — I10 ESSENTIAL HYPERTENSION: ICD-10-CM

## 2022-04-13 DIAGNOSIS — D51.0 PERNICIOUS ANEMIA: ICD-10-CM
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
## 2022-04-13 PROCEDURE — 99214 OFFICE O/P EST MOD 30 MIN: CPT | Performed by: INTERNAL MEDICINE

## 2022-04-13 NOTE — PROGRESS NOTES
Assessment/Plan:         Diagnoses and all orders for this visit:    Gait instability  Use cane for safety   Has appt for injection upcoming    Ankylosing spondylitis of multiple sites in spine [facility])  He is reaching out to Dr Lula Dodson for another injection - he does get benefit for several weeks from injection  Add Tylenol scheduled up to TID and discussed gabapentin    Cervical radiculopathy  Awaiting next injection which has been helpful at least temporary for pain mgmt Add Tylenol since on Eliquis daily now     Pernicious anemia  B12 injection monthly     Essential hypertension  No added salt diet Continue current rx   Increase water intake       Rto 4 months     Patient ID: Shelby Mcneill  is a 76 y o  male  HPI   Pt ok but has been more limited due to pain and even hand pain/numbness Just had injection to left hand He is awaiting return of Dr Lula Dodson for next injection which does help temproarily for several weeks He is asking what he can add with taking Eliquis now No chest pain No falls He does use rollator or cane more often now He tires more easily No change in bowels or bladder function He had injection left hand for numbness by [facility] - minimal result         Review of Systems   Constitutional: Negative for chills and fever  HENT: Negative  Eyes: Negative for visual disturbance  Respiratory: Negative for cough and shortness of breath  Cardiovascular: Negative for chest pain, palpitations and leg swelling  Gastrointestinal: Negative for abdominal distention and abdominal pain  Genitourinary: Negative  Musculoskeletal: Positive for arthralgias and neck pain  Neurological: Negative for dizziness, light-headedness and headaches  Psychiatric/Behavioral: Negative for sleep disturbance  The patient is not nervous/anxious          Past Medical History:   Diagnosis Date    Arthritis     Atrial flutter ([address] )     Cholecystitis     Coronary artery disease     Hypertension     RBBB     Spinal stenosis      Past Surgical History:   Procedure Laterality Date    CHOLECYSTECTOMY      COLON SURGERY      bwel resection    COLONOSCOPY      ESOPHAGOGASTRODUODENOSCOPY      2007  [facility]    ID LAP,CHOLECYSTECTOMY N/A 2017    Procedure: CHOLECYSTECTOMY LAPAROSCOPIC;  Surgeon: Richie Templeton MD;  [facility];  Service: General    SMALL INTESTINE SURGERY      [facility] DEC 2011     Social History     Socioeconomic History    Marital status: /Civil Union     Spouse name: Not on file    Number of children: Not on file    Years of education: Not on file    Highest education level: Not on file   Occupational History    Not on file   Tobacco Use    Smoking status: Former Smoker     Packs/day: 2 00     Years: 3 00     Pack years: 6 00     Quit date:      Years since quittin 1    Smokeless tobacco: Never Used   Vaping Use    Vaping Use: Never used   Substance and Sexual Activity    Alcohol use: Yes     Comment: social     Drug use: No    Sexual activity: Not on file   Other Topics Concern    Not on file   Social History Narrative    [facility]    FEELS SAFE  [facility]      Social Determinants of Health     Financial Resource Strain: Not on file   Food Insecurity: Not on file   Transportation Needs: Not on file   Physical Activity: Not on file   Stress: Not on file   Social Connections: Not on file   Intimate Partner Violence: Not on file   Housing Stability: Not on file     No Known Allergies  BMI Counseling: Body mass index is 26 4 kg/m²  The BMI is above normal  Nutrition recommendations include decreasing portion sizes, consuming healthier snacks and moderation in carbohydrate intake  Exercise recommendations include exercising 3-5 times per week  Rationale for BMI follow-up plan is due to patient being overweight or obese             /78   Pulse 76   Temp 97 9 °F (36 6 °C) (Temporal)   Resp 18   Ht 5' 10" (1 778 m)   Wt 83 5 kg (184 lb)   BMI 26 40 kg/m²          Physical Exam  Vitals reviewed  Constitutional:       General: He is not in acute distress  Appearance: Normal appearance  He is not ill-appearing, toxic-appearing or diaphoretic  HENT:      Head: Normocephalic and atraumatic  Right Ear: External ear normal       Left Ear: External ear normal       Nose: Nose normal       Mouth/Throat:      Mouth: Mucous membranes are dry  Eyes:      General: No scleral icterus  Extraocular Movements: Extraocular movements intact  Conjunctiva/sclera: Conjunctivae normal       Pupils: Pupils are equal, round, and reactive to light  Cardiovascular:      Rate and Rhythm: Normal rate and regular rhythm  Pulses: Normal pulses  Heart sounds: Normal heart sounds  Pulmonary:      Effort: Pulmonary effort is normal  No respiratory distress  Breath sounds: Normal breath sounds  No wheezing  Abdominal:      General: Bowel sounds are normal  There is no distension  Palpations: Abdomen is soft  Tenderness: There is no abdominal tenderness  Musculoskeletal:      Cervical back: Normal range of motion and neck supple  Tenderness present  Right lower leg: No edema  Left lower leg: No edema  Lymphadenopathy:      Cervical: No cervical adenopathy  Skin:     General: Skin is dry  Coloration: Skin is not jaundiced or pale  Neurological:      General: No focal deficit present  Mental Status: He is alert and oriented to person, place, and time  Mental status is at baseline  Cranial Nerves: No cranial nerve deficit  Psychiatric:         Mood and Affect: Mood normal          Behavior: Behavior normal          Thought Content:  Thought content normal          Judgment: Judgment normal

## 2022-05-05 ENCOUNTER — TELEPHONE (OUTPATIENT)
Dept: FAMILY MEDICINE CLINIC | Facility: CLINIC | Age: 77
End: 2022-05-05

## 2022-05-05 DIAGNOSIS — I10 ESSENTIAL HYPERTENSION: ICD-10-CM

## 2022-05-05 RX ORDER — LISINOPRIL 40 MG/1
40 TABLET ORAL DAILY
Qty: 90 TABLET | Refills: 3 | Status: SHIPPED | OUTPATIENT
Start: 2022-05-05

## 2022-05-05 RX ORDER — CHLORTHALIDONE 25 MG/1
25 TABLET ORAL DAILY
Qty: 90 TABLET | Refills: 3 | Status: SHIPPED | OUTPATIENT
Start: 2022-05-05 | End: 2022-08-03

## 2022-07-18 ENCOUNTER — HOSPITAL ENCOUNTER (EMERGENCY)
Facility: HOSPITAL | Age: 77
Discharge: HOME | End: 2022-07-18
Attending: EMERGENCY MEDICINE
Payer: MEDICARE

## 2022-07-18 ENCOUNTER — TELEPHONE (OUTPATIENT)
Dept: FAMILY MEDICINE CLINIC | Facility: CLINIC | Age: 77
End: 2022-07-18

## 2022-07-18 VITALS
BODY MASS INDEX: 24.34 KG/M2 | SYSTOLIC BLOOD PRESSURE: 161 MMHG | HEART RATE: 66 BPM | DIASTOLIC BLOOD PRESSURE: 77 MMHG | WEIGHT: 170 LBS | OXYGEN SATURATION: 97 % | HEIGHT: 70 IN | TEMPERATURE: 97.9 F | RESPIRATION RATE: 20 BRPM

## 2022-07-18 DIAGNOSIS — R53.1 WEAKNESS: Primary | ICD-10-CM

## 2022-07-18 DIAGNOSIS — Z87.39 HISTORY OF ANKYLOSING SPONDYLITIS: ICD-10-CM

## 2022-07-18 LAB
ANION GAP SERPL CALCULATED.3IONS-SCNC: 10 MMOL/L (ref 4–13)
ATRIAL RATE: 75 BPM
BACTERIA UR QL AUTO: NORMAL /HPF
BASOPHILS # BLD AUTO: 0.02 THOUSANDS/ΜL (ref 0–0.1)
BASOPHILS NFR BLD AUTO: 0 % (ref 0–1)
BILIRUB UR QL STRIP: NEGATIVE
BUN SERPL-MCNC: 19 MG/DL (ref 5–25)
CALCIUM SERPL-MCNC: 9.3 MG/DL (ref 8.4–10.2)
CHLORIDE SERPL-SCNC: 102 MMOL/L (ref 96–108)
CLARITY UR: CLEAR
CO2 SERPL-SCNC: 28 MMOL/L (ref 21–32)
COLOR UR: YELLOW
CREAT SERPL-MCNC: 0.94 MG/DL (ref 0.6–1.3)
EOSINOPHIL # BLD AUTO: 0.03 THOUSAND/ΜL (ref 0–0.61)
EOSINOPHIL NFR BLD AUTO: 0 % (ref 0–6)
ERYTHROCYTE [DISTWIDTH] IN BLOOD BY AUTOMATED COUNT: 12.9 % (ref 11.6–15.1)
GFR SERPL CREATININE-BSD FRML MDRD: 76 ML/MIN/1.73SQ M
GLUCOSE SERPL-MCNC: 101 MG/DL (ref 65–140)
GLUCOSE UR STRIP-MCNC: NEGATIVE MG/DL
HCT VFR BLD AUTO: 40.6 % (ref 36.5–49.3)
HGB BLD-MCNC: 14 G/DL (ref 12–17)
HGB UR QL STRIP.AUTO: ABNORMAL
IMM GRANULOCYTES # BLD AUTO: 0.02 THOUSAND/UL (ref 0–0.2)
IMM GRANULOCYTES NFR BLD AUTO: 0 % (ref 0–2)
KETONES UR STRIP-MCNC: NEGATIVE MG/DL
LEUKOCYTE ESTERASE UR QL STRIP: NEGATIVE
LYMPHOCYTES # BLD AUTO: 1.32 THOUSANDS/ΜL (ref 0.6–4.47)
LYMPHOCYTES NFR BLD AUTO: 20 % (ref 14–44)
MCH RBC QN AUTO: 31 PG (ref 26.8–34.3)
MCHC RBC AUTO-ENTMCNC: 34.5 G/DL (ref 31.4–37.4)
MCV RBC AUTO: 90 FL (ref 82–98)
MONOCYTES # BLD AUTO: 0.52 THOUSAND/ΜL (ref 0.17–1.22)
MONOCYTES NFR BLD AUTO: 8 % (ref 4–12)
NEUTROPHILS # BLD AUTO: 4.84 THOUSANDS/ΜL (ref 1.85–7.62)
NEUTS SEG NFR BLD AUTO: 72 % (ref 43–75)
NITRITE UR QL STRIP: NEGATIVE
NON-SQ EPI CELLS URNS QL MICRO: NORMAL /HPF
NRBC BLD AUTO-RTO: 0 /100 WBCS
P AXIS: 73 DEGREES
PH UR STRIP.AUTO: 6 [PH]
PLATELET # BLD AUTO: 168 THOUSANDS/UL (ref 149–390)
PMV BLD AUTO: 10.7 FL (ref 8.9–12.7)
POTASSIUM SERPL-SCNC: 3.5 MMOL/L (ref 3.5–5.3)
PR INTERVAL: 224 MS
PROT UR STRIP-MCNC: NEGATIVE MG/DL
QRS AXIS: -45 DEGREES
QRSD INTERVAL: 144 MS
QT INTERVAL: 418 MS
QTC INTERVAL: 466 MS
RBC # BLD AUTO: 4.51 MILLION/UL (ref 3.88–5.62)
RBC #/AREA URNS AUTO: NORMAL /HPF
SODIUM SERPL-SCNC: 140 MMOL/L (ref 135–147)
SP GR UR STRIP.AUTO: 1.02 (ref 1–1.03)
T WAVE AXIS: 66 DEGREES
TSH SERPL DL<=0.05 MIU/L-ACNC: 1.72 UIU/ML (ref 0.45–4.5)
UROBILINOGEN UR QL STRIP.AUTO: 1 E.U./DL
VENTRICULAR RATE: 75 BPM
WBC # BLD AUTO: 6.75 THOUSAND/UL (ref 4.31–10.16)
WBC #/AREA URNS AUTO: NORMAL /HPF

## 2022-07-18 PROCEDURE — 80048 BASIC METABOLIC PNL TOTAL CA: CPT | Performed by: STUDENT IN AN ORGANIZED HEALTH CARE EDUCATION/TRAINING PROGRAM

## 2022-07-18 PROCEDURE — 93010 ELECTROCARDIOGRAM REPORT: CPT | Performed by: INTERNAL MEDICINE

## 2022-07-18 PROCEDURE — 99284 EMERGENCY DEPT VISIT MOD MDM: CPT

## 2022-07-18 PROCEDURE — 93005 ELECTROCARDIOGRAM TRACING: CPT

## 2022-07-18 PROCEDURE — 85025 COMPLETE CBC W/AUTO DIFF WBC: CPT | Performed by: STUDENT IN AN ORGANIZED HEALTH CARE EDUCATION/TRAINING PROGRAM

## 2022-07-18 PROCEDURE — 81001 URINALYSIS AUTO W/SCOPE: CPT | Performed by: STUDENT IN AN ORGANIZED HEALTH CARE EDUCATION/TRAINING PROGRAM

## 2022-07-18 PROCEDURE — 99285 EMERGENCY DEPT VISIT HI MDM: CPT | Performed by: EMERGENCY MEDICINE

## 2022-07-18 PROCEDURE — 36415 COLL VENOUS BLD VENIPUNCTURE: CPT | Performed by: STUDENT IN AN ORGANIZED HEALTH CARE EDUCATION/TRAINING PROGRAM

## 2022-07-18 PROCEDURE — 84443 ASSAY THYROID STIM HORMONE: CPT | Performed by: STUDENT IN AN ORGANIZED HEALTH CARE EDUCATION/TRAINING PROGRAM

## 2022-07-18 NOTE — TELEPHONE ENCOUNTER
Needs to go to er FOR EVAL - NEEDS LABS TO CHECK HGB ETC AS HE HAS BEEN ANEMIC IN PAST  There actually is standard dose of eliquis except reduction for kidney function so he may be having reaction or bleeding from it

## 2022-07-18 NOTE — DISCHARGE INSTRUCTIONS
You were seen in the emergency department for generalized weakness  Your evaluation did not reveal any emergent causes of your weakness  Please follow-up with your family doctor  Please also follow-up with orthopedics to receive another injection  It is recommended you participate in physical therapy  Return to the emergency department for any fevers, chills, inability to walk, headaches, dizziness, chest pain, shortness of breath, or any other new or concerning symptoms

## 2022-07-18 NOTE — ED ATTENDING ATTESTATION
7/18/2022  I, Rochelle Yu MD, saw and evaluated the patient  I have discussed the patient with the resident/non-physician practitioner and agree with the resident's/non-physician practitioner's findings, Plan of Care, and MDM as documented in the resident's/non-physician practitioner's note, except where noted  All available labs and Radiology studies were reviewed  I was present for key portions of any procedure(s) performed by the resident/non-physician practitioner and I was immediately available to provide assistance  At this point I agree with the current assessment done in the Emergency Department  I have conducted an independent evaluation of this patient a history and physical is as follows:    Subjective:  Patient is 64-year-old male with history of ankylosing spondylitis who presents with ongoing lower back pain and generalized weakness  The symptoms have been worsening over the past several months  He missed a back injection which she blames for his symptoms  No red flags including bowel bladder incontinence, saddle anesthesia  He still ambulatory with a walker  No recent falls  Objective:  Strength 5/5 lower extremities, sensation intact throughout    Assessment and plan:  Patient with ongoing chronic ankylosing spondylitis with associated in deconditioning  Advised physical therapy and outpatient follow-up with orthopedics and strict return precautions discussed      ED Course         Critical Care Time  Procedures

## 2022-07-18 NOTE — ED PROVIDER NOTES
History  Chief Complaint   Patient presents with    Difficulty Walking     Feels like he has progressively getting weaker  Has history of spinal stenosis and his physician has been out getting surgery  His injection is overdue but wife feels there's something more going on  Patient is a 73-year-old male, past medical history of ankylosing spondylitis, Crohn's, hypertension, and atrial flutter on Eliquis, who presents to the emergency department for weakness and difficulty ambulating  Patient states the symptoms have been present for several months  The reason he came in today is because he called his PCP and it was recommended to him to seek evaluation  Patient states that he thinks the reason he is weak because he has not had his spinal steroid injection (that he normally gets every couple of months) in a while  He has more recently tried tylenol (last dose yesterday) with minimal relief  There are no other modifying factors  Associated symptoms include fatigue  He denies any recent fells  He denies any chest pain, SOB, NVD, headaches, dizziness,urinary symptoms, back pain, abdominal pain, fevers, chills, or any other new or concerning symptoms  He denies any recent falls or head strikes  Per chart review, PCP sent patient to ER for eval and recommended checking hemoglobin as he has had anemia in the past       History provided by:  Patient and medical records   used: No        Prior to Admission Medications   Prescriptions Last Dose Informant Patient Reported? Taking?    Besivance 0 6 % SUSP  Self Yes No   Sig: INSTILL 1 DROP INTO LEFT EYE TWICE DAILY   Durezol 0 05 % EMUL  Self Yes No   Sig: INSTILL 1 DROP INTO LEFT EYE TWICE DAILY   Eliquis 5 MG   No No   Sig: Take 1 tablet by mouth twice daily   Prolensa 0 07 % SOLN  Self Yes No   Sig: INSTILL 1 DROP INTO LEFT EYE NIGHTLY   amLODIPine (NORVASC) 5 mg tablet   No No   Sig: Take 1 tablet (5 mg total) by mouth daily   aspirin (ECOTRIN LOW STRENGTH) 81 mg EC tablet  Self No No   Sig: Take 1 tablet (81 mg total) by mouth daily   chlorthalidone 25 mg tablet   No No   Sig: Take 1 tablet (25 mg total) by mouth daily   diphenhydrAMINE HCl (BENADRYL ALLERGY PO)  Self Yes No   Sig: Take by mouth as needed   ferrous sulfate 325 (65 Fe) mg tablet  Self Yes No   Sig: Take 325 mg by mouth daily with breakfast   fluorouracil (EFUDEX) 5 % cream  Self Yes No   Sig: APPLY ONCE A DAY FOR 4 WEEKS WASH HANDS AFTER APPLICATION WASH OFF IN THE MORNING   lisinopril (ZESTRIL) 40 mg tablet   No No   Sig: Take 1 tablet (40 mg total) by mouth daily   pantoprazole (PROTONIX) 40 mg tablet  Self No No   Sig: Take 1 tablet (40 mg total) by mouth daily   potassium chloride (K-DUR,KLOR-CON) 20 mEq tablet   No No   Sig: Take 1 tablet (20 mEq total) by mouth 2 (two) times a day      Facility-Administered Medications Last Administration Doses Remaining   cyanocobalamin injection 1,000 mcg 6/2/2021  8:52 AM           Past Medical History:   Diagnosis Date    Arthritis     Atrial flutter (HCC)     Cholecystitis     Coronary artery disease     Hypertension     RBBB     Spinal stenosis        Past Surgical History:   Procedure Laterality Date    CHOLECYSTECTOMY      COLON SURGERY      bwel resection    COLONOSCOPY      ESOPHAGOGASTRODUODENOSCOPY      02/07/2007  ONSET    [place] LAP,CHOLECYSTECTOMY N/A 12/5/2017    Procedure: CHOLECYSTECTOMY LAPAROSCOPIC;  Surgeon: Dunia Schmitz MD;  Location: [facility];  Service: General    SMALL INTESTINE SURGERY      ONSEC [address] [facility] [address] 2011       Family History   Problem Relation Age of Onset    Arthritis Mother     Heart attack Father     Coronary artery disease Family     Diabetes Family      I have reviewed and agree with the history as documented      E-Cigarette/Vaping    E-Cigarette Use Never User      E-Cigarette/Vaping Substances    Nicotine No     THC No     CBD No     Flavoring No     Other No     Unknown No      Social History     Tobacco Use    Smoking status: Former Smoker     Packs/day: 2 00     Years: 3 00     Pack years: 6 00     Quit date:      Years since quittin 8    Smokeless tobacco: Never Used   Vaping Use    Vaping Use: Never used   Substance Use Topics    Alcohol use: Yes     Comment: social     Drug use: No        Review of Systems   Constitutional: Negative for chills and fever  Respiratory: Negative for shortness of breath  Cardiovascular: Negative for chest pain  Gastrointestinal: Negative for abdominal pain, diarrhea, nausea and vomiting  Musculoskeletal: Positive for gait problem  Negative for back pain and neck pain  Neurological: Positive for weakness  All other systems reviewed and are negative  Physical Exam  ED Triage Vitals [22 1002]   Temperature Pulse Respirations Blood Pressure SpO2   97 9 °F (36 6 °C) 66 20 161/77 97 %      Temp Source Heart Rate Source Patient Position - Orthostatic VS BP Location FiO2 (%)   Tympanic Monitor Sitting Left arm --      Pain Score       No Pain             Orthostatic Vital Signs  Vitals:    22 1002   BP: 161/77   Pulse: 66   Patient Position - Orthostatic VS: Sitting       Physical Exam  Vitals and nursing note reviewed  Constitutional:       General: He is not in acute distress  Appearance: He is well-developed  He is not diaphoretic  HENT:      Head: Normocephalic and atraumatic  Comments: There is no conjunctival palor     Right Ear: External ear normal       Left Ear: External ear normal       Nose: Nose normal    Eyes:      General: Lids are normal  No scleral icterus  Cardiovascular:      Rate and Rhythm: Normal rate and regular rhythm  Heart sounds: Normal heart sounds  No murmur heard  No friction rub  No gallop  Pulmonary:      Effort: Pulmonary effort is normal  No respiratory distress  Breath sounds: Normal breath sounds  No wheezing or rales     Abdominal:      Palpations: Abdomen is soft       Tenderness: There is no abdominal tenderness  There is no guarding or rebound  Musculoskeletal:         General: No deformity  Normal range of motion  Cervical back: Normal range of motion and neck supple  Skin:     General: Skin is warm and dry  Comments: There is bruising to the top of the distal right foot  Neurological:      General: No focal deficit present  Mental Status: He is alert and oriented to person, place, and time  GCS: GCS eye subscore is 4  GCS verbal subscore is 5  GCS motor subscore is 6  Cranial Nerves: Cranial nerves are intact  No cranial nerve deficit, dysarthria or facial asymmetry  Sensory: Sensation is intact  No sensory deficit  Motor: Motor function is intact  No weakness  Comments: Strength 5 out 5 globally  No focal neurologic deficits     Psychiatric:         Mood and Affect: Mood normal          Behavior: Behavior normal          ED Medications  Medications - No data to display    Diagnostic Studies  Results Reviewed     Procedure Component Value Units Date/Time    Urine Microscopic [[id number]]  (Normal) Collected: 07/18/22 1133    Lab Status: Final result Specimen: Urine, Clean Catch Updated: 07/18/22 1154     RBC, UA 2-4 /hpf      WBC, UA 0-1 /hpf      Epithelial Cells Occasional /hpf      Bacteria, UA Occasional /hpf     UA w Reflex to Microscopic w Reflex to Culture [[id number]]  (Abnormal) Collected: 07/18/22 1133    Lab Status: Final result Specimen: Urine, Clean Catch Updated: 07/18/22 1140     Color, UA Yellow     Clarity, UA Clear     Specific [place], UA 1 020     pH, UA 6 0     Leukocytes, UA Negative     Nitrite, UA Negative     Protein, UA Negative mg/dl      Glucose, UA Negative mg/dl      Ketones, UA Negative mg/dl      Urobilinogen, UA 1 0 E U /dl      Bilirubin, UA Negative     Occult Blood, UA Trace-Intact    Basic metabolic panel [[id number]] Collected: 07/18/22 1035    Lab Status: Final result Specimen: Blood from Arm, Right Updated: 07/18/22 1115     Sodium 140 mmol/L      Potassium 3 5 mmol/L      Chloride 102 mmol/L      CO2 28 mmol/L      ANION GAP 10 mmol/L      BUN 19 mg/dL      Creatinine 0 94 mg/dL      Glucose 101 mg/dL      Calcium 9 3 mg/dL      eGFR 76 ml/min/1 73sq m     Narrative:      [place] guidelines for Chronic Kidney Disease (CKD):     Stage 1 with normal or high GFR (GFR > 90 mL/min/1 73 square meters)    Stage 2 Mild CKD (GFR = 60-89 mL/min/1 73 square meters)    Stage 3A Moderate CKD (GFR = 45-59 mL/min/1 73 square meters)    Stage 3B Moderate CKD (GFR = 30-44 mL/min/1 73 square meters)    Stage 4 Severe CKD (GFR = 15-29 mL/min/1 73 square meters)    Stage 5 End Stage CKD (GFR <15 mL/min/1 73 square meters)  Note: GFR calculation is accurate only with a steady state creatinine    TSH, 3rd generation with Free T4 reflex [[id number]]  (Normal) Collected: 07/18/22 1035    Lab Status: Final result Specimen: Blood from Arm, Right Updated: 07/18/22 1115     TSH 3RD GENERATON 1 720 uIU/mL     Narrative:      Patients undergoing fluorescein dye angiography may retain small amounts of fluorescein in the body for 48-72 hours post procedure  Samples containing fluorescein can produce falsely depressed TSH values  If the patient had this procedure,a specimen should be resubmitted post fluorescein clearance        CBC and differential [[id number]] Collected: 07/18/22 1035    Lab Status: Final result Specimen: Blood from Arm, Right Updated: 07/18/22 1043     WBC 6 75 Thousand/uL      RBC 4 51 Million/uL      Hemoglobin 14 0 g/dL      Hematocrit 40 6 %      MCV 90 fL      MCH 31 0 pg      MCHC 34 5 g/dL      RDW 12 9 %      MPV 10 7 fL      Platelets 826 Thousands/uL      nRBC 0 /100 WBCs      Neutrophils Relative 72 %      Immat GRANS % 0 %      Lymphocytes Relative 20 %      Monocytes Relative 8 %      Eosinophils Relative 0 %      Basophils Relative 0 %      Neutrophils Absolute 4 84 Thousands/µL      Immature Grans Absolute 0 02 Thousand/uL      Lymphocytes Absolute 1 32 Thousands/µL      Monocytes Absolute 0 52 Thousand/µL      Eosinophils Absolute 0 03 Thousand/µL      Basophils Absolute 0 02 Thousands/µL                  No orders to display         Procedures  ECG 12 Lead Documentation Only    Date/Time: 7/18/2022 10:36 AM  Performed by: Efraín Becerra DO  Authorized by: Efraín Becerra DO     ECG reviewed by me, the ED Provider: yes    Patient location:  ED  Interpretation:     Interpretation: normal    Rate:     ECG rate:  75    ECG rate assessment: normal    Rhythm:     Rhythm: sinus rhythm and A-V block    Ectopy:     Ectopy: none    QRS:     QRS axis:  Left  Conduction:     Conduction: abnormal      Abnormal conduction: complete RBBB and 1st degree    ST segments:     ST segments:  Normal  T waves:     T waves: inverted      Inverted:  AVR and V1          ED Course  ED Course as of 07/18/22 1215   Mon Jul 18, 2022   1044 Hemoglobin: 14 0  No anemia   1120 TSH 3RD GENERATON: 1 720   8067 Basic metabolic panel   6013 Leukocytes, UA: Negative   1144 Nitrite, UA: Negative   1146 Patient re-evaluated  Resting comfortably  Discussed negative laboratory workup  Will discharge  Recommended orthopedic and PCP follow-up  Return precautions discussed  Patient verbalized understanding and agreed to plan of care  SBIRT 20yo+    Flowsheet Row Most Recent Value   SBIRT (23 yo +)    In order to provide better care to our patients, we are screening all of our patients for alcohol and drug use  Would it be okay to ask you these screening questions? Yes Filed at: 07/18/2022 1146   Initial Alcohol Screen: US AUDIT-C     1  How often do you have a drink containing alcohol? 0 Filed at: 07/18/2022 1146   2  How many drinks containing alcohol do you have on a typical day you are drinking? 0 Filed at: 07/18/2022 1146   3a  Male UNDER 65:  How often do you have five or more drinks on one occasion? 0 Filed at: 07/18/2022 1146   3b  FEMALE Any Age, or MALE 65+: How often do you have 4 or more drinks on one occassion? 0 Filed at: 07/18/2022 1146   Audit-C Score 0 Filed at: 07/18/2022 1146   SAMANTHA: How many times in the past year have you    Used an illegal drug or used a prescription medication for non-medical reasons? Never Filed at: 07/18/2022 1146                MDM  Number of Diagnoses or Management Options  History of ankylosing spondylitis  Weakness  Diagnosis management comments: Patient is a 76 y o  male who presents to the ED for generalized weakness and difficulty walking  I suspect patient's symptoms are secondary to known ankylosing spondylitis and expected disease progression  Low suspicion for anemia, UTI, dysrhythmia, electrolyte abnormality, or any other emergent causes of weakness  Presentation not consistent with CVA or intracranial bleed  Plan:  Labs, reassessment                 Portions of the record may have been created with voice recognition software  Occasional wrong word or "sound a like" substitutions may have occurred due to the inherent limitations of voice recognition software  Read the chart carefully and recognize, using context, where substitutions have occurred           Amount and/or Complexity of Data Reviewed  Clinical lab tests: ordered  Tests in the medicine section of CPT®: ordered    Risk of Complications, Morbidity, and/or Mortality  Presenting problems: low  Diagnostic procedures: low  Management options: minimal    Patient Progress  Patient progress: stable      Disposition  Final diagnoses:   Weakness   History of ankylosing spondylitis     Time reflects when diagnosis was documented in both MDM as applicable and the Disposition within this note     Time User Action Codes Description Comment    7/18/2022 10:29 AM Reginal Kayser Add [R53 1] Weakness     7/18/2022 10:33 AM Ra Kayser Add [Z87 39] History of ankylosing spondylitis       ED Disposition     ED Disposition   Discharge    Condition   Stable    Date/Time   Mon Jul 18, 2022 11:55 AM    Comment   Kristine Leiva  discharge to home/self care                 Follow-up Information     Follow up With Specialties Details Why Contact Info Additional DO [facility]   [address] Emergency Department Emergency Medicine  As needed [address] Alvar[address] [place] [id number]  [facility] Emergency Department, [address] [place], [place], [address] [facility]          Discharge Medication List as of 7/18/2022 11:56 AM      CONTINUE these medications which have NOT CHANGED    Details   amLODIPine (NORVASC) 5 mg tablet Take 1 tablet (5 mg total) by mouth daily, Starting Thu 2/10/2022, Normal      aspirin (ECOTRIN LOW STRENGTH) 81 mg EC tablet Take 1 tablet (81 mg total) by mouth daily, Starting Mon 12/21/2020, No Print      Besivance 0 6 % SUSP INSTILL 1 DROP INTO LEFT EYE TWICE DAILY, Historical Med      chlorthalidone 25 mg tablet Take 1 tablet (25 mg total) by mouth daily, Starting Thu 5/5/2022, Until Wed 8/3/2022, Normal      diphenhydrAMINE HCl (BENADRYL ALLERGY PO) Take by mouth as needed, Historical Med      Durezol 0 05 % EMUL INSTILL 1 DROP INTO LEFT EYE TWICE DAILY, Historical Med      Eliquis 5 MG Take 1 tablet by mouth twice daily, Normal      ferrous sulfate 325 (65 Fe) mg tablet Take 325 mg by mouth daily with breakfast, Historical Med      fluorouracil (EFUDEX) 5 % cream APPLY ONCE A DAY FOR 4 WEEKS WASH HANDS AFTER APPLICATION WASH OFF IN THE MORNING, Historical Med      lisinopril (ZESTRIL) 40 mg tablet Take 1 tablet (40 mg total) by mouth daily, Starting Thu 5/5/2022, Normal      pantoprazole (PROTONIX) 40 mg tablet Take 1 tablet (40 mg total) by mouth daily, Starting Tue 10/26/2021, Until Wed 4/13/2022, Normal      potassium chloride (K-DUR,KLOR-CON) 20 mEq tablet Take 1 tablet (20 mEq total) by mouth 2 (two) times a day, Starting Thu 2/10/2022, Normal      Prolensa 0 07 % SOLN INSTILL 1 DROP INTO LEFT EYE NIGHTLY, Historical Med           No discharge procedures on file  PDMP Review     None           ED Provider  Attending physically available and evaluated Denisa Leisa SHULTZ managed the patient along with the ED Attending      Electronically Signed by         Dona Doan DO  07/18/22 5680

## 2022-07-19 DIAGNOSIS — I10 ESSENTIAL HYPERTENSION: ICD-10-CM

## 2022-07-19 RX ORDER — AMLODIPINE BESYLATE 5 MG/1
5 TABLET ORAL DAILY
Qty: 30 TABLET | Refills: 5 | Status: SHIPPED | OUTPATIENT
Start: 2022-07-19

## 2022-07-19 RX ORDER — AMLODIPINE BESYLATE 5 MG/1
TABLET ORAL
Qty: 30 TABLET | Refills: 5 | Status: SHIPPED | OUTPATIENT
Start: 2022-07-19 | End: 2022-07-19 | Stop reason: SDUPTHER

## 2022-10-17 ENCOUNTER — OFFICE VISIT (OUTPATIENT)
Dept: CARDIOLOGY CLINIC | Facility: HOSPITAL | Age: 77
End: 2022-10-17
Payer: MEDICARE

## 2022-10-17 VITALS
DIASTOLIC BLOOD PRESSURE: 70 MMHG | WEIGHT: 179 LBS | BODY MASS INDEX: 25.62 KG/M2 | SYSTOLIC BLOOD PRESSURE: 150 MMHG | HEIGHT: 70 IN | HEART RATE: 73 BPM

## 2022-10-17 DIAGNOSIS — I65.03: ICD-10-CM

## 2022-10-17 DIAGNOSIS — I48.92 NEW ONSET ATRIAL FLUTTER (HCC): Primary | ICD-10-CM

## 2022-10-17 DIAGNOSIS — I65.23 BILATERAL CAROTID ARTERY STENOSIS: ICD-10-CM

## 2022-10-17 DIAGNOSIS — M45.0 ANKYLOSING SPONDYLITIS OF MULTIPLE SITES IN SPINE (HCC): ICD-10-CM

## 2022-10-17 DIAGNOSIS — I27.20 PULMONARY HYPERTENSION (HCC): ICD-10-CM

## 2022-10-17 DIAGNOSIS — I10 ESSENTIAL HYPERTENSION: ICD-10-CM

## 2022-10-17 PROCEDURE — 99214 OFFICE O/P EST MOD 30 MIN: CPT | Performed by: INTERNAL MEDICINE

## 2022-10-17 NOTE — PROGRESS NOTES
Hernan Lemus   1945  [id number]  [facility]  [address]  [address] [address]  [phone]    1  New onset atrial flutter ([address] )     2  Essential hypertension     3  Asymptomatic stenosis of both vertebral arteries     4  Bilateral carotid artery stenosis  VAS carotid complete study   5  Pulmonary hypertension ([facility] [address] )     6  Ankylosing spondylitis of multiple sites in spine [facility])         Discussion/Summary:  Overall he has been doing well  He has paroxysms of atrial flutter remains on anticoagulation  Blood pressure manual recheck did come down continue current antihypertensives  For some reason he was taken off of Lipitor I have asked him to speak to his primary physician if there is no reason that he should not be on I recommend he go back on Lipitor 40 mg LDL is now significantly elevated  He is due for carotid Doppler to follow-up on bilateral carotid stenosis with occlusion on the right  Interval History:   40-year-old gentleman with hypertension carotid stenosis, hyperlipidemia presented with new onset atrial flutter to the hospital   Along with some bradycardia  He is now maintaining sinus rhythm overall he has been doing well  Functional capacity is good for 66year-old gentleman  He denies any chest pain, shortness of breath, palpitations, lightheadedness, dizziness, or syncope  There has been no lower extremity edema, PND, orthopnea  Overall he is doing well  He has had some increased bruising and bleeding particular when he is working outside any cuts his arms  Overall he has been doing well he does some odd jobs around the house functional capacity has been good he is mainly limited by spinal stenosis  Denies any chest pain, palpitations, shortness of breath, lightheadedness, dizziness, or syncope  There has been no lower extremity edema, PND, orthopnea      Problem List     Bilateral carotid artery stenosis Ankylosing spondylitis (HCC)    Esophageal reflux    Cervical radiculopathy    Chondromalacia patellae    Lumbosacral spondylosis without myelopathy    Vertebral artery stenosis, asymptomatic    Overview Signed 2019  9:55 PM by Rafita Bland DO               Essential hypertension    Pernicious anemia    Medicare annual wellness visit, subsequent    Fall    Gait instability    Needs flu shot    New onset atrial flutter ([address] )    Diverticulitis    Diarrhea    Hyperbilirubinemia    Pancreatic cyst        Past Medical History:   Diagnosis Date   • Arthritis    • Atrial flutter ([address] )    • Cholecystitis    • Coronary artery disease    • Hypertension    • RBBB    • Spinal stenosis      Social History     Socioeconomic History   • Marital status: /Civil Union     Spouse name: Not on file   • Number of children: Not on file   • Years of education: Not on file   • Highest education level: Not on file   Occupational History   • Not on file   Tobacco Use   • Smoking status: Former Smoker     Packs/day: 2 00     Years: 3 00     Pack years: 6 00     Quit date:      Years since quittin 8   • Smokeless tobacco: Never Used   Vaping Use   • Vaping Use: Never used   Substance and Sexual Activity   • Alcohol use: Yes     Comment: social    • Drug use: No   • Sexual activity: Not on file   Other Topics Concern   • Not on file   Social History Narrative    CAFFEINE USE    DENTAL CARE,REGULARLY    FEELS SAFE  [facility]      Social Determinants of Health     Financial Resource Strain: Not on file   Food Insecurity: Not on file   Transportation Needs: Not on file   Physical Activity: Not on file   Stress: Not on file   Social Connections: Not on file   Intimate Partner Violence: Not on file   Housing Stability: Not on file      Family History   Problem Relation Age of Onset   • Arthritis Mother    • Heart attack Father    • Coronary artery disease Family    • Diabetes Family      Past Surgical History:   Procedure Laterality Date   • CHOLECYSTECTOMY     • COLON SURGERY      bwel resection   • COLONOSCOPY     • ESOPHAGOGASTRODUODENOSCOPY      02/07/2007  ONSET   • [place] LAP,CHOLECYSTECTOMY N/A 12/5/2017    Procedure: CHOLECYSTECTOMY LAPAROSCOPIC;  Surgeon: Oren Barclay MD;  Location[facility];  Service: General   • SMALL INTESTINE SURGERY      ONSEC DEC 2011       Current Outpatient Medications:   •  amLODIPine (NORVASC) 5 mg tablet, Take 1 tablet (5 mg total) by mouth daily, Disp: 30 tablet, Rfl: 5  •  chlorthalidone 25 mg tablet, Take 1 tablet (25 mg total) by mouth daily, Disp: 90 tablet, Rfl: 3  •  Eliquis 5 MG, Take 1 tablet by mouth twice daily, Disp: 60 tablet, Rfl: 5  •  ferrous sulfate 325 (65 Fe) mg tablet, Take 325 mg by mouth daily with breakfast, Disp: , Rfl:   •  lisinopril (ZESTRIL) 40 mg tablet, Take 1 tablet (40 mg total) by mouth daily, Disp: 90 tablet, Rfl: 3  •  pantoprazole (PROTONIX) 40 mg tablet, Take 1 tablet (40 mg total) by mouth daily, Disp: 90 tablet, Rfl: 3  •  potassium chloride (K-DUR,KLOR-CON) 20 mEq tablet, Take 1 tablet (20 mEq total) by mouth 2 (two) times a day, Disp: 60 tablet, Rfl: 5  •  aspirin (ECOTRIN LOW STRENGTH) 81 mg EC tablet, Take 1 tablet (81 mg total) by mouth daily, Disp: 90 tablet, Rfl: 3  •  Besivance 0 6 % SUSP, INSTILL 1 DROP INTO LEFT EYE TWICE DAILY, Disp: , Rfl:   •  diphenhydrAMINE HCl (BENADRYL ALLERGY PO), Take by mouth as needed, Disp: , Rfl:   •  Durezol 0 05 % EMUL, INSTILL 1 DROP INTO LEFT EYE TWICE DAILY, Disp: , Rfl:   •  fluorouracil (EFUDEX) 5 % cream, APPLY ONCE A DAY FOR 4 WEEKS WASH HANDS AFTER APPLICATION WASH OFF IN THE MORNING, Disp: , Rfl:   •  Prolensa 0 07 % SOLN, INSTILL 1 DROP INTO LEFT EYE NIGHTLY, Disp: , Rfl:     Current Facility-Administered Medications:   •  cyanocobalamin injection 1,000 mcg, 1,000 mcg, Intramuscular, Q30 Days, Allan Marion DO, 1,000 mcg at 06/02/21 0852  No Known Allergies    Labs:     Chemistry        Component Value Date/Time    K 3 5 07/18/2022 1035     07/18/2022 1035    CO2 28 07/18/2022 1035    CO2 30 12/16/2018 1031    BUN 19 07/18/2022 1035    CREATININE 0 94 07/18/2022 1035        Component Value Date/Time    CALCIUM 9 3 07/18/2022 1035    ALKPHOS 92 12/21/2021 0826    AST 18 12/21/2021 0826    ALT 20 12/21/2021 0826            No results found for: CHOL  Lab Results   Component Value Date    HDL 46 12/21/2021    HDL 49 12/23/2019    HDL 38 (L) 12/17/2018     Lab Results   Component Value Date    LDLCALC 166 (H) 12/21/2021    LDLCALC 94 12/23/2019    LDLCALC 156 (H) 12/17/2018     Lab Results   Component Value Date    TRIG 122 12/21/2021    TRIG 104 12/23/2019    TRIG 178 (H) 12/17/2018     No results found for: CHOLHDL    Imaging: No results found  ECG:    Sinus bradycardia      Review of Systems   Constitutional: Negative  HENT: Negative  Eyes: Negative  Cardiovascular: Negative  Respiratory: Negative  Endocrine: Negative  Hematologic/Lymphatic: Negative  Skin: Negative  Musculoskeletal: Negative  Gastrointestinal: Negative  Genitourinary: Negative  Neurological: Negative  Psychiatric/Behavioral: Negative  All other systems reviewed and are negative  Vitals:    10/17/22 1321   BP: (!) 184/88   Pulse: 73     Vitals:    10/17/22 1321   Weight: 81 2 kg (179 lb)     Height: 5' 10" (177 8 cm)   Body mass index is 25 68 kg/m²  Physical Exam:  Vital signs reviewed  General:  Alert and cooperative, appears stated age, no acute distress  HEENT:  PERRLA, EOMI, no scleral icterus, no conjunctival pallor  Neck:  No lymphadenopathy, no thyromegaly, no carotid bruits, no elevated JVP  Heart:  Regular rate and rhythm, normal S1/S2, no S3/S4, no murmur, rubs or gallops    PMI nondisplaced  Lungs:  Clear to auscultation bilaterally, no wheezes rales or rhonchi  Abdomen:  Soft, non-tender, positive bowel sounds, no rebound or guarding,   no organomegaly   Extremities:  Normal range of motion    No clubbing, cyanosis or edema   Vascular:  2+ pedal pulses  Skin:  No rashes or lesions on exposed skin  Neurologic:  Cranial nerves II-XII grossly intact without focal deficits  Psych:  Normal mood and affect

## 2022-10-21 DIAGNOSIS — I48.92 NEW ONSET ATRIAL FLUTTER (HCC): ICD-10-CM

## 2022-10-21 DIAGNOSIS — I65.21 STENOSIS OF RIGHT CAROTID ARTERY: Primary | ICD-10-CM

## 2022-10-21 RX ORDER — ATORVASTATIN CALCIUM 40 MG/1
40 TABLET, FILM COATED ORAL DAILY
Qty: 90 TABLET | Refills: 3 | Status: SHIPPED | OUTPATIENT
Start: 2022-10-21

## 2022-10-21 RX ORDER — ATORVASTATIN CALCIUM 40 MG/1
40 TABLET, FILM COATED ORAL DAILY
COMMUNITY
End: 2022-10-21 | Stop reason: SDUPTHER

## 2022-11-12 DIAGNOSIS — I48.92 NEW ONSET ATRIAL FLUTTER (HCC): ICD-10-CM

## 2022-11-13 RX ORDER — APIXABAN 5 MG/1
TABLET, FILM COATED ORAL
Qty: 60 TABLET | Refills: 0 | Status: SHIPPED | OUTPATIENT
Start: 2022-11-13

## 2022-11-21 DIAGNOSIS — I48.92 NEW ONSET ATRIAL FLUTTER (HCC): ICD-10-CM

## 2022-11-21 RX ORDER — POTASSIUM CHLORIDE 20 MEQ/1
20 TABLET, EXTENDED RELEASE ORAL 2 TIMES DAILY
Qty: 60 TABLET | Refills: 5 | Status: SHIPPED | OUTPATIENT
Start: 2022-11-21

## 2022-12-27 ENCOUNTER — OFFICE VISIT (OUTPATIENT)
Dept: FAMILY MEDICINE CLINIC | Facility: CLINIC | Age: 77
End: 2022-12-27

## 2022-12-27 VITALS
DIASTOLIC BLOOD PRESSURE: 80 MMHG | HEART RATE: 80 BPM | SYSTOLIC BLOOD PRESSURE: 128 MMHG | RESPIRATION RATE: 18 BRPM | BODY MASS INDEX: 26.57 KG/M2 | HEIGHT: 70 IN | TEMPERATURE: 97.3 F | WEIGHT: 185.6 LBS

## 2022-12-27 DIAGNOSIS — I48.92 NEW ONSET ATRIAL FLUTTER (HCC): ICD-10-CM

## 2022-12-27 DIAGNOSIS — Z00.00 MEDICARE ANNUAL WELLNESS VISIT, SUBSEQUENT: Primary | ICD-10-CM

## 2022-12-27 NOTE — PROGRESS NOTES
Assessment and Plan:     Problem List Items Addressed This Visit        Cardiovascular and Mediastinum    New onset atrial flutter (HCC)    Relevant Medications    apixaban (Eliquis) 5 mg       Other    Medicare annual wellness visit, subsequent - Primary   Pt will check cost of xarelto to see if less expensive  Stay hydrated  Fall precautions  Pt states his wife does the paperowrk/thinks has acp documents and requested he checks with his wife  Rto 6months      Preventive health issues were discussed with patient, and age appropriate screening tests were ordered as noted in patient's After Visit Summary  Personalized health advice and appropriate referrals for health education or preventive services given if needed, as noted in patient's After Visit Summary  History of Present Illness:     Patient presents for a Medicare Wellness Visit    HPI   Pt has had progressive decline overall but remains active as able He uses rollator/cane No falls but is unsteady at times No sob Cost of eliquis is increasing for pt   Patient Care Team:  Mehrdad Curtis DO as PCP - General  DO Marleni Swenson MD [place], MD (Ophthalmology)     Review of Systems:     Review of Systems   Constitutional: Positive for activity change  Negative for chills and fever  HENT: Negative  Eyes: Negative for visual disturbance  Respiratory: Negative for cough and shortness of breath  Cardiovascular: Negative for chest pain, palpitations and leg swelling  Gastrointestinal: Negative for abdominal distention and abdominal pain  Genitourinary: Negative  Musculoskeletal: Positive for arthralgias, back pain and gait problem  Neurological: Negative for dizziness, light-headedness and headaches  Psychiatric/Behavioral: Negative for sleep disturbance  The patient is not nervous/anxious           Problem List:     Patient Active Problem List   Diagnosis   • Bilateral carotid artery stenosis   • Ankylosing spondylitis (HCC)   • Esophageal reflux   • Cervical radiculopathy   • Lumbosacral spondylosis without myelopathy   • Vertebral artery stenosis, asymptomatic   • Essential hypertension   • Pernicious anemia   • Medicare annual wellness visit, subsequent   • Gait instability   • Needs flu shot   • New onset atrial flutter ([address] )   • Diverticulitis   • Pancreatic cyst   • Pulmonary hypertension (HCC)   • Age-related cataract of left eye      Past Medical and Surgical History:     Past Medical History:   Diagnosis Date   • Arthritis    • Atrial flutter ([address] )    • Cholecystitis    • Coronary artery disease    • Hypertension    • RBBB    • Spinal stenosis      Past Surgical History:   Procedure Laterality Date   • CHOLECYSTECTOMY     • COLON SURGERY      bwel resection   • COLONOSCOPY     • ESOPHAGOGASTRODUODENOSCOPY      2007  ON[facility]   • PA LAPAROSCOPY SURG CHOLECYSTECTOMY N/A 2017    Procedure: CHOLECYSTECTOMY LAPAROSCOPIC;  Surgeon: Sachin Bautista MD;  Location: [facility];  Service: General   • SMALL INTESTINE SURGERY      [facility] [address]       Family History:     Family History   Problem Relation Age of Onset   • Arthritis Mother    • Heart attack Father    • Coronary artery disease Family    • Diabetes Family       Social History:     Social History     Socioeconomic History   • Marital status: /Civil Union     Spouse name: None   • Number of children: None   • Years of education: None   • Highest education level: None   Occupational History   • None   Tobacco Use   • Smoking status: Former     Packs/day: 2 00     Years: 3 00     Pack years: 6 00     Types: Cigarettes     Quit date:      Years since quittin 0   • Smokeless tobacco: Never   Vaping Use   • Vaping Use: Never used   Substance and Sexual Activity   • Alcohol use: Yes     Comment: social    • Drug use: No   • Sexual activity: None   Other Topics Concern   • None   Social History Narrative    CAFFEINE USE    DENTAL CARE,REGULARLY    FEELS SAFE AT HOME    LIVES INDEPENDENTLY WITH SPOUSE    USES SAFETY EQUIPMENT    SUN PROTECTION      Social Determinants of Health     Financial Resource Strain: Low Risk    • Difficulty of Paying Living Expenses: Not very hard   Food Insecurity: Not on file   Transportation Needs: No Transportation Needs   • Lack of Transportation (Medical): No   • Lack of Transportation (Non-Medical):  No   Physical Activity: Not on file   Stress: Not on file   Social Connections: Not on file   Intimate Partner Violence: Not on file   Housing Stability: Not on file      Medications and Allergies:     Current Outpatient Medications   Medication Sig Dispense Refill   • amLODIPine (NORVASC) 5 mg tablet Take 1 tablet (5 mg total) by mouth daily 30 tablet 5   • apixaban (Eliquis) 5 mg Take 1 tablet (5 mg total) by mouth 2 (two) times a day 60 tablet 5   • atorvastatin (LIPITOR) 40 mg tablet Take 1 tablet (40 mg total) by mouth daily 90 tablet 3   • chlorthalidone 25 mg tablet Take 1 tablet (25 mg total) by mouth daily 90 tablet 3   • ferrous sulfate 325 (65 Fe) mg tablet Take 325 mg by mouth daily with breakfast     • lisinopril (ZESTRIL) 40 mg tablet Take 1 tablet (40 mg total) by mouth daily 90 tablet 3   • pantoprazole (PROTONIX) 40 mg tablet Take 1 tablet (40 mg total) by mouth daily 90 tablet 3   • potassium chloride (K-DUR,KLOR-CON) 20 mEq tablet Take 1 tablet (20 mEq total) by mouth 2 (two) times a day 60 tablet 5   • aspirin (ECOTRIN LOW STRENGTH) 81 mg EC tablet Take 1 tablet (81 mg total) by mouth daily 90 tablet 3   • Besivance 0 6 % SUSP INSTILL 1 DROP INTO LEFT EYE TWICE DAILY     • diphenhydrAMINE HCl (BENADRYL ALLERGY PO) Take by mouth as needed     • Durezol 0 05 % EMUL INSTILL 1 DROP INTO LEFT EYE TWICE DAILY     • fluorouracil (EFUDEX) 5 % cream APPLY ONCE A DAY FOR 4 WEEKS WASH HANDS AFTER APPLICATION WASH OFF IN THE MORNING     • Prolensa 0 07 % SOLN INSTILL 1 DROP INTO LEFT EYE NIGHTLY Current Facility-Administered Medications   Medication Dose Route Frequency Provider Last Rate Last Admin   • cyanocobalamin injection 1,000 mcg  1,000 mcg Intramuscular Q30 Days Josetayler MckinneyDO   1,000 mcg at 06/02/21 0973     No Known Allergies   Immunizations:     Immunization History   Administered Date(s) Administered   • COVID-19 MODERNA VACC 0 5 ML IM 01/20/2021, 02/17/2021   • H1N1, All Formulations 12/16/2009   • INFLUENZA 01/09/2006, 11/21/2007, 10/27/2008, 11/21/2009, 10/26/2010, 02/06/2012, 12/20/2012, 09/06/2013   • Influenza, high dose seasonal 0 7 mL 09/18/2020      Health Maintenance:         Topic Date Due   • Hepatitis C Screening  Completed         Topic Date Due   • Hepatitis B Vaccine (1 of 3 - 3-dose series) Never done   • Pneumococcal Vaccine: 65+ Years (1 - PCV) Never done   • COVID-19 Vaccine (3 - Booster for [facility] series) 04/14/2021   • Influenza Vaccine (1) 09/01/2022      Medicare Screening Tests and Risk Assessments:     Kisha Hernandez is here for his Subsequent Wellness visit  Last Medicare Wellness visit information reviewed, patient interviewed, no change since last AWV  Health Risk Assessment:   Patient rates overall health as fair  Patient feels that their physical health rating is same  Patient is satisfied with their life  Eyesight was rated as same  Hearing was rated as same  Patient feels that their emotional and mental health rating is same  Patients states they are never, rarely angry  Patient states they are sometimes unusually tired/fatigued  Pain experienced in the last 7 days has been some  Patient's pain rating has been 4/10  Patient states that he has experienced no weight loss or gain in last 6 months  Fall Risk Screening: In the past year, patient has experienced: no history of falling in past year      Home Safety:  Patient has trouble with stairs inside or outside of their home  Patient has working smoke alarms and has working carbon monoxide detector  Home safety hazards include: none  Nutrition:   Current diet is No Added Salt  Medications:   Patient is currently taking over-the-counter supplements  OTC medications include: see medication list  Patient is able to manage medications  Activities of Daily Living (ADLs)/Instrumental Activities of Daily Living (IADLs):   Walk and transfer into and out of bed and chair?: Yes  Dress and groom yourself?: Yes    Bathe or shower yourself?: Yes    Feed yourself? Yes  Do your laundry/housekeeping?: Yes  Manage your money, pay your bills and track your expenses?: Yes  Make your own meals?: Yes    Do your own shopping?: Yes    Previous Hospitalizations:   Any hospitalizations or ED visits within the last 12 months?: No      Advance Care Planning:   Living will: Yes    Durable POA for healthcare: Yes    Advanced directive: Yes    End of Life Decisions reviewed with patient: Yes    Provider agrees with end of life decisions: Yes      Cognitive Screening:   Provider or family/friend/caregiver concerned regarding cognition?: No    PREVENTIVE SCREENINGS      Cardiovascular Screening:    General: Screening Current      Diabetes Screening:     General: Screening Current      Colorectal Cancer Screening:     General: Screening Not Indicated      Prostate Cancer Screening:    General: Screening Not Indicated      Osteoporosis Screening:    General: Patient Declines      Abdominal Aortic Aneurysm (AAA) Screening:    Risk factors include: tobacco use        Lung Cancer Screening:     General: Screening Not Indicated      Hepatitis C Screening:    General: Screening Current    Screening, Brief Intervention, and Referral to Treatment (SBIRT)    Screening  Typical number of drinks in a day: 0  Typical number of drinks in a week: 0  Interpretation: Low risk drinking behavior      AUDIT-C Screenin) How often did you have a drink containing alcohol in the past year? never  2) How many drinks did you have on a typical day when you were drinking in the past year? 0  3) How often did you have 6 or more drinks on one occasion in the past year? never    AUDIT-C Score: 0  Interpretation: Score 0-3 (male): Negative screen for alcohol misuse    Single Item Drug Screening:  How often have you used an illegal drug (including marijuana) or a prescription medication for non-medical reasons in the past year? never    Single Item Drug Screen Score: 0  Interpretation: Negative screen for possible drug use disorder    Brief Intervention  Alcohol & drug use screenings were reviewed  No concerns regarding substance use disorder identified  Other Counseling Topics:   Regular weightbearing exercise and calcium and vitamin D intake  No results found  Physical Exam:     /80   Pulse 80   Temp (!) 97 3 °F (36 3 °C) (Temporal)   Resp 18   Ht 5' 10" (1 778 m)   Wt 84 2 kg (185 lb 9 6 oz)   BMI 26 63 kg/m²     Physical Exam  Vitals reviewed  Constitutional:       General: He is not in acute distress  Appearance: Normal appearance  He is not toxic-appearing  HENT:      Head: Normocephalic and atraumatic  Right Ear: External ear normal       Left Ear: External ear normal       Nose: Nose normal       Mouth/Throat:      Mouth: Mucous membranes are dry  Eyes:      General: No scleral icterus  Extraocular Movements: Extraocular movements intact  Conjunctiva/sclera: Conjunctivae normal       Pupils: Pupils are equal, round, and reactive to light  Cardiovascular:      Rate and Rhythm: Normal rate  Rhythm irregular  Pulses: Normal pulses  Pulmonary:      Effort: Pulmonary effort is normal  No respiratory distress  Breath sounds: Normal breath sounds  No wheezing  Abdominal:      General: Bowel sounds are normal  There is no distension  Palpations: Abdomen is soft  Tenderness: There is no abdominal tenderness  Musculoskeletal:         General: Tenderness and deformity present        Cervical back: Normal range of motion and neck supple  Tenderness present  Right lower leg: No edema  Left lower leg: No edema  Lymphadenopathy:      Cervical: No cervical adenopathy  Skin:     General: Skin is dry  Coloration: Skin is not jaundiced or pale  Neurological:      General: No focal deficit present  Mental Status: He is alert and oriented to person, place, and time  Mental status is at baseline  Cranial Nerves: No cranial nerve deficit  Sensory: No sensory deficit  Gait: Gait abnormal    Psychiatric:         Mood and Affect: Mood normal          Behavior: Behavior normal          Thought Content:  Thought content normal          Judgment: Judgment normal           Pineda Simeon DO

## 2022-12-27 NOTE — PATIENT INSTRUCTIONS
Medicare Preventive Visit Patient Instructions  Thank you for completing your Welcome to Medicare Visit or Medicare Annual Wellness Visit today  Your next wellness visit will be due in one year (12/28/2023)  The screening/preventive services that you may require over the next 5-10 years are detailed below  Some tests may not apply to you based off risk factors and/or age  Screening tests ordered at today's visit but not completed yet may show as past due  Also, please note that scanned in results may not display below  Preventive Screenings:  Service Recommendations Previous Testing/Comments   Colorectal Cancer Screening  · Colonoscopy    · Fecal Occult Blood Test (FOBT)/Fecal Immunochemical Test (FIT)  · Fecal DNA/Cologuard Test  · Flexible Sigmoidoscopy Age: 39-70 years old   Colonoscopy: every 10 years (May be performed more frequently if at higher risk)  OR  FOBT/FIT: every 1 year  OR  Cologuard: every 3 years  OR  Sigmoidoscopy: every 5 years  Screening may be recommended earlier than age 39 if at higher risk for colorectal cancer  Also, an individualized decision between you and your healthcare provider will decide whether screening between the ages of 74-80 would be appropriate   Colonoscopy: Not on file  FOBT/FIT: Not on file  Cologuard: Not on file  Sigmoidoscopy: Not on file          Prostate Cancer Screening Individualized decision between patient and health care provider in men between ages of 53-78   Medicare will cover every 12 months beginning on the day after your 50th birthday PSA: No results in last 5 years     Screening Not Indicated     Hepatitis C Screening Once for adults born between 1945 and 1965  More frequently in patients at high risk for Hepatitis C Hep C Antibody: 12/15/2020    Screening Current   Diabetes Screening 1-2 times per year if you're at risk for diabetes or have pre-diabetes Fasting glucose: 133 mg/dL (12/15/2020)  A1C: 5 8 % (12/17/2018)  Screening Current   Cholesterol Screening Once every 5 years if you don't have a lipid disorder  May order more often based on risk factors  Lipid panel: 12/21/2021  Screening Current      Other Preventive Screenings Covered by Medicare:  1  Abdominal Aortic Aneurysm (AAA) Screening: covered once if your at risk  You're considered to be at risk if you have a family history of AAA or a male between the age of 73-68 who smoking at least 100 cigarettes in your lifetime  2  Lung Cancer Screening: covers low dose CT scan once per year if you meet all of the following conditions: (1) Age 50-69; (2) No signs or symptoms of lung cancer; (3) Current smoker or have quit smoking within the last 15 years; (4) You have a tobacco smoking history of at least 20 pack years (packs per day x number of years you smoked); (5) You get a written order from a healthcare provider  3  Glaucoma Screening: covered annually if you're considered high risk: (1) You have diabetes OR (2) Family history of glaucoma OR (3)  aged 48 and older OR (3)  American aged 72 and older  3  Osteoporosis Screening: covered every 2 years if you meet one of the following conditions: (1) Have a vertebral abnormality; (2) On glucocorticoid therapy for more than 3 months; (3) Have primary hyperparathyroidism; (4) On osteoporosis medications and need to assess response to drug therapy  5  HIV Screening: covered annually if you're between the age of 12-76  Also covered annually if you are younger than 13 and older than 72 with risk factors for HIV infection  For pregnant patients, it is covered up to 3 times per pregnancy      Immunizations:  Immunization Recommendations   Influenza Vaccine Annual influenza vaccination during flu season is recommended for all persons aged >= 6 months who do not have contraindications   Pneumococcal Vaccine   * Pneumococcal conjugate vaccine = PCV13 (Prevnar 13), PCV15 (Vaxneuvance), PCV20 (Prevnar 20)  * Pneumococcal polysaccharide vaccine = PPSV23 (Pneumovax) Adults 2364 years old: 1-3 doses may be recommended based on certain risk factors  Adults 72 years old: 1-2 doses may be recommended based off what pneumonia vaccine you previously received   Hepatitis B Vaccine 3 dose series if at intermediate or high risk (ex: diabetes, end stage renal disease, liver disease)   Tetanus (Td) Vaccine - COST NOT COVERED BY MEDICARE PART B Following completion of primary series, a booster dose should be given every 10 years to maintain immunity against tetanus  Td may also be given as tetanus wound prophylaxis  Tdap Vaccine - COST NOT COVERED BY MEDICARE PART B Recommended at least once for all adults  For pregnant patients, recommended with each pregnancy  Shingles Vaccine (Shingrix) - COST NOT COVERED BY MEDICARE PART B  2 shot series recommended in those aged 48 and above     Health Maintenance Due:      Topic Date Due   • Hepatitis C Screening  Completed     Immunizations Due:      Topic Date Due   • Hepatitis B Vaccine (1 of 3 - 3-dose series) Never done   • Pneumococcal Vaccine: 65+ Years (1 - PCV) Never done   • COVID-19 Vaccine (3 - Booster for Moderna series) 04/14/2021   • Influenza Vaccine (1) 09/01/2022     Advance Directives   What are advance directives? Advance directives are legal documents that state your wishes and plans for medical care  These plans are made ahead of time in case you lose your ability to make decisions for yourself  Advance directives can apply to any medical decision, such as the treatments you want, and if you want to donate organs  What are the types of advance directives? There are many types of advance directives, and each state has rules about how to use them  You may choose a combination of any of the following:  · Living will: This is a written record of the treatment you want  You can also choose which treatments you do not want, which to limit, and which to stop at a certain time   This includes surgery, medicine, IV fluid, and tube feedings  · Durable power of  for healthcare [facility]): This is a written record that states who you want to make healthcare choices for you when you are unable to make them for yourself  This person, called a proxy, is usually a family member or a friend  You may choose more than 1 proxy  · Do not resuscitate (DNR) order:  A DNR order is used in case your heart stops beating or you stop breathing  It is a request not to have certain forms of treatment, such as CPR  A DNR order may be included in other types of advance directives  · Medical directive: This covers the care that you want if you are in a coma, near death, or unable to make decisions for yourself  You can list the treatments you want for each condition  Treatment may include pain medicine, surgery, blood transfusions, dialysis, IV or tube feedings, and a ventilator (breathing machine)  · Values history: This document has questions about your views, beliefs, and how you feel and think about life  This information can help others choose the care that you would choose  Why are advance directives important? An advance directive helps you control your care  Although spoken wishes may be used, it is better to have your wishes written down  Spoken wishes can be misunderstood, or not followed  Treatments may be given even if you do not want them  An advance directive may make it easier for your family to make difficult choices about your care  Weight Management   Why it is important to manage your weight:  Being overweight increases your risk of health conditions such as heart disease, high blood pressure, type 2 diabetes, and certain types of cancer  It can also increase your risk for osteoarthritis, sleep apnea, and other respiratory problems  Aim for a slow, steady weight loss  Even a small amount of weight loss can lower your risk of health problems    How to lose weight safely:  A safe and healthy way to lose weight is to eat fewer calories and get regular exercise  You can lose up about 1 pound a week by decreasing the number of calories you eat by 500 calories each day  Healthy meal plan for weight management:  A healthy meal plan includes a variety of foods, contains fewer calories, and helps you stay healthy  A healthy meal plan includes the following:  · Eat whole-grain foods more often  A healthy meal plan should contain fiber  Fiber is the part of grains, fruits, and vegetables that is not broken down by your body  Whole-grain foods are healthy and provide extra fiber in your diet  Some examples of whole-grain foods are whole-wheat breads and pastas, oatmeal, brown rice, and bulgur  · Eat a variety of vegetables every day  Include dark, leafy greens such as spinach, kale, nick greens, and mustard greens  Eat yellow and orange vegetables such as carrots, sweet potatoes, and winter squash  · Eat a variety of fruits every day  Choose fresh or canned fruit (canned in its own juice or light syrup) instead of juice  Fruit juice has very little or no fiber  · Eat low-fat dairy foods  Drink fat-free (skim) milk or 1% milk  Eat fat-free yogurt and low-fat cottage cheese  Try low-fat cheeses such as mozzarella and other reduced-fat cheeses  · Choose meat and other protein foods that are low in fat  Choose beans or other legumes such as split peas or lentils  Choose fish, skinless poultry (chicken or turkey), or lean cuts of red meat (beef or pork)  Before you cook meat or poultry, cut off any visible fat  · Use less fat and oil  Try baking foods instead of frying them  Add less fat, such as margarine, sour cream, regular salad dressing and mayonnaise to foods  Eat fewer high-fat foods  Some examples of high-fat foods include french fries, doughnuts, ice cream, and cakes  · Eat fewer sweets  Limit foods and drinks that are high in sugar   This includes candy, cookies, regular soda, and sweetened drinks  Exercise:  Exercise at least 30 minutes per day on most days of the week  Some examples of exercise include walking, biking, dancing, and swimming  You can also fit in more physical activity by taking the stairs instead of the elevator or parking farther away from stores  Ask your healthcare provider about the best exercise plan for you  © Copyright [employer] 2018 Information is for End User's use only and may not be sold, redistributed or otherwise used for commercial purposes   All illustrations and images included in CareNotes® are the copyrighted property of A D A M , [employer]  or [address] [place] [address] [employer]

## 2023-02-13 DIAGNOSIS — K21.9 GASTROESOPHAGEAL REFLUX DISEASE: ICD-10-CM

## 2023-02-13 RX ORDER — PANTOPRAZOLE SODIUM 40 MG/1
40 TABLET, DELAYED RELEASE ORAL DAILY
Qty: 90 TABLET | Refills: 3 | Status: SHIPPED | OUTPATIENT
Start: 2023-02-13 | End: 2023-05-14

## 2023-02-16 ENCOUNTER — APPOINTMENT (OUTPATIENT)
Dept: LAB | Facility: MEDICAL CENTER | Age: 78
End: 2023-02-16

## 2023-02-16 ENCOUNTER — TRANSCRIBE ORDERS (OUTPATIENT)
Dept: LAB | Facility: MEDICAL CENTER | Age: 78
End: 2023-02-16

## 2023-02-16 DIAGNOSIS — M45.0 ANKYLOSING SPONDYLITIS OF MULTIPLE SITES IN SPINE (HCC): Primary | ICD-10-CM

## 2023-02-16 DIAGNOSIS — M25.50 PAIN IN JOINT, MULTIPLE SITES: ICD-10-CM

## 2023-02-16 DIAGNOSIS — M45.0 ANKYLOSING SPONDYLITIS OF MULTIPLE SITES IN SPINE (HCC): ICD-10-CM

## 2023-02-16 LAB
CRP SERPL QL: <3 MG/L
ERYTHROCYTE [SEDIMENTATION RATE] IN BLOOD: 5 MM/HOUR (ref 0–19)
URATE SERPL-MCNC: 7.7 MG/DL (ref 3.5–8.5)

## 2023-02-27 LAB — HLA-B27 QL NAA+PROBE: NORMAL

## 2023-05-22 DIAGNOSIS — I10 ESSENTIAL HYPERTENSION: ICD-10-CM

## 2023-05-22 RX ORDER — CHLORTHALIDONE 25 MG/1
25 TABLET ORAL DAILY
Qty: 90 TABLET | Refills: 0 | Status: SHIPPED | OUTPATIENT
Start: 2023-05-22 | End: 2023-08-20

## 2023-07-08 DIAGNOSIS — I10 ESSENTIAL HYPERTENSION: ICD-10-CM

## 2023-07-09 RX ORDER — LISINOPRIL 40 MG/1
40 TABLET ORAL DAILY
Qty: 90 TABLET | Refills: 0 | Status: SHIPPED | OUTPATIENT
Start: 2023-07-09

## 2023-08-26 DIAGNOSIS — I10 ESSENTIAL HYPERTENSION: ICD-10-CM

## 2023-08-26 DIAGNOSIS — I48.92 NEW ONSET ATRIAL FLUTTER (HCC): ICD-10-CM

## 2023-08-26 RX ORDER — AMLODIPINE BESYLATE 5 MG/1
5 TABLET ORAL DAILY
Qty: 30 TABLET | Refills: 0 | Status: SHIPPED | OUTPATIENT
Start: 2023-08-26

## 2023-08-26 RX ORDER — POTASSIUM CHLORIDE 20 MEQ/1
20 TABLET, EXTENDED RELEASE ORAL 2 TIMES DAILY
Qty: 60 TABLET | Refills: 0 | Status: SHIPPED | OUTPATIENT
Start: 2023-08-26

## 2023-08-27 NOTE — ANESTHESIA POSTPROCEDURE EVALUATION
Post-Op Assessment Note      CV Status:  Stable    Mental Status:  Alert and awake    Hydration Status:  Euvolemic    PONV Controlled:  Controlled    Airway Patency:  Patent    Post Op Vitals Reviewed: Yes          Staff: CRNA           BP (P) 153/72 (12/05/17 0956)    Temp (P) 97 7 °F (36 5 °C) (12/05/17 0956)    Pulse (P) 63 (12/05/17 0956)   Resp      SpO2 Dr Stevens- code sepsis

## 2023-09-30 DIAGNOSIS — I10 ESSENTIAL HYPERTENSION: ICD-10-CM

## 2023-09-30 RX ORDER — CHLORTHALIDONE 25 MG/1
25 TABLET ORAL DAILY
Qty: 90 TABLET | Refills: 0 | Status: SHIPPED | OUTPATIENT
Start: 2023-09-30 | End: 2023-09-30 | Stop reason: SDUPTHER

## 2023-09-30 RX ORDER — CHLORTHALIDONE 25 MG/1
25 TABLET ORAL DAILY
Qty: 90 TABLET | Refills: 3 | Status: SHIPPED | OUTPATIENT
Start: 2023-09-30 | End: 2023-12-29

## 2023-10-16 DIAGNOSIS — I48.92 NEW ONSET ATRIAL FLUTTER (HCC): ICD-10-CM

## 2023-10-16 RX ORDER — POTASSIUM CHLORIDE 20 MEQ/1
20 TABLET, EXTENDED RELEASE ORAL 2 TIMES DAILY
Qty: 60 TABLET | Refills: 0 | Status: SHIPPED | OUTPATIENT
Start: 2023-10-16

## 2023-10-18 NOTE — PROGRESS NOTES
Cardiology Follow Up    Ernestine Luke  1945  [id number]  [address] [place] [address] [facility] [address]  [place] [address] [address]  [phone]    1. Paroxysmal atrial flutter ([address])        2. Bilateral carotid artery stenosis        3.  Essential hypertension            Discussion/Summary:    Paroxysmal aflutter  Known hx of paroxysmal aflutter; had been in NSR at last visit in October 2022  He is not on an AVN blocker  He is on eliquis for stroke prevention with no bleeding issues  EKG today does show Aflutter with rate of 58; he is asymptomatic with no complaints of dyspnea or fatigue    Essential HTN  BP is elevated today at 140/90 and remains elevated on recheck  He is maintained on amlodipine 5 mg QD, chlorthalidone 25 mg QD and lisinopril 40 mg QD  He does admit to a high sodium diet  I have asked him to reduce his sodium intake and monitor his blood pressure twice daily for 1 week and contact our office with results at which time we may need to alter medications    Mild AS  Mild aortic stenosis with mean gradient 12 mmHg on echocardiogram from 2020  Patient does have 2/6 systolic murmur with radiation to the carotids  I did offer updated echocardiogram to further evaluate aortic valve but given patient is asymptomatic he wishes to hold off at this time  I reviewed symptoms of AS such as fatigue, dyspnea, chest pressure and overall decreased functional capacity and have asked him to contact our office if he notices any of these symptoms prior to his next visit    Hyperlipidemia  He has not had a lipid panel since 2021 which showed:  Triglycerides 122  HDL 46    He is on Lipitor 40 mg QD  Patient does not wish to recheck lipid panel; I did review that it is imperative that his LDL be at goal of 70 or less given his known carotid disease and aortic stenosis but patient states that he does not wish to have his lipid panel rechecked    Carotid stenosis  Last carotid ultrasound in 2020 showed know occluded mid-distal right ICA; <50% stenosis of left ICA; no change from 2019  Dr. Evangelina Wheeler ordered repeat study in October 2022 but patient did not have done  Patient does not wish to have this procedure done    He will return to the office in one year to follow up with Dr. Evangelina Wheeler    Interval History:   Brenna Trinidad is a 78 y.o. male with PMH of carotid stenosis, ankylosing spondylitis, GERD, HTN, HLD, and paroxysmal Aflutter who returns to the office today for routine follow up visit. Overall, the patient states he feels well from a cardiac standpoint. He states he is very active throughout the day and through his daily activities has no chest discomfort, dyspnea on exertion or palpitations. He has no lower extremity edema orthopnea and also has no dizziness/lightheadedness or near syncope/syncope. The patient's blood pressure is elevated today and this remains elevated on recheck. He does admit to high sodium diet and I have asked him to monitor his sodium more closely and tend towards a more heart healthy/low-fat/low-cholesterol diet as he states his diet is not very healthy at this time. He will attempt to reduce his sodium intake and will monitor his blood pressure for 1 week, twice daily and will contact our office with results at which time we may need to alter his antihypertensive medications. I did review that his LDL from 2021 was 166 and that his goal should be 70 or less given his carotid stenosis and aortic stenosis. The patient states he will continue taking his Lipitor but does not want to recheck his cholesterol level at this time. We again did review his carotid study from 2020 and I stated that Dr. Evangelina Wheeler did order a repeat study in 2022 but the patient states he does not wish to have this done.   I also do hear a 2/6 systolic murmur on exam and note the patient did have mild aortic stenosis during echocardiogram 2020 and I have offered to order updated echo but the patient states that he is feeling well and does not wish to have this done. I did review symptoms of AS with the patient such as shortness of breath, fatigue and potential chest pressure and overall just generalized reduction in exercise capacity and I asked the patient to contact our office if he notices any of these before his next visit.     Medical Problems       Problem List       Bilateral carotid artery stenosis    Ankylosing spondylitis (HCC)    Esophageal reflux    Cervical radiculopathy    Lumbosacral spondylosis without myelopathy    Vertebral artery stenosis, asymptomatic    Overview Signed 2019  9:55 PM by Parish Cheema DO               Essential hypertension    Pernicious anemia    Medicare annual wellness visit, subsequent    Gait instability    Needs flu shot    New onset atrial flutter ([address])    Diverticulitis    Pancreatic cyst    Pulmonary hypertension ([address])    Age-related cataract of left eye        Past Medical History:   Diagnosis Date    Arthritis     Atrial flutter ([address])     Cholecystitis     Coronary artery disease     Hypertension     RBBB     Spinal stenosis      Social History     Socioeconomic History    Marital status: /Civil Union     Spouse name: Not on file    Number of children: Not on file    Years of education: Not on file    Highest education level: Not on file   Occupational History    Not on file   Tobacco Use    Smoking status: Former     Packs/day: 2.00     Years: 3.00     Total pack years: 6.00     Types: Cigarettes     Quit date: 1966     Years since quittin.8    Smokeless tobacco: Never   Vaping Use    Vaping Use: Never used   Substance and Sexual Activity    Alcohol use: Yes     Comment: social     Drug use: No    Sexual activity: Not on file   Other Topics Concern    Not on file   Social History Narrative    [address] [facility]    FEELS SAFE AT HOME    LIVES INDEPENDENTLY WITH SPOUSE    USES SAFETY EQUIPMENT    SUN PROTECTION      Social Determinants of Health     Financial Resource Strain: Low Risk  (12/20/2022)    Overall Financial Resource Strain (CARDIA)     Difficulty of Paying Living Expenses: Not very hard   Food Insecurity: Not on file   Transportation Needs: No Transportation Needs (12/20/2022)    PRAPARE - Transportation     Lack of Transportation (Medical): No     Lack of Transportation (Non-Medical):  No   Physical Activity: Not on file   Stress: Not on file   Social Connections: Not on file   Intimate Partner Violence: Not on file   Housing Stability: Not on file      Family History   Problem Relation Age of Onset    Arthritis Mother     Heart attack Father     Coronary artery disease Family     Diabetes Family      Past Surgical History:   Procedure Laterality Date    CHOLECYSTECTOMY      COLON SURGERY      bwel resection    COLONOSCOPY      ESOPHAGOGASTRODUODENOSCOPY      02/07/2007  ONSET    [place] LAPAROSCOPY SURG CHOLECYSTECTOMY N/A 12/5/2017    Procedure: CHOLECYSTECTOMY LAPAROSCOPIC;  Surgeon: Justino Kenney MD;  Location: [facility];  Service: General    SMALL INTESTINE SURGERY      ONSEC [address] [facility] [id number] 2011       Current Outpatient Medications:     amLODIPine (NORVASC) 5 mg tablet, Take 1 tablet (5 mg total) by mouth daily, Disp: 90 tablet, Rfl: 3    apixaban (Eliquis) 5 mg, Take 1 tablet (5 mg total) by mouth 2 (two) times a day, Disp: 60 tablet, Rfl: 5    atorvastatin (LIPITOR) 40 mg tablet, Take 1 tablet (40 mg total) by mouth daily, Disp: 90 tablet, Rfl: 3    chlorthalidone 25 mg tablet, Take 1 tablet (25 mg total) by mouth daily, Disp: 90 tablet, Rfl: 3    ferrous sulfate 325 (65 Fe) mg tablet, Take 325 mg by mouth daily with breakfast, Disp: , Rfl:     lisinopril (ZESTRIL) 40 mg tablet, Take 1 tablet (40 mg total) by mouth daily, Disp: 90 tablet, Rfl: 0    pantoprazole (PROTONIX) 40 mg tablet, Take 1 tablet (40 mg total) by mouth daily, Disp: 90 tablet, Rfl: 3    potassium chloride (K-DUR,KLOR-CON) 20 mEq tablet, Take 1 tablet (20 mEq total) by mouth 2 (two) times a day, Disp: 60 tablet, Rfl: 0    Current Facility-Administered Medications:     cyanocobalamin injection 1,000 mcg, 1,000 mcg, Intramuscular, Q30 Days, Sher Whitmore DO, 1,000 mcg at 06/02/21 0852  No Known Allergies    Labs:     Chemistry        Component Value Date/Time    K 3.5 07/18/2022 1035     07/18/2022 1035    CO2 28 07/18/2022 1035    CO2 30 12/16/2018 1031    BUN 19 07/18/2022 1035    CREATININE 0.94 07/18/2022 1035        Component Value Date/Time    CALCIUM 9.3 07/18/2022 1035    ALKPHOS 92 12/21/2021 0826    AST 18 12/21/2021 0826    ALT 20 12/21/2021 0826            No results found for: "CHOL"  Lab Results   Component Value Date    HDL 46 12/21/2021    HDL 49 12/23/2019    HDL 38 (L) 12/17/2018     Lab Results   Component Value Date    LDLCALC 166 (H) 12/21/2021    LDLCALC 94 12/23/2019    LDLCALC 156 (H) 12/17/2018     Lab Results   Component Value Date    TRIG 122 12/21/2021    TRIG 104 12/23/2019    TRIG 178 (H) 12/17/2018     No results found for: "CHOLHDL"    Imaging: No results found. ECG:  Aflutter with rate of 58      Review of Systems   Constitutional: Negative for chills, fever and malaise/fatigue. HENT:  Negative for congestion. Cardiovascular:  Negative for chest pain, dyspnea on exertion, leg swelling, orthopnea and palpitations. Respiratory:  Negative for cough, shortness of breath (no SOB at rest) and wheezing. Endocrine: Negative. Hematologic/Lymphatic: Negative. Skin: Negative. Musculoskeletal:  Positive for joint pain (osteoarthritis). Gastrointestinal:  Negative for bloating, abdominal pain, nausea and vomiting. Genitourinary: Negative. Neurological:  Negative for dizziness and light-headedness. Neuropathy in B/L feet   Psychiatric/Behavioral: Negative. All other systems reviewed and are negative.       Vitals:    10/26/23 0917   BP: 140/90   Pulse: 58     Vitals:    10/26/23 0917   Weight: 81.8 kg (180 lb 6.4 oz)     Height: 5' 10" (177.8 cm)   Body mass index is 25.88 kg/m². Physical Exam:  Physical Exam  Vitals reviewed. Constitutional:       General: He is not in acute distress. HENT:      Head: Normocephalic and atraumatic. Mouth/Throat:      Mouth: Mucous membranes are moist.   Cardiovascular:      Rate and Rhythm: Normal rate. Rhythm irregularly irregular. Heart sounds: S1 normal and S2 normal. Murmur (wtih radiation to carotids) heard. Systolic murmur is present with a grade of 2/6. Pulmonary:      Effort: Pulmonary effort is normal. No respiratory distress. Breath sounds: Normal breath sounds. Abdominal:      General: Bowel sounds are normal. There is no distension. Palpations: Abdomen is soft. Musculoskeletal:         General: Normal range of motion. Cervical back: Normal range of motion and neck supple. Right lower leg: No edema. Left lower leg: No edema. Skin:     General: Skin is warm and dry. Neurological:      Mental Status: He is alert and oriented to person, place, and time.    Psychiatric:         Mood and Affect: Mood normal.

## 2023-10-26 ENCOUNTER — OFFICE VISIT (OUTPATIENT)
Dept: CARDIOLOGY CLINIC | Facility: HOSPITAL | Age: 78
End: 2023-10-26
Payer: MEDICARE

## 2023-10-26 VITALS
SYSTOLIC BLOOD PRESSURE: 140 MMHG | DIASTOLIC BLOOD PRESSURE: 90 MMHG | BODY MASS INDEX: 25.83 KG/M2 | HEIGHT: 70 IN | HEART RATE: 58 BPM | WEIGHT: 180.4 LBS

## 2023-10-26 DIAGNOSIS — I65.23 BILATERAL CAROTID ARTERY STENOSIS: ICD-10-CM

## 2023-10-26 DIAGNOSIS — I10 ESSENTIAL HYPERTENSION: ICD-10-CM

## 2023-10-26 DIAGNOSIS — I48.92 PAROXYSMAL ATRIAL FLUTTER (HCC): Primary | ICD-10-CM

## 2023-10-26 PROCEDURE — 99214 OFFICE O/P EST MOD 30 MIN: CPT | Performed by: NURSE PRACTITIONER

## 2023-10-26 PROCEDURE — 93000 ELECTROCARDIOGRAM COMPLETE: CPT | Performed by: NURSE PRACTITIONER

## 2023-10-26 NOTE — PATIENT INSTRUCTIONS
Follow a low salt diet  Monitor your blood pressure twice a day; take your blood pressure in the morning at least one hour after you have taken your morning meds and after you have been resting for at least 5 minutes; and then take blood pressure again in the afternoon/evening and call our office in one week with results [phone]

## 2023-11-25 DIAGNOSIS — I10 ESSENTIAL HYPERTENSION: ICD-10-CM

## 2023-11-25 DIAGNOSIS — I48.92 NEW ONSET ATRIAL FLUTTER (HCC): ICD-10-CM

## 2023-11-26 RX ORDER — APIXABAN 5 MG/1
5 TABLET, FILM COATED ORAL 2 TIMES DAILY
Qty: 60 TABLET | Refills: 5 | Status: SHIPPED | OUTPATIENT
Start: 2023-11-26

## 2023-11-26 RX ORDER — LISINOPRIL 40 MG/1
40 TABLET ORAL DAILY
Qty: 90 TABLET | Refills: 3 | Status: SHIPPED | OUTPATIENT
Start: 2023-11-26

## 2023-11-30 DIAGNOSIS — I48.92 NEW ONSET ATRIAL FLUTTER (HCC): ICD-10-CM

## 2023-11-30 RX ORDER — POTASSIUM CHLORIDE 20 MEQ/1
20 TABLET, EXTENDED RELEASE ORAL 2 TIMES DAILY
Qty: 60 TABLET | Refills: 5 | Status: SHIPPED | OUTPATIENT
Start: 2023-11-30

## 2023-12-21 ENCOUNTER — RA CDI HCC (OUTPATIENT)
Dept: OTHER | Facility: HOSPITAL | Age: 78
End: 2023-12-21

## 2023-12-28 ENCOUNTER — HOSPITAL ENCOUNTER (EMERGENCY)
Facility: HOSPITAL | Age: 78
Discharge: HOME | End: 2023-12-28
Attending: EMERGENCY MEDICINE
Payer: MEDICARE

## 2023-12-28 ENCOUNTER — APPOINTMENT (EMERGENCY)
Dept: RADIOLOGY | Facility: HOSPITAL | Age: 78
End: 2023-12-28
Payer: MEDICARE

## 2023-12-28 VITALS
HEART RATE: 59 BPM | OXYGEN SATURATION: 95 % | DIASTOLIC BLOOD PRESSURE: 84 MMHG | SYSTOLIC BLOOD PRESSURE: 196 MMHG | RESPIRATION RATE: 16 BRPM | TEMPERATURE: 99 F

## 2023-12-28 DIAGNOSIS — M70.40 PREPATELLAR BURSITIS: Primary | ICD-10-CM

## 2023-12-28 LAB
ALBUMIN SERPL BCP-MCNC: 4.1 G/DL (ref 3.5–5)
ALP SERPL-CCNC: 95 U/L (ref 34–104)
ALT SERPL W P-5'-P-CCNC: 7 U/L (ref 7–52)
ANION GAP SERPL CALCULATED.3IONS-SCNC: 10 MMOL/L
AST SERPL W P-5'-P-CCNC: 12 U/L (ref 13–39)
B BURGDOR IGG+IGM SER QL IA: NEGATIVE
BASOPHILS # BLD AUTO: 0.01 THOUSANDS/ÂΜL (ref 0–0.1)
BASOPHILS NFR BLD AUTO: 0 % (ref 0–1)
BILIRUB SERPL-MCNC: 2.68 MG/DL (ref 0.2–1)
BUN SERPL-MCNC: 17 MG/DL (ref 5–25)
CALCIUM SERPL-MCNC: 8.9 MG/DL (ref 8.4–10.2)
CHLORIDE SERPL-SCNC: 101 MMOL/L (ref 96–108)
CO2 SERPL-SCNC: 26 MMOL/L (ref 21–32)
CREAT SERPL-MCNC: 0.88 MG/DL (ref 0.6–1.3)
CRP SERPL QL: 16.5 MG/L
EOSINOPHIL # BLD AUTO: 0.01 THOUSAND/ÂΜL (ref 0–0.61)
EOSINOPHIL NFR BLD AUTO: 0 % (ref 0–6)
ERYTHROCYTE [DISTWIDTH] IN BLOOD BY AUTOMATED COUNT: 12.8 % (ref 11.6–15.1)
ERYTHROCYTE [SEDIMENTATION RATE] IN BLOOD: 12 MM/HOUR (ref 0–19)
GFR SERPL CREATININE-BSD FRML MDRD: 81 ML/MIN/1.73SQ M
GLUCOSE SERPL-MCNC: 109 MG/DL (ref 65–140)
HCT VFR BLD AUTO: 39.8 % (ref 36.5–49.3)
HGB BLD-MCNC: 13.3 G/DL (ref 12–17)
IMM GRANULOCYTES # BLD AUTO: 0.03 THOUSAND/UL (ref 0–0.2)
IMM GRANULOCYTES NFR BLD AUTO: 0 % (ref 0–2)
LYMPHOCYTES # BLD AUTO: 0.85 THOUSANDS/ÂΜL (ref 0.6–4.47)
LYMPHOCYTES NFR BLD AUTO: 10 % (ref 14–44)
MCH RBC QN AUTO: 30.7 PG (ref 26.8–34.3)
MCHC RBC AUTO-ENTMCNC: 33.4 G/DL (ref 31.4–37.4)
MCV RBC AUTO: 92 FL (ref 82–98)
MONOCYTES # BLD AUTO: 0.57 THOUSAND/ÂΜL (ref 0.17–1.22)
MONOCYTES NFR BLD AUTO: 7 % (ref 4–12)
NEUTROPHILS # BLD AUTO: 6.85 THOUSANDS/ÂΜL (ref 1.85–7.62)
NEUTS SEG NFR BLD AUTO: 83 % (ref 43–75)
NRBC BLD AUTO-RTO: 0 /100 WBCS
PLATELET # BLD AUTO: 167 THOUSANDS/UL (ref 149–390)
PMV BLD AUTO: 10.8 FL (ref 8.9–12.7)
POTASSIUM SERPL-SCNC: 3.2 MMOL/L (ref 3.5–5.3)
PROT SERPL-MCNC: 6.5 G/DL (ref 6.4–8.4)
RBC # BLD AUTO: 4.33 MILLION/UL (ref 3.88–5.62)
SODIUM SERPL-SCNC: 137 MMOL/L (ref 135–147)
URATE SERPL-MCNC: 6.2 MG/DL (ref 3.5–8.5)
WBC # BLD AUTO: 8.32 THOUSAND/UL (ref 4.31–10.16)

## 2023-12-28 PROCEDURE — 99283 EMERGENCY DEPT VISIT LOW MDM: CPT

## 2023-12-28 PROCEDURE — 87040 BLOOD CULTURE FOR BACTERIA: CPT | Performed by: EMERGENCY MEDICINE

## 2023-12-28 PROCEDURE — 80053 COMPREHEN METABOLIC PANEL: CPT | Performed by: EMERGENCY MEDICINE

## 2023-12-28 PROCEDURE — 99284 EMERGENCY DEPT VISIT MOD MDM: CPT | Performed by: EMERGENCY MEDICINE

## 2023-12-28 PROCEDURE — 85025 COMPLETE CBC W/AUTO DIFF WBC: CPT | Performed by: EMERGENCY MEDICINE

## 2023-12-28 PROCEDURE — 73562 X-RAY EXAM OF KNEE 3: CPT

## 2023-12-28 PROCEDURE — 86618 LYME DISEASE ANTIBODY: CPT | Performed by: EMERGENCY MEDICINE

## 2023-12-28 PROCEDURE — 85652 RBC SED RATE AUTOMATED: CPT | Performed by: EMERGENCY MEDICINE

## 2023-12-28 PROCEDURE — 36415 COLL VENOUS BLD VENIPUNCTURE: CPT | Performed by: EMERGENCY MEDICINE

## 2023-12-28 PROCEDURE — 96374 THER/PROPH/DIAG INJ IV PUSH: CPT

## 2023-12-28 PROCEDURE — 96372 THER/PROPH/DIAG INJ SC/IM: CPT

## 2023-12-28 PROCEDURE — 84550 ASSAY OF BLOOD/URIC ACID: CPT | Performed by: EMERGENCY MEDICINE

## 2023-12-28 PROCEDURE — 86140 C-REACTIVE PROTEIN: CPT | Performed by: EMERGENCY MEDICINE

## 2023-12-28 RX ORDER — CELECOXIB 200 MG/1
200 CAPSULE ORAL 2 TIMES DAILY PRN
Qty: 30 CAPSULE | Refills: 0 | Status: SHIPPED | OUTPATIENT
Start: 2023-12-28

## 2023-12-28 RX ORDER — KETOROLAC TROMETHAMINE 30 MG/ML
15 INJECTION, SOLUTION INTRAMUSCULAR; INTRAVENOUS ONCE
Status: COMPLETED | OUTPATIENT
Start: 2023-12-28 | End: 2023-12-28

## 2023-12-28 RX ORDER — POTASSIUM CHLORIDE 20 MEQ/1
40 TABLET, EXTENDED RELEASE ORAL ONCE
Status: COMPLETED | OUTPATIENT
Start: 2023-12-28 | End: 2023-12-28

## 2023-12-28 RX ORDER — KETOROLAC TROMETHAMINE 10 MG/1
10 TABLET, FILM COATED ORAL EVERY 6 HOURS PRN
Qty: 4 TABLET | Refills: 0 | Status: SHIPPED | OUTPATIENT
Start: 2023-12-28

## 2023-12-28 RX ORDER — DEXAMETHASONE SODIUM PHOSPHATE 10 MG/ML
8 INJECTION, SOLUTION INTRAMUSCULAR; INTRAVENOUS ONCE
Status: COMPLETED | OUTPATIENT
Start: 2023-12-28 | End: 2023-12-28

## 2023-12-28 RX ADMIN — POTASSIUM CHLORIDE 40 MEQ: 1500 TABLET, EXTENDED RELEASE ORAL at 06:09

## 2023-12-28 RX ADMIN — DEXAMETHASONE SODIUM PHOSPHATE 8 MG: 10 INJECTION, SOLUTION INTRAMUSCULAR; INTRAVENOUS at 06:34

## 2023-12-28 RX ADMIN — KETOROLAC TROMETHAMINE 15 MG: 30 INJECTION, SOLUTION INTRAMUSCULAR at 05:35

## 2023-12-28 NOTE — ED NOTES
"Patient ambulated approximately 70 ft. Patient stated his knee pain \"much better\".     Becca Siegel RN  12/28/23 0644    "

## 2023-12-28 NOTE — DISCHARGE INSTRUCTIONS
Return to the ER for any new, concerning, or worsening issues.    I have made a referral to the orthopedic surgeons for you to have your knee reevaluated in the office.  Our schedulers will be contacting you to set up an appointment.    Use Toradol 1 pill every 6 hours as needed for pain over the next 24 to 30 hours. Do not take Advil, Motrin, ibuprofen, Aleve, Naprosyn, or aspirin while on Toradol.  You may take Tylenol if necessary.     Use ice to the knee 4-6 times a day for 10 to 15 minutes at a time.    Return to the ER for any signs of infection such as increasing redness increasing pain or fever or chills.

## 2023-12-28 NOTE — ED PROVIDER NOTES
History  Chief Complaint   Patient presents with    Knee Pain     Swollen x3 days, redness, hurts to stand, warm to touch     78-year-old male presents emergency department complaining of swelling to the left knee.  Patient notes a history of arthritis as well as atrial flutter and is currently on Eliquis patient notes he got up to go to the bathroom today and he felt that he was having more pain and swelling in the knee.  Patient denies any fever chills or direct trauma.  Patient states that he has had symptoms for about 10 days and he continues to get worse.  The patient denies any fever chills, or direct trauma.  The patient notes that he has an appointment with his doctor in 4 hours however he notes a decreased confidence in their ability to evaluate the issue so he came to the hospital.        Prior to Admission Medications   Prescriptions Last Dose Informant Patient Reported? Taking?   amLODIPine (NORVASC) 5 mg tablet 12/27/2023  No Yes   Sig: Take 1 tablet (5 mg total) by mouth daily   apixaban (Eliquis) 5 mg 12/27/2023  No Yes   Sig: Take 1 tablet by mouth twice daily   atorvastatin (LIPITOR) 40 mg tablet 12/27/2023  No Yes   Sig: Take 1 tablet (40 mg total) by mouth daily   chlorthalidone 25 mg tablet 12/27/2023  No Yes   Sig: Take 1 tablet (25 mg total) by mouth daily   ferrous sulfate 325 (65 Fe) mg tablet 12/27/2023 Self Yes Yes   Sig: Take 325 mg by mouth daily with breakfast   lisinopril (ZESTRIL) 40 mg tablet 12/27/2023  No Yes   Sig: Take 1 tablet by mouth once daily   pantoprazole (PROTONIX) 40 mg tablet   No No   Sig: Take 1 tablet (40 mg total) by mouth daily   potassium chloride (K-DUR,KLOR-CON) 20 mEq tablet 12/27/2023  No Yes   Sig: Take 1 tablet by mouth twice daily      Facility-Administered Medications Last Administration Doses Remaining   cyanocobalamin injection 1,000 mcg 6/2/2021  8:52 AM           Past Medical History:   Diagnosis Date    Arthritis     Atrial flutter (HCC)      Cholecystitis     Coronary artery disease     Hypertension     RBBB     Spinal stenosis        Past Surgical History:   Procedure Laterality Date    CHOLECYSTECTOMY      COLON SURGERY      bwel resection    COLONOSCOPY      ESOPHAGOGASTRODUODENOSCOPY      2007  ONSET    [place] LAPAROSCOPY SURG CHOLECYSTECTOMY N/A 2017    Procedure: CHOLECYSTECTOMY LAPAROSCOPIC;  Surgeon: Ez Lainez MD;  Location: [facility];  Service: General    SMALL INTESTINE SURGERY      ONSEC DEC 2011       Family History   Problem Relation Age of Onset    Arthritis Mother     Heart attack Father     Coronary artery disease Family     Diabetes Family      I have reviewed and agree with the history as documented.    E-Cigarette/Vaping    E-Cigarette Use Never User      E-Cigarette/Vaping Substances    Nicotine No     THC No     CBD No     Flavoring No     Other No     Unknown No      Social History     Tobacco Use    Smoking status: Former     Current packs/day: 0.00     Average packs/day: 2.0 packs/day for 3.0 years (6.0 ttl pk-yrs)     Types: Cigarettes     Start date:      Quit date:      Years since quittin.0    Smokeless tobacco: Never   Vaping Use    Vaping status: Never Used   Substance Use Topics    Alcohol use: Yes     Comment: social     Drug use: No       Review of Systems   Constitutional:  Positive for activity change. Negative for chills and fever.   HENT:  Negative for ear pain and sore throat.    Eyes:  Negative for pain and visual disturbance.   Respiratory:  Negative for cough and shortness of breath.    Cardiovascular:  Negative for chest pain and palpitations.   Gastrointestinal:  Negative for abdominal pain and vomiting.   Genitourinary:  Negative for dysuria and hematuria.   Musculoskeletal:  Positive for arthralgias, gait problem and myalgias. Negative for back pain.   Skin:  Negative for color change and rash.   Neurological:  Negative for seizures and syncope.   All other systems reviewed and are  negative.      Physical Exam  Physical Exam  Vitals and nursing note reviewed.   Constitutional:       General: He is not in acute distress.     Appearance: He is well-developed.   HENT:      Head: Normocephalic and atraumatic.   Eyes:      Conjunctiva/sclera: Conjunctivae normal.   Cardiovascular:      Rate and Rhythm: Normal rate and regular rhythm.      Heart sounds: No murmur heard.  Pulmonary:      Effort: Pulmonary effort is normal. No respiratory distress.      Breath sounds: Normal breath sounds.   Abdominal:      Palpations: Abdomen is soft.      Tenderness: There is no abdominal tenderness.   Musculoskeletal:         General: Swelling and tenderness present.      Cervical back: Neck supple.      Comments: Patient has swelling to the bursa of the left knee.  It is hot and red.  The patient however has no evidence of knee effusion.  The patient has a range of motion of to the knee but has increased pain with utilizing it.  The patient is without any ecchymosis or lymphangitic streaks.   Skin:     General: Skin is warm and dry.      Capillary Refill: Capillary refill takes less than 2 seconds.   Neurological:      Mental Status: He is alert.   Psychiatric:         Mood and Affect: Mood normal.         Vital Signs  ED Triage Vitals   Temperature Pulse Respirations Blood Pressure SpO2   12/28/23 0409 12/28/23 0409 12/28/23 0409 12/28/23 0409 12/28/23 0409   99 °F (37.2 °C) 74 16 (!) 213/97 96 %      Temp Source Heart Rate Source Patient Position - Orthostatic VS BP Location FiO2 (%)   12/28/23 0409 12/28/23 0409 12/28/23 0409 12/28/23 0409 --   Temporal Monitor Lying Left arm       Pain Score       12/28/23 0535       No Pain           Vitals:    12/28/23 0409 12/28/23 0430 12/28/23 0530 12/28/23 0630   BP: (!) 213/97 (!) 184/85 (!) 174/81 (!) 196/84   Pulse: 74 68 66 59   Patient Position - Orthostatic VS: Lying Lying Lying Lying         Visual Acuity      ED Medications  Medications   ketorolac (TORADOL)  injection 15 mg (15 mg Intravenous Given 12/28/23 0535)   potassium chloride (K-DUR,KLOR-CON) CR tablet 40 mEq (40 mEq Oral Given 12/28/23 0609)   dexamethasone (PF) (DECADRON) injection 8 mg (8 mg Intramuscular Given 12/28/23 0634)       Diagnostic Studies  Results Reviewed       Procedure Component Value Units Date/Time    Blood culture #2 [[id number]] Collected: 12/28/23 0522    Lab Status: In process Specimen: Blood from Arm, Right Updated: 12/28/23 0533    Blood culture #1 [[id number]] Collected: 12/28/23 0528    Lab Status: In process Specimen: Blood from Hand, Right Updated: 12/28/23 0533    Uric acid [[id number]]  (Normal) Collected: 12/28/23 0441    Lab Status: Final result Specimen: Blood from Arm, Right Updated: 12/28/23 0531     Uric Acid 6.2 mg/dL     Narrative:      N-acetyl-p-benzoquinone imine (metabolite of Acetaminophen) will generate erroneously low results in samples for patients that have taken an overdose of Acetaminophen.    Comprehensive metabolic panel [[id number]]  (Abnormal) Collected: 12/28/23 0441    Lab Status: Final result Specimen: Blood from Arm, Right Updated: 12/28/23 0513     Sodium 137 mmol/L      Potassium 3.2 mmol/L      Chloride 101 mmol/L      CO2 26 mmol/L      ANION GAP 10 mmol/L      BUN 17 mg/dL      Creatinine 0.88 mg/dL      Glucose 109 mg/dL      Calcium 8.9 mg/dL      AST 12 U/L      ALT 7 U/L      Alkaline Phosphatase 95 U/L      Total Protein 6.5 g/dL      Albumin 4.1 g/dL      Total Bilirubin 2.68 mg/dL      eGFR 81 ml/min/1.73sq m     Narrative:      [employer] Kid[employer] guidelines for Chronic Kidney Disease (CKD):     Stage 1 with normal or high GFR (GFR > 90 mL/min/1.73 square meters)    Stage 2 Mild CKD (GFR = 60-89 mL/min/1.73 square meters)    Stage 3A Moderate CKD (GFR = 45-59 mL/min/1.73 square meters)    Stage 3B Moderate CKD (GFR = 30-44 mL/min/1.73 square meters)    Stage 4 Severe CKD (GFR = 15-29 mL/min/1.73 square meters)    Stage 5 End Stage  CKD (GFR <15 mL/min/1.73 square meters)  Note: GFR calculation is accurate only with a steady state creatinine    C-reactive protein [[id number]]  (Abnormal) Collected: 12/28/23 0441    Lab Status: Final result Specimen: Blood from Arm, Right Updated: 12/28/23 0513     CRP 16.5 mg/L     Sedimentation rate, automated [[id number]]  (Normal) Collected: 12/28/23 0441    Lab Status: Final result Specimen: Blood from Arm, Right Updated: 12/28/23 0453     Sed Rate 12 mm/hour     CBC and differential [[id number]]  (Abnormal) Collected: 12/28/23 0441    Lab Status: Final result Specimen: Blood from Arm, Right Updated: 12/28/23 0453     WBC 8.32 Thousand/uL      RBC 4.33 Million/uL      Hemoglobin 13.3 g/dL      Hematocrit 39.8 %      MCV 92 fL      MCH 30.7 pg      MCHC 33.4 g/dL      RDW 12.8 %      MPV 10.8 fL      Platelets 167 Thousands/uL      nRBC 0 /100 WBCs      Neutrophils Relative 83 %      Immat GRANS % 0 %      Lymphocytes Relative 10 %      Monocytes Relative 7 %      Eosinophils Relative 0 %      Basophils Relative 0 %      Neutrophils Absolute 6.85 Thousands/µL      Immature Grans Absolute 0.03 Thousand/uL      Lymphocytes Absolute 0.85 Thousands/µL      Monocytes Absolute 0.57 Thousand/µL      Eosinophils Absolute 0.01 Thousand/µL      Basophils Absolute 0.01 Thousands/µL     Lyme Total AB W Reflex to IGM/IGG [[id number]] Collected: 12/28/23 0441    Lab Status: In process Specimen: Blood from Arm, Right Updated: 12/28/23 0449    Narrative:      The following orders were created for panel order Lyme Total AB W Reflex to IGM/IGG.  Procedure                               Abnormality         Status                     ---------                               -----------         ------                     Lyme Total AB W Reflex t...[[id number]]                      In process                   Please view results for these tests on the individual orders.    Lyme Total AB W Reflex to IGM/IGG [[id number]] Collected:  12/28/23 0441    Lab Status: In process Specimen: Blood from Arm, Right Updated: 12/28/23 0449                   XR knee 3 views left non injury   ED Interpretation by Dylan Christian Jr.,  (12/28 0449)   Knee degenerative changes noted.                 Procedures  Procedures         ED Course  ED Course as of 12/28/23 0657   Thu Dec 28, 2023   0650 Patient admits to increased improvement in ambulation and pain after Toradol.  Patient's symptoms are likely due to an inflammatory bursitis as he has had symptoms for a week without worsening toxic symptoms, as well as no fever or leukocytosis.  The patient has no evidence of significant trauma.  The patient has no evidence of effusion and has full range of motion of the knee.  The patient be placed on Toradol with a orthopedic follow-up.  The patient was told to return for any infectious symptoms like spreading redness or fever or chills.                               SBIRT 22yo+      Flowsheet Row Most Recent Value   Initial Alcohol Screen: US AUDIT-C     1. How often do you have a drink containing alcohol? 0 Filed at: 12/28/2023 0411   2. How many drinks containing alcohol do you have on a typical day you are drinking?  0 Filed at: 12/28/2023 0411   3a. Male UNDER 65: How often do you have five or more drinks on one occasion? 0 Filed at: 12/28/2023 0411   3b. FEMALE Any Age, or MALE 65+: How often do you have 4 or more drinks on one occassion? 0 Filed at: 12/28/2023 0411   Audit-C Score 0 Filed at: 12/28/2023 0411   SAMANTHA: How many times in the past year have you...    Used an illegal drug or used a prescription medication for non-medical reasons? Never Filed at: 12/28/2023 0411                      Medical Decision Making  78-year-old male presents emergency department status post injury/pain to his left knee.  The patient denies any direct trauma to the knee But states that he has had increasing redness and pain and swelling to the skin overlying the knee for  the last 10 days.  The patient denies any fever or chills and admits to increasing pain with walking, which prompted him to come into the emergency department for evaluation, despite the patient having an appointment in 4 hours with his family doctor.  The patient has no evidence of fever on my evaluation and examination of the left knee shows no evidence of joint effusion.  With redness and swelling over the skin of the patella, the patient has symptoms that are most consistent with prepatellar bursitis.  The patient does not have any lymphangitic streaking and the patient had lab work done in the emergency department with a normal white count.  The patient has an elevation of CRP which is nonspecific, and uric acid is nonelevated.  The patient was given a dose of Toradol as well as steroid while in the emergency department the patient is able to walk much better and states his pain is significantly improved.  With the patient having symptoms over 10 days and no worsening toxic symptoms.  I believe the patient's problem is likely inflammatory over septic.  Patient however was told to return for any signs of increasing infectious symptomatology such as progressive redness, lymphangitic streaks, fever chills or generalized malaise.  Patient be referred to outpatient orthopedics for repeat evaluation and was given 4 doses of Toradol and prescription form for home.    Amount and/or Complexity of Data Reviewed  Labs: ordered.  Radiology: ordered and independent interpretation performed.    Risk  Prescription drug management.             Disposition  Final diagnoses:   Prepatellar bursitis     Time reflects when diagnosis was documented in both MDM as applicable and the Disposition within this note       Time User Action Codes Description Comment    12/28/2023  6:51 AM Dylan Christian Add [M70.40] Prepatellar bursitis           ED Disposition       ED Disposition   Discharge    Condition   Stable    Date/Time   Thu Dec  28, 2023 0651    Comment   Tee Forrest Jr. discharge to home/self care.                   Follow-up Information       Follow up With Specialties Details Why Contact Info    Arlen Macedo DO Internal Medicine, [facility] On 1/2/2024  [address] [facility] [address]  [phone]              Patient's Medications   Discharge Prescriptions    KETOROLAC (TORADOL) 10 MG TABLET    Take 1 tablet (10 mg total) by mouth every 6 (six) hours as needed for moderate pain for up to 4 doses       Start Date: 12/28/2023End Date: --       Order Dose: 10 mg       Quantity: 4 tablet    Refills: 0           PDMP Review       None            ED Provider  Electronically Signed by             Dylan Christian Jr.,   12/28/23 0657

## 2023-12-31 LAB
BACTERIA BLD CULT: NORMAL
BACTERIA BLD CULT: NORMAL

## 2024-01-02 LAB
BACTERIA BLD CULT: NORMAL
BACTERIA BLD CULT: NORMAL

## 2024-01-17 ENCOUNTER — APPOINTMENT (OUTPATIENT)
Dept: RADIOLOGY | Facility: CLINIC | Age: 79
End: 2024-01-17
Payer: MEDICARE

## 2024-01-17 ENCOUNTER — OFFICE VISIT (OUTPATIENT)
Dept: OBGYN CLINIC | Facility: CLINIC | Age: 79
End: 2024-01-17
Payer: MEDICARE

## 2024-01-17 VITALS
WEIGHT: 176 LBS | SYSTOLIC BLOOD PRESSURE: 162 MMHG | HEIGHT: 70 IN | HEART RATE: 96 BPM | BODY MASS INDEX: 25.2 KG/M2 | DIASTOLIC BLOOD PRESSURE: 83 MMHG

## 2024-01-17 DIAGNOSIS — M70.22 OLECRANON BURSITIS, LEFT ELBOW: ICD-10-CM

## 2024-01-17 DIAGNOSIS — M70.40 PREPATELLAR BURSITIS: ICD-10-CM

## 2024-01-17 DIAGNOSIS — M10.9 GOUTY BURSITIS OF LEFT OLECRANON: Primary | ICD-10-CM

## 2024-01-17 PROCEDURE — 99204 OFFICE O/P NEW MOD 45 MIN: CPT | Performed by: STUDENT IN AN ORGANIZED HEALTH CARE EDUCATION/TRAINING PROGRAM

## 2024-01-17 PROCEDURE — 73070 X-RAY EXAM OF ELBOW: CPT

## 2024-01-17 NOTE — PROGRESS NOTES
Ortho Sports Medicine New Patient Elbow Visit     Assesment:   78 y.o.male with left elbow pain, and swelling ongoing for 2 weeks.  Imaging and exam consistent with gouty bursitis of the left olecranon.     Plan:  I reviewed the history, exam, and imaging with the patient in clinic today.  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  On exam, he does have olecranon bursitis but the swelling does feel more firm consistent with calcium deposition rather than fluid only.  The patient states that he is having minimal pain in the elbow and no pain with range of motion that would suggest septic bursitis.  He did take Celebrex which she states helped significantly with the symptoms.  The patient is currently on Eliquis for atrial fibrillation.  He has a follow-up appoint with his PCP tomorrow.  I did discuss that the Celebrex can interact with Eliquis and that I would recommend that he discuss treatment for gout with his PCP when he follows up this week.  I did discuss the possibility of aspiration but I recommended against it given the evidence of calcium deposit and the fact that did not feel like he had much fluid that we could aspirate on exam.  I did discuss there is a risk of an infection with an aspiration and that I am not sure that we would be able to get much fluid out.  Patient demonstrated understanding discussion was agreed with the plan.  All of his questions were answered.  I recommend he discuss appropriate treatment for gout with his primary care physician particularly in the setting of being on Eliquis.  I recommended the patient follow-up in 4 weeks for repeat evaluation.  I did discuss with the patient that if his symptoms do change including increasing pain, pain with elbow range of motion, erythema, or drainage around the elbow, that he should reach out immediately for evaluation or go to the emergency  department for evaluation of septic bursitis.  Patient demonstrated understanding of the return precautions.    Conservative treatment:  Continue with the Celebrex until seen by PCP in 2 days  Recommend follow up with PCP to discuss treatments for gout.  Discussed return precautions for septic bursitis.    Imaging:  All imaging from today was reviewed by myself and explained to the patient.     Injection:  No Injection planned at this time.    Surgery:  No surgery is recommended at this point, continue with conservative treatment plan as noted.    Follow up:  Return in about 1 month (around 2/17/2024).        Chief Complaint   Patient presents with    Left Elbow - Pain, Swelling       History of Present Illness:  The patient is a 78 y.o. RHD male seen in clinic for left elbow pain. The pain started about 2 weeks ago without any known injury.  He states he had Celebrex from the ED for prepatellar bursitis that occurred about a month ago. He states he took the Celebrex for his current symptoms and this did resolve his symptoms again.  He states that the swelling did return intensely about 4-5 days ago.  The patient states he took Celebrex today and now he has no pain, but swelling persists. He does note limited ROM as well.  He denies ever having gout/pseudogout or olecranon bursiitis in the past. Symptoms are aggravated by movement. The patient has tried Celebrex only. Symptoms have improved since the onset.     Occupation: retired teacher and   The patient has the following co-morbidities: HTN      Hand/wrist Surgical History:  None    Past Medical, Social and Family History:  Past Medical History:   Diagnosis Date    Arthritis     Atrial flutter (HCC)     Cholecystitis     Coronary artery disease     Hypertension     RBBB     Spinal stenosis      Past Surgical History:   Procedure Laterality Date    CHOLECYSTECTOMY      COLON SURGERY      bwel resection    COLONOSCOPY      ESOPHAGOGASTRODUODENOSCOPY       02/07/2007  [place]    [place] LAPAROSCOPY SURG CHOLECYSTECTOMY N/A 12/5/2017    Procedure: CHOLECYSTECTOMY LAPAROSCOPIC;  Surgeon: Ez Lainez MD;  Location: [facility];  Service: General    SMALL INTESTINE SURGERY      ONSEC DEC 2011     No Known Allergies  Current Outpatient Medications on File Prior to Visit   Medication Sig Dispense Refill    amLODIPine (NORVASC) 5 mg tablet Take 1 tablet (5 mg total) by mouth daily 90 tablet 3    apixaban (Eliquis) 5 mg Take 1 tablet by mouth twice daily 60 tablet 5    atorvastatin (LIPITOR) 40 mg tablet Take 1 tablet (40 mg total) by mouth daily 90 tablet 3    celecoxib (CeleBREX) 200 mg capsule Take 1 capsule (200 mg total) by mouth 2 (two) times a day as needed for mild pain 30 capsule 0    ferrous sulfate 325 (65 Fe) mg tablet Take 325 mg by mouth daily with breakfast      ketorolac (TORADOL) 10 mg tablet Take 1 tablet (10 mg total) by mouth every 6 (six) hours as needed for moderate pain for up to 4 doses 4 tablet 0    lisinopril (ZESTRIL) 40 mg tablet Take 1 tablet by mouth once daily 90 tablet 3    potassium chloride (K-DUR,KLOR-CON) 20 mEq tablet Take 1 tablet by mouth twice daily 60 tablet 5    chlorthalidone 25 mg tablet Take 1 tablet (25 mg total) by mouth daily 90 tablet 3    pantoprazole (PROTONIX) 40 mg tablet Take 1 tablet (40 mg total) by mouth daily 90 tablet 3     Current Facility-Administered Medications on File Prior to Visit   Medication Dose Route Frequency Provider Last Rate Last Admin    cyanocobalamin injection 1,000 mcg  1,000 mcg Intramuscular Q30 Days Arlen Macedo DO   1,000 mcg at 06/02/21 0852     Social History     Socioeconomic History    Marital status: /Civil Union     Spouse name: Not on file    Number of children: Not on file    Years of education: Not on file    Highest education level: Not on file   Occupational History    Not on file   Tobacco Use    Smoking status: Former     Current packs/day: 0.00     Average packs/day: 2.0  "packs/day for 3.0 years (6.0 ttl pk-yrs)     Types: Cigarettes     Start date:      Quit date:      Years since quittin.0    Smokeless tobacco: Never   Vaping Use    Vaping status: Never Used   Substance and Sexual Activity    Alcohol use: Yes     Comment: social     Drug use: No    Sexual activity: Not on file   Other Topics Concern    Not on file   Social History Narrative    CAFFEINE USE    DENTAL CARE,REGULARLY    FEELS SAFE AT HOME    LIVES INDEPENDENTLY WITH SPOUSE    USES SAFETY EQUIPMENT    SUN PROTECTION      Social Determinants of Health     Financial Resource Strain: Low Risk  (2022)    Overall Financial Resource Strain (CARDIA)     Difficulty of Paying Living Expenses: Not very hard   Food Insecurity: Not on file   Transportation Needs: No Transportation Needs (2022)    PRAPARE - Transportation     Lack of Transportation (Medical): No     Lack of Transportation (Non-Medical): No   Physical Activity: Not on file   Stress: Not on file   Social Connections: Not on file   Intimate Partner Violence: Not on file   Housing Stability: Not on file         I have reviewed the past medical, surgical, social and family history, medications and allergies as documented in the EMR.    Review of systems: ROS is negative other than that noted in the HPI.    Physical Exam:    Blood pressure 162/83, pulse 96, height 5' 10\" (1.778 m), weight 79.8 kg (176 lb).    General/Constitutional: NAD, well developed, well nourished  HENT: Normocephalic, atraumatic  CV: Intact distal pulses, regular rate  Resp: No respiratory distress or labored breathing  GI: Soft and non-tender   Lymphatic: No lymphadenopathy palpated  Neuro: Alert and Oriented x 3, no focal deficits  Psych: Normal mood, normal affect, normal judgement, normal behavior  Skin: Warm, dry, no rashes, no erythema      Focused left Elbow Exam:  Skin is intact.  No ecchymosis, or erythema. Firm, moderate effusion of the olecranon bursa without an " effusion of the elbow noted on exam.  The skin overlying the bursa showed no erythema and there was no drainage to suggest septic bursitis.    Limited range of motion of the elbow with +10 of extension, full flexion, full pronation, and full supination without pain or mechanical block. No micromotion tenderness with movement.    No tenderness over the medial or lateral epicondyles, olecranon, radial head, proximal wrist extensors, or proximal wrist flexors.      No tenderness and negative Tinel's over the cubital tunnel.  No subluxation of the ulnar nerve with flexion or extension of the elbow.    5/5 strength with elbow flexion and extension, wrist flexion and extension, forearm pronation and supination.  No pain with resisted wrist extension or wrist flexion.  No pain with resisted pronation or supination of the forearm.     Negative hook test of the distal biceps tendon.    Elbow is stable to 0 and 30 degrees of varus and valgus stress at both 0 and 30 degrees of flexion.  Negative moving valgus stress test.    UE NV Exam: +2 Radial pulses bilaterally  Sensation intact to light touch C5-T1 bilaterally, Radial/median/ulnar nerve motor intact    Bilateral shoulder, wrist/hand, and forearm ROM full, painless with passive ROM, no ttp or crepitance throughout extremities above wrist joint    Cervical ROM is full without pain, numbness or tingling    Negative spurling maneuver bilaterally       Elbow Imaging:    X-rays of the left elbow were obtained 1/17/24 and reviewed with the patient.  Based on my independent review, imaging shows osteoarthritis with osteophyte formation and calcium deposits within the olecranon bursa.      Scribe Attestation      I,:  Massiel Black PA-C am acting as a scribe while in the presence of the attending physician.:       I,:  Ti Pena MD personally performed the services described in this documentation    as scribed in my presence.:

## 2024-01-25 DIAGNOSIS — M70.40 PREPATELLAR BURSITIS: ICD-10-CM

## 2024-01-25 RX ORDER — CELECOXIB 200 MG/1
200 CAPSULE ORAL 2 TIMES DAILY PRN
Qty: 30 CAPSULE | Refills: 5 | Status: SHIPPED | OUTPATIENT
Start: 2024-01-25

## 2024-01-30 ENCOUNTER — TELEPHONE (OUTPATIENT)
Dept: FAMILY MEDICINE CLINIC | Facility: CLINIC | Age: 79
End: 2024-01-30

## 2024-01-31 NOTE — TELEPHONE ENCOUNTER
01/31/241:44 PM    Federico     Per update to patient attribution workflow:     When patient is scheduled with another  PCP- No encounter to be sent- Only new  PCP to update at time of first visit.     When patient has already established care with another  PCP and your office is still listed   Call new PCP office ([facility], Admin, Coordinator) speak to Admin, have office change to appropriate PCP- No encounter needs to be sent to care gap.     Thank you,  Elise Reed    Please inform pt I have her Dexa report from January, it is in our system. I have referred her to our Endo department to discuss other treatment options. Please help schedule.

## 2024-02-06 ENCOUNTER — OFFICE VISIT (OUTPATIENT)
Dept: FAMILY MEDICINE CLINIC | Facility: CLINIC | Age: 79
End: 2024-02-06
Payer: MEDICARE

## 2024-02-06 VITALS
HEART RATE: 62 BPM | BODY MASS INDEX: 25.48 KG/M2 | OXYGEN SATURATION: 96 % | HEIGHT: 70 IN | WEIGHT: 178 LBS | SYSTOLIC BLOOD PRESSURE: 146 MMHG | DIASTOLIC BLOOD PRESSURE: 88 MMHG

## 2024-02-06 DIAGNOSIS — G89.29 CHRONIC MIDLINE LOW BACK PAIN, UNSPECIFIED WHETHER SCIATICA PRESENT: ICD-10-CM

## 2024-02-06 DIAGNOSIS — K50.80 CROHN'S DISEASE OF BOTH SMALL AND LARGE INTESTINE WITHOUT COMPLICATION (HCC): ICD-10-CM

## 2024-02-06 DIAGNOSIS — M54.50 CHRONIC MIDLINE LOW BACK PAIN, UNSPECIFIED WHETHER SCIATICA PRESENT: ICD-10-CM

## 2024-02-06 DIAGNOSIS — I27.20 PULMONARY HYPERTENSION (HCC): ICD-10-CM

## 2024-02-06 DIAGNOSIS — Z23 ENCOUNTER FOR IMMUNIZATION: Primary | ICD-10-CM

## 2024-02-06 DIAGNOSIS — R93.5 ABNORMAL ABDOMINAL CT SCAN: ICD-10-CM

## 2024-02-06 DIAGNOSIS — D51.0 PERNICIOUS ANEMIA: ICD-10-CM

## 2024-02-06 DIAGNOSIS — E53.8 B12 DEFICIENCY: ICD-10-CM

## 2024-02-06 DIAGNOSIS — I10 PRIMARY HYPERTENSION: ICD-10-CM

## 2024-02-06 DIAGNOSIS — M45.9 ANKYLOSING SPONDYLITIS, UNSPECIFIED SITE OF SPINE (HCC): ICD-10-CM

## 2024-02-06 DIAGNOSIS — I48.92 NEW ONSET ATRIAL FLUTTER (HCC): ICD-10-CM

## 2024-02-06 PROCEDURE — 99214 OFFICE O/P EST MOD 30 MIN: CPT | Performed by: STUDENT IN AN ORGANIZED HEALTH CARE EDUCATION/TRAINING PROGRAM

## 2024-02-06 NOTE — PROGRESS NOTES
Name: Tee Forrest Jr.      : 1945      MRN: [id number]  Encounter Provider: Champ Roldan MD  Encounter Date: 2024   Encounter department: [facility] PRIMARY [facility]ARE    Assessment & Plan     1. Encounter for immunization    2. Primary hypertension  -     Comprehensive metabolic panel; Future  -     Lipid Panel with Direct LDL reflex; Future  -     CBC and differential; Future  -     Vitamin B12; Future  -     Aldosterone/Renin Ratio; Future    3. Pernicious anemia    4. Chronic midline low back pain, unspecified whether sciatica present  -     Ambulatory Referral to Physical Therapy; Future  -     Ambulatory referral to Spine & Pain Management; Future    5. Crohn's disease of both small and large intestine without complication (HCC)    6. Ankylosing spondylitis, unspecified site of spine (HCC)    7. Pulmonary hypertension (HCC)    8. New onset atrial flutter (HCC)    9. B12 deficiency  -     Vitamin B12; Future    10. Abnormal abdominal CT scan        Patient due for screening labs, also continues to complain of chronic back pain recent MRI reviewed which showed significant spinal stenosis degenerative changes.  Was previously receiving epidural injections from [facility] is now requesting referral to spine and pain for second opinion.  Was previously told he needed surgical intervention however had no interest in surgery secondary to his age.  He is interested in attending physical therapy.  In regards to his new onset a flutter he is following with cardiology currently on Eliquis and stable.  Does have a history of pernicious anemia for which he receives B12 injections is due for repeat B12 level and also CBC to assess anemia.  I did review his charts and looks like he had several abnormal CAT scans which show some abnormal appearance of the pancreas.  This was seen on multiple CT scans with recommendation for nonemergent outpatient MRI.  Patient states this was never done and is not currently  interested in pursuing additional MRI study.       Subjective      HPI    Is a pleasant 78-year-old male presents the office today for establishment of care.  Patient previously seen Dr. Macedo.  States overall he feels well his only concern today is chronic low back pain.  Patient states he was previously seeing [facility] who is managing this with injections and is looking for a second opinion.  Additionally we reviewed his past medical history and current medications.  Looks like he was started on Celebrex by the emergency department.  Advised that he should discontinue this as he is on the Eliquis.    Review of Systems   Constitutional:  Negative for activity change, appetite change, chills, fatigue and fever.   HENT:  Negative for congestion, dental problem, drooling, ear discharge, ear pain, facial swelling, postnasal drip, rhinorrhea and sinus pain.    Eyes:  Negative for photophobia, pain, discharge and itching.   Respiratory:  Negative for apnea, cough, chest tightness and shortness of breath.    Cardiovascular:  Negative for chest pain and leg swelling.   Gastrointestinal:  Negative for abdominal distention, abdominal pain, anal bleeding, constipation, diarrhea and nausea.   Endocrine: Negative for cold intolerance, heat intolerance and polydipsia.   Genitourinary:  Negative for difficulty urinating.   Musculoskeletal:  Positive for back pain. Negative for arthralgias, gait problem, joint swelling and myalgias.   Skin:  Negative for color change and pallor.   Allergic/Immunologic: Negative for immunocompromised state.   Neurological:  Negative for dizziness, seizures, facial asymmetry, weakness, light-headedness, numbness and headaches.   Psychiatric/Behavioral:  Negative for agitation, behavioral problems, confusion, decreased concentration and dysphoric mood.    All other systems reviewed and are negative.      Current Outpatient Medications on File Prior to Visit   Medication Sig    amLODIPine (NORVASC) 5  "mg tablet Take 1 tablet (5 mg total) by mouth daily    apixaban (Eliquis) 5 mg Take 1 tablet by mouth twice daily    atorvastatin (LIPITOR) 40 mg tablet Take 1 tablet (40 mg total) by mouth daily    chlorthalidone 25 mg tablet Take 1 tablet (25 mg total) by mouth daily    ferrous sulfate 325 (65 Fe) mg tablet Take 325 mg by mouth daily with breakfast    lisinopril (ZESTRIL) 40 mg tablet Take 1 tablet by mouth once daily    pantoprazole (PROTONIX) 40 mg tablet Take 1 tablet (40 mg total) by mouth daily    potassium chloride (K-DUR,KLOR-CON) 20 mEq tablet Take 1 tablet by mouth twice daily    [DISCONTINUED] celecoxib (CeleBREX) 200 mg capsule Take 1 capsule (200 mg total) by mouth 2 (two) times a day as needed for mild pain    [DISCONTINUED] ketorolac (TORADOL) 10 mg tablet Take 1 tablet (10 mg total) by mouth every 6 (six) hours as needed for moderate pain for up to 4 doses (Patient not taking: Reported on 2/6/2024)       Objective     /88 (BP Location: Left arm, Patient Position: Sitting, Cuff Size: Adult)   Pulse 62   Ht 5' 10\" (1.778 m)   Wt 80.7 kg (178 lb)   SpO2 96%   BMI 25.54 kg/m²     Physical Exam  Vitals and nursing note reviewed.   Constitutional:       Appearance: He is well-developed.   HENT:      Head: Normocephalic and atraumatic.   Eyes:      Conjunctiva/sclera: Conjunctivae normal.      Pupils: Pupils are equal, round, and reactive to light.   Neck:      Thyroid: No thyromegaly.      Vascular: No JVD.      Trachea: No tracheal deviation.   Cardiovascular:      Rate and Rhythm: Normal rate and regular rhythm.   Pulmonary:      Effort: Pulmonary effort is normal.      Breath sounds: Normal breath sounds.   Abdominal:      General: Bowel sounds are normal.      Palpations: Abdomen is soft.   Musculoskeletal:         General: No tenderness or deformity. Normal range of motion.      Cervical back: Normal range of motion and neck supple.   Lymphadenopathy:      Cervical: No cervical adenopathy. "   Skin:     General: Skin is warm and dry.      Capillary Refill: Capillary refill takes less than 2 seconds.   Neurological:      Mental Status: He is alert and oriented to person, place, and time.      Cranial Nerves: No cranial nerve deficit.      Coordination: Coordination normal.      Deep Tendon Reflexes: Reflexes normal.   Psychiatric:         Behavior: Behavior normal.       Champ Roldan MD

## 2024-02-07 ENCOUNTER — APPOINTMENT (OUTPATIENT)
Dept: LAB | Facility: HOSPITAL | Age: 79
End: 2024-02-07
Payer: MEDICARE

## 2024-02-07 DIAGNOSIS — E53.8 B12 DEFICIENCY: ICD-10-CM

## 2024-02-07 DIAGNOSIS — I10 PRIMARY HYPERTENSION: ICD-10-CM

## 2024-02-07 LAB
ALBUMIN SERPL BCP-MCNC: 4.2 G/DL (ref 3.5–5)
ALP SERPL-CCNC: 130 U/L (ref 34–104)
ALT SERPL W P-5'-P-CCNC: 14 U/L (ref 7–52)
ANION GAP SERPL CALCULATED.3IONS-SCNC: 10 MMOL/L
AST SERPL W P-5'-P-CCNC: 15 U/L (ref 13–39)
BASOPHILS # BLD AUTO: 0.01 THOUSANDS/ÂΜL (ref 0–0.1)
BASOPHILS NFR BLD AUTO: 0 % (ref 0–1)
BILIRUB SERPL-MCNC: 1.55 MG/DL (ref 0.2–1)
BUN SERPL-MCNC: 23 MG/DL (ref 5–25)
CALCIUM SERPL-MCNC: 9.2 MG/DL (ref 8.4–10.2)
CHLORIDE SERPL-SCNC: 103 MMOL/L (ref 96–108)
CHOLEST SERPL-MCNC: 147 MG/DL
CO2 SERPL-SCNC: 29 MMOL/L (ref 21–32)
CREAT SERPL-MCNC: 0.87 MG/DL (ref 0.6–1.3)
EOSINOPHIL # BLD AUTO: 0.05 THOUSAND/ÂΜL (ref 0–0.61)
EOSINOPHIL NFR BLD AUTO: 1 % (ref 0–6)
ERYTHROCYTE [DISTWIDTH] IN BLOOD BY AUTOMATED COUNT: 13 % (ref 11.6–15.1)
GFR SERPL CREATININE-BSD FRML MDRD: 81 ML/MIN/1.73SQ M
GLUCOSE P FAST SERPL-MCNC: 92 MG/DL (ref 65–99)
HCT VFR BLD AUTO: 40.2 % (ref 36.5–49.3)
HDLC SERPL-MCNC: 43 MG/DL
HGB BLD-MCNC: 13.2 G/DL (ref 12–17)
IMM GRANULOCYTES # BLD AUTO: 0.01 THOUSAND/UL (ref 0–0.2)
IMM GRANULOCYTES NFR BLD AUTO: 0 % (ref 0–2)
LDLC SERPL CALC-MCNC: 86 MG/DL (ref 0–100)
LYMPHOCYTES # BLD AUTO: 1.41 THOUSANDS/ÂΜL (ref 0.6–4.47)
LYMPHOCYTES NFR BLD AUTO: 26 % (ref 14–44)
MCH RBC QN AUTO: 29.7 PG (ref 26.8–34.3)
MCHC RBC AUTO-ENTMCNC: 32.8 G/DL (ref 31.4–37.4)
MCV RBC AUTO: 91 FL (ref 82–98)
MONOCYTES # BLD AUTO: 0.35 THOUSAND/ÂΜL (ref 0.17–1.22)
MONOCYTES NFR BLD AUTO: 7 % (ref 4–12)
NEUTROPHILS # BLD AUTO: 3.54 THOUSANDS/ÂΜL (ref 1.85–7.62)
NEUTS SEG NFR BLD AUTO: 66 % (ref 43–75)
NRBC BLD AUTO-RTO: 0 /100 WBCS
PLATELET # BLD AUTO: 201 THOUSANDS/UL (ref 149–390)
PMV BLD AUTO: 10.7 FL (ref 8.9–12.7)
POTASSIUM SERPL-SCNC: 4 MMOL/L (ref 3.5–5.3)
PROT SERPL-MCNC: 6.8 G/DL (ref 6.4–8.4)
RBC # BLD AUTO: 4.44 MILLION/UL (ref 3.88–5.62)
SODIUM SERPL-SCNC: 142 MMOL/L (ref 135–147)
TRIGL SERPL-MCNC: 89 MG/DL
VIT B12 SERPL-MCNC: 158 PG/ML (ref 180–914)
WBC # BLD AUTO: 5.37 THOUSAND/UL (ref 4.31–10.16)

## 2024-02-07 PROCEDURE — 85025 COMPLETE CBC W/AUTO DIFF WBC: CPT

## 2024-02-07 PROCEDURE — 82607 VITAMIN B-12: CPT

## 2024-02-07 PROCEDURE — 36415 COLL VENOUS BLD VENIPUNCTURE: CPT

## 2024-02-07 PROCEDURE — 82088 ASSAY OF ALDOSTERONE: CPT

## 2024-02-07 PROCEDURE — 80053 COMPREHEN METABOLIC PANEL: CPT

## 2024-02-07 PROCEDURE — 80061 LIPID PANEL: CPT

## 2024-02-07 PROCEDURE — 84244 ASSAY OF RENIN: CPT

## 2024-02-14 ENCOUNTER — EVALUATION (OUTPATIENT)
Dept: PHYSICAL THERAPY | Facility: CLINIC | Age: 79
End: 2024-02-14
Payer: MEDICARE

## 2024-02-14 DIAGNOSIS — M54.50 CHRONIC MIDLINE LOW BACK PAIN, UNSPECIFIED WHETHER SCIATICA PRESENT: Primary | ICD-10-CM

## 2024-02-14 DIAGNOSIS — G89.29 CHRONIC MIDLINE LOW BACK PAIN, UNSPECIFIED WHETHER SCIATICA PRESENT: Primary | ICD-10-CM

## 2024-02-14 PROCEDURE — 97110 THERAPEUTIC EXERCISES: CPT

## 2024-02-14 PROCEDURE — 97161 PT EVAL LOW COMPLEX 20 MIN: CPT

## 2024-02-14 NOTE — PROGRESS NOTES
PT Evaluation     Today's date: 2024  Patient name: Tee Forrest Jr.  : 1945  MRN: [id number]  Referring provider: Champ Roldan MD  Dx:   Encounter Diagnosis     ICD-10-CM    1. Chronic midline low back pain, unspecified whether sciatica present  M54.50 Ambulatory Referral to Physical Therapy    G89.29           Start Time: 0745  Stop Time: 0830  Total time in clinic (min): 45 minutes    Assessment  Assessment details: Tee Forrest Jr. is a 78 y.o. male presenting to outpatient physical therapy with noted impairments including midline low back pain, impaired hamstring tissue extensibility, reduced lumbar active range of motion, reduced hip/core/glute strength, poor posture, and reduced activity tolerance. Pt experiencing decrease in symptoms with lumbar forward flexion; increase in symptoms with lumbar extension. Due to noted impairments, the patient's present functional limitations include difficulty with ADLs/IADLs, difficulty walking/standing for extended periods, impaired safety with ambulation,  reduced tolerance for functional mobility and activity due to pain/weakness, and difficulty completing HH responsibilities, especially outside work. Patient to benefit from skilled outpatient physical therapy 2x/week for 8 weeks in order to reduce low back pain, maximize pain free lumbar range of motion, increase core/hip/glute strength and stability, improve safety with ambulation, and improve functional mobility/functional activity in order to maximize function with reduced limitations. Home exercise program was provided and all questions answered to patient's level of satisfaction. Thank you for your referral.       Impairments: abnormal gait, abnormal or restricted ROM, activity intolerance, impaired balance, impaired physical strength, lacks appropriate home exercise program, pain with function and poor body mechanics    Symptom irritability: highUnderstanding of Dx/Px/POC: good   Prognosis:  "fair  Prognosis details: Due to chronicity and age     Goals  STGs to be achieved in 4 weeks:  1. Pt to demonstrate reduced subjective pain rating \"at worst\" by at least 2-3 points from Initial Eval in order to allow for reduced pain with ADLs and improved functional activity tolerance.   2. Pt to demonstrate increased AROM of lumbar flexion to beyond B knee level in order to allow for greater ease and independence with ADLs and functional mobility.   3. Pt to demonstrate ability to stand for over 5 minutes without more than 2 point increase in pt reported low back pain scale to demonstrate improving functional activity.   4. Pt to demonstrate increased MMT of B hip flexion, extension, and abduction by at least 1/2-1 grade in order to improve safety and stability with ADLs and functional mobility.     LTGs to be achieved by discharge:  1. Pt will be I with HEP in order to continue to improve quality of life and independence and reduce risk for re-injury.   2. Pt to demonstrate ability to walk out to barn with only one rest break to demonstrate improved safety and functional activity.   3. Pt to demonstrate improved function as noted by achieving or exceeding predicted score on FOTO outcomes assessment tool.    4. Pt to demonstrate ability to stand for at least 15 minutes without need for seated rest break to demonstrate improved QOL.     Plan  Patient would benefit from: skilled physical therapy  Planned modality interventions: cryotherapy and thermotherapy: hydrocollator packs  Planned therapy interventions: abdominal trunk stabilization, IASTM, joint mobilization, manual therapy, massage, balance, nerve gliding, neuromuscular re-education, patient education, postural training, strengthening, stretching, therapeutic activities, therapeutic exercise, home exercise program, flexibility and body mechanics training  Frequency: 2x week  Duration in weeks: 8  Plan of Care beginning date: 2/14/2024  Plan of Care " expiration date: 2024  Treatment plan discussed with: patient and PTA        Subjective Evaluation    History of Present Illness  Mechanism of injury: Pt is presenting to OPPT with chief compliant of LBP. Pt reports pain started about 1 year ago. Pt reports pain with all standing and walking, but reports he feels fine when sitting. Pt reports that he has extreme difficulties when walking due to pain. Reports that after he walks a few steps without his rollator, he needs to stop and bend over to help relieve pain so he can make a few more steps. Pt reports that his balance feels off as well. Reports pain started gradually; denies any TAMMY or trauma. Pt reports he also can not stand longer than 10-15 mins without having increased pain and needing to sit down. Pt reports no pain in BLEs. Pt denies numbness/tingling down the legs but reports that both of his feet are numb for years. Pt reports no pain with laying down or pain with sleeping. Pt reports he is also walking with rollator and SPC. Reports that he can walk longer distances with rollator because it helps with his balance and he can take pressure off of back. Reports he feels like his legs will give out due to pain when walking but the bending over for about a minute will relieve pain. Reports he has some difficulty going up/down stairs.   Reports he used to go to [facility] for spinal injections for pain management which helped. Pt denies any progressive LE weakness, changes in bowel/bladder, and saddle paraesthesia.     Hx of L BURAK          Recurrent probem    Quality of life: good    Patient Goals  Patient goals for therapy: decreased pain, improved balance, increased motion, increased strength, independence with ADLs/IADLs and return to sport/leisure activities  Patient goal: walk longer distances without needing to stop, increase strength/balance  Pain  Current pain ratin  At best pain ratin  At worst pain ratin  Quality: dull ache, sharp and  discomfort  Relieving factors: rest and relaxation  Aggravating factors: standing, walking, stair climbing and lifting  Progression: worsening    Social Support  Lives in: multiple-level home  Lives with: spouse    Employment status: not working    Diagnostic Tests  CT scan: abnormal  Treatments  Previous treatment: injection treatment and medication  Current treatment: physical therapy        Objective     Concurrent Complaints  Negative for night pain, disturbed sleep, bladder dysfunction, bowel dysfunction and saddle (S4) numbness    Postural Observations    Additional Postural Observation Details  Forward trunk lean     Neurological Testing     Sensation     Lumbar   Left   Intact: light touch    Right   Intact: light touch    Reflexes   Left   Patellar (L4): normal (2+)  Achilles (S1): normal (2+)  Clonus sign: negative    Right   Patellar (L4): normal (2+)  Achilles (S1): normal (2+)  Clonus sign: negative    Additional Neurological Details  Significantly decreased sensation of B feet     Active Range of Motion     Lumbar   Flexion: Active lumbar flexion: reaching above B knee level.  Restriction level: maximal  Extension:  with pain Restriction level: maximal  Left lateral flexion:  Restriction level: maximal  Right lateral flexion:  Restriction level: maximal    Additional Active Range of Motion Details  Impaired balance with lumbar AROM    Decrease in symptoms with lumbar flexion; increase in symptoms with lumbar extension         Strength/Myotome Testing     Left Hip   Planes of Motion   Flexion: 4-  Extension: 4-  Abduction: 4  Adduction: 4+  External rotation: 4  Internal rotation: 4    Right Hip   Planes of Motion   Flexion: 4  Extension: 4-  Abduction: 4  Adduction: 4+  External rotation: 4  Internal rotation: 4    Left Knee   Flexion: 4-  Extension: 4    Right Knee   Flexion: 4  Extension: 4    Left Ankle/Foot   Dorsiflexion: 4  Great toe extension: 4    Right Ankle/Foot   Dorsiflexion: 4  Great toe  extension: 4    Tests     Lumbar     Left   Positive slump test.   Negative crossed SLR and passive SLR.     Right   Negative crossed SLR, passive SLR and slump test.     Left Hip   Negative ELIZABETH.     Right Hip   Negative ELIZABETH.     Ambulation     Observational Gait   Decreased walking speed and stride length.     Additional Observational Gait Details  Ambulating into clinic with rollator walker  Neuro Exam:     Transfers   Sit to stand: independent   Sit to supine: independent   Supine to sit: independent   Roll: independent  Into the car: independent   Out of the car: independent              Precautions: hx of L BURAK      Re-eval Date:  by 3/13/24    Date 2/14/2024        Visit Count 1       FOTO 2/14/2024        Pain In See IE       Pain Out See IE           Manuals 2/14/2024                                        Neuro Re-Ed        TA with PPT Seated reviewed       TA with JERAMY        TA with christiane        Seated TA with christiane  reviewed                               Ther Ex        Nustep        Hamstring stretch reviewed       Seated/standing Palloff press        Seated/stading Mutlifidus punch        clamshells        Seated hip abduction with tb        Seated hip add with ball squeeze        Side steps        Monster walks        bridges        SLR        Leg press         Leg ext/flx machine LAQ reviewed               Ther Activity        Sit to stands                 Gait Training                        Modalities                              2/14/2024  - HEP was issued and reviewed this date for above noted exercises. Pt demonstrated understanding without incident and without questions/concerns. Will continue to update upcoming.       Access Code: [email or web address]  URL: [email or web address]  Date: 02/14/2024  Prepared by: Champ Saenz    Exercises  - Seated March  - 1 x daily - 7 x weekly - 2 sets - 10 reps  - Seated Transversus Abdominis Bracing  - 1 x daily - 7 x weekly - 2 sets - 10 reps -  5 sec hold  - Seated Hamstring Stretch  - 1 x daily - 7 x weekly - 1 sets - 3 reps - 30 sec hold  - Seated Long Arc Quad  - 1 x daily - 7 x weekly - 2 sets - 10 reps - 3 sec hold

## 2024-02-15 ENCOUNTER — CLINICAL SUPPORT (OUTPATIENT)
Dept: FAMILY MEDICINE CLINIC | Facility: CLINIC | Age: 79
End: 2024-02-15
Payer: MEDICARE

## 2024-02-15 DIAGNOSIS — D51.0 PERNICIOUS ANEMIA: Primary | ICD-10-CM

## 2024-02-15 PROCEDURE — 96372 THER/PROPH/DIAG INJ SC/IM: CPT

## 2024-02-15 RX ADMIN — CYANOCOBALAMIN 1000 MCG: 1000 INJECTION, SOLUTION INTRAMUSCULAR; SUBCUTANEOUS at 10:10

## 2024-02-16 ENCOUNTER — OFFICE VISIT (OUTPATIENT)
Dept: PHYSICAL THERAPY | Facility: CLINIC | Age: 79
End: 2024-02-16
Payer: MEDICARE

## 2024-02-16 DIAGNOSIS — M54.50 CHRONIC MIDLINE LOW BACK PAIN, UNSPECIFIED WHETHER SCIATICA PRESENT: Primary | ICD-10-CM

## 2024-02-16 DIAGNOSIS — G89.29 CHRONIC MIDLINE LOW BACK PAIN, UNSPECIFIED WHETHER SCIATICA PRESENT: Primary | ICD-10-CM

## 2024-02-16 LAB
ALDOST SERPL-MCNC: 7.3 NG/DL (ref 0–30)
ALDOST/RENIN PLAS-RTO: 14.9 {RATIO} (ref 0–30)
RENIN PLAS-CCNC: 0.49 NG/ML/HR (ref 0.17–5.38)

## 2024-02-16 PROCEDURE — 97112 NEUROMUSCULAR REEDUCATION: CPT

## 2024-02-16 PROCEDURE — 97110 THERAPEUTIC EXERCISES: CPT

## 2024-02-16 NOTE — PROGRESS NOTES
"Daily Note     Today's date: 2024  Patient name: Tee Forrest Jr.  : 1945  MRN: [id number]  Referring provider: Champ Roldan MD  Dx:   Encounter Diagnosis     ICD-10-CM    1. Chronic midline low back pain, unspecified whether sciatica present  M54.50     G89.29           Start Time: 0910  Stop Time: 0955  Total time in clinic (min): 45 minutes    Subjective: Pt reports he is feeling good today; reports he as tried exercises at home.       Objective: See treatment diary below      Assessment: Tolerated treatment well. Able to complete POC without incident. Tolerated progressions well today; no increase in baseline symptoms. Pt did well supine with head of bed slightly elevated. Demonstrating impaired ability to maintain TA contraction with BKFO and SLR though no reported increases in pain. Noted muscular fatigue by end of session. Will continue to progress as tolerated. Patient demonstrated fatigue post treatment and would benefit from continued PT      Plan: Continue per plan of care.  Progress treatment as tolerated.       Precautions: hx of L BURAK      Re-eval Date:  by 3/13/24    Date 2024       Visit Count 1       FOTO 2024        Pain In See IE 0/10       Pain Out See IE 0/10          Manuals 2024                                       Neuro Re-Ed        TA with PPT Seated reviewed Bed slightly elevated x20/5\"       TA with BKFO  X20 3\"       TA with marches  X20       Seated TA with marches  reviewed                               Ther Ex        Nustep  L4 10'       Hamstring stretch reviewed       Seated/standing Palloff press        Seated/stading Mutlifidus punch        clamshells        Seated hip abduction with tb  HOB slightly elevated x20 ytb      Seated hip add with ball squeeze  HOB slightly elevated x20 5\"       Side steps        Monster walks        bridges        SLR  x10 HOB slightly elevated       Leg press         Leg ext/flx machine LAQ reviewed        "        Ther Activity        Sit to stands                 Gait Training                        Modalities                              2/14/2024  - HEP was issued and reviewed this date for above noted exercises. Pt demonstrated understanding without incident and without questions/concerns. Will continue to update upcoming.

## 2024-02-19 ENCOUNTER — OFFICE VISIT (OUTPATIENT)
Dept: PHYSICAL THERAPY | Facility: CLINIC | Age: 79
End: 2024-02-19
Payer: MEDICARE

## 2024-02-19 DIAGNOSIS — M54.50 CHRONIC MIDLINE LOW BACK PAIN, UNSPECIFIED WHETHER SCIATICA PRESENT: Primary | ICD-10-CM

## 2024-02-19 DIAGNOSIS — G89.29 CHRONIC MIDLINE LOW BACK PAIN, UNSPECIFIED WHETHER SCIATICA PRESENT: Primary | ICD-10-CM

## 2024-02-19 PROCEDURE — 97110 THERAPEUTIC EXERCISES: CPT

## 2024-02-19 PROCEDURE — 97112 NEUROMUSCULAR REEDUCATION: CPT

## 2024-02-19 NOTE — PROGRESS NOTES
"Daily Note     Today's date: 2024  Patient name: Tee Forrest Jr.  : 1945  MRN: [id number]  Referring provider: Champ Roldan MD  Dx:   Encounter Diagnosis     ICD-10-CM    1. Chronic midline low back pain, unspecified whether sciatica present  M54.50     G89.29           Start Time: 0945  Stop Time: 1030  Total time in clinic (min): 45 minutes    Subjective: Pt reports he has felt much better since last session. Reports he has felt significantly less stiff. Reports that he has been getting around better with the cane.       Objective: See treatment diary below      Assessment: Tolerated treatment well. Able to complete POC without incident. Still demonstrating impaired ability to maintain TA contract and impaired abdominal endurance. Improved performance of ambulation noted into and around clinic with SPC; no longer ambulating much with rollator walker and gait speed appears to be increased. Will continue to progress as tolerated. Patient demonstrated fatigue post treatment and would benefit from continued PT      Plan: Continue per plan of care.  Progress treatment as tolerated.       Precautions: hx of L BURAK      Re-eval Date:  by 3/13/24    Date 2024     Visit Count 1       FOTO 2024        Pain In See IE 0/10  0/10      Pain Out See IE 0/10 0/10          Manuals 2024                                     Neuro Re-Ed        TA with PPT Seated reviewed Bed slightly elevated x20/5\"  Bed slightly elevated x20/5\"      TA with BKFO  X20 3\"  X20 3\"      TA with marches  X20  X30      Seated TA with marches  reviewed                               Ther Ex        Nustep  L4 10'  L4 10'      Hamstring stretch reviewed       Seated/standing Palloff press        Seated/stading Mutlifidus punch        clamshells        Seated hip abduction with tb  HOB slightly elevated x20 ytb HOB slightly elevated x20 rtb     Seated hip add with ball squeeze  HOB slightly elevated x20 " "5\"  HOB slightly elevated x20 5\"      Side steps        Monster walks        bridges        SLR  x10 HOB slightly elevated  2x10 HOB slightly elevated      Leg press         Leg ext/flx machine LAQ reviewed               Ther Activity        Sit to stands                 Gait Training                        Modalities                              2/14/2024  - HEP was issued and reviewed this date for above noted exercises. Pt demonstrated understanding without incident and without questions/concerns. Will continue to update upcoming.           "

## 2024-02-21 PROBLEM — Z00.00 MEDICARE ANNUAL WELLNESS VISIT, SUBSEQUENT: Status: RESOLVED | Noted: 2019-08-26 | Resolved: 2024-02-21

## 2024-02-23 ENCOUNTER — OFFICE VISIT (OUTPATIENT)
Dept: PHYSICAL THERAPY | Facility: CLINIC | Age: 79
End: 2024-02-23
Payer: MEDICARE

## 2024-02-23 DIAGNOSIS — G89.29 CHRONIC MIDLINE LOW BACK PAIN, UNSPECIFIED WHETHER SCIATICA PRESENT: Primary | ICD-10-CM

## 2024-02-23 DIAGNOSIS — M54.50 CHRONIC MIDLINE LOW BACK PAIN, UNSPECIFIED WHETHER SCIATICA PRESENT: Primary | ICD-10-CM

## 2024-02-23 PROCEDURE — 97112 NEUROMUSCULAR REEDUCATION: CPT

## 2024-02-23 PROCEDURE — 97110 THERAPEUTIC EXERCISES: CPT

## 2024-02-23 NOTE — PROGRESS NOTES
"Daily Note     Today's date: 2024  Patient name: Tee Forrest Jr.  : 1945  MRN: [id number]  Referring provider: Champ Roldan MD  Dx:   Encounter Diagnosis     ICD-10-CM    1. Chronic midline low back pain, unspecified whether sciatica present  M54.50     G89.29           Start Time: 0830  Stop Time: 930  Total time in clinic (min): 60 minutes    Subjective: Pt reports he feels he can move around better; pt reports that he feels he can reach higher shelves easier. Reports that he still has difficulty walking longer distances.       Objective: See treatment diary below      Assessment: Tolerated treatment well. Able to complete POC without incident. Tolerated progressions well today; reported good stretch with ball rollouts this session. Demonstrating difficulty maintaining upright posture with seated palloff press due to decreased abdominal strength/endurance and difficulty reaching full shoulder ROM due to shoulder pain/tightness. L hip weakness noted with SLR. Will continue to progress as tolerated. Patient demonstrated fatigue post treatment and would benefit from continued PT      Plan: Continue per plan of care.  Progress treatment as tolerated.       Precautions: hx of L BURAK      Re-eval Date:  by 3/13/24    Date 2024    Visit Count 1       FOTO 2024        Pain In See IE 0/10  0/10  1/10    Pain Out See IE 0/10 0/10  0/10         Manuals 2024                                    Neuro Re-Ed        TA with PPT Seated reviewed Bed slightly elevated x20/5\"  Bed slightly elevated x20/5\"  Bed slightly elevated x20/5\"     TA with BKFO  X20 3\"  X20 3\"  X20 3\"     TA with marches  X20  X30  x30    Seated TA with marches  reviewed                               Ther Ex        Nustep  L4 10'  L4 10'  L4 10'     Hamstring stretch reviewed       Seated/standing Palloff press    Seated palloff press x20 gtb    Seated/stading Mutlifidus punch      " "  clamshells        Seated hip abduction with tb  HOB slightly elevated x20 ytb HOB slightly elevated x20 rtb HOB slightly elevated x20 rtb    Seated hip add with ball squeeze  HOB slightly elevated x20 5\"  HOB slightly elevated x20 5\"  Seated x30 5\"     Side steps        Monster walks        bridges        SLR  x10 HOB slightly elevated  2x10 HOB slightly elevated  2x10 HOB slightly elevated     Leg press         Leg ext/flx machine LAQ reviewed   33# leg ext 2x10    33# leg flx 2x10     Stability ball rollouts     X10 3 way 10\" hold    Ther Activity        Sit to stands                 Gait Training                        Modalities                              2/14/2024  - HEP was issued and reviewed this date for above noted exercises. Pt demonstrated understanding without incident and without questions/concerns. Will continue to update upcoming.             "

## 2024-02-26 ENCOUNTER — OFFICE VISIT (OUTPATIENT)
Dept: PHYSICAL THERAPY | Facility: CLINIC | Age: 79
End: 2024-02-26
Payer: MEDICARE

## 2024-02-26 DIAGNOSIS — G89.29 CHRONIC MIDLINE LOW BACK PAIN, UNSPECIFIED WHETHER SCIATICA PRESENT: Primary | ICD-10-CM

## 2024-02-26 DIAGNOSIS — M54.50 CHRONIC MIDLINE LOW BACK PAIN, UNSPECIFIED WHETHER SCIATICA PRESENT: Primary | ICD-10-CM

## 2024-02-26 PROCEDURE — 97112 NEUROMUSCULAR REEDUCATION: CPT

## 2024-02-26 PROCEDURE — 97110 THERAPEUTIC EXERCISES: CPT

## 2024-02-26 NOTE — PROGRESS NOTES
"Daily Note     Today's date: 2024  Patient name: Tee Forrest Jr.  : 1945  MRN: [id number]  Referring provider: Champ Roldan MD  Dx:   Encounter Diagnosis     ICD-10-CM    1. Chronic midline low back pain, unspecified whether sciatica present  M54.50     G89.29           Start Time: 0830  Stop Time: 0915  Total time in clinic (min): 45 minutes    Subjective: Pt reports that he feels he can move better but he is still having pain in low back when walking long distances. Reports that pain is \"still pretty bad\" when walking.       Objective: See treatment diary below      Assessment: Tolerated treatment well. Able to complete POC without incident. No increases in baseline symptoms by end of session. Tolerated progressions well today; continues to demonstrate impaired LE strength and difficulty maintaining TA contract with dynamic LE strength. Requires frequent verbal/visual cues for proper technique/correct muscle engagement. Will continue to progress as tolerated.  Patient demonstrated fatigue post treatment and would benefit from continued PT      Plan: Continue per plan of care.  Progress treatment as tolerated.       Precautions: hx of L BURAK      Re-eval Date:  by 3/13/24    Date 2024   Visit Count 1 2 3 4 5   FOTO 2024        Pain In See IE 0/10  0/10  1/10 1/10    Pain Out See IE 0/10 0/10  0/10  0/10        Manuals 2024                                   Neuro Re-Ed        TA with PPT Seated reviewed Bed slightly elevated x20/5\"  Bed slightly elevated x20/5\"  Bed slightly elevated x20/5\"  Bed slightly elevated x20/5\"    TA with BKFO  X20 3\"  X20 3\"  X20 3\"  X30 3-5\"    TA with marches  X20  X30  x30    Seated TA with marchlizzie  reviewed    Seated TA march x30                           Ther Ex        Nustep  L4 10'  L4 10'  L4 10'  L4 10'    Hamstring stretch reviewed       Seated/standing Palloff press    Seated palloff press x20 " "gtb    Seated/stading Mutlifidus punch        clamshells        Seated hip abduction with tb  HOB slightly elevated x20 ytb HOB slightly elevated x20 rtb HOB slightly elevated x20 rtb Seated x20 gtb   Seated hip add with ball squeeze  HOB slightly elevated x20 5\"  HOB slightly elevated x20 5\"  Seated x30 5\"  Seated x20 5\" hold   Side steps     NV   Monster walks        bridges        SLR  x10 HOB slightly elevated  2x10 HOB slightly elevated  2x10 HOB slightly elevated     Leg press         Leg ext/flx machine LAQ reviewed   33# leg ext 2x10    33# leg flx 2x10  33# leg ext 3x10    33# leg flx 2x10    Step ups     X20 R/L 6\"    Stability ball rollouts     X10 3 way 10\" hold X10 3 way 10\" hold   Ther Activity        Sit to stands                 Gait Training                        Modalities                              2/14/2024  - HEP was issued and reviewed this date for above noted exercises. Pt demonstrated understanding without incident and without questions/concerns. Will continue to update upcoming.               "

## 2024-03-01 ENCOUNTER — OFFICE VISIT (OUTPATIENT)
Dept: PHYSICAL THERAPY | Facility: CLINIC | Age: 79
End: 2024-03-01
Payer: MEDICARE

## 2024-03-01 DIAGNOSIS — M54.50 CHRONIC MIDLINE LOW BACK PAIN, UNSPECIFIED WHETHER SCIATICA PRESENT: Primary | ICD-10-CM

## 2024-03-01 DIAGNOSIS — G89.29 CHRONIC MIDLINE LOW BACK PAIN, UNSPECIFIED WHETHER SCIATICA PRESENT: Primary | ICD-10-CM

## 2024-03-01 PROCEDURE — 97112 NEUROMUSCULAR REEDUCATION: CPT

## 2024-03-01 PROCEDURE — 97110 THERAPEUTIC EXERCISES: CPT

## 2024-03-01 NOTE — PROGRESS NOTES
"Daily Note     Today's date: 3/1/2024  Patient name: Tee Forrest Jr.  : 1945  MRN: [id number]  Referring provider: Champ Roldan MD  Dx:   Encounter Diagnosis     ICD-10-CM    1. Chronic midline low back pain, unspecified whether sciatica present  M54.50     G89.29           Start Time: 0915  Stop Time: 1000  Total time in clinic (min): 45 minutes    Subjective: Pt reports that he feels he can move around better since starting PT walking with the cane; reports he does still have same back pain. Reports he feels he can now walk more. Reports he feels he does not have to bend over to alleviate back pain as much.       Objective: See treatment diary below      Assessment: Tolerated treatment well. Able to complete POC without incident. Performance of exercises improving well; able to tolerate progressions well. Still demonstrating impaired LE/abdominal strength and endurance. Has been tolerating periods of standing well; standing exercises have not been limited by lower back pain. Continues to demo difficulty maintaining upright posture. Will continue to progress as tolerated. Patient demonstrated fatigue post treatment and would benefit from continued PT      Plan: Continue per plan of care.  Progress treatment as tolerated.       Precautions: hx of L BURAK      Re-eval Date:  by 3/13/24    Date 3/1 2/16  2/19 2/23 2/26/24   Visit Count 6 2 3 4 5   FOTO        Pain In 1/10 0/10  0/10  1/10 1/10    Pain Out 0/10 0/10 0/10  0/10  0/10        Manuals 3/1 2/16  2/19 2/23 2/26/24                                   Neuro Re-Ed        TA with PPT Bed slightly elevated x20/5\"  Bed slightly elevated x20/5\"  Bed slightly elevated x20/5\"  Bed slightly elevated x20/5\"  Bed slightly elevated x20/5\"    TA with BKFO X20/3\"  X20 3\"  X20 3\"  X20 3\"  X30 3-5\"    TA with marches  X20  X30  x30    Seated TA with marches  Seated TA marches x30                               Ther Ex        Nustep L4 10'  L4 10'  L4 10'  L4 10'  " "L4 10'    Hamstring stretch        Seated/standing Palloff press    Seated palloff press x20 gtb    Seated/stading Mutlifidus punch        clamshells        Seated hip abduction with tb Seated x30 blue tb HOB slightly elevated x20 ytb HOB slightly elevated x20 rtb HOB slightly elevated x20 rtb Seated x20 gtb   Seated hip add with ball squeeze  HOB slightly elevated x20 5\"  HOB slightly elevated x20 5\"  Seated x30 5\"  Seated x20 5\" hold   Side steps X4 laps in // bars ytb     NV   Monster walks        bridges        SLR 2x10 HOB slightly elevated  x10 HOB slightly elevated  2x10 HOB slightly elevated  2x10 HOB slightly elevated     Leg press         Leg ext/flx machine 33# leg ext 3x10    33# leg flx 2x10    33# leg ext 2x10    33# leg flx 2x10  33# leg ext 3x10    33# leg flx 2x10    Step ups X20 R/L 6\"     X20 R/L 6\"    Stability ball rollouts     X10 3 way 10\" hold X10 3 way 10\" hold   Ther Activity        Sit to stands                 Gait Training                        Modalities                              2/14/2024  - HEP was issued and reviewed this date for above noted exercises. Pt demonstrated understanding without incident and without questions/concerns. Will continue to update upcoming.                 "

## 2024-03-04 ENCOUNTER — OFFICE VISIT (OUTPATIENT)
Dept: PHYSICAL THERAPY | Facility: CLINIC | Age: 79
End: 2024-03-04
Payer: MEDICARE

## 2024-03-04 DIAGNOSIS — G89.29 CHRONIC MIDLINE LOW BACK PAIN, UNSPECIFIED WHETHER SCIATICA PRESENT: Primary | ICD-10-CM

## 2024-03-04 DIAGNOSIS — M54.50 CHRONIC MIDLINE LOW BACK PAIN, UNSPECIFIED WHETHER SCIATICA PRESENT: Primary | ICD-10-CM

## 2024-03-04 PROCEDURE — 97110 THERAPEUTIC EXERCISES: CPT

## 2024-03-04 PROCEDURE — 97112 NEUROMUSCULAR REEDUCATION: CPT

## 2024-03-04 NOTE — PROGRESS NOTES
"Daily Note     Today's date: 3/4/2024  Patient name: Tee Forrest Jr.  : 1945  MRN: [id number]  Referring provider: Champ Roldan MD  Dx:   Encounter Diagnosis     ICD-10-CM    1. Chronic midline low back pain, unspecified whether sciatica present  M54.50     G89.29           Start Time: 0845  Stop Time: 0930  Total time in clinic (min): 45 minutes    Subjective: \"I felt good over the weekend, I feel like I am moving better\"       Objective: See treatment diary below      Assessment: Tolerated treatment well. Able to complete POC without incident. Noted improving muscular endurance; requiring less rest breaks. Able to ambulate with appropriate gait speed with SPC but demonstrates forward trunk lean and wide LEILA. Able to complete SLR with less L hip pain today. Cont with difficulty maintaining sustained TA contraction. Will continue to progress as tolerated.  Patient demonstrated fatigue post treatment and would benefit from continued PT      Plan: Continue per plan of care.  Progress treatment as tolerated.       Precautions: hx of L BURAK      Re-eval Date:  by 3/13/24    Date 3/1 3/4 2/19 2/23 2/26/24   Visit Count 6 7 3 4 5   FOTO        Pain In 1/10 0/10  0/10  1/10 1/10    Pain Out 0/10 0/10 0/10  0/10  0/10        Manuals 3/1 3/4 2/19 2/23 2/26/24                                   Neuro Re-Ed        TA with PPT Bed slightly elevated x20/5\"  Bed slightly elevated x20/5\"  Bed slightly elevated x20/5\"  Bed slightly elevated x20/5\"  Bed slightly elevated x20/5\"    TA with BKFO X20/3\"  X20 3\"  X20 3\"  X20 3\"  X30 3-5\"    TA with marches   X30  x30    Seated TA with marches  Seated TA marches x30 Seated TA marches x30                              Ther Ex        Nustep L4 10'  L4 10'  L4 10'  L4 10'  L4 10'    Hamstring stretch        Seated/standing Palloff press    Seated palloff press x20 gtb    Seated/stading Mutlifidus punch        clamshells        Seated hip abduction with tb Seated x30 blue tb " "Seated x30 blue tb HOB slightly elevated x20 rtb HOB slightly elevated x20 rtb Seated x20 gtb   Seated hip add with ball squeeze  Seated x20 5\"  HOB slightly elevated x20 5\"  Seated x30 5\"  Seated x20 5\" hold   Side steps X4 laps in // bars ytb     NV   Monster walks        bridges        SLR 2x10 HOB slightly elevated  2x10 HOB slightly elevated  2x10 HOB slightly elevated  2x10 HOB slightly elevated     Leg press         Leg ext/flx machine 33# leg ext 3x10    33# leg flx 2x10  33# leg ext 3x10    33# leg flx 3x10   33# leg ext 2x10    33# leg flx 2x10  33# leg ext 3x10    33# leg flx 2x10    Step ups X20 R/L 6\"     X20 R/L 6\"    Stability ball rollouts     X10 3 way 10\" hold X10 3 way 10\" hold   Ther Activity        Sit to stands                 Gait Training                        Modalities                              2/14/2024  - HEP was issued and reviewed this date for above noted exercises. Pt demonstrated understanding without incident and without questions/concerns. Will continue to update upcoming.                   "

## 2024-03-08 ENCOUNTER — OFFICE VISIT (OUTPATIENT)
Dept: PHYSICAL THERAPY | Facility: CLINIC | Age: 79
End: 2024-03-08
Payer: MEDICARE

## 2024-03-08 DIAGNOSIS — G89.29 CHRONIC MIDLINE LOW BACK PAIN, UNSPECIFIED WHETHER SCIATICA PRESENT: Primary | ICD-10-CM

## 2024-03-08 DIAGNOSIS — M54.50 CHRONIC MIDLINE LOW BACK PAIN, UNSPECIFIED WHETHER SCIATICA PRESENT: Primary | ICD-10-CM

## 2024-03-08 PROCEDURE — 97112 NEUROMUSCULAR REEDUCATION: CPT

## 2024-03-08 PROCEDURE — 97110 THERAPEUTIC EXERCISES: CPT

## 2024-03-08 NOTE — PROGRESS NOTES
"Daily Note     Today's date: 3/8/2024  Patient name: Tee Forrest Jr.  : 1945  MRN: [id number]  Referring provider: Champ Roldan MD  Dx:   Encounter Diagnosis     ICD-10-CM    1. Chronic midline low back pain, unspecified whether sciatica present  M54.50     G89.29           Start Time: 0915  Stop Time: 1000  Total time in clinic (min): 45 minutes    Subjective: I feel like I am moving around better, I felt good after last session.       Objective: See treatment diary below      Assessment: Tolerated treatment well. Able to complete POC without incident. Able to complete SLR with no noted L hip pain today. Able to maintain TA contract with dynamic LE movements with improved performance this session; still requiring frequent cues for proper breathing and proper muscle engagement. Will continue to progress as tolerated. Patient demonstrated fatigue post treatment and would benefit from continued PT      Plan: Continue per plan of care.  Progress treatment as tolerated.       Precautions: hx of L BURAK      Re-eval Date:  by 3/13/24    Date 3/1 3/4 3/8 2/23 2/26/24   Visit Count 6 7 8 4 5   FOTO        Pain In 1/10 0/10  0/10  1/10 1/10    Pain Out 0/10 0/10 0/10  0/10  0/10        Manuals 3/1 3/4 3/8 2/23 2/26/24                                   Neuro Re-Ed        TA with PPT Bed slightly elevated x20/5\"  Bed slightly elevated x20/5\"  Bed slightly elevated x20/5\"  Bed slightly elevated x20/5\"  Bed slightly elevated x20/5\"    TA with BKFO X20/3\"  X20 3\"  X20 3\"  X20 3\"  X30 3-5\"    TA with marches    x30    Seated TA with marches  Seated TA marches x30 Seated TA marches x30 Seated TA marches x30                             Ther Ex        Nustep L4 10'  L4 10'  L4 10'  L4 10'  L4 10'    Hamstring stretch        Seated/standing Palloff press    Seated palloff press x20 gtb    Seated/stading Mutlifidus punch        clamshells        Seated hip abduction with tb Seated x30 blue tb Seated x30 blue tb HOB " "slightly elevated x20 rtb HOB slightly elevated x20 rtb Seated x20 gtb   Seated hip add with ball squeeze  Seated x20 5\"  HOB slightly elevated x20 5\"  Seated x30 5\"  Seated x20 5\" hold   Side steps X4 laps in // bars ytb     NV   Monster walks        bridges        SLR 2x10 HOB slightly elevated  2x10 HOB slightly elevated  2x10 HOB slightly elevated  2x10 HOB slightly elevated     Leg press    50# 2x10      Leg ext/flx machine 33# leg ext 3x10    33# leg flx 2x10  33# leg ext 3x10    33# leg flx 3x10  33# leg ext 3x10    33# leg flx 3x10  33# leg ext 2x10    33# leg flx 2x10  33# leg ext 3x10    33# leg flx 2x10    Step ups X20 R/L 6\"     X20 R/L 6\"    Stability ball rollouts     X10 3 way 10\" hold X10 3 way 10\" hold   Ther Activity        Sit to stands                 Gait Training                        Modalities                              2/14/2024  - HEP was issued and reviewed this date for above noted exercises. Pt demonstrated understanding without incident and without questions/concerns. Will continue to update upcoming.                     "

## 2024-03-10 NOTE — PROGRESS NOTES
"PT Re-Evaluation     Today's date: 3/11/2024  Patient name: Tee Forrest Jr.  : 1945  MRN: [id number]  Referring provider: Champ Roldan MD  Dx:   Encounter Diagnosis     ICD-10-CM    1. Chronic midline low back pain, unspecified whether sciatica present  M54.50     G89.29             Start Time: 0915  Stop Time: 1000  Total time in clinic (min): 45 minutes    Assessment  Assessment details: Tee has been consistent with attending PT sessions. Pt is making steady progress with skilled PT services. Noted improvements in hip/core/glute strength and lumbar AROM. Still demonstrating difficulty bending forward and difficulty standing and walking for periods over 10-15 minutes. Still cont with impaired hip/core/glute strength despite noted improvements that cont to impact function and mobility. Pt reports he has been able to walk longer periods/increased distances with rollator walker before requiring seated rest break, but cont with difficulty ambulating with SPC. Pt still ambulating with forward trunk lean but overall is improving with continuing improvements in abdominal strength/endurance. Pt will continue to benefit from skilled PT services 2x/wk for another 3-4 weeks to address continued impairments in strength/stability/ROM that continue to impact overall function, performance of ADLs/IADLs, and mobility. Exam findings were discussed with pt and all questions answered to pt satisfaction.         Impairments: abnormal gait, abnormal or restricted ROM, activity intolerance, impaired balance, impaired physical strength, lacks appropriate home exercise program, pain with function and poor body mechanics    Symptom irritability: highUnderstanding of Dx/Px/POC: good   Prognosis: fair  Prognosis details: Due to chronicity and age     Goals  STGs to be achieved in 4 weeks:  1. Pt to demonstrate reduced subjective pain rating \"at worst\" by at least 2-3 points from Initial Eval in order to allow for reduced " pain with ADLs and improved functional activity tolerance. MET  2. Pt to demonstrate increased AROM of lumbar flexion to beyond B knee level in order to allow for greater ease and independence with ADLs and functional mobility. MET  3. Pt to demonstrate ability to stand for over 5 minutes without more than 2 point increase in pt reported low back pain scale to demonstrate improving functional activity. MET  4. Pt to demonstrate increased MMT of B hip flexion, extension, and abduction by at least 1/2-1 grade in order to improve safety and stability with ADLs and functional mobility. MET    LTGs to be achieved by discharge:  1. Pt will be I with HEP in order to continue to improve quality of life and independence and reduce risk for re-injury. MET  2. Pt to demonstrate ability to walk out to barn with only one rest break to demonstrate improved safety and functional activity. NOT MET  3. Pt to demonstrate improved function as noted by achieving or exceeding predicted score on FOTO outcomes assessment tool.  MET  4. Pt to demonstrate ability to stand for at least 15 minutes without need for seated rest break to demonstrate improved QOL. NOT MET    Plan  Patient would benefit from: skilled physical therapy  Planned modality interventions: cryotherapy and thermotherapy: hydrocollator packs  Planned therapy interventions: abdominal trunk stabilization, IASTM, joint mobilization, manual therapy, massage, balance, nerve gliding, neuromuscular re-education, patient education, postural training, strengthening, stretching, therapeutic activities, therapeutic exercise, home exercise program, flexibility and body mechanics training  Frequency: 2x week  Duration in weeks: 4  Plan of Care beginning date: 3/11/2024  Plan of Care expiration date: 4/24/2024  Treatment plan discussed with: patient and PTA        Subjective Evaluation    History of Present Illness  Mechanism of injury: Pt is presenting to OPPT with chief compliant of  LBP. Pt reports pain started about 1 year ago. Pt reports pain with all standing and walking, but reports he feels fine when sitting. Pt reports that he has extreme difficulties when walking due to pain. Reports that after he walks a few steps without his rollator, he needs to stop and bend over to help relieve pain so he can make a few more steps. Pt reports that his balance feels off as well. Reports pain started gradually; denies any TAMMY or trauma. Pt reports he also can not stand longer than 10-15 mins without having increased pain and needing to sit down. Pt reports no pain in BLEs. Pt denies numbness/tingling down the legs but reports that both of his feet are numb for years. Pt reports no pain with laying down or pain with sleeping. Pt reports he is also walking with rollator and SPC. Reports that he can walk longer distances with rollator because it helps with his balance and he can take pressure off of back. Reports he feels like his legs will give out due to pain when walking but the bending over for about a minute will relieve pain. Reports he has some difficulty going up/down stairs.   Reports he used to go to [facility] for spinal injections for pain management which helped. Pt denies any progressive LE weakness, changes in bowel/bladder, and saddle paraesthesia.     Hx of L BURAK    3/11/24: Pt reports he feels he is making steady improvements with skilled PT services. Reports that he feels he can ambulate greater distances with walker now and reports improved tolerance of ambulation with SPC. Reports he still requires seated rest break after ambulating about 10-15 mins, but can ambulate farther distances overall. Reports he now feels comfortable walking into clinic with SPC. Reports pain overall is decreased. Reports he feels he can get in/out of chairs easier now and is able to reach higher with his arms into higher cabinets.           Recurrent probem    Quality of life: good    Patient Goals  Patient goals  for therapy: decreased pain, improved balance, increased motion, increased strength, independence with ADLs/IADLs and return to sport/leisure activities  Patient goal: walk longer distances without needing to stop PARTIALLY MET, increase strength/balance MET  Pain  Current pain ratin  At best pain ratin  At worst pain ratin  Quality: dull ache, sharp and discomfort  Relieving factors: rest and relaxation  Aggravating factors: standing, walking, stair climbing and lifting  Progression: worsening    Social Support  Lives in: multiple-level home  Lives with: spouse    Employment status: not working    Diagnostic Tests  CT scan: abnormal  Treatments  Previous treatment: injection treatment and medication  Current treatment: physical therapy        Objective     Concurrent Complaints  Negative for night pain, disturbed sleep, bladder dysfunction, bowel dysfunction and saddle (S4) numbness    Postural Observations    Additional Postural Observation Details  Forward trunk lean though improving    Neurological Testing     Sensation     Lumbar   Left   Intact: light touch    Right   Intact: light touch    Reflexes   Left   Patellar (L4): normal (2+)  Achilles (S1): normal (2+)  Clonus sign: negative    Right   Patellar (L4): normal (2+)  Achilles (S1): normal (2+)  Clonus sign: negative    Additional Neurological Details  Significantly decreased sensation of B feet     Active Range of Motion     Lumbar   Flexion: Active lumbar flexion: reaching below B knee level.  Restriction level: moderate  Extension:  Restriction level: maximal  Left lateral flexion:  Restriction level: moderate  Right lateral flexion:  Restriction level: moderate    Additional Active Range of Motion Details  Cont with impaired balance with lumbar AROM            Strength/Myotome Testing     Left Hip   Planes of Motion   Flexion: 4  Extension: 4  Abduction: 4  Adduction: 4+  External rotation: 4  Internal rotation: 4+    Right Hip   Planes of  "Motion   Flexion: 4  Extension: 4  Abduction: 4  Adduction: 4+  External rotation: 4  Internal rotation: 4+    Left Knee   Flexion: 4+  Extension: 4+    Right Knee   Flexion: 4+  Extension: 4+    Left Ankle/Foot   Dorsiflexion: 4+  Great toe extension: 4+    Right Ankle/Foot   Dorsiflexion: 4+  Great toe extension: 4+    Ambulation     Observational Gait   Decreased walking speed and stride length.     Additional Observational Gait Details  Ambulating into clinic with SPC  Neuro Exam:     Transfers   Sit to stand: independent   Sit to supine: independent   Supine to sit: independent   Roll: independent  Into the car: independent   Out of the car: independent              Precautions: hx of L BURAK    Re-eval Date:  by 3/13/24     Date 3/1 3/4 3/8 3/11 2/26/24   Visit Count 6 7 8 9 5   FOTO             Pain In 1/10 0/10  0/10  0/10 1/10    Pain Out 0/10 0/10 0/10  0/10  0/10          Manuals 3/1 3/4 3/8 3/11 2/26/24                                                           Neuro Re-Ed             TA with PPT Bed slightly elevated x20/5\"  Bed slightly elevated x20/5\"  Bed slightly elevated x20/5\"  Bed slightly elevated x20/5\"  Bed slightly elevated x20/5\"    TA with BKFO X20/3\"  X20 3\"  X20 3\"  X20 3\"  X30 3-5\"    TA with marches            Seated TA with marches  Seated TA marches x30 Seated TA marches x30 Seated TA marches x30  Seated TA marches x30                                               Ther Ex             Nustep L4 10'  L4 10'  L4 10'  L4 10'  L4 10'    Hamstring stretch             Seated/standing Palloff press            Seated/stading Mutlifidus punch             clamshells             Seated hip abduction with tb Seated x30 blue tb Seated x30 blue tb HOB slightly elevated x20 rtb  Seated x20 gtb   Seated hip add with ball squeeze   Seated x20 5\"  HOB slightly elevated x20 5\"  Seated x30 5\"  Seated x20 5\" hold   Side steps X4 laps in // bars ytb       x5 laps rtb at mat table NV   Monster walks           " "  bridges             SLR 2x10 HOB slightly elevated  2x10 HOB slightly elevated  2x10 HOB slightly elevated  2x10 HOB slightly elevated      Leg press      50# 2x10        Leg ext/flx machine 33# leg ext 3x10     33# leg flx 2x10  33# leg ext 3x10     33# leg flx 3x10  33# leg ext 3x10     33# leg flx 3x10  33# leg ext 3x10     33# leg flx 3x10  33# leg ext 3x10     33# leg flx 2x10    Step ups X20 R/L 6\"        X20 R/L 6\"    Stability ball rollouts         X10 3 way 10\" hold   Ther Activity             Sit to stands                            Gait Training                                         Modalities                                               "

## 2024-03-11 ENCOUNTER — EVALUATION (OUTPATIENT)
Dept: PHYSICAL THERAPY | Facility: CLINIC | Age: 79
End: 2024-03-11
Payer: MEDICARE

## 2024-03-11 DIAGNOSIS — M54.50 CHRONIC MIDLINE LOW BACK PAIN, UNSPECIFIED WHETHER SCIATICA PRESENT: Primary | ICD-10-CM

## 2024-03-11 DIAGNOSIS — G89.29 CHRONIC MIDLINE LOW BACK PAIN, UNSPECIFIED WHETHER SCIATICA PRESENT: Primary | ICD-10-CM

## 2024-03-11 PROCEDURE — 97110 THERAPEUTIC EXERCISES: CPT

## 2024-03-15 ENCOUNTER — CLINICAL SUPPORT (OUTPATIENT)
Dept: FAMILY MEDICINE CLINIC | Facility: CLINIC | Age: 79
End: 2024-03-15
Payer: MEDICARE

## 2024-03-15 ENCOUNTER — OFFICE VISIT (OUTPATIENT)
Dept: PHYSICAL THERAPY | Facility: CLINIC | Age: 79
End: 2024-03-15
Payer: MEDICARE

## 2024-03-15 DIAGNOSIS — M54.50 CHRONIC MIDLINE LOW BACK PAIN, UNSPECIFIED WHETHER SCIATICA PRESENT: Primary | ICD-10-CM

## 2024-03-15 DIAGNOSIS — E53.9 VITAMIN B DEFICIENCY: Primary | ICD-10-CM

## 2024-03-15 DIAGNOSIS — G89.29 CHRONIC MIDLINE LOW BACK PAIN, UNSPECIFIED WHETHER SCIATICA PRESENT: Primary | ICD-10-CM

## 2024-03-15 PROCEDURE — 97110 THERAPEUTIC EXERCISES: CPT

## 2024-03-15 PROCEDURE — 96372 THER/PROPH/DIAG INJ SC/IM: CPT | Performed by: STUDENT IN AN ORGANIZED HEALTH CARE EDUCATION/TRAINING PROGRAM

## 2024-03-15 PROCEDURE — 97112 NEUROMUSCULAR REEDUCATION: CPT

## 2024-03-15 RX ORDER — CYANOCOBALAMIN 1000 UG/ML
1000 INJECTION, SOLUTION INTRAMUSCULAR; SUBCUTANEOUS
Status: SHIPPED | OUTPATIENT
Start: 2024-03-15

## 2024-03-15 RX ADMIN — CYANOCOBALAMIN 1000 MCG: 1000 INJECTION, SOLUTION INTRAMUSCULAR; SUBCUTANEOUS at 10:26

## 2024-03-15 NOTE — PROGRESS NOTES
"Daily Note     Today's date: 3/15/2024  Patient name: Tee Forrest Jr.  : 1945  MRN: [id number]  Referring provider: Champ Roldan MD  Dx:   Encounter Diagnosis     ICD-10-CM    1. Chronic midline low back pain, unspecified whether sciatica present  M54.50     G89.29           Start Time: 0910  Stop Time: 09  Total time in clinic (min): 45 minutes    Subjective: \"I feel good, my walking seems to be getting better\"       Objective: See treatment diary below      Assessment: Tolerated treatment well. Able to complete POC without incident. Demo improved TA contract and less noted gluteal fatigue. Still demonstrating impaired ability to maintain upright posture; can correct for short period with verbal/tactile cues. Will cont to progress as tolerated. No increase in baseline symptoms. Able to Patient demonstrated fatigue post treatment and would benefit from continued PT      Plan: Continue per plan of care.  Progress treatment as tolerated.       Precautions: hx of L BURAK    Re-eval Date:  by 3/13/24     Date 3/1 3/4 3/8 3/11 3/15   Visit Count 6 7 8 9 10   FOTO             Pain In 1/10 0/10  0/10  0/10 0/10   Pain Out 0/10 0/10 0/10  0/10  0/10          Manuals 3/1 3/4 3/8 3/11 3/15                                                           Neuro Re-Ed             TA with PPT Bed slightly elevated x20/5\"  Bed slightly elevated x20/5\"  Bed slightly elevated x20/5\"  Bed slightly elevated x20/5\"     TA with BKFO X20/3\"  X20 3\"  X20 3\"  X20 3\"  X30 3-5\"    TA with marches            Seated TA with marches  Seated TA marches x30 Seated TA marches x30 Seated TA marches x30  Seated TA marches x30  seated TA marches x30                                              Ther Ex             Nustep L4 10'  L4 10'  L4 10'  L4 10'  L4 10'    Hamstring stretch             Seated/standing Palloff press            Seated/stading Mutlifidus punch             clamshells             Seated hip abduction with tb Seated x30 blue " "tb Seated x30 blue tb HOB slightly elevated x20 rtb  Seated x20 gtb   Seated hip add with ball squeeze   Seated x20 5\"  HOB slightly elevated x20 5\"  Seated x30 5\"  Seated x20 5\" hold   Side steps X4 laps in // bars ytb       x5 laps rtb at mat table x5 laps rtb at mat table   Monster walks             bridges             SLR 2x10 HOB slightly elevated  2x10 HOB slightly elevated  2x10 HOB slightly elevated  2x10 HOB slightly elevated      Leg press      50# 2x10     60# x30   Leg ext/flx machine 33# leg ext 3x10     33# leg flx 2x10  33# leg ext 3x10     33# leg flx 3x10  33# leg ext 3x10     33# leg flx 3x10  33# leg ext 3x10     33# leg flx 3x10  33# leg ext 3x10     33# leg flx 2x10    Step ups X20 R/L 6\"        X20 R/L 6\"    Stability ball rollouts            Ther Activity             Sit to stands                            Gait Training                                         Modalities                                                    "

## 2024-03-25 ENCOUNTER — APPOINTMENT (OUTPATIENT)
Dept: PHYSICAL THERAPY | Facility: CLINIC | Age: 79
End: 2024-03-25
Payer: MEDICARE

## 2024-03-26 DIAGNOSIS — K21.9 GASTROESOPHAGEAL REFLUX DISEASE: ICD-10-CM

## 2024-03-26 RX ORDER — PANTOPRAZOLE SODIUM 40 MG/1
40 TABLET, DELAYED RELEASE ORAL DAILY
Qty: 90 TABLET | Refills: 0 | Status: SHIPPED | OUTPATIENT
Start: 2024-03-26 | End: 2024-06-24

## 2024-03-29 ENCOUNTER — APPOINTMENT (OUTPATIENT)
Dept: PHYSICAL THERAPY | Facility: CLINIC | Age: 79
End: 2024-03-29
Payer: MEDICARE

## 2024-04-02 ENCOUNTER — CONSULT (OUTPATIENT)
Dept: PAIN MEDICINE | Facility: CLINIC | Age: 79
End: 2024-04-02
Payer: MEDICARE

## 2024-04-02 VITALS
HEART RATE: 67 BPM | BODY MASS INDEX: 25.34 KG/M2 | DIASTOLIC BLOOD PRESSURE: 79 MMHG | SYSTOLIC BLOOD PRESSURE: 200 MMHG | WEIGHT: 177 LBS | HEIGHT: 70 IN

## 2024-04-02 DIAGNOSIS — M54.16 LUMBAR RADICULOPATHY: Primary | ICD-10-CM

## 2024-04-02 PROCEDURE — 99204 OFFICE O/P NEW MOD 45 MIN: CPT | Performed by: STUDENT IN AN ORGANIZED HEALTH CARE EDUCATION/TRAINING PROGRAM

## 2024-04-02 NOTE — PROGRESS NOTES
"Assessment:  1. Lumbar radiculopathy        Portions of the record may have been created with voice recognition software. Occasional wrong word or \"sound a like\" substitutions may have occurred due to the inherent limitations of voice recognition software. Read the chart carefully and recognize, using context, where substitutions have occurred. Contact me with any questions.       Plan:  79-year-old male referred by PCP, here for initial evaluation of chronic bilateral low back pain with intermittent radiation into bilateral L5 distribution.  He also reports symptoms of neurogenic claudication.  Prior lumbar spine MRI reviewed and shows multilevel DDD, most significant at L5-S1.  He has previously undergone lumbar epidural steroid injections with variable relief.  He was told he might require surgical intervention but notes he is not interested in pursuing surgery at his age.  He denies new or progressive weakness, saddle anesthesia, bowel/bladder incontinence.    Will schedule for interlaminar lumbar epidural steroid injection L4-5.  Will need clearance to hold Eliquis.    Discussed option of physical therapy for core/back strengthening, addressing gait/balance.  Patient defers.    Follow-up in 1 month after injection.        Complete risks and benefits including bleeding, infection, tissue reaction, nerve injury and allergic reaction were discussed. The approach was demonstrated using models and literature was provided. Verbal and written consent was obtained.      History of Present Illness:    The patient is a 79 y.o. male who presents for consultation in regards to Back Pain.  Symptoms have been present for 2-3 years. Symptoms began without any precipitating injury or trauma. Pain is reported to be 5 on the numeric rating scale.  Symptoms are felt intermittently and worst in the no typical pattern.  Symptoms are characterized as sharp, pressure-like, and throbbing.  Symptoms are associated with bilateral leg " weakness.  Aggravating factors include standing, bending, walking, and exercise.  Relieving factors include lying down, sitting, and relaxation.  He was previously under the care of other pain provider and has undergone interventional procedures including KAYLI's which were initially providing significant relief but notes waning relief with the last few interventional treatments.  Most recent KAYLI was in November 2022.      Review of Systems:    Review of Systems   Constitutional:  Negative for chills and fever.   HENT:  Negative for ear pain, sinus pressure and sore throat.    Eyes:  Negative for pain and redness.   Respiratory:  Negative for cough and wheezing.    Cardiovascular:  Negative for leg swelling.   Gastrointestinal:  Negative for abdominal pain and nausea.   Endocrine: Negative for polydipsia and polyuria.   Genitourinary:  Negative for difficulty urinating and frequency.   Musculoskeletal:  Negative for gait problem and myalgias.   Skin:  Negative for pallor and wound.   Neurological:  Negative for seizures, weakness and headaches.   Psychiatric/Behavioral:  Negative for decreased concentration and sleep disturbance.            Past Medical History:   Diagnosis Date    Arthritis     Atrial flutter (HCC)     Cholecystitis     Coronary artery disease     Hypertension     RBBB     Spinal stenosis        Past Surgical History:   Procedure Laterality Date    CHOLECYSTECTOMY      COLON SURGERY      bwel resection    COLONOSCOPY      ESOPHAGOGASTRODUODENOSCOPY      02/07/2007  [facility]    [place] LAPAROSCOPY SURG CHOLECYSTECTOMY N/A 12/5/2017    Procedure: CHOLECYSTECTOMY LAPAROSCOPIC;  Surgeon: Ez Lainez MD;  Location: [facility];  Service: General    SMALL INTESTINE SURGERY      ONSEC DEC 2011       Family History   Problem Relation Age of Onset    Arthritis Mother     Heart attack Father     Coronary artery disease Family     Diabetes Family        Social History     Occupational History    Not on file   Tobacco  "Use    Smoking status: Former     Current packs/day: 0.00     Average packs/day: 2.0 packs/day for 3.0 years (6.0 ttl pk-yrs)     Types: Cigarettes     Start date:      Quit date:      Years since quittin.2    Smokeless tobacco: Never   Vaping Use    Vaping status: Never Used   Substance and Sexual Activity    Alcohol use: Yes     Comment: social     Drug use: No    Sexual activity: Not on file         Current Outpatient Medications:     amLODIPine (NORVASC) 5 mg tablet, Take 1 tablet (5 mg total) by mouth daily, Disp: 90 tablet, Rfl: 3    apixaban (Eliquis) 5 mg, Take 1 tablet by mouth twice daily, Disp: 60 tablet, Rfl: 5    atorvastatin (LIPITOR) 40 mg tablet, Take 1 tablet (40 mg total) by mouth daily, Disp: 90 tablet, Rfl: 3    chlorthalidone 25 mg tablet, Take 1 tablet (25 mg total) by mouth daily, Disp: 90 tablet, Rfl: 3    ferrous sulfate 325 (65 Fe) mg tablet, Take 325 mg by mouth daily with breakfast, Disp: , Rfl:     lisinopril (ZESTRIL) 40 mg tablet, Take 1 tablet by mouth once daily, Disp: 90 tablet, Rfl: 3    pantoprazole (PROTONIX) 40 mg tablet, Take 1 tablet (40 mg total) by mouth daily, Disp: 90 tablet, Rfl: 0    potassium chloride (K-DUR,KLOR-CON) 20 mEq tablet, Take 1 tablet by mouth twice daily, Disp: 60 tablet, Rfl: 5    Current Facility-Administered Medications:     cyanocobalamin injection 1,000 mcg, 1,000 mcg, Intramuscular, Q30 Days, Arlen Macedo DO, 1,000 mcg at 02/15/24 1010    cyanocobalamin injection 1,000 mcg, 1,000 mcg, Intramuscular, Q30 Days, Champ Roldan MD, 1,000 mcg at 03/15/24 1026    No Known Allergies    Physical Exam:    BP (!) 200/79   Pulse 67   Ht 5' 10\" (1.778 m)   Wt 80.3 kg (177 lb)   BMI 25.40 kg/m²     Constitutional: normal, well developed, well nourished, alert, in no distress and non-toxic and no overt pain behavior.  Eyes: anicteric  HEENT: grossly intact  Neck: supple, symmetric, trachea midline and no masses   Pulmonary:even and " unlabored  Cardiovascular:No edema or pitting edema present  Skin:Normal without rashes or lesions and well hydrated  Psychiatric:Mood and affect appropriate  Neurologic:Cranial Nerves II-XII grossly intact  Musculoskeletal:normal gait, grossly intact strength and sensation to light touch over BLE    Imaging  [facility] spine and pain procedure    (Results Pending)     IMPRESSION:    1.  Multilevel degenerative changes as discussed above.  2.  Anterolisthesis and severe degenerative changes at L5-S1 causing severe  central canal and bilateral lateral recess and foraminal stenosis with  compression of the thecal sac, descending nerve roots, and bilateral exiting L5  nerve roots.  3.  Marked facet hypertrophic changes at L4-5 causing right foraminal stenosis  and likely compression of the exiting right L4 nerve root. Mild bilateral  lateral recess stenosis and mild left foraminal stenosis at L4-5. Mild left  foraminal stenosis at L3-4.                Workstation:[id number]  Narrative    This result has an attachment that is not available.  EXAMINATION: MRI lumbar spine without contrast    CLINICAL INFORMATION: Back pain.    TECHNIQUE: Sagittal T1, sagittal T2, sagittal STIR, and axial T2.    COMPARISON: 11/19/2018.    FINDINGS: The lumbar vertebrae are numbered from caudal to cranial direction.  The most inferior lumbar type vertebra is numbered as L5.    Bone marrow: No acute fracture.  Alignment: Grade 1 (5 mm) anterolisthesis of L5. No spondylolysis.    Levels: Multilevel degenerative changes. Disc desiccation, loss of disc heights,  osteophytes, and facet arthropathy identified. Mild loss of disc heights at  T11-12, T12-L1, L1-2, L5-S1. Disc bulges at L1-2, L2-3, L3-4, L4-5, L5-S1.  Degenerative changes between the spinous processes.    L1-2: Left disc herniation/annular fissure. No significant central canal or  foraminal stenosis.  L2-3: Marked bilateral facet arthropathy. No significant central canal stenosis.  No  significant foraminal stenosis.  L3-4: No significant central canal stenosis. Mild left foraminal stenosis.  L4-5: Central disc herniation. Marked bilateral facet arthropathy. Mild  bilateral lateral recess stenosis. Right foraminal stenosis. Marked facet  hypertrophic changes likely compressing the exiting right L4 nerve root. Mild  left foraminal stenosis.  L5-S1: Extensive bilateral facet and ligamentous hypertrophy. Small facet joint  effusions present. Synovial cysts around bilateral facet joints and possibly in  the right foramen. Disc bulge/herniation. Severe central canal and bilateral  lateral recess stenosis with compression of the thecal sac and descending nerve  roots. Severe bilateral foraminal stenosis with compression of bilateral exiting  L5 nerve roots.    CSF/cord: The conus medullaris terminates at L1-2 level.  Paraspinal soft tissues: Normal.  Additional findings: Renal cysts.  Orders Placed This Encounter   Procedures    [facility] procedure

## 2024-04-03 ENCOUNTER — TELEPHONE (OUTPATIENT)
Age: 79
End: 2024-04-03

## 2024-04-03 NOTE — TELEPHONE ENCOUNTER
Anti coag approval received  Procedure 4/15  LD of Eliquis will be 4/11 and then pt will hold until procedure is completed and advised by a nurse to restart  S/w pt and advised. Verbalized understanding

## 2024-04-03 NOTE — TELEPHONE ENCOUNTER
Dear Dr. Roldan,     Patient has been scheduled for an L4-L5 INTERLAMINAR EPIDURAL SI_ with Dr Bustos on 04/15/24. The patient is currently taking Eliquis which needs to be held 3 days prior to procedure. Is the patient okay to hold medication for required  3 days prior to procedure?    Thank You

## 2024-04-15 ENCOUNTER — CLINICAL SUPPORT (OUTPATIENT)
Dept: FAMILY MEDICINE CLINIC | Facility: CLINIC | Age: 79
End: 2024-04-15
Payer: MEDICARE

## 2024-04-15 ENCOUNTER — HOSPITAL ENCOUNTER (OUTPATIENT)
Dept: RADIOLOGY | Facility: HOSPITAL | Age: 79
Discharge: HOME | End: 2024-04-15
Attending: STUDENT IN AN ORGANIZED HEALTH CARE EDUCATION/TRAINING PROGRAM
Payer: MEDICARE

## 2024-04-15 VITALS
DIASTOLIC BLOOD PRESSURE: 83 MMHG | OXYGEN SATURATION: 96 % | TEMPERATURE: 98.4 F | RESPIRATION RATE: 20 BRPM | SYSTOLIC BLOOD PRESSURE: 180 MMHG | HEART RATE: 63 BPM

## 2024-04-15 DIAGNOSIS — M54.16 LUMBAR RADICULOPATHY: ICD-10-CM

## 2024-04-15 DIAGNOSIS — E53.8 B12 DEFICIENCY: Primary | ICD-10-CM

## 2024-04-15 PROCEDURE — 96372 THER/PROPH/DIAG INJ SC/IM: CPT | Performed by: STUDENT IN AN ORGANIZED HEALTH CARE EDUCATION/TRAINING PROGRAM

## 2024-04-15 PROCEDURE — 62323 NJX INTERLAMINAR LMBR/SAC: CPT | Performed by: STUDENT IN AN ORGANIZED HEALTH CARE EDUCATION/TRAINING PROGRAM

## 2024-04-15 RX ORDER — PAPAVERINE HCL 150 MG
5 CAPSULE, EXTENDED RELEASE ORAL ONCE
Status: COMPLETED | OUTPATIENT
Start: 2024-04-15 | End: 2024-04-15

## 2024-04-15 RX ORDER — 0.9 % SODIUM CHLORIDE 0.9 %
2 VIAL (ML) INJECTION ONCE
Status: COMPLETED | OUTPATIENT
Start: 2024-04-15 | End: 2024-04-15

## 2024-04-15 RX ORDER — LIDOCAINE HYDROCHLORIDE 10 MG/ML
3 INJECTION, SOLUTION EPIDURAL; INFILTRATION; INTRACAUDAL; PERINEURAL ONCE
Status: COMPLETED | OUTPATIENT
Start: 2024-04-15 | End: 2024-04-15

## 2024-04-15 RX ADMIN — Medication 2 ML: at 14:20

## 2024-04-15 RX ADMIN — CYANOCOBALAMIN 1000 MCG: 1000 INJECTION, SOLUTION INTRAMUSCULAR; SUBCUTANEOUS at 09:49

## 2024-04-15 RX ADMIN — IOHEXOL 0.5 ML: 300 INJECTION, SOLUTION INTRAVENOUS at 14:20

## 2024-04-15 RX ADMIN — LIDOCAINE HYDROCHLORIDE 3 ML: 10 INJECTION, SOLUTION EPIDURAL; INFILTRATION; INTRACAUDAL; PERINEURAL at 14:20

## 2024-04-15 RX ADMIN — Medication 5 MG: at 14:20

## 2024-04-15 NOTE — DISCHARGE INSTRUCTIONS
Epidural Steroid Injection   WHAT YOU NEED TO KNOW:   An epidural steroid injection (KAYLI) is a procedure to inject steroid medicine into the epidural space. The epidural space is between your spinal cord and vertebrae. Steroids reduce inflammation and fluid buildup in your spine that may be causing pain. You may be given pain medicine along with the steroids.          ACTIVITY  Do not drive or operate machinery today.  No strenuous activity today - bending, lifting, etc.  You may resume normal activites starting tomorrow - start slowly and as tolerated.  You may shower today, but no tub baths or hot tubs.  You may have numbness for several hours from the local anesthetic. Please use caution and common sense, especially with weight-bearing activities.    CARE OF THE INJECTION SITE  If you have soreness or pain, apply ice to the area today (20 minutes on/20 minutes off).  Starting tomorrow, you may use warm, moist heat or ice if needed.  You may have an increase or change in your discomfort for 36-48 hours after your treatment.  Apply ice and continue with any pain medication you have been prescribed.  Notify [facility] if you have any of the following: redness, drainage, swelling, headache, stiff neck or fever above 100°F.    SPECIAL INSTRUCTIONS  Our office will contact you in approximately 7 days for a progress report.    MEDICATIONS  Continue to take all routine medications.  Our office may have instructed you to hold some medications.    As no general anesthesia was used in today's procedure, you should not experience any side effects related to anesthesia.     If you are diabetic, the steroids used in today's injection may temporarily increase your blood sugar levels after the first few days after your injection. Please keep a close eye on your sugars and alert the doctor who manages your diabetes if your sugars are significantly high from your baseline or you are symptomatic.     If you have a  problem specifically related to your procedure, please call our office at [phone].  Problems not related to your procedure should be directed to your primary care physician.

## 2024-04-17 PROBLEM — M54.16 LUMBAR RADICULOPATHY: Status: ACTIVE | Noted: 2024-04-17

## 2024-04-17 NOTE — H&P
History of Present Illness: The patient is a 79 y.o. male who presents with complaints of low back pain    Past Medical History:   Diagnosis Date    Arthritis     Atrial flutter (HCC)     Cholecystitis     Coronary artery disease     Hypertension     RBBB     Spinal stenosis        Past Surgical History:   Procedure Laterality Date    CHOLECYSTECTOMY      COLON SURGERY      bwel resection    COLONOSCOPY      ESOPHAGOGASTRODUODENOSCOPY      02/07/2007  [facility]    [place] LAPAROSCOPY SURG CHOLECYSTECTOMY N/A 12/5/2017    Procedure: CHOLECYSTECTOMY LAPAROSCOPIC;  Surgeon: Ez Lainez MD;  Location: [facility];  Service: General    SMALL INTESTINE SURGERY      [facility] DEC 2011         Current Outpatient Medications:     amLODIPine (NORVASC) 5 mg tablet, Take 1 tablet (5 mg total) by mouth daily, Disp: 90 tablet, Rfl: 3    apixaban (Eliquis) 5 mg, Take 1 tablet by mouth twice daily, Disp: 60 tablet, Rfl: 5    atorvastatin (LIPITOR) 40 mg tablet, Take 1 tablet (40 mg total) by mouth daily, Disp: 90 tablet, Rfl: 3    chlorthalidone 25 mg tablet, Take 1 tablet (25 mg total) by mouth daily, Disp: 90 tablet, Rfl: 3    ferrous sulfate 325 (65 Fe) mg tablet, Take 325 mg by mouth daily with breakfast, Disp: , Rfl:     lisinopril (ZESTRIL) 40 mg tablet, Take 1 tablet by mouth once daily, Disp: 90 tablet, Rfl: 3    pantoprazole (PROTONIX) 40 mg tablet, Take 1 tablet (40 mg total) by mouth daily, Disp: 90 tablet, Rfl: 0    potassium chloride (K-DUR,KLOR-CON) 20 mEq tablet, Take 1 tablet by mouth twice daily, Disp: 60 tablet, Rfl: 5    Current Facility-Administered Medications:     cyanocobalamin injection 1,000 mcg, 1,000 mcg, Intramuscular, Q30 Days, Arlen Macedo DO, 1,000 mcg at 02/15/24 1010    cyanocobalamin injection 1,000 mcg, 1,000 mcg, Intramuscular, Q30 Days, Champ Roldan MD, 1,000 mcg at 04/15/24 0949    No Known Allergies    Physical Exam:   Vitals:    04/15/24 1437   BP: (!) 180/83   Pulse: 63   Resp: 20   Temp:     SpO2: 96%     General: Awake, Alert, Oriented x 3, Mood and affect appropriate  Respiratory: Respirations even and unlabored  Cardiovascular: Peripheral pulses intact; no edema  Musculoskeletal Exam: ambulates w/ SPC    ASA Score: 2    Patient/Chart Verification  Patient ID Verified: Verbal  Consents Confirmed: Procedural, To be obtained in the Pre-Procedure area  H&P( within 30 days) Verified: Yes  Allergies Reviewed: Yes  Anticoag/NSAID held?: Yes (Eliquis  LD 4/11)  Currently on antibiotics?: NA    Assessment:   1. Lumbar radiculopathy        Plan: interlaminar lumbar epidural steroid injection L4-5

## 2024-04-22 ENCOUNTER — TELEPHONE (OUTPATIENT)
Dept: PAIN MEDICINE | Facility: CLINIC | Age: 79
End: 2024-04-22

## 2024-04-29 NOTE — TELEPHONE ENCOUNTER
Patient reports slight improvement post inj  Pain level 0/10     Pt stated he was fine first few days and now he is back to the same feeling prior to injection.

## 2024-05-08 ENCOUNTER — OFFICE VISIT (OUTPATIENT)
Dept: PAIN MEDICINE | Facility: CLINIC | Age: 79
End: 2024-05-08
Payer: MEDICARE

## 2024-05-08 VITALS
DIASTOLIC BLOOD PRESSURE: 73 MMHG | BODY MASS INDEX: 25.48 KG/M2 | HEART RATE: 60 BPM | WEIGHT: 178 LBS | HEIGHT: 70 IN | SYSTOLIC BLOOD PRESSURE: 167 MMHG

## 2024-05-08 DIAGNOSIS — M54.16 LUMBAR RADICULOPATHY: ICD-10-CM

## 2024-05-08 DIAGNOSIS — G89.4 CHRONIC PAIN SYNDROME: Primary | ICD-10-CM

## 2024-05-08 PROCEDURE — 99214 OFFICE O/P EST MOD 30 MIN: CPT | Performed by: STUDENT IN AN ORGANIZED HEALTH CARE EDUCATION/TRAINING PROGRAM

## 2024-05-08 NOTE — PROGRESS NOTES
"Assessment:  1. Chronic pain syndrome    2. Lumbar radiculopathy        Portions of the record may have been created with voice recognition software. Occasional wrong word or \"sound a like\" substitutions may have occurred due to the inherent limitations of voice recognition software. Read the chart carefully and recognize, using context, where substitutions have occurred. Contact me with any questions.         Plan:  79-year-old male here for follow-up visit with regards to chronic low back pain symptoms.  Since last visit, he underwent an interlaminar lumbar epidural injection L4-5 on 4/15/2024.  He denies significant change in symptoms with this.  Denies new or progressive weakness, saddle anesthesia, bowel/bladder incontinence.  Notes symptoms are mostly bothersome with prolonged ambulation and standing.    Discussed option of initiation of gabapentin for symptom management.  Patient defers at this time.    Patient declines surgical referral for discussion of surgical options.    Continue home exercise program.      History of Present Illness:  The patient is a 79 y.o. male who presents for a follow up office visit in regards to Back Pain.   Denies significant change in symptoms since last visit.    I have personally reviewed and/or updated the patient's past medical history, past surgical history, family history, social history, current medications, allergies, and vital signs today.       Review of Systems  Review of Systems   Constitutional:  Negative for fever.   HENT:  Negative for sinus pain.    Eyes:  Negative for redness.   Respiratory:  Negative for shortness of breath.    Cardiovascular:  Negative for palpitations.   Gastrointestinal:  Negative for abdominal pain and nausea.   Endocrine: Negative for polydipsia.   Genitourinary:  Negative for difficulty urinating.   Musculoskeletal:  Positive for back pain and gait problem. Negative for myalgias.        Decreased range of motion   Skin:  Negative for " rash.   Neurological:  Negative for seizures and headaches.   Psychiatric/Behavioral:  Negative for decreased concentration. The patient is not nervous/anxious.    All other systems reviewed and are negative.          Past Medical History:   Diagnosis Date    Arthritis     Atrial flutter (HCC)     Cholecystitis     Coronary artery disease     Hypertension     RBBB     Spinal stenosis        Past Surgical History:   Procedure Laterality Date    CHOLECYSTECTOMY      COLON SURGERY      bwel resection    COLONOSCOPY      ESOPHAGOGASTRODUODENOSCOPY      2007  [facility]    [place] LAPAROSCOPY SURG CHOLECYSTECTOMY N/A 2017    Procedure: CHOLECYSTECTOMY LAPAROSCOPIC;  Surgeon: Ez Lainez MD;  Location: [facility];  Service: General    SMALL INTESTINE SURGERY      ON[facility] DEC 2011       Family History   Problem Relation Age of Onset    Arthritis Mother     Heart attack Father     Coronary artery disease Family     Diabetes Family        Social History     Occupational History    Not on file   Tobacco Use    Smoking status: Former     Current packs/day: 0.00     Average packs/day: 2.0 packs/day for 3.0 years (6.0 ttl pk-yrs)     Types: Cigarettes     Start date:      Quit date:      Years since quittin.3    Smokeless tobacco: Never   Vaping Use    Vaping status: Never Used   Substance and Sexual Activity    Alcohol use: Yes     Comment: social     Drug use: No    Sexual activity: Not on file         Current Outpatient Medications:     amLODIPine (NORVASC) 5 mg tablet, Take 1 tablet (5 mg total) by mouth daily, Disp: 90 tablet, Rfl: 3    apixaban (Eliquis) 5 mg, Take 1 tablet by mouth twice daily, Disp: 60 tablet, Rfl: 5    atorvastatin (LIPITOR) 40 mg tablet, Take 1 tablet (40 mg total) by mouth daily, Disp: 90 tablet, Rfl: 3    ferrous sulfate 325 (65 Fe) mg tablet, Take 325 mg by mouth daily with breakfast, Disp: , Rfl:     lisinopril (ZESTRIL) 40 mg tablet, Take 1 tablet by mouth once daily, Disp: 90  "tablet, Rfl: 3    pantoprazole (PROTONIX) 40 mg tablet, Take 1 tablet (40 mg total) by mouth daily, Disp: 90 tablet, Rfl: 0    potassium chloride (K-DUR,KLOR-CON) 20 mEq tablet, Take 1 tablet by mouth twice daily, Disp: 60 tablet, Rfl: 5    chlorthalidone 25 mg tablet, Take 1 tablet (25 mg total) by mouth daily, Disp: 90 tablet, Rfl: 3    Current Facility-Administered Medications:     cyanocobalamin injection 1,000 mcg, 1,000 mcg, Intramuscular, Q30 Days, Arlen Macedo DO, 1,000 mcg at 02/15/24 1010    cyanocobalamin injection 1,000 mcg, 1,000 mcg, Intramuscular, Q30 Days, Champ Roldan MD, 1,000 mcg at 04/15/24 0949    No Known Allergies    Physical Exam:    /73   Pulse 60   Ht 5' 10\" (1.778 m)   Wt 80.7 kg (178 lb)   BMI 25.54 kg/m²     Constitutional:normal, well developed, well nourished, alert, in no distress and non-toxic and no overt pain behavior.  Eyes:anicteric  HEENT:grossly intact  Neck:supple, symmetric, trachea midline and no masses   Pulmonary:even and unlabored  Cardiovascular:No edema or pitting edema present  Skin:Normal without rashes or lesions and well hydrated  Psychiatric:Mood and affect appropriate  Neurologic:Cranial Nerves II-XII grossly intact  Musculoskeletal:normal gait    Imaging      No new relevant imaging available for review.  "

## 2024-05-15 ENCOUNTER — CLINICAL SUPPORT (OUTPATIENT)
Dept: FAMILY MEDICINE CLINIC | Facility: CLINIC | Age: 79
End: 2024-05-15
Payer: MEDICARE

## 2024-05-15 DIAGNOSIS — E53.8 B12 DEFICIENCY: Primary | ICD-10-CM

## 2024-05-15 PROCEDURE — 96372 THER/PROPH/DIAG INJ SC/IM: CPT | Performed by: STUDENT IN AN ORGANIZED HEALTH CARE EDUCATION/TRAINING PROGRAM

## 2024-05-15 RX ADMIN — CYANOCOBALAMIN 1000 MCG: 1000 INJECTION, SOLUTION INTRAMUSCULAR; SUBCUTANEOUS at 09:56

## 2024-05-17 DIAGNOSIS — I65.21 STENOSIS OF RIGHT CAROTID ARTERY: ICD-10-CM

## 2024-05-18 RX ORDER — ATORVASTATIN CALCIUM 40 MG/1
40 TABLET, FILM COATED ORAL DAILY
Qty: 90 TABLET | Refills: 1 | Status: SHIPPED | OUTPATIENT
Start: 2024-05-18

## 2024-05-30 ENCOUNTER — RA CDI HCC (OUTPATIENT)
Dept: OTHER | Facility: HOSPITAL | Age: 79
End: 2024-05-30

## 2024-06-06 ENCOUNTER — OFFICE VISIT (OUTPATIENT)
Dept: FAMILY MEDICINE CLINIC | Facility: CLINIC | Age: 79
End: 2024-06-06
Payer: MEDICARE

## 2024-06-06 VITALS
WEIGHT: 176 LBS | DIASTOLIC BLOOD PRESSURE: 64 MMHG | SYSTOLIC BLOOD PRESSURE: 188 MMHG | BODY MASS INDEX: 25.2 KG/M2 | HEART RATE: 56 BPM | HEIGHT: 70 IN | OXYGEN SATURATION: 98 %

## 2024-06-06 DIAGNOSIS — Z00.00 MEDICARE ANNUAL WELLNESS VISIT, SUBSEQUENT: Primary | ICD-10-CM

## 2024-06-06 DIAGNOSIS — I10 ESSENTIAL HYPERTENSION: ICD-10-CM

## 2024-06-06 DIAGNOSIS — M54.16 LUMBAR RADICULOPATHY: ICD-10-CM

## 2024-06-06 PROCEDURE — 99214 OFFICE O/P EST MOD 30 MIN: CPT | Performed by: STUDENT IN AN ORGANIZED HEALTH CARE EDUCATION/TRAINING PROGRAM

## 2024-06-06 PROCEDURE — G0439 PPPS, SUBSEQ VISIT: HCPCS | Performed by: STUDENT IN AN ORGANIZED HEALTH CARE EDUCATION/TRAINING PROGRAM

## 2024-06-06 RX ORDER — AMLODIPINE BESYLATE 10 MG/1
10 TABLET ORAL DAILY
Qty: 90 TABLET | Refills: 0 | Status: SHIPPED | OUTPATIENT
Start: 2024-06-06

## 2024-06-06 NOTE — ASSESSMENT & PLAN NOTE
MRI from 2020 reviewed  Follows with spine and pain  Status post injection minimal alleviation of symptoms  Spine pain recommended neurosurg and gabapentin initiation, patient deferred

## 2024-06-06 NOTE — PROGRESS NOTES
Ambulatory Visit  Name: Tee Forrest Jr.      : 1945      MRN: [id number]  Encounter Provider: Champ Roldan MD  Encounter Date: 2024   Encounter department: [facility] PRIMARY CARE    Assessment & Plan   1. Medicare annual wellness visit, subsequent  2. Essential hypertension  Assessment & Plan:  No history of hypertension  Appears grossly uncontrolled based on previous office appointments  Currently on amlodipine 5, chlorthalidone 25, lisinopril 40.  At this time we will increase his amlodipine from 5 mg to 10 mg  Follow-up nurse visit 1 week for BP check  Orders:  -     amLODIPine (NORVASC) 10 mg tablet; Take 1 tablet (10 mg total) by mouth daily  3. Lumbar radiculopathy  Assessment & Plan:  MRI from  reviewed  Follows with spine and pain  Status post injection minimal alleviation of symptoms  Spine pain recommended neurosurg and gabapentin initiation, patient deferred       Preventive health issues were discussed with patient, and age appropriate screening tests were ordered as noted in patient's After Visit Summary. Personalized health advice and appropriate referrals for health education or preventive services given if needed, as noted in patient's After Visit Summary.    History of Present Illness     HPI   Patient Care Team:  Champ Roldan MD as PCP - General (Family Medicine)  DO Marleni Carty PA-C Bankim Bhatt, MD Alan Listhaus, MD (Ophthalmology)    Review of Systems   Constitutional:  Negative for activity change, appetite change, chills, fatigue and fever.   HENT:  Negative for congestion, dental problem, drooling, ear discharge, ear pain, facial swelling, postnasal drip, rhinorrhea and sinus pain.    Eyes:  Negative for photophobia, pain, discharge and itching.   Respiratory:  Negative for apnea, cough, chest tightness and shortness of breath.    Cardiovascular:  Negative for chest pain and leg swelling.   Gastrointestinal:  Negative for abdominal  distention, abdominal pain, anal bleeding, constipation, diarrhea and nausea.   Endocrine: Negative for cold intolerance, heat intolerance and polydipsia.   Genitourinary:  Negative for difficulty urinating.   Musculoskeletal:  Positive for back pain and gait problem. Negative for arthralgias, joint swelling and myalgias.   Skin:  Negative for color change and pallor.   Allergic/Immunologic: Negative for immunocompromised state.   Neurological:  Negative for dizziness, seizures, facial asymmetry, weakness, light-headedness, numbness and headaches.   Psychiatric/Behavioral:  Negative for agitation, behavioral problems, confusion, decreased concentration and dysphoric mood.    All other systems reviewed and are negative.    Medical History Reviewed by provider this encounter:       Annual Wellness Visit Questionnaire   Tee is here for his Subsequent Wellness visit.     Health Risk Assessment:   Patient rates overall health as poor. Patient feels that their physical health rating is much worse. Patient is satisfied with their life. Eyesight was rated as same. Hearing was rated as slightly worse. Patient feels that their emotional and mental health rating is same. Patients states they are sometimes angry. Patient states they are often unusually tired/fatigued. Pain experienced in the last 7 days has been a lot. Patient's pain rating has been 8/10. Patient states that he has experienced no weight loss or gain in last 6 months. Not doing too bad for my age    Depression Screening:   PHQ-2 Score: 0      Fall Risk Screening:   In the past year, patient has experienced: history of falling in past year      Home Safety:  Patient has trouble with stairs inside or outside of their home. Patient has working smoke alarms and has no working carbon monoxide detector. Home safety hazards include: none. none    Nutrition:   Current diet is Regular.     Medications:   Patient is not currently taking any over-the-counter  supplements. Patient is able to manage medications.     Activities of Daily Living (ADLs)/Instrumental Activities of Daily Living (IADLs):   Walk and transfer into and out of bed and chair?: Yes  Dress and groom yourself?: Yes    Bathe or shower yourself?: Yes    Feed yourself? Yes  Do your laundry/housekeeping?: Yes  Manage your money, pay your bills and track your expenses?: Yes  Make your own meals?: Yes    Do your own shopping?: Yes    Previous Hospitalizations:   Any hospitalizations or ED visits within the last 12 months?: No      Advance Care Planning:   Living will: Yes    Durable POA for healthcare: No    Advanced directive: Yes    Advanced directive counseling given: Yes    ACP document given: Yes    End of Life Decisions reviewed with patient: Yes    Provider agrees with end of life decisions: Yes      PREVENTIVE SCREENINGS      Cardiovascular Screening:    General: Screening Current      Diabetes Screening:     General: Screening Current      Prostate Cancer Screening:    General: Screening Not Indicated      Abdominal Aortic Aneurysm (AAA) Screening:    Risk factors include: tobacco use        Lung Cancer Screening:     General: Screening Not Indicated      Hepatitis C Screening:    General: Screening Current    Screening, Brief Intervention, and Referral to Treatment (SBIRT)    Screening  Typical number of drinks in a day: 1  Typical number of drinks in a week: 4  Interpretation: Low risk drinking behavior.    Single Item Drug Screening:  How often have you used an illegal drug (including marijuana) or a prescription medication for non-medical reasons in the past year? never    Single Item Drug Screen Score: 0  Interpretation: Negative screen for possible drug use disorder    Social Determinants of Health     Financial Resource Strain: Low Risk  (12/20/2022)    Overall Financial Resource Strain (CARDIA)     Difficulty of Paying Living Expenses: Not very hard   Food Insecurity: No Food Insecurity  "(6/5/2024)    Hunger Vital Sign     Worried About Running Out of Food in the Last Year: Never true     Ran Out of Food in the Last Year: Never true   Transportation Needs: No Transportation Needs (6/5/2024)    PRAPARE - Transportation     Lack of Transportation (Medical): No     Lack of Transportation (Non-Medical): No   Housing Stability: Low Risk  (6/5/2024)    Housing Stability Vital Sign     Unable to Pay for Housing in the Last Year: No     Number of Times Moved in the Last Year: 0     Homeless in the Last Year: No   Utilities: Not At Risk (6/5/2024)    [facility] Utilities     Threatened with loss of utilities: No     No results found.    Objective     BP (!) 188/64 (BP Location: Left arm, Patient Position: Sitting, Cuff Size: Adult)   Pulse 56   Ht 5' 10\" (1.778 m)   Wt 79.8 kg (176 lb)   SpO2 98%   BMI 25.25 kg/m²     Physical Exam  Constitutional:       Appearance: He is well-developed. He is not ill-appearing or diaphoretic.   HENT:      Head: Normocephalic.   Eyes:      Pupils: Pupils are equal, round, and reactive to light.   Cardiovascular:      Rate and Rhythm: Normal rate and regular rhythm.   Pulmonary:      Effort: Pulmonary effort is normal.      Breath sounds: Normal breath sounds.   Abdominal:      General: Bowel sounds are normal.      Palpations: Abdomen is soft.   Musculoskeletal:         General: Normal range of motion.      Cervical back: Normal range of motion and neck supple.   Skin:     General: Skin is warm.   Neurological:      Mental Status: He is alert.       Administrative Statements           "

## 2024-06-06 NOTE — ASSESSMENT & PLAN NOTE
No history of hypertension  Appears grossly uncontrolled based on previous office appointments  Currently on amlodipine 5, chlorthalidone 25, lisinopril 40.  At this time we will increase his amlodipine from 5 mg to 10 mg  Follow-up nurse visit 1 week for BP check

## 2024-06-06 NOTE — PATIENT INSTRUCTIONS
Medicare Preventive Visit Patient Instructions  Thank you for completing your Welcome to Medicare Visit or Medicare Annual Wellness Visit today. Your next wellness visit will be due in one year (6/7/2025).  The screening/preventive services that you may require over the next 5-10 years are detailed below. Some tests may not apply to you based off risk factors and/or age. Screening tests ordered at today's visit but not completed yet may show as past due. Also, please note that scanned in results may not display below.  Preventive Screenings:  Service Recommendations Previous Testing/Comments   Colorectal Cancer Screening  Colonoscopy    Fecal Occult Blood Test (FOBT)/Fecal Immunochemical Test (FIT)  Fecal DNA/Cologuard Test  Flexible Sigmoidoscopy Age: 45-75 years old   Colonoscopy: every 10 years (May be performed more frequently if at higher risk)  OR  FOBT/FIT: every 1 year  OR  Cologuard: every 3 years  OR  Sigmoidoscopy: every 5 years  Screening may be recommended earlier than age 45 if at higher risk for colorectal cancer. Also, an individualized decision between you and your healthcare provider will decide whether screening between the ages of 76-85 would be appropriate. Colonoscopy: Not on file  FOBT/FIT: Not on file  Cologuard: Not on file  Sigmoidoscopy: Not on file          Prostate Cancer Screening Individualized decision between patient and health care provider in men between ages of 55-69   Medicare will cover every 12 months beginning on the day after your 50th birthday PSA: No results in last 5 years     Screening Not Indicated     Hepatitis C Screening Once for adults born between 1945 and 1965  More frequently in patients at high risk for Hepatitis C Hep C Antibody: 12/15/2020    Screening Current   Diabetes Screening 1-2 times per year if you're at risk for diabetes or have pre-diabetes Fasting glucose: 92 mg/dL (2/7/2024)  A1C: No results in last 5 years (No results in last 5 years)  Screening  Current   Cholesterol Screening Once every 5 years if you don't have a lipid disorder. May order more often based on risk factors. Lipid panel: 02/07/2024  Screening Current      Other Preventive Screenings Covered by Medicare:  Abdominal Aortic Aneurysm (AAA) Screening: covered once if your at risk. You're considered to be at risk if you have a family history of AAA or a male between the age of 65-75 who smoking at least 100 cigarettes in your lifetime.  Lung Cancer Screening: covers low dose CT scan once per year if you meet all of the following conditions: (1) Age 55-77; (2) No signs or symptoms of lung cancer; (3) Current smoker or have quit smoking within the last 15 years; (4) You have a tobacco smoking history of at least 20 pack years (packs per day x number of years you smoked); (5) You get a written order from a healthcare provider.  Glaucoma Screening: covered annually if you're considered high risk: (1) You have diabetes OR (2) Family history of glaucoma OR (3)  aged 50 and older OR (4)  American aged 65 and older  Osteoporosis Screening: covered every 2 years if you meet one of the following conditions: (1) Have a vertebral abnormality; (2) On glucocorticoid therapy for more than 3 months; (3) Have primary hyperparathyroidism; (4) On osteoporosis medications and need to assess response to drug therapy.  HIV Screening: covered annually if you're between the age of 15-65. Also covered annually if you are younger than 15 and older than 65 with risk factors for HIV infection. For pregnant patients, it is covered up to 3 times per pregnancy.    Immunizations:  Immunization Recommendations   Influenza Vaccine Annual influenza vaccination during flu season is recommended for all persons aged >= 6 months who do not have contraindications   Pneumococcal Vaccine   * Pneumococcal conjugate vaccine = PCV13 (Prevnar 13), PCV15 (Vaxneuvance), PCV20 (Prevnar 20)  * Pneumococcal polysaccharide  vaccine = PPSV23 (Pneumovax) Adults 19-65 yo with certain risk factors or if 65+ yo  If never received any pneumonia vaccine: recommend Prevnar 20 (PCV20)  Give PCV20 if previously received 1 dose of PCV13 or PPSV23   Hepatitis B Vaccine 3 dose series if at intermediate or high risk (ex: diabetes, end stage renal disease, liver disease)   Respiratory syncytial virus (RSV) Vaccine - COVERED BY MEDICARE PART D  * RSVPreF3 (Arexvy) CDC recommends that adults 60 years of age and older may receive a single dose of RSV vaccine using shared clinical decision-making (SCDM)   Tetanus (Td) Vaccine - COST NOT COVERED BY MEDICARE PART B Following completion of primary series, a booster dose should be given every 10 years to maintain immunity against tetanus. Td may also be given as tetanus wound prophylaxis.   Tdap Vaccine - COST NOT COVERED BY MEDICARE PART B Recommended at least once for all adults. For pregnant patients, recommended with each pregnancy.   Shingles Vaccine (Shingrix) - COST NOT COVERED BY MEDICARE PART B  2 shot series recommended in those 19 years and older who have or will have weakened immune systems or those 50 years and older     Health Maintenance Due:      Topic Date Due   • Hepatitis C Screening  Completed     Immunizations Due:      Topic Date Due   • COVID-19 Vaccine (4 - 2023-24 season) 09/01/2023   • Influenza Vaccine (Season Ended) 09/01/2024     Advance Directives   What are advance directives?  Advance directives are legal documents that state your wishes and plans for medical care. These plans are made ahead of time in case you lose your ability to make decisions for yourself. Advance directives can apply to any medical decision, such as the treatments you want, and if you want to donate organs.   What are the types of advance directives?  There are many types of advance directives, and each state has rules about how to use them. You may choose a combination of any of the following:  Living  will:  This is a written record of the treatment you want. You can also choose which treatments you do not want, which to limit, and which to stop at a certain time. This includes surgery, medicine, IV fluid, and tube feedings.   Durable power of  for healthcare (DPAHC):  This is a written record that states who you want to make healthcare choices for you when you are unable to make them for yourself. This person, called a proxy, is usually a family member or a friend. You may choose more than 1 proxy.  Do not resuscitate (DNR) order:  A DNR order is used in case your heart stops beating or you stop breathing. It is a request not to have certain forms of treatment, such as CPR. A DNR order may be included in other types of advance directives.  Medical directive:  This covers the care that you want if you are in a coma, near death, or unable to make decisions for yourself. You can list the treatments you want for each condition. Treatment may include pain medicine, surgery, blood transfusions, dialysis, IV or tube feedings, and a ventilator (breathing machine).  Values history:  This document has questions about your views, beliefs, and how you feel and think about life. This information can help others choose the care that you would choose.  Why are advance directives important?  An advance directive helps you control your care. Although spoken wishes may be used, it is better to have your wishes written down. Spoken wishes can be misunderstood, or not followed. Treatments may be given even if you do not want them. An advance directive may make it easier for your family to make difficult choices about your care.   Fall Prevention    Fall prevention  includes ways to make your home and other areas safer. It also includes ways you can move more carefully to prevent a fall. Health conditions that cause changes in your blood pressure, vision, or muscle strength and coordination may increase your risk for falls.  Medicines may also increase your risk for falls if they make you dizzy, weak, or sleepy.   Fall prevention tips:   Stand or sit up slowly.    Use assistive devices as directed.    Wear shoes that fit well and have soles that .    Wear a personal alarm.    Stay active.    Manage your medical conditions.    Home Safety Tips:  Add items to prevent falls in the bathroom.    Keep paths clear.    Install bright lights in your home.    Keep items you use often on shelves within reach.    Paint or place reflective tape on the edges of your stairs.    Weight Management   Why it is important to manage your weight:  Being overweight increases your risk of health conditions such as heart disease, high blood pressure, type 2 diabetes, and certain types of cancer. It can also increase your risk for osteoarthritis, sleep apnea, and other respiratory problems. Aim for a slow, steady weight loss. Even a small amount of weight loss can lower your risk of health problems.  How to lose weight safely:  A safe and healthy way to lose weight is to eat fewer calories and get regular exercise. You can lose up about 1 pound a week by decreasing the number of calories you eat by 500 calories each day.   Healthy meal plan for weight management:  A healthy meal plan includes a variety of foods, contains fewer calories, and helps you stay healthy. A healthy meal plan includes the following:  Eat whole-grain foods more often.  A healthy meal plan should contain fiber. Fiber is the part of grains, fruits, and vegetables that is not broken down by your body. Whole-grain foods are healthy and provide extra fiber in your diet. Some examples of whole-grain foods are whole-wheat breads and pastas, oatmeal, brown rice, and bulgur.  Eat a variety of vegetables every day.  Include dark, leafy greens such as spinach, kale, nick greens, and mustard greens. Eat yellow and orange vegetables such as carrots, sweet potatoes, and winter squash.   Eat a  variety of fruits every day.  Choose fresh or canned fruit (canned in its own juice or light syrup) instead of juice. Fruit juice has very little or no fiber.  Eat low-fat dairy foods.  Drink fat-free (skim) milk or 1% milk. Eat fat-free yogurt and low-fat cottage cheese. Try low-fat cheeses such as mozzarella and other reduced-fat cheeses.  Choose meat and other protein foods that are low in fat.  Choose beans or other legumes such as split peas or lentils. Choose fish, skinless poultry (chicken or turkey), or lean cuts of red meat (beef or pork). Before you cook meat or poultry, cut off any visible fat.   Use less fat and oil.  Try baking foods instead of frying them. Add less fat, such as margarine, sour cream, regular salad dressing and mayonnaise to foods. Eat fewer high-fat foods. Some examples of high-fat foods include french fries, doughnuts, ice cream, and cakes.  Eat fewer sweets.  Limit foods and drinks that are high in sugar. This includes candy, cookies, regular soda, and sweetened drinks.  Exercise:  Exercise at least 30 minutes per day on most days of the week. Some examples of exercise include walking, biking, dancing, and swimming. You can also fit in more physical activity by taking the stairs instead of the elevator or parking farther away from stores. Ask your healthcare provider about the best exercise plan for you.      © Copyright [employer] 2018 Information is for End User's use only and may not be sold, redistributed or otherwise used for commercial purposes. All illustrations and images included in CareNotes® are the copyrighted property of [employer] or [employer]

## 2024-07-23 ENCOUNTER — OFFICE VISIT (OUTPATIENT)
Dept: PAIN MEDICINE | Facility: CLINIC | Age: 79
End: 2024-07-23
Payer: MEDICARE

## 2024-07-23 VITALS
HEIGHT: 70 IN | WEIGHT: 180 LBS | TEMPERATURE: 97.5 F | OXYGEN SATURATION: 98 % | BODY MASS INDEX: 25.77 KG/M2 | DIASTOLIC BLOOD PRESSURE: 72 MMHG | SYSTOLIC BLOOD PRESSURE: 183 MMHG | HEART RATE: 57 BPM

## 2024-07-23 DIAGNOSIS — G89.4 CHRONIC PAIN SYNDROME: Primary | ICD-10-CM

## 2024-07-23 PROCEDURE — 99214 OFFICE O/P EST MOD 30 MIN: CPT | Performed by: STUDENT IN AN ORGANIZED HEALTH CARE EDUCATION/TRAINING PROGRAM

## 2024-07-23 NOTE — PROGRESS NOTES
"Assessment:  1. Chronic pain syndrome        Portions of the record may have been created with voice recognition software. Occasional wrong word or \"sound a like\" substitutions may have occurred due to the inherent limitations of voice recognition software. Read the chart carefully and recognize, using context, where substitutions have occurred. Contact me with any questions.       Plan:  79-year-old male here for follow-up visit with regards to chronic low back pain symptoms.  Denies significant changes symptoms since last visit.  Denies progressive weakness, saddle anesthesia, BBI.  He previously underwent an LESI on 4/15/2024 without significant relief.    Patient defers further interventional treatment or medication management at this time.    Patient is inquiring about need for continuing Eliquis at this time.  Advised to follow-up with PCP regarding this. Patient expresses understanding.    Follow-up as needed.      History of Present Illness:  The patient is a 79 y.o. male who presents for a follow up office visit in regards to Hip Pain (bilateral).       I have personally reviewed and/or updated the patient's past medical history, past surgical history, family history, social history, current medications, allergies, and vital signs today.         Review of Systems  Review of Systems   Constitutional:  Negative for fever.   HENT:  Negative for sinus pain.    Eyes:  Negative for redness.   Respiratory:  Negative for shortness of breath.    Cardiovascular:  Negative for palpitations.   Gastrointestinal:  Negative for abdominal pain and nausea.   Endocrine: Negative for polydipsia.   Genitourinary:  Negative for difficulty urinating.   Musculoskeletal:  Positive for gait problem. Negative for myalgias.        Decreased range of motion  Pain in extremity-legs   Skin:  Negative for rash.   Neurological:  Negative for seizures and headaches.   Psychiatric/Behavioral:  Negative for decreased concentration. The " patient is not nervous/anxious.            Past Medical History:   Diagnosis Date    Arthritis     Atrial flutter (HCC)     Cholecystitis     Coronary artery disease     Hypertension     RBBB     Spinal stenosis        Past Surgical History:   Procedure Laterality Date    CHOLECYSTECTOMY      COLON SURGERY      bwel resection    COLONOSCOPY      ESOPHAGOGASTRODUODENOSCOPY      2007  ONSET    [place] LAPAROSCOPY SURG CHOLECYSTECTOMY N/A 2017    Procedure: CHOLECYSTECTOMY LAPAROSCOPIC;  Surgeon: Ez Lainez MD;  Location: [facility];  Service: General    SMALL INTESTINE SURGERY      ONSEC DEC 2011       Family History   Problem Relation Age of Onset    Arthritis Mother     Heart attack Father     Coronary artery disease Family     Diabetes Family        Social History     Occupational History    Not on file   Tobacco Use    Smoking status: Former     Current packs/day: 0.00     Average packs/day: 2.0 packs/day for 3.0 years (6.0 ttl pk-yrs)     Types: Cigarettes     Start date:      Quit date:      Years since quittin.6    Smokeless tobacco: Never   Vaping Use    Vaping status: Never Used   Substance and Sexual Activity    Alcohol use: Yes     Comment: social     Drug use: No    Sexual activity: Not on file         Current Outpatient Medications:     amLODIPine (NORVASC) 10 mg tablet, Take 1 tablet (10 mg total) by mouth daily, Disp: 90 tablet, Rfl: 0    apixaban (Eliquis) 5 mg, Take 1 tablet by mouth twice daily, Disp: 60 tablet, Rfl: 5    atorvastatin (LIPITOR) 40 mg tablet, Take 1 tablet (40 mg total) by mouth daily, Disp: 90 tablet, Rfl: 1    ferrous sulfate 325 (65 Fe) mg tablet, Take 325 mg by mouth daily with breakfast, Disp: , Rfl:     lisinopril (ZESTRIL) 40 mg tablet, Take 1 tablet by mouth once daily, Disp: 90 tablet, Rfl: 3    potassium chloride (K-DUR,KLOR-CON) 20 mEq tablet, Take 1 tablet by mouth twice daily, Disp: 60 tablet, Rfl: 5    chlorthalidone 25 mg tablet, Take 1 tablet (25  "mg total) by mouth daily, Disp: 90 tablet, Rfl: 3    pantoprazole (PROTONIX) 40 mg tablet, Take 1 tablet (40 mg total) by mouth daily, Disp: 90 tablet, Rfl: 0    Current Facility-Administered Medications:     cyanocobalamin injection 1,000 mcg, 1,000 mcg, Intramuscular, Q30 Days, Arlen Macedo DO, 1,000 mcg at 02/15/24 1010    cyanocobalamin injection 1,000 mcg, 1,000 mcg, Intramuscular, Q30 Days, Champ Roldan MD, 1,000 mcg at 05/15/24 0956    No Known Allergies    Physical Exam:    BP (!) 183/72   Pulse 57   Temp 97.5 °F (36.4 °C)   Ht 5' 10\" (1.778 m)   Wt 81.6 kg (180 lb)   SpO2 98%   BMI 25.83 kg/m²     Constitutional:normal, well developed, well nourished, alert, in no distress and non-toxic and no overt pain behavior.  Eyes:anicteric  HEENT:grossly intact  Neck:supple, symmetric, trachea midline and no masses   Pulmonary:even and unlabored  Cardiovascular:No edema or pitting edema present  Skin:Normal without rashes or lesions and well hydrated  Psychiatric:Mood and affect appropriate  Neurologic:Cranial Nerves II-XII grossly intact  Musculoskeletal: stooped posture      Imaging    No new relevant imaging available for review.      "

## 2024-08-07 ENCOUNTER — OFFICE VISIT (OUTPATIENT)
Dept: FAMILY MEDICINE CLINIC | Facility: CLINIC | Age: 79
End: 2024-08-07
Payer: MEDICARE

## 2024-08-07 VITALS
WEIGHT: 175 LBS | OXYGEN SATURATION: 98 % | DIASTOLIC BLOOD PRESSURE: 78 MMHG | TEMPERATURE: 98.6 F | HEART RATE: 78 BPM | BODY MASS INDEX: 25.05 KG/M2 | SYSTOLIC BLOOD PRESSURE: 130 MMHG | HEIGHT: 70 IN

## 2024-08-07 DIAGNOSIS — M54.16 LUMBAR RADICULOPATHY: ICD-10-CM

## 2024-08-07 DIAGNOSIS — R26.81 GAIT INSTABILITY: Primary | ICD-10-CM

## 2024-08-07 PROCEDURE — G2211 COMPLEX E/M VISIT ADD ON: HCPCS | Performed by: STUDENT IN AN ORGANIZED HEALTH CARE EDUCATION/TRAINING PROGRAM

## 2024-08-07 PROCEDURE — 99214 OFFICE O/P EST MOD 30 MIN: CPT | Performed by: STUDENT IN AN ORGANIZED HEALTH CARE EDUCATION/TRAINING PROGRAM

## 2024-08-07 RX ORDER — GABAPENTIN 100 MG/1
100 CAPSULE ORAL 3 TIMES DAILY
Qty: 90 CAPSULE | Refills: 5 | Status: SHIPPED | OUTPATIENT
Start: 2024-08-07 | End: 2025-02-03

## 2024-08-07 NOTE — PROGRESS NOTES
Ambulatory Visit  Name: Tee Forrest Jr.      : 1945      MRN: [id number]  Encounter Provider: Champ Roldan MD  Encounter Date: 2024   Encounter department: [facility] PRIMARY CARE    Assessment & Plan   1. Gait instability  -     gabapentin (NEURONTIN) 100 mg capsule; Take 1 capsule (100 mg total) by mouth 3 (three) times a day  2. Lumbar radiculopathy  -     gabapentin (NEURONTIN) 100 mg capsule; Take 1 capsule (100 mg total) by mouth 3 (three) times a day    Recent note from pain management review, patient was offered additional interventions anything surgical evaluation however per documentation patient deferred at that time.  Note from 2024.    Agreeable to trying gabapentin at this time for his chronic pain.    Of note both subjectively and objectively patient has almost 100% decreasing sensation in the bilateral feet.  I discussed with him that at this time I am not comfortable with him continuing to drive due to his decreased sensation slow reflex time.  I recommended that patient cease driving immediately and I provided him with resources for adaptive equipment programs.  Advised patient that if he does not establish with 1 of these programs or find alternative means of transportation that I will be mandated to report this to the [place] being a Department of Motor Vehicles.  Advised patient we will reevaluate formal revocation of license in 30 days.       History of Present Illness     HPI    This an 79-year-old male presents the office companied by his wife for concerns of chronic pain gait abnormalities.  Patient does follow with spine and pain was recently seen by them for similar complaints however is looking for a second opinion regarding if anything can be done.    Review of Systems   Constitutional:  Negative for activity change, appetite change, chills, fatigue and fever.   HENT:  Negative for congestion, dental problem, drooling, ear discharge, ear pain, facial  "swelling, postnasal drip, rhinorrhea and sinus pain.    Eyes:  Negative for photophobia, pain, discharge and itching.   Respiratory:  Negative for apnea, cough, chest tightness and shortness of breath.    Cardiovascular:  Negative for chest pain and leg swelling.   Gastrointestinal:  Negative for abdominal distention, abdominal pain, anal bleeding, constipation, diarrhea and nausea.   Endocrine: Negative for cold intolerance, heat intolerance and polydipsia.   Genitourinary:  Negative for difficulty urinating.   Musculoskeletal:  Positive for back pain and gait problem. Negative for arthralgias, joint swelling and myalgias.   Skin:  Negative for color change and pallor.   Allergic/Immunologic: Negative for immunocompromised state.   Neurological:  Negative for dizziness, seizures, facial asymmetry, weakness, light-headedness, numbness and headaches.   Psychiatric/Behavioral:  Negative for agitation, behavioral problems, confusion, decreased concentration and dysphoric mood.    All other systems reviewed and are negative.      Objective     /78 (BP Location: Left arm, Patient Position: Sitting, Cuff Size: Standard)   Pulse 78   Temp 98.6 °F (37 °C) (Tympanic)   Ht 5' 10\" (1.778 m)   Wt 79.4 kg (175 lb)   SpO2 98%   BMI 25.11 kg/m²     Physical Exam  Constitutional:       Appearance: He is well-developed.   HENT:      Head: Normocephalic.   Eyes:      Pupils: Pupils are equal, round, and reactive to light.   Cardiovascular:      Rate and Rhythm: Normal rate and regular rhythm.   Pulmonary:      Effort: Pulmonary effort is normal.      Breath sounds: Normal breath sounds.   Abdominal:      General: Bowel sounds are normal.      Palpations: Abdomen is soft.   Musculoskeletal:         General: Normal range of motion.      Cervical back: Normal range of motion and neck supple.   Skin:     General: Skin is warm.   Neurological:      Motor: Weakness present.      Gait: Gait abnormal.       Administrative " Statements

## 2024-08-26 ENCOUNTER — APPOINTMENT (EMERGENCY)
Dept: CT IMAGING | Facility: HOSPITAL | Age: 79
DRG: 386 | End: 2024-08-26
Payer: MEDICARE

## 2024-08-26 ENCOUNTER — APPOINTMENT (INPATIENT)
Dept: RADIOLOGY | Facility: HOSPITAL | Age: 79
DRG: 386 | End: 2024-08-26
Payer: MEDICARE

## 2024-08-26 ENCOUNTER — HOSPITAL ENCOUNTER (INPATIENT)
Facility: HOSPITAL | Age: 79
LOS: 10 days | Discharge: HOME | DRG: 386 | End: 2024-09-05
Admitting: SURGERY
Payer: MEDICARE

## 2024-08-26 DIAGNOSIS — K56.609 BOWEL OBSTRUCTION (HCC): Primary | ICD-10-CM

## 2024-08-26 DIAGNOSIS — K21.9 GASTROESOPHAGEAL REFLUX DISEASE: ICD-10-CM

## 2024-08-26 DIAGNOSIS — K50.80 CROHN'S DISEASE OF BOTH SMALL AND LARGE INTESTINE WITHOUT COMPLICATION (HCC): ICD-10-CM

## 2024-08-26 DIAGNOSIS — I27.20 PULMONARY HYPERTENSION (HCC): ICD-10-CM

## 2024-08-26 DIAGNOSIS — E55.9 VITAMIN D DEFICIENCY: ICD-10-CM

## 2024-08-26 DIAGNOSIS — I10 ESSENTIAL HYPERTENSION: ICD-10-CM

## 2024-08-26 DIAGNOSIS — K56.609 SBO (SMALL BOWEL OBSTRUCTION) (HCC): ICD-10-CM

## 2024-08-26 DIAGNOSIS — E87.8 ELECTROLYTE ABNORMALITY: ICD-10-CM

## 2024-08-26 PROBLEM — I48.91 ATRIAL FIBRILLATION (HCC): Status: ACTIVE | Noted: 2020-10-10

## 2024-08-26 PROBLEM — R17 ELEVATED BILIRUBIN: Status: ACTIVE | Noted: 2024-08-26

## 2024-08-26 PROBLEM — K50.012 TERMINAL ILEITIS, WITH INTESTINAL OBSTRUCTION (HCC): Status: ACTIVE | Noted: 2024-08-26

## 2024-08-26 PROBLEM — R41.0 EPISODE OF CONFUSION: Status: ACTIVE | Noted: 2024-08-26

## 2024-08-26 LAB
ALBUMIN SERPL BCG-MCNC: 4.9 G/DL (ref 3.5–5)
ALP SERPL-CCNC: 114 U/L (ref 34–104)
ALT SERPL W P-5'-P-CCNC: 14 U/L (ref 7–52)
ANION GAP SERPL CALCULATED.3IONS-SCNC: 11 MMOL/L (ref 4–13)
AST SERPL W P-5'-P-CCNC: 16 U/L (ref 13–39)
ATRIAL RATE: 83 BPM
BASOPHILS # BLD AUTO: 0.02 THOUSANDS/ÂΜL (ref 0–0.1)
BASOPHILS NFR BLD AUTO: 0 % (ref 0–1)
BILIRUB DIRECT SERPL-MCNC: 0.41 MG/DL (ref 0–0.2)
BILIRUB SERPL-MCNC: 3.08 MG/DL (ref 0.2–1)
BUN SERPL-MCNC: 16 MG/DL (ref 5–25)
CALCIUM SERPL-MCNC: 10.3 MG/DL (ref 8.4–10.2)
CHLORIDE SERPL-SCNC: 98 MMOL/L (ref 96–108)
CO2 SERPL-SCNC: 30 MMOL/L (ref 21–32)
CREAT SERPL-MCNC: 1.08 MG/DL (ref 0.6–1.3)
CRP SERPL QL: 28.8 MG/L
EOSINOPHIL # BLD AUTO: 0.04 THOUSAND/ÂΜL (ref 0–0.61)
EOSINOPHIL NFR BLD AUTO: 0 % (ref 0–6)
ERYTHROCYTE [DISTWIDTH] IN BLOOD BY AUTOMATED COUNT: 13 % (ref 11.6–15.1)
ERYTHROCYTE [SEDIMENTATION RATE] IN BLOOD: 4 MM/HOUR (ref 0–19)
GFR SERPL CREATININE-BSD FRML MDRD: 64 ML/MIN/1.73SQ M
GLUCOSE SERPL-MCNC: 138 MG/DL (ref 65–140)
HCT VFR BLD AUTO: 47.5 % (ref 36.5–49.3)
HGB BLD-MCNC: 16.2 G/DL (ref 12–17)
IMM GRANULOCYTES # BLD AUTO: 0.07 THOUSAND/UL (ref 0–0.2)
IMM GRANULOCYTES NFR BLD AUTO: 1 % (ref 0–2)
LACTATE SERPL-SCNC: 1.6 MMOL/L (ref 0.5–2)
LIPASE SERPL-CCNC: 11 U/L (ref 11–82)
LYMPHOCYTES # BLD AUTO: 1.32 THOUSANDS/ÂΜL (ref 0.6–4.47)
LYMPHOCYTES NFR BLD AUTO: 9 % (ref 14–44)
MCH RBC QN AUTO: 30.1 PG (ref 26.8–34.3)
MCHC RBC AUTO-ENTMCNC: 34.1 G/DL (ref 31.4–37.4)
MCV RBC AUTO: 88 FL (ref 82–98)
MONOCYTES # BLD AUTO: 0.67 THOUSAND/ÂΜL (ref 0.17–1.22)
MONOCYTES NFR BLD AUTO: 5 % (ref 4–12)
NEUTROPHILS # BLD AUTO: 12.35 THOUSANDS/ÂΜL (ref 1.85–7.62)
NEUTS SEG NFR BLD AUTO: 85 % (ref 43–75)
NRBC BLD AUTO-RTO: 0 /100 WBCS
PLATELET # BLD AUTO: 180 THOUSANDS/UL (ref 149–390)
PMV BLD AUTO: 11.5 FL (ref 8.9–12.7)
POTASSIUM SERPL-SCNC: 3.4 MMOL/L (ref 3.5–5.3)
PROT SERPL-MCNC: 7.9 G/DL (ref 6.4–8.4)
QRS AXIS: -71 DEGREES
QRSD INTERVAL: 152 MS
QT INTERVAL: 446 MS
QTC INTERVAL: 491 MS
RBC # BLD AUTO: 5.38 MILLION/UL (ref 3.88–5.62)
SODIUM SERPL-SCNC: 139 MMOL/L (ref 135–147)
T WAVE AXIS: 71 DEGREES
VENTRICULAR RATE: 73 BPM
WBC # BLD AUTO: 14.47 THOUSAND/UL (ref 4.31–10.16)

## 2024-08-26 PROCEDURE — 93010 ELECTROCARDIOGRAM REPORT: CPT | Performed by: INTERNAL MEDICINE

## 2024-08-26 PROCEDURE — 85652 RBC SED RATE AUTOMATED: CPT | Performed by: STUDENT IN AN ORGANIZED HEALTH CARE EDUCATION/TRAINING PROGRAM

## 2024-08-26 PROCEDURE — 74177 CT ABD & PELVIS W/CONTRAST: CPT

## 2024-08-26 PROCEDURE — 99284 EMERGENCY DEPT VISIT MOD MDM: CPT

## 2024-08-26 PROCEDURE — 80053 COMPREHEN METABOLIC PANEL: CPT

## 2024-08-26 PROCEDURE — 36415 COLL VENOUS BLD VENIPUNCTURE: CPT

## 2024-08-26 PROCEDURE — 85025 COMPLETE CBC W/AUTO DIFF WBC: CPT

## 2024-08-26 PROCEDURE — 96361 HYDRATE IV INFUSION ADD-ON: CPT

## 2024-08-26 PROCEDURE — 82248 BILIRUBIN DIRECT: CPT | Performed by: STUDENT IN AN ORGANIZED HEALTH CARE EDUCATION/TRAINING PROGRAM

## 2024-08-26 PROCEDURE — 99223 1ST HOSP IP/OBS HIGH 75: CPT | Performed by: STUDENT IN AN ORGANIZED HEALTH CARE EDUCATION/TRAINING PROGRAM

## 2024-08-26 PROCEDURE — 83605 ASSAY OF LACTIC ACID: CPT

## 2024-08-26 PROCEDURE — 99285 EMERGENCY DEPT VISIT HI MDM: CPT

## 2024-08-26 PROCEDURE — 99222 1ST HOSP IP/OBS MODERATE 55: CPT | Performed by: NURSE PRACTITIONER

## 2024-08-26 PROCEDURE — 99223 1ST HOSP IP/OBS HIGH 75: CPT | Performed by: SURGERY

## 2024-08-26 PROCEDURE — 96374 THER/PROPH/DIAG INJ IV PUSH: CPT

## 2024-08-26 PROCEDURE — 93005 ELECTROCARDIOGRAM TRACING: CPT

## 2024-08-26 PROCEDURE — 86140 C-REACTIVE PROTEIN: CPT | Performed by: STUDENT IN AN ORGANIZED HEALTH CARE EDUCATION/TRAINING PROGRAM

## 2024-08-26 PROCEDURE — 96376 TX/PRO/DX INJ SAME DRUG ADON: CPT

## 2024-08-26 PROCEDURE — 83690 ASSAY OF LIPASE: CPT

## 2024-08-26 RX ORDER — KETOROLAC TROMETHAMINE 30 MG/ML
15 INJECTION, SOLUTION INTRAMUSCULAR; INTRAVENOUS EVERY 6 HOURS PRN
Status: DISCONTINUED | OUTPATIENT
Start: 2024-08-26 | End: 2024-08-26

## 2024-08-26 RX ORDER — ONDANSETRON 2 MG/ML
4 INJECTION INTRAMUSCULAR; INTRAVENOUS ONCE
Status: COMPLETED | OUTPATIENT
Start: 2024-08-26 | End: 2024-08-26

## 2024-08-26 RX ORDER — SODIUM CHLORIDE, SODIUM LACTATE, POTASSIUM CHLORIDE, CALCIUM CHLORIDE 600; 310; 30; 20 MG/100ML; MG/100ML; MG/100ML; MG/100ML
125 INJECTION, SOLUTION INTRAVENOUS CONTINUOUS
Status: DISCONTINUED | OUTPATIENT
Start: 2024-08-26 | End: 2024-08-29

## 2024-08-26 RX ORDER — HEPARIN SODIUM 5000 [USP'U]/ML
5000 INJECTION, SOLUTION INTRAVENOUS; SUBCUTANEOUS EVERY 8 HOURS SCHEDULED
Status: DISCONTINUED | OUTPATIENT
Start: 2024-08-26 | End: 2024-09-04

## 2024-08-26 RX ORDER — POTASSIUM CHLORIDE 29.8 MG/ML
40 INJECTION INTRAVENOUS ONCE
Status: DISCONTINUED | OUTPATIENT
Start: 2024-08-26 | End: 2024-08-26

## 2024-08-26 RX ORDER — KETOROLAC TROMETHAMINE 30 MG/ML
15 INJECTION, SOLUTION INTRAMUSCULAR; INTRAVENOUS EVERY 6 HOURS SCHEDULED
Status: DISPENSED | OUTPATIENT
Start: 2024-08-26 | End: 2024-08-28

## 2024-08-26 RX ORDER — POTASSIUM CHLORIDE 14.9 MG/ML
20 INJECTION INTRAVENOUS
Status: COMPLETED | OUTPATIENT
Start: 2024-08-26 | End: 2024-08-26

## 2024-08-26 RX ORDER — HYDROMORPHONE HCL IN WATER/PF 6 MG/30 ML
0.2 PATIENT CONTROLLED ANALGESIA SYRINGE INTRAVENOUS EVERY 4 HOURS PRN
Status: DISCONTINUED | OUTPATIENT
Start: 2024-08-26 | End: 2024-09-05 | Stop reason: HOSPADM

## 2024-08-26 RX ORDER — POTASSIUM CHLORIDE 14.9 MG/ML
20 INJECTION INTRAVENOUS ONCE
Status: DISCONTINUED | OUTPATIENT
Start: 2024-08-26 | End: 2024-08-26

## 2024-08-26 RX ORDER — PANTOPRAZOLE SODIUM 40 MG/10ML
40 INJECTION, POWDER, LYOPHILIZED, FOR SOLUTION INTRAVENOUS
Status: DISCONTINUED | OUTPATIENT
Start: 2024-08-26 | End: 2024-09-05

## 2024-08-26 RX ORDER — HYDRALAZINE HYDROCHLORIDE 20 MG/ML
5 INJECTION INTRAMUSCULAR; INTRAVENOUS EVERY 6 HOURS PRN
Status: DISCONTINUED | OUTPATIENT
Start: 2024-08-26 | End: 2024-09-05 | Stop reason: HOSPADM

## 2024-08-26 RX ORDER — ONDANSETRON 2 MG/ML
4 INJECTION INTRAMUSCULAR; INTRAVENOUS EVERY 6 HOURS PRN
Status: DISCONTINUED | OUTPATIENT
Start: 2024-08-26 | End: 2024-09-05 | Stop reason: HOSPADM

## 2024-08-26 RX ADMIN — HEPARIN SODIUM 5000 UNITS: 5000 INJECTION INTRAVENOUS; SUBCUTANEOUS at 07:33

## 2024-08-26 RX ADMIN — SODIUM CHLORIDE 1000 ML: 0.9 INJECTION, SOLUTION INTRAVENOUS at 01:20

## 2024-08-26 RX ADMIN — ONDANSETRON 4 MG: 2 INJECTION INTRAMUSCULAR; INTRAVENOUS at 03:03

## 2024-08-26 RX ADMIN — POTASSIUM CHLORIDE 20 MEQ: 14.9 INJECTION, SOLUTION INTRAVENOUS at 12:09

## 2024-08-26 RX ADMIN — ONDANSETRON 4 MG: 2 INJECTION INTRAMUSCULAR; INTRAVENOUS at 01:19

## 2024-08-26 RX ADMIN — POTASSIUM CHLORIDE 20 MEQ: 14.9 INJECTION, SOLUTION INTRAVENOUS at 16:10

## 2024-08-26 RX ADMIN — KETOROLAC TROMETHAMINE 15 MG: 30 INJECTION, SOLUTION INTRAMUSCULAR; INTRAVENOUS at 12:08

## 2024-08-26 RX ADMIN — KETOROLAC TROMETHAMINE 15 MG: 30 INJECTION, SOLUTION INTRAMUSCULAR; INTRAVENOUS at 17:18

## 2024-08-26 RX ADMIN — HEPARIN SODIUM 5000 UNITS: 5000 INJECTION INTRAVENOUS; SUBCUTANEOUS at 14:01

## 2024-08-26 RX ADMIN — HEPARIN SODIUM 5000 UNITS: 5000 INJECTION INTRAVENOUS; SUBCUTANEOUS at 22:44

## 2024-08-26 RX ADMIN — POTASSIUM CHLORIDE 20 MEQ: 14.9 INJECTION, SOLUTION INTRAVENOUS at 13:54

## 2024-08-26 RX ADMIN — SODIUM CHLORIDE, SODIUM LACTATE, POTASSIUM CHLORIDE, AND CALCIUM CHLORIDE 125 ML/HR: .6; .31; .03; .02 INJECTION, SOLUTION INTRAVENOUS at 07:33

## 2024-08-26 RX ADMIN — IOHEXOL 100 ML: 350 INJECTION, SOLUTION INTRAVENOUS at 02:16

## 2024-08-26 RX ADMIN — PANTOPRAZOLE SODIUM 40 MG: 40 INJECTION, POWDER, FOR SOLUTION INTRAVENOUS at 08:06

## 2024-08-26 NOTE — CONSULTS
[facility]  Consult  Name: Tee Forrest Jr. 79 y.o. male I MRN: [id number]  Unit/Bed#: -01 I Date of Admission: 8/26/2024   Date of Service: 8/26/2024 I Hospital Day: 0    Inpatient consult to Internal Medicine  Consult performed by: MYNOR Miguel  Consult ordered by: Jarred Gallo MD      Assessment & Plan     Confusion  Assessment & Plan  Per chart review, patient with episode of disorientation.  Today, he has difficulty with naming the month  Per patient's wife, he has been having short-term memory difficulty for the past several months along with becoming verbally angry with her, and this is not characteristic of him  This could be related to acute medical issue and hospitalization versus possible cognitive decline with acute exacerbation versus medication effect, (recently started on gabapentin) or a combination of factors  Maintain sleep-wake cycle, promote restful environment  Conduct serial assessments    * SBO (small bowel obstruction) (HCC)  Assessment & Plan  Presented for abdominal pain and vomiting, found to have small bowel obstruction per CT imaging  Per patient's wife, he has a history of SBO x 2 in 1999, treated with NGT at [facility]. Required surgery for SBO in 2011 at [facility] (now closed)  NG tube was unable to be placed, currently managing conservatively  Continue NPO and IV fluids with LR@125 mL/hour  Additional care per primary team, general surgery  Monitor labs and vital signs, conduct serial physical assessments    Elevated bilirubin  Assessment & Plan  GI following, recommendations appreciated: Recommend nonurgent MRI/MRCP for further evaluation of pancreaticobiliary anatomy.  This could be done in the outpatient setting.  Will defer further serologic workup at this time due to suspicion for Gilbert's syndrome  Trend daily CMP    Crohn's disease of both small and large intestine without complication (HCC)  Assessment &  Plan  With reported history of Crohn's  GI input appreciated: Requires colonoscopy for evaluation and definitive diagnosis, However, would recommend this be done in 6 to 8 weeks following resolution of SBO    Lumbar radiculopathy  Assessment & Plan  History of lumbar radiculopathy, ankylosing spondylitis, treated with injections  Follows with pain and spine  Also with bilateral foot neuropathy, recently initiated on gabapentin 100 mg p.o. 3 times daily on 8/7/2024  Conduct serial physical assessments, monitor for adequate pain control    Pancreatic cyst  Assessment & Plan  CT abdomen pelvis: Re-identified prominence of the main pancreatic duct with several nonspecific subcentimeter cystic lesions, overall not significantly changed from previous CT examinations. These can be further evaluated with MRI/MRCP on a nonemergent basis  Follow-up as an outpatient    Atrial fibrillation (HCC)  Assessment & Plan  Takes apixaban, currently on hold pending clinical course for SBO  Does not appear to be on beta-blocker at this time  Monitor heart rate    Essential hypertension  Assessment & Plan  BP elevated on presentation, possibly due to pain/discomfort, now controlled  Appears to be on lisinopril, amlodipine, and chlorthalidone as an outpatient, currently on hold while n.p.o.  Continue hydralazine IV PRN  Continue to monitor BP    Bilateral carotid artery stenosis  Assessment & Plan  Noted to have high-grade right carotid stenosis   Per vascular surgery note in 2019, patient's anatomy not favorable for carotid stenting due to extensive bulky calcific plaque and due to cervical spine immobility and location of carotid stenosis difficult to achieve adequate exposure to perform endarterectomy in safe manner  He was started on DAPT however this was discontinued in favor of apixaban for atrial fibrillation           VTE Prophylaxis: VTE Score: 3 Moderate Risk (Score 3-4) - Pharmacological DVT Prophylaxis Ordered:  heparin.    Mobility:   Basic Mobility Inpatient Raw Score: 17  JH-HLM Goal: 5: Stand one or more mins  JH-HLM Achieved: 6: Walk 10 steps or more  JH-HLM Goal NOT achieved. Continue with multidisciplinary rounding and encourage appropriate mobility to improve upon JH-HLM goals.    Recommendations for Discharge:  Continue outpatient follow-up for comorbid conditions    Total Time Spent on Date of Encounter in care of patient: 48 mins. This time was spent on one or more of the following: performing physical exam; counseling and coordination of care; obtaining or reviewing history; documenting in the medical record; reviewing/ordering tests, medications or procedures; communicating with other healthcare professionals and discussing with patient's family/caregivers.    Collaboration of Care: Were Recommendations Directly Discussed with Primary Treatment Team? Yes    History of Present Illness:  Tee Forrest Jr. is a 79 y.o. male who is originally admitted to the general surgery service due to small bowel obstruction. We are consulted for confusion and comanagement of medical comorbidities.     Review of Systems:  Review of Systems   Unable to perform ROS: Mental status change       Past Medical and Surgical History:   Past Medical History:   Diagnosis Date    Arthritis     Atrial flutter (HCC)     Cholecystitis     Coronary artery disease     Hypertension     RBBB     Spinal stenosis        Past Surgical History:   Procedure Laterality Date    CHOLECYSTECTOMY      COLON SURGERY      bwel resection    COLONOSCOPY      ESOPHAGOGASTRODUODENOSCOPY      02/07/2007  ONSET    EYE SURGERY      HIP SURGERY      JOINT REPLACEMENT      [place] LAPAROSCOPY SURG CHOLECYSTECTOMY N/A 12/05/2017    Procedure: CHOLECYSTECTOMY LAPAROSCOPIC;  Surgeon: Ez Lainez MD;  Location: [facility];  Service: General    SMALL INTESTINE SURGERY      ONSEC DEC 2011       Meds/Allergies:  all medications and allergies reviewed    Allergies: No Known  "Allergies    Social History:  Marital Status: /Civil Union  Substance Use History:   Social History     Substance and Sexual Activity   Alcohol Use Yes    Comment: social      Social History     Tobacco Use   Smoking Status Former    Current packs/day: 0.00    Average packs/day: 2.0 packs/day for 3.0 years (6.0 ttl pk-yrs)    Types: Cigarettes    Start date: 1963    Quit date: 1966    Years since quittin.6   Smokeless Tobacco Never     Social History     Substance and Sexual Activity   Drug Use No       Family History:  Family History   Problem Relation Age of Onset    Arthritis Mother     Heart attack Father     Coronary artery disease Family     Diabetes Family        Physical Exam:   Vitals:   Blood Pressure: 135/71 (24 1525)  Pulse: 84 (24 1525)  Temperature: 98.6 °F (37 °C) (24 1525)  Temp Source: Oral (24 1510)  Respirations: 18 (24 1525)  Height: 5' 9\" (175.3 cm) (24 1510)  Weight - Scale: 79.6 kg (175 lb 9.5 oz) (24 1510)  SpO2: (!) 88 % (24 1525)    Physical Exam  Vitals and nursing note reviewed.   Constitutional:       General: He is not in acute distress.  HENT:      Head: Normocephalic and atraumatic.      Mouth/Throat:      Mouth: Mucous membranes are moist.      Pharynx: Oropharynx is clear.   Eyes:      Pupils: Pupils are equal, round, and reactive to light.   Cardiovascular:      Rate and Rhythm: Normal rate and regular rhythm.      Pulses: Normal pulses.   Pulmonary:      Effort: Pulmonary effort is normal. No respiratory distress.      Breath sounds: Normal breath sounds.   Abdominal:      General: Bowel sounds are normal. There is distension.      Palpations: Abdomen is soft.      Tenderness: There is no abdominal tenderness. There is no guarding.      Comments: Firm, distended, nontender   Musculoskeletal:      Cervical back: Neck supple.      Right lower leg: No edema.      Left lower leg: No edema.   Skin:     General: Skin is " warm and dry.      Capillary Refill: Capillary refill takes less than 2 seconds.   Neurological:      General: No focal deficit present.      Mental Status: He is alert.      Cranial Nerves: No dysarthria.      Motor: No weakness.      Comments: Disoriented to time, appears to confabulate        Additional Data:   Lab Results:    Results from last 7 days   Lab Units 08/26/24  0119   WBC Thousand/uL 14.47*   HEMOGLOBIN g/dL 16.2   HEMATOCRIT % 47.5   PLATELETS Thousands/uL 180   SEGS PCT % 85*   LYMPHO PCT % 9*   MONO PCT % 5   EOS PCT % 0     Results from last 7 days   Lab Units 08/26/24  0119   SODIUM mmol/L 139   POTASSIUM mmol/L 3.4*   CHLORIDE mmol/L 98   CO2 mmol/L 30   BUN mg/dL 16   CREATININE mg/dL 1.08   ANION GAP mmol/L 11   CALCIUM mg/dL 10.3*   ALBUMIN g/dL 4.9   TOTAL BILIRUBIN mg/dL 3.08*   ALK PHOS U/L 114*   ALT U/L 14   AST U/L 16   GLUCOSE RANDOM mg/dL 138             Lab Results   Component Value Date    HGBA1C 5.8 12/17/2018    HGBA1C 5.2 10/19/2017         Results from last 7 days   Lab Units 08/26/24  0348   LACTIC ACID mmol/L 1.6       Imaging: Reviewed radiology reports from this admission including: abdominal/pelvic CT  CT abdomen pelvis with contrast   Final Result by Jason Bates MD (08/26 0417)      Findings above compatible with acute small bowel obstruction with suspected transition at the level of the terminal ileum which appears acutely inflamed,? Infectious versus inflammatory ileitis. Small amount of free fluid tracking in the right paracolic    gutter region. No free air. Surgical consult advised.      Reidentified prominence of the main pancreatic duct with several nonspecific subcentimeter cystic lesions, overall not significantly changed from previous CT examinations. These can be further evaluated with MRI/MRCP on a nonemergent basis as clinically    warranted as recommended on prior studies.      Aforementioned findings were discussed with Dr. Antunez at approximately 4:00 a.m.  on 8/26/2024.         Workstation performed: [id number]             EKG, Pathology, and Other Studies Reviewed on Admission:   EKG: Atrial fibrillation. HR 73.    ** Please Note: This note may have been constructed using a voice recognition system. **

## 2024-08-26 NOTE — H&P
"H&P - General Surgery   Tee Forrest Jr. 79 y.o. male MRN: [id number]  Unit/Bed#: -01 Encounter: [id number]  Assessment:  79 year old male with PMH significant for CAD, atrial flutter, arthritis, Crohn's, prior small bowel obstructions requiring surgery presents for the evaluation of small bowel obstruction   Afebrile, vitals stable  WBC 14.47  Hgb 16.2  K 3.4  Cr 1.08  Alk phos 114  Tbili 3.08  CTAP: \"Findings above compatible with acute small bowel obstruction with suspected transition at the level of the terminal ileum which appears acutely inflamed,? Infectious versus inflammatory ileitis. Small amount of free fluid tracking in the right paracolic gutter region. No free air. Reidentified prominence of the main pancreatic duct with several nonspecific subcentimeter cystic lesions, overall not significantly changed from previous CT examinations.\"  Clinically stable, well appearing. Abdomen soft, distended, mildly TTP    Plan:  No acute surgical intervention indicated at this time. Continue conservative management of small bowel obstruction. Will hold off on NGT at this time, will reconsider if patient develops nausea or vomiting   NPO, IV fluids   Appreciate GI and SLIM consults   Analgesia and antiemetics prn   DVT prophylaxis  Discussed with Dr. Dyer     History of Present Illness   Chief Complaint: abdominal pain     HPI:  Tee Forrest Jr. is a 79 y.o. male with past medical history significant for CAD, atrial flutter, arthritis, gait instability, prior SBO, Crohn's who initially presented to [facility] ED with complaints of abdominal pain. Patient reports a history of abdominal pain on and off for the past few days. Reports associated nausea and vomiting. Also reports a history of leg numbness and gait instability. He reports he has had prior small bowel obstructions, which he believes has requires surgical intervention in the past. He states he thinks he has had multiple bowel surgeries, but cannot " provide any more information. He reports his abdominal pain has improved since coming to the hospital. During the encounter, patient talks as if his wife is in the room, however, she was not present during this encounter. He is able to tell me where he is. He takes Eliquis. History of cholecystectomy. Patient reports his last colonoscopy was years ago.     Review of Systems   Constitutional:  Negative for chills and fever.   HENT:  Negative for congestion and sore throat.    Respiratory:  Negative for cough and shortness of breath.    Cardiovascular:  Negative for chest pain and leg swelling.   Gastrointestinal:  Positive for abdominal pain, nausea and vomiting.   Endocrine: Negative for polydipsia and polyuria.   Genitourinary:  Negative for difficulty urinating, frequency and urgency.   Musculoskeletal:  Positive for gait problem.   Skin:  Negative for color change and wound.   Neurological:  Positive for weakness and numbness. Negative for dizziness and headaches.       Historical Information   Past Medical History:   Diagnosis Date    Arthritis     Atrial flutter (HCC)     Cholecystitis     Coronary artery disease     Hypertension     RBBB     Spinal stenosis      Past Surgical History:   Procedure Laterality Date    CHOLECYSTECTOMY      COLON SURGERY      bwel resection    COLONOSCOPY      ESOPHAGOGASTRODUODENOSCOPY      02/07/2007  ONSET    EYE SURGERY      HIP SURGERY      JOINT REPLACEMENT      IA LAPAROSCOPY SURG CHOLECYSTECTOMY N/A 12/05/2017    Procedure: CHOLECYSTECTOMY LAPAROSCOPIC;  Surgeon: Ez Lainez MD;  Location: [facility];  Service: General    SMALL INTESTINE SURGERY      ONSEC DEC 2011     Social History   Social History     Substance and Sexual Activity   Alcohol Use Yes    Comment: social      Social History     Substance and Sexual Activity   Drug Use No     Social History     Tobacco Use   Smoking Status Former    Current packs/day: 0.00    Average packs/day: 2.0 packs/day for 3.0 years  (6.0 ttl pk-yrs)    Types: Cigarettes    Start date: 1963    Quit date:     Years since quittin.6   Smokeless Tobacco Never     E-Cigarette/Vaping    E-Cigarette Use Never User      E-Cigarette/Vaping Substances    Nicotine No     THC No     CBD No     Flavoring No     Other No     Unknown No      Family History:   Family History   Problem Relation Age of Onset    Arthritis Mother     Heart attack Father     Coronary artery disease Family     Diabetes Family        Meds/Allergies   PTA meds:   Prior to Admission Medications   Prescriptions Last Dose Informant Patient Reported? Taking?   amLODIPine (NORVASC) 10 mg tablet 2024 Self No Yes   Sig: Take 1 tablet (10 mg total) by mouth daily   apixaban (Eliquis) 5 mg 2024 Self No Yes   Sig: Take 1 tablet by mouth twice daily   atorvastatin (LIPITOR) 40 mg tablet 2024 Self No Yes   Sig: Take 1 tablet (40 mg total) by mouth daily   chlorthalidone 25 mg tablet   No No   Sig: Take 1 tablet (25 mg total) by mouth daily   ferrous sulfate 325 (65 Fe) mg tablet 2024 Self Yes Yes   Sig: Take 325 mg by mouth daily with breakfast   gabapentin (NEURONTIN) 100 mg capsule 2024  No Yes   Sig: Take 1 capsule (100 mg total) by mouth 3 (three) times a day   lisinopril (ZESTRIL) 40 mg tablet 2024 Self No Yes   Sig: Take 1 tablet by mouth once daily   pantoprazole (PROTONIX) 40 mg tablet   No No   Sig: Take 1 tablet (40 mg total) by mouth daily   potassium chloride (K-DUR,KLOR-CON) 20 mEq tablet 2024 Self No Yes   Sig: Take 1 tablet by mouth twice daily      Facility-Administered Medications Last Administration Doses Remaining   cyanocobalamin injection 1,000 mcg 2/15/2024 10:10 AM    cyanocobalamin injection 1,000 mcg 5/15/2024  9:56 AM         No Known Allergies    Objective   First Vitals:   Blood Pressure: (!) 229/109 (24 005)  Pulse: 82 (24)  Temperature: 98.1 °F (36.7 °C) (24)  Temp Source: Tympanic (24  "0055)  Respirations: 18 (08/26/24 0055)  Height: 5' 9\" (175.3 cm) (08/26/24 0449)  Weight - Scale: 80.3 kg (177 lb) (08/26/24 0055)  SpO2: 96 % (08/26/24 0055)    Current Vitals:   Blood Pressure: 148/94 (08/26/24 0728)  Pulse: 71 (08/26/24 0527)  Temperature: 98.7 °F (37.1 °C) (08/26/24 0728)  Temp Source: Tympanic (08/26/24 0055)  Respirations: 22 (08/26/24 0130)  Height: 5' 9\" (175.3 cm) (08/26/24 0449)  Weight - Scale: 79.6 kg (175 lb 9.5 oz) (08/26/24 0449)  SpO2: 90 % (08/26/24 0527)    No intake or output data in the 24 hours ending 08/26/24 0844    Invasive Devices       Peripheral Intravenous Line  Duration             Peripheral IV 08/26/24 Right;Ventral (anterior) Forearm <1 day                    Physical Exam  Vitals reviewed.   Constitutional:       General: He is not in acute distress.     Appearance: He is normal weight. He is not ill-appearing.   HENT:      Head: Normocephalic and atraumatic.   Cardiovascular:      Rate and Rhythm: Normal rate.   Pulmonary:      Effort: Pulmonary effort is normal. No respiratory distress.   Abdominal:      General: A surgical scar is present. There is distension.      Palpations: Abdomen is soft.      Tenderness: There is abdominal tenderness. There is no guarding.   Musculoskeletal:      Right lower leg: No edema.      Left lower leg: No edema.   Skin:     General: Skin is warm and dry.   Neurological:      General: No focal deficit present.      Mental Status: He is alert. Mental status is at baseline.      Sensory: Sensory deficit present.   Psychiatric:         Mood and Affect: Mood normal.         Behavior: Behavior normal.         Lab Results: I have personally reviewed pertinent lab results.  , CBC:   Lab Results   Component Value Date    WBC 14.47 (H) 08/26/2024    HGB 16.2 08/26/2024    HCT 47.5 08/26/2024    MCV 88 08/26/2024     08/26/2024    RBC 5.38 08/26/2024    MCH 30.1 08/26/2024    MCHC 34.1 08/26/2024    RDW 13.0 08/26/2024    MPV 11.5 " 08/26/2024    NRBC 0 08/26/2024   , CMP:   Lab Results   Component Value Date    SODIUM 139 08/26/2024    K 3.4 (L) 08/26/2024    CL 98 08/26/2024    CO2 30 08/26/2024    BUN 16 08/26/2024    CREATININE 1.08 08/26/2024    CALCIUM 10.3 (H) 08/26/2024    AST 16 08/26/2024    ALT 14 08/26/2024    ALKPHOS 114 (H) 08/26/2024    EGFR 64 08/26/2024     Imaging: I have personally reviewed pertinent reports.    EKG, Pathology, and Other Studies: I have personally reviewed pertinent reports.      Code Status: Prior  Advance Directive and Living Will:      Power of :    POLST:      Counseling / Coordination of Care  Total floor / unit time spent today 25 minutes.  Greater than 50% of total time was spent with the patient and / or family counseling and / or coordination of care.  A description of the counseling / coordination of care: evaluation of patient, review of diagnostic and laboratory studies, and discussion with attending.       Kaelyn López PA-C

## 2024-08-26 NOTE — ASSESSMENT & PLAN NOTE
Noted to have high-grade right carotid stenosis   Per vascular surgery note in 2019, patient's anatomy not favorable for carotid stenting due to extensive bulky calcific plaque and due to cervical spine immobility and location of carotid stenosis difficult to achieve adequate exposure to perform endarterectomy in safe manner  He was started on DAPT however this was discontinued in favor of apixaban for atrial fibrillation

## 2024-08-26 NOTE — ASSESSMENT & PLAN NOTE
History of lumbar radiculopathy, ankylosing spondylitis, treated with injections  Follows with pain and spine  Also with bilateral foot neuropathy, recently initiated on gabapentin 100 mg p.o. 3 times daily on 8/7/2024  Conduct serial physical assessments, monitor for adequate pain control

## 2024-08-26 NOTE — ASSESSMENT & PLAN NOTE
CT abdomen pelvis: Re-identified prominence of the main pancreatic duct with several nonspecific subcentimeter cystic lesions, overall not significantly changed from previous CT examinations. These can be further evaluated with MRI/MRCP on a nonemergent basis  Follow-up as an outpatient

## 2024-08-26 NOTE — ASSESSMENT & PLAN NOTE
Takes apixaban, currently on hold pending clinical course for SBO  Does not appear to be on beta-blocker at this time  Monitor heart rate

## 2024-08-26 NOTE — ASSESSMENT & PLAN NOTE
With reported history of Crohn's  GI input appreciated: Requires colonoscopy for evaluation and definitive diagnosis, However, would recommend this be done in 6 to 8 weeks following resolution of SBO

## 2024-08-26 NOTE — ED PROVIDER NOTES
"History  Chief Complaint   Patient presents with    Abdominal Pain     Mid Abdominal pain since this afternoon; pt reports vomiting, diarrhea; pt reports taking \"some stuff to help you stop vomiting\"     HPI    Patient is an 79 year old male with PMHx CAD, Aflutter, arthritis, prior SBO s/p partical colectomy, presenting to the ED for evaluation of abdominal pain, nausea, vomiting. States that he [place] yesterday and had beer and cake. States that today, patient woke with mid to lower abdominal pain, distention, and abdominal firmness, followed by several episodes of nausea and nonbilious, nonbloody emesis. Pain did not improve throughout the day, prompting ED evaluation. Patient had 1 bowel movement this morning. No fevers, chills, urinary complaints, chest pain, SOB.    Prior to Admission Medications   Prescriptions Last Dose Informant Patient Reported? Taking?   amLODIPine (NORVASC) 10 mg tablet 8/25/2024 Self No Yes   Sig: Take 1 tablet (10 mg total) by mouth daily   apixaban (Eliquis) 5 mg 8/25/2024 Self No Yes   Sig: Take 1 tablet by mouth twice daily   atorvastatin (LIPITOR) 40 mg tablet 8/25/2024 Self No Yes   Sig: Take 1 tablet (40 mg total) by mouth daily   chlorthalidone 25 mg tablet   No No   Sig: Take 1 tablet (25 mg total) by mouth daily   ferrous sulfate 325 (65 Fe) mg tablet 8/25/2024 Self Yes Yes   Sig: Take 325 mg by mouth daily with breakfast   gabapentin (NEURONTIN) 100 mg capsule 8/25/2024  No Yes   Sig: Take 1 capsule (100 mg total) by mouth 3 (three) times a day   lisinopril (ZESTRIL) 40 mg tablet 8/25/2024 Self No Yes   Sig: Take 1 tablet by mouth once daily   pantoprazole (PROTONIX) 40 mg tablet   No No   Sig: Take 1 tablet (40 mg total) by mouth daily   potassium chloride (K-DUR,KLOR-CON) 20 mEq tablet 8/25/2024 Self No Yes   Sig: Take 1 tablet by mouth twice daily      Facility-Administered Medications Last Administration Doses Remaining   cyanocobalamin injection " 1,000 mcg 2/15/2024 10:10 AM    cyanocobalamin injection 1,000 mcg 5/15/2024  9:56 AM           Past Medical History:   Diagnosis Date    Arthritis     Atrial flutter (HCC)     Cholecystitis     Coronary artery disease     Hypertension     RBBB     Spinal stenosis        Past Surgical History:   Procedure Laterality Date    CHOLECYSTECTOMY      COLON SURGERY      bwel resection    COLONOSCOPY      ESOPHAGOGASTRODUODENOSCOPY      2007  ONSET    EYE SURGERY      HIP SURGERY      JOINT REPLACEMENT      AK LAPAROSCOPY SURG CHOLECYSTECTOMY N/A 2017    Procedure: CHOLECYSTECTOMY LAPAROSCOPIC;  Surgeon: Ez Lainez MD;  Location: [facility];  Service: General    SMALL INTESTINE SURGERY      ONSEC DEC 2011       Family History   Problem Relation Age of Onset    Arthritis Mother     Heart attack Father     Coronary artery disease Family     Diabetes Family      I have reviewed and agree with the history as documented.    E-Cigarette/Vaping    E-Cigarette Use Never User      E-Cigarette/Vaping Substances    Nicotine No     THC No     CBD No     Flavoring No     Other No     Unknown No      Social History     Tobacco Use    Smoking status: Former     Current packs/day: 0.00     Average packs/day: 2.0 packs/day for 3.0 years (6.0 ttl pk-yrs)     Types: Cigarettes     Start date: 1963     Quit date:      Years since quittin.6    Smokeless tobacco: Never   Vaping Use    Vaping status: Never Used   Substance Use Topics    Alcohol use: Yes     Comment: social     Drug use: No       Review of Systems   All other systems reviewed and are negative.      Physical Exam  Physical Exam  Vitals and nursing note reviewed.   Constitutional:       General: He is not in acute distress.     Appearance: He is well-developed and normal weight. He is not ill-appearing or toxic-appearing.   HENT:      Head: Normocephalic and atraumatic.      Mouth/Throat:      Mouth: Mucous membranes are moist.      Pharynx: No  oropharyngeal exudate.   Eyes:      Extraocular Movements: Extraocular movements intact.      Conjunctiva/sclera: Conjunctivae normal.   Cardiovascular:      Rate and Rhythm: Normal rate and regular rhythm.      Heart sounds: Normal heart sounds. No murmur heard.  Pulmonary:      Effort: Pulmonary effort is normal. No respiratory distress.      Breath sounds: Normal breath sounds. No wheezing, rhonchi or rales.   Chest:      Chest wall: No tenderness.   Abdominal:      General: There is distension.      Palpations: Abdomen is soft.      Tenderness: There is abdominal tenderness in the right lower quadrant, suprapubic area and left lower quadrant. There is no guarding or rebound.      Hernia: No hernia is present.      Comments: Abdomen is firm     Musculoskeletal:         General: No swelling.      Cervical back: Neck supple.   Skin:     General: Skin is warm and dry.      Capillary Refill: Capillary refill takes less than 2 seconds.      Coloration: Skin is not pale.   Neurological:      General: No focal deficit present.      Mental Status: He is alert and oriented to person, place, and time.   Psychiatric:         Mood and Affect: Mood normal.         Vital Signs  ED Triage Vitals [08/26/24 0055]   Temperature Pulse Respirations Blood Pressure SpO2   98.1 °F (36.7 °C) 82 18 (!) 229/109 96 %      Temp Source Heart Rate Source Patient Position - Orthostatic VS BP Location FiO2 (%)   Tympanic Monitor Sitting Left arm --      Pain Score       6           Vitals:    08/26/24 0055 08/26/24 0130 08/26/24 0527 08/26/24 0728   BP: (!) 229/109 (!) 185/85 163/84 148/94   Pulse: 82 64 71    Patient Position - Orthostatic VS: Sitting            Visual Acuity      ED Medications  Medications   lactated ringers bolus 1,000 mL (has no administration in time range)     And   lactated ringers bolus 1,000 mL (has no administration in time range)   sodium chloride 0.9 % bolus 1,000 mL (has no administration in time range)     And    sodium chloride 0.9 % bolus 1,000 mL (has no administration in time range)   lactated ringers infusion (125 mL/hr Intravenous New Bag 8/26/24 0733)   heparin (porcine) subcutaneous injection 5,000 Units (5,000 Units Subcutaneous Given 8/26/24 0733)   morphine injection 2 mg (has no administration in time range)   ondansetron (ZOFRAN) injection 4 mg (has no administration in time range)   pantoprazole (PROTONIX) injection 40 mg (40 mg Intravenous Given 8/26/24 0806)   sodium chloride 0.9 % bolus 1,000 mL (0 mL Intravenous Stopped 8/26/24 0303)   ondansetron (ZOFRAN) injection 4 mg (4 mg Intravenous Given 8/26/24 0119)   iohexol (OMNIPAQUE) 350 MG/ML injection (MULTI-DOSE) 100 mL (100 mL Intravenous Given 8/26/24 0216)   ondansetron (ZOFRAN) injection 4 mg (4 mg Intravenous Given 8/26/24 0303)       Diagnostic Studies  Results Reviewed       Procedure Component Value Units Date/Time    Lactic acid, plasma (w/reflex if result > 2.0) [[id number]]  (Normal) Collected: 08/26/24 0348    Lab Status: Final result Specimen: Blood from Arm, Right Updated: 08/26/24 0422     LACTIC ACID 1.6 mmol/L     Narrative:      Result may be elevated if tourniquet was used during collection.    Comprehensive metabolic panel [[id number]]  (Abnormal) Collected: 08/26/24 0119    Lab Status: Final result Specimen: Blood from Arm, Right Updated: 08/26/24 0204     Sodium 139 mmol/L      Potassium 3.4 mmol/L      Chloride 98 mmol/L      CO2 30 mmol/L      ANION GAP 11 mmol/L      BUN 16 mg/dL      Creatinine 1.08 mg/dL      Glucose 138 mg/dL      Calcium 10.3 mg/dL      AST 16 U/L      ALT 14 U/L      Alkaline Phosphatase 114 U/L      Total Protein 7.9 g/dL      Albumin 4.9 g/dL      Total Bilirubin 3.08 mg/dL      eGFR 64 ml/min/1.73sq m     Narrative:      National Kid[employer] guidelines for Chronic Kidney Disease (CKD):     Stage 1 with normal or high GFR (GFR > 90 mL/min/1.73 square meters)    Stage 2 Mild CKD (GFR = 60-89  mL/min/1.73 square meters)    Stage 3A Moderate CKD (GFR = 45-59 mL/min/1.73 square meters)    Stage 3B Moderate CKD (GFR = 30-44 mL/min/1.73 square meters)    Stage 4 Severe CKD (GFR = 15-29 mL/min/1.73 square meters)    Stage 5 End Stage CKD (GFR <15 mL/min/1.73 square meters)  Note: GFR calculation is accurate only with a steady state creatinine    Lipase [[id number]]  (Normal) Collected: 08/26/24 0119    Lab Status: Final result Specimen: Blood from Arm, Right Updated: 08/26/24 0204     Lipase 11 u/L     CBC and differential [[id number]]  (Abnormal) Collected: 08/26/24 0119    Lab Status: Final result Specimen: Blood from Arm, Right Updated: 08/26/24 0129     WBC 14.47 Thousand/uL      RBC 5.38 Million/uL      Hemoglobin 16.2 g/dL      Hematocrit 47.5 %      MCV 88 fL      MCH 30.1 pg      MCHC 34.1 g/dL      RDW 13.0 %      MPV 11.5 fL      Platelets 180 Thousands/uL      nRBC 0 /100 WBCs      Segmented % 85 %      Immature Grans % 1 %      Lymphocytes % 9 %      Monocytes % 5 %      Eosinophils Relative 0 %      Basophils Relative 0 %      Absolute Neutrophils 12.35 Thousands/µL      Absolute Immature Grans 0.07 Thousand/uL      Absolute Lymphocytes 1.32 Thousands/µL      Absolute Monocytes 0.67 Thousand/µL      Eosinophils Absolute 0.04 Thousand/µL      Basophils Absolute 0.02 Thousands/µL                    CT abdomen pelvis with contrast   Final Result by Jason Bates MD (08/26 0417)      Findings above compatible with acute small bowel obstruction with suspected transition at the level of the terminal ileum which appears acutely inflamed,? Infectious versus inflammatory ileitis. Small amount of free fluid tracking in the right paracolic    gutter region. No free air. Surgical consult advised.      Reidentified prominence of the main pancreatic duct with several nonspecific subcentimeter cystic lesions, overall not significantly changed from previous CT examinations. These can be further evaluated with  MRI/MRCP on a nonemergent basis as clinically    warranted as recommended on prior studies.      Aforementioned findings were discussed with Dr. Antunez at approximately 4:00 a.m. on 8/26/2024.         Workstation performed: [id number]         XR abdomen 1 view kub    (Results Pending)              Procedures  Procedures         ED Course  ED Course as of 08/26/24 0808   Mon Aug 26, 2024   0141 EKG: AF at 73 bpm, RBBB, no acute STEMI as interpreted by me     Patient is an 79 year old male presenting to the ED for abdominal pain, nausea, and vomiting for the last day. History and clinical exam documented above. Concern for SBO, colitis, gastroenteritis, other intraabdominal pathology. Will do abdominal labs, CT AP. Treat with fluids and Zofran.     Diagnostics reviewed with patient. Mild leukocytosis possibly from stress reaction vs infection. Bilirubin elevated, but has been elevated in the past. Patient had second episode of vomiting and was given more Zofran.    CT reviewed by myself- concerning for bowel obstruction. Contacted surgery team and discussed case; agree that patient's CT is consistent with bowel obstruction. Arrangements made for admission to surgery. CT finding reviewed with radiologist; consistent with bowel obstruction. Reviewed the CT with patient and wife at bedside. They are agreeable to admission.                              SBIRT 20yo+      Flowsheet Row Most Recent Value   Initial Alcohol Screen: US AUDIT-C     1. How often do you have a drink containing alcohol? 1 Filed at: 08/26/2024 0054   2. How many drinks containing alcohol do you have on a typical day you are drinking?  1 Filed at: 08/26/2024 0054   3a. Male UNDER 65: How often do you have five or more drinks on one occasion? 0 Filed at: 08/26/2024 0054   3b. FEMALE Any Age, or MALE 65+: How often do you have 4 or more drinks on one occassion? 0 Filed at: 08/26/2024 0054   Audit-C Score 2 Filed at: 08/26/2024 0054   SAMANTHA: How many times  in the past year have you...    Used an illegal drug or used a prescription medication for non-medical reasons? Never Filed at: 08/26/2024 0054                      Medical Decision Making  Amount and/or Complexity of Data Reviewed  Labs: ordered.  Radiology: ordered.    Risk  Prescription drug management.  Decision regarding hospitalization.                 Disposition  Final diagnoses:   Bowel obstruction (HCC)     Time reflects when diagnosis was documented in both MDM as applicable and the Disposition within this note       Time User Action Codes Description Comment    8/26/2024  3:52 AM HarmonyChamp jeffrey Add [R10.30] Lower abdominal pain     8/26/2024  3:52 AM HarmonyChamp Add [R11.2] Nausea and vomiting     8/26/2024  3:52 AM Champ Antunez Add [K56.609] Bowel obstruction (HCC)     8/26/2024  3:52 AM HarmonyChamp jeffrey Modify [R10.30] Lower abdominal pain     8/26/2024  3:52 AM Champ Antunez Modify [K56.609] Bowel obstruction (HCC)     8/26/2024  4:01 AM Champ Antunez Remove [R10.30] Lower abdominal pain     8/26/2024  4:01 AM Champ Antunez Remove [R11.2] Nausea and vomiting     8/26/2024  6:20 AM Jarred Gallo [I10] Essential hypertension     8/26/2024  6:20 AM Jarred Gallo [I27.20] Pulmonary hypertension (HCC)     8/26/2024  6:20 AM Jarred Gallo [I48.92] New onset atrial flutter (HCC)     8/26/2024  6:20 AM Jarred Gallo [I48.92] New onset atrial flutter (HCC)     8/26/2024  6:25 AM Jarred Gallo [K50.80] Crohn's disease of both small and large intestine without complication (HCC)     8/26/2024  6:25 AM Jarred Gallo [K56.609] SBO (small bowel obstruction) (HCC)           ED Disposition       ED Disposition   Admit    Condition   Stable    Date/Time   Mon Aug 26, 2024 0352    Comment   Admit to Dr. Gallo                 Follow-up Information    None         Current Discharge Medication List        CONTINUE these medications which have NOT CHANGED    Details    amLODIPine (NORVASC) 10 mg tablet Take 1 tablet (10 mg total) by mouth daily  Qty: 90 tablet, Refills: 0    Associated Diagnoses: Essential hypertension      apixaban (Eliquis) 5 mg Take 1 tablet by mouth twice daily  Qty: 60 tablet, Refills: 5    Associated Diagnoses: New onset atrial flutter (HCC)      atorvastatin (LIPITOR) 40 mg tablet Take 1 tablet (40 mg total) by mouth daily  Qty: 90 tablet, Refills: 1    Associated Diagnoses: Stenosis of right carotid artery      ferrous sulfate 325 (65 Fe) mg tablet Take 325 mg by mouth daily with breakfast      gabapentin (NEURONTIN) 100 mg capsule Take 1 capsule (100 mg total) by mouth 3 (three) times a day  Qty: 90 capsule, Refills: 5    Associated Diagnoses: Gait instability; Lumbar radiculopathy      lisinopril (ZESTRIL) 40 mg tablet Take 1 tablet by mouth once daily  Qty: 90 tablet, Refills: 3    Associated Diagnoses: Essential hypertension      potassium chloride (K-DUR,KLOR-CON) 20 mEq tablet Take 1 tablet by mouth twice daily  Qty: 60 tablet, Refills: 5    Associated Diagnoses: New onset atrial flutter (HCC)      chlorthalidone 25 mg tablet Take 1 tablet (25 mg total) by mouth daily  Qty: 90 tablet, Refills: 3    Associated Diagnoses: Essential hypertension      pantoprazole (PROTONIX) 40 mg tablet Take 1 tablet (40 mg total) by mouth daily  Qty: 90 tablet, Refills: 0    Comments: Please consider 90 day supplies to promote better adherence  Associated Diagnoses: Gastroesophageal reflux disease             No discharge procedures on file.    PDMP Review       None            ED Provider  Electronically Signed by             Champ Antunez DO  08/26/24 4625

## 2024-08-26 NOTE — ASSESSMENT & PLAN NOTE
Presented for abdominal pain and vomiting, found to have small bowel obstruction per CT imaging  Per patient's wife, he has a history of SBO x 2 in 1999, treated with NGT at [facility]. Required surgery for SBO in 2011 at [facility] (now closed)  NG tube was unable to be placed, currently managing conservatively  Continue NPO and IV fluids with LR@125 mL/hour  Additional care per primary team, general surgery  Monitor labs and vital signs, conduct serial physical assessments

## 2024-08-26 NOTE — ASSESSMENT & PLAN NOTE
BP elevated on presentation, possibly due to pain/discomfort, now controlled  Appears to be on lisinopril, amlodipine, and chlorthalidone as an outpatient, currently on hold while n.p.o.  Continue hydralazine IV PRN  Continue to monitor BP

## 2024-08-26 NOTE — ED NOTES
Attempted x3 to place NG tube but was unsuccessful. [facility] made aware.      Faviola Howell RN  08/26/24 5913

## 2024-08-26 NOTE — CONSULTS
Consultation - [facility]  Tee Forrest Jr. 79 y.o. male MRN: [id number]  Unit/Bed#: -01 Encounter: [id number]          Inpatient consult to gastroenterology     Date/Time  8/26/2024 8:45 AM     Performed by  Meera Ramos DO   Authorized by  Jarred Gallo MD             REASON FOR CONSULT:     Crohn's disease    ASSESSMENT & PLAN:      Tee Forrest Jr. is a 79 y.o. old male with PMHx notable for recurrent small bowel obstruction s/p resection, prior cholecystectomy, HTN, Aflutter on Eliquis, and mild AS who presents 8/26 with small bowel obstruction.  Gastroenterology has been consulted due to reported history of Crohn's disease.    Small bowel obstruction  Abnormal CT imaging  CT imaging on admission shows acute small bowel obstruction with suspected transition point at the TI which appears to be inflamed on imaging.  There is a reported history of Crohn's disease that was diagnosed after recurrent small bowel obstructions requiring resection, however he has not been on therapy or following with gastroenterology for this so I am suspicious of this diagnosis.  We could consider colonoscopy with biopsy of TI once patient clinically improves and is no longer obstructed; if within goals of care of course  Check ESR, CRP  If patient is able to produce stool sample would recommend checking fecal calprotectin  Management of SBO per surgery team; agree with NG tube decompression    Elevated liver enzymes  Dilated pancreatic duct  Labs on admission shows cholestatic liver enzyme elevation with T. bili 3.08 with .  Transaminases normal.  Bilirubin appears to be chronically elevated for many years as high as 3.5 in 2020.  He has a history of cholecystitis with choledocholithiasis in 2017 status post cholecystectomy.  CT on admission shows dilated PD to 6 mm along with subcentimeter cystic lesions measuring up to 7 mm, all of which have been previously seen on prior  imaging.  No CBD dilation on imaging.  Differential includes cholestasis secondary to pancreatic cystic neoplasm such as simple cyst, serocystadenoma, mucinous cystic neoplasm, IPMN, pseudopapillary neoplasm, versus adenocarcinoma.  Consider nonurgent MRCP versus EUS for further evaluation if within goals of care  Obtain conjugated bilirubin  Monitor liver enzymes daily    Rest of care per primary team.  Thank you for this consultation.   ______________________________________________________________________    HPI:  79 y.o. old male with PMHx notable for recurrent small bowel obstruction s/p resection, HTN,  Aflutter on Eliquis, mild AS who presents 8/26 with small bowel obstruction.  Gastroenterology has been consulted due to reported history of Crohn's disease.    Patient seen and examined at bedside this morning.  He is delirious attempting to get out of bed and pull out his IV, so he is unable to provide history.  I called the patient's wife for collateral history.  She reports that he has had recurrent small bowel obstructions starting over 10 years ago.  His third bowel obstruction required small bowel resection and that is when he was reportedly diagnosed with Crohn's disease.  She does not believe he was ever diagnosed prior to that via colonoscopy with biopsies.  He has never received treatment for Crohn disease.  He typically moves his bowels once daily and there is never report of bloody bowel movements.  Wife reports that he was in his normal state of health until yesterday when he was experiencing nausea and difficulty moving his bowels which prompted his presentation to the ED.  She reports that his last colonoscopy was over 10 years ago, we do not have this on file.      REVIEW OF SYSTEMS: Negative other than stated above.       Historical Information   Past Medical History:   Diagnosis Date    Arthritis     Atrial flutter (HCC)     Cholecystitis     Coronary artery disease     Hypertension     RBBB      Spinal stenosis      Past Surgical History:   Procedure Laterality Date    CHOLECYSTECTOMY      COLON SURGERY      bwel resection    COLONOSCOPY      ESOPHAGOGASTRODUODENOSCOPY      2007  ONSET    EYE SURGERY      HIP SURGERY      JOINT REPLACEMENT      PA LAPAROSCOPY SURG CHOLECYSTECTOMY N/A 2017    Procedure: CHOLECYSTECTOMY LAPAROSCOPIC;  Surgeon: Ez Lainez MD;  Location: [facility];  Service: General    SMALL INTESTINE SURGERY      ONSEC DEC 2011     Social History   Social History     Substance and Sexual Activity   Alcohol Use Yes    Comment: social      Social History     Substance and Sexual Activity   Drug Use No     Social History     Tobacco Use   Smoking Status Former    Current packs/day: 0.00    Average packs/day: 2.0 packs/day for 3.0 years (6.0 ttl pk-yrs)    Types: Cigarettes    Start date: 1963    Quit date:     Years since quittin.6   Smokeless Tobacco Never     Family History   Problem Relation Age of Onset    Arthritis Mother     Heart attack Father     Coronary artery disease Family     Diabetes Family          Meds/Allergies     Facility-Administered Medications Prior to Admission:     cyanocobalamin injection 1,000 mcg    cyanocobalamin injection 1,000 mcg    Medications Prior to Admission:     amLODIPine (NORVASC) 10 mg tablet    apixaban (Eliquis) 5 mg    atorvastatin (LIPITOR) 40 mg tablet    ferrous sulfate 325 (65 Fe) mg tablet    gabapentin (NEURONTIN) 100 mg capsule    lisinopril (ZESTRIL) 40 mg tablet    potassium chloride (K-DUR,KLOR-CON) 20 mEq tablet    chlorthalidone 25 mg tablet    pantoprazole (PROTONIX) 40 mg tablet  Current Facility-Administered Medications   Medication Dose Route Frequency    heparin (porcine) subcutaneous injection 5,000 Units  5,000 Units Subcutaneous Q8H IRMA    lactated ringers bolus 1,000 mL  1,000 mL Intravenous Once PRN    And    lactated ringers bolus 1,000 mL  1,000 mL Intravenous Once PRN    lactated ringers infusion   "125 mL/hr Intravenous Continuous    morphine injection 2 mg  2 mg Intravenous Q3H PRN    ondansetron (ZOFRAN) injection 4 mg  4 mg Intravenous Q6H PRN    pantoprazole (PROTONIX) injection 40 mg  40 mg Intravenous Q24H IRMA    sodium chloride 0.9 % bolus 1,000 mL  1,000 mL Intravenous Once PRN    And    sodium chloride 0.9 % bolus 1,000 mL  1,000 mL Intravenous Once PRN       No Known Allergies      Objective   Blood pressure 148/94, pulse 71, temperature 98.7 °F (37.1 °C), resp. rate 22, height 5' 9\" (1.753 m), weight 79.6 kg (175 lb 9.5 oz), SpO2 90%. Body mass index is 25.93 kg/m².    No intake or output data in the 24 hours ending 08/26/24 0845    PHYSICAL EXAM:  General: No apparent distress, resting comfortably   Head: Normocephalic, atraumatic  Eyes: Anicteric, no conjunctival erythema  Neck: Trachea midline, no visible lymphadenopathy or goiter  Respiratory: Non-labored respirations, symmetric thorax expansion  Cardiovascular: Extremities appear well-perfused  Abdomen: Soft, Non-distended, non-tender  Extremities: Moves extremities spontaneously  Skin: No visible rashes, wounds, or jaundice  Neuro: No gross focal deficits, moves extremities spontaneously      LAB RESULTS:  Admission on 08/26/2024   Component Date Value    WBC 08/26/2024 14.47 (H)     RBC 08/26/2024 5.38     Hemoglobin 08/26/2024 16.2     Hematocrit 08/26/2024 47.5     MCV 08/26/2024 88     MCH 08/26/2024 30.1     MCHC 08/26/2024 34.1     RDW 08/26/2024 13.0     MPV 08/26/2024 11.5     Platelets 08/26/2024 180     nRBC 08/26/2024 0     Segmented % 08/26/2024 85 (H)     Immature Grans % 08/26/2024 1     Lymphocytes % 08/26/2024 9 (L)     Monocytes % 08/26/2024 5     Eosinophils Relative 08/26/2024 0     Basophils Relative 08/26/2024 0     Absolute Neutrophils 08/26/2024 12.35 (H)     Absolute Immature Grans 08/26/2024 0.07     Absolute Lymphocytes 08/26/2024 1.32     Absolute Monocytes 08/26/2024 0.67     Eosinophils Absolute 08/26/2024 0.04     " Basophils Absolute 08/26/2024 0.02     Sodium 08/26/2024 139     Potassium 08/26/2024 3.4 (L)     Chloride 08/26/2024 98     CO2 08/26/2024 30     ANION GAP 08/26/2024 11     BUN 08/26/2024 16     Creatinine 08/26/2024 1.08     Glucose 08/26/2024 138     Calcium 08/26/2024 10.3 (H)     AST 08/26/2024 16     ALT 08/26/2024 14     Alkaline Phosphatase 08/26/2024 114 (H)     Total Protein 08/26/2024 7.9     Albumin 08/26/2024 4.9     Total Bilirubin 08/26/2024 3.08 (H)     eGFR 08/26/2024 64     Lipase 08/26/2024 11     Ventricular Rate 08/26/2024 73     Atrial Rate 08/26/2024 83     QRSD Interval 08/26/2024 152     QT Interval 08/26/2024 446     QTC Interval 08/26/2024 491     QRS Axis 08/26/2024 -71     T Wave Axis 08/26/2024 71     LACTIC ACID 08/26/2024 1.6          IMAGING STUDIES: I have personally reviewed pertinent imaging studies.      Meera Ramos DO   Gastroenterology Fellow, PGY-4  [facility]

## 2024-08-26 NOTE — PLAN OF CARE
Problem: PAIN - ADULT  Goal: Verbalizes/displays adequate comfort level or baseline comfort level  Description: Interventions:  - Encourage patient to monitor pain and request assistance  - Assess pain using appropriate pain scale  - Administer analgesics based on type and severity of pain and evaluate response  - Implement non-pharmacological measures as appropriate and evaluate response  - Consider cultural and social influences on pain and pain management  - Notify physician/advanced practitioner if interventions unsuccessful or patient reports new pain  Outcome: Progressing     Problem: INFECTION - ADULT  Goal: Absence or prevention of progression during hospitalization  Description: INTERVENTIONS:  - Assess and monitor for signs and symptoms of infection  - Monitor lab/diagnostic results  - Monitor all insertion sites, i.e. indwelling lines, tubes, and drains  - Monitor endotracheal if appropriate and nasal secretions for changes in amount and color  - Addison appropriate cooling/warming therapies per order  - Administer medications as ordered  - Instruct and encourage patient and family to use good hand hygiene technique  - Identify and instruct in appropriate isolation precautions for identified infection/condition  Outcome: Progressing  Goal: Absence of fever/infection during neutropenic period  Description: INTERVENTIONS:  - Monitor WBC    Outcome: Progressing     Problem: SAFETY ADULT  Goal: Patient will remain free of falls  Description: INTERVENTIONS:  - Educate patient/family on patient safety including physical limitations  - Instruct patient to call for assistance with activity   - Consult OT/PT to assist with strengthening/mobility   - Keep Call bell within reach  - Keep bed low and locked with side rails adjusted as appropriate  - Keep care items and personal belongings within reach  - Initiate and maintain comfort rounds  - Make Fall Risk Sign visible to staff  - Offer Toileting every 2 Hours,  in advance of need  - Initiate/Maintain bed alarm  - Obtain necessary fall risk management equipment: nonslip socks  - Apply yellow socks and bracelet for high fall risk patients  - Consider moving patient to room near nurses station  Outcome: Progressing  Goal: Maintain or return to baseline ADL function  Description: INTERVENTIONS:  -  Assess patient's ability to carry out ADLs; assess patient's baseline for ADL function and identify physical deficits which impact ability to perform ADLs (bathing, care of mouth/teeth, toileting, grooming, dressing, etc.)  - Assess/evaluate cause of self-care deficits   - Assess range of motion  - Assess patient's mobility; develop plan if impaired  - Assess patient's need for assistive devices and provide as appropriate  - Encourage maximum independence but intervene and supervise when necessary  - Involve family in performance of ADLs  - Assess for home care needs following discharge   - Consider OT consult to assist with ADL evaluation and planning for discharge  - Provide patient education as appropriate  Outcome: Progressing  Goal: Maintains/Returns to pre admission functional level  Description: INTERVENTIONS:  - Perform AM-PAC 6 Click Basic Mobility/ Daily Activity assessment daily.  - Set and communicate daily mobility goal to care team and patient/family/caregiver.   - Collaborate with rehabilitation services on mobility goals if consulted  - Perform Range of Motion 3 times a day.  - Reposition patient every 2 hours.  - Dangle patient 3 times a day  - Stand patient 3 times a day  - Ambulate patient 3 times a day  - Out of bed to chair 3 times a day   - Out of bed for meals 3 times a day  - Out of bed for toileting  - Record patient progress and toleration of activity level   Outcome: Progressing     Problem: DISCHARGE PLANNING  Goal: Discharge to home or other facility with appropriate resources  Description: INTERVENTIONS:  - Identify barriers to discharge w/patient and  caregiver  - Arrange for needed discharge resources and transportation as appropriate  - Identify discharge learning needs (meds, wound care, etc.)  - Arrange for interpretive services to assist at discharge as needed  - Refer to Case Management Department for coordinating discharge planning if the patient needs post-hospital services based on physician/advanced practitioner order or complex needs related to functional status, cognitive ability, or social support system  Outcome: Progressing     Problem: Knowledge Deficit  Goal: Patient/family/caregiver demonstrates understanding of disease process, treatment plan, medications, and discharge instructions  Description: Complete learning assessment and assess knowledge base.  Interventions:  - Provide teaching at level of understanding  - Provide teaching via preferred learning methods  Outcome: Progressing

## 2024-08-26 NOTE — ASSESSMENT & PLAN NOTE
GI following, recommendations appreciated: Recommend nonurgent MRI/MRCP for further evaluation of pancreaticobiliary anatomy.  This could be done in the outpatient setting.  Will defer further serologic workup at this time due to suspicion for Gilbert's syndrome  Trend daily CMP

## 2024-08-26 NOTE — ASSESSMENT & PLAN NOTE
Per chart review, patient with episode of disorientation.  Today, he has difficulty with naming the month  Per patient's wife, he has been having short-term memory difficulty for the past several months along with becoming verbally angry with her, and this is not characteristic of him  This could be related to acute medical issue and hospitalization versus possible cognitive decline with acute exacerbation versus medication effect, (recently started on gabapentin) or a combination of factors  Maintain sleep-wake cycle, promote restful environment  Conduct serial assessments

## 2024-08-26 NOTE — CASE MANAGEMENT
Case Management Assessment & Discharge Planning Note    Patient name Tee Forrest Jr.  Location /-01 MRN [id number]  : 1945 Date 2024       Current Admission Date: 2024  Current Admission Diagnosis:Terminal ileitis, with intestinal obstruction (HCC)   Patient Active Problem List    Diagnosis Date Noted Date Diagnosed    SBO (small bowel obstruction) (HCC) 2024     Terminal ileitis, with intestinal obstruction (HCC) 2024     Confusion 2024     Lumbar radiculopathy 2024     Chronic low back pain 2024     Crohn's disease of both small and large intestine without complication (HCC) 2024     Age-related cataract of left eye 2021     Pulmonary hypertension (HCC) 2021     Pancreatic cyst 10/20/2020     New onset atrial flutter (HCC) 10/10/2020     Diverticulitis 10/10/2020     Needs flu shot 2020     Gait instability 2020     Essential hypertension 2019     Pernicious anemia 2019     Vertebral artery stenosis, asymptomatic 2019     Cervical radiculopathy 2018     Lumbosacral spondylosis without myelopathy 2018     Bilateral carotid artery stenosis 10/19/2017     Ankylosing spondylitis (HCC) 2015     Esophageal reflux 05/15/2012       LOS (days): 0  Geometric Mean LOS (GMLOS) (days): 3.1  Days to GMLOS:2.6     OBJECTIVE:    Risk of Unplanned Readmission Score: 7.37     Current admission status: Inpatient    Preferred Pharmacy:   [id number]  [address] - [address]  [address] [id number]  Phone: [phone] Fax: [phone]    Primary Care Provider: Champ Roldan MD    Primary Insurance: MEDICARE  Secondary Insurance: [employer]    ASSESSMENT:  Active Health Care Proxies       Elva Forrest Health Care Representative - Spouse   Primary Phone: [phone] (Mobile)  Home Phone: [phone]                    Readmission Root Cause  30 Day Readmission: No    Patient Information  Admitted from:: Home  Mental Status: Alert  During Assessment patient was accompanied by: Not accompanied during assessment  Assessment information provided by:: Patient  Primary Caregiver: Self  Support Systems: Spouse/significant other  County of Residence: [facility] do you live in?: [place]  Home entry access options. Select all that apply.: No steps to enter home  Type of Current Residence: [facility]  Living Arrangements: Lives w/ Spouse/significant other  Is patient a ?: No    Activities of Daily Living Prior to Admission  Functional Status: Independent  Completes ADLs independently?: Yes  Ambulates independently?: Yes  Does patient use assisted devices?: No  Does patient currently own DME?: Yes  What DME does the patient currently own?: Walker, Wheelchair  Does patient have a history of Outpatient Therapy (PT/OT)?: Yes  Does the patient have a history of Short-Term Rehab?: No  Does patient have a history of HHC?: Yes  Does patient currently have HHC?: No    Patient Information Continued  Income Source: Pension/long-term  Does patient have prescription coverage?: Yes ([employer])  Does patient have a history of substance abuse?: No  Does patient have a history of Mental Health Diagnosis?: No    PHQ 2/9 Screening   Reviewed PHQ 2/9 Depression Screening Score?: No    Means of Transportation  Means of Transport to Appts:: Drives Self      Social Determinants of Health (SDOH)      Flowsheet Row Most Recent Value   Housing Stability    In the last 12 months, was there a time when you were not able to pay the mortgage or rent on time? N   In the past 12 months, how many times have you moved where you were living? 0   At any time in the past 12 months, were you homeless or living in a shelter (including now)? N   Transportation Needs    In the past 12 months, has lack of transportation kept you from medical appointments or from  getting medications? no   In the past 12 months, has lack of transportation kept you from meetings, work, or from getting things needed for daily living? No   Food Insecurity    Within the past 12 months, you worried that your food would run out before you got the money to buy more. Never true   Within the past 12 months, the food you bought just didn't last and you didn't have money to get more. Never true   Utilities    In the past 12 months has the electric, gas, oil, or water company threatened to shut off services in your home? No            DISCHARGE DETAILS:    Discharge planning discussed with:: Pt    Contacts  Patient Contacts: Elva Forrest  Relationship to Patient:: Family  Contact Method: Phone  Phone Number: [phone]  Reason/Outcome: Emergency Contact    Treatment Team Recommendation: Home  Discharge Destination Plan:: Home  Pt was IPA and able to drive.  Pt is being followed by surgery.  Will follow for any care needs.

## 2024-08-27 ENCOUNTER — APPOINTMENT (INPATIENT)
Dept: NON INVASIVE DIAGNOSTICS | Facility: HOSPITAL | Age: 79
DRG: 386 | End: 2024-08-27
Payer: MEDICARE

## 2024-08-27 ENCOUNTER — APPOINTMENT (INPATIENT)
Dept: MRI IMAGING | Facility: HOSPITAL | Age: 79
DRG: 386 | End: 2024-08-27
Payer: MEDICARE

## 2024-08-27 ENCOUNTER — APPOINTMENT (INPATIENT)
Dept: RADIOLOGY | Facility: HOSPITAL | Age: 79
DRG: 386 | End: 2024-08-27
Payer: MEDICARE

## 2024-08-27 LAB
ALBUMIN SERPL BCG-MCNC: 3.9 G/DL (ref 3.5–5)
ALP SERPL-CCNC: 65 U/L (ref 34–104)
ALT SERPL W P-5'-P-CCNC: 11 U/L (ref 7–52)
ANION GAP SERPL CALCULATED.3IONS-SCNC: 9 MMOL/L (ref 4–13)
AORTIC ROOT: 3.7 CM
AORTIC VALVE MEAN VELOCITY: 17.8 M/S
APICAL FOUR CHAMBER EJECTION FRACTION: 47 %
ASCENDING AORTA: 3.2 CM
AST SERPL W P-5'-P-CCNC: 18 U/L (ref 13–39)
AV AREA BY CONTINUOUS VTI: 0.9 CM2
AV AREA PEAK VELOCITY: 0.9 CM2
AV LVOT MEAN GRADIENT: 2 MMHG
AV LVOT PEAK GRADIENT: 2 MMHG
AV MEAN GRADIENT: 16 MMHG
AV PEAK GRADIENT: 27 MMHG
AV VALVE AREA: 0.94 CM2
AV VELOCITY RATIO: 0.31
BACTERIA UR QL AUTO: ABNORMAL /HPF
BASOPHILS # BLD MANUAL: 0 THOUSAND/UL (ref 0–0.1)
BASOPHILS NFR MAR MANUAL: 0 % (ref 0–1)
BILIRUB DIRECT SERPL-MCNC: 0.95 MG/DL (ref 0–0.2)
BILIRUB SERPL-MCNC: 7.03 MG/DL (ref 0.2–1)
BILIRUB UR QL STRIP: ABNORMAL
BSA FOR ECHO PROCEDURE: 1.95 M2
BUN SERPL-MCNC: 36 MG/DL (ref 5–25)
CALCIUM SERPL-MCNC: 9 MG/DL (ref 8.4–10.2)
CHLORIDE SERPL-SCNC: 99 MMOL/L (ref 96–108)
CLARITY UR: CLEAR
CO2 SERPL-SCNC: 30 MMOL/L (ref 21–32)
COLOR UR: ABNORMAL
CREAT SERPL-MCNC: 1.12 MG/DL (ref 0.6–1.3)
DOP CALC AO PEAK VEL: 2.49 M/S
DOP CALC AO VTI: 45.83 CM
DOP CALC LVOT AREA: 2.83 CM2
DOP CALC LVOT CARDIAC INDEX: 1.48 L/MIN/M2
DOP CALC LVOT CARDIAC OUTPUT: 2.89 L/MIN
DOP CALC LVOT DIAMETER: 1.9 CM
DOP CALC LVOT PEAK VEL VTI: 15.18 CM
DOP CALC LVOT PEAK VEL: 0.78 M/S
DOP CALC LVOT STROKE INDEX: 23.1 ML/M2
DOP CALC LVOT STROKE VOLUME: 45
EOSINOPHIL # BLD MANUAL: 0 THOUSAND/UL (ref 0–0.4)
EOSINOPHIL NFR BLD MANUAL: 0 % (ref 0–6)
ERYTHROCYTE [DISTWIDTH] IN BLOOD BY AUTOMATED COUNT: 13.2 % (ref 11.6–15.1)
FRACTIONAL SHORTENING: 38 (ref 28–44)
GFR SERPL CREATININE-BSD FRML MDRD: 61 ML/MIN/1.73SQ M
GLUCOSE SERPL-MCNC: 119 MG/DL (ref 65–140)
GLUCOSE SERPL-MCNC: 120 MG/DL (ref 65–140)
GLUCOSE UR STRIP-MCNC: NEGATIVE MG/DL
HCT VFR BLD AUTO: 40.2 % (ref 36.5–49.3)
HGB BLD-MCNC: 13.4 G/DL (ref 12–17)
HGB UR QL STRIP.AUTO: ABNORMAL
INTERVENTRICULAR SEPTUM IN DIASTOLE (PARASTERNAL SHORT AXIS VIEW): 1.1 CM
INTERVENTRICULAR SEPTUM: 1.1 CM (ref 0.6–1.1)
KETONES UR STRIP-MCNC: ABNORMAL MG/DL
LAAS-AP2: 13.9 CM2
LAAS-AP4: 16.1 CM2
LEFT ATRIUM SIZE: 5.3 CM
LEFT ATRIUM VOLUME (MOD BIPLANE): 43 ML
LEFT ATRIUM VOLUME INDEX (MOD BIPLANE): 22.1 ML/M2
LEFT INTERNAL DIMENSION IN SYSTOLE: 3 CM (ref 2.1–4)
LEFT VENTRICULAR INTERNAL DIMENSION IN DIASTOLE: 4.8 CM (ref 3.5–6)
LEFT VENTRICULAR POSTERIOR WALL IN END DIASTOLE: 1.2 CM
LEFT VENTRICULAR STROKE VOLUME: 74 ML
LEUKOCYTE ESTERASE UR QL STRIP: NEGATIVE
LG PLATELETS BLD QL SMEAR: PRESENT
LVSV (TEICH): 74 ML
LYMPHOCYTES # BLD AUTO: 1.72 THOUSAND/UL (ref 0.6–4.47)
LYMPHOCYTES # BLD AUTO: 20 % (ref 14–44)
MAGNESIUM SERPL-MCNC: 1.6 MG/DL (ref 1.9–2.7)
MCH RBC QN AUTO: 30.2 PG (ref 26.8–34.3)
MCHC RBC AUTO-ENTMCNC: 33.3 G/DL (ref 31.4–37.4)
MCV RBC AUTO: 91 FL (ref 82–98)
MONOCYTES # BLD AUTO: 0.09 THOUSAND/UL (ref 0–1.22)
MONOCYTES NFR BLD: 1 % (ref 4–12)
MUCOUS THREADS UR QL AUTO: ABNORMAL
NEUTROPHILS # BLD MANUAL: 6.79 THOUSAND/UL (ref 1.85–7.62)
NEUTS BAND NFR BLD MANUAL: 18 % (ref 0–8)
NEUTS SEG NFR BLD AUTO: 61 % (ref 43–75)
NITRITE UR QL STRIP: POSITIVE
NON-SQ EPI CELLS URNS QL MICRO: ABNORMAL /HPF
PH UR STRIP.AUTO: 6 [PH]
PHOSPHATE SERPL-MCNC: 2.9 MG/DL (ref 2.3–4.1)
PLATELET # BLD AUTO: 139 THOUSANDS/UL (ref 149–390)
PLATELET BLD QL SMEAR: ADEQUATE
PMV BLD AUTO: 11.9 FL (ref 8.9–12.7)
POTASSIUM SERPL-SCNC: 3.7 MMOL/L (ref 3.5–5.3)
PROT SERPL-MCNC: 6.3 G/DL (ref 6.4–8.4)
PROT UR STRIP-MCNC: ABNORMAL MG/DL
RBC # BLD AUTO: 4.44 MILLION/UL (ref 3.88–5.62)
RBC #/AREA URNS AUTO: ABNORMAL /HPF
RBC MORPH BLD: NORMAL
RIGHT ATRIAL 2D VOLUME: 66 ML
RIGHT ATRIUM AREA SYSTOLE A4C: 21.8 CM2
RIGHT VENTRICLE ID DIMENSION: 3.8 CM
SL CV LEFT ATRIUM LENGTH A2C: 4.1 CM
SL CV LV EF: 55
SL CV PED ECHO LEFT VENTRICLE DIASTOLIC VOLUME (MOD BIPLANE) 2D: 108 ML
SL CV PED ECHO LEFT VENTRICLE SYSTOLIC VOLUME (MOD BIPLANE) 2D: 34 ML
SODIUM SERPL-SCNC: 138 MMOL/L (ref 135–147)
SP GR UR STRIP.AUTO: >=1.03
TR MAX PG: 34 MMHG
TR PEAK VELOCITY: 2.9 M/S
TRICUSPID ANNULAR PLANE SYSTOLIC EXCURSION: 1.8 CM
TRICUSPID VALVE PEAK REGURGITATION VELOCITY: 2.92 M/S
UROBILINOGEN UR QL STRIP.AUTO: 1 E.U./DL
WBC # BLD AUTO: 8.6 THOUSAND/UL (ref 4.31–10.16)
WBC #/AREA URNS AUTO: ABNORMAL /HPF

## 2024-08-27 PROCEDURE — 85007 BL SMEAR W/DIFF WBC COUNT: CPT | Performed by: SURGERY

## 2024-08-27 PROCEDURE — 84100 ASSAY OF PHOSPHORUS: CPT | Performed by: SURGERY

## 2024-08-27 PROCEDURE — 93880 EXTRACRANIAL BILAT STUDY: CPT | Performed by: SURGERY

## 2024-08-27 PROCEDURE — 83735 ASSAY OF MAGNESIUM: CPT | Performed by: SURGERY

## 2024-08-27 PROCEDURE — 82948 REAGENT STRIP/BLOOD GLUCOSE: CPT

## 2024-08-27 PROCEDURE — 85027 COMPLETE CBC AUTOMATED: CPT | Performed by: SURGERY

## 2024-08-27 PROCEDURE — 93880 EXTRACRANIAL BILAT STUDY: CPT

## 2024-08-27 PROCEDURE — 93306 TTE W/DOPPLER COMPLETE: CPT

## 2024-08-27 PROCEDURE — 74181 MRI ABDOMEN W/O CONTRAST: CPT

## 2024-08-27 PROCEDURE — 97167 OT EVAL HIGH COMPLEX 60 MIN: CPT

## 2024-08-27 PROCEDURE — 93306 TTE W/DOPPLER COMPLETE: CPT | Performed by: INTERNAL MEDICINE

## 2024-08-27 PROCEDURE — 99232 SBSQ HOSP IP/OBS MODERATE 35: CPT | Performed by: STUDENT IN AN ORGANIZED HEALTH CARE EDUCATION/TRAINING PROGRAM

## 2024-08-27 PROCEDURE — 81001 URINALYSIS AUTO W/SCOPE: CPT | Performed by: PHYSICIAN ASSISTANT

## 2024-08-27 PROCEDURE — 99233 SBSQ HOSP IP/OBS HIGH 50: CPT | Performed by: SURGERY

## 2024-08-27 PROCEDURE — NC001 PR NO CHARGE: Performed by: SURGERY

## 2024-08-27 PROCEDURE — 99232 SBSQ HOSP IP/OBS MODERATE 35: CPT | Performed by: NURSE PRACTITIONER

## 2024-08-27 PROCEDURE — 71045 X-RAY EXAM CHEST 1 VIEW: CPT

## 2024-08-27 PROCEDURE — 82248 BILIRUBIN DIRECT: CPT

## 2024-08-27 PROCEDURE — 80053 COMPREHEN METABOLIC PANEL: CPT | Performed by: SURGERY

## 2024-08-27 RX ORDER — MAGNESIUM SULFATE HEPTAHYDRATE 40 MG/ML
2 INJECTION, SOLUTION INTRAVENOUS ONCE
Status: COMPLETED | OUTPATIENT
Start: 2024-08-27 | End: 2024-08-27

## 2024-08-27 RX ORDER — LIDOCAINE HYDROCHLORIDE 20 MG/ML
1 JELLY TOPICAL ONCE
Status: COMPLETED | OUTPATIENT
Start: 2024-08-27 | End: 2024-08-27

## 2024-08-27 RX ADMIN — SODIUM CHLORIDE, SODIUM LACTATE, POTASSIUM CHLORIDE, AND CALCIUM CHLORIDE 125 ML/HR: .6; .31; .03; .02 INJECTION, SOLUTION INTRAVENOUS at 21:03

## 2024-08-27 RX ADMIN — KETOROLAC TROMETHAMINE 15 MG: 30 INJECTION, SOLUTION INTRAMUSCULAR; INTRAVENOUS at 17:16

## 2024-08-27 RX ADMIN — KETOROLAC TROMETHAMINE 15 MG: 30 INJECTION, SOLUTION INTRAMUSCULAR; INTRAVENOUS at 23:21

## 2024-08-27 RX ADMIN — KETOROLAC TROMETHAMINE 15 MG: 30 INJECTION, SOLUTION INTRAMUSCULAR; INTRAVENOUS at 11:56

## 2024-08-27 RX ADMIN — KETOROLAC TROMETHAMINE 15 MG: 30 INJECTION, SOLUTION INTRAMUSCULAR; INTRAVENOUS at 00:01

## 2024-08-27 RX ADMIN — MAGNESIUM SULFATE HEPTAHYDRATE 2 G: 40 INJECTION, SOLUTION INTRAVENOUS at 09:57

## 2024-08-27 RX ADMIN — ONDANSETRON 4 MG: 2 INJECTION INTRAMUSCULAR; INTRAVENOUS at 02:48

## 2024-08-27 RX ADMIN — HEPARIN SODIUM 5000 UNITS: 5000 INJECTION INTRAVENOUS; SUBCUTANEOUS at 05:42

## 2024-08-27 RX ADMIN — PANTOPRAZOLE SODIUM 40 MG: 40 INJECTION, POWDER, FOR SOLUTION INTRAVENOUS at 09:19

## 2024-08-27 RX ADMIN — HEPARIN SODIUM 5000 UNITS: 5000 INJECTION INTRAVENOUS; SUBCUTANEOUS at 14:11

## 2024-08-27 RX ADMIN — HYDROCORTISONE SODIUM SUCCINATE 100 MG: 100 INJECTION, POWDER, FOR SOLUTION INTRAMUSCULAR; INTRAVENOUS at 14:11

## 2024-08-27 RX ADMIN — HEPARIN SODIUM 5000 UNITS: 5000 INJECTION INTRAVENOUS; SUBCUTANEOUS at 21:03

## 2024-08-27 RX ADMIN — SODIUM CHLORIDE, SODIUM LACTATE, POTASSIUM CHLORIDE, AND CALCIUM CHLORIDE 125 ML/HR: .6; .31; .03; .02 INJECTION, SOLUTION INTRAVENOUS at 02:31

## 2024-08-27 RX ADMIN — SODIUM CHLORIDE, SODIUM LACTATE, POTASSIUM CHLORIDE, AND CALCIUM CHLORIDE 125 ML/HR: .6; .31; .03; .02 INJECTION, SOLUTION INTRAVENOUS at 09:58

## 2024-08-27 RX ADMIN — LIDOCAINE HYDROCHLORIDE 1 APPLICATION: 20 JELLY TOPICAL at 09:13

## 2024-08-27 RX ADMIN — HYDROCORTISONE SODIUM SUCCINATE 100 MG: 100 INJECTION, POWDER, FOR SOLUTION INTRAMUSCULAR; INTRAVENOUS at 21:03

## 2024-08-27 RX ADMIN — TOPICAL ANESTHETIC 2 SPRAY: 200 SPRAY DENTAL; PERIODONTAL at 09:13

## 2024-08-27 NOTE — PLAN OF CARE
Problem: PAIN - ADULT  Goal: Verbalizes/displays adequate comfort level or baseline comfort level  Description: Interventions:  - Encourage patient to monitor pain and request assistance  - Assess pain using appropriate pain scale  - Administer analgesics based on type and severity of pain and evaluate response  - Implement non-pharmacological measures as appropriate and evaluate response  - Consider cultural and social influences on pain and pain management  - Notify physician/advanced practitioner if interventions unsuccessful or patient reports new pain  Outcome: Progressing     Problem: INFECTION - ADULT  Goal: Absence or prevention of progression during hospitalization  Description: INTERVENTIONS:  - Assess and monitor for signs and symptoms of infection  - Monitor lab/diagnostic results  - Monitor all insertion sites, i.e. indwelling lines, tubes, and drains  - Monitor endotracheal if appropriate and nasal secretions for changes in amount and color  - [place] appropriate cooling/warming therapies per order  - Administer medications as ordered  - Instruct and encourage patient and family to use good hand hygiene technique  - Identify and instruct in appropriate isolation precautions for identified infection/condition  Outcome: Progressing  Goal: Absence of fever/infection during neutropenic period  Description: INTERVENTIONS:  - Monitor WBC    Outcome: Progressing     Problem: SAFETY ADULT  Goal: Patient will remain free of falls  Description: INTERVENTIONS:  - Educate patient/family on patient safety including physical limitations  - Instruct patient to call for assistance with activity   - Consult OT/PT to assist with strengthening/mobility   - Keep Call bell within reach  - Keep bed low and locked with side rails adjusted as appropriate  - Keep care items and personal belongings within reach  - Initiate and maintain comfort rounds  - Make Fall Risk Sign visible to staff  - Offer Toileting every 2 Hours,  in advance of need  - Initiate/Maintain bed alarm  - Obtain necessary fall risk management equipment: nonslip socks  - Apply yellow socks and bracelet for high fall risk patients  - Consider moving patient to room near nurses station  Outcome: Progressing  Goal: Maintain or return to baseline ADL function  Description: INTERVENTIONS:  -  Assess patient's ability to carry out ADLs; assess patient's baseline for ADL function and identify physical deficits which impact ability to perform ADLs (bathing, care of mouth/teeth, toileting, grooming, dressing, etc.)  - Assess/evaluate cause of self-care deficits   - Assess range of motion  - Assess patient's mobility; develop plan if impaired  - Assess patient's need for assistive devices and provide as appropriate  - Encourage maximum independence but intervene and supervise when necessary  - Involve family in performance of ADLs  - Assess for home care needs following discharge   - Consider OT consult to assist with ADL evaluation and planning for discharge  - Provide patient education as appropriate  Outcome: Progressing  Goal: Maintains/Returns to pre admission functional level  Description: INTERVENTIONS:  - Perform AM-PAC 6 Click Basic Mobility/ Daily Activity assessment daily.  - Set and communicate daily mobility goal to care team and patient/family/caregiver.   - Collaborate with rehabilitation services on mobility goals if consulted  - Perform Range of Motion 3 times a day.  - Reposition patient every 2 hours.  - Dangle patient 3 times a day  - Stand patient 3 times a day  - Ambulate patient 3 times a day  - Out of bed to chair 3 times a day   - Out of bed for meals 3 times a day  - Out of bed for toileting  - Record patient progress and toleration of activity level   Outcome: Progressing     Problem: DISCHARGE PLANNING  Goal: Discharge to home or other facility with appropriate resources  Description: INTERVENTIONS:  - Identify barriers to discharge w/patient and  caregiver  - Arrange for needed discharge resources and transportation as appropriate  - Identify discharge learning needs (meds, wound care, etc.)  - Arrange for interpretive services to assist at discharge as needed  - Refer to Case Management Department for coordinating discharge planning if the patient needs post-hospital services based on physician/advanced practitioner order or complex needs related to functional status, cognitive ability, or social support system  Outcome: Progressing     Problem: Knowledge Deficit  Goal: Patient/family/caregiver demonstrates understanding of disease process, treatment plan, medications, and discharge instructions  Description: Complete learning assessment and assess knowledge base.  Interventions:  - Provide teaching at level of understanding  - Provide teaching via preferred learning methods  Outcome: Progressing

## 2024-08-27 NOTE — NURSING NOTE
Patient had an episode of vomiting around 0230 am, after eating some ice chips and possibly drinking the cold water at the bottom of the cup. Emesis consisted of chunks of what appeared to be food and also tan colored liquid, with very little odor.     RN gave zophran.    Patient had a second episode of vomiting around 0600am, which consisted of tan liquid and chunks of what looked like tomatoes . Emesis had a strong odor of stool this time. Patient does not wish to have the ng tube placed.     RN removed the cup of ice from patient 's room just as a precaution.

## 2024-08-27 NOTE — PROCEDURES
18 Fr NG tube placed to right nare on second attempt, tolerated reasonably well with return of 1L orange/tan output.

## 2024-08-27 NOTE — PROGRESS NOTES
[facility]  Progress Note  Name: Tee Wadsworth I  MRN: [id number]  Unit/Bed#: -01 I Date of Admission: 8/26/2024   Date of Service: 8/27/2024 I Hospital Day: 1    Assessment & Plan     Confusion  Assessment & Plan  Per chart review, patient with confusion  Per patient's wife, he has been having short-term memory difficulty for the past several months along with becoming verbally angry with her, and this is not characteristic of him  This could be related to acute medical issue and hospitalization versus possible cognitive decline with acute exacerbation versus medication effect, (recently started on gabapentin) or a combination of factors  Maintain sleep-wake cycle, promote restful environment  Conduct serial assessments    * SBO (small bowel obstruction) ([facility], located within [facility])  Assessment & Plan  Presented for abdominal pain and vomiting, found to have small bowel obstruction per CT imaging revealing possible terminal ileitis  Per patient's wife, he has a history of SBO x 2 in 1999, treated with NGT at [facility]. Required surgery for SBO in 2011 at [facility] (now closed)  NG tube was unable to be placed initially, placed today by surgery   Initiated on Solu-Cortef 100 mg IV Q8H  Continue NPO and IV fluids with LR@125 mL/hour  Additional care per primary team, general surgery  Monitor labs and vital signs, conduct serial physical assessments    Elevated bilirubin  Assessment & Plan  GI following, recommendations appreciated: Recommend nonurgent MRI/MRCP for further evaluation of pancreaticobiliary anatomy.  This could be done in the outpatient setting.  Will defer further serologic workup at this time due to suspicion for Gilbert's syndrome  Trend daily CMP    Crohn's disease of both small and large intestine without complication (HCC)  Assessment & Plan  With reported history of Crohn's  GI input appreciated: Requires colonoscopy for evaluation and definitive diagnosis,  However, would recommend this be done in 6 to 8 weeks following resolution of SBO    Lumbar radiculopathy  Assessment & Plan  History of lumbar radiculopathy, ankylosing spondylitis, treated with injections  Follows with pain and spine  Also with bilateral foot neuropathy, recently initiated on gabapentin 100 mg p.o. 3 times daily on 8/7/2024  Conduct serial physical assessments, monitor for adequate pain control    Pancreatic cyst  Assessment & Plan  CT abdomen pelvis: Re-identified prominence of the main pancreatic duct with several nonspecific subcentimeter cystic lesions, overall not significantly changed from previous CT examinations. These can be further evaluated with MRI/MRCP on a nonemergent basis  Follow-up as an outpatient    Atrial fibrillation (HCC)  Assessment & Plan  Takes apixaban, currently on hold pending clinical course for SBO  Does not appear to be on beta-blocker at this time  Monitor heart rate    Essential hypertension  Assessment & Plan  BP elevated on presentation, possibly due to pain/discomfort, now controlled  Appears to be on lisinopril, amlodipine, and chlorthalidone as an outpatient, currently on hold while n.p.o.  Continue hydralazine IV PRN  Continue to monitor BP    Bilateral carotid artery stenosis  Assessment & Plan  Noted to have high-grade right carotid stenosis   Per vascular surgery note in 2019, patient's anatomy not favorable for carotid stenting due to extensive bulky calcific plaque and due to cervical spine immobility and location of carotid stenosis difficult to achieve adequate exposure to perform endarterectomy in safe manner  He was started on DAPT however this was discontinued in favor of apixaban for atrial fibrillation           VTE Pharmacologic Prophylaxis: VTE Score: 3 Moderate Risk (Score 3-4) - Pharmacological DVT Prophylaxis Ordered: heparin.    Mobility:   Basic Mobility Inpatient Raw Score: 19  JH-HLM Goal: 6: Walk 10 steps or more  JH-HLM Achieved: 3: Sit  at edge of bed  JH-HLM Goal NOT achieved. Continue with multidisciplinary rounding and encourage appropriate mobility to improve upon JH-HLM goals.    Patient Centered Rounds: I performed bedside rounds with nursing staff today.     Total Time Spent on Date of Encounter in care of patient: 25 mins. This time was spent on one or more of the following: performing physical exam; counseling and coordination of care; obtaining or reviewing history; documenting in the medical record; reviewing/ordering tests, medications or procedures; communicating with other healthcare professionals and discussing with patient's family/caregivers.    Current Length of Stay: 1 day(s)  Current Patient Status: Inpatient   Certification Statement: The patient will continue to require additional inpatient hospital stay due to SBO.  Discharge Plan:  per primary team.    Code Status: Prior    Subjective:   Patient evaluated at bedside.  Overnight he had an episode of vomiting.  Currently with NG tube.  Denies fever, chest pain, shortness of breath, or dizziness.    Objective:     Vitals:   Temp (24hrs), Av.9 °F (37.2 °C), Min:97.8 °F (36.6 °C), Max:100.3 °F (37.9 °C)    Temp:  [97.8 °F (36.6 °C)-100.3 °F (37.9 °C)] 98.6 °F (37 °C)  HR:  [67-76] 76  Resp:  [16-18] 18  BP: (152-158)/(67-80) 153/71  SpO2:  [88 %-94 %] 88 %  Body mass index is 25.84 kg/m².     Input and Output Summary (last 24 hours):     Intake/Output Summary (Last 24 hours) at 2024 1628  Last data filed at 2024 1501  Gross per 24 hour   Intake 2340 ml   Output 2700 ml   Net -360 ml       Physical Exam:   Physical Exam  Vitals and nursing note reviewed.   Constitutional:       Appearance: He is ill-appearing.   HENT:      Head: Normocephalic and atraumatic.      Mouth/Throat:      Pharynx: Oropharynx is clear.   Eyes:      Pupils: Pupils are equal, round, and reactive to light.   Cardiovascular:      Rate and Rhythm: Normal rate and regular rhythm.      Pulses:  Normal pulses.      Heart sounds: Normal heart sounds.   Pulmonary:      Effort: Pulmonary effort is normal. No respiratory distress.      Breath sounds: Normal breath sounds.   Abdominal:      General: Bowel sounds are normal. There is distension.      Palpations: Abdomen is soft.      Tenderness: There is no abdominal tenderness.   Musculoskeletal:      Cervical back: Neck supple.      Right lower leg: No edema.      Left lower leg: No edema.   Skin:     General: Skin is warm and dry.      Capillary Refill: Capillary refill takes less than 2 seconds.      Coloration: Skin is jaundiced.   Neurological:      General: No focal deficit present.      Mental Status: He is alert.      Comments: Confused, slow to answer          Additional Data:     Labs:  Results from last 7 days   Lab Units 08/27/24  0544 08/26/24  0119   WBC Thousand/uL 8.60 14.47*   HEMOGLOBIN g/dL 13.4 16.2   HEMATOCRIT % 40.2 47.5   PLATELETS Thousands/uL 139* 180   BANDS PCT % 18*  --    SEGS PCT %  --  85*   LYMPHO PCT % 20 9*   MONO PCT % 1* 5   EOS PCT % 0 0     Results from last 7 days   Lab Units 08/27/24  0544   SODIUM mmol/L 138   POTASSIUM mmol/L 3.7   CHLORIDE mmol/L 99   CO2 mmol/L 30   BUN mg/dL 36*   CREATININE mg/dL 1.12   ANION GAP mmol/L 9   CALCIUM mg/dL 9.0   ALBUMIN g/dL 3.9   TOTAL BILIRUBIN mg/dL 7.03*   ALK PHOS U/L 65   ALT U/L 11   AST U/L 18   GLUCOSE RANDOM mg/dL 120         Results from last 7 days   Lab Units 08/27/24  0247   POC GLUCOSE mg/dl 119         Results from last 7 days   Lab Units 08/26/24  0348   LACTIC ACID mmol/L 1.6       Lines/Drains:  Invasive Devices       Peripheral Intravenous Line  Duration             Peripheral IV 08/26/24 Right;Ventral (anterior) Forearm 1 day              Drain  Duration             NG/OG/Enteral Tube Nasogastric Right nare <1 day                      Last 24 Hours Medication List:   Current Facility-Administered Medications   Medication Dose Route Frequency Provider Last Rate     heparin (porcine)  5,000 Units Subcutaneous Q8H IRMA Gallo MD      hydrALAZINE  5 mg Intravenous Q6H PRN Kaelyn López PA-C      hydrocortisone sodium succinate  100 mg Intravenous Q8H IRMA Vanegas PA-C      HYDROmorphone  0.2 mg Intravenous Q4H PRN Kaelyn López PA-C      ketorolac  15 mg Intravenous Q6H IRMA López PA-C      lactated ringers  125 mL/hr Intravenous Continuous Jarred Gallo  mL/hr (08/27/24 0958)    ondansetron  4 mg Intravenous Q6H PRN Jarred Gallo MD      pantoprazole  40 mg Intravenous Q24H IRMA Gallo MD          Today, Patient Was Seen By: MYNOR Miguel    **Please Note: This note may have been constructed using a voice recognition system.**

## 2024-08-27 NOTE — ASSESSMENT & PLAN NOTE
79 M hx A. Flutter on Eliquis, carotid stenosis, CAD, degenerative lumbar disease, and prior SBO 2/2 crohn's disease s/p SBR in 2011 presenting with s/sx SBO and terminal ileitis on CT now HD#2    No flatus or BM, nausea with vomiting overnight, pressure like discomfort.   Non-toxic on exam, loud systolic murmur with left carotid bruit vs radiation, abdomen distended but soft without peritoneal signs.  AFVSS, Tmax 100.3 yesterday.  WBC 8.6 with 18% bands, BUN 36, Plt 139, Mag 1.6, T bili 7 from 3.    Assessment/Plan:  SBO, adhesive vs related to terminal ileitis, with vomiting now NG was placed bedside with return of 1L orange/tan output, will check placement with CXR and continue to intermittent suction at 100 mmHg. May have ice chips, small sips of water to help dilute gastric contents.    Terminal ileitis, appreciate GI recs, hold on abx or steroids for now. Serial exams and monitoring.    Hyperbilirubinemia, will order MRI/MRCP for tomorrow to allow time for GI tract to decompress with NG tube, repeat AM labs.    Systolic murmur and carotid bruit, will check ECHO and Carotid US.    Confusion, appreciate SLIM recs, has risk factors for vascular dementia, will also check UA and PVR.    VTE prophylaxis, on Eliquis at home, currently SQ heparin, will discuss with attending if heparin gtt may be warranted currently.

## 2024-08-27 NOTE — ASSESSMENT & PLAN NOTE
Per chart review, patient with confusion  Per patient's wife, he has been having short-term memory difficulty for the past several months along with becoming verbally angry with her, and this is not characteristic of him  This could be related to acute medical issue and hospitalization versus possible cognitive decline with acute exacerbation versus medication effect, (recently started on gabapentin) or a combination of factors  Maintain sleep-wake cycle, promote restful environment  Conduct serial assessments

## 2024-08-27 NOTE — OCCUPATIONAL THERAPY NOTE
Occupational Therapy Evaluation     Patient Name: Tee Forrest Jr.  Today's Date: 8/27/2024  Problem List  Principal Problem:    SBO (small bowel obstruction) (HCC)  Active Problems:    Bilateral carotid artery stenosis    Essential hypertension    Atrial fibrillation (HCC)    Pancreatic cyst    Crohn's disease of both small and large intestine without complication (HCC)    Lumbar radiculopathy    Confusion    Elevated bilirubin    Past Medical History  Past Medical History:   Diagnosis Date    Arthritis     Atrial flutter (HCC)     Cholecystitis     Coronary artery disease     Hypertension     RBBB     Spinal stenosis      Past Surgical History  Past Surgical History:   Procedure Laterality Date    CHOLECYSTECTOMY      COLON SURGERY      bwel resection    COLONOSCOPY      ESOPHAGOGASTRODUODENOSCOPY      02/07/2007  ONSET    EYE SURGERY      HIP SURGERY      JOINT REPLACEMENT      ID LAPAROSCOPY SURG CHOLECYSTECTOMY N/A 12/05/2017    Procedure: CHOLECYSTECTOMY LAPAROSCOPIC;  Surgeon: Ez Lainez MD;  Location: [facility];  Service: General    SMALL INTESTINE SURGERY      ONSEC DEC 2011        08/27/24 1544   OT Last Visit   OT Visit Date 08/27/24   Note Type   Note type Evaluation   Additional Comments Nsg staff verbally cleared pt for OT evaluation.  Pt received  supine in bed c HOB semi-elevated on this date + is agreeable to OT session @ this time. @ exit, pt remains in  seated in bedside recliner c all lines intact, all needs met, call bell in hand + nsg aware of location/disposition.   Pain Assessment   Pain Assessment Tool 0-10   Pain Score No Pain   Restrictions/Precautions   Weight Bearing Precautions Per Order No   Braces or Orthoses   (none)   Other Precautions Cognitive;Chair Alarm;Bed Alarm;Multiple lines;Fall Risk;Aspiration;Fluid restriction  (NG tube, IV; small sips of water allowed while NG tube functional)   Home Living   Type of Home House   Home Layout Bed/bath upstairs;Able to live on  "main level with bedroom/bathroom;Two level;Performs ADLs on one level  (raised ranch with finished basement; 1 + 1 [place] in front, 2 [place] from back, 3 [place] from patio; full flight to cellar with HR)   Bathroom Shower/Tub Walk-in shower  (also has tub, uses the tub most often to stand and shower)   Bathroom Toilet Standard   Bathroom Equipment Commode   Bathroom Accessibility Accessible   Home Equipment Cane;Walker;Electric scooter  (2 rollators, 5 canes)   Prior Function   Level of [place] Independent with ADLs;Independent with IADLS  (uses electric scooter outside, uses cane inside)   Lives With Spouse   Receives Help From Family  (son lives four doors down from pt, another son close to pt)   IADLs Independent with driving;Independent with medication management;Independent with meal prep  (splits laundry with wife, wife completes cleaning)   Falls in the last 6 months   (unable to quantify, stating \"nothing that hurt - I might miss a step sometimes\")   Vocational Retired  (schoolteacher and )   Lifestyle   Autonomy Pt lives in  2 story home c wife + @ baseline, performs ADLs  I'ly, IADLs with A + fxl mobility I'ly c AD.. (+) . Pt is retired.   Reciprocal Relationships wife, 2 sons   Service to Others retired   Subjective   Subjective \"Today is a different day.\"   ADL   Eating Assistance 5  Supervision/Setup   Grooming Assistance 5  Supervision/Setup   UB Bathing Assistance 5  Supervision/Setup   LB Bathing Assistance 3  Moderate Assistance   UB Dressing Assistance 4  Minimal Assistance   LB Dressing Assistance 2  Maximal Assistance   Toileting Assistance  2  Maximal Assistance   Additional Comments Given fxl performance skills + medical complexity, therapist suspecting via clinical judgement + skilled analysis; pt currently requires stated assist above to perform each area of ADL d/t limitations including: generalized muscle weakness, sitting/standing balance deficits, fxl activity tolerance " limitations, difficulty performing bend/reach-anterior trunk flexion, requires external assist to complete transitional movements, decreased core strength, decreased postural control, instability in stance, cognitive deficits, safety awareness concerns, decreased problem solving abilities, impulsivity, difficulty attending to task, BUE strength, line/NG management, high fall risk, sitting balance deficits, and standing balance deficits   Bed Mobility   Supine to Sit 4  Minimal assistance   Additional items Verbal cues   Transfers   Sit to Stand 4  Minimal assistance   Additional items Verbal cues;Impulsive   Stand to Sit 4  Minimal assistance   Additional items Verbal cues;Impulsive   Stand pivot 4  Minimal assistance  (pt stepped over IV line during SPT, demonstrated poor safety awareness)   Additional items Verbal cues;Impulsive   Balance   Static Sitting Fair +   Dynamic Sitting Fair   Static Standing Fair   Dynamic Standing Fair -   Activity Tolerance   Activity Tolerance Patient tolerated treatment well   Medical Staff Made Aware LEIGHTON Gutierrez   RUE Assessment   RUE Assessment WFL  (did not formally assess, pt able to complete all requested tasks during eval)   LUE Assessment   LUE Assessment WFL  (did not formally assess, pt able to complete all requested tasks during eval)   Hand Function   Gross Motor Coordination Functional   Fine Motor Coordination Functional   Sensation   Light Touch Partial deficits in the RUE;Partial deficits in the LUE   Vision-Basic Assessment   Current Vision Wears glasses only for reading   Psychosocial   Psychosocial (WDL) WDL   Patient Behaviors/Mood Calm;Cooperative  (tangential)   Cognition   Overall Cognitive Status Impaired   Arousal/Participation Alert;Responsive;Cooperative   Attention Attends with cues to redirect   Orientation Level Oriented to person;Oriented to place   Memory Decreased short term memory;Decreased recall of recent events;Decreased recall of precautions    Following Commands Follows one step commands with increased time or repetition   Assessment   Limitation Decreased ADL status;Decreased UE strength;Decreased Safe judgement during ADL;Decreased cognition;Decreased endurance;Decreased high-level ADLs;Decreased self-care trans   Prognosis Good   Assessment Patient is a 79 y.o. male seen for OT evaluation s/p admit to  [facility] on 8/26/2024 w/SBO (small bowel obstruction) (HCC) + commorbidities/PMHx (as listed in medical record) affecting patient's functional performance c ADL tasks at time of assessment. OT orders placed for evaluation and treatment to assess pt's ADLs, cognitive status + performance during functional tasks in order to formulate appropriate d/c recommendations.     Therapist performed at least two patient identifiers during session including name and wristband. Personal factors affecting patient at time of initial evaluation include: impulsivity, step(s) to enter environment, multi-level environment, advanced age, inability to perform IADLs, inability to perform ADLs, new need for AD, inability to ambulate household distances, inability to navigate community distances, limited insight into impairments, and decreased initiation and engagement.   Pt's clinical presentation is currently unstable/unpredictable given new onset deficits that effect pt's occupational performance and ability to safely return to [facility] including decrease activity tolerance, decrease standing balance, decrease sitting balance, decrease performance during ADL tasks, decrease cognition, decrease safety awareness , increase impulsiveness, decrease UB MS, decrease generalized strength, decrease activity engagement, decrease performance during functional transfers, steps to enter home, high fall risk, and limited insight to deficits combined with medical complications of hypertension , abnormal renal lab values, change in mental status, abnormal CBC, abnormal potassium  values, impulsivity during admission, and need for input for mobility technique/safety. This evaluation required an extensive review of medical and/or therapy records and additional review of physical, cognitive and psychosocial history related to functional performance. Based upon functional performance deficits and assessments, this evaluation has been identified as a  moderate complexity evaluation.      Patient to benefit from continued Occupational Therapy treatment while in the hospital to address aforementioned deficits and maximize level of functional independence with ADLs and functional mobility.   Plan   Treatment Interventions ADL retraining;UE strengthening/ROM;Functional transfer training;Endurance training;Cognitive reorientation;Patient/family training;Equipment evaluation/education;Compensatory technique education;Activityengagement;Energy conservation   Goal Expiration Date 09/06/24   OT Treatment Day 0   OT Frequency 3-5x/wk   Discharge Recommendation   Rehab Resource Intensity Level, OT II (Moderate Resource Intensity)   -PAC Daily Activity Inpatient   Lower Body Dressing 2   Bathing 2   Toileting 2   Upper Body Dressing 3   Grooming 3   Eating 3   Daily Activity Raw Score 15   Daily Activity Standardized Score (Calc for Raw Score >=11) 34.69   AM-PAC Applied Cognition Inpatient   Following a Speech/Presentation 2   Understanding Ordinary Conversation 3   Taking Medications 2   Remembering Where Things Are Placed or Put Away 2   Remembering List of 4-5 Errands 2   Taking Care of Complicated Tasks 2   Applied Cognition Raw Score 13   Applied Cognition Standardized Score 30.46   End of Consult   Patient Position at End of Consult Bedside chair;Bed/Chair alarm activated   Nurse Communication Nurse aware of consult     The patient's raw score on the AM-PAC Daily Activity inpatient short form is 15, standardized score is 34.69, less than 39.4. Please refer to the recommendation of the Occupational  Therapist for safe DC planning.    Resource Intensity Recommendation: Level II (Moderate Resource Intensity)        GOALS:    *ADL transfers with (S) for inc'd independence with ADLs/purposeful tasks    *UB ADL with (S) for inc'd independence with self cares    *LB ADL with (S) using AE prn for inc'd independence with self cares    *Toileting with (S) for clothing management and hygiene for return to PLOF with personal care    *Increase stand tolerance x 3  m for inc'd tolerance with standing purposeful tasks    *Participate in 10m UE therex to increase overall stamina/activity tolerance for purposeful tasks    *Bed mobility- (I) for inc'd independence to manage own comfort and initiate EOB & OOB purposeful tasks    *Patient will verbalize 3 safety awareness/ principles to prevent falls in the home setting.     *Patient will verbalize and demonstrate use of energy conservation/deep breathing techniques and work simplification skills during functional activities with no verbal cues.     *Patient will increase OOB/sitting tolerance to 2-4 hours per day to increase participation in self-care and leisure tasks with no s/s of exertion.     *Patient will engage in ongoing cognitive assessment to assist with safe discharge planning/recommendations.     *Pt will verbalize and demonstrate understanding of post-op movement precautions 100% (if applicable) in tx sessions for increased safety and functional mobility.      *Pt will demonstrate use of long handled AE (if appropriate) during 100% of tx sessions for increased ADL safety and independence following D/C     Yanci Henderson OTR/SADAF

## 2024-08-27 NOTE — PROGRESS NOTES
"[facility]  Progress Note  Name: Tee Wadsworth I  MRN: [id number]  Unit/Bed#: -[facility] I Date of Admission: 8/26/2024   Date of Service: 8/27/2024 I Hospital Day: 1    Assessment & Plan   * SBO (small bowel obstruction) (HCC)  Assessment & Plan  79 M hx A. Flutter on Eliquis, carotid stenosis, CAD, degenerative lumbar disease, and prior SBO 2/2 crohn's disease s/p SBR in 2011 presenting with s/sx SBO and terminal ileitis on CT now HD#2    No flatus or BM, nausea with vomiting overnight, pressure like discomfort.   Non-toxic on exam, loud systolic murmur with left carotid bruit vs radiation, abdomen distended but soft without peritoneal signs.  AFVSS, Tmax 100.3 yesterday.  WBC 8.6 with 18% bands, BUN 36, Plt 139, Mag 1.6, T bili 7 from 3.    Assessment/Plan:  SBO, adhesive vs related to terminal ileitis, with vomiting now NG was placed bedside with return of 1L orange/tan output, will check placement with CXR and continue to intermittent suction at 100 mmHg. May have ice chips, small sips of water to help dilute gastric contents.    Terminal ileitis, appreciate GI recs, hold on abx or steroids for now. Serial exams and monitoring.    Hyperbilirubinemia, will order MRI/MRCP for tomorrow to allow time for GI tract to decompress with NG tube, repeat AM labs.    Systolic murmur and carotid bruit, will check ECHO and Carotid US.    Confusion, appreciate SLIM recs, has risk factors for vascular dementia, will also check UA and PVR.    VTE prophylaxis, on Eliquis at home, currently SQ heparin, will discuss with attending if heparin gtt may be warranted currently.             Subjective/Objective   Chief Complaint: \"I've been here 6 days, when are we going to get this show on the road\"    Subjective: Patient confused about his current hospital stay this morning. Thought he had been here almost a week already and is asking when he'll have surgery. Reports bloating discomfort, but overall " "abdominal pain stable. Admits to nausea and vomiting overnight and this morning. Believes it's been several days since his last BM. Denies chest pain, shortness or breath, trouble urinating, or swelling in extremities.    Objective:     Blood pressure 158/73, pulse 70, temperature 98.9 °F (37.2 °C), resp. rate 18, height 5' 9\" (1.753 m), weight 79.6 kg (175 lb 9.5 oz), SpO2 92%.,Body mass index is 25.93 kg/m².      Intake/Output Summary (Last 24 hours) at 8/27/2024 0942  Last data filed at 8/27/2024 0901  Gross per 24 hour   Intake 2400 ml   Output 425 ml   Net 1975 ml       Invasive Devices       Peripheral Intravenous Line  Duration             Peripheral IV 08/26/24 Right;Ventral (anterior) Forearm 1 day              Drain  Duration             NG/OG/Enteral Tube Nasogastric Right nare <1 day                    Physical Exam  Vitals and nursing note reviewed.   Constitutional:       General: He is not in acute distress.     Appearance: He is well-developed. He is not diaphoretic.   HENT:      Head: Normocephalic and atraumatic.   Eyes:      General: Scleral icterus present.      Extraocular Movements: EOM normal.      Conjunctiva/sclera: Conjunctivae normal.      Pupils: Pupils are equal, round, and reactive to light.   Neck:      Vascular: Carotid bruit (L>R) present.   Cardiovascular:      Rate and Rhythm: Normal rate.      Heart sounds: Murmur (very high pitched systolic murmur at left sternal border) heard.   Pulmonary:      Effort: No respiratory distress.   Abdominal:      Comments: Round and distended, soft/compressible. Mild generalized pressure like discomfort with palpation. No guarding or rigidity. No signs of incarcerated inguinal hernia on exam.   Musculoskeletal:         General: Normal range of motion.      Cervical back: Normal range of motion.   Skin:     General: Skin is warm and dry.      Capillary Refill: Capillary refill takes less than 2 seconds.      Coloration: Skin is jaundiced. " "  Neurological:      Mental Status: He is alert.      Comments: Oriented to name, place of residence, current location, and stated \"August 30th 2025\" as date. Confused regarding duration of stay, thought he'd been here 6 days when currently is HD#2. Pleasant and conversational. Limited understanding of current diagnosis and treatment plan.   Psychiatric:         Mood and Affect: Mood and affect normal.         Behavior: Behavior normal.           Lab, Imaging and other studies:I have personally reviewed pertinent lab results.  , CBC:   Lab Results   Component Value Date    WBC 8.60 08/27/2024    HGB 13.4 08/27/2024    HCT 40.2 08/27/2024    MCV 91 08/27/2024     (L) 08/27/2024    RBC 4.44 08/27/2024    MCH 30.2 08/27/2024    MCHC 33.3 08/27/2024    RDW 13.2 08/27/2024    MPV 11.9 08/27/2024   , CMP:   Lab Results   Component Value Date    SODIUM 138 08/27/2024    K 3.7 08/27/2024    CL 99 08/27/2024    CO2 30 08/27/2024    BUN 36 (H) 08/27/2024    CREATININE 1.12 08/27/2024    CALCIUM 9.0 08/27/2024    AST 18 08/27/2024    ALT 11 08/27/2024    ALKPHOS 65 08/27/2024    EGFR 61 08/27/2024     VTE Pharmacologic Prophylaxis: Heparin  VTE Mechanical Prophylaxis: sequential compression device    "

## 2024-08-27 NOTE — PROGRESS NOTES
Progress Note -  [facility]  Tee Forrest Jr. 79 y.o. male MRN: [id number]  Unit/Bed#: -01 Encounter: [id number]      ASSESSMENT AND PLAN:      Tee Forrest Jr. is a 79 y.o. old male with PMHx notable for recurrent small bowel obstruction s/p resection, prior cholecystectomy, HTN, Aflutter on Eliquis, and mild AS who presents 8/26 with small bowel obstruction.  Gastroenterology has been consulted due to reported history of Crohn's disease.     Small bowel obstruction  Concern for Terminal Ileitis 2/2 Crohn's???  CT imaging on admission shows acute small bowel obstruction with suspected transition point at the TI which appears to be inflamed on imaging.  There is a reported history of Crohn's disease that was diagnosed after recurrent small bowel obstructions requiring resection, however he has not been on therapy or following with gastroenterology for this so I am suspicious of this diagnosis.  We could consider colonoscopy with biopsy of TI once patient clinically improves and is no longer obstructed; if within goals of care of course  CRP elevated, given history and concern for inflammatory ileitis, It is certainly reasonable to start IV steroids.   If patient is able to produce stool sample would recommend checking fecal calprotectin  Management of SBO per surgery team; agree with NG tube decompression     Elevated liver enzymes  Dilated pancreatic duct  Labs on admission shows cholestatic liver enzyme elevation with T. bili 3.08 with .  Transaminases normal.  Bilirubin appears to be chronically elevated for many years as high as 3.5 in 2020.  He has a history of cholecystitis with choledocholithiasis in 2017 status post cholecystectomy.  CT on admission shows dilated PD to 6 mm along with subcentimeter cystic lesions measuring up to 7 mm, all of which have been previously seen on prior imaging.  No CBD dilation on imaging.  Differential includes cholestasis secondary to  Halima, pancreatic cystic neoplasm such as simple cyst, serocystadenoma, mucinous cystic neoplasm, IPMN, pseudopapillary neoplasm, versus adenocarcinoma.  MRCP ordered by primary team. Will follow up results  His unconjugated hyperbilirubinemia could be 2/2 [place]. Can consider ruling out  hemolysis with haptoglobin.   Monitor liver enzymes daily        Rest of care per primary team.    ______________________________________________________________________    Subjective: Patient was seen and examined bedside this morning.  He reports improvement in his abdominal pain, distention, nausea and vomiting with placement of NG tube.  He is passing gas intermittently but does not report any bowel movement yet.    REVIEW OF SYSTEMS:    Review of Systems   Constitutional:  Negative for chills and fever.   HENT:  Positive for ear pain. Negative for sore throat.    Eyes:  Negative for pain and visual disturbance.   Respiratory:  Negative for cough and shortness of breath.    Cardiovascular:  Negative for chest pain and palpitations.   Gastrointestinal:  Negative for abdominal pain and vomiting.   Genitourinary:  Negative for dysuria and hematuria.   Musculoskeletal:  Negative for arthralgias and back pain.   Skin:  Negative for color change and rash.   Neurological:  Negative for seizures and syncope.   All other systems reviewed and are negative.         Historical Information   Past Medical History:   Diagnosis Date    Arthritis     Atrial flutter (HCC)     Cholecystitis     Coronary artery disease     Hypertension     RBBB     Spinal stenosis      Past Surgical History:   Procedure Laterality Date    CHOLECYSTECTOMY      COLON SURGERY      bwel resection    COLONOSCOPY      ESOPHAGOGASTRODUODENOSCOPY      02/07/2007  ONSET    EYE SURGERY      HIP SURGERY      JOINT REPLACEMENT      NC LAPAROSCOPY SURG CHOLECYSTECTOMY N/A 12/05/2017    Procedure: CHOLECYSTECTOMY LAPAROSCOPIC;  Surgeon: Ez Lainez MD;  Location: [facility]  OR;  Service: General    SMALL INTESTINE SURGERY      ONSEC DEC 2011     Social History   Social History     Substance and Sexual Activity   Alcohol Use Yes    Comment: social      Social History     Substance and Sexual Activity   Drug Use No     Social History     Tobacco Use   Smoking Status Former    Current packs/day: 0.00    Average packs/day: 2.0 packs/day for 3.0 years (6.0 ttl pk-yrs)    Types: Cigarettes    Start date: 1963    Quit date: 1966    Years since quittin.6   Smokeless Tobacco Never     Family History   Problem Relation Age of Onset    Arthritis Mother     Heart attack Father     Coronary artery disease Family     Diabetes Family        Meds/Allergies       Facility-Administered Medications Prior to Admission:     cyanocobalamin injection 1,000 mcg    cyanocobalamin injection 1,000 mcg    Medications Prior to Admission:     amLODIPine (NORVASC) 10 mg tablet    apixaban (Eliquis) 5 mg    atorvastatin (LIPITOR) 40 mg tablet    ferrous sulfate 325 (65 Fe) mg tablet    gabapentin (NEURONTIN) 100 mg capsule    lisinopril (ZESTRIL) 40 mg tablet    potassium chloride (K-DUR,KLOR-CON) 20 mEq tablet    chlorthalidone 25 mg tablet    pantoprazole (PROTONIX) 40 mg tablet  Current Facility-Administered Medications   Medication Dose Route Frequency    heparin (porcine) subcutaneous injection 5,000 Units  5,000 Units Subcutaneous Q8H IRMA    hydrALAZINE (APRESOLINE) injection 5 mg  5 mg Intravenous Q6H PRN    hydrocortisone (Solu-CORTEF) injection 100 mg  100 mg Intravenous Q8H IRMA    HYDROmorphone HCl (DILAUDID) injection 0.2 mg  0.2 mg Intravenous Q4H PRN    ketorolac (TORADOL) injection 15 mg  15 mg Intravenous Q6H IRMA    lactated ringers infusion  125 mL/hr Intravenous Continuous    ondansetron (ZOFRAN) injection 4 mg  4 mg Intravenous Q6H PRN    pantoprazole (PROTONIX) injection 40 mg  40 mg Intravenous Q24H IRMA       No Known Allergies        Objective     Blood pressure 158/73, pulse 70,  "temperature 97.8 °F (36.6 °C), temperature source Oral, resp. rate 16, height 5' 9\" (1.753 m), weight 79.4 kg (175 lb), SpO2 92%. Body mass index is 25.84 kg/m².      Intake/Output Summary (Last 24 hours) at 8/27/2024 1319  Last data filed at 8/27/2024 1156  Gross per 24 hour   Intake 2340 ml   Output 2175 ml   Net 165 ml         PHYSICAL EXAM:      Physical Exam  Vitals and nursing note reviewed.   Constitutional:       General: He is not in acute distress.     Appearance: He is well-developed.   HENT:      Head: Normocephalic and atraumatic.   Eyes:      Conjunctiva/sclera: Conjunctivae normal.   Cardiovascular:      Rate and Rhythm: Normal rate and regular rhythm.      Heart sounds: No murmur heard.  Pulmonary:      Effort: Pulmonary effort is normal. No respiratory distress.      Breath sounds: Normal breath sounds.   Abdominal:      General: There is distension.      Palpations: Abdomen is soft.      Tenderness: There is no abdominal tenderness.   Musculoskeletal:         General: No swelling.      Cervical back: Neck supple.   Skin:     General: Skin is warm and dry.      Capillary Refill: Capillary refill takes less than 2 seconds.   Neurological:      Mental Status: He is alert.   Psychiatric:         Mood and Affect: Mood normal.          Lab Results:   Admission on 08/26/2024   Component Date Value    WBC 08/26/2024 14.47 (H)     RBC 08/26/2024 5.38     Hemoglobin 08/26/2024 16.2     Hematocrit 08/26/2024 47.5     MCV 08/26/2024 88     MCH 08/26/2024 30.1     MCHC 08/26/2024 34.1     RDW 08/26/2024 13.0     MPV 08/26/2024 11.5     Platelets 08/26/2024 180     nRBC 08/26/2024 0     Segmented % 08/26/2024 85 (H)     Immature Grans % 08/26/2024 1     Lymphocytes % 08/26/2024 9 (L)     Monocytes % 08/26/2024 5     Eosinophils Relative 08/26/2024 0     Basophils Relative 08/26/2024 0     Absolute Neutrophils 08/26/2024 12.35 (H)     Absolute Immature Grans 08/26/2024 0.07     Absolute Lymphocytes 08/26/2024 " 1.32     Absolute Monocytes 08/26/2024 0.67     Eosinophils Absolute 08/26/2024 0.04     Basophils Absolute 08/26/2024 0.02     Sodium 08/26/2024 139     Potassium 08/26/2024 3.4 (L)     Chloride 08/26/2024 98     CO2 08/26/2024 30     ANION GAP 08/26/2024 11     BUN 08/26/2024 16     Creatinine 08/26/2024 1.08     Glucose 08/26/2024 138     Calcium 08/26/2024 10.3 (H)     AST 08/26/2024 16     ALT 08/26/2024 14     Alkaline Phosphatase 08/26/2024 114 (H)     Total Protein 08/26/2024 7.9     Albumin 08/26/2024 4.9     Total Bilirubin 08/26/2024 3.08 (H)     eGFR 08/26/2024 64     Lipase 08/26/2024 11     Ventricular Rate 08/26/2024 73     Atrial Rate 08/26/2024 83     QRSD Interval 08/26/2024 152     QT Interval 08/26/2024 446     QTC Interval 08/26/2024 491     QRS Axis 08/26/2024 -71     T Wave Axis 08/26/2024 71     LACTIC ACID 08/26/2024 1.6     Bilirubin, Direct 08/26/2024 0.41 (H)     Sed Rate 08/26/2024 4     CRP 08/26/2024 28.8 (H)     POC Glucose 08/27/2024 119     WBC 08/27/2024 8.60     RBC 08/27/2024 4.44     Hemoglobin 08/27/2024 13.4     Hematocrit 08/27/2024 40.2     MCV 08/27/2024 91     MCH 08/27/2024 30.2     MCHC 08/27/2024 33.3     RDW 08/27/2024 13.2     MPV 08/27/2024 11.9     Platelets 08/27/2024 139 (L)     Sodium 08/27/2024 138     Potassium 08/27/2024 3.7     Chloride 08/27/2024 99     CO2 08/27/2024 30     ANION GAP 08/27/2024 9     BUN 08/27/2024 36 (H)     Creatinine 08/27/2024 1.12     Glucose 08/27/2024 120     Calcium 08/27/2024 9.0     AST 08/27/2024 18     ALT 08/27/2024 11     Alkaline Phosphatase 08/27/2024 65     Total Protein 08/27/2024 6.3 (L)     Albumin 08/27/2024 3.9     Total Bilirubin 08/27/2024 7.03 (H)     eGFR 08/27/2024 61     Magnesium 08/27/2024 1.6 (L)     Phosphorus 08/27/2024 2.9     Segmented % 08/27/2024 61     Bands % 08/27/2024 18 (H)     Lymphocytes % 08/27/2024 20     Monocytes % 08/27/2024 1 (L)     Eosinophils % 08/27/2024 0     Basophils % 08/27/2024 0      Absolute Neutrophils 08/27/2024 6.79     Absolute Lymphocytes 08/27/2024 1.72     Absolute Monocytes 08/27/2024 0.09     Absolute Eosinophils 08/27/2024 0.00     Absolute Basophils 08/27/2024 0.00     RBC Morphology 08/27/2024 Normal     Platelet Estimate 08/27/2024 Adequate     Large Platelet 08/27/2024 Present     Triscuspid Valve Regurgi* 08/27/2024 34.0     RAA A4C 08/27/2024 21.8     LA Volume Index (BP) 08/27/2024 22.1     AV peak gradient 08/27/2024 27     LVOT stroke volume 08/27/2024 45.00     Ao VTI 08/27/2024 45.83     Aortic valve peak veloci* 08/27/2024 2.49     LVOT peak VTI 08/27/2024 15.18     LVOT peak lv 08/27/2024 0.78     LVOT diameter 08/27/2024 1.9     AV LVOT peak gradient 08/27/2024 2     AV mean gradient 08/27/2024 16     RA 2D Volume 08/27/2024 66.0     TR Peak Lv 08/27/2024 2.9     AV area peak lv 08/27/2024 0.9     AV area by cont VTI 08/27/2024 0.9     LVOT mn grad 08/27/2024 2.0     RVID d 08/27/2024 3.8     A4C EF 08/27/2024 47     Aortic valve mean veloci* 08/27/2024 17.80     Tricuspid valve peak reg* 08/27/2024 2.92     Left ventricular stroke * 08/27/2024 74.00     IVSd 08/27/2024 1.10     Tricuspid annular plane * 08/27/2024 1.80     Ao root 08/27/2024 3.70     LVPWd 08/27/2024 1.20     LA size 08/27/2024 5.3     Asc Ao 08/27/2024 3.2     LA volume (BP) 08/27/2024 43     FS 08/27/2024 38     LVIDS 08/27/2024 3.00     IVS 08/27/2024 1.1     LVIDd 08/27/2024 4.80     LA length (A2C) 08/27/2024 4.10     LEFT VENTRICLE SYSTOLIC * 08/27/2024 34     LV DIASTOLIC VOLUME (MOD* 08/27/2024 108     LVOT Cardiac Index 08/27/2024 1.48     LVOT stroke volume index 08/27/2024 23.10     LVOT Cardiac Output 08/27/2024 2.89     Left Atrium Area-systoli* 08/27/2024 16.1     Left Atrium Area-systoli* 08/27/2024 13.9     LVSV, 2D 08/27/2024 74     LVOT area 08/27/2024 2.83     DVI 08/27/2024 0.31     AV valve area 08/27/2024 0.94     BSA 08/27/2024 1.95     LV EF 08/27/2024 60     Bilirubin,  Direct 08/27/2024 0.95 (H)        Imaging Studies: I have personally reviewed pertinent imaging studies.    Ania Duran MD  Gastroenterology Fellow  Available via EPIC Secure chat  8/27/2024 1:19 PM

## 2024-08-27 NOTE — ASSESSMENT & PLAN NOTE
Presented for abdominal pain and vomiting, found to have small bowel obstruction per CT imaging revealing possible terminal ileitis  Per patient's wife, he has a history of SBO x 2 in 1999, treated with NGT at [facility]. Required surgery for SBO in 2011 at [facility] (now closed)  NG tube was unable to be placed initially, placed today by surgery   Initiated on Solu-Cortef 100 mg IV Q8H  Continue NPO and IV fluids with LR@125 mL/hour  Additional care per primary team, general surgery  Monitor labs and vital signs, conduct serial physical assessments

## 2024-08-27 NOTE — PROGRESS NOTES
Pt bladder scanned for 340ml.  Pt encouraged to try and void.  Will recheck bladder in about an hour.

## 2024-08-28 ENCOUNTER — ANESTHESIA EVENT (INPATIENT)
Dept: PERIOP | Facility: HOSPITAL | Age: 79
DRG: 386 | End: 2024-08-28
Payer: MEDICARE

## 2024-08-28 ENCOUNTER — ANESTHESIA (INPATIENT)
Dept: PERIOP | Facility: HOSPITAL | Age: 79
DRG: 386 | End: 2024-08-28
Payer: MEDICARE

## 2024-08-28 ENCOUNTER — APPOINTMENT (INPATIENT)
Dept: RADIOLOGY | Facility: HOSPITAL | Age: 79
DRG: 386 | End: 2024-08-28
Payer: MEDICARE

## 2024-08-28 ENCOUNTER — APPOINTMENT (OUTPATIENT)
Dept: PERIOP | Facility: HOSPITAL | Age: 79
DRG: 386 | End: 2024-08-28
Payer: MEDICARE

## 2024-08-28 LAB
ALBUMIN SERPL BCG-MCNC: 3.6 G/DL (ref 3.5–5)
ALP SERPL-CCNC: 55 U/L (ref 34–104)
ALT SERPL W P-5'-P-CCNC: 10 U/L (ref 7–52)
ANION GAP SERPL CALCULATED.3IONS-SCNC: 8 MMOL/L (ref 4–13)
AST SERPL W P-5'-P-CCNC: 17 U/L (ref 13–39)
BILIRUB DIRECT SERPL-MCNC: 0.93 MG/DL (ref 0–0.2)
BILIRUB SERPL-MCNC: 6 MG/DL (ref 0.2–1)
BUN SERPL-MCNC: 36 MG/DL (ref 5–25)
CALCIUM SERPL-MCNC: 8.7 MG/DL (ref 8.4–10.2)
CHLORIDE SERPL-SCNC: 100 MMOL/L (ref 96–108)
CO2 SERPL-SCNC: 30 MMOL/L (ref 21–32)
CREAT SERPL-MCNC: 0.99 MG/DL (ref 0.6–1.3)
ERYTHROCYTE [DISTWIDTH] IN BLOOD BY AUTOMATED COUNT: 12.9 % (ref 11.6–15.1)
GFR SERPL CREATININE-BSD FRML MDRD: 71 ML/MIN/1.73SQ M
GLUCOSE SERPL-MCNC: 105 MG/DL (ref 65–140)
HCT VFR BLD AUTO: 38.4 % (ref 36.5–49.3)
HGB BLD-MCNC: 12.8 G/DL (ref 12–17)
MAGNESIUM SERPL-MCNC: 2.3 MG/DL (ref 1.9–2.7)
MCH RBC QN AUTO: 30.4 PG (ref 26.8–34.3)
MCHC RBC AUTO-ENTMCNC: 33.3 G/DL (ref 31.4–37.4)
MCV RBC AUTO: 91 FL (ref 82–98)
PHOSPHATE SERPL-MCNC: 2.9 MG/DL (ref 2.3–4.1)
PLATELET # BLD AUTO: 122 THOUSANDS/UL (ref 149–390)
PMV BLD AUTO: 11.7 FL (ref 8.9–12.7)
POTASSIUM SERPL-SCNC: 3.4 MMOL/L (ref 3.5–5.3)
PROT SERPL-MCNC: 6.1 G/DL (ref 6.4–8.4)
RBC # BLD AUTO: 4.21 MILLION/UL (ref 3.88–5.62)
SODIUM SERPL-SCNC: 138 MMOL/L (ref 135–147)
WBC # BLD AUTO: 5.99 THOUSAND/UL (ref 4.31–10.16)

## 2024-08-28 PROCEDURE — 0DH68UZ INSERTION OF FEEDING DEVICE INTO STOMACH, VIA NATURAL OR ARTIFICIAL OPENING ENDOSCOPIC: ICD-10-PCS | Performed by: SURGERY

## 2024-08-28 PROCEDURE — 99232 SBSQ HOSP IP/OBS MODERATE 35: CPT | Performed by: STUDENT IN AN ORGANIZED HEALTH CARE EDUCATION/TRAINING PROGRAM

## 2024-08-28 PROCEDURE — 80048 BASIC METABOLIC PNL TOTAL CA: CPT | Performed by: PHYSICIAN ASSISTANT

## 2024-08-28 PROCEDURE — 80076 HEPATIC FUNCTION PANEL: CPT | Performed by: PHYSICIAN ASSISTANT

## 2024-08-28 PROCEDURE — 99233 SBSQ HOSP IP/OBS HIGH 50: CPT

## 2024-08-28 PROCEDURE — 85027 COMPLETE CBC AUTOMATED: CPT | Performed by: PHYSICIAN ASSISTANT

## 2024-08-28 PROCEDURE — 43246 EGD PLACE GASTROSTOMY TUBE: CPT | Performed by: STUDENT IN AN ORGANIZED HEALTH CARE EDUCATION/TRAINING PROGRAM

## 2024-08-28 PROCEDURE — 83735 ASSAY OF MAGNESIUM: CPT | Performed by: PHYSICIAN ASSISTANT

## 2024-08-28 PROCEDURE — 84100 ASSAY OF PHOSPHORUS: CPT | Performed by: PHYSICIAN ASSISTANT

## 2024-08-28 PROCEDURE — 74018 RADEX ABDOMEN 1 VIEW: CPT

## 2024-08-28 PROCEDURE — 99232 SBSQ HOSP IP/OBS MODERATE 35: CPT | Performed by: SURGERY

## 2024-08-28 RX ORDER — PROPOFOL 10 MG/ML
INJECTION, EMULSION INTRAVENOUS AS NEEDED
Status: DISCONTINUED | OUTPATIENT
Start: 2024-08-28 | End: 2024-08-28

## 2024-08-28 RX ORDER — POTASSIUM CHLORIDE 14.9 MG/ML
20 INJECTION INTRAVENOUS ONCE
Status: COMPLETED | OUTPATIENT
Start: 2024-08-28 | End: 2024-08-29

## 2024-08-28 RX ORDER — ESMOLOL HYDROCHLORIDE 10 MG/ML
INJECTION INTRAVENOUS AS NEEDED
Status: DISCONTINUED | OUTPATIENT
Start: 2024-08-28 | End: 2024-08-28

## 2024-08-28 RX ORDER — ONDANSETRON 2 MG/ML
4 INJECTION INTRAMUSCULAR; INTRAVENOUS ONCE AS NEEDED
Status: DISCONTINUED | OUTPATIENT
Start: 2024-08-28 | End: 2024-08-28 | Stop reason: HOSPADM

## 2024-08-28 RX ORDER — LIDOCAINE HYDROCHLORIDE 20 MG/ML
INJECTION, SOLUTION EPIDURAL; INFILTRATION; INTRACAUDAL; PERINEURAL AS NEEDED
Status: DISCONTINUED | OUTPATIENT
Start: 2024-08-28 | End: 2024-08-28

## 2024-08-28 RX ORDER — ONDANSETRON 2 MG/ML
INJECTION INTRAMUSCULAR; INTRAVENOUS AS NEEDED
Status: DISCONTINUED | OUTPATIENT
Start: 2024-08-28 | End: 2024-08-28

## 2024-08-28 RX ORDER — SUCCINYLCHOLINE/SOD CL,ISO/PF 100 MG/5ML
SYRINGE (ML) INTRAVENOUS AS NEEDED
Status: DISCONTINUED | OUTPATIENT
Start: 2024-08-28 | End: 2024-08-28

## 2024-08-28 RX ORDER — LIDOCAINE HYDROCHLORIDE 20 MG/ML
1 JELLY TOPICAL ONCE
Status: COMPLETED | OUTPATIENT
Start: 2024-08-28 | End: 2024-08-28

## 2024-08-28 RX ADMIN — HEPARIN SODIUM 5000 UNITS: 5000 INJECTION INTRAVENOUS; SUBCUTANEOUS at 21:14

## 2024-08-28 RX ADMIN — PROPOFOL 40 MG: 10 INJECTION, EMULSION INTRAVENOUS at 15:18

## 2024-08-28 RX ADMIN — PANTOPRAZOLE SODIUM 40 MG: 40 INJECTION, POWDER, FOR SOLUTION INTRAVENOUS at 09:14

## 2024-08-28 RX ADMIN — Medication 100 MG: at 15:09

## 2024-08-28 RX ADMIN — HYDROCORTISONE SODIUM SUCCINATE 100 MG: 100 INJECTION, POWDER, FOR SOLUTION INTRAMUSCULAR; INTRAVENOUS at 14:07

## 2024-08-28 RX ADMIN — HYDROCORTISONE SODIUM SUCCINATE 100 MG: 100 INJECTION, POWDER, FOR SOLUTION INTRAMUSCULAR; INTRAVENOUS at 21:14

## 2024-08-28 RX ADMIN — POTASSIUM CHLORIDE 20 MEQ: 14.9 INJECTION, SOLUTION INTRAVENOUS at 10:43

## 2024-08-28 RX ADMIN — ONDANSETRON 4 MG: 2 INJECTION INTRAMUSCULAR; INTRAVENOUS at 15:16

## 2024-08-28 RX ADMIN — ESMOLOL HYDROCHLORIDE 20 MG: 100 INJECTION, SOLUTION INTRAVENOUS at 15:27

## 2024-08-28 RX ADMIN — KETOROLAC TROMETHAMINE 15 MG: 30 INJECTION, SOLUTION INTRAMUSCULAR; INTRAVENOUS at 05:18

## 2024-08-28 RX ADMIN — SODIUM CHLORIDE, SODIUM LACTATE, POTASSIUM CHLORIDE, AND CALCIUM CHLORIDE 125 ML/HR: .6; .31; .03; .02 INJECTION, SOLUTION INTRAVENOUS at 23:56

## 2024-08-28 RX ADMIN — HYDRALAZINE HYDROCHLORIDE 5 MG: 20 INJECTION INTRAMUSCULAR; INTRAVENOUS at 16:19

## 2024-08-28 RX ADMIN — TOPICAL ANESTHETIC 2 SPRAY: 200 SPRAY DENTAL; PERIODONTAL at 09:22

## 2024-08-28 RX ADMIN — LIDOCAINE HYDROCHLORIDE 1 APPLICATION: 20 JELLY TOPICAL at 09:14

## 2024-08-28 RX ADMIN — SODIUM CHLORIDE, SODIUM LACTATE, POTASSIUM CHLORIDE, AND CALCIUM CHLORIDE 125 ML/HR: .6; .31; .03; .02 INJECTION, SOLUTION INTRAVENOUS at 14:07

## 2024-08-28 RX ADMIN — HEPARIN SODIUM 5000 UNITS: 5000 INJECTION INTRAVENOUS; SUBCUTANEOUS at 14:07

## 2024-08-28 RX ADMIN — HYDRALAZINE HYDROCHLORIDE 5 MG: 20 INJECTION INTRAMUSCULAR; INTRAVENOUS at 23:04

## 2024-08-28 RX ADMIN — ESMOLOL HYDROCHLORIDE 20 MG: 100 INJECTION, SOLUTION INTRAVENOUS at 15:21

## 2024-08-28 RX ADMIN — PROPOFOL 110 MG: 10 INJECTION, EMULSION INTRAVENOUS at 15:09

## 2024-08-28 RX ADMIN — SODIUM CHLORIDE, SODIUM LACTATE, POTASSIUM CHLORIDE, AND CALCIUM CHLORIDE 125 ML/HR: .6; .31; .03; .02 INJECTION, SOLUTION INTRAVENOUS at 05:07

## 2024-08-28 RX ADMIN — LIDOCAINE HYDROCHLORIDE 60 MG: 20 INJECTION, SOLUTION EPIDURAL; INFILTRATION; INTRACAUDAL; PERINEURAL at 15:08

## 2024-08-28 RX ADMIN — LIDOCAINE HYDROCHLORIDE 40 MG: 20 INJECTION, SOLUTION EPIDURAL; INFILTRATION; INTRACAUDAL; PERINEURAL at 15:27

## 2024-08-28 RX ADMIN — HYDROCORTISONE SODIUM SUCCINATE 100 MG: 100 INJECTION, POWDER, FOR SOLUTION INTRAMUSCULAR; INTRAVENOUS at 05:18

## 2024-08-28 RX ADMIN — HEPARIN SODIUM 5000 UNITS: 5000 INJECTION INTRAVENOUS; SUBCUTANEOUS at 05:18

## 2024-08-28 NOTE — ASSESSMENT & PLAN NOTE
Noted to have high-grade right carotid stenosis   Per vascular surgery note in 2019, patient's anatomy not favorable for carotid stenting due to extensive bulky calcific plaque and due to cervical spine immobility and location of carotid stenosis difficult to achieve adequate exposure to perform endarterectomy in safe manner  He was started on DAPT however this was discontinued in favor of apixaban for atrial fibrillation  Carotid ultrasound unchanged from prior study 10/16/2020

## 2024-08-28 NOTE — PROGRESS NOTES
[facility]  Progress Note  Name: Tee Wadsworth I  MRN: [id number]  Unit/Bed#: -01 I Date of Admission: 8/26/2024   Date of Service: 8/28/2024 I Hospital Day: 2    Assessment & Plan   * SBO (small bowel obstruction) (HCC)  Assessment & Plan  Presented for abdominal pain and vomitiing  CT abdomen pelvis: Suggests acute small bowel obstruction  Patient is under the care of surgical services, Slim following for medical management  NG tube was pulled out overnight-general surgery reinserting  Continue NPO and IV fluids with LR@125 mL/hour  Continue Solu-Cortef  Monitor labs and vital signs, conduct serial physical assessments    Crohn's disease of both small and large intestine without complication (HCC)  Assessment & Plan  With reported history of Crohn's  GI input appreciated: Requires colonoscopy for evaluation and definitive diagnosis, However, would recommend this be done in 6 to 8 weeks following resolution of SBO    Elevated bilirubin  Assessment & Plan  Appreciate input of GI who are following -Recommend nonurgent MRI/MRCP for further evaluation of pancreaticobiliary anatomy.  This could be done in the outpatient setting.  Will defer further serologic workup at this time due to suspicion for Gilbert's syndrome  Continue to trend    Confusion  Assessment & Plan  Per chart review, patient with confusion  Per patient's wife, he has been having short-term memory difficulty for the past several months   Mentation continues to improve   Presentation could be hospitalization versus cognitive decline   Maintain sleep-wake cycle, promote restful environment  Conduct serial assessments    Bilateral carotid artery stenosis  Assessment & Plan  Noted to have high-grade right carotid stenosis   Per vascular surgery note in 2019, patient's anatomy not favorable for carotid stenting due to extensive bulky calcific plaque and due to cervical spine immobility and location of carotid stenosis  difficult to achieve adequate exposure to perform endarterectomy in safe manner  He was started on DAPT however this was discontinued in favor of apixaban for atrial fibrillation  Carotid ultrasound unchanged from prior study 10/16/2020    Atrial fibrillation (HCC)  Assessment & Plan  Takes apixaban, currently on hold pending clinical course for SBO  Does not appear to be on beta-blocker at this time  Rate controlled   On heparin for VTE prophylaxis      Essential hypertension  Assessment & Plan  Blood pressure results reviewed   Home meds include lisinopril, amlodipine, and chlorthalidone as an outpatient, currently on hold while n.p.o.  Continue hydralazine IV PRN  Continue to monitor BP per protocol    Pancreatic cyst  Assessment & Plan  CT abdomen pelvis: Re-identified prominence of the main pancreatic duct with several nonspecific subcentimeter cystic lesions, overall not significantly changed from previous CT examinations  MRCP: Noted cystic pancreatic lesions, recommended outpatient follow-up    Lumbar radiculopathy  Assessment & Plan  History of lumbar radiculopathy, ankylosing spondylitis, treated with injections  Follows with pain and spine  Also with bilateral foot neuropathy, recently initiated on gabapentin 100 mg p.o. 3 times daily on 8/7/2024  Conduct serial physical assessments, monitor for adequate pain control               VTE Pharmacologic Prophylaxis: VTE Score: 3 Moderate Risk (Score 3-4) - Pharmacological DVT Prophylaxis Ordered: heparin.    Mobility:   Basic Mobility Inpatient Raw Score: 19  JH-HLM Goal: 6: Walk 10 steps or more  JH-HLM Achieved: 6: Walk 10 steps or more  JH-HLM Goal achieved. Continue to encourage appropriate mobility.    Patient Centered Rounds: I performed bedside rounds with nursing staff today.   Discussions with Specialists or Other Care Team Provider: General surgery, GI, nursing, case management    Education and Discussions with Family / Patient: Updated contact  person (wife) via phone.    Total Time Spent on Date of Encounter in care of patient: 38 mins. This time was spent on one or more of the following: performing physical exam; counseling and coordination of care; obtaining or reviewing history; documenting in the medical record; reviewing/ordering tests, medications or procedures; communicating with other healthcare professionals and discussing with patient's family/caregivers.    Current Length of Stay: 2 day(s)  Current Patient Status: Inpatient   Certification Statement: The patient will continue to require additional inpatient hospital stay due to management of small bowel obstruction under the primary service of general surgery  Discharge Plan:  Patient is being treated for small bowel obstruction.  He is currently under the primary service of general surgery, Slim following for medical management.  He is currently n.p.o. with an NG tube in place, IV fluids.  Pain controlled.  Discharge will be per primary team.    Code Status: Prior    Subjective:   Denies any dizziness, lightheadedness, chest pain or palpitations.  Denies abdominal discomfort.  No flatus or bowel movements per patient    Objective:     Vitals:   Temp (24hrs), Av.9 °F (36.6 °C), Min:96.9 °F (36.1 °C), Max:98.6 °F (37 °C)    Temp:  [96.9 °F (36.1 °C)-98.6 °F (37 °C)] 96.9 °F (36.1 °C)  HR:  [53-79] 53  Resp:  [18-24] 24  BP: (124-153)/() 147/106  SpO2:  [88 %-96 %] 96 %  Body mass index is 25.84 kg/m².     Input and Output Summary (last 24 hours):     Intake/Output Summary (Last 24 hours) at 2024 1052  Last data filed at 2024 0825  Gross per 24 hour   Intake 1710 ml   Output 2488 ml   Net -778 ml       Physical Exam:   Physical Exam  Vitals and nursing note reviewed.   Constitutional:       General: He is not in acute distress.     Appearance: He is well-developed.   HENT:      Head: Normocephalic and atraumatic.      Mouth/Throat:      Mouth: Mucous membranes are dry.    Cardiovascular:      Rate and Rhythm: Normal rate and regular rhythm.      Pulses: Normal pulses.      Heart sounds: Normal heart sounds. No murmur heard.  Pulmonary:      Effort: Pulmonary effort is normal. No respiratory distress.      Breath sounds: Normal breath sounds. No wheezing or rales.   Abdominal:      General: There is no distension.      Palpations: Abdomen is soft.      Tenderness: There is no abdominal tenderness. There is no guarding.      Comments: Hypoactive bowel sounds x 4   Musculoskeletal:         General: No swelling.   Skin:     General: Skin is warm and dry.      Capillary Refill: Capillary refill takes less than 2 seconds.   Neurological:      General: No focal deficit present.      Mental Status: He is alert. Mental status is at baseline.   Psychiatric:         Mood and Affect: Mood normal.         Behavior: Behavior normal.          Additional Data:     Labs:  Results from last 7 days   Lab Units 08/28/24  0513 08/27/24  0544 08/26/24  0119   WBC Thousand/uL 5.99 8.60 14.47*   HEMOGLOBIN g/dL 12.8 13.4 16.2   HEMATOCRIT % 38.4 40.2 47.5   PLATELETS Thousands/uL 122* 139* 180   BANDS PCT %  --  18*  --    SEGS PCT %  --   --  85*   LYMPHO PCT %  --  20 9*   MONO PCT %  --  1* 5   EOS PCT %  --  0 0     Results from last 7 days   Lab Units 08/28/24  0513   SODIUM mmol/L 138   POTASSIUM mmol/L 3.4*   CHLORIDE mmol/L 100   CO2 mmol/L 30   BUN mg/dL 36*   CREATININE mg/dL 0.99   ANION GAP mmol/L 8   CALCIUM mg/dL 8.7   ALBUMIN g/dL 3.6   TOTAL BILIRUBIN mg/dL 6.00*   ALK PHOS U/L 55   ALT U/L 10   AST U/L 17   GLUCOSE RANDOM mg/dL 105         Results from last 7 days   Lab Units 08/27/24  0247   POC GLUCOSE mg/dl 119         Results from last 7 days   Lab Units 08/26/24  0348   LACTIC ACID mmol/L 1.6       Lines/Drains:  Invasive Devices       Peripheral Intravenous Line  Duration             Peripheral IV 08/26/24 Right;Ventral (anterior) Forearm 2 days              Drain  Duration              NG/OG/Enteral Tube Nasogastric Right nare 1 day                          Imaging: Reviewed radiology reports from this admission including: chest xray, abdominal/pelvic CT, and MRI abdomen/MRCP    Recent Cultures (last 7 days):         Last 24 Hours Medication List:   Current Facility-Administered Medications   Medication Dose Route Frequency Provider Last Rate    heparin (porcine)  5,000 Units Subcutaneous Q8H IRMA Gallo MD      hydrALAZINE  5 mg Intravenous Q6H PRN Kaelyn López PA-C      hydrocortisone sodium succinate  100 mg Intravenous Q8H IRMA Vanegas PA-C      HYDROmorphone  0.2 mg Intravenous Q4H PRN Kaelyn López PA-C      ketorolac  15 mg Intravenous Q6H IRMA López PA-C      lactated ringers  125 mL/hr Intravenous Continuous Jarred Gallo  mL/hr (08/28/24 0507)    ondansetron  4 mg Intravenous Q6H PRN Jarred Gallo MD      pantoprazole  40 mg Intravenous Q24H IRMA Gallo MD      potassium chloride  20 mEq Intravenous Once MYNOR Matos 20 mEq (08/28/24 1043)        Today, Patient Was Seen By: MYNOR Matos    **Please Note: This note may have been constructed using a voice recognition system.**

## 2024-08-28 NOTE — ASSESSMENT & PLAN NOTE
CT abdomen pelvis: Re-identified prominence of the main pancreatic duct with several nonspecific subcentimeter cystic lesions, overall not significantly changed from previous CT examinations  MRCP: Noted cystic pancreatic lesions, recommended outpatient follow-up

## 2024-08-28 NOTE — ANESTHESIA PREPROCEDURE EVALUATION
Procedure:  EGD    Relevant Problems   CARDIO   (+) Atrial fibrillation (HCC)   (+) Essential hypertension      GI/HEPATIC   (+) Esophageal reflux   (+) Pancreatic cyst   (+) SBO (small bowel obstruction) (HCC)      HEMATOLOGY   (+) Pernicious anemia      MUSCULOSKELETAL   (+) Ankylosing spondylitis (HCC)   (+) Chronic low back pain   (+) Lumbosacral spondylosis without myelopathy      NEURO/PSYCH   (+) Chronic low back pain        Physical Exam    Airway    Mallampati score: I  TM Distance: >3 FB  Neck ROM: full     Dental       Cardiovascular  Cardiovascular exam normal    Pulmonary  Pulmonary exam normal     Other Findings        Anesthesia Plan  ASA Score- 3     Anesthesia Type- IV sedation with anesthesia with ASA Monitors.         Additional Monitors:     Airway Plan:            Plan Factors-Exercise tolerance (METS): >4 METS.    Chart reviewed. EKG reviewed. Imaging results reviewed. Existing labs reviewed. Patient summary reviewed.                  Induction- intravenous.    Postoperative Plan- Plan for postoperative opioid use. Planned trial extubation        Informed Consent- Anesthetic plan and risks discussed with patient.  I personally reviewed this patient with the CRNA. Discussed and agreed on the Anesthesia Plan with the CRNA..

## 2024-08-28 NOTE — ASSESSMENT & PLAN NOTE
Per chart review, patient with confusion  Per patient's wife, he has been having short-term memory difficulty for the past several months   Mentation continues to improve   Presentation could be hospitalization versus cognitive decline   Maintain sleep-wake cycle, promote restful environment  Conduct serial assessments

## 2024-08-28 NOTE — ANESTHESIA POSTPROCEDURE EVALUATION
Post-Op Assessment Note    CV Status:  Stable    Pain management: adequate       Mental Status:  Alert and awake   Hydration Status:  Euvolemic   PONV Controlled:  Controlled   Airway Patency:  Patent     Post Op Vitals Reviewed: Yes    No anethesia notable event occurred.    Staff: CRNA               BP   210/103   Temp   98   Pulse  75   Resp   19   SpO2   93

## 2024-08-28 NOTE — PROGRESS NOTES
"Progress Note - General Surgery   Tee Forrest Jr. 79 y.o. male MRN: [id number]  Unit/Bed#: -01 Encounter: [id number]    Assessment:  80yo male PMH CAD, atrial flutter, prior SBOs presents with SBO and terminal ileitis   -MCRP 8/27: \"Cystic pancreatic lesions measuring up to 11 mm at the pancreatic body concerning for sidebranch IPMN. Stable prominence of the main pancreatic duct notably at the pancreatic body and tail up to 6 mm\"    -NG tube was inserted yesterday with 2.6L of output; overnight NG tube was accidentally pulled out by the patient   -two attempts to re-insert the tube by myself and the nurse were unsuccessful   -at this time patient does not have any abdominal pain, nausea, vomiting; patient denies passing flatus but did tell the GI team that he did; no recorded bowel movements   -abdomen is flat, soft, nontender   -afebrile, VSS   -no leukocytosis   -Hgb stable   -Plt 122 (139)   -K 3.4    Plan:  -will attempt to place NG tube later this afternoon  -NPO, IVF  -continue hydrocortisone  -monitor for bowel function  -encourage OOB  -appreciate GI and [facility] recs  -DVT prophylaxis  -trend labs and vitals  -discussed with Dr Dyer    Subjective/Objective     Subjective: Patient accidentally pulled the NG tube out overnight. He has not had nausea, vomiting, abdominal pain since. Denies return of bowel function.     Objective:     Blood pressure (!) 147/106, pulse (!) 53, temperature (!) 96.9 °F (36.1 °C), temperature source Temporal, resp. rate (!) 24, height 5' 9\" (1.753 m), weight 79.4 kg (175 lb), SpO2 96%.,Body mass index is 25.84 kg/m².      Intake/Output Summary (Last 24 hours) at 8/28/2024 1005  Last data filed at 8/28/2024 0825  Gross per 24 hour   Intake 1710 ml   Output 2488 ml   Net -778 ml       Invasive Devices       Peripheral Intravenous Line  Duration             Peripheral IV 08/26/24 Right;Ventral (anterior) Forearm 2 days              Drain  Duration             NG/OG/Enteral " Tube Nasogastric Right nare 1 day                  Physical Exam  Constitutional:       Appearance: Normal appearance.   HENT:      Mouth/Throat:      Mouth: Mucous membranes are moist.      Pharynx: Oropharynx is clear.   Cardiovascular:      Rate and Rhythm: Normal rate.   Pulmonary:      Effort: Pulmonary effort is normal.   Abdominal:      General: Abdomen is flat.      Palpations: Abdomen is soft.      Tenderness: There is no abdominal tenderness.   Musculoskeletal:         General: Normal range of motion.   Skin:     General: Skin is warm and dry.   Neurological:      General: No focal deficit present.      Mental Status: He is alert.          Lab, Imaging and other studies:CBC:   Lab Results   Component Value Date    WBC 5.99 08/28/2024    HGB 12.8 08/28/2024    HCT 38.4 08/28/2024    MCV 91 08/28/2024     (L) 08/28/2024    RBC 4.21 08/28/2024    MCH 30.4 08/28/2024    MCHC 33.3 08/28/2024    RDW 12.9 08/28/2024    MPV 11.7 08/28/2024   , CMP:   Lab Results   Component Value Date    SODIUM 138 08/28/2024    K 3.4 (L) 08/28/2024     08/28/2024    CO2 30 08/28/2024    BUN 36 (H) 08/28/2024    CREATININE 0.99 08/28/2024    CALCIUM 8.7 08/28/2024    AST 17 08/28/2024    ALT 10 08/28/2024    ALKPHOS 55 08/28/2024    EGFR 71 08/28/2024     VTE Pharmacologic Prophylaxis: Heparin  VTE Mechanical Prophylaxis: sequential compression device    Kayy Vanegas PA-C

## 2024-08-28 NOTE — PROGRESS NOTES
Patient pulled out ng tube by accident in his sleep. Patient refused to let me or anyone else insert ng tube. Patient is requesting to have surgery team do it if it necessary. [facility] notified.

## 2024-08-28 NOTE — PLAN OF CARE
Problem: PAIN - ADULT  Goal: Verbalizes/displays adequate comfort level or baseline comfort level  Description: Interventions:  - Encourage patient to monitor pain and request assistance  - Assess pain using appropriate pain scale  - Administer analgesics based on type and severity of pain and evaluate response  - Implement non-pharmacological measures as appropriate and evaluate response  - Consider cultural and social influences on pain and pain management  - Notify physician/advanced practitioner if interventions unsuccessful or patient reports new pain  Outcome: Progressing     Problem: INFECTION - ADULT  Goal: Absence or prevention of progression during hospitalization  Description: INTERVENTIONS:  - Assess and monitor for signs and symptoms of infection  - Monitor lab/diagnostic results  - Monitor all insertion sites, i.e. indwelling lines, tubes, and drains  - Monitor endotracheal if appropriate and nasal secretions for changes in amount and color  - [place] appropriate cooling/warming therapies per order  - Administer medications as ordered  - Instruct and encourage patient and family to use good hand hygiene technique  - Identify and instruct in appropriate isolation precautions for identified infection/condition  Outcome: Progressing  Goal: Absence of fever/infection during neutropenic period  Description: INTERVENTIONS:  - Monitor WBC    Outcome: Progressing

## 2024-08-28 NOTE — ASSESSMENT & PLAN NOTE
Takes apixaban, currently on hold pending clinical course for SBO  Does not appear to be on beta-blocker at this time  Rate controlled   On heparin for VTE prophylaxis

## 2024-08-28 NOTE — PLAN OF CARE
Problem: PAIN - ADULT  Goal: Verbalizes/displays adequate comfort level or baseline comfort level  Description: Interventions:  - Encourage patient to monitor pain and request assistance  - Assess pain using appropriate pain scale  - Administer analgesics based on type and severity of pain and evaluate response  - Implement non-pharmacological measures as appropriate and evaluate response  - Consider cultural and social influences on pain and pain management  - Notify physician/advanced practitioner if interventions unsuccessful or patient reports new pain  Outcome: Progressing     Problem: INFECTION - ADULT  Goal: Absence or prevention of progression during hospitalization  Description: INTERVENTIONS:  - Assess and monitor for signs and symptoms of infection  - Monitor lab/diagnostic results  - Monitor all insertion sites, i.e. indwelling lines, tubes, and drains  - Monitor endotracheal if appropriate and nasal secretions for changes in amount and color  - [place] appropriate cooling/warming therapies per order  - Administer medications as ordered  - Instruct and encourage patient and family to use good hand hygiene technique  - Identify and instruct in appropriate isolation precautions for identified infection/condition  Outcome: Progressing  Goal: Absence of fever/infection during neutropenic period  Description: INTERVENTIONS:  - Monitor WBC    Outcome: Progressing     Problem: SAFETY ADULT  Goal: Patient will remain free of falls  Description: INTERVENTIONS:  - Educate patient/family on patient safety including physical limitations  - Instruct patient to call for assistance with activity   - Consult OT/PT to assist with strengthening/mobility   - Keep Call bell within reach  - Keep bed low and locked with side rails adjusted as appropriate  - Keep care items and personal belongings within reach  - Initiate and maintain comfort rounds  - Make Fall Risk Sign visible to staff  - Offer Toileting every 2 Hours,  in advance of need  - Initiate/Maintain bed alarm  - Obtain necessary fall risk management equipment: walker   - Apply yellow socks and bracelet for high fall risk patients  - Consider moving patient to room near nurses station  Outcome: Progressing  Goal: Maintain or return to baseline ADL function  Description: INTERVENTIONS:  -  Assess patient's ability to carry out ADLs; assess patient's baseline for ADL function and identify physical deficits which impact ability to perform ADLs (bathing, care of mouth/teeth, toileting, grooming, dressing, etc.)  - Assess/evaluate cause of self-care deficits   - Assess range of motion  - Assess patient's mobility; develop plan if impaired  - Assess patient's need for assistive devices and provide as appropriate  - Encourage maximum independence but intervene and supervise when necessary  - Involve family in performance of ADLs  - Assess for home care needs following discharge   - Consider OT consult to assist with ADL evaluation and planning for discharge  - Provide patient education as appropriate  Outcome: Progressing  Goal: Maintains/Returns to pre admission functional level  Description: INTERVENTIONS:  - Perform AM-PAC 6 Click Basic Mobility/ Daily Activity assessment daily.  - Set and communicate daily mobility goal to care team and patient/family/caregiver.   - Collaborate with rehabilitation services on mobility goals if consulted  - Perform Range of Motion 3 times a day.  - Reposition patient every 2 hours.  - Dangle patient 3 times a day  - Stand patient 3 times a day  - Ambulate patient 3 times a day  - Out of bed to chair 3 times a day   - Out of bed for meals 3 times a day  - Out of bed for toileting  - Record patient progress and toleration of activity level   Outcome: Progressing     Problem: DISCHARGE PLANNING  Goal: Discharge to home or other facility with appropriate resources  Description: INTERVENTIONS:  - Identify barriers to discharge w/patient and  caregiver  - Arrange for needed discharge resources and transportation as appropriate  - Identify discharge learning needs (meds, wound care, etc.)  - Arrange for interpretive services to assist at discharge as needed  - Refer to Case Management Department for coordinating discharge planning if the patient needs post-hospital services based on physician/advanced practitioner order or complex needs related to functional status, cognitive ability, or social support system  Outcome: Progressing     Problem: Knowledge Deficit  Goal: Patient/family/caregiver demonstrates understanding of disease process, treatment plan, medications, and discharge instructions  Description: Complete learning assessment and assess knowledge base.  Interventions:  - Provide teaching at level of understanding  - Provide teaching via preferred learning methods  Outcome: Progressing

## 2024-08-28 NOTE — ASSESSMENT & PLAN NOTE
Appreciate input of GI who are following -Recommend nonurgent MRI/MRCP for further evaluation of pancreaticobiliary anatomy.  This could be done in the outpatient setting.  Will defer further serologic workup at this time due to suspicion for Gilbert's syndrome  Continue to trend

## 2024-08-28 NOTE — NURSING NOTE
NG re-insertion attempted twice per physician, with no success. Surgical PA-C present at bedside. General Surgeon made aware of attempts.

## 2024-08-28 NOTE — PROGRESS NOTES
Progress Note -  [facility]  Tee Forrest Jr. 79 y.o. male MRN: [id number]  Unit/Bed#: -01 Encounter: [id number]      ASSESSMENT AND PLAN:      Tee Forrest Jr. is a 79 y.o. old male with PMHx notable for recurrent small bowel obstruction s/p resection, prior cholecystectomy, HTN, Aflutter on Eliquis, and mild AS who presents 8/26 with small bowel obstruction.  Gastroenterology has been consulted due to reported history of Crohn's disease.     Small bowel obstruction  Concern for Terminal Ileitis 2/2 Crohn's???  CT imaging on admission shows acute small bowel obstruction with suspected transition point at the TI which appears to be inflamed on imaging.  There is a reported history of Crohn's disease that was diagnosed after recurrent small bowel obstructions requiring resection, however he has not been on therapy or following with gastroenterology for this so I am suspicious of this diagnosis.  We could consider colonoscopy with biopsy of TI for definitive diagnosis in 6 to 8 weeks after resolution of small bowel obstruction  CRP elevated, given history and concern for inflammatory ileitis, I agree with starting IV steroids  If patient is able to produce stool sample would recommend checking fecal calprotectin  Management of SBO per surgery team; agree with replacement of NG tube for decompression  Upon discharge we will arrange for outpatient follow-up with IBD team     Elevated liver enzymes  Dilated pancreatic duct  Labs on admission shows cholestatic liver enzyme elevation with T. bili 3.08 with .  Transaminases normal.  Bilirubin appears to be chronically elevated for many years as high as 3.5 in 2020.  He has a history of cholecystitis with choledocholithiasis in 2017 status post cholecystectomy.  CT on admission shows dilated PD to 6 mm along with subcentimeter cystic lesions measuring up to 7 mm, all of which have been previously seen on prior imaging.  No CBD dilation  on imaging.  Differential includes cholestasis secondary to [place], pancreatic cystic neoplasm such as simple cyst, serocystadenoma, mucinous cystic neoplasm, IPMN, pseudopapillary neoplasm, versus adenocarcinoma.  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  No other suspicious lesions.  Can be followed up with outpatient MRI or CT abdomen in 1 to 2 years  His unconjugated hyperbilirubinemia could be 2/2 [place]. Can consider ruling out  hemolysis with haptoglobin.   Monitor liver enzymes daily        Rest of care per primary team.    ______________________________________________________________________    Subjective: Patient was seen and examined bedside this morning.  Unfortunately he accidentally removed his NG tube overnight.  He reports overall improvement in his  distention, nausea and vomiting after placement of NG tube.  He is passing gas intermittently but does not report any bowel movement yet.  Plan to replace NG tube this morning    REVIEW OF SYSTEMS:    Review of Systems   Constitutional:  Negative for chills and fever.   HENT:  Negative for ear pain and sore throat.    Eyes:  Negative for pain and visual disturbance.   Respiratory:  Negative for cough and shortness of breath.    Cardiovascular:  Negative for chest pain and palpitations.   Gastrointestinal:  Negative for abdominal pain and vomiting.   Genitourinary:  Negative for dysuria and hematuria.   Musculoskeletal:  Negative for arthralgias and back pain.   Skin:  Negative for color change and rash.   Neurological:  Negative for seizures and syncope.   All other systems reviewed and are negative.         Historical Information   Past Medical History:   Diagnosis Date    Arthritis     Atrial flutter (HCC)     Cholecystitis     Coronary artery disease     Hypertension     RBBB     Spinal stenosis      Past Surgical History:   Procedure Laterality Date    CHOLECYSTECTOMY       COLON SURGERY      bwel resection    COLONOSCOPY      ESOPHAGOGASTRODUODENOSCOPY      2007  ONSET    EYE SURGERY      HIP SURGERY      JOINT REPLACEMENT      [place] LAPAROSCOPY SURG CHOLECYSTECTOMY N/A 2017    Procedure: CHOLECYSTECTOMY LAPAROSCOPIC;  Surgeon: Ez Lainez MD;  [facility];  Service: General    SMALL INTESTINE SURGERY      ONSEC DEC 2011     Social History   Social History     Substance and Sexual Activity   Alcohol Use Yes    Comment: social      Social History     Substance and Sexual Activity   Drug Use No     Social History     Tobacco Use   Smoking Status Former    Current packs/day: 0.00    Average packs/day: 2.0 packs/day for 3.0 years (6.0 ttl pk-yrs)    Types: Cigarettes    Start date: 1963    Quit date:     Years since quittin.6   Smokeless Tobacco Never     Family History   Problem Relation Age of Onset    Arthritis Mother     Heart attack Father     Coronary artery disease Family     Diabetes Family        Meds/Allergies       Facility-Administered Medications Prior to Admission:     cyanocobalamin injection 1,000 mcg    cyanocobalamin injection 1,000 mcg    Medications Prior to Admission:     amLODIPine (NORVASC) 10 mg tablet    apixaban (Eliquis) 5 mg    atorvastatin (LIPITOR) 40 mg tablet    ferrous sulfate 325 (65 Fe) mg tablet    gabapentin (NEURONTIN) 100 mg capsule    lisinopril (ZESTRIL) 40 mg tablet    potassium chloride (K-DUR,KLOR-CON) 20 mEq tablet    chlorthalidone 25 mg tablet    pantoprazole (PROTONIX) 40 mg tablet  Current Facility-Administered Medications   Medication Dose Route Frequency    heparin (porcine) subcutaneous injection 5,000 Units  5,000 Units Subcutaneous Q8H IRMA    hydrALAZINE (APRESOLINE) injection 5 mg  5 mg Intravenous Q6H PRN    hydrocortisone (Solu-CORTEF) injection 100 mg  100 mg Intravenous Q8H IRMA    HYDROmorphone HCl (DILAUDID) injection 0.2 mg  0.2 mg Intravenous Q4H PRN    ketorolac (TORADOL) injection 15 mg  15 mg  "Intravenous Q6H IRMA    lactated ringers infusion  125 mL/hr Intravenous Continuous    ondansetron (ZOFRAN) injection 4 mg  4 mg Intravenous Q6H PRN    pantoprazole (PROTONIX) injection 40 mg  40 mg Intravenous Q24H IRMA    potassium chloride 20 mEq IVPB (premix)  20 mEq Intravenous Once       No Known Allergies        Objective     Blood pressure (!) 147/106, pulse (!) 53, temperature (!) 96.9 °F (36.1 °C), temperature source Temporal, resp. rate (!) 24, height 5' 9\" (1.753 m), weight 79.4 kg (175 lb), SpO2 96%. Body mass index is 25.84 kg/m².      Intake/Output Summary (Last 24 hours) at 8/28/2024 1018  Last data filed at 8/28/2024 0825  Gross per 24 hour   Intake 1710 ml   Output 2488 ml   Net -778 ml         PHYSICAL EXAM:      Physical Exam  Vitals and nursing note reviewed.   Constitutional:       General: He is not in acute distress.     Appearance: He is well-developed.   HENT:      Head: Normocephalic and atraumatic.   Eyes:      Conjunctiva/sclera: Conjunctivae normal.   Cardiovascular:      Rate and Rhythm: Normal rate and regular rhythm.      Heart sounds: No murmur heard.  Pulmonary:      Effort: Pulmonary effort is normal. No respiratory distress.      Breath sounds: Normal breath sounds.   Abdominal:      General: There is distension.      Palpations: Abdomen is soft.      Tenderness: There is no abdominal tenderness.   Musculoskeletal:         General: No swelling.      Cervical back: Neck supple.   Skin:     General: Skin is warm and dry.      Capillary Refill: Capillary refill takes less than 2 seconds.   Neurological:      Mental Status: He is alert.   Psychiatric:         Mood and Affect: Mood normal.          Lab Results:   Admission on 08/26/2024   Component Date Value    WBC 08/26/2024 14.47 (H)     RBC 08/26/2024 5.38     Hemoglobin 08/26/2024 16.2     Hematocrit 08/26/2024 47.5     MCV 08/26/2024 88     MCH 08/26/2024 30.1     MCHC 08/26/2024 34.1     RDW 08/26/2024 13.0     MPV 08/26/2024 " 11.5     Platelets 08/26/2024 180     nRBC 08/26/2024 0     Segmented % 08/26/2024 85 (H)     Immature Grans % 08/26/2024 1     Lymphocytes % 08/26/2024 9 (L)     Monocytes % 08/26/2024 5     Eosinophils Relative 08/26/2024 0     Basophils Relative 08/26/2024 0     Absolute Neutrophils 08/26/2024 12.35 (H)     Absolute Immature Grans 08/26/2024 0.07     Absolute Lymphocytes 08/26/2024 1.32     Absolute Monocytes 08/26/2024 0.67     Eosinophils Absolute 08/26/2024 0.04     Basophils Absolute 08/26/2024 0.02     Sodium 08/26/2024 139     Potassium 08/26/2024 3.4 (L)     Chloride 08/26/2024 98     CO2 08/26/2024 30     ANION GAP 08/26/2024 11     BUN 08/26/2024 16     Creatinine 08/26/2024 1.08     Glucose 08/26/2024 138     Calcium 08/26/2024 10.3 (H)     AST 08/26/2024 16     ALT 08/26/2024 14     Alkaline Phosphatase 08/26/2024 114 (H)     Total Protein 08/26/2024 7.9     Albumin 08/26/2024 4.9     Total Bilirubin 08/26/2024 3.08 (H)     eGFR 08/26/2024 64     Lipase 08/26/2024 11     Ventricular Rate 08/26/2024 73     Atrial Rate 08/26/2024 83     QRSD Interval 08/26/2024 152     QT Interval 08/26/2024 446     QTC Interval 08/26/2024 491     QRS Axis 08/26/2024 -71     T Wave Axis 08/26/2024 71     LACTIC ACID 08/26/2024 1.6     Bilirubin, Direct 08/26/2024 0.41 (H)     Sed Rate 08/26/2024 4     CRP 08/26/2024 28.8 (H)     POC Glucose 08/27/2024 119     WBC 08/27/2024 8.60     RBC 08/27/2024 4.44     Hemoglobin 08/27/2024 13.4     Hematocrit 08/27/2024 40.2     MCV 08/27/2024 91     MCH 08/27/2024 30.2     MCHC 08/27/2024 33.3     RDW 08/27/2024 13.2     MPV 08/27/2024 11.9     Platelets 08/27/2024 139 (L)     Sodium 08/27/2024 138     Potassium 08/27/2024 3.7     Chloride 08/27/2024 99     CO2 08/27/2024 30     ANION GAP 08/27/2024 9     BUN 08/27/2024 36 (H)     Creatinine 08/27/2024 1.12     Glucose 08/27/2024 120     Calcium 08/27/2024 9.0     AST 08/27/2024 18     ALT 08/27/2024 11     Alkaline Phosphatase  08/27/2024 65     Total Protein 08/27/2024 6.3 (L)     Albumin 08/27/2024 3.9     Total Bilirubin 08/27/2024 7.03 (H)     eGFR 08/27/2024 61     Magnesium 08/27/2024 1.6 (L)     Phosphorus 08/27/2024 2.9     Segmented % 08/27/2024 61     Bands % 08/27/2024 18 (H)     Lymphocytes % 08/27/2024 20     Monocytes % 08/27/2024 1 (L)     Eosinophils % 08/27/2024 0     Basophils % 08/27/2024 0     Absolute Neutrophils 08/27/2024 6.79     Absolute Lymphocytes 08/27/2024 1.72     Absolute Monocytes 08/27/2024 0.09     Absolute Eosinophils 08/27/2024 0.00     Absolute Basophils 08/27/2024 0.00     RBC Morphology 08/27/2024 Normal     Platelet Estimate 08/27/2024 Adequate     Large Platelet 08/27/2024 Present     Triscuspid Valve Regurgi* 08/27/2024 34.0     RAA A4C 08/27/2024 21.8     LA Volume Index (BP) 08/27/2024 22.1     AV peak gradient 08/27/2024 27     LVOT stroke volume 08/27/2024 45.00     Ao VTI 08/27/2024 45.83     Aortic valve peak veloci* 08/27/2024 2.49     LVOT peak VTI 08/27/2024 15.18     LVOT peak lv 08/27/2024 0.78     LVOT diameter 08/27/2024 1.9     AV LVOT peak gradient 08/27/2024 2     AV mean gradient 08/27/2024 16     RA 2D Volume 08/27/2024 66.0     TR Peak Lv 08/27/2024 2.9     AV area peak lv 08/27/2024 0.9     AV area by cont VTI 08/27/2024 0.9     LVOT mn grad 08/27/2024 2.0     RVID d 08/27/2024 3.8     A4C EF 08/27/2024 47     Aortic valve mean veloci* 08/27/2024 17.80     Tricuspid valve peak reg* 08/27/2024 2.92     Left ventricular stroke * 08/27/2024 74.00     IVSd 08/27/2024 1.10     Tricuspid annular plane * 08/27/2024 1.80     Ao root 08/27/2024 3.70     LVPWd 08/27/2024 1.20     LA size 08/27/2024 5.3     Asc Ao 08/27/2024 3.2     LA volume (BP) 08/27/2024 43     FS 08/27/2024 38     LVIDS 08/27/2024 3.00     IVS 08/27/2024 1.1     LVIDd 08/27/2024 4.80     LA length (A2C) 08/27/2024 4.10     LEFT VENTRICLE SYSTOLIC * 08/27/2024 34     LV DIASTOLIC VOLUME (MOD* 08/27/2024 108     LVOT  Cardiac Index 08/27/2024 1.48     LVOT stroke volume index 08/27/2024 23.10     LVOT Cardiac Output 08/27/2024 2.89     Left Atrium Area-systoli* 08/27/2024 16.1     Left Atrium Area-systoli* 08/27/2024 13.9     LVSV, 2D 08/27/2024 74     LVOT area 08/27/2024 2.83     DVI 08/27/2024 0.31     AV valve area 08/27/2024 0.94     BSA 08/27/2024 1.95     LV EF 08/27/2024 55     Color, UA 08/27/2024 [place] (A)     Clarity, UA 08/27/2024 Clear     Specific Gravity, UA 08/27/2024 >=1.030 (H)     pH, UA 08/27/2024 6.0     Leukocytes, UA 08/27/2024 Negative     Nitrite, UA 08/27/2024 Positive (A)     Protein, UA 08/27/2024 1+ (A)     Glucose, UA 08/27/2024 Negative     Ketones, UA 08/27/2024 Trace (A)     Urobilinogen, UA 08/27/2024 1.0     Bilirubin, UA 08/27/2024 1+ (A)     Occult Blood, UA 08/27/2024 2+ (A)     Bilirubin, Direct 08/27/2024 0.95 (H)     RBC, UA 08/27/2024 10-20 (A)     WBC, UA 08/27/2024 0-1     Epithelial Cells 08/27/2024 Occasional     Bacteria, UA 08/27/2024 Occasional     MUCUS THREADS 08/27/2024 Occasional (A)     WBC 08/28/2024 5.99     RBC 08/28/2024 4.21     Hemoglobin 08/28/2024 12.8     Hematocrit 08/28/2024 38.4     MCV 08/28/2024 91     MCH 08/28/2024 30.4     MCHC 08/28/2024 33.3     RDW 08/28/2024 12.9     Platelets 08/28/2024 122 (L)     MPV 08/28/2024 11.7     Sodium 08/28/2024 138     Potassium 08/28/2024 3.4 (L)     Chloride 08/28/2024 100     CO2 08/28/2024 30     ANION GAP 08/28/2024 8     BUN 08/28/2024 36 (H)     Creatinine 08/28/2024 0.99     Glucose 08/28/2024 105     Calcium 08/28/2024 8.7     eGFR 08/28/2024 71     Total Bilirubin 08/28/2024 6.00 (H)     Bilirubin, Direct 08/28/2024 0.93 (H)     Alkaline Phosphatase 08/28/2024 55     AST 08/28/2024 17     ALT 08/28/2024 10     Total Protein 08/28/2024 6.1 (L)     Albumin 08/28/2024 3.6     Magnesium 08/28/2024 2.3     Phosphorus 08/28/2024 2.9        Imaging Studies: I have personally reviewed pertinent imaging studies.    Ania  MD Roger  Gastroenterology Fellow  Available via EPIC Secure chat  8/28/2024 10:18 AM

## 2024-08-28 NOTE — ASSESSMENT & PLAN NOTE
Blood pressure results reviewed   Home meds include lisinopril, amlodipine, and chlorthalidone as an outpatient, currently on hold while n.p.o.  Continue hydralazine IV PRN  Continue to monitor BP per protocol

## 2024-08-28 NOTE — ASSESSMENT & PLAN NOTE
Presented for abdominal pain and vomitiing  CT abdomen pelvis: Suggests acute small bowel obstruction  Patient is under the care of surgical services, Slim following for medical management  NG tube was pulled out overnight-general surgery reinserting  Continue NPO and IV fluids with LR@125 mL/hour  Continue Solu-Cortef  Monitor labs and vital signs, conduct serial physical assessments

## 2024-08-28 NOTE — PHYSICAL THERAPY NOTE
PT cancel note       08/28/24 1328   PT Last Visit   PT Visit Date 08/28/24   Note Type   Note type Cancelled Session   Cancel Reasons Medical status   Additional Comments awaiting NG tube placement     Kayla Ventura

## 2024-08-29 LAB
ALBUMIN SERPL BCG-MCNC: 3.5 G/DL (ref 3.5–5)
ALP SERPL-CCNC: 52 U/L (ref 34–104)
ALT SERPL W P-5'-P-CCNC: 10 U/L (ref 7–52)
ANION GAP SERPL CALCULATED.3IONS-SCNC: 10 MMOL/L (ref 4–13)
AST SERPL W P-5'-P-CCNC: 14 U/L (ref 13–39)
BASOPHILS # BLD AUTO: 0.01 THOUSANDS/ÂΜL (ref 0–0.1)
BASOPHILS NFR BLD AUTO: 0 % (ref 0–1)
BILIRUB DIRECT SERPL-MCNC: 0.96 MG/DL (ref 0–0.2)
BILIRUB SERPL-MCNC: 4.35 MG/DL (ref 0.2–1)
BUN SERPL-MCNC: 30 MG/DL (ref 5–25)
CALCIUM SERPL-MCNC: 8.6 MG/DL (ref 8.4–10.2)
CHLORIDE SERPL-SCNC: 102 MMOL/L (ref 96–108)
CO2 SERPL-SCNC: 26 MMOL/L (ref 21–32)
CREAT SERPL-MCNC: 0.77 MG/DL (ref 0.6–1.3)
EOSINOPHIL # BLD AUTO: 0 THOUSAND/ÂΜL (ref 0–0.61)
EOSINOPHIL NFR BLD AUTO: 0 % (ref 0–6)
ERYTHROCYTE [DISTWIDTH] IN BLOOD BY AUTOMATED COUNT: 12.9 % (ref 11.6–15.1)
GFR SERPL CREATININE-BSD FRML MDRD: 85 ML/MIN/1.73SQ M
GLUCOSE SERPL-MCNC: 111 MG/DL (ref 65–140)
HCT VFR BLD AUTO: 38 % (ref 36.5–49.3)
HGB BLD-MCNC: 12.6 G/DL (ref 12–17)
IMM GRANULOCYTES # BLD AUTO: 0.05 THOUSAND/UL (ref 0–0.2)
IMM GRANULOCYTES NFR BLD AUTO: 1 % (ref 0–2)
LYMPHOCYTES # BLD AUTO: 0.51 THOUSANDS/ÂΜL (ref 0.6–4.47)
LYMPHOCYTES NFR BLD AUTO: 7 % (ref 14–44)
MCH RBC QN AUTO: 29.9 PG (ref 26.8–34.3)
MCHC RBC AUTO-ENTMCNC: 33.2 G/DL (ref 31.4–37.4)
MCV RBC AUTO: 90 FL (ref 82–98)
MONOCYTES # BLD AUTO: 0.33 THOUSAND/ÂΜL (ref 0.17–1.22)
MONOCYTES NFR BLD AUTO: 4 % (ref 4–12)
NEUTROPHILS # BLD AUTO: 6.86 THOUSANDS/ÂΜL (ref 1.85–7.62)
NEUTS SEG NFR BLD AUTO: 88 % (ref 43–75)
NRBC BLD AUTO-RTO: 0 /100 WBCS
PLATELET # BLD AUTO: 141 THOUSANDS/UL (ref 149–390)
PMV BLD AUTO: 11.6 FL (ref 8.9–12.7)
POTASSIUM SERPL-SCNC: 3.2 MMOL/L (ref 3.5–5.3)
PROT SERPL-MCNC: 6 G/DL (ref 6.4–8.4)
RBC # BLD AUTO: 4.22 MILLION/UL (ref 3.88–5.62)
SODIUM SERPL-SCNC: 138 MMOL/L (ref 135–147)
WBC # BLD AUTO: 7.76 THOUSAND/UL (ref 4.31–10.16)

## 2024-08-29 PROCEDURE — 99233 SBSQ HOSP IP/OBS HIGH 50: CPT

## 2024-08-29 PROCEDURE — 85025 COMPLETE CBC W/AUTO DIFF WBC: CPT

## 2024-08-29 PROCEDURE — 80048 BASIC METABOLIC PNL TOTAL CA: CPT

## 2024-08-29 PROCEDURE — 80076 HEPATIC FUNCTION PANEL: CPT

## 2024-08-29 PROCEDURE — 97163 PT EVAL HIGH COMPLEX 45 MIN: CPT

## 2024-08-29 PROCEDURE — 99232 SBSQ HOSP IP/OBS MODERATE 35: CPT | Performed by: STUDENT IN AN ORGANIZED HEALTH CARE EDUCATION/TRAINING PROGRAM

## 2024-08-29 PROCEDURE — 99232 SBSQ HOSP IP/OBS MODERATE 35: CPT | Performed by: SURGERY

## 2024-08-29 RX ORDER — KETOROLAC TROMETHAMINE 30 MG/ML
15 INJECTION, SOLUTION INTRAMUSCULAR; INTRAVENOUS ONCE
Status: COMPLETED | OUTPATIENT
Start: 2024-08-29 | End: 2024-08-29

## 2024-08-29 RX ORDER — DEXTROSE MONOHYDRATE AND SODIUM CHLORIDE 5; .45 G/100ML; G/100ML
50 INJECTION, SOLUTION INTRAVENOUS CONTINUOUS
Status: DISCONTINUED | OUTPATIENT
Start: 2024-08-29 | End: 2024-09-04

## 2024-08-29 RX ORDER — LABETALOL HYDROCHLORIDE 5 MG/ML
10 INJECTION, SOLUTION INTRAVENOUS ONCE
Status: COMPLETED | OUTPATIENT
Start: 2024-08-29 | End: 2024-08-29

## 2024-08-29 RX ORDER — POTASSIUM CHLORIDE 14.9 MG/ML
20 INJECTION INTRAVENOUS
Status: COMPLETED | OUTPATIENT
Start: 2024-08-29 | End: 2024-08-29

## 2024-08-29 RX ORDER — LISINOPRIL 20 MG/1
40 TABLET ORAL DAILY
Status: DISCONTINUED | OUTPATIENT
Start: 2024-08-29 | End: 2024-09-05 | Stop reason: HOSPADM

## 2024-08-29 RX ORDER — AMLODIPINE BESYLATE 10 MG/1
10 TABLET ORAL DAILY
Status: DISCONTINUED | OUTPATIENT
Start: 2024-08-29 | End: 2024-09-05 | Stop reason: HOSPADM

## 2024-08-29 RX ADMIN — POTASSIUM CHLORIDE 20 MEQ: 14.9 INJECTION, SOLUTION INTRAVENOUS at 11:26

## 2024-08-29 RX ADMIN — POTASSIUM CHLORIDE 20 MEQ: 14.9 INJECTION, SOLUTION INTRAVENOUS at 09:14

## 2024-08-29 RX ADMIN — PANTOPRAZOLE SODIUM 40 MG: 40 INJECTION, POWDER, FOR SOLUTION INTRAVENOUS at 09:15

## 2024-08-29 RX ADMIN — HYDRALAZINE HYDROCHLORIDE 5 MG: 20 INJECTION INTRAMUSCULAR; INTRAVENOUS at 06:10

## 2024-08-29 RX ADMIN — KETOROLAC TROMETHAMINE 15 MG: 30 INJECTION, SOLUTION INTRAMUSCULAR at 06:52

## 2024-08-29 RX ADMIN — DEXTROSE AND SODIUM CHLORIDE 100 ML/HR: 5; .45 INJECTION, SOLUTION INTRAVENOUS at 23:23

## 2024-08-29 RX ADMIN — AMLODIPINE BESYLATE 10 MG: 10 TABLET ORAL at 13:15

## 2024-08-29 RX ADMIN — LISINOPRIL 40 MG: 20 TABLET ORAL at 13:15

## 2024-08-29 RX ADMIN — HYDROCORTISONE SODIUM SUCCINATE 100 MG: 100 INJECTION, POWDER, FOR SOLUTION INTRAMUSCULAR; INTRAVENOUS at 13:14

## 2024-08-29 RX ADMIN — LABETALOL HYDROCHLORIDE 10 MG: 5 INJECTION, SOLUTION INTRAVENOUS at 10:20

## 2024-08-29 RX ADMIN — HYDROCORTISONE SODIUM SUCCINATE 100 MG: 100 INJECTION, POWDER, FOR SOLUTION INTRAMUSCULAR; INTRAVENOUS at 05:23

## 2024-08-29 RX ADMIN — HYDROCORTISONE SODIUM SUCCINATE 100 MG: 100 INJECTION, POWDER, FOR SOLUTION INTRAMUSCULAR; INTRAVENOUS at 21:07

## 2024-08-29 RX ADMIN — SODIUM CHLORIDE, SODIUM LACTATE, POTASSIUM CHLORIDE, AND CALCIUM CHLORIDE 125 ML/HR: .6; .31; .03; .02 INJECTION, SOLUTION INTRAVENOUS at 06:08

## 2024-08-29 RX ADMIN — HEPARIN SODIUM 5000 UNITS: 5000 INJECTION INTRAVENOUS; SUBCUTANEOUS at 13:15

## 2024-08-29 RX ADMIN — HEPARIN SODIUM 5000 UNITS: 5000 INJECTION INTRAVENOUS; SUBCUTANEOUS at 05:23

## 2024-08-29 RX ADMIN — DEXTROSE AND SODIUM CHLORIDE 100 ML/HR: 5; .45 INJECTION, SOLUTION INTRAVENOUS at 13:18

## 2024-08-29 RX ADMIN — HEPARIN SODIUM 5000 UNITS: 5000 INJECTION INTRAVENOUS; SUBCUTANEOUS at 21:07

## 2024-08-29 RX ADMIN — HYDRALAZINE HYDROCHLORIDE 5 MG: 20 INJECTION INTRAMUSCULAR; INTRAVENOUS at 13:14

## 2024-08-29 NOTE — PHYSICAL THERAPY NOTE
PHYSICAL THERAPY EVALUATION  NAME:  Tee Forrest Jr.  DATE: 08/29/24    AGE:   79 y.o.  Mrn:   [id number]  ADMIT DX:  Bowel obstruction (HCC) [K56.609]  Abdominal pain [R10.9]  Problem List:   Patient Active Problem List   Diagnosis    Bilateral carotid artery stenosis    Ankylosing spondylitis (HCC)    Esophageal reflux    Cervical radiculopathy    Lumbosacral spondylosis without myelopathy    Vertebral artery stenosis, asymptomatic    Essential hypertension    Pernicious anemia    Gait instability    Needs flu shot    Atrial fibrillation (HCC)    Diverticulitis    Pancreatic cyst    Pulmonary hypertension (HCC)    Age-related cataract of left eye    Chronic low back pain    Crohn's disease of both small and large intestine without complication (HCC)    Lumbar radiculopathy    SBO (small bowel obstruction) (HCC)    Terminal ileitis, with intestinal obstruction (HCC)    Confusion    Elevated bilirubin       Past Medical History  Past Medical History:   Diagnosis Date    Arthritis     Atrial flutter (HCC)     Cholecystitis     Coronary artery disease     Hypertension     RBBB     Spinal stenosis        Past Surgical History  Past Surgical History:   Procedure Laterality Date    CHOLECYSTECTOMY      COLON SURGERY      bwel resection    COLONOSCOPY      ESOPHAGOGASTRODUODENOSCOPY      02/07/2007  ONSET    EYE SURGERY      HIP SURGERY      JOINT REPLACEMENT      [place] LAPAROSCOPY SURG CHOLECYSTECTOMY N/A 12/05/2017    Procedure: CHOLECYSTECTOMY LAPAROSCOPIC;  Surgeon: Ez Lainez MD;  Location: [facility];  Service: General    SMALL INTESTINE SURGERY      ONSEC DEC 2011       Length Of Stay: 3  Performed at least 2 patient identifiers during session: Name and Birthday         08/29/24 0847   PT Last Visit   PT Visit Date 08/29/24   Note Type   Note type Evaluation   Pain Assessment   Pain Assessment Tool 0-10   Pain Score No Pain   Restrictions/Precautions   Weight Bearing Precautions Per Order No   Braces or  Orthoses   (none reported)   Other Precautions Cognitive;Chair Alarm;Bed Alarm;Multiple lines;Fall Risk;Aspiration  (NG tube)   Home Living   Type of Home House   Home Layout Two level  (raised ranch with finished basement. 1+1 [place] from from, 2 [place] from back. FOS to basement with HR)   Bathroom Shower/Tub Walk-in shower  (also has tub shower available)   Bathroom Toilet Standard   Bathroom Equipment Commode   Bathroom Accessibility Accessible   Home Equipment Cane;Walker;Electric scooter  (2 rollators, 5 canes. Electric scooter used outside, cane used in the home)   Additional Comments Information regarding home set up and PLOF gathered via chart review including from OT lucio 8/27/24 as pt questionable historian during evaluation and needing frequent redirection to maintain attention to task   Prior Function   Level of [place] Independent with ADLs;Independent with functional mobility   Lives With Spouse   Receives Help From Family  (son lives 2 doors down from pt)   IADLs Independent with driving;Independent with medication management;Independent with meal prep   Falls in the last 6 months   (unknown fall history; none per pt)   Vocational Retired  ( and )   General   Family/Caregiver Present No   Cognition   Overall Cognitive Status Impaired   Arousal/Participation Alert   Attention Attends with cues to redirect   Orientation Level Oriented X4   Memory Decreased recall of recent events;Decreased short term memory   Following Commands Follows one step commands without difficulty   Comments Pt agreeable to PT evaluation   RLE Assessment   RLE Assessment X   Strength RLE   RLE Overall Strength 4-/5  (grossly assesed during functional mobility)   LLE Assessment   LLE Assessment X   Strength LLE   LLE Overall Strength 4-/5  (grossly assesed during functional mobility)   Bed Mobility   Supine to Sit 4  Minimal assistance   Additional items Assist x 1;Increased time required;HOB  elevated;Bedrails   Sit to Supine   (not tested as pt seated in bedside chair at end of session)   Additional Comments pt without complaints of lightheadedness, SOB, chest pain, dizziness throughout session   Transfers   Sit to Stand 4  Minimal assistance   Additional items Assist x 1;Increased time required;Verbal cues   Stand to Sit 4  Minimal assistance   Additional items Assist x 1;Increased time required;Verbal cues   Additional Comments RW used during transfers   Ambulation/Elevation   Gait pattern Improper Weight shift;Decreased foot clearance;Forward Flexion;Short stride;Foward flexed   Gait Assistance 4  Minimal assist   Additional items Assist x 1;Verbal cues   Assistive Device Rolling walker   Distance 3' to bedside chair   Stair Management Assistance Not tested   Balance   Static Sitting Good   Dynamic Sitting Fair +   Static Standing Fair -   Dynamic Standing Fair -   Ambulatory Fair -   Activity Tolerance   Activity Tolerance Patient tolerated treatment well   Nurse Made Aware LEIGHTON Gold made aware of session outcomes   Assessment   Prognosis Good   Problem List Decreased strength;Decreased endurance;Impaired balance;Decreased mobility;Decreased cognition;Decreased safety awareness   Assessment Pt is 79 y.o. male seen for high-complexity PT evaluation on 8/29/2024 s/p admit to [facility] on 8/26/2024 w/ SBO (small bowel obstruction) (HCC). PT was consulted to assess pt's functional mobility and d/c needs. Order placed for PT eval and tx. PTA, pt was living with his spouse in a raised ranch with 1 vs 2 [place] and was independent with ADLs and functional mobility with use of SPC in the home and electric scooter in the community; home set up and PLOF information gathered via chart review pt questionable historian during evaluation and needing frequent redirection to maintain attention to task. At time of eval, patient required Janie for sup > sit, STS, and ambulation 3' to bedside chair with  rolling walker. Upon evaluation, pt presenting with impaired functional mobility d/t decreased strength, decreased endurance, impaired balance, decreased mobility, decreased cognition, decreased safety awareness, and activity intolerance. Pertinent PMHx and current co-morbidities affecting pt's physical performance at time of assessment include: SBO, crohn's disease, confusion, atrial fibrillation, essential HTN, lumbar radiculopathy, pancreatic cyst. Personal factors affecting pt at time of eval include: inaccessible home environment, ambulating w/ assistive device, stairs to enter home, inability to ambulate household distances, inability to navigate level surfaces w/o external assistance, and decreased cognition. The following objective measures performed on IE also reveal limitations: AM-PAC 6-Clicks: 16/24. Pt's clinical presentation is currently unstable/unpredictable seen in pt's presentation of advanced age, need for input for task focus and mobility technique, need for minimal assist w/ all phases of mobility when usually mobilizing independently, tolerance to only 3 feet of ambulation, and ongoing medical assessment. Overall, pt's rehab potential and prognosis to return to PLOF is good as impacted by objective findings, warranting pt to receive further skilled PT interventions to address impairments, activity limitations, and participation restrictions. Pt to benefit from continued PT services to address deficits as defined above and maximize level of functional independent mobility and consistency. From PT/mobility standpoint, recommendation at time of d/c would be level 2, moderate resource intensity in order to facilitate return to PLOF.   Goals   [facility] Expiration Date 09/08/24   Short Term Goal #1 In 10 days: Increase bilateral LE strength 1/2 grade to facilitate independent mobility, Perform all bed mobility tasks modified independent to decrease caregiver burden, Perform all transfers modified  independent to improve independence, Ambulate > 150 ft. with RW with distant S w/o LOB and w/ normalized gait pattern 100% of the time, Navigate 2 stairs with distant S with unilateral handrail to facilitate return to previous living environment, and Increase all balance 1/2 grade to decrease risk for falls   PT Treatment Day 0   Plan   Treatment/Interventions Functional transfer training;Therapeutic exercise;Elevations;Endurance training;Patient/family training;Bed mobility;Gait training;Spoke to nursing   PT Frequency 3-5x/wk   Discharge Recommendation   Rehab Resource Intensity Level, PT II (Moderate Resource Intensity)   AM-PAC Basic Mobility Inpatient   Turning in Flat Bed Without Bedrails 3   Lying on Back to Sitting on Edge of Flat Bed Without Bedrails 3   Moving Bed to Chair 3   Standing Up From Chair Using Arms 3   Walk in Room 3   Climb 3-5 Stairs With Railing 1   Basic Mobility Inpatient Raw Score 16   Basic Mobility Standardized Score 38.32   [facility] Highest Level Of Mobility   -HLM Goal 5: Stand one or more mins   JH-HLM Achieved 4: Move to chair/commode   End of Consult   Patient Position at End of Consult Bed/Chair alarm activated;All needs within reach;Bedside chair  (lines maintained throughout)     Time In: 0847  Time Out: 0903  Total Evaluation Minutes:16    Yanci Portillo, PT

## 2024-08-29 NOTE — NURSING NOTE
Pt spent all day oob in his chair, presently back in bed in no acute distress, callbell in reach of the pt

## 2024-08-29 NOTE — PROGRESS NOTES
Progress Note -  [facility]  Tee Forrest Jr. 79 y.o. male MRN: [id number]  Unit/Bed#: -01 Encounter: [id number]      ASSESSMENT AND PLAN:      Tee Forrest Jr. is a 79 y.o. old male with PMHx notable for recurrent small bowel obstruction s/p resection, prior cholecystectomy, HTN, Aflutter on Eliquis, and mild AS who presents 8/26 with small bowel obstruction.  Gastroenterology has been consulted due to reported history of Crohn's disease.     #Small bowel obstruction  #Concern for Terminal Ileitis 2/2 Crohn's  CT imaging on admission shows acute small bowel obstruction with suspected transition point at the TI which appears to be inflamed on imaging.  There is a reported history of Crohn's disease that was diagnosed after recurrent small bowel obstructions requiring resection, however he has not been on therapy or following with gastroenterology for this so I am suspicious of this diagnosis.  Recommend colonoscopy with biopsy of TI for definitive diagnosis in 6 to 8 weeks after resolution of small bowel obstruction  CRP elevated, given history and concern for inflammatory ileitis, I agree with continuing IV steroids.  Could consider rechecking CRP to see if improvement with addition of steroids  If patient is able to produce stool sample would recommend checking fecal calprotectin  Management of SBO per surgery team. NG tube endoscopically placed on 8/28 and draining  Upon discharge we will arrange for outpatient follow-up with IBD team       #Elevated liver enzymes  #Dilated pancreatic duct  Labs on admission shows cholestatic liver enzyme elevation with T. bili 3.08 with .  Transaminases normal.  Bilirubin appears to be chronically elevated for many years as high as 3.5 in 2020.  He has a history of cholecystitis with choledocholithiasis in 2017 status post cholecystectomy.  CT on admission shows dilated PD to 6 mm along with subcentimeter cystic lesions measuring up to 7  mm, all of which have been previously seen on prior imaging.  No CBD dilation on imaging.  Differential includes cholestasis secondary to [facility], pancreatic cystic neoplasm such as simple cyst, serocystadenoma, mucinous cystic neoplasm, IPMN, pseudopapillary neoplasm, versus adenocarcinoma.  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  No other suspicious lesions.  Can be followed up with outpatient MRI or CT abdomen in 1 to 2 years  His unconjugated hyperbilirubinemia could be 2/2 [facility]. Can consider ruling out  hemolysis with haptoglobin.   Monitor liver enzymes daily      Rest of care per primary team.    ______________________________________________________________________    Subjective: Patient was seen and examined bedside this morning.  His NG tube was endoscopically replaced yesterday.  He denies any worsening abdominal pain, abdominal distention, nausea or vomiting.  He has been able to pass gas but still does not report any bowel movements yet    REVIEW OF SYSTEMS:    Review of Systems   Constitutional:  Negative for chills and fever.   HENT:  Negative for ear pain and sore throat.    Eyes:  Negative for pain and visual disturbance.   Respiratory:  Negative for cough and shortness of breath.    Cardiovascular:  Negative for chest pain and palpitations.   Gastrointestinal:  Negative for abdominal pain and vomiting.   Genitourinary:  Negative for dysuria and hematuria.   Musculoskeletal:  Negative for arthralgias and back pain.   Skin:  Negative for color change and rash.   Neurological:  Negative for seizures and syncope.   All other systems reviewed and are negative.         Historical Information   Past Medical History:   Diagnosis Date    Arthritis     Atrial flutter (HCC)     Cholecystitis     Coronary artery disease     Hypertension     RBBB     Spinal stenosis      Past Surgical History:   Procedure Laterality Date     CHOLECYSTECTOMY      COLON SURGERY      bwel resection    COLONOSCOPY      ESOPHAGOGASTRODUODENOSCOPY      2007  ONSET    EYE SURGERY      HIP SURGERY      JOINT REPLACEMENT      OR LAPAROSCOPY SURG CHOLECYSTECTOMY N/A 2017    Procedure: CHOLECYSTECTOMY LAPAROSCOPIC;  Surgeon: Ez Lainez MD;  Location: [facility];  Service: General    SMALL INTESTINE SURGERY      ONSEC DEC 2011     Social History   Social History     Substance and Sexual Activity   Alcohol Use Yes    Comment: social      Social History     Substance and Sexual Activity   Drug Use No     Social History     Tobacco Use   Smoking Status Former    Current packs/day: 0.00    Average packs/day: 2.0 packs/day for 3.0 years (6.0 ttl pk-yrs)    Types: Cigarettes    Start date: 1963    Quit date:     Years since quittin.6   Smokeless Tobacco Never     Family History   Problem Relation Age of Onset    Arthritis Mother     Heart attack Father     Coronary artery disease Family     Diabetes Family        Meds/Allergies       Facility-Administered Medications Prior to Admission:     cyanocobalamin injection 1,000 mcg    cyanocobalamin injection 1,000 mcg    Medications Prior to Admission:     amLODIPine (NORVASC) 10 mg tablet    apixaban (Eliquis) 5 mg    atorvastatin (LIPITOR) 40 mg tablet    ferrous sulfate 325 (65 Fe) mg tablet    gabapentin (NEURONTIN) 100 mg capsule    lisinopril (ZESTRIL) 40 mg tablet    potassium chloride (K-DUR,KLOR-CON) 20 mEq tablet    chlorthalidone 25 mg tablet    pantoprazole (PROTONIX) 40 mg tablet  Current Facility-Administered Medications   Medication Dose Route Frequency    heparin (porcine) subcutaneous injection 5,000 Units  5,000 Units Subcutaneous Q8H IRMA    hydrALAZINE (APRESOLINE) injection 5 mg  5 mg Intravenous Q6H PRN    hydrocortisone (Solu-CORTEF) injection 100 mg  100 mg Intravenous Q8H IRMA    HYDROmorphone HCl (DILAUDID) injection 0.2 mg  0.2 mg Intravenous Q4H PRN    labetalol (NORMODYNE)  "injection 10 mg  10 mg Intravenous Once    lactated ringers infusion  125 mL/hr Intravenous Continuous    ondansetron (ZOFRAN) injection 4 mg  4 mg Intravenous Q6H PRN    pantoprazole (PROTONIX) injection 40 mg  40 mg Intravenous Q24H IRMA    potassium chloride 20 mEq IVPB (premix)  20 mEq Intravenous Q2H       No Known Allergies        Objective     Blood pressure (!) 191/96, pulse 78, temperature 98.6 °F (37 °C), temperature source Oral, resp. rate 18, height 5' 9\" (1.753 m), weight 79.4 kg (175 lb), SpO2 96%. Body mass index is 25.84 kg/m².      Intake/Output Summary (Last 24 hours) at 8/29/2024 1015  Last data filed at 8/29/2024 0608  Gross per 24 hour   Intake 650 ml   Output 1800 ml   Net -1150 ml         PHYSICAL EXAM:      Physical Exam  Vitals and nursing note reviewed.   Constitutional:       General: He is not in acute distress.     Appearance: He is well-developed.   HENT:      Head: Normocephalic and atraumatic.   Eyes:      Conjunctiva/sclera: Conjunctivae normal.   Cardiovascular:      Rate and Rhythm: Normal rate and regular rhythm.      Heart sounds: No murmur heard.  Pulmonary:      Effort: Pulmonary effort is normal. No respiratory distress.      Breath sounds: Normal breath sounds.   Abdominal:      General: There is distension.      Palpations: Abdomen is soft.      Tenderness: There is no abdominal tenderness.   Musculoskeletal:         General: No swelling.      Cervical back: Neck supple.   Skin:     General: Skin is warm and dry.      Capillary Refill: Capillary refill takes less than 2 seconds.   Neurological:      Mental Status: He is alert.   Psychiatric:         Mood and Affect: Mood normal.          Lab Results:   No results displayed because visit has over 200 results.          Imaging Studies: I have personally reviewed pertinent imaging studies.    Ania Duran MD  Gastroenterology Fellow  Available via EPIC Secure chat  8/29/2024 10:15 AM   "

## 2024-08-29 NOTE — PROGRESS NOTES
[facility]  Progress Note  Name: Tee Wadsworth I  MRN: [id number]  Unit/Bed#: -01 I Date of Admission: 8/26/2024   Date of Service: 8/29/2024 I Hospital Day: 3    Assessment & Plan   * SBO (small bowel obstruction) (HCC)  Assessment & Plan  Presented for abdominal pain and vomitiing  CT abdomen pelvis: Suggests acute small bowel obstruction  Patient is under the care of surgical services, Slim following for medical management  Continue NPO and IV fluids with LR@125 mL/hour  Continue Solu-Cortef  Continue NG tube per general surgery  Monitor labs and vital signs, conduct serial physical assessments    Crohn's disease of both small and large intestine without complication (HCC)  Assessment & Plan  With reported history of Crohn's  GI input appreciated: Requires colonoscopy for evaluation and definitive diagnosis, However, would recommend this be done in 6 to 8 weeks following resolution of SBO    Elevated bilirubin  Assessment & Plan  Appreciate input of GI who are following   MRCP results reviewed  Continue to trend    Confusion  Assessment & Plan  Per chart review, patient with confusion  Per patient's wife, he has been having short-term memory difficulty for the past several months   Mentation is at baseline today  Suspect confusion related to hospitalization versus cognitive decline   Maintain sleep-wake cycle, promote restful environment  Conduct serial assessments    Bilateral carotid artery stenosis  Assessment & Plan  Noted to have high-grade right carotid stenosis   Per vascular surgery note in 2019, patient's anatomy not favorable for carotid stenting due to extensive bulky calcific plaque and due to cervical spine immobility and location of carotid stenosis difficult to achieve adequate exposure to perform endarterectomy in safe manner  He was started on DAPT however this was discontinued in favor of apixaban for atrial fibrillation  Carotid ultrasound unchanged from  prior study 10/16/2020    Atrial fibrillation (HCC)  Assessment & Plan  Takes apixaban, currently on hold pending clinical course for SBO  Does not appear to be on beta-blocker at this time  Rate controlled   On heparin for VTE prophylaxis      Essential hypertension  Assessment & Plan  Blood pressure results reviewed-elevated this a.m.  Home meds include lisinopril, amlodipine, and chlorthalidone as an outpatient, currently on hold while n.p.o.  Gave labetalol 10 mg IV x 1  Continue hydralazine IV PRN  Continue to monitor BP per protocol    Pancreatic cyst  Assessment & Plan  CT abdomen pelvis: Re-identified prominence of the main pancreatic duct with several nonspecific subcentimeter cystic lesions, overall not significantly changed from previous CT examinations  MRCP: Noted cystic pancreatic lesions, recommended outpatient follow-up    Lumbar radiculopathy  Assessment & Plan  History of lumbar radiculopathy, ankylosing spondylitis, treated with injections  Follows with pain and spine  Also with bilateral foot neuropathy, recently initiated on gabapentin 100 mg p.o. 3 times daily on 8/7/2024  Conduct serial physical assessments, monitor for adequate pain control               VTE Pharmacologic Prophylaxis: VTE Score: 3 Moderate Risk (Score 3-4) - Pharmacological DVT Prophylaxis Ordered: heparin.    Mobility:   Basic Mobility Inpatient Raw Score: 19  JH-HLM Goal: 6: Walk 10 steps or more  JH-HLM Achieved: 6: Walk 10 steps or more  JH-HLM Goal achieved. Continue to encourage appropriate mobility.    Patient Centered Rounds: I performed bedside rounds with nursing staff today.   Discussions with Specialists or Other Care Team Provider: General surgery, GI, nursing, case management    Education and Discussions with Family / Patient: Updated  (wife) at bedside.    Total Time Spent on Date of Encounter in care of patient: 36 mins. This time was spent on one or more of the following: performing physical  exam; counseling and coordination of care; obtaining or reviewing history; documenting in the medical record; reviewing/ordering tests, medications or procedures; communicating with other healthcare professionals and discussing with patient's family/caregivers.    Current Length of Stay: 3 day(s)  Current Patient Status: Inpatient   Certification Statement: The patient will continue to require additional inpatient hospital stay due to management of small bowel obstruction under the primary service of general surgery  Discharge Plan:  Patient is being treated for small bowel obstruction.  He is currently under the primary service of general surgery, Slim following for medical management.  Remains n.p.o. with NG tube in place. Pain controlled.  Discharge will be per primary team.    Code Status: Prior    Subjective:   Denies any dizziness, lightheadedness, chest pain or palpitations.  Denies abdominal discomfort.  Pleased that he is starting to pass flatus today.    Objective:     Vitals:   Temp (24hrs), Av °F (37.2 °C), Min:98.6 °F (37 °C), Max:99.7 °F (37.6 °C)    Temp:  [98.6 °F (37 °C)-99.7 °F (37.6 °C)] 98.6 °F (37 °C)  HR:  [70-94] 78  Resp:  [17-21] 18  BP: (149-221)/() 191/96  SpO2:  [92 %-96 %] 96 %  Body mass index is 25.84 kg/m².     Input and Output Summary (last 24 hours):     Intake/Output Summary (Last 24 hours) at 2024 1055  Last data filed at 2024 0608  Gross per 24 hour   Intake 650 ml   Output 1800 ml   Net -1150 ml       Physical Exam:   Physical Exam  Vitals and nursing note reviewed.   Constitutional:       General: He is not in acute distress.     Appearance: He is well-developed.   HENT:      Head: Normocephalic and atraumatic.      Mouth/Throat:      Mouth: Mucous membranes are dry.   Cardiovascular:      Rate and Rhythm: Normal rate and regular rhythm.      Pulses: Normal pulses.      Heart sounds: Normal heart sounds. No murmur heard.  Pulmonary:      Effort: Pulmonary  effort is normal. No respiratory distress.      Breath sounds: Normal breath sounds. No wheezing or rales.   Abdominal:      General: Bowel sounds are normal. There is no distension.      Palpations: Abdomen is soft.      Tenderness: There is no abdominal tenderness. There is no guarding.      Comments: NG tube right nares draining brown liquid, hypoactive bowel sounds x 4, reports passing flatus today   Musculoskeletal:         General: No swelling.   Skin:     General: Skin is warm and dry.      Capillary Refill: Capillary refill takes less than 2 seconds.   Neurological:      General: No focal deficit present.      Mental Status: He is alert. Mental status is at baseline.   Psychiatric:         Mood and Affect: Mood normal.         Behavior: Behavior normal.          Additional Data:     Labs:  Results from last 7 days   Lab Units 08/29/24  0457 08/28/24  0513 08/27/24  0544   WBC Thousand/uL 7.76   < > 8.60   HEMOGLOBIN g/dL 12.6   < > 13.4   HEMATOCRIT % 38.0   < > 40.2   PLATELETS Thousands/uL 141*   < > 139*   BANDS PCT %  --   --  18*   SEGS PCT % 88*  --   --    LYMPHO PCT % 7*  --  20   MONO PCT % 4  --  1*   EOS PCT % 0  --  0    < > = values in this interval not displayed.     Results from last 7 days   Lab Units 08/29/24  0457   SODIUM mmol/L 138   POTASSIUM mmol/L 3.2*   CHLORIDE mmol/L 102   CO2 mmol/L 26   BUN mg/dL 30*   CREATININE mg/dL 0.77   ANION GAP mmol/L 10   CALCIUM mg/dL 8.6   ALBUMIN g/dL 3.5   TOTAL BILIRUBIN mg/dL 4.35*   ALK PHOS U/L 52   ALT U/L 10   AST U/L 14   GLUCOSE RANDOM mg/dL 111         Results from last 7 days   Lab Units 08/27/24  0247   POC GLUCOSE mg/dl 119         Results from last 7 days   Lab Units 08/26/24  0348   LACTIC ACID mmol/L 1.6       Lines/Drains:  Invasive Devices       Peripheral Intravenous Line  Duration             Peripheral IV 08/26/24 Right;Ventral (anterior) Forearm 3 days              Drain  Duration             NG/OG/Enteral Tube Nasogastric 16 Fr  Right nare <1 day                          Imaging: Reviewed radiology reports from this admission including: chest xray, abdominal/pelvic CT, and MRI abdomen/MRCP    Recent Cultures (last 7 days):         Last 24 Hours Medication List:   Current Facility-Administered Medications   Medication Dose Route Frequency Provider Last Rate    heparin (porcine)  5,000 Units Subcutaneous Q8H IRMA Gallo MD      hydrALAZINE  5 mg Intravenous Q6H PRN Kaelyn López PA-C      hydrocortisone sodium succinate  100 mg Intravenous Q8H IRMA Vanegas PA-C      HYDROmorphone  0.2 mg Intravenous Q4H PRN Kaelyn López PA-C      lactated ringers  125 mL/hr Intravenous Continuous Jarred Gallo  mL/hr (08/29/24 0608)    ondansetron  4 mg Intravenous Q6H PRJEANNE Gallo MD      pantoprazole  40 mg Intravenous Q24H IRMA Gallo MD      potassium chloride  20 mEq Intravenous Q2H Kayy Vanegas PA-C 20 mEq (08/29/24 0914)        Today, Patient Was Seen By: MYNOR Matos    **Please Note: This note may have been constructed using a voice recognition system.**

## 2024-08-29 NOTE — PLAN OF CARE
Problem: PHYSICAL THERAPY ADULT  Goal: Performs mobility at highest level of function for planned discharge setting.  See evaluation for individualized goals.  Description: Treatment/Interventions: Functional transfer training, Therapeutic exercise, Elevations, Endurance training, Patient/family training, Bed mobility, Gait training, Spoke to nursing          See flowsheet documentation for full assessment, interventions and recommendations.  Note: Prognosis: Good  Problem List: Decreased strength, Decreased endurance, Impaired balance, Decreased mobility, Decreased cognition, Decreased safety awareness  Assessment: Pt is 79 y.o. male seen for high-complexity PT evaluation on 8/29/2024 s/p admit to [facility] on 8/26/2024 w/ SBO (small bowel obstruction) (HCC). PT was consulted to assess pt's functional mobility and d/c needs. Order placed for PT eval and tx. PTA, [place] and was independent with ADLs and functional mobility with use of SPC in the home and electric scooter in the community; home set up and PLOF information gathered via chart review pt questionable historian during evaluation and needing frequent redirection to maintain attention to task. At time of eval, patient required Janie for sup > sit, STS, and ambulation 3' to bedside chair with rolling walker. Upon evaluation, pt presenting with impaired functional mobility d/t decreased strength, decreased endurance, impaired balance, decreased mobility, decreased cognition, decreased safety awareness, and activity intolerance. Pertinent PMHx and current co-morbidities affecting pt's physical performance at time of assessment include: SBO, crohn's disease, confusion, atrial fibrillation, essential HTN, lumbar radiculopathy, pancreatic cyst. Personal factors affecting pt at time of eval include: inaccessible home environment, ambulating w/ assistive device, stairs to enter home, inability  to ambulate household distances, inability to navigate level surfaces w/o external assistance, and decreased cognition. The following objective measures performed on IE also reveal limitations: AM-PAC 6-Clicks: 16/24. Pt's clinical presentation is currently unstable/unpredictable seen in pt's presentation of advanced age, need for input for task focus and mobility technique, need for minimal assist w/ all phases of mobility when usually mobilizing independently, tolerance to only 3 feet of ambulation, and ongoing medical assessment. Overall, pt's rehab potential and prognosis to return to PLOF is good as impacted by objective findings, warranting pt to receive further skilled PT interventions to address impairments, activity limitations, and participation restrictions. Pt to benefit from continued PT services to address deficits as defined above and maximize level of functional independent mobility and consistency. From PT/mobility standpoint, recommendation at time of d/c would be level 2, moderate resource intensity in order to facilitate return to PLOF.        Rehab Resource Intensity Level, PT: II (Moderate Resource Intensity)    See flowsheet documentation for full assessment.      Yanci Portillo; PT, DPT

## 2024-08-29 NOTE — ASSESSMENT & PLAN NOTE
Per chart review, patient with confusion  Per patient's wife, he has been having short-term memory difficulty for the past several months   Mentation is now at baseline  Suspect confusion related to hospitalization versus cognitive decline   Maintain sleep-wake cycle, promote restful environment  Conduct serial assessments

## 2024-08-29 NOTE — PROGRESS NOTES
"Progress Note - General Surgery   Tee Forrest Jr. 79 y.o. male MRN: [id number]  Unit/Bed#: -01 Encounter: [id number]    Assessment:  78yo male PMH CAD, atrial flutter, prior SBOs presents with SBO and terminal ileitis    -NG tube inserted endoscopically by GI yesterday after multiple failed bedside attempts   -NG output 500cc, bilious   -patient reports passing gas   -abdomen is flat, soft, nontender   -afebrile, VSS   -no leukocytosis   -Hgb stable   -K 3.2    Plan:  -continue NG tube today, gastrografin challenge tomorrow morning  -NPO, IVF  -continue hydrocortisone  -replete K  -monitor for bowel function  -appreciate GI and SLIM recs  -trend labs and vitals  -discussed with Dr Dyer    Subjective/Objective     Subjective: Patient feeling better since NG tube was inserted. Reports passing some gas. No abdominal pain.     Objective:     Blood pressure (!) 176/88, pulse 70, temperature 98.6 °F (37 °C), temperature source Oral, resp. rate 18, height 5' 9\" (1.753 m), weight 79.4 kg (175 lb), SpO2 95%.,Body mass index is 25.84 kg/m².      Intake/Output Summary (Last 24 hours) at 8/29/2024 0842  Last data filed at 8/29/2024 0608  Gross per 24 hour   Intake 650 ml   Output 1800 ml   Net -1150 ml       Invasive Devices       Peripheral Intravenous Line  Duration             Peripheral IV 08/26/24 Right;Ventral (anterior) Forearm 3 days              Drain  Duration             NG/OG/Enteral Tube Nasogastric 16 Fr Right nare <1 day                  Physical Exam  Constitutional:       Appearance: Normal appearance.   HENT:      Mouth/Throat:      Mouth: Mucous membranes are moist.      Pharynx: Oropharynx is clear.   Cardiovascular:      Rate and Rhythm: Normal rate.   Pulmonary:      Effort: Pulmonary effort is normal.   Abdominal:      General: Abdomen is flat.      Palpations: Abdomen is soft.      Tenderness: There is no abdominal tenderness.   Musculoskeletal:         General: Normal range of motion. "   Skin:     General: Skin is warm and dry.   Neurological:      Mental Status: He is alert. Mental status is at baseline.          Lab, Imaging and other studies:CBC:   Lab Results   Component Value Date    WBC 7.76 08/29/2024    HGB 12.6 08/29/2024    HCT 38.0 08/29/2024    MCV 90 08/29/2024     (L) 08/29/2024    RBC 4.22 08/29/2024    MCH 29.9 08/29/2024    MCHC 33.2 08/29/2024    RDW 12.9 08/29/2024    MPV 11.6 08/29/2024    NRBC 0 08/29/2024   , CMP:   Lab Results   Component Value Date    SODIUM 138 08/29/2024    K 3.2 (L) 08/29/2024     08/29/2024    CO2 26 08/29/2024    BUN 30 (H) 08/29/2024    CREATININE 0.77 08/29/2024    CALCIUM 8.6 08/29/2024    EGFR 85 08/29/2024     VTE Pharmacologic Prophylaxis: Heparin  VTE Mechanical Prophylaxis: sequential compression device    Kyay Vanegas PA-C

## 2024-08-29 NOTE — ASSESSMENT & PLAN NOTE
Blood pressure reviewed and elevated  Chlorthalidone on hold while NPO  Amlodipine and lisinopril were resumed yesterday  Continue hydralazine IV PRN  Continue to monitor BP

## 2024-08-29 NOTE — PLAN OF CARE
Problem: PAIN - ADULT  Goal: Verbalizes/displays adequate comfort level or baseline comfort level  Description: Interventions:  - Encourage patient to monitor pain and request assistance  - Assess pain using appropriate pain scale  - Administer analgesics based on type and severity of pain and evaluate response  - Implement non-pharmacological measures as appropriate and evaluate response  - Consider cultural and social influences on pain and pain management  - Notify physician/advanced practitioner if interventions unsuccessful or patient reports new pain  Outcome: Progressing     Problem: INFECTION - ADULT  Goal: Absence or prevention of progression during hospitalization  Description: INTERVENTIONS:  - Assess and monitor for signs and symptoms of infection  - Monitor lab/diagnostic results  - Monitor all insertion sites, i.e. indwelling lines, tubes, and drains  - Monitor endotracheal if appropriate and nasal secretions for changes in amount and color  - [place] appropriate cooling/warming therapies per order  - Administer medications as ordered  - Instruct and encourage patient and family to use good hand hygiene technique  - Identify and instruct in appropriate isolation precautions for identified infection/condition  Outcome: Progressing  Goal: Absence of fever/infection during neutropenic period  Description: INTERVENTIONS:  - Monitor WBC    Outcome: Progressing     Problem: SAFETY ADULT  Goal: Patient will remain free of falls  Description: INTERVENTIONS:  - Educate patient/family on patient safety including physical limitations  - Instruct patient to call for assistance with activity   - Consult OT/PT to assist with strengthening/mobility   - Keep Call bell within reach  - Keep bed low and locked with side rails adjusted as appropriate  - Keep care items and personal belongings within reach  - Initiate and maintain comfort rounds  - Make Fall Risk Sign visible to staff  - Offer Toileting every 1 Hours,  in advance of need  - Initiate/Maintain chairalarm  - Obtain necessary fall risk management equipment: chair   - Apply yellow socks and bracelet for high fall risk patients  - Consider moving patient to room near nurses station  Outcome: Progressing  Goal: Maintain or return to baseline ADL function  Description: INTERVENTIONS:  -  Assess patient's ability to carry out ADLs; assess patient's baseline for ADL function and identify physical deficits which impact ability to perform ADLs (bathing, care of mouth/teeth, toileting, grooming, dressing, etc.)  - Assess/evaluate cause of self-care deficits   - Assess range of motion  - Assess patient's mobility; develop plan if impaired  - Assess patient's need for assistive devices and provide as appropriate  - Encourage maximum independence but intervene and supervise when necessary  - Involve family in performance of ADLs  - Assess for home care needs following discharge   - Consider OT consult to assist with ADL evaluation and planning for discharge  - Provide patient education as appropriate  Outcome: Progressing  Goal: Maintains/Returns to pre admission functional level  Description: INTERVENTIONS:  - Perform AM-PAC 6 Click Basic Mobility/ Daily Activity assessment daily.  - Set and communicate daily mobility goal to care team and patient/family/caregiver.   - Collaborate with rehabilitation services on mobility goals if consulted  - Perform Range of Motion 3 times a day.  - Reposition patient every 2 hours.  - Dangle patient 3 times a day  - Stand patient 3 times a day  - Ambulate patient 3 times a day  - Out of bed to chair 3 times a day   - Out of bed for meals 3 times a day  - Out of bed for toileting  - Record patient progress and toleration of activity level   Outcome: Progressing     Problem: Knowledge Deficit  Goal: Patient/family/caregiver demonstrates understanding of disease process, treatment plan, medications, and discharge instructions  Description:  Complete learning assessment and assess knowledge base.  Interventions:  - Provide teaching at level of understanding  - Provide teaching via preferred learning methods  Outcome: Progressing

## 2024-08-29 NOTE — ASSESSMENT & PLAN NOTE
Presented for abdominal pain and vomitiing  CT abdomen pelvis: Suggests acute small bowel obstruction  Continue NPO and IV fluids with D5 1/2 NS@125 mL/hour  S/p Solu-Cortef, transitioned to Solumedrol  Continue NG tube per general surgery  Monitor labs and vital signs, conduct serial physical assessments  Ongoing care by primary team which is general surgery

## 2024-08-29 NOTE — PLAN OF CARE
Problem: PAIN - ADULT  Goal: Verbalizes/displays adequate comfort level or baseline comfort level  Description: Interventions:  - Encourage patient to monitor pain and request assistance  - Assess pain using appropriate pain scale  - Administer analgesics based on type and severity of pain and evaluate response  - Implement non-pharmacological measures as appropriate and evaluate response  - Consider cultural and social influences on pain and pain management  - Notify physician/advanced practitioner if interventions unsuccessful or patient reports new pain  Outcome: Progressing     Problem: INFECTION - ADULT  Goal: Absence or prevention of progression during hospitalization  Description: INTERVENTIONS:  - Assess and monitor for signs and symptoms of infection  - Monitor lab/diagnostic results  - Monitor all insertion sites, i.e. indwelling lines, tubes, and drains  - Monitor endotracheal if appropriate and nasal secretions for changes in amount and color  - [place] appropriate cooling/warming therapies per order  - Administer medications as ordered  - Instruct and encourage patient and family to use good hand hygiene technique  - Identify and instruct in appropriate isolation precautions for identified infection/condition  Outcome: Progressing  Goal: Absence of fever/infection during neutropenic period  Description: INTERVENTIONS:  - Monitor WBC    Outcome: Progressing     Problem: SAFETY ADULT  Goal: Patient will remain free of falls  Description: INTERVENTIONS:  - Educate patient/family on patient safety including physical limitations  - Instruct patient to call for assistance with activity   - Consult OT/PT to assist with strengthening/mobility   - Keep Call bell within reach  - Keep bed low and locked with side rails adjusted as appropriate  - Keep care items and personal belongings within reach  - Initiate and maintain comfort rounds  - Make Fall Risk Sign visible to staff  - Offer Toileting every 2 Hours,  in advance of need  - Initiate/Maintain bed alarm  - Obtain necessary fall risk management equipment: walker   - Apply yellow socks and bracelet for high fall risk patients  - Consider moving patient to room near nurses station  Outcome: Progressing  Goal: Maintain or return to baseline ADL function  Description: INTERVENTIONS:  -  Assess patient's ability to carry out ADLs; assess patient's baseline for ADL function and identify physical deficits which impact ability to perform ADLs (bathing, care of mouth/teeth, toileting, grooming, dressing, etc.)  - Assess/evaluate cause of self-care deficits   - Assess range of motion  - Assess patient's mobility; develop plan if impaired  - Assess patient's need for assistive devices and provide as appropriate  - Encourage maximum independence but intervene and supervise when necessary  - Involve family in performance of ADLs  - Assess for home care needs following discharge   - Consider OT consult to assist with ADL evaluation and planning for discharge  - Provide patient education as appropriate  Outcome: Progressing  Goal: Maintains/Returns to pre admission functional level  Description: INTERVENTIONS:  - Perform AM-PAC 6 Click Basic Mobility/ Daily Activity assessment daily.  - Set and communicate daily mobility goal to care team and patient/family/caregiver.   - Collaborate with rehabilitation services on mobility goals if consulted  - Perform Range of Motion 3 times a day.  - Reposition patient every 2 hours.  - Dangle patient 3 times a day  - Stand patient 3 times a day  - Ambulate patient 3 times a day  - Out of bed to chair 3 times a day   - Out of bed for meals 3 times a day  - Out of bed for toileting  - Record patient progress and toleration of activity level   Outcome: Progressing     Problem: DISCHARGE PLANNING  Goal: Discharge to home or other facility with appropriate resources  Description: INTERVENTIONS:  - Identify barriers to discharge w/patient and  caregiver  - Arrange for needed discharge resources and transportation as appropriate  - Identify discharge learning needs (meds, wound care, etc.)  - Arrange for interpretive services to assist at discharge as needed  - Refer to Case Management Department for coordinating discharge planning if the patient needs post-hospital services based on physician/advanced practitioner order or complex needs related to functional status, cognitive ability, or social support system  Outcome: Progressing     Problem: Knowledge Deficit  Goal: Patient/family/caregiver demonstrates understanding of disease process, treatment plan, medications, and discharge instructions  Description: Complete learning assessment and assess knowledge base.  Interventions:  - Provide teaching at level of understanding  - Provide teaching via preferred learning methods  Outcome: Progressing

## 2024-08-30 LAB
ALBUMIN SERPL BCG-MCNC: 3.5 G/DL (ref 3.5–5)
ALP SERPL-CCNC: 57 U/L (ref 34–104)
ALT SERPL W P-5'-P-CCNC: 10 U/L (ref 7–52)
ANION GAP SERPL CALCULATED.3IONS-SCNC: 13 MMOL/L (ref 4–13)
ANION GAP SERPL CALCULATED.3IONS-SCNC: 9 MMOL/L (ref 4–13)
AST SERPL W P-5'-P-CCNC: 14 U/L (ref 13–39)
BASOPHILS # BLD AUTO: 0.01 THOUSANDS/ÂΜL (ref 0–0.1)
BASOPHILS NFR BLD AUTO: 0 % (ref 0–1)
BILIRUB DIRECT SERPL-MCNC: 0.7 MG/DL (ref 0–0.2)
BILIRUB SERPL-MCNC: 3.24 MG/DL (ref 0.2–1)
BUN SERPL-MCNC: 15 MG/DL (ref 5–25)
BUN SERPL-MCNC: 18 MG/DL (ref 5–25)
CALCIUM SERPL-MCNC: 8.5 MG/DL (ref 8.4–10.2)
CALCIUM SERPL-MCNC: 9 MG/DL (ref 8.4–10.2)
CHLORIDE SERPL-SCNC: 97 MMOL/L (ref 96–108)
CHLORIDE SERPL-SCNC: 99 MMOL/L (ref 96–108)
CHOLEST SERPL-MCNC: 119 MG/DL
CO2 SERPL-SCNC: 28 MMOL/L (ref 21–32)
CO2 SERPL-SCNC: 29 MMOL/L (ref 21–32)
CREAT SERPL-MCNC: 0.62 MG/DL (ref 0.6–1.3)
CREAT SERPL-MCNC: 0.66 MG/DL (ref 0.6–1.3)
EOSINOPHIL # BLD AUTO: 0 THOUSAND/ÂΜL (ref 0–0.61)
EOSINOPHIL NFR BLD AUTO: 0 % (ref 0–6)
ERYTHROCYTE [DISTWIDTH] IN BLOOD BY AUTOMATED COUNT: 12.6 % (ref 11.6–15.1)
GFR SERPL CREATININE-BSD FRML MDRD: 90 ML/MIN/1.73SQ M
GFR SERPL CREATININE-BSD FRML MDRD: 93 ML/MIN/1.73SQ M
GLUCOSE SERPL-MCNC: 139 MG/DL (ref 65–140)
GLUCOSE SERPL-MCNC: 140 MG/DL (ref 65–140)
HBV CORE AB SER QL: NORMAL
HBV SURFACE AB SER-ACNC: <3 MIU/ML
HBV SURFACE AG SER QL: NORMAL
HCT VFR BLD AUTO: 38.3 % (ref 36.5–49.3)
HCV AB SER QL: NORMAL
HDLC SERPL-MCNC: 35 MG/DL
HGB BLD-MCNC: 13.2 G/DL (ref 12–17)
IMM GRANULOCYTES # BLD AUTO: 0.07 THOUSAND/UL (ref 0–0.2)
IMM GRANULOCYTES NFR BLD AUTO: 1 % (ref 0–2)
LDLC SERPL CALC-MCNC: 57 MG/DL (ref 0–100)
LYMPHOCYTES # BLD AUTO: 0.56 THOUSANDS/ÂΜL (ref 0.6–4.47)
LYMPHOCYTES NFR BLD AUTO: 6 % (ref 14–44)
MCH RBC QN AUTO: 30.3 PG (ref 26.8–34.3)
MCHC RBC AUTO-ENTMCNC: 34.5 G/DL (ref 31.4–37.4)
MCV RBC AUTO: 88 FL (ref 82–98)
MONOCYTES # BLD AUTO: 0.48 THOUSAND/ÂΜL (ref 0.17–1.22)
MONOCYTES NFR BLD AUTO: 5 % (ref 4–12)
NEUTROPHILS # BLD AUTO: 8.26 THOUSANDS/ÂΜL (ref 1.85–7.62)
NEUTS SEG NFR BLD AUTO: 88 % (ref 43–75)
NRBC BLD AUTO-RTO: 0 /100 WBCS
PLATELET # BLD AUTO: 156 THOUSANDS/UL (ref 149–390)
PMV BLD AUTO: 11.1 FL (ref 8.9–12.7)
POTASSIUM SERPL-SCNC: 2.6 MMOL/L (ref 3.5–5.3)
POTASSIUM SERPL-SCNC: 3.2 MMOL/L (ref 3.5–5.3)
PROT SERPL-MCNC: 6 G/DL (ref 6.4–8.4)
RBC # BLD AUTO: 4.36 MILLION/UL (ref 3.88–5.62)
SODIUM SERPL-SCNC: 137 MMOL/L (ref 135–147)
SODIUM SERPL-SCNC: 138 MMOL/L (ref 135–147)
TRIGL SERPL-MCNC: 133 MG/DL
WBC # BLD AUTO: 9.38 THOUSAND/UL (ref 4.31–10.16)

## 2024-08-30 PROCEDURE — 80076 HEPATIC FUNCTION PANEL: CPT

## 2024-08-30 PROCEDURE — 87340 HEPATITIS B SURFACE AG IA: CPT

## 2024-08-30 PROCEDURE — 80048 BASIC METABOLIC PNL TOTAL CA: CPT

## 2024-08-30 PROCEDURE — 80048 BASIC METABOLIC PNL TOTAL CA: CPT | Performed by: SURGERY

## 2024-08-30 PROCEDURE — 86704 HEP B CORE ANTIBODY TOTAL: CPT

## 2024-08-30 PROCEDURE — 86803 HEPATITIS C AB TEST: CPT

## 2024-08-30 PROCEDURE — 99232 SBSQ HOSP IP/OBS MODERATE 35: CPT | Performed by: SURGERY

## 2024-08-30 PROCEDURE — 86706 HEP B SURFACE ANTIBODY: CPT

## 2024-08-30 PROCEDURE — 85025 COMPLETE CBC W/AUTO DIFF WBC: CPT

## 2024-08-30 PROCEDURE — 99233 SBSQ HOSP IP/OBS HIGH 50: CPT | Performed by: NURSE PRACTITIONER

## 2024-08-30 PROCEDURE — 80061 LIPID PANEL: CPT

## 2024-08-30 PROCEDURE — 99232 SBSQ HOSP IP/OBS MODERATE 35: CPT | Performed by: STUDENT IN AN ORGANIZED HEALTH CARE EDUCATION/TRAINING PROGRAM

## 2024-08-30 RX ORDER — POTASSIUM CHLORIDE 14.9 MG/ML
20 INJECTION INTRAVENOUS
Status: COMPLETED | OUTPATIENT
Start: 2024-08-30 | End: 2024-08-30

## 2024-08-30 RX ORDER — METHYLPREDNISOLONE SODIUM SUCCINATE 40 MG/ML
40 INJECTION, POWDER, LYOPHILIZED, FOR SOLUTION INTRAMUSCULAR; INTRAVENOUS DAILY
Status: DISCONTINUED | OUTPATIENT
Start: 2024-08-30 | End: 2024-09-05

## 2024-08-30 RX ORDER — POTASSIUM CHLORIDE 14.9 MG/ML
20 INJECTION INTRAVENOUS ONCE
Status: COMPLETED | OUTPATIENT
Start: 2024-08-30 | End: 2024-08-30

## 2024-08-30 RX ADMIN — LISINOPRIL 40 MG: 20 TABLET ORAL at 08:56

## 2024-08-30 RX ADMIN — HEPARIN SODIUM 5000 UNITS: 5000 INJECTION INTRAVENOUS; SUBCUTANEOUS at 14:13

## 2024-08-30 RX ADMIN — HYDRALAZINE HYDROCHLORIDE 5 MG: 20 INJECTION INTRAMUSCULAR; INTRAVENOUS at 08:56

## 2024-08-30 RX ADMIN — POTASSIUM CHLORIDE 20 MEQ: 14.9 INJECTION, SOLUTION INTRAVENOUS at 08:55

## 2024-08-30 RX ADMIN — AMLODIPINE BESYLATE 10 MG: 10 TABLET ORAL at 08:55

## 2024-08-30 RX ADMIN — POTASSIUM CHLORIDE 20 MEQ: 14.9 INJECTION, SOLUTION INTRAVENOUS at 14:12

## 2024-08-30 RX ADMIN — DEXTROSE AND SODIUM CHLORIDE 100 ML/HR: 5; .45 INJECTION, SOLUTION INTRAVENOUS at 23:29

## 2024-08-30 RX ADMIN — HYDRALAZINE HYDROCHLORIDE 5 MG: 20 INJECTION INTRAMUSCULAR; INTRAVENOUS at 23:27

## 2024-08-30 RX ADMIN — METHYLPREDNISOLONE SODIUM SUCCINATE 40 MG: 40 INJECTION, POWDER, FOR SOLUTION INTRAMUSCULAR; INTRAVENOUS at 14:12

## 2024-08-30 RX ADMIN — DEXTROSE AND SODIUM CHLORIDE 100 ML/HR: 5; .45 INJECTION, SOLUTION INTRAVENOUS at 14:13

## 2024-08-30 RX ADMIN — POTASSIUM CHLORIDE 20 MEQ: 14.9 INJECTION, SOLUTION INTRAVENOUS at 06:51

## 2024-08-30 RX ADMIN — HYDROCORTISONE SODIUM SUCCINATE 100 MG: 100 INJECTION, POWDER, FOR SOLUTION INTRAMUSCULAR; INTRAVENOUS at 06:08

## 2024-08-30 RX ADMIN — POTASSIUM CHLORIDE 20 MEQ: 14.9 INJECTION, SOLUTION INTRAVENOUS at 20:44

## 2024-08-30 RX ADMIN — POTASSIUM CHLORIDE 20 MEQ: 14.9 INJECTION, SOLUTION INTRAVENOUS at 11:52

## 2024-08-30 RX ADMIN — HEPARIN SODIUM 5000 UNITS: 5000 INJECTION INTRAVENOUS; SUBCUTANEOUS at 06:08

## 2024-08-30 RX ADMIN — PANTOPRAZOLE SODIUM 40 MG: 40 INJECTION, POWDER, FOR SOLUTION INTRAVENOUS at 08:56

## 2024-08-30 RX ADMIN — HEPARIN SODIUM 5000 UNITS: 5000 INJECTION INTRAVENOUS; SUBCUTANEOUS at 22:33

## 2024-08-30 RX ADMIN — HYDRALAZINE HYDROCHLORIDE 5 MG: 20 INJECTION INTRAMUSCULAR; INTRAVENOUS at 18:06

## 2024-08-30 NOTE — PLAN OF CARE
Problem: PAIN - ADULT  Goal: Verbalizes/displays adequate comfort level or baseline comfort level  Description: Interventions:  - Encourage patient to monitor pain and request assistance  - Assess pain using appropriate pain scale  - Administer analgesics based on type and severity of pain and evaluate response  - Implement non-pharmacological measures as appropriate and evaluate response  - Consider cultural and social influences on pain and pain management  - Notify physician/advanced practitioner if interventions unsuccessful or patient reports new pain  Outcome: Progressing     Problem: SAFETY ADULT  Goal: Patient will remain free of falls  Description: INTERVENTIONS:  - Educate patient/family on patient safety including physical limitations  - Instruct patient to call for assistance with activity   - Consult OT/PT to assist with strengthening/mobility   - Keep Call bell within reach  - Keep bed low and locked with side rails adjusted as appropriate  - Keep care items and personal belongings within reach  - Initiate and maintain comfort rounds  - Make Fall Risk Sign visible to staff  - Offer Toileting every 1 Hours, in advance of need  - Initiate/Maintain bed alarm  - Obtain necessary fall risk management equipment: bed  - Apply yellow socks and bracelet for high fall risk patients  - Consider moving patient to room near nurses station  Outcome: Progressing  Goal: Maintain or return to baseline ADL function  Description: INTERVENTIONS:  -  Assess patient's ability to carry out ADLs; assess patient's baseline for ADL function and identify physical deficits which impact ability to perform ADLs (bathing, care of mouth/teeth, toileting, grooming, dressing, etc.)  - Assess/evaluate cause of self-care deficits   - Assess range of motion  - Assess patient's mobility; develop plan if impaired  - Assess patient's need for assistive devices and provide as appropriate  - Encourage maximum independence but intervene and  supervise when necessary  - Involve family in performance of ADLs  - Assess for home care needs following discharge   - Consider OT consult to assist with ADL evaluation and planning for discharge  - Provide patient education as appropriate  Outcome: Progressing  Goal: Maintains/Returns to pre admission functional level  Description: INTERVENTIONS:  - Perform AM-PAC 6 Click Basic Mobility/ Daily Activity assessment daily.  - Set and communicate daily mobility goal to care team and patient/family/caregiver.   - Collaborate with rehabilitation services on mobility goals if consulted  - Perform Range of Motion 3 times a day.  - Reposition patient every 2 hours.  - Dangle patient 3 times a day  - Stand patient 3 times a day  - Ambulate patient 3 times a day  - Out of bed to chair 3 times a day   - Out of bed for meals 3 times a day  - Out of bed for toileting  - Record patient progress and toleration of activity level   Outcome: Progressing     Problem: Knowledge Deficit  Goal: Patient/family/caregiver demonstrates understanding of disease process, treatment plan, medications, and discharge instructions  Description: Complete learning assessment and assess knowledge base.  Interventions:  - Provide teaching at level of understanding  - Provide teaching via preferred learning methods  Outcome: Progressing     Problem: Nutrition/Hydration-ADULT  Goal: Nutrient/Hydration intake appropriate for improving, restoring or maintaining nutritional needs  Description: Monitor and assess patient's nutrition/hydration status for malnutrition. Collaborate with interdisciplinary team and initiate plan and interventions as ordered.  Monitor patient's weight and dietary intake as ordered or per policy. Utilize nutrition screening tool and intervene as necessary. Determine patient's food preferences and provide high-protein, high-caloric foods as appropriate.     INTERVENTIONS:  - Monitor oral intake, urinary output, labs, and treatment  plans  - Assess nutrition and hydration status and recommend course of action  - Evaluate amount of meals eaten  - Assist patient with eating if necessary   - Allow adequate time for meals  - Recommend/ encourage appropriate diets, oral nutritional supplements, and vitamin/mineral supplements  - Order, calculate, and assess calorie counts as needed  - Recommend, monitor, and adjust tube feedings and TPN/PPN based on assessed needs  - Assess need for intravenous fluids  - Provide specific nutrition/hydration education as appropriate  - Include patient/family/caregiver in decisions related to nutrition  Outcome: Progressing

## 2024-08-30 NOTE — NURSING NOTE
Pt presently oob in his chair watching tv, denies any pain ,faint bs noted but pt not passing any gas today, 450cc total ng drainage, dr guerrero made aware, callbell in reach of the pt

## 2024-08-30 NOTE — PROGRESS NOTES
Progress Note -  [facility]  Tee Forrest Jr. 79 y.o. male MRN: [id number]  Unit/Bed#: -01 Encounter: [id number]      ASSESSMENT AND PLAN:      Tee Forrest Jr. is a 79 y.o. old male with PMHx notable for recurrent small bowel obstruction s/p resection, prior cholecystectomy, HTN, Aflutter on Eliquis, and mild AS who presents 8/26 with small bowel obstruction.  Gastroenterology has been consulted due to reported history of Crohn's disease.     #Small bowel obstruction  #Concern for Terminal Ileitis 2/2 Crohn's  CT imaging on admission shows acute small bowel obstruction with suspected transition point at the TI which appears to be inflamed on imaging.  There is a reported history of Crohn's disease that was diagnosed after recurrent small bowel obstructions requiring resection, however he has not been on therapy or following with gastroenterology.  Management of SBO per surgery team. Continue NG decompression. NG tube endoscopically replaced on 8/28. 1.3L output overnight  Agree with plan for gastrografin study  Labs pending for prebiologic therapy workup in preparation for possible need for biologic therapy (CBC, CMP, Lipid panel, HepB surface Ag, Ab and core total ab). Will hold off quantiferon gold as it will like indeterminate with his current steroid therapy  CRP elevated, given history and concern for inflammatory ileitis, I agree with continuing IV steroids.    Recommend outpatient colonoscopy with biopsy of the terminal ileum for definitive      diagnosis in about 6-8 weeks after resolution of small bowel obstruction  If patient is able to produce stool sample would recommend checking fecal calprotectin  Upon discharge we will arrange for outpatient follow-up with IBD team       #Elevated liver enzymes  #Dilated pancreatic duct  Labs on admission shows cholestatic liver enzyme elevation with T. bili 3.08 with .  Transaminases normal.  Bilirubin appears to be  chronically elevated for many years as high as 3.5 in 2020.  He has a history of cholecystitis with choledocholithiasis in 2017 status post cholecystectomy.  CT on admission shows dilated PD to 6 mm along with subcentimeter cystic lesions measuring up to 7 mm, all of which have been previously seen on prior imaging.  No CBD dilation on imaging.  Differential includes cholestasis secondary to [place], pancreatic cystic neoplasm such as simple cyst, serocystadenoma, mucinous cystic neoplasm, IPMN, pseudopapillary neoplasm, versus adenocarcinoma.  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  No other suspicious lesions.  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    ______________________________________________________________________    Subjective: Patient seen and examined in his chair this morning.  NG tube with 1.3 L output overnight.  He has not passed flatus this morning but denies any progressive abdominal pain, distention, nausea or vomiting.        REVIEW OF SYSTEMS:    Review of Systems   Constitutional:  Negative for chills and fever.   HENT:  Negative for ear pain and sore throat.    Eyes:  Negative for pain and visual disturbance.   Respiratory:  Negative for cough and shortness of breath.    Cardiovascular:  Negative for chest pain and palpitations.   Gastrointestinal:  Negative for abdominal pain and vomiting.   Genitourinary:  Negative for dysuria and hematuria.   Musculoskeletal:  Negative for arthralgias and back pain.   Skin:  Negative for color change and rash.   Neurological:  Negative for seizures and syncope.   All other systems reviewed and are negative.         Historical Information   Past Medical History:   Diagnosis Date    Arthritis     Atrial flutter (HCC)      Cholecystitis     Coronary artery disease     Hypertension     RBBB     Spinal stenosis      Past Surgical History:   Procedure Laterality Date    CHOLECYSTECTOMY      COLON SURGERY      bwel resection    COLONOSCOPY      ESOPHAGOGASTRODUODENOSCOPY      2007  ONSET    EYE SURGERY      HIP SURGERY      JOINT REPLACEMENT      GA LAPAROSCOPY SURG CHOLECYSTECTOMY N/A 2017    Procedure: CHOLECYSTECTOMY LAPAROSCOPIC;  Surgeon: Ez Lainez MD;  Location: [facility];  Service: General    SMALL INTESTINE SURGERY      ONSEC DEC 2011     Social History   Social History     Substance and Sexual Activity   Alcohol Use Yes    Comment: social      Social History     Substance and Sexual Activity   Drug Use No     Social History     Tobacco Use   Smoking Status Former    Current packs/day: 0.00    Average packs/day: 2.0 packs/day for 3.0 years (6.0 ttl pk-yrs)    Types: Cigarettes    Start date: 1963    Quit date:     Years since quittin.7   Smokeless Tobacco Never     Family History   Problem Relation Age of Onset    Arthritis Mother     Heart attack Father     Coronary artery disease Family     Diabetes Family        Meds/Allergies       Facility-Administered Medications Prior to Admission:     cyanocobalamin injection 1,000 mcg    cyanocobalamin injection 1,000 mcg    Medications Prior to Admission:     amLODIPine (NORVASC) 10 mg tablet    apixaban (Eliquis) 5 mg    atorvastatin (LIPITOR) 40 mg tablet    ferrous sulfate 325 (65 Fe) mg tablet    gabapentin (NEURONTIN) 100 mg capsule    lisinopril (ZESTRIL) 40 mg tablet    potassium chloride (K-DUR,KLOR-CON) 20 mEq tablet    chlorthalidone 25 mg tablet    pantoprazole (PROTONIX) 40 mg tablet  Current Facility-Administered Medications   Medication Dose Route Frequency    amLODIPine (NORVASC) tablet 10 mg  10 mg Oral Daily    dextrose 5 % and sodium chloride 0.45 % infusion  100 mL/hr Intravenous Continuous    heparin (porcine) subcutaneous injection  "5,000 Units  5,000 Units Subcutaneous Q8H IRMA    hydrALAZINE (APRESOLINE) injection 5 mg  5 mg Intravenous Q6H PRN    hydrocortisone (Solu-CORTEF) injection 100 mg  100 mg Intravenous Q8H IRMA    HYDROmorphone HCl (DILAUDID) injection 0.2 mg  0.2 mg Intravenous Q4H PRN    lisinopril (ZESTRIL) tablet 40 mg  40 mg Oral Daily    ondansetron (ZOFRAN) injection 4 mg  4 mg Intravenous Q6H PRN    pantoprazole (PROTONIX) injection 40 mg  40 mg Intravenous Q24H IRMA    potassium chloride 20 mEq IVPB (premix)  20 mEq Intravenous Q2H    potassium chloride 20 mEq IVPB (premix)  20 mEq Intravenous Q2H       No Known Allergies        Objective     Blood pressure (!) 191/99, pulse 60, temperature 98.3 °F (36.8 °C), resp. rate 20, height 5' 9\" (1.753 m), weight 79.4 kg (175 lb), SpO2 97%. Body mass index is 25.84 kg/m².      Intake/Output Summary (Last 24 hours) at 8/30/2024 0957  Last data filed at 8/30/2024 0728  Gross per 24 hour   Intake 1651.67 ml   Output 3300 ml   Net -1648.33 ml         PHYSICAL EXAM:      Physical Exam  Vitals and nursing note reviewed.   Constitutional:       General: He is not in acute distress.     Appearance: He is well-developed.   HENT:      Head: Normocephalic and atraumatic.   Eyes:      Conjunctiva/sclera: Conjunctivae normal.   Cardiovascular:      Rate and Rhythm: Normal rate and regular rhythm.      Heart sounds: No murmur heard.  Pulmonary:      Effort: Pulmonary effort is normal. No respiratory distress.      Breath sounds: Normal breath sounds.   Abdominal:      General: There is distension.      Palpations: Abdomen is soft.      Tenderness: There is no abdominal tenderness.   Musculoskeletal:         General: No swelling.      Cervical back: Neck supple.   Skin:     General: Skin is warm and dry.      Capillary Refill: Capillary refill takes less than 2 seconds.   Neurological:      Mental Status: He is alert.   Psychiatric:         Mood and Affect: Mood normal.          Lab Results:   No " results displayed because visit has over 200 results.          Imaging Studies: I have personally reviewed pertinent imaging studies.    Ania Duran MD  Gastroenterology Fellow  Available via EPIC Secure chat  8/30/2024 9:57 AM

## 2024-08-30 NOTE — PROGRESS NOTES
"Progress Note - General Surgery   Tee Forrest Jr. 79 y.o. male MRN: [id number]  Unit/Bed#: -01 Encounter: [id number]    Assessment:  79 year old male with PMH CAD, atrial flutter, prior SBOs presents with SBO and terminal ileitis   Afebrile, vitals stable   No leukocytosis, WBC 9.38  Hgb 13.2  K 2.6 (3.2)  Cr 0.62  Tbili 3.24 (4.35)  Direct bilirubin 0.70 (0.96)  NGT 1.3L, bilious output   Abdomen soft, distended, nontender    Plan:  Continue NGT to suction today. Consider gastrograffin challenge tomorrow   NPO, IVF  Replete K  Continue hydrocortisone   Monitor for bowel function   Appreciate GI and SLIM recommendations   Monitor labs and vitals   Discussed with Dr. Dyer     Subjective/Objective   Subjective: patient reports doing well. No abdominal pain, n/v. States he is passing gas, but no bowel movement yet. Discussed with nurse, 600cc overnight. 150cc in past hour and a half    Objective:   Blood pressure (!) 183/92, pulse 68, temperature 98.3 °F (36.8 °C), resp. rate 20, height 5' 9\" (1.753 m), weight 79.4 kg (175 lb), SpO2 97%.,Body mass index is 25.84 kg/m².      Intake/Output Summary (Last 24 hours) at 8/30/2024 0807  Last data filed at 8/30/2024 0728  Gross per 24 hour   Intake 1651.67 ml   Output 3300 ml   Net -1648.33 ml       Invasive Devices       Peripheral Intravenous Line  Duration             Peripheral IV 08/26/24 Right;Ventral (anterior) Forearm 4 days              Drain  Duration             NG/OG/Enteral Tube Nasogastric 16 Fr Right nare 1 day                    Physical Exam  Vitals reviewed.   Constitutional:       General: He is not in acute distress.     Appearance: He is obese. He is not ill-appearing.   HENT:      Head: Normocephalic and atraumatic.      Comments: NGT in place with bilious output  Cardiovascular:      Rate and Rhythm: Normal rate.   Pulmonary:      Effort: Pulmonary effort is normal. No respiratory distress.   Abdominal:      General: There is distension.      " Palpations: Abdomen is soft.      Tenderness: There is no abdominal tenderness. There is no guarding.   Musculoskeletal:      Right lower leg: No edema.      Left lower leg: No edema.   Skin:     General: Skin is warm and dry.   Neurological:      General: No focal deficit present.      Mental Status: He is alert. Mental status is at baseline.   Psychiatric:         Mood and Affect: Mood normal.         Behavior: Behavior normal.          Lab, Imaging and other studies:I have personally reviewed pertinent lab results.  , CBC:   Lab Results   Component Value Date    WBC 9.38 08/30/2024    HGB 13.2 08/30/2024    HCT 38.3 08/30/2024    MCV 88 08/30/2024     08/30/2024    RBC 4.36 08/30/2024    MCH 30.3 08/30/2024    MCHC 34.5 08/30/2024    RDW 12.6 08/30/2024    MPV 11.1 08/30/2024    NRBC 0 08/30/2024   , CMP:   Lab Results   Component Value Date    SODIUM 137 08/30/2024    K 2.6 (L) 08/30/2024    CL 99 08/30/2024    CO2 29 08/30/2024    BUN 18 08/30/2024    CREATININE 0.62 08/30/2024    CALCIUM 8.5 08/30/2024    AST 14 08/30/2024    ALT 10 08/30/2024    ALKPHOS 57 08/30/2024    EGFR 93 08/30/2024     VTE Pharmacologic Prophylaxis: Heparin  VTE Mechanical Prophylaxis: sequential compression device    Kaelyn López PA-C

## 2024-08-30 NOTE — PLAN OF CARE
Problem: PAIN - ADULT  Goal: Verbalizes/displays adequate comfort level or baseline comfort level  Description: Interventions:  - Encourage patient to monitor pain and request assistance  - Assess pain using appropriate pain scale  - Administer analgesics based on type and severity of pain and evaluate response  - Implement non-pharmacological measures as appropriate and evaluate response  - Consider cultural and social influences on pain and pain management  - Notify physician/advanced practitioner if interventions unsuccessful or patient reports new pain  Outcome: Progressing     Problem: INFECTION - ADULT  Goal: Absence or prevention of progression during hospitalization  Description: INTERVENTIONS:  - Assess and monitor for signs and symptoms of infection  - Monitor lab/diagnostic results  - Monitor all insertion sites, i.e. indwelling lines, tubes, and drains  - Monitor endotracheal if appropriate and nasal secretions for changes in amount and color  - [place] appropriate cooling/warming therapies per order  - Administer medications as ordered  - Instruct and encourage patient and family to use good hand hygiene technique  - Identify and instruct in appropriate isolation precautions for identified infection/condition  Outcome: Progressing  Goal: Absence of fever/infection during neutropenic period  Description: INTERVENTIONS:  - Monitor WBC    Outcome: Progressing     Problem: SAFETY ADULT  Goal: Patient will remain free of falls  Description: INTERVENTIONS:  - Educate patient/family on patient safety including physical limitations  - Instruct patient to call for assistance with activity   - Consult OT/PT to assist with strengthening/mobility   - Keep Call bell within reach  - Keep bed low and locked with side rails adjusted as appropriate  - Keep care items and personal belongings within reach  - Initiate and maintain comfort rounds  - Make Fall Risk Sign visible to staff  - Offer Toileting every  Hours,  in advance of need  - Initiate/Maintain alarm  - Obtain necessary fall risk management equipment:   - Apply yellow socks and bracelet for high fall risk patients  - Consider moving patient to room near nurses station  Outcome: Progressing  Goal: Maintain or return to baseline ADL function  Description: INTERVENTIONS:  -  Assess patient's ability to carry out ADLs; assess patient's baseline for ADL function and identify physical deficits which impact ability to perform ADLs (bathing, care of mouth/teeth, toileting, grooming, dressing, etc.)  - Assess/evaluate cause of self-care deficits   - Assess range of motion  - Assess patient's mobility; develop plan if impaired  - Assess patient's need for assistive devices and provide as appropriate  - Encourage maximum independence but intervene and supervise when necessary  - Involve family in performance of ADLs  - Assess for home care needs following discharge   - Consider OT consult to assist with ADL evaluation and planning for discharge  - Provide patient education as appropriate  Outcome: Progressing  Goal: Maintains/Returns to pre admission functional level  Description: INTERVENTIONS:  - Perform AM-PAC 6 Click Basic Mobility/ Daily Activity assessment daily.  - Set and communicate daily mobility goal to care team and patient/family/caregiver.   - Collaborate with rehabilitation services on mobility goals if consulted  - Perform Range of Motion  times a day.  - Reposition patient every  hours.  - Dangle patient  times a day  - Stand patient  times a day  - Ambulate patient  times a day  - Out of bed to chair  times a day   - Out of bed for meals times a day  - Out of bed for toileting  - Record patient progress and toleration of activity level   Outcome: Progressing     Problem: DISCHARGE PLANNING  Goal: Discharge to home or other facility with appropriate resources  Description: INTERVENTIONS:  - Identify barriers to discharge w/patient and caregiver  - Arrange for  needed discharge resources and transportation as appropriate  - Identify discharge learning needs (meds, wound care, etc.)  - Arrange for interpretive services to assist at discharge as needed  - Refer to Case Management Department for coordinating discharge planning if the patient needs post-hospital services based on physician/advanced practitioner order or complex needs related to functional status, cognitive ability, or social support system  Outcome: Progressing     Problem: Knowledge Deficit  Goal: Patient/family/caregiver demonstrates understanding of disease process, treatment plan, medications, and discharge instructions  Description: Complete learning assessment and assess knowledge base.  Interventions:  - Provide teaching at level of understanding  - Provide teaching via preferred learning methods  Outcome: Progressing     Problem: Nutrition/Hydration-ADULT  Goal: Nutrient/Hydration intake appropriate for improving, restoring or maintaining nutritional needs  Description: Monitor and assess patient's nutrition/hydration status for malnutrition. Collaborate with interdisciplinary team and initiate plan and interventions as ordered.  Monitor patient's weight and dietary intake as ordered or per policy. Utilize nutrition screening tool and intervene as necessary. Determine patient's food preferences and provide high-protein, high-caloric foods as appropriate.     INTERVENTIONS:  - Monitor oral intake, urinary output, labs, and treatment plans  - Assess nutrition and hydration status and recommend course of action  - Evaluate amount of meals eaten  - Assist patient with eating if necessary   - Allow adequate time for meals  - Recommend/ encourage appropriate diets, oral nutritional supplements, and vitamin/mineral supplements  - Order, calculate, and assess calorie counts as needed  - Recommend, monitor, and adjust tube feedings and TPN/PPN based on assessed needs  - Assess need for intravenous fluids  -  Provide specific nutrition/hydration education as appropriate  - Include patient/family/caregiver in decisions related to nutrition  Outcome: Progressing

## 2024-08-30 NOTE — PROGRESS NOTES
[facility]  Progress Note  Name: Tee Wadsworth I  MRN: [id number]  Unit/Bed#: -01 I Date of Admission: 8/26/2024   Date of Service: 8/30/2024 I Hospital Day: 4    Assessment & Plan     Confusion  Assessment & Plan  Per chart review, patient with confusion  Per patient's wife, he has been having short-term memory difficulty for the past several months   Mentation is now at baseline  Suspect confusion related to hospitalization versus cognitive decline   Maintain sleep-wake cycle, promote restful environment  Conduct serial assessments    * SBO (small bowel obstruction) (HCC)  Assessment & Plan  Presented for abdominal pain and vomitiing  CT abdomen pelvis: Suggests acute small bowel obstruction  Continue NPO and IV fluids with D5 1/2 NS@125 mL/hour  S/p Solu-Cortef, transitioned to Solumedrol  Continue NG tube per general surgery  Monitor labs and vital signs, conduct serial physical assessments  Ongoing care by primary team which is general surgery    Elevated bilirubin  Assessment & Plan  MRCP results reviewed  Continue to trend  Gastroenterology following, input appreciated    Crohn's disease of both small and large intestine without complication (HCC)  Assessment & Plan  With reported history of Crohn's  GI input appreciated: Requires colonoscopy for evaluation and definitive diagnosis, However, would recommend this be done in 6 to 8 weeks following resolution of SBO    Lumbar radiculopathy  Assessment & Plan  History of lumbar radiculopathy, ankylosing spondylitis, treated with injections  Follows with pain and spine  Also with bilateral foot neuropathy, recently initiated on gabapentin 100 mg p.o. 3 times daily on 8/7/2024  Conduct serial physical assessments, monitor for adequate pain control    Pancreatic cyst  Assessment & Plan  CT abdomen pelvis: Re-identified prominence of the main pancreatic duct with several nonspecific subcentimeter cystic lesions, overall not  significantly changed from previous CT examinations  MRCP: Noted cystic pancreatic lesions, recommended outpatient follow-up    Atrial fibrillation (HCC)  Assessment & Plan  Takes apixaban, currently on hold pending clinical course for SBO  Does not appear to be on beta-blocker at this time  Rate controlled   On heparin for VTE prophylaxis      Essential hypertension  Assessment & Plan  Blood pressure reviewed and elevated  Chlorthalidone on hold while NPO  Amlodipine and lisinopril were resumed yesterday  Continue hydralazine IV PRN  Continue to monitor BP     Bilateral carotid artery stenosis  Assessment & Plan  Noted to have high-grade right carotid stenosis   Per vascular surgery note in 2019, patient's anatomy not favorable for carotid stenting due to extensive bulky calcific plaque and due to cervical spine immobility and location of carotid stenosis difficult to achieve adequate exposure to perform endarterectomy in safe manner  He was started on DAPT however this was discontinued in favor of apixaban for atrial fibrillation  Carotid ultrasound unchanged from prior study 10/16/2020         VTE Pharmacologic Prophylaxis: VTE Score: 3 Moderate Risk (Score 3-4) - Pharmacological DVT Prophylaxis Ordered: heparin.    Mobility:   Basic Mobility Inpatient Raw Score: 19  JH-HLM Goal: 6: Walk 10 steps or more  JH-HLM Achieved: 6: Walk 10 steps or more  JH-HLM Goal achieved. Continue to encourage appropriate mobility.    Patient Centered Rounds: I performed bedside rounds with nursing staff today.   Discussions with Specialists or Other Care Team Provider: RAF    Total Time Spent on Date of Encounter in care of patient: 30 mins. This time was spent on one or more of the following: performing physical exam; counseling and coordination of care; obtaining or reviewing history; documenting in the medical record; reviewing/ordering tests, medications or procedures; communicating with other healthcare professionals and  discussing with patient's family/caregivers.    Current Length of Stay: 4 day(s)  Current Patient Status: Inpatient   Code Status: Prior    Subjective:   Patient evaluated at bedside.  No specific complaints aside from the NG tube.  He said it took multiple attempts to put it in.  He denies nausea or abdominal pain at this time.  He reported flatus this morning but nothing since.  No BM.    Objective:     Vitals:   Temp (24hrs), Av.4 °F (36.9 °C), Min:98.3 °F (36.8 °C), Max:98.6 °F (37 °C)    Temp:  [98.3 °F (36.8 °C)-98.6 °F (37 °C)] 98.4 °F (36.9 °C)  HR:  [60-97] 88  Resp:  [16-21] 21  BP: (167-191)/(86-99) 167/91  SpO2:  [95 %-97 %] 97 %  Body mass index is 25.84 kg/m².     Input and Output Summary (last 24 hours):     Intake/Output Summary (Last 24 hours) at 2024 1620  Last data filed at 2024 1436  Gross per 24 hour   Intake 611.67 ml   Output 3475 ml   Net -2863.33 ml       Physical Exam:   Physical Exam  Vitals and nursing note reviewed.   Constitutional:       General: He is not in acute distress.  HENT:      Head: Normocephalic and atraumatic.      Nose: Nose normal.      Mouth/Throat:      Mouth: Mucous membranes are moist.      Pharynx: Oropharynx is clear.   Eyes:      Pupils: Pupils are equal, round, and reactive to light.   Cardiovascular:      Rate and Rhythm: Normal rate and regular rhythm.      Pulses: Normal pulses.      Heart sounds: Normal heart sounds.   Pulmonary:      Effort: Pulmonary effort is normal. No respiratory distress.      Breath sounds: Normal breath sounds.   Abdominal:      General: Bowel sounds are decreased. There is distension.      Palpations: Abdomen is soft.      Comments: NG tube in place, 500 mL bilious output in canister   Musculoskeletal:      Cervical back: Neck supple.      Right lower leg: No edema.      Left lower leg: No edema.   Skin:     General: Skin is warm and dry.      Capillary Refill: Capillary refill takes less than 2 seconds.   Neurological:       General: No focal deficit present.      Mental Status: He is alert and oriented to person, place, and time.        Additional Data:     Labs:  Results from last 7 days   Lab Units 08/30/24  0506 08/28/24  0513 08/27/24  0544   WBC Thousand/uL 9.38   < > 8.60   HEMOGLOBIN g/dL 13.2   < > 13.4   HEMATOCRIT % 38.3   < > 40.2   PLATELETS Thousands/uL 156   < > 139*   BANDS PCT %  --   --  18*   SEGS PCT % 88*   < >  --    LYMPHO PCT % 6*   < > 20   MONO PCT % 5   < > 1*   EOS PCT % 0   < > 0    < > = values in this interval not displayed.     Results from last 7 days   Lab Units 08/30/24  0506   SODIUM mmol/L 137   POTASSIUM mmol/L 2.6*   CHLORIDE mmol/L 99   CO2 mmol/L 29   BUN mg/dL 18   CREATININE mg/dL 0.62   ANION GAP mmol/L 9   CALCIUM mg/dL 8.5   ALBUMIN g/dL 3.5   TOTAL BILIRUBIN mg/dL 3.24*   ALK PHOS U/L 57   ALT U/L 10   AST U/L 14   GLUCOSE RANDOM mg/dL 139         Results from last 7 days   Lab Units 08/27/24  0247   POC GLUCOSE mg/dl 119         Results from last 7 days   Lab Units 08/26/24  0348   LACTIC ACID mmol/L 1.6       Lines/Drains:  Invasive Devices       Peripheral Intravenous Line  Duration             Peripheral IV 08/30/24 Distal;Left;Ventral (anterior) Forearm <1 day              Drain  Duration             NG/OG/Enteral Tube Nasogastric 16 Fr Right nare 2 days                          Imaging: Reviewed radiology reports from this admission including: xray(s)    Recent Cultures (last 7 days):         Last 24 Hours Medication List:   Current Facility-Administered Medications   Medication Dose Route Frequency Provider Last Rate    amLODIPine  10 mg Oral Daily MYNOR Matos      dextrose 5 % and sodium chloride 0.45 %  100 mL/hr Intravenous Continuous Evin Dyer  mL/hr (08/30/24 1413)    heparin (porcine)  5,000 Units Subcutaneous Q8H [facility] Jarred Gallo MD      hydrALAZINE  5 mg Intravenous Q6H PRN Kaelyn López PA-C      HYDROmorphone  0.2 mg Intravenous Q4H PRN  Kaelyn López PA-C      lisinopril  40 mg Oral Daily MYNOR Matos      methylPREDNISolone sodium succinate  40 mg Intravenous Daily Yfn Griffin DO      ondansetron  4 mg Intravenous Q6H PRN Jarred Gallo MD      pantoprazole  40 mg Intravenous Q24H [facility] Jarred Gallo MD          Today, Patient Was Seen By: MYNOR Miguel    **Please Note: This note may have been constructed using a voice recognition system.**

## 2024-08-31 PROBLEM — E87.8 ELECTROLYTE ABNORMALITY: Status: ACTIVE | Noted: 2024-08-31

## 2024-08-31 LAB
ALBUMIN SERPL BCG-MCNC: 3.6 G/DL (ref 3.5–5)
ALP SERPL-CCNC: 58 U/L (ref 34–104)
ALT SERPL W P-5'-P-CCNC: 13 U/L (ref 7–52)
ANION GAP SERPL CALCULATED.3IONS-SCNC: 11 MMOL/L (ref 4–13)
ANION GAP SERPL CALCULATED.3IONS-SCNC: 9 MMOL/L (ref 4–13)
AST SERPL W P-5'-P-CCNC: 14 U/L (ref 13–39)
BASOPHILS # BLD AUTO: 0.02 THOUSANDS/ÂΜL (ref 0–0.1)
BASOPHILS NFR BLD AUTO: 0 % (ref 0–1)
BILIRUB DIRECT SERPL-MCNC: 0.79 MG/DL (ref 0–0.2)
BILIRUB SERPL-MCNC: 3.65 MG/DL (ref 0.2–1)
BUN SERPL-MCNC: 14 MG/DL (ref 5–25)
BUN SERPL-MCNC: 15 MG/DL (ref 5–25)
CALCIUM SERPL-MCNC: 8.6 MG/DL (ref 8.4–10.2)
CALCIUM SERPL-MCNC: 8.8 MG/DL (ref 8.4–10.2)
CHLORIDE SERPL-SCNC: 97 MMOL/L (ref 96–108)
CHLORIDE SERPL-SCNC: 98 MMOL/L (ref 96–108)
CO2 SERPL-SCNC: 29 MMOL/L (ref 21–32)
CO2 SERPL-SCNC: 30 MMOL/L (ref 21–32)
CREAT SERPL-MCNC: 0.65 MG/DL (ref 0.6–1.3)
CREAT SERPL-MCNC: 0.69 MG/DL (ref 0.6–1.3)
EOSINOPHIL # BLD AUTO: 0 THOUSAND/ÂΜL (ref 0–0.61)
EOSINOPHIL NFR BLD AUTO: 0 % (ref 0–6)
ERYTHROCYTE [DISTWIDTH] IN BLOOD BY AUTOMATED COUNT: 12.7 % (ref 11.6–15.1)
GFR SERPL CREATININE-BSD FRML MDRD: 89 ML/MIN/1.73SQ M
GFR SERPL CREATININE-BSD FRML MDRD: 91 ML/MIN/1.73SQ M
GLUCOSE SERPL-MCNC: 122 MG/DL (ref 65–140)
GLUCOSE SERPL-MCNC: 126 MG/DL (ref 65–140)
HCT VFR BLD AUTO: 42.2 % (ref 36.5–49.3)
HGB BLD-MCNC: 14.2 G/DL (ref 12–17)
IMM GRANULOCYTES # BLD AUTO: 0.14 THOUSAND/UL (ref 0–0.2)
IMM GRANULOCYTES NFR BLD AUTO: 1 % (ref 0–2)
LYMPHOCYTES # BLD AUTO: 0.71 THOUSANDS/ÂΜL (ref 0.6–4.47)
LYMPHOCYTES NFR BLD AUTO: 6 % (ref 14–44)
MAGNESIUM SERPL-MCNC: 1.8 MG/DL (ref 1.9–2.7)
MCH RBC QN AUTO: 29.6 PG (ref 26.8–34.3)
MCHC RBC AUTO-ENTMCNC: 33.6 G/DL (ref 31.4–37.4)
MCV RBC AUTO: 88 FL (ref 82–98)
MONOCYTES # BLD AUTO: 0.88 THOUSAND/ÂΜL (ref 0.17–1.22)
MONOCYTES NFR BLD AUTO: 8 % (ref 4–12)
NEUTROPHILS # BLD AUTO: 9.71 THOUSANDS/ÂΜL (ref 1.85–7.62)
NEUTS SEG NFR BLD AUTO: 85 % (ref 43–75)
NRBC BLD AUTO-RTO: 0 /100 WBCS
PHOSPHATE SERPL-MCNC: 2.5 MG/DL (ref 2.3–4.1)
PLATELET # BLD AUTO: 157 THOUSANDS/UL (ref 149–390)
PMV BLD AUTO: 10.7 FL (ref 8.9–12.7)
POTASSIUM SERPL-SCNC: 2.8 MMOL/L (ref 3.5–5.3)
POTASSIUM SERPL-SCNC: 2.9 MMOL/L (ref 3.5–5.3)
PROT SERPL-MCNC: 6.1 G/DL (ref 6.4–8.4)
RBC # BLD AUTO: 4.8 MILLION/UL (ref 3.88–5.62)
SODIUM SERPL-SCNC: 136 MMOL/L (ref 135–147)
SODIUM SERPL-SCNC: 138 MMOL/L (ref 135–147)
WBC # BLD AUTO: 11.46 THOUSAND/UL (ref 4.31–10.16)

## 2024-08-31 PROCEDURE — 99233 SBSQ HOSP IP/OBS HIGH 50: CPT | Performed by: NURSE PRACTITIONER

## 2024-08-31 PROCEDURE — 80048 BASIC METABOLIC PNL TOTAL CA: CPT

## 2024-08-31 PROCEDURE — 84100 ASSAY OF PHOSPHORUS: CPT

## 2024-08-31 PROCEDURE — 85025 COMPLETE CBC W/AUTO DIFF WBC: CPT

## 2024-08-31 PROCEDURE — 80076 HEPATIC FUNCTION PANEL: CPT

## 2024-08-31 PROCEDURE — 80048 BASIC METABOLIC PNL TOTAL CA: CPT | Performed by: NURSE PRACTITIONER

## 2024-08-31 PROCEDURE — 83735 ASSAY OF MAGNESIUM: CPT

## 2024-08-31 RX ORDER — POTASSIUM CHLORIDE 14.9 MG/ML
20 INJECTION INTRAVENOUS
Status: COMPLETED | OUTPATIENT
Start: 2024-08-31 | End: 2024-08-31

## 2024-08-31 RX ORDER — MAGNESIUM SULFATE 1 G/100ML
1 INJECTION INTRAVENOUS ONCE
Status: COMPLETED | OUTPATIENT
Start: 2024-08-31 | End: 2024-08-31

## 2024-08-31 RX ADMIN — LISINOPRIL 40 MG: 20 TABLET ORAL at 09:20

## 2024-08-31 RX ADMIN — MAGNESIUM SULFATE HEPTAHYDRATE 1 G: 1 INJECTION, SOLUTION INTRAVENOUS at 09:20

## 2024-08-31 RX ADMIN — HYDRALAZINE HYDROCHLORIDE 5 MG: 20 INJECTION INTRAMUSCULAR; INTRAVENOUS at 23:28

## 2024-08-31 RX ADMIN — HEPARIN SODIUM 5000 UNITS: 5000 INJECTION INTRAVENOUS; SUBCUTANEOUS at 06:46

## 2024-08-31 RX ADMIN — AMLODIPINE BESYLATE 10 MG: 10 TABLET ORAL at 09:20

## 2024-08-31 RX ADMIN — HEPARIN SODIUM 5000 UNITS: 5000 INJECTION INTRAVENOUS; SUBCUTANEOUS at 15:24

## 2024-08-31 RX ADMIN — POTASSIUM CHLORIDE 20 MEQ: 200 INJECTION, SOLUTION INTRAVENOUS at 09:21

## 2024-08-31 RX ADMIN — HEPARIN SODIUM 5000 UNITS: 5000 INJECTION INTRAVENOUS; SUBCUTANEOUS at 23:29

## 2024-08-31 RX ADMIN — DEXTROSE AND SODIUM CHLORIDE 100 ML/HR: 5; .45 INJECTION, SOLUTION INTRAVENOUS at 23:28

## 2024-08-31 RX ADMIN — PANTOPRAZOLE SODIUM 40 MG: 40 INJECTION, POWDER, FOR SOLUTION INTRAVENOUS at 09:20

## 2024-08-31 RX ADMIN — METHYLPREDNISOLONE SODIUM SUCCINATE 40 MG: 40 INJECTION, POWDER, FOR SOLUTION INTRAMUSCULAR; INTRAVENOUS at 09:20

## 2024-08-31 RX ADMIN — POTASSIUM CHLORIDE 20 MEQ: 200 INJECTION, SOLUTION INTRAVENOUS at 11:49

## 2024-08-31 NOTE — ASSESSMENT & PLAN NOTE
Blood pressure reviewed and elevated but improving  Chlorthalidone on hold while NPO  Amlodipine and lisinopril were resumed 8/29/2024  Continue hydralazine IV PRN  Continue to monitor BP

## 2024-08-31 NOTE — ASSESSMENT & PLAN NOTE
With hypokalemia and hypomagnesemia status post repletion  Continue to monitor with daily metabolic panel, replete as needed in coordination with primary team, general surgery

## 2024-08-31 NOTE — PROGRESS NOTES
[facility]  Progress Note  Name: Tee Wadsworth I  MRN: [id number]  Unit/Bed#: -01 I Date of Admission: 8/26/2024   Date of Service: 8/31/2024 I Hospital Day: 5    Assessment & Plan     * SBO (small bowel obstruction) (HCC)  Assessment & Plan  Presented for abdominal pain and vomitiing  CT abdomen pelvis: Suggests acute small bowel obstruction  Continue NPO and IV fluids with D5 1/2 NS@125 mL/hour  S/p Solu-Cortef, transitioned to Solumedrol 40 mg IV daily, D2  Continue NG tube per general surgery  Monitor labs and vital signs, conduct serial physical assessments  Ongoing care by primary team which is general surgery    Essential hypertension  Assessment & Plan  Blood pressure reviewed and elevated but improving  Chlorthalidone on hold while NPO  Amlodipine and lisinopril were resumed 8/29/2024  Continue hydralazine IV PRN  Continue to monitor BP     Elevated bilirubin  Assessment & Plan  MRCP results reviewed  Continue to trend  Gastroenterology following, input appreciated    Crohn's disease of both small and large intestine without complication (HCC)  Assessment & Plan  With reported history of Crohn's  GI input appreciated: Requires colonoscopy for evaluation and definitive diagnosis, However, would recommend this be done in 6 to 8 weeks following resolution of SBO    Electrolyte abnormality  Assessment & Plan  With hypokalemia and hypomagnesemia status post repletion  Continue to monitor with daily metabolic panel, replete as needed in coordination with primary team, general surgery    Confusion  Assessment & Plan  Per chart review, patient with confusion  Per patient's wife, he has been having short-term memory difficulty for the past several months   Mentation is now at baseline  Suspect confusion related to hospitalization versus cognitive decline   Maintain sleep-wake cycle, promote restful environment  Conduct serial assessments    Lumbar radiculopathy  Assessment &  Plan  History of lumbar radiculopathy, ankylosing spondylitis, treated with injections  Follows with pain and spine  Also with bilateral foot neuropathy, recently initiated on gabapentin 100 mg p.o. 3 times daily on 8/7/2024  Conduct serial physical assessments, monitor for adequate pain control    Pancreatic cyst  Assessment & Plan  CT abdomen pelvis: Re-identified prominence of the main pancreatic duct with several nonspecific subcentimeter cystic lesions, overall not significantly changed from previous CT examinations  MRCP: Noted cystic pancreatic lesions, recommended outpatient follow-up    Atrial fibrillation (HCC)  Assessment & Plan  Takes apixaban, currently on hold pending clinical course for SBO  Does not appear to be on beta-blocker at this time  Rate controlled   On heparin for VTE prophylaxis      Bilateral carotid artery stenosis  Assessment & Plan  Noted to have high-grade right carotid stenosis   Per vascular surgery note in 2019, patient's anatomy not favorable for carotid stenting due to extensive bulky calcific plaque and due to cervical spine immobility and location of carotid stenosis difficult to achieve adequate exposure to perform endarterectomy in safe manner  He was started on DAPT however this was discontinued in favor of apixaban for atrial fibrillation  Carotid ultrasound unchanged from prior study 10/16/2020           VTE Pharmacologic Prophylaxis: VTE Score: 3 Moderate Risk (Score 3-4) - Pharmacological DVT Prophylaxis Ordered: heparin.    Mobility:   Basic Mobility Inpatient Raw Score: 19  JH-HLM Goal: 6: Walk 10 steps or more  JH-HLM Achieved: 6: Walk 10 steps or more  JH-HLM Goal achieved. Continue to encourage appropriate mobility.    Patient Centered Rounds: I performed bedside rounds with nursing staff today.   Discussions with Specialists or Other Care Team Provider: Surgery    Education and Discussions with Family / Patient: Updated  (wife) via phone. 8:05  am    Total Time Spent on Date of Encounter in care of patient: 40 mins. This time was spent on one or more of the following: performing physical exam; counseling and coordination of care; obtaining or reviewing history; documenting in the medical record; reviewing/ordering tests, medications or procedures; communicating with other healthcare professionals and discussing with patient's family/caregivers.    Current Length of Stay: 5 day(s)  Current Patient Status: Inpatient   Certification Statement: The patient will continue to require additional inpatient hospital stay due to SBO  Discharge Plan:  Discharge planning per primary team which is surgery    Code Status: Prior    Subjective:   Patient evaluated at bedside.  He says he is tired of this.  He says he has not eaten in 6 days.  He says he has not passed gas since yesterday, no BM.  He said he is urinating fine.  Denies fever, myalgia, chest pain, shortness of breath.  I told him surgery will be seeing him later and will likely discuss further plans with him.  I explained him the rationale of the interventions and he seemed to understand.     Objective:     Vitals:   Temp (24hrs), Av.5 °F (36.9 °C), Min:98.4 °F (36.9 °C), Max:98.5 °F (36.9 °C)    Temp:  [98.4 °F (36.9 °C)-98.5 °F (36.9 °C)] 98.5 °F (36.9 °C)  HR:  [60-97] 73  Resp:  [17-21] 17  BP: (150-191)/(69-99) 150/69  SpO2:  [95 %-98 %] 98 %  Body mass index is 25.84 kg/m².     Input and Output Summary (last 24 hours):     Intake/Output Summary (Last 24 hours) at 2024 0729  Last data filed at 2024 0601  Gross per 24 hour   Intake 2532.17 ml   Output 2400 ml   Net 132.17 ml       Physical Exam:   Physical Exam  Vitals and nursing note reviewed.   Constitutional:       General: He is not in acute distress.     Appearance: He is ill-appearing.   HENT:      Head: Normocephalic and atraumatic.      Mouth/Throat:      Mouth: Mucous membranes are dry.      Pharynx: Oropharynx is clear.   Eyes:       Pupils: Pupils are equal, round, and reactive to light.   Cardiovascular:      Rate and Rhythm: Normal rate and regular rhythm.      Pulses: Normal pulses.      Heart sounds: Normal heart sounds.   Pulmonary:      Effort: Pulmonary effort is normal. No respiratory distress.      Breath sounds: Normal breath sounds.   Abdominal:      General: Bowel sounds are normal. There is distension.      Palpations: Abdomen is soft.      Comments: Tympanic to percussion, NG tube in place, abdomen nontender to palpation   Musculoskeletal:      Cervical back: Neck supple.      Right lower leg: No edema.      Left lower leg: No edema.   Skin:     General: Skin is warm and dry.      Capillary Refill: Capillary refill takes less than 2 seconds.   Neurological:      General: No focal deficit present.      Mental Status: He is alert and oriented to person, place, and time. Mental status is at baseline.          Additional Data:     Labs:  Results from last 7 days   Lab Units 08/31/24  0505 08/28/24  0513 08/27/24  0544   WBC Thousand/uL 11.46*   < > 8.60   HEMOGLOBIN g/dL 14.2   < > 13.4   HEMATOCRIT % 42.2   < > 40.2   PLATELETS Thousands/uL 157   < > 139*   BANDS PCT %  --   --  18*   SEGS PCT % 85*   < >  --    LYMPHO PCT % 6*   < > 20   MONO PCT % 8   < > 1*   EOS PCT % 0   < > 0    < > = values in this interval not displayed.     Results from last 7 days   Lab Units 08/31/24  0505   SODIUM mmol/L 138   POTASSIUM mmol/L 2.8*   CHLORIDE mmol/L 98   CO2 mmol/L 29   BUN mg/dL 15   CREATININE mg/dL 0.65   ANION GAP mmol/L 11   CALCIUM mg/dL 8.6   ALBUMIN g/dL 3.6   TOTAL BILIRUBIN mg/dL 3.65*   ALK PHOS U/L 58   ALT U/L 13   AST U/L 14   GLUCOSE RANDOM mg/dL 122         Results from last 7 days   Lab Units 08/27/24  0247   POC GLUCOSE mg/dl 119         Results from last 7 days   Lab Units 08/26/24  0348   LACTIC ACID mmol/L 1.6       Lines/Drains:  Invasive Devices       Peripheral Intravenous Line  Duration             Peripheral IV  08/30/24 Distal;Left;Ventral (anterior) Forearm <1 day              Drain  Duration             NG/OG/Enteral Tube Nasogastric 16 Fr Right nare 2 days                      Last 24 Hours Medication List:   Current Facility-Administered Medications   Medication Dose Route Frequency Provider Last Rate    amLODIPine  10 mg Oral Daily MYNOR Matos      dextrose 5 % and sodium chloride 0.45 %  100 mL/hr Intravenous Continuous Evin Dyer  mL/hr (08/30/24 0817)    heparin (porcine)  5,000 Units Subcutaneous Q8H IRMA Gallo MD      hydrALAZINE  5 mg Intravenous Q6H PRN Kaelyn López PA-C      HYDROmorphone  0.2 mg Intravenous Q4H PRN Kaelyn López PA-C      lisinopril  40 mg Oral Daily MYNOR Matos      magnesium sulfate  1 g Intravenous Once MYNOR Miguel      methylPREDNISolone sodium succinate  40 mg Intravenous Daily Yfn Griffin DO      ondansetron  4 mg Intravenous Q6H PRN Jarred Gallo MD      pantoprazole  40 mg Intravenous Q24H IRMA Gallo MD      potassium chloride  40 mEq Intravenous Once MYNOR Miguel          Today, Patient Was Seen By: MYNOR Miguel    **Please Note: This note may have been constructed using a voice recognition system.**

## 2024-08-31 NOTE — ASSESSMENT & PLAN NOTE
Presented for abdominal pain and vomitiing  CT abdomen pelvis: Suggests acute small bowel obstruction  Continue NPO and IV fluids with D5 1/2 NS@125 mL/hour  S/p Solu-Cortef, transitioned to Solumedrol 40 mg IV daily, D2  Continue NG tube per general surgery  Monitor labs and vital signs, conduct serial physical assessments  Ongoing care by primary team which is general surgery

## 2024-08-31 NOTE — PLAN OF CARE
Problem: PAIN - ADULT  Goal: Verbalizes/displays adequate comfort level or baseline comfort level  Description: Interventions:  - Encourage patient to monitor pain and request assistance  - Assess pain using appropriate pain scale  - Administer analgesics based on type and severity of pain and evaluate response  - Implement non-pharmacological measures as appropriate and evaluate response  - Consider cultural and social influences on pain and pain management  - Notify physician/advanced practitioner if interventions unsuccessful or patient reports new pain  Outcome: Progressing     Problem: INFECTION - ADULT  Goal: Absence or prevention of progression during hospitalization  Description: INTERVENTIONS:  - Assess and monitor for signs and symptoms of infection  - Monitor lab/diagnostic results  - Monitor all insertion sites, i.e. indwelling lines, tubes, and drains  - Monitor endotracheal if appropriate and nasal secretions for changes in amount and color  - [place] appropriate cooling/warming therapies per order  - Administer medications as ordered  - Instruct and encourage patient and family to use good hand hygiene technique  - Identify and instruct in appropriate isolation precautions for identified infection/condition  Outcome: Progressing     Problem: SAFETY ADULT  Goal: Patient will remain free of falls  Description: INTERVENTIONS:  - Educate patient/family on patient safety including physical limitations  - Instruct patient to call for assistance with activity   - Consult OT/PT to assist with strengthening/mobility   - Keep Call bell within reach  - Keep bed low and locked with side rails adjusted as appropriate  - Keep care items and personal belongings within reach  - Initiate and maintain comfort rounds  - Make Fall Risk Sign visible to staff  - Offer Toileting every 2 Hours, in advance of need  - Initiate/Maintain bed alarm  - Apply yellow socks and bracelet for high fall risk patients  - Consider  moving patient to room near nurses station  Outcome: Progressing

## 2024-08-31 NOTE — PROGRESS NOTES
"Progress Note - General Surgery   Tee Forrest Jr. 79 y.o. male MRN: [id number]  Unit/Bed#: -01 Encounter: [id number]    Assessment:  79 year old male with PMH CAD, atrial flutter, prior SBOs presents with SBO and terminal ileitis   Afebrile, vitals stable  WBC 11.46 (9.38)  Hgb 14.2  K 2.8 (3.2)  Cr 0.65  Tbili 3.65 (3.24)  Direct bilirubin 0.79  Mg 1.8  NGT 1.1L output  Abdomen soft, distended, nontender    Plan:  Continue bowel rest with NGT to suction  N.p.o. with IV fluids  Replete potassium and magnesium  Monitor labs and vitals  Continue steroids, appreciate GI recommendations  Appreciate SLIM recommendations  Stephane with Dr. Patel who will also evaluate the patient later    Subjective/Objective   Subjective: patient denies abdominal pain, n/v. He denies having a bowel movement since admission. He states he has not passed gas in a couple of days. Urinating without difficulty.     Objective:   Blood pressure 166/88, pulse 81, temperature 97.8 °F (36.6 °C), temperature source Axillary, resp. rate 18, height 5' 9\" (1.753 m), weight 79.4 kg (175 lb), SpO2 93%.,Body mass index is 25.84 kg/m².      Intake/Output Summary (Last 24 hours) at 8/31/2024 0749  Last data filed at 8/31/2024 0734  Gross per 24 hour   Intake 2532.17 ml   Output 2600 ml   Net -67.83 ml       Invasive Devices       Peripheral Intravenous Line  Duration             Peripheral IV 08/30/24 Distal;Left;Ventral (anterior) Forearm <1 day              Drain  Duration             NG/OG/Enteral Tube Nasogastric 16 Fr Right nare 2 days                    Physical Exam  Vitals reviewed.   Constitutional:       General: He is not in acute distress.     Appearance: He is obese. He is not ill-appearing.   HENT:      Head: Normocephalic and atraumatic.      Comments: NGT in place with bilious output  Cardiovascular:      Rate and Rhythm: Normal rate.   Pulmonary:      Effort: Pulmonary effort is normal. No respiratory distress.   Abdominal:      " General: There is distension.      Palpations: Abdomen is soft.      Tenderness: There is no abdominal tenderness. There is no guarding.   Musculoskeletal:      Right lower leg: No edema.      Left lower leg: No edema.   Skin:     General: Skin is warm and dry.   Neurological:      General: No focal deficit present.      Mental Status: He is alert. Mental status is at baseline.   Psychiatric:         Mood and Affect: Mood normal.         Behavior: Behavior normal.          Lab, Imaging and other studies:I have personally reviewed pertinent lab results.  , CBC:   Lab Results   Component Value Date    WBC 11.46 (H) 08/31/2024    HGB 14.2 08/31/2024    HCT 42.2 08/31/2024    MCV 88 08/31/2024     08/31/2024    RBC 4.80 08/31/2024    MCH 29.6 08/31/2024    MCHC 33.6 08/31/2024    RDW 12.7 08/31/2024    MPV 10.7 08/31/2024    NRBC 0 08/31/2024   , CMP:   Lab Results   Component Value Date    SODIUM 138 08/31/2024    K 2.8 (L) 08/31/2024    CL 98 08/31/2024    CO2 29 08/31/2024    BUN 15 08/31/2024    CREATININE 0.65 08/31/2024    CALCIUM 8.6 08/31/2024    AST 14 08/31/2024    ALT 13 08/31/2024    ALKPHOS 58 08/31/2024    EGFR 91 08/31/2024     VTE Pharmacologic Prophylaxis: Heparin  VTE Mechanical Prophylaxis: sequential compression device    Kaelyn López PA-C

## 2024-09-01 ENCOUNTER — APPOINTMENT (INPATIENT)
Dept: RADIOLOGY | Facility: HOSPITAL | Age: 79
DRG: 386 | End: 2024-09-01
Payer: MEDICARE

## 2024-09-01 LAB
ALBUMIN SERPL BCG-MCNC: 3.6 G/DL (ref 3.5–5)
ALP SERPL-CCNC: 62 U/L (ref 34–104)
ALT SERPL W P-5'-P-CCNC: 18 U/L (ref 7–52)
ANION GAP SERPL CALCULATED.3IONS-SCNC: 9 MMOL/L (ref 4–13)
AST SERPL W P-5'-P-CCNC: 16 U/L (ref 13–39)
BASOPHILS # BLD AUTO: 0.02 THOUSANDS/ÂΜL (ref 0–0.1)
BASOPHILS NFR BLD AUTO: 0 % (ref 0–1)
BILIRUB SERPL-MCNC: 4.03 MG/DL (ref 0.2–1)
BUN SERPL-MCNC: 13 MG/DL (ref 5–25)
CALCIUM SERPL-MCNC: 8.5 MG/DL (ref 8.4–10.2)
CHLORIDE SERPL-SCNC: 96 MMOL/L (ref 96–108)
CO2 SERPL-SCNC: 30 MMOL/L (ref 21–32)
CREAT SERPL-MCNC: 0.63 MG/DL (ref 0.6–1.3)
EOSINOPHIL # BLD AUTO: 0.01 THOUSAND/ÂΜL (ref 0–0.61)
EOSINOPHIL NFR BLD AUTO: 0 % (ref 0–6)
ERYTHROCYTE [DISTWIDTH] IN BLOOD BY AUTOMATED COUNT: 12.6 % (ref 11.6–15.1)
GFR SERPL CREATININE-BSD FRML MDRD: 92 ML/MIN/1.73SQ M
GLUCOSE SERPL-MCNC: 124 MG/DL (ref 65–140)
HCT VFR BLD AUTO: 42.5 % (ref 36.5–49.3)
HGB BLD-MCNC: 14.6 G/DL (ref 12–17)
IMM GRANULOCYTES # BLD AUTO: 0.26 THOUSAND/UL (ref 0–0.2)
IMM GRANULOCYTES NFR BLD AUTO: 2 % (ref 0–2)
LYMPHOCYTES # BLD AUTO: 0.91 THOUSANDS/ÂΜL (ref 0.6–4.47)
LYMPHOCYTES NFR BLD AUTO: 8 % (ref 14–44)
MAGNESIUM SERPL-MCNC: 1.9 MG/DL (ref 1.9–2.7)
MCH RBC QN AUTO: 29.7 PG (ref 26.8–34.3)
MCHC RBC AUTO-ENTMCNC: 34.4 G/DL (ref 31.4–37.4)
MCV RBC AUTO: 86 FL (ref 82–98)
MONOCYTES # BLD AUTO: 0.86 THOUSAND/ÂΜL (ref 0.17–1.22)
MONOCYTES NFR BLD AUTO: 7 % (ref 4–12)
NEUTROPHILS # BLD AUTO: 9.88 THOUSANDS/ÂΜL (ref 1.85–7.62)
NEUTS SEG NFR BLD AUTO: 83 % (ref 43–75)
NRBC BLD AUTO-RTO: 0 /100 WBCS
PLATELET # BLD AUTO: 169 THOUSANDS/UL (ref 149–390)
PMV BLD AUTO: 10.7 FL (ref 8.9–12.7)
POTASSIUM SERPL-SCNC: 2.5 MMOL/L (ref 3.5–5.3)
PROT SERPL-MCNC: 6.1 G/DL (ref 6.4–8.4)
RBC # BLD AUTO: 4.92 MILLION/UL (ref 3.88–5.62)
SODIUM SERPL-SCNC: 135 MMOL/L (ref 135–147)
WBC # BLD AUTO: 11.94 THOUSAND/UL (ref 4.31–10.16)

## 2024-09-01 PROCEDURE — 85025 COMPLETE CBC W/AUTO DIFF WBC: CPT | Performed by: NURSE PRACTITIONER

## 2024-09-01 PROCEDURE — 83735 ASSAY OF MAGNESIUM: CPT | Performed by: NURSE PRACTITIONER

## 2024-09-01 PROCEDURE — 80053 COMPREHEN METABOLIC PANEL: CPT | Performed by: NURSE PRACTITIONER

## 2024-09-01 PROCEDURE — 74022 RADEX COMPL AQT ABD SERIES: CPT

## 2024-09-01 PROCEDURE — 99233 SBSQ HOSP IP/OBS HIGH 50: CPT | Performed by: NURSE PRACTITIONER

## 2024-09-01 RX ORDER — POTASSIUM CHLORIDE 14.9 MG/ML
20 INJECTION INTRAVENOUS
Status: DISPENSED | OUTPATIENT
Start: 2024-09-01 | End: 2024-09-01

## 2024-09-01 RX ORDER — POTASSIUM CHLORIDE 14.9 MG/ML
20 INJECTION INTRAVENOUS
Status: COMPLETED | OUTPATIENT
Start: 2024-09-01 | End: 2024-09-01

## 2024-09-01 RX ADMIN — PANTOPRAZOLE SODIUM 40 MG: 40 INJECTION, POWDER, FOR SOLUTION INTRAVENOUS at 09:29

## 2024-09-01 RX ADMIN — METHYLPREDNISOLONE SODIUM SUCCINATE 40 MG: 40 INJECTION, POWDER, FOR SOLUTION INTRAMUSCULAR; INTRAVENOUS at 09:29

## 2024-09-01 RX ADMIN — DEXTROSE AND SODIUM CHLORIDE 100 ML/HR: 5; .45 INJECTION, SOLUTION INTRAVENOUS at 08:59

## 2024-09-01 RX ADMIN — HEPARIN SODIUM 5000 UNITS: 5000 INJECTION INTRAVENOUS; SUBCUTANEOUS at 05:26

## 2024-09-01 RX ADMIN — AMLODIPINE BESYLATE 10 MG: 10 TABLET ORAL at 09:01

## 2024-09-01 RX ADMIN — POTASSIUM CHLORIDE 20 MEQ: 14.9 INJECTION, SOLUTION INTRAVENOUS at 13:24

## 2024-09-01 RX ADMIN — HEPARIN SODIUM 5000 UNITS: 5000 INJECTION INTRAVENOUS; SUBCUTANEOUS at 21:05

## 2024-09-01 RX ADMIN — POTASSIUM CHLORIDE 20 MEQ: 14.9 INJECTION, SOLUTION INTRAVENOUS at 14:57

## 2024-09-01 RX ADMIN — HEPARIN SODIUM 5000 UNITS: 5000 INJECTION INTRAVENOUS; SUBCUTANEOUS at 13:24

## 2024-09-01 RX ADMIN — LISINOPRIL 40 MG: 20 TABLET ORAL at 09:00

## 2024-09-01 RX ADMIN — POTASSIUM CHLORIDE 20 MEQ: 14.9 INJECTION, SOLUTION INTRAVENOUS at 08:52

## 2024-09-01 RX ADMIN — DEXTROSE AND SODIUM CHLORIDE 100 ML/HR: 5; .45 INJECTION, SOLUTION INTRAVENOUS at 20:30

## 2024-09-01 NOTE — PROGRESS NOTES
"Progress Note - General Surgery   Tee Forrest Jr. 79 y.o. male MRN: [id number]  Unit/Bed#: -01 Encounter: [id number]    Assessment:  79 year old male with PMH CAD, atrial flutter, prior SBOs presents with SBO and terminal ileitis   Afebrile, vitals stable  WBC 11.94 (11.46)  Hgb 14.6  K 2.5  Cr 0.63  Tbili 4.03 (3.65)  Mg 1.9 (1.8)  Abdomen soft, less distended today compared to yesterday, nontender    Plan:  Continue NGT to suction today. Slight clinical improvement today, operative vs nonoperative management pending evaluation by Dr. Patel  NPO with IV fluids  Continue steroids  Replete electrolytes  Monitor labs and vitals   Appreciate SLIM and GI recs   Discussed with Dr. Patel    Subjective/Objective   Subjective: patient has no complaints. Denies abdominal pain, n/v. Reports passing flatus. No bowel movement.     Objective:   Blood pressure (!) 171/85, pulse 76, temperature 98.8 °F (37.1 °C), resp. rate 16, height 5' 9\" (1.753 m), weight 79.4 kg (175 lb), SpO2 97%.,Body mass index is 25.84 kg/m².      Intake/Output Summary (Last 24 hours) at 9/1/2024 0818  Last data filed at 9/1/2024 0501  Gross per 24 hour   Intake 40 ml   Output 2725 ml   Net -2685 ml       Invasive Devices       Peripheral Intravenous Line  Duration             Peripheral IV 08/30/24 Distal;Left;Ventral (anterior) Forearm 1 day              Drain  Duration             NG/OG/Enteral Tube Nasogastric 16 Fr Right nare 3 days                    Physical Exam  Vitals reviewed.   Constitutional:       General: He is not in acute distress.     Appearance: He is obese. He is not ill-appearing.   HENT:      Head: Normocephalic and atraumatic.   Cardiovascular:      Rate and Rhythm: Normal rate.   Pulmonary:      Effort: Pulmonary effort is normal. No respiratory distress.   Abdominal:      General: Abdomen is flat. There is no distension.      Palpations: Abdomen is soft.      Tenderness: There is no abdominal tenderness. There is no " guarding.   Musculoskeletal:      Right lower leg: No edema.      Left lower leg: No edema.   Skin:     General: Skin is warm and dry.   Neurological:      General: No focal deficit present.      Mental Status: He is alert. Mental status is at baseline.   Psychiatric:         Mood and Affect: Mood normal.         Behavior: Behavior normal.          Lab, Imaging and other studies:I have personally reviewed pertinent lab results.  , CBC:   Lab Results   Component Value Date    WBC 11.94 (H) 09/01/2024    HGB 14.6 09/01/2024    HCT 42.5 09/01/2024    MCV 86 09/01/2024     09/01/2024    RBC 4.92 09/01/2024    MCH 29.7 09/01/2024    MCHC 34.4 09/01/2024    RDW 12.6 09/01/2024    MPV 10.7 09/01/2024    NRBC 0 09/01/2024   , CMP:   Lab Results   Component Value Date    SODIUM 135 09/01/2024    K 2.5 (L) 09/01/2024    CL 96 09/01/2024    CO2 30 09/01/2024    BUN 13 09/01/2024    CREATININE 0.63 09/01/2024    CALCIUM 8.5 09/01/2024    AST 16 09/01/2024    ALT 18 09/01/2024    ALKPHOS 62 09/01/2024    EGFR 92 09/01/2024     VTE Pharmacologic Prophylaxis: Heparin  VTE Mechanical Prophylaxis: sequential compression device    Kaelyn López PA-C

## 2024-09-01 NOTE — PROGRESS NOTES
[facility]  Progress Note  Name: Tee Wadsworth I  MRN: [id number]  Unit/Bed#: -01 I Date of Admission: 8/26/2024   Date of Service: 9/1/2024 I Hospital Day: 6    Assessment & Plan     * SBO (small bowel obstruction) (HCC)  Assessment & Plan  Presented for abdominal pain and vomitiing  CT abdomen pelvis: Suggests acute small bowel obstruction  Continue NPO and IV fluids with D5 1/2 NS@125 mL/hour  S/p Solu-Cortef, transitioned to Solumedrol 40 mg IV daily, D3  Continue NG tube per general surgery  Monitor labs and vital signs, conduct serial physical assessments  Ongoing care by primary team which is general surgery.  Today, surgery feels that patient is slightly improved and are continuing conservative management at this point    Essential hypertension  Assessment & Plan  Blood pressure reviewed and elevated but improving  Chlorthalidone on hold while NPO  Amlodipine and lisinopril were resumed 8/29/2024  Continue hydralazine IV PRN  Continue to monitor BP     Elevated bilirubin  Assessment & Plan  MRCP results reviewed  Continue to trend  Gastroenterology following, input appreciated    Crohn's disease of both small and large intestine without complication (HCC)  Assessment & Plan  With reported history of Crohn's  GI input appreciated: Requires colonoscopy for evaluation and definitive diagnosis, However, would recommend this be done in 6 to 8 weeks following resolution of SBO    Electrolyte abnormality  Assessment & Plan  With hypokalemia and hypomagnesemia status post repletion  Continue to monitor with daily metabolic panel, replete as needed in coordination with primary team, general surgery    Confusion  Assessment & Plan  Per chart review, patient with confusion  Per patient's wife, he has been having short-term memory difficulty for the past several months   Mentation is now at baseline  Suspect confusion related to hospitalization versus cognitive decline   Maintain  sleep-wake cycle, promote restful environment  Conduct serial assessments    Lumbar radiculopathy  Assessment & Plan  History of lumbar radiculopathy, ankylosing spondylitis, treated with injections  Follows with pain and spine  Also with bilateral foot neuropathy, recently initiated on gabapentin 100 mg p.o. 3 times daily on 8/7/2024  Conduct serial physical assessments, monitor for adequate pain control    Pancreatic cyst  Assessment & Plan  CT abdomen pelvis: Re-identified prominence of the main pancreatic duct with several nonspecific subcentimeter cystic lesions, overall not significantly changed from previous CT examinations  MRCP: Noted cystic pancreatic lesions, recommended outpatient follow-up    Atrial fibrillation (HCC)  Assessment & Plan  Takes apixaban, currently on hold pending clinical course for SBO  Does not appear to be on beta-blocker at this time  Rate controlled   On heparin for VTE prophylaxis      Bilateral carotid artery stenosis  Assessment & Plan  Noted to have high-grade right carotid stenosis   Per vascular surgery note in 2019, patient's anatomy not favorable for carotid stenting due to extensive bulky calcific plaque and due to cervical spine immobility and location of carotid stenosis difficult to achieve adequate exposure to perform endarterectomy in safe manner  He was started on DAPT however this was discontinued in favor of apixaban for atrial fibrillation  Carotid ultrasound unchanged from prior study 10/16/2020           VTE Pharmacologic Prophylaxis: VTE Score: 3 Moderate Risk (Score 3-4) - Pharmacological DVT Prophylaxis Ordered: heparin.    Mobility:   Basic Mobility Inpatient Raw Score: 19  JH-HLM Goal: 6: Walk 10 steps or more  JH-HLM Achieved: 6: Walk 10 steps or more  JH-HLM Goal achieved. Continue to encourage appropriate mobility.    Patient Centered Rounds: I performed bedside rounds with nursing staff today.   Discussions with Specialists or Other Care Team Provider:  Surgery    Education and Discussions with Family / Patient: Updated  (son and daughter in law) at bedside.    Total Time Spent on Date of Encounter in care of patient: 45 mins. This time was spent on one or more of the following: performing physical exam; counseling and coordination of care; obtaining or reviewing history; documenting in the medical record; reviewing/ordering tests, medications or procedures; communicating with other healthcare professionals and discussing with patient's family/caregivers.    Current Length of Stay: 6 day(s)  Current Patient Status: Inpatient   Certification Statement: The patient will continue to require additional inpatient hospital stay due to SBO  Discharge Plan:  Per general surgery pending clinical course.    Code Status: Prior    Subjective:   Patient seen and examined.  Denies abdominal pain, no flatus today, he says he is very tired of waiting here.  Had extensive discussion with patient and his son and daughter-in-law at bedside.  I told them that it is in his best interest to continue conservative management as recommended by surgery at this time.  He verbalized understanding, but says he just wants to get home and re-emphasizes that he has not eaten in over 8 days.     Objective:     Vitals:   Temp (24hrs), Av.6 °F (37 °C), Min:98.1 °F (36.7 °C), Max:98.9 °F (37.2 °C)    Temp:  [98.1 °F (36.7 °C)-98.9 °F (37.2 °C)] 98.8 °F (37.1 °C)  HR:  [75-76] 76  Resp:  [16-18] 16  BP: (171-180)/(85-94) 171/85  SpO2:  [96 %-97 %] 97 %  Body mass index is 25.84 kg/m².     Input and Output Summary (last 24 hours):     Intake/Output Summary (Last 24 hours) at 2024 1332  Last data filed at 2024 1242  Gross per 24 hour   Intake 40 ml   Output 2425 ml   Net -2385 ml       Physical Exam:   Physical Exam  Vitals and nursing note reviewed.   Constitutional:       General: He is not in acute distress.  HENT:      Head: Normocephalic and atraumatic.      Mouth/Throat:       Pharynx: Oropharynx is clear.   Eyes:      Pupils: Pupils are equal, round, and reactive to light.   Cardiovascular:      Rate and Rhythm: Normal rate and regular rhythm.      Pulses: Normal pulses.      Heart sounds: Normal heart sounds.   Pulmonary:      Effort: Pulmonary effort is normal. No respiratory distress.      Breath sounds: Normal breath sounds.   Abdominal:      General: Bowel sounds are decreased. There is distension.      Palpations: Abdomen is soft.      Tenderness: There is no abdominal tenderness.      Comments: Abdomen soft. Distended but a little less so today   Musculoskeletal:      Cervical back: Neck supple.      Right lower leg: No edema.      Left lower leg: No edema.   Skin:     General: Skin is warm and dry.      Capillary Refill: Capillary refill takes less than 2 seconds.   Neurological:      General: No focal deficit present.      Mental Status: He is alert. Mental status is at baseline.          Additional Data:     Labs:  Results from last 7 days   Lab Units 09/01/24  0524 08/28/24  0513 08/27/24  0544   WBC Thousand/uL 11.94*   < > 8.60   HEMOGLOBIN g/dL 14.6   < > 13.4   HEMATOCRIT % 42.5   < > 40.2   PLATELETS Thousands/uL 169   < > 139*   BANDS PCT %  --   --  18*   SEGS PCT % 83*   < >  --    LYMPHO PCT % 8*   < > 20   MONO PCT % 7   < > 1*   EOS PCT % 0   < > 0    < > = values in this interval not displayed.     Results from last 7 days   Lab Units 09/01/24  0524   SODIUM mmol/L 135   POTASSIUM mmol/L 2.5*   CHLORIDE mmol/L 96   CO2 mmol/L 30   BUN mg/dL 13   CREATININE mg/dL 0.63   ANION GAP mmol/L 9   CALCIUM mg/dL 8.5   ALBUMIN g/dL 3.6   TOTAL BILIRUBIN mg/dL 4.03*   ALK PHOS U/L 62   ALT U/L 18   AST U/L 16   GLUCOSE RANDOM mg/dL 124         Results from last 7 days   Lab Units 08/27/24  0247   POC GLUCOSE mg/dl 119         Results from last 7 days   Lab Units 08/26/24  0348   LACTIC ACID mmol/L 1.6       Lines/Drains:  Invasive Devices       Peripheral Intravenous  Line  Duration             Peripheral IV 08/30/24 Distal;Left;Ventral (anterior) Forearm 1 day              Drain  Duration             NG/OG/Enteral Tube Nasogastric 16 Fr Right nare 3 days                        Last 24 Hours Medication List:   Current Facility-Administered Medications   Medication Dose Route Frequency Provider Last Rate    amLODIPine  10 mg Oral Daily MYNOR Matos      dextrose 5 % and sodium chloride 0.45 %  100 mL/hr Intravenous Continuous Evin Dyer  mL/hr (09/01/24 0859)    heparin (porcine)  5,000 Units Subcutaneous Q8H IRMA Gallo MD      hydrALAZINE  5 mg Intravenous Q6H PRN Kaelyn López PA-C      HYDROmorphone  0.2 mg Intravenous Q4H PRN Kaelyn López PA-C      lisinopril  40 mg Oral Daily MYNOR Matos      methylPREDNISolone sodium succinate  40 mg Intravenous Daily Yfn Griffin DO      ondansetron  4 mg Intravenous Q6H PRN Jarred Gallo MD      pantoprazole  40 mg Intravenous Q24H IRMA Gallo MD      potassium chloride  20 mEq Intravenous Q2H Kaelyn López PA-C 20 mEq (09/01/24 1324)        Today, Patient Was Seen By: MYNOR Miguel    **Please Note: This note may have been constructed using a voice recognition system.**

## 2024-09-01 NOTE — ASSESSMENT & PLAN NOTE
Presented for abdominal pain and vomitiing  CT abdomen pelvis: Suggests acute small bowel obstruction  Continue NPO and IV fluids with D5 1/2 NS@125 mL/hour  S/p Solu-Cortef, transitioned to Solumedrol 40 mg IV daily, D3  Continue NG tube per general surgery  Monitor labs and vital signs, conduct serial physical assessments  Ongoing care by primary team which is general surgery.  Today, surgery feels that patient is slightly improved and are continuing conservative management at this point

## 2024-09-01 NOTE — PLAN OF CARE
Problem: PAIN - ADULT  Goal: Verbalizes/displays adequate comfort level or baseline comfort level  Description: Interventions:  - Encourage patient to monitor pain and request assistance  - Assess pain using appropriate pain scale  - Administer analgesics based on type and severity of pain and evaluate response  - Implement non-pharmacological measures as appropriate and evaluate response  - Consider cultural and social influences on pain and pain management  - Notify physician/advanced practitioner if interventions unsuccessful or patient reports new pain  Outcome: Progressing     Problem: SAFETY ADULT  Goal: Patient will remain free of falls  Description: INTERVENTIONS:  - Educate patient/family on patient safety including physical limitations  - Instruct patient to call for assistance with activity   - Consult OT/PT to assist with strengthening/mobility   - Keep Call bell within reach  - Keep bed low and locked with side rails adjusted as appropriate  - Keep care items and personal belongings within reach  - Initiate and maintain comfort rounds  - Make Fall Risk Sign visible to staff  - Offer Toileting every 2 Hours, in advance of need  - Initiate/Maintain bed alarm  - Apply yellow socks and bracelet for high fall risk patients  - Consider moving patient to room near nurses station  Outcome: Progressing  Goal: Maintain or return to baseline ADL function  Description: INTERVENTIONS:  -  Assess patient's ability to carry out ADLs; assess patient's baseline for ADL function and identify physical deficits which impact ability to perform ADLs (bathing, care of mouth/teeth, toileting, grooming, dressing, etc.)  - Assess/evaluate cause of self-care deficits   - Assess range of motion  - Assess patient's mobility; develop plan if impaired  - Assess patient's need for assistive devices and provide as appropriate  - Encourage maximum independence but intervene and supervise when necessary  - Involve family in performance  of ADLs  - Assess for home care needs following discharge   - Consider OT consult to assist with ADL evaluation and planning for discharge  - Provide patient education as appropriate  Outcome: Progressing  Goal: Maintains/Returns to pre admission functional level  Description: INTERVENTIONS:  - Perform AM-PAC 6 Click Basic Mobility/ Daily Activity assessment daily.  - Set and communicate daily mobility goal to care team and patient/family/caregiver.   - Collaborate with rehabilitation services on mobility goals if consulted  - Out of bed to chair 1-2 times a day   - Out of bed for meals 1-2 times a day  - Out of bed for toileting  - Record patient progress and toleration of activity level   Outcome: Progressing

## 2024-09-02 LAB
ALBUMIN SERPL BCG-MCNC: 3.7 G/DL (ref 3.5–5)
ALP SERPL-CCNC: 65 U/L (ref 34–104)
ALT SERPL W P-5'-P-CCNC: 22 U/L (ref 7–52)
ANION GAP SERPL CALCULATED.3IONS-SCNC: 9 MMOL/L (ref 4–13)
AST SERPL W P-5'-P-CCNC: 16 U/L (ref 13–39)
BASOPHILS # BLD AUTO: 0.01 THOUSANDS/ÂΜL (ref 0–0.1)
BASOPHILS NFR BLD AUTO: 0 % (ref 0–1)
BILIRUB DIRECT SERPL-MCNC: 1.01 MG/DL (ref 0–0.2)
BILIRUB SERPL-MCNC: 3.83 MG/DL (ref 0.2–1)
BUN SERPL-MCNC: 15 MG/DL (ref 5–25)
CALCIUM SERPL-MCNC: 8.6 MG/DL (ref 8.4–10.2)
CHLORIDE SERPL-SCNC: 95 MMOL/L (ref 96–108)
CO2 SERPL-SCNC: 33 MMOL/L (ref 21–32)
CREAT SERPL-MCNC: 0.72 MG/DL (ref 0.6–1.3)
EOSINOPHIL # BLD AUTO: 0.01 THOUSAND/ÂΜL (ref 0–0.61)
EOSINOPHIL NFR BLD AUTO: 0 % (ref 0–6)
ERYTHROCYTE [DISTWIDTH] IN BLOOD BY AUTOMATED COUNT: 12.6 % (ref 11.6–15.1)
GFR SERPL CREATININE-BSD FRML MDRD: 87 ML/MIN/1.73SQ M
GLUCOSE SERPL-MCNC: 118 MG/DL (ref 65–140)
HCT VFR BLD AUTO: 44 % (ref 36.5–49.3)
HGB BLD-MCNC: 15.2 G/DL (ref 12–17)
IMM GRANULOCYTES # BLD AUTO: 0.18 THOUSAND/UL (ref 0–0.2)
IMM GRANULOCYTES NFR BLD AUTO: 1 % (ref 0–2)
LYMPHOCYTES # BLD AUTO: 0.78 THOUSANDS/ÂΜL (ref 0.6–4.47)
LYMPHOCYTES NFR BLD AUTO: 6 % (ref 14–44)
MAGNESIUM SERPL-MCNC: 1.9 MG/DL (ref 1.9–2.7)
MCH RBC QN AUTO: 30.3 PG (ref 26.8–34.3)
MCHC RBC AUTO-ENTMCNC: 34.5 G/DL (ref 31.4–37.4)
MCV RBC AUTO: 88 FL (ref 82–98)
MONOCYTES # BLD AUTO: 0.69 THOUSAND/ÂΜL (ref 0.17–1.22)
MONOCYTES NFR BLD AUTO: 5 % (ref 4–12)
NEUTROPHILS # BLD AUTO: 11.28 THOUSANDS/ÂΜL (ref 1.85–7.62)
NEUTS SEG NFR BLD AUTO: 88 % (ref 43–75)
NRBC BLD AUTO-RTO: 0 /100 WBCS
PLATELET # BLD AUTO: 172 THOUSANDS/UL (ref 149–390)
PMV BLD AUTO: 10.7 FL (ref 8.9–12.7)
POTASSIUM SERPL-SCNC: 3 MMOL/L (ref 3.5–5.3)
PROT SERPL-MCNC: 6.4 G/DL (ref 6.4–8.4)
RBC # BLD AUTO: 5.01 MILLION/UL (ref 3.88–5.62)
SODIUM SERPL-SCNC: 137 MMOL/L (ref 135–147)
WBC # BLD AUTO: 12.95 THOUSAND/UL (ref 4.31–10.16)

## 2024-09-02 PROCEDURE — 80076 HEPATIC FUNCTION PANEL: CPT

## 2024-09-02 PROCEDURE — 83735 ASSAY OF MAGNESIUM: CPT

## 2024-09-02 PROCEDURE — 80048 BASIC METABOLIC PNL TOTAL CA: CPT

## 2024-09-02 PROCEDURE — 85025 COMPLETE CBC W/AUTO DIFF WBC: CPT

## 2024-09-02 RX ORDER — POTASSIUM CHLORIDE 14.9 MG/ML
20 INJECTION INTRAVENOUS
Status: COMPLETED | OUTPATIENT
Start: 2024-09-02 | End: 2024-09-02

## 2024-09-02 RX ADMIN — AMLODIPINE BESYLATE 10 MG: 10 TABLET ORAL at 08:15

## 2024-09-02 RX ADMIN — HEPARIN SODIUM 5000 UNITS: 5000 INJECTION INTRAVENOUS; SUBCUTANEOUS at 14:46

## 2024-09-02 RX ADMIN — DEXTROSE AND SODIUM CHLORIDE 100 ML/HR: 5; .45 INJECTION, SOLUTION INTRAVENOUS at 19:38

## 2024-09-02 RX ADMIN — HEPARIN SODIUM 5000 UNITS: 5000 INJECTION INTRAVENOUS; SUBCUTANEOUS at 22:14

## 2024-09-02 RX ADMIN — HEPARIN SODIUM 5000 UNITS: 5000 INJECTION INTRAVENOUS; SUBCUTANEOUS at 06:08

## 2024-09-02 RX ADMIN — POTASSIUM CHLORIDE 20 MEQ: 14.9 INJECTION, SOLUTION INTRAVENOUS at 12:18

## 2024-09-02 RX ADMIN — POTASSIUM CHLORIDE 20 MEQ: 14.9 INJECTION, SOLUTION INTRAVENOUS at 10:37

## 2024-09-02 RX ADMIN — LISINOPRIL 40 MG: 20 TABLET ORAL at 08:15

## 2024-09-02 RX ADMIN — POTASSIUM CHLORIDE 20 MEQ: 14.9 INJECTION, SOLUTION INTRAVENOUS at 08:15

## 2024-09-02 RX ADMIN — METHYLPREDNISOLONE SODIUM SUCCINATE 40 MG: 40 INJECTION, POWDER, FOR SOLUTION INTRAMUSCULAR; INTRAVENOUS at 08:15

## 2024-09-02 RX ADMIN — POTASSIUM CHLORIDE 20 MEQ: 14.9 INJECTION, SOLUTION INTRAVENOUS at 14:46

## 2024-09-02 RX ADMIN — DEXTROSE AND SODIUM CHLORIDE 100 ML/HR: 5; .45 INJECTION, SOLUTION INTRAVENOUS at 06:39

## 2024-09-02 RX ADMIN — PANTOPRAZOLE SODIUM 40 MG: 40 INJECTION, POWDER, FOR SOLUTION INTRAVENOUS at 08:15

## 2024-09-02 NOTE — PLAN OF CARE
Problem: PAIN - ADULT  Goal: Verbalizes/displays adequate comfort level or baseline comfort level  Description: Interventions:  - Encourage patient to monitor pain and request assistance  - Assess pain using appropriate pain scale  - Administer analgesics based on type and severity of pain and evaluate response  - Implement non-pharmacological measures as appropriate and evaluate response  - Consider cultural and social influences on pain and pain management  - Notify physician/advanced practitioner if interventions unsuccessful or patient reports new pain  Outcome: Progressing     Problem: INFECTION - ADULT  Goal: Absence or prevention of progression during hospitalization  Description: INTERVENTIONS:  - Assess and monitor for signs and symptoms of infection  - Monitor lab/diagnostic results  - Monitor all insertion sites, i.e. indwelling lines, tubes, and drains  - Monitor endotracheal if appropriate and nasal secretions for changes in amount and color  - [place] appropriate cooling/warming therapies per order  - Administer medications as ordered  - Instruct and encourage patient and family to use good hand hygiene technique  - Identify and instruct in appropriate isolation precautions for identified infection/condition  Outcome: Progressing     Problem: SAFETY ADULT  Goal: Maintain or return to baseline ADL function  Description: INTERVENTIONS:  -  Assess patient's ability to carry out ADLs; assess patient's baseline for ADL function and identify physical deficits which impact ability to perform ADLs (bathing, care of mouth/teeth, toileting, grooming, dressing, etc.)  - Assess/evaluate cause of self-care deficits   - Assess range of motion  - Assess patient's mobility; develop plan if impaired  - Assess patient's need for assistive devices and provide as appropriate  - Encourage maximum independence but intervene and supervise when necessary  - Involve family in performance of ADLs  - Assess for home care  needs following discharge   - Consider OT consult to assist with ADL evaluation and planning for discharge  - Provide patient education as appropriate  Outcome: Progressing     Problem: DISCHARGE PLANNING  Goal: Discharge to home or other facility with appropriate resources  Description: INTERVENTIONS:  - Identify barriers to discharge w/patient and caregiver  - Arrange for needed discharge resources and transportation as appropriate  - Identify discharge learning needs (meds, wound care, etc.)  - Arrange for interpretive services to assist at discharge as needed  - Refer to Case Management Department for coordinating discharge planning if the patient needs post-hospital services based on physician/advanced practitioner order or complex needs related to functional status, cognitive ability, or social support system  Outcome: Progressing     Problem: Prexisting or High Potential for Compromised Skin Integrity  Goal: Skin integrity is maintained or improved  Description: INTERVENTIONS:  - Identify patients at risk for skin breakdown  - Assess and monitor skin integrity  - Assess and monitor nutrition and hydration status  - Monitor labs   - Assess for incontinence   - Turn and reposition patient  - Assist with mobility/ambulation  - Relieve pressure over bony prominences  - Avoid friction and shearing  - Provide appropriate hygiene as needed including keeping skin clean and dry  - Evaluate need for skin moisturizer/barrier cream  - Collaborate with interdisciplinary team   - Patient/family teaching  - Consider wound care consult   Outcome: Progressing     Problem: GASTROINTESTINAL - ADULT  Goal: Maintains or returns to baseline bowel function  Description: INTERVENTIONS:  - Assess bowel function  - Encourage oral fluids to ensure adequate hydration  - Administer IV fluids if ordered to ensure adequate hydration  - Administer ordered medications as needed  - Encourage mobilization and activity  - Consider nutritional  services referral to assist patient with adequate nutrition and appropriate food choices  Outcome: Progressing

## 2024-09-02 NOTE — QUICK NOTE
Patient tolerated his clear liquid sips over his NG tube tonight.  He had a large soft bowel movement and is feeling well.  DC NG tube and continue clear liquid diet.  No carbonated beverages.  No straws.  DC IR consult for a.m.

## 2024-09-02 NOTE — PROGRESS NOTES
"Progress Note - General Surgery   Tee Forrest Jr. 79 y.o. male MRN: [id number]  Unit/Bed#: -01 Encounter: [id number]    Assessment:  79 year old male with PMH CAD, atrial flutter, prior SBOs presents HD#7 with SBO and terminal ileitis   Afebrile, vitals stable  WBC 12.95 (11.94)  Hgb 15.2  K 3.0 (2.5)  Cr 0.72  Tbili 3.83 (4.03)  Direct bili 1.01 (0.79)  Abdomen soft, distended, nontender    Plan:  Plan for a 4-hour clamp trial of NGT.  If patient develops abdominal pain, nausea or vomiting will place back to suction  Will place IR consult for PICC placement in anticipation of TPN  Replete potassium  NPO with IV fluids   Encourage ambulation   Continue steroids   Monitor labs and vitals  Appreciate SLIM and GI recommendations  Evaluated at bedside with Dr. Patel     Subjective/Objective   Subjective: patient reports feeling about the same as yesterday. No abdominal pain, n/v. States he is passing gas, but no bowel movement     Objective:   Blood pressure 157/79, pulse 66, temperature 98 °F (36.7 °C), resp. rate 18, height 5' 9\" (1.753 m), weight 79.4 kg (175 lb), SpO2 97%.,Body mass index is 25.84 kg/m².      Intake/Output Summary (Last 24 hours) at 9/2/2024 0743  Last data filed at 9/2/2024 0602  Gross per 24 hour   Intake 0 ml   Output 1913 ml   Net -1913 ml       Invasive Devices       Peripheral Intravenous Line  Duration             Peripheral IV 08/30/24 Distal;Left;Ventral (anterior) Forearm 2 days              Drain  Duration             NG/OG/Enteral Tube Nasogastric 16 Fr Right nare 4 days                    Physical Exam  Vitals reviewed.   Constitutional:       General: He is not in acute distress.     Appearance: He is obese. He is not ill-appearing.   HENT:      Head: Normocephalic and atraumatic.      Comments: NGT in place with bilious output  Cardiovascular:      Rate and Rhythm: Normal rate.   Pulmonary:      Effort: Pulmonary effort is normal. No respiratory distress.   Abdominal:      " General: There is distension.      Palpations: Abdomen is soft.      Tenderness: There is no abdominal tenderness. There is no guarding.   Musculoskeletal:      Right lower leg: No edema.      Left lower leg: No edema.   Skin:     General: Skin is warm and dry.   Neurological:      General: No focal deficit present.      Mental Status: He is alert. Mental status is at baseline.   Psychiatric:         Mood and Affect: Mood normal.         Behavior: Behavior normal.          Lab, Imaging and other studies:I have personally reviewed pertinent lab results.  , CBC:   Lab Results   Component Value Date    WBC 12.95 (H) 09/02/2024    HGB 15.2 09/02/2024    HCT 44.0 09/02/2024    MCV 88 09/02/2024     09/02/2024    RBC 5.01 09/02/2024    MCH 30.3 09/02/2024    MCHC 34.5 09/02/2024    RDW 12.6 09/02/2024    MPV 10.7 09/02/2024    NRBC 0 09/02/2024   , CMP:   Lab Results   Component Value Date    SODIUM 137 09/02/2024    K 3.0 (L) 09/02/2024    CL 95 (L) 09/02/2024    CO2 33 (H) 09/02/2024    BUN 15 09/02/2024    CREATININE 0.72 09/02/2024    CALCIUM 8.6 09/02/2024    AST 16 09/02/2024    ALT 22 09/02/2024    ALKPHOS 65 09/02/2024    EGFR 87 09/02/2024     VTE Pharmacologic Prophylaxis: Heparin  VTE Mechanical Prophylaxis: sequential compression device    Kaelyn López PA-C

## 2024-09-02 NOTE — PLAN OF CARE
Problem: PAIN - ADULT  Goal: Verbalizes/displays adequate comfort level or baseline comfort level  Description: Interventions:  - Encourage patient to monitor pain and request assistance  - Assess pain using appropriate pain scale  - Administer analgesics based on type and severity of pain and evaluate response  - Implement non-pharmacological measures as appropriate and evaluate response  - Consider cultural and social influences on pain and pain management  - Notify physician/advanced practitioner if interventions unsuccessful or patient reports new pain  Outcome: Progressing     Problem: INFECTION - ADULT  Goal: Absence or prevention of progression during hospitalization  Description: INTERVENTIONS:  - Assess and monitor for signs and symptoms of infection  - Monitor lab/diagnostic results  - Monitor all insertion sites, i.e. indwelling lines, tubes, and drains  - Monitor endotracheal if appropriate and nasal secretions for changes in amount and color  - [place] appropriate cooling/warming therapies per order  - Administer medications as ordered  - Instruct and encourage patient and family to use good hand hygiene technique  - Identify and instruct in appropriate isolation precautions for identified infection/condition  Outcome: Progressing

## 2024-09-03 LAB
ANION GAP SERPL CALCULATED.3IONS-SCNC: 10 MMOL/L (ref 4–13)
ANION GAP SERPL CALCULATED.3IONS-SCNC: 6 MMOL/L (ref 4–13)
BASOPHILS # BLD AUTO: 0.01 THOUSANDS/ÂΜL (ref 0–0.1)
BASOPHILS NFR BLD AUTO: 0 % (ref 0–1)
BUN SERPL-MCNC: 16 MG/DL (ref 5–25)
BUN SERPL-MCNC: 19 MG/DL (ref 5–25)
CALCIUM SERPL-MCNC: 7.7 MG/DL (ref 8.4–10.2)
CALCIUM SERPL-MCNC: 8.3 MG/DL (ref 8.4–10.2)
CHLORIDE SERPL-SCNC: 100 MMOL/L (ref 96–108)
CHLORIDE SERPL-SCNC: 97 MMOL/L (ref 96–108)
CO2 SERPL-SCNC: 28 MMOL/L (ref 21–32)
CO2 SERPL-SCNC: 31 MMOL/L (ref 21–32)
CREAT SERPL-MCNC: 0.77 MG/DL (ref 0.6–1.3)
CREAT SERPL-MCNC: 0.81 MG/DL (ref 0.6–1.3)
EOSINOPHIL # BLD AUTO: 0.03 THOUSAND/ÂΜL (ref 0–0.61)
EOSINOPHIL NFR BLD AUTO: 0 % (ref 0–6)
ERYTHROCYTE [DISTWIDTH] IN BLOOD BY AUTOMATED COUNT: 12.9 % (ref 11.6–15.1)
GFR SERPL CREATININE-BSD FRML MDRD: 83 ML/MIN/1.73SQ M
GFR SERPL CREATININE-BSD FRML MDRD: 85 ML/MIN/1.73SQ M
GLUCOSE SERPL-MCNC: 106 MG/DL (ref 65–140)
GLUCOSE SERPL-MCNC: 175 MG/DL (ref 65–140)
HCT VFR BLD AUTO: 37.1 % (ref 36.5–49.3)
HGB BLD-MCNC: 12.7 G/DL (ref 12–17)
IMM GRANULOCYTES # BLD AUTO: 0.1 THOUSAND/UL (ref 0–0.2)
IMM GRANULOCYTES NFR BLD AUTO: 1 % (ref 0–2)
LYMPHOCYTES # BLD AUTO: 0.86 THOUSANDS/ÂΜL (ref 0.6–4.47)
LYMPHOCYTES NFR BLD AUTO: 8 % (ref 14–44)
MAGNESIUM SERPL-MCNC: 1.8 MG/DL (ref 1.9–2.7)
MAGNESIUM SERPL-MCNC: 2.1 MG/DL (ref 1.9–2.7)
MCH RBC QN AUTO: 30.2 PG (ref 26.8–34.3)
MCHC RBC AUTO-ENTMCNC: 34.2 G/DL (ref 31.4–37.4)
MCV RBC AUTO: 88 FL (ref 82–98)
MONOCYTES # BLD AUTO: 0.51 THOUSAND/ÂΜL (ref 0.17–1.22)
MONOCYTES NFR BLD AUTO: 5 % (ref 4–12)
NEUTROPHILS # BLD AUTO: 8.96 THOUSANDS/ÂΜL (ref 1.85–7.62)
NEUTS SEG NFR BLD AUTO: 86 % (ref 43–75)
NRBC BLD AUTO-RTO: 0 /100 WBCS
PHOSPHATE SERPL-MCNC: 2.8 MG/DL (ref 2.3–4.1)
PLATELET # BLD AUTO: 173 THOUSANDS/UL (ref 149–390)
PMV BLD AUTO: 10.8 FL (ref 8.9–12.7)
POTASSIUM SERPL-SCNC: 2.8 MMOL/L (ref 3.5–5.3)
POTASSIUM SERPL-SCNC: 3.7 MMOL/L (ref 3.5–5.3)
RBC # BLD AUTO: 4.2 MILLION/UL (ref 3.88–5.62)
SODIUM SERPL-SCNC: 135 MMOL/L (ref 135–147)
SODIUM SERPL-SCNC: 137 MMOL/L (ref 135–147)
WBC # BLD AUTO: 10.47 THOUSAND/UL (ref 4.31–10.16)

## 2024-09-03 PROCEDURE — 85025 COMPLETE CBC W/AUTO DIFF WBC: CPT

## 2024-09-03 PROCEDURE — 97116 GAIT TRAINING THERAPY: CPT

## 2024-09-03 PROCEDURE — 80048 BASIC METABOLIC PNL TOTAL CA: CPT | Performed by: PHYSICIAN ASSISTANT

## 2024-09-03 PROCEDURE — 83735 ASSAY OF MAGNESIUM: CPT | Performed by: PHYSICIAN ASSISTANT

## 2024-09-03 PROCEDURE — 99232 SBSQ HOSP IP/OBS MODERATE 35: CPT | Performed by: SURGERY

## 2024-09-03 PROCEDURE — 80048 BASIC METABOLIC PNL TOTAL CA: CPT

## 2024-09-03 PROCEDURE — 84100 ASSAY OF PHOSPHORUS: CPT | Performed by: PHYSICIAN ASSISTANT

## 2024-09-03 PROCEDURE — 83735 ASSAY OF MAGNESIUM: CPT

## 2024-09-03 PROCEDURE — 99232 SBSQ HOSP IP/OBS MODERATE 35: CPT | Performed by: INTERNAL MEDICINE

## 2024-09-03 PROCEDURE — 97110 THERAPEUTIC EXERCISES: CPT

## 2024-09-03 RX ORDER — POTASSIUM CHLORIDE 14.9 MG/ML
20 INJECTION INTRAVENOUS
Status: COMPLETED | OUTPATIENT
Start: 2024-09-03 | End: 2024-09-04

## 2024-09-03 RX ORDER — MAGNESIUM SULFATE HEPTAHYDRATE 40 MG/ML
2 INJECTION, SOLUTION INTRAVENOUS ONCE
Status: COMPLETED | OUTPATIENT
Start: 2024-09-03 | End: 2024-09-04

## 2024-09-03 RX ORDER — POTASSIUM CHLORIDE 14.9 MG/ML
20 INJECTION INTRAVENOUS
Status: DISPENSED | OUTPATIENT
Start: 2024-09-03 | End: 2024-09-03

## 2024-09-03 RX ADMIN — MAGNESIUM SULFATE HEPTAHYDRATE 2 G: 40 INJECTION, SOLUTION INTRAVENOUS at 11:21

## 2024-09-03 RX ADMIN — DEXTROSE AND SODIUM CHLORIDE 100 ML/HR: 5; .45 INJECTION, SOLUTION INTRAVENOUS at 06:04

## 2024-09-03 RX ADMIN — HEPARIN SODIUM 5000 UNITS: 5000 INJECTION INTRAVENOUS; SUBCUTANEOUS at 21:34

## 2024-09-03 RX ADMIN — HEPARIN SODIUM 5000 UNITS: 5000 INJECTION INTRAVENOUS; SUBCUTANEOUS at 14:34

## 2024-09-03 RX ADMIN — HEPARIN SODIUM 5000 UNITS: 5000 INJECTION INTRAVENOUS; SUBCUTANEOUS at 05:53

## 2024-09-03 RX ADMIN — PANTOPRAZOLE SODIUM 40 MG: 40 INJECTION, POWDER, FOR SOLUTION INTRAVENOUS at 09:35

## 2024-09-03 RX ADMIN — LISINOPRIL 40 MG: 20 TABLET ORAL at 09:35

## 2024-09-03 RX ADMIN — DEXTROSE AND SODIUM CHLORIDE 50 ML/HR: 5; .45 INJECTION, SOLUTION INTRAVENOUS at 21:38

## 2024-09-03 RX ADMIN — METHYLPREDNISOLONE SODIUM SUCCINATE 40 MG: 40 INJECTION, POWDER, FOR SOLUTION INTRAMUSCULAR; INTRAVENOUS at 09:35

## 2024-09-03 RX ADMIN — POTASSIUM CHLORIDE 20 MEQ: 14.9 INJECTION, SOLUTION INTRAVENOUS at 14:31

## 2024-09-03 RX ADMIN — POTASSIUM CHLORIDE 20 MEQ: 14.9 INJECTION, SOLUTION INTRAVENOUS at 23:39

## 2024-09-03 RX ADMIN — AMLODIPINE BESYLATE 10 MG: 10 TABLET ORAL at 09:35

## 2024-09-03 RX ADMIN — POTASSIUM CHLORIDE 20 MEQ: 14.9 INJECTION, SOLUTION INTRAVENOUS at 12:41

## 2024-09-03 RX ADMIN — POTASSIUM CHLORIDE 20 MEQ: 14.9 INJECTION, SOLUTION INTRAVENOUS at 21:34

## 2024-09-03 RX ADMIN — POTASSIUM CHLORIDE 20 MEQ: 14.9 INJECTION, SOLUTION INTRAVENOUS at 09:36

## 2024-09-03 NOTE — PLAN OF CARE
Problem: PHYSICAL THERAPY ADULT  Goal: Performs mobility at highest level of function for planned discharge setting.  See evaluation for individualized goals.  Description: Treatment/Interventions: Functional transfer training, Therapeutic exercise, Endurance training, Bed mobility, Gait training  Equipment Recommended: Walker       See flowsheet documentation for full assessment, interventions and recommendations.  Outcome: Progressing  Note: Prognosis: Good  Problem List: Decreased strength, Decreased endurance, Impaired balance, Decreased mobility, Decreased cognition, Decreased safety awareness  Assessment: Pt seen for PT treatment session this date with interventions consisting of gait training to normalize gait pattern to decrease fall risk and therapeutic exercise to improve endurance to improve functional mobility. Pt agreeable to PT treatment session upon arrival, pt found supine in bed, in no apparent distress. Since previous session, pt has made good progress as evidenced by increased distance ambulated, decreased assistance required with mobility, increased activity tolerance, and improved balance  Barriers during this session include  none .  Pt continues to be functioning below baseline level, and remains limited 2* factors listed above and including impaired  balance, impaired cognition, and decreased mobility.  Pt prognosis for achieving goals is good, pending pt progress with hospitalization/medical status improvements, and indicated by motivated to participate in therapy, ability to follow cues, and improvement with mobility status. PT will continue to see pt during current hospitalization in order to address the deficits listed above and provide interventions consistent w/ POC in effort to achieve goals. Current goals and POC remain appropriate, pt continues to have rehab potential  Upon conclusion pt supine in bed. The patient's AM-PAC Basic Mobility Inpatient Short Form Raw Score is 20.  Based  on patient presentations and impairments, pt would most appropriately benefit from Level 3 resource intensity upon discharge.  Please also refer to the recommendation of the Physical Therapist for safe discharge planning. RN verbalized pt appropriate for PT session.  Barriers to Discharge: None     Rehab Resource Intensity Level, PT: III (Minimum Resource Intensity)    See flowsheet documentation for full assessment.

## 2024-09-03 NOTE — CASE MANAGEMENT
Case Management Discharge Planning Note    Patient name Tee Forrest Jr.  Location /-01 MRN [id number]  : 1945 Date 9/3/2024       Current Admission Date: 2024  Current Admission Diagnosis:SBO (small bowel obstruction) (Formerly [facility])   Patient Active Problem List    Diagnosis Date Noted Date Diagnosed    Electrolyte abnormality 2024     SBO (small bowel obstruction) (Formerly [facility]) 2024     Terminal ileitis, with intestinal obstruction (Formerly [facility]) 2024     Confusion 2024     Elevated bilirubin 2024     Lumbar radiculopathy 2024     Chronic low back pain 2024     Crohn's disease of both small and large intestine without complication (Formerly [facility]) 2024     Age-related cataract of left eye 2021     Pulmonary hypertension (Formerly [facility]) 2021     Pancreatic cyst 10/20/2020     Atrial fibrillation (Formerly [facility]) 10/10/2020     Diverticulitis 10/10/2020     Needs flu shot 2020     Gait instability 2020     Essential hypertension 2019     Pernicious anemia 2019     Vertebral artery stenosis, asymptomatic 2019     Cervical radiculopathy 2018     Lumbosacral spondylosis without myelopathy 2018     Bilateral carotid artery stenosis 10/19/2017     Ankylosing spondylitis (Formerly [facility]) 2015     Esophageal reflux 05/15/2012       LOS (days): 8  Geometric Mean LOS (GMLOS) (days): 3.1  Days to GMLOS:-5.4     OBJECTIVE:  Risk of Unplanned Readmission Score: 17.18         Current admission status: Inpatient   Preferred Pharmacy:   [facility] - [id number]  [address] [address]  Phone: [phone] Fax: [phone]    Primary Care Provider: Champ Roldan MD    Primary Insurance: MEDICARE  Secondary Insurance: [employer]    DISCHARGE DETAILS:  Pt has had the NGT removed, moved bowels.  Is tolerating clear liquid diet.  Do not anticipate any care needs upon DC.

## 2024-09-03 NOTE — PLAN OF CARE
Problem: PAIN - ADULT  Goal: Verbalizes/displays adequate comfort level or baseline comfort level  Description: Interventions:  - Encourage patient to monitor pain and request assistance  - Assess pain using appropriate pain scale  - Administer analgesics based on type and severity of pain and evaluate response  - Implement non-pharmacological measures as appropriate and evaluate response  - Consider cultural and social influences on pain and pain management  - Notify physician/advanced practitioner if interventions unsuccessful or patient reports new pain  Outcome: Progressing     Problem: INFECTION - ADULT  Goal: Absence or prevention of progression during hospitalization  Description: INTERVENTIONS:  - Assess and monitor for signs and symptoms of infection  - Monitor lab/diagnostic results  - Monitor all insertion sites, i.e. indwelling lines, tubes, and drains  - Monitor endotracheal if appropriate and nasal secretions for changes in amount and color  - [place] appropriate cooling/warming therapies per order  - Administer medications as ordered  - Instruct and encourage patient and family to use good hand hygiene technique  - Identify and instruct in appropriate isolation precautions for identified infection/condition  Outcome: Progressing     Problem: SAFETY ADULT  Goal: Patient will remain free of falls  Description: INTERVENTIONS:  - Educate patient/family on patient safety including physical limitations  - Instruct patient to call for assistance with activity   - Consult OT/PT to assist with strengthening/mobility   - Keep Call bell within reach  - Keep bed low and locked with side rails adjusted as appropriate  - Keep care items and personal belongings within reach  - Initiate and maintain comfort rounds  - Make Fall Risk Sign visible to staff  - Offer Toileting every 2 Hours, in advance of need  - Initiate/Maintain bed alarm  - Apply yellow socks and bracelet for high fall risk patients  - Consider  moving patient to room near nurses station  Outcome: Progressing  Goal: Maintain or return to baseline ADL function  Description: INTERVENTIONS:  -  Assess patient's ability to carry out ADLs; assess patient's baseline for ADL function and identify physical deficits which impact ability to perform ADLs (bathing, care of mouth/teeth, toileting, grooming, dressing, etc.)  - Assess/evaluate cause of self-care deficits   - Assess range of motion  - Assess patient's mobility; develop plan if impaired  - Assess patient's need for assistive devices and provide as appropriate  - Encourage maximum independence but intervene and supervise when necessary  - Involve family in performance of ADLs  - Assess for home care needs following discharge   - Consider OT consult to assist with ADL evaluation and planning for discharge  - Provide patient education as appropriate  Outcome: Progressing  Goal: Maintains/Returns to pre admission functional level  Description: INTERVENTIONS:  - Perform AM-PAC 6 Click Basic Mobility/ Daily Activity assessment daily.  - Set and communicate daily mobility goal to care team and patient/family/caregiver.   - Collaborate with rehabilitation services on mobility goals if consulted  - Out of bed to chair 1-2 times a day   - Out of bed for meals 1-2 times a day  - Out of bed for toileting  - Record patient progress and toleration of activity level   Outcome: Progressing     Problem: DISCHARGE PLANNING  Goal: Discharge to home or other facility with appropriate resources  Description: INTERVENTIONS:  - Identify barriers to discharge w/patient and caregiver  - Arrange for needed discharge resources and transportation as appropriate  - Identify discharge learning needs (meds, wound care, etc.)  - Arrange for interpretive services to assist at discharge as needed  - Refer to Case Management Department for coordinating discharge planning if the patient needs post-hospital services based on physician/advanced  practitioner order or complex needs related to functional status, cognitive ability, or social support system  Outcome: Progressing     Problem: Knowledge Deficit  Goal: Patient/family/caregiver demonstrates understanding of disease process, treatment plan, medications, and discharge instructions  Description: Complete learning assessment and assess knowledge base.  Interventions:  - Provide teaching at level of understanding  - Provide teaching via preferred learning methods  Outcome: Progressing     Problem: Nutrition/Hydration-ADULT  Goal: Nutrient/Hydration intake appropriate for improving, restoring or maintaining nutritional needs  Description: Monitor and assess patient's nutrition/hydration status for malnutrition. Collaborate with interdisciplinary team and initiate plan and interventions as ordered.  Monitor patient's weight and dietary intake as ordered or per policy. Utilize nutrition screening tool and intervene as necessary. Determine patient's food preferences and provide high-protein, high-caloric foods as appropriate.     INTERVENTIONS:  - Monitor oral intake, urinary output, labs, and treatment plans  - Assess nutrition and hydration status and recommend course of action  - Evaluate amount of meals eaten  - Assist patient with eating if necessary   - Allow adequate time for meals  - Recommend/ encourage appropriate diets, oral nutritional supplements, and vitamin/mineral supplements  - Order, calculate, and assess calorie counts as needed  - Recommend, monitor, and adjust tube feedings and TPN/PPN based on assessed needs  - Assess need for intravenous fluids  - Provide specific nutrition/hydration education as appropriate  - Include patient/family/caregiver in decisions related to nutrition  Outcome: Progressing     Problem: Prexisting or High Potential for Compromised Skin Integrity  Goal: Skin integrity is maintained or improved  Description: INTERVENTIONS:  - Identify patients at risk for skin  breakdown  - Assess and monitor skin integrity  - Assess and monitor nutrition and hydration status  - Monitor labs   - Assess for incontinence   - Turn and reposition patient  - Assist with mobility/ambulation  - Relieve pressure over bony prominences  - Avoid friction and shearing  - Provide appropriate hygiene as needed including keeping skin clean and dry  - Evaluate need for skin moisturizer/barrier cream  - Collaborate with interdisciplinary team   - Patient/family teaching  - Consider wound care consult   Outcome: Progressing     Problem: GASTROINTESTINAL - ADULT  Goal: Minimal or absence of nausea and/or vomiting  Description: INTERVENTIONS:  - Administer IV fluids if ordered to ensure adequate hydration  - Maintain NPO status until nausea and vomiting are resolved  - Nasogastric tube if ordered  - Administer ordered antiemetic medications as needed  - Provide nonpharmacologic comfort measures as appropriate  - Advance diet as tolerated, if ordered  - Consider nutrition services referral to assist patient with adequate nutrition and appropriate food choices  Outcome: Progressing  Goal: Maintains or returns to baseline bowel function  Description: INTERVENTIONS:  - Assess bowel function  - Encourage oral fluids to ensure adequate hydration  - Administer IV fluids if ordered to ensure adequate hydration  - Administer ordered medications as needed  - Encourage mobilization and activity  - Consider nutritional services referral to assist patient with adequate nutrition and appropriate food choices  Outcome: Progressing

## 2024-09-03 NOTE — PROGRESS NOTES
Progress Note -  [facility]  Tee Forrest Jr. 79 y.o. male MRN: [id number]  Unit/Bed#: -01 Encounter: [id number]      ASSESSMENT AND PLAN:      Tee Forrest Jr. is a 79 y.o. old male with PMHx notable for recurrent small bowel obstruction s/p resection, prior cholecystectomy, HTN, Aflutter on Eliquis, and mild AS who presents 8/26 with small bowel obstruction.  Gastroenterology has been consulted due to reported history of Crohn's disease.     #Small bowel obstruction  #Concern for Terminal Ileitis 2/2 Crohn's  CT imaging on admission shows acute small bowel obstruction with suspected transition point at the TI which appears to be inflamed on imaging.  There is a reported history of Crohn's disease that was diagnosed after recurrent small bowel obstructions requiring resection, however he has not been on therapy or following with gastroenterology.  Management of SBO per surgery team.  Patient tolerated clear liquid diet and had a large bowel movement on 9/2.  NG tube since discontinued   Patient now on clear liquid diet.  Will defer advancement of diet to surgical team.  Recommend to check chemistry panel twice daily to monitor electrolytes for refeeding syndrome.  Please replete electrolytes as needed  Started on IV steroids on 7/27.   Since tolerating oral intake, can consider transitioning to prednisone 40 mg p.o. daily with plan for taper over the next 4 weeks (taper by 10 mg every week)  Recommend outpatient colonoscopy with biopsy of the terminal ileum for definitive      diagnosis in about 6-8 weeks after resolution of small bowel obstruction.  Will arrange for follow-up with one of our GI clinic  Surgery team will arrange for follow-up with colorectal surgery outpatient  If patient is able to produce stool sample would recommend checking fecal calprotectin  Upon discharge we will arrange for outpatient follow-up with IBD team       #Elevated liver enzymes  #Dilated pancreatic  duct  Labs on admission shows cholestatic liver enzyme elevation with T. bili 3.08 with .  Transaminases normal.  Bilirubin appears to be chronically elevated for many years as high as 3.5 in 2020.  He has a history of cholecystitis with choledocholithiasis in 2017 status post cholecystectomy.  CT on admission shows dilated PD to 6 mm along with subcentimeter cystic lesions measuring up to 7 mm, all of which have been previously seen on prior imaging.  No CBD dilation on imaging.  Differential includes cholestasis secondary to Elida, pancreatic cystic neoplasm such as simple cyst, serocystadenoma, mucinous cystic neoplasm, IPMN, pseudopapillary neoplasm, versus adenocarcinoma.  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  No other suspicious lesions.  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  GI team to sign off at this time.  Please do not hesitate to reach out for further questions.    ______________________________________________________________________    Subjective: Patient seen and examined bedside this morning.  He reports improvement in his abdominal pain, distention.  No nausea or vomiting.  He is tolerating clear liquid diet and has had 3 bowel movement.       REVIEW OF SYSTEMS:    Review of Systems   Constitutional:  Negative for chills and fever.   HENT:  Negative for ear pain and sore throat.    Eyes:  Negative for pain and visual disturbance.   Respiratory:  Negative for cough and shortness of breath.    Cardiovascular:  Negative for chest pain and palpitations.   Gastrointestinal:  Negative for abdominal pain and vomiting.   Genitourinary:  Negative for dysuria and hematuria.   Musculoskeletal:  Negative for arthralgias and back pain.   Skin:  Negative for  color change and rash.   Neurological:  Negative for seizures and syncope.   All other systems reviewed and are negative.         Historical Information   Past Medical History:   Diagnosis Date    Arthritis     Atrial flutter (HCC)     Cholecystitis     Coronary artery disease     Hypertension     RBBB     Spinal stenosis      Past Surgical History:   Procedure Laterality Date    CHOLECYSTECTOMY      COLON SURGERY      bwel resection    COLONOSCOPY      ESOPHAGOGASTRODUODENOSCOPY      2007  ONSET    EYE SURGERY      HIP SURGERY      JOINT REPLACEMENT      MO LAPAROSCOPY SURG CHOLECYSTECTOMY N/A 2017    Procedure: CHOLECYSTECTOMY LAPAROSCOPIC;  Surgeon: Ez Lainez MD;  Location: [facility];  Service: General    SMALL INTESTINE SURGERY      ONSEC DEC 2011     Social History   Social History     Substance and Sexual Activity   Alcohol Use Yes    Comment: social      Social History     Substance and Sexual Activity   Drug Use No     Social History     Tobacco Use   Smoking Status Former    Current packs/day: 0.00    Average packs/day: 2.0 packs/day for 3.0 years (6.0 ttl pk-yrs)    Types: Cigarettes    Start date: 1963    Quit date:     Years since quittin.7   Smokeless Tobacco Never     Family History   Problem Relation Age of Onset    Arthritis Mother     Heart attack Father     Coronary artery disease Family     Diabetes Family        Meds/Allergies       Facility-Administered Medications Prior to Admission:     cyanocobalamin injection 1,000 mcg    cyanocobalamin injection 1,000 mcg    Medications Prior to Admission:     amLODIPine (NORVASC) 10 mg tablet    apixaban (Eliquis) 5 mg    atorvastatin (LIPITOR) 40 mg tablet    ferrous sulfate 325 (65 Fe) mg tablet    gabapentin (NEURONTIN) 100 mg capsule    lisinopril (ZESTRIL) 40 mg tablet    potassium chloride (K-DUR,KLOR-CON) 20 mEq tablet    chlorthalidone 25 mg tablet    pantoprazole (PROTONIX) 40 mg tablet  Current Facility-Administered  "Medications   Medication Dose Route Frequency    amLODIPine (NORVASC) tablet 10 mg  10 mg Oral Daily    dextrose 5 % and sodium chloride 0.45 % infusion  100 mL/hr Intravenous Continuous    heparin (porcine) subcutaneous injection 5,000 Units  5,000 Units Subcutaneous Q8H IRMA    hydrALAZINE (APRESOLINE) injection 5 mg  5 mg Intravenous Q6H PRN    HYDROmorphone HCl (DILAUDID) injection 0.2 mg  0.2 mg Intravenous Q4H PRN    lisinopril (ZESTRIL) tablet 40 mg  40 mg Oral Daily    methylPREDNISolone sodium succinate (Solu-MEDROL) injection 40 mg  40 mg Intravenous Daily    ondansetron (ZOFRAN) injection 4 mg  4 mg Intravenous Q6H PRN    pantoprazole (PROTONIX) injection 40 mg  40 mg Intravenous Q24H IRMA    potassium chloride 20 mEq IVPB (premix)  20 mEq Intravenous Q2H    Followed by    potassium chloride 20 mEq IVPB (premix)  20 mEq Intravenous Q2H       No Known Allergies        Objective     Blood pressure 143/66, pulse 63, temperature 98.5 °F (36.9 °C), resp. rate 19, height 5' 9\" (1.753 m), weight 79.4 kg (175 lb), SpO2 99%. Body mass index is 25.84 kg/m².      Intake/Output Summary (Last 24 hours) at 9/3/2024 0941  Last data filed at 9/3/2024 0707  Gross per 24 hour   Intake 840 ml   Output 850 ml   Net -10 ml         PHYSICAL EXAM:      Physical Exam  Vitals and nursing note reviewed.   Constitutional:       General: He is not in acute distress.     Appearance: He is well-developed.   HENT:      Head: Normocephalic and atraumatic.   Eyes:      Conjunctiva/sclera: Conjunctivae normal.   Cardiovascular:      Rate and Rhythm: Normal rate and regular rhythm.      Heart sounds: No murmur heard.  Pulmonary:      Effort: Pulmonary effort is normal. No respiratory distress.      Breath sounds: Normal breath sounds.   Abdominal:      General: There is no distension.      Palpations: Abdomen is soft.      Tenderness: There is no abdominal tenderness.   Musculoskeletal:         General: No swelling.      Cervical back: Neck " supple.   Skin:     General: Skin is warm and dry.      Capillary Refill: Capillary refill takes less than 2 seconds.   Neurological:      Mental Status: He is alert.   Psychiatric:         Mood and Affect: Mood normal.          Lab Results:   No results displayed because visit has over 200 results.          Imaging Studies: I have personally reviewed pertinent imaging studies.    Ania Duran MD  Gastroenterology Fellow  Available via EPIC Secure chat  9/3/2024 9:41 AM

## 2024-09-03 NOTE — PLAN OF CARE
Problem: PAIN - ADULT  Goal: Verbalizes/displays adequate comfort level or baseline comfort level  Description: Interventions:  - Encourage patient to monitor pain and request assistance  - Assess pain using appropriate pain scale  - Administer analgesics based on type and severity of pain and evaluate response  - Implement non-pharmacological measures as appropriate and evaluate response  - Consider cultural and social influences on pain and pain management  - Notify physician/advanced practitioner if interventions unsuccessful or patient reports new pain  9/3/2024 0159 by Tenisha Hurley RN  Outcome: Progressing  9/3/2024 0159 by Tenisha Hurley RN  Outcome: Progressing     Problem: INFECTION - ADULT  Goal: Absence or prevention of progression during hospitalization  Description: INTERVENTIONS:  - Assess and monitor for signs and symptoms of infection  - Monitor lab/diagnostic results  - Monitor all insertion sites, i.e. indwelling lines, tubes, and drains  - Monitor endotracheal if appropriate and nasal secretions for changes in amount and color  - [place] appropriate cooling/warming therapies per order  - Administer medications as ordered  - Instruct and encourage patient and family to use good hand hygiene technique  - Identify and instruct in appropriate isolation precautions for identified infection/condition  9/3/2024 0159 by Tenisha Hurley RN  Outcome: Progressing  9/3/2024 0159 by Tenisha Hurley RN  Outcome: Progressing     Problem: SAFETY ADULT  Goal: Patient will remain free of falls  Description: INTERVENTIONS:  - Educate patient/family on patient safety including physical limitations  - Instruct patient to call for assistance with activity   - Consult OT/PT to assist with strengthening/mobility   - Keep Call bell within reach  - Keep bed low and locked with side rails adjusted as appropriate  - Keep care items and personal belongings within reach  - Initiate and maintain comfort  rounds  - Make Fall Risk Sign visible to staff  - Offer Toileting every 2 Hours, in advance of need  - Initiate/Maintain bed alarm  - Apply yellow socks and bracelet for high fall risk patients  - Consider moving patient to room near nurses station  9/3/2024 0159 by Tenisha Hurley RN  Outcome: Progressing  9/3/2024 0159 by Tenisha Hurley RN  Outcome: Progressing  Goal: Maintain or return to baseline ADL function  Description: INTERVENTIONS:  -  Assess patient's ability to carry out ADLs; assess patient's baseline for ADL function and identify physical deficits which impact ability to perform ADLs (bathing, care of mouth/teeth, toileting, grooming, dressing, etc.)  - Assess/evaluate cause of self-care deficits   - Assess range of motion  - Assess patient's mobility; develop plan if impaired  - Assess patient's need for assistive devices and provide as appropriate  - Encourage maximum independence but intervene and supervise when necessary  - Involve family in performance of ADLs  - Assess for home care needs following discharge   - Consider OT consult to assist with ADL evaluation and planning for discharge  - Provide patient education as appropriate  9/3/2024 0159 by Tenisha Hurley RN  Outcome: Progressing  9/3/2024 0159 by Tenisha Hurley RN  Outcome: Progressing  Goal: Maintains/Returns to pre admission functional level  Description: INTERVENTIONS:  - Perform AM-PAC 6 Click Basic Mobility/ Daily Activity assessment daily.  - Set and communicate daily mobility goal to care team and patient/family/caregiver.   - Collaborate with rehabilitation services on mobility goals if consulted  - Out of bed to chair 1-2 times a day   - Out of bed for meals 1-2 times a day  - Out of bed for toileting  - Record patient progress and toleration of activity level   9/3/2024 0159 by Tenisha Hurley RN  Outcome: Progressing  9/3/2024 0159 by Tenisha Hurley RN  Outcome: Progressing     Problem: DISCHARGE  PLANNING  Goal: Discharge to home or other facility with appropriate resources  Description: INTERVENTIONS:  - Identify barriers to discharge w/patient and caregiver  - Arrange for needed discharge resources and transportation as appropriate  - Identify discharge learning needs (meds, wound care, etc.)  - Arrange for interpretive services to assist at discharge as needed  - Refer to Case Management Department for coordinating discharge planning if the patient needs post-hospital services based on physician/advanced practitioner order or complex needs related to functional status, cognitive ability, or social support system  9/3/2024 0159 by Tenisha Hurley RN  Outcome: Progressing  9/3/2024 0159 by Tenisha Hurley RN  Outcome: Progressing     Problem: Knowledge Deficit  Goal: Patient/family/caregiver demonstrates understanding of disease process, treatment plan, medications, and discharge instructions  Description: Complete learning assessment and assess knowledge base.  Interventions:  - Provide teaching at level of understanding  - Provide teaching via preferred learning methods  9/3/2024 0159 by Tenisha Hurley RN  Outcome: Progressing  9/3/2024 0159 by Tenisha Hurley RN  Outcome: Progressing     Problem: Nutrition/Hydration-ADULT  Goal: Nutrient/Hydration intake appropriate for improving, restoring or maintaining nutritional needs  Description: Monitor and assess patient's nutrition/hydration status for malnutrition. Collaborate with interdisciplinary team and initiate plan and interventions as ordered.  Monitor patient's weight and dietary intake as ordered or per policy. Utilize nutrition screening tool and intervene as necessary. Determine patient's food preferences and provide high-protein, high-caloric foods as appropriate.     INTERVENTIONS:  - Monitor oral intake, urinary output, labs, and treatment plans  - Assess nutrition and hydration status and recommend course of action  - Evaluate  amount of meals eaten  - Assist patient with eating if necessary   - Allow adequate time for meals  - Recommend/ encourage appropriate diets, oral nutritional supplements, and vitamin/mineral supplements  - Order, calculate, and assess calorie counts as needed  - Recommend, monitor, and adjust tube feedings and TPN/PPN based on assessed needs  - Assess need for intravenous fluids  - Provide specific nutrition/hydration education as appropriate  - Include patient/family/caregiver in decisions related to nutrition  9/3/2024 0159 by Tenisha Hurley RN  Outcome: Progressing  9/3/2024 0159 by Tenisha Hurley RN  Outcome: Progressing     Problem: Prexisting or High Potential for Compromised Skin Integrity  Goal: Skin integrity is maintained or improved  Description: INTERVENTIONS:  - Identify patients at risk for skin breakdown  - Assess and monitor skin integrity  - Assess and monitor nutrition and hydration status  - Monitor labs   - Assess for incontinence   - Turn and reposition patient  - Assist with mobility/ambulation  - Relieve pressure over bony prominences  - Avoid friction and shearing  - Provide appropriate hygiene as needed including keeping skin clean and dry  - Evaluate need for skin moisturizer/barrier cream  - Collaborate with interdisciplinary team   - Patient/family teaching  - Consider wound care consult   9/3/2024 0159 by Tenisha Hurley RN  Outcome: Progressing  9/3/2024 0159 by Tenisha Hurley RN  Outcome: Progressing     Problem: GASTROINTESTINAL - ADULT  Goal: Minimal or absence of nausea and/or vomiting  Description: INTERVENTIONS:  - Administer IV fluids if ordered to ensure adequate hydration  - Maintain NPO status until nausea and vomiting are resolved  - Nasogastric tube if ordered  - Administer ordered antiemetic medications as needed  - Provide nonpharmacologic comfort measures as appropriate  - Advance diet as tolerated, if ordered  - Consider nutrition services referral to  assist patient with adequate nutrition and appropriate food choices  9/3/2024 0159 by Tenisha Hurley RN  Outcome: Progressing  9/3/2024 0159 by Tenisha Hurley RN  Outcome: Progressing  Goal: Maintains or returns to baseline bowel function  Description: INTERVENTIONS:  - Assess bowel function  - Encourage oral fluids to ensure adequate hydration  - Administer IV fluids if ordered to ensure adequate hydration  - Administer ordered medications as needed  - Encourage mobilization and activity  - Consider nutritional services referral to assist patient with adequate nutrition and appropriate food choices  9/3/2024 0159 by Tenisha Hurley RN  Outcome: Progressing  9/3/2024 0159 by Tenisha Hurley RN  Outcome: Progressing

## 2024-09-03 NOTE — ASSESSMENT & PLAN NOTE
79 M hx A. Flutter on Eliquis, carotid stenosis, CAD, degenerative lumbar disease, and prior SBO 2/2 crohn's disease s/p SBR in 2011 presenting with s/sx SBO and terminal ileitis on CT now HD#9.    NG removed yesterday following BM, loose BM again this morning, tolerated breakfast, no pain.  Abdomen soft, non-tender, non-distended, well appearing overall.  AFVSS, BP WNL.  K 2.8, Mag 1.8, WBC 10.47    Assessment/Plan:  SBO 2/2 Crohn's on IV hydrocortisone therapy now day 1 following NG removal and tolerating his first meal of clear liquids with ongoing bowel activity. Clinically and hemodynamically stable with ongoing hypokalemia, hypomagnesemia. Will taper IVF down to 50 now. Keep on clear liquids this morning. Replete K and Mag and recheck lytes at 1800, check Phos now from AM lab specimen, then repeat full labs in AM. Will discuss with Dr. Dyer. Appreciate SLIM and GI recs.   Last FLP and Glucose 4/21/18    Okay for FLP and Glucose? PCP out of office  Please advise.

## 2024-09-03 NOTE — PROGRESS NOTES
"[facility]  Progress Note  Name: Tee Wadsworth I  MRN: [id number]  Unit/Bed#: -[facility] I Date of Admission: 8/26/2024   Date of Service: 9/3/2024 I Hospital Day: 8    Assessment & Plan   * SBO (small bowel obstruction) (HCC)  Assessment & Plan  79 M hx A. Flutter on Eliquis, carotid stenosis, CAD, degenerative lumbar disease, and prior SBO 2/2 crohn's disease s/p SBR in 2011 presenting with s/sx SBO and terminal ileitis on CT now HD#9.    NG removed yesterday following BM, loose BM again this morning, tolerated breakfast, no pain.  Abdomen soft, non-tender, non-distended, well appearing overall.  AFVSS, BP WNL.  K 2.8, Mag 1.8, WBC 10.47    Assessment/Plan:  SBO 2/2 Crohn's on IV hydrocortisone therapy now day 1 following NG removal and tolerating his first meal of clear liquids with ongoing bowel activity. Clinically and hemodynamically stable with ongoing hypokalemia, hypomagnesemia. Will taper IVF down to 50 now. Keep on clear liquids this morning. Replete K and Mag and recheck lytes at 1800, check Phos now from AM lab specimen, then repeat full labs in AM. Will discuss with Dr. Dyer. Appreciate SLIM and GI recs.             Subjective/Objective   Chief Complaint: none    Subjective: feeling well, no pain, no N/V, loose BM earlier today, tolerating clears    Objective:     Blood pressure 143/66, pulse 63, temperature 98.5 °F (36.9 °C), resp. rate 19, height 5' 9\" (1.753 m), weight 79.4 kg (175 lb), SpO2 99%.,Body mass index is 25.84 kg/m².      Intake/Output Summary (Last 24 hours) at 9/3/2024 1030  Last data filed at 9/3/2024 0707  Gross per 24 hour   Intake 840 ml   Output 850 ml   Net -10 ml       Invasive Devices       Peripheral Intravenous Line  Duration             Peripheral IV 08/30/24 Distal;Left;Ventral (anterior) Forearm 3 days    Peripheral IV 09/03/24 Dorsal (posterior);Right Forearm <1 day              Drain  Duration             NG/OG/Enteral Tube Nasogastric 16 " Fr Right nare 5 days                    Physical Exam  Vitals and nursing note reviewed.   Constitutional:       General: He is not in acute distress.     Appearance: He is well-developed. He is not diaphoretic.   HENT:      Head: Normocephalic and atraumatic.   Eyes:      Extraocular Movements: EOM normal.      Conjunctiva/sclera: Conjunctivae normal.      Pupils: Pupils are equal, round, and reactive to light.   Pulmonary:      Effort: No respiratory distress.   Abdominal:      Comments: Round soft non-distended, non-tender, no guarding or rigidity.   Musculoskeletal:         General: Normal range of motion.      Cervical back: Normal range of motion.   Skin:     General: Skin is warm and dry.      Capillary Refill: Capillary refill takes less than 2 seconds.   Neurological:      Mental Status: He is alert and oriented to person, place, and time.   Psychiatric:         Mood and Affect: Mood and affect normal.         Behavior: Behavior normal.           Lab, Imaging and other studies:I have personally reviewed pertinent lab results.  , CBC:   Lab Results   Component Value Date    WBC 10.47 (H) 09/03/2024    HGB 12.7 09/03/2024    HCT 37.1 09/03/2024    MCV 88 09/03/2024     09/03/2024    RBC 4.20 09/03/2024    MCH 30.2 09/03/2024    MCHC 34.2 09/03/2024    RDW 12.9 09/03/2024    MPV 10.8 09/03/2024    NRBC 0 09/03/2024   , CMP:   Lab Results   Component Value Date    SODIUM 137 09/03/2024    K 2.8 (L) 09/03/2024     09/03/2024    CO2 31 09/03/2024    BUN 19 09/03/2024    CREATININE 0.77 09/03/2024    CALCIUM 7.7 (L) 09/03/2024    EGFR 85 09/03/2024     VTE Pharmacologic Prophylaxis: Heparin  VTE Mechanical Prophylaxis: sequential compression device

## 2024-09-03 NOTE — PHYSICAL THERAPY NOTE
PT Treatment Note    NAME:  Tee Forrest Jr.  DATE: 09/03/24    AGE:   79 y.o.  Mrn:   [id number]  ADMIT DX:  Bowel obstruction (HCC) [K56.609]  Abdominal pain [R10.9]  Performed at least 2 patient identifiers during session: Name and ID bracelet       09/03/24 1452   PT Last Visit   PT Visit Date 09/03/24   Note Type   Note Type Treatment   Pain Assessment   Pain Assessment Tool 0-10   Pain Score No Pain   Restrictions/Precautions   Weight Bearing Precautions Per Order No   Other Precautions Multiple lines;Fall Risk   General   Chart Reviewed Yes   Family/Caregiver Present No   Cognition   Overall Cognitive Status Impaired   Arousal/Participation Responsive   Attention Within functional limits   Orientation Level Oriented X4   Memory Decreased short term memory   Following Commands Follows one step commands without difficulty   Bed Mobility   Supine to Sit 6  Modified independent   Additional items HOB elevated   Sit to Supine 6  Modified independent   Additional items HOB elevated   Transfers   Sit to Stand 5  Supervision   Additional items Assist x 1;Verbal cues;Increased time required   Stand to Sit 5  Supervision   Additional items Assist x 1;Verbal cues;Increased time required   Additional Comments pt denied dizziness with transitional movement  (stood to change gown)   Ambulation/Elevation   Gait pattern Improper Weight shift;Decreased foot clearance   Gait Assistance 5  Supervision   Additional items Assist x 1;Verbal cues   Assistive Device Rolling walker   Distance 335 ft   Ambulation/Elevation Additional Comments increased height of RW   Balance   Static Sitting Normal   Dynamic Sitting Good   Static Standing Fair   Dynamic Standing Fair -   Ambulatory Fair -   Endurance Deficit   Endurance Deficit No   Activity Tolerance   Activity Tolerance Patient tolerated treatment well   Exercises   Hip Flexion Supine;15 reps;AROM;Bilateral   Hip Adduction Supine;15 reps;AROM;Bilateral   Assessment   Prognosis  Good   Assessment Pt seen for PT treatment session this date with interventions consisting of gait training to normalize gait pattern to decrease fall risk and therapeutic exercise to improve endurance to improve functional mobility. Pt agreeable to PT treatment session upon arrival, pt found supine in bed, in no apparent distress. Since previous session, pt has made good progress as evidenced by increased distance ambulated, decreased assistance required with mobility, increased activity tolerance, and improved balance  Barriers during this session include  none .  Pt continues to be functioning below baseline level, and remains limited 2* factors listed above and including impaired  balance, impaired cognition, and decreased mobility.  Pt prognosis for achieving goals is good, pending pt progress with hospitalization/medical status improvements, and indicated by motivated to participate in therapy, ability to follow cues, and improvement with mobility status. PT will continue to see pt during current hospitalization in order to address the deficits listed above and provide interventions consistent w/ POC in effort to achieve goals. Current goals and POC remain appropriate, pt continues to have rehab potential  Upon conclusion pt supine in bed. The patient's AM-PAC Basic Mobility Inpatient Short Form Raw Score is 20.  Based on patient presentations and impairments, pt would most appropriately benefit from Level 3 resource intensity upon discharge.  Please also refer to the recommendation of the Physical Therapist for safe discharge planning. RN verbalized pt appropriate for PT session.   Barriers to Discharge None   Goals   Patient Goals to go for a walk in the hallway   STG Expiration Date 09/08/24   PT Treatment Day 1   Plan   Treatment/Interventions Functional transfer training;Therapeutic exercise;Endurance training;Bed mobility;Gait training   Progress Progressing toward goals   PT Frequency 3-5x/wk    Discharge Recommendation   Rehab Resource Intensity Level, PT III (Minimum Resource Intensity)   Equipment Recommended Walker   Walker Package Recommended Wheeled walker   AM-PAC Basic Mobility Inpatient   Turning in Flat Bed Without Bedrails 4   Lying on Back to Sitting on Edge of Flat Bed Without Bedrails 4   Moving Bed to Chair 3   Standing Up From Chair Using Arms 3   Walk in Room 3   Climb 3-5 Stairs With Railing 3   Basic Mobility Inpatient Raw Score 20   Basic Mobility Standardized Score 43.99   [facility] Highest Level Of Mobility   -HLM Goal 6: Walk 10 steps or more   -HLM Achieved 8: Walk 250 feet ot more         Time In: 1450  Time Out: 1513  Total Treatment Minutes: 23    Kayla Ventura, PT

## 2024-09-03 NOTE — PLAN OF CARE
Problem: PAIN - ADULT  Goal: Verbalizes/displays adequate comfort level or baseline comfort level  Description: Interventions:  - Encourage patient to monitor pain and request assistance  - Assess pain using appropriate pain scale  - Administer analgesics based on type and severity of pain and evaluate response  - Implement non-pharmacological measures as appropriate and evaluate response  - Consider cultural and social influences on pain and pain management  - Notify physician/advanced practitioner if interventions unsuccessful or patient reports new pain  Outcome: Progressing     Problem: INFECTION - ADULT  Goal: Absence or prevention of progression during hospitalization  Description: INTERVENTIONS:  - Assess and monitor for signs and symptoms of infection  - Monitor lab/diagnostic results  - Monitor all insertion sites, i.e. indwelling lines, tubes, and drains  - Monitor endotracheal if appropriate and nasal secretions for changes in amount and color  - [place] appropriate cooling/warming therapies per order  - Administer medications as ordered  - Instruct and encourage patient and family to use good hand hygiene technique  - Identify and instruct in appropriate isolation precautions for identified infection/condition  Outcome: Progressing     Problem: SAFETY ADULT  Goal: Patient will remain free of falls  Description: INTERVENTIONS:  - Educate patient/family on patient safety including physical limitations  - Instruct patient to call for assistance with activity   - Consult OT/PT to assist with strengthening/mobility   - Keep Call bell within reach  - Keep bed low and locked with side rails adjusted as appropriate  - Keep care items and personal belongings within reach  - Initiate and maintain comfort rounds  - Make Fall Risk Sign visible to staff  - Offer Toileting every 2 Hours, in advance of need  - Initiate/Maintain bed alarm  - Apply yellow socks and bracelet for high fall risk patients  - Consider  moving patient to room near nurses station  Outcome: Progressing  Goal: Maintain or return to baseline ADL function  Description: INTERVENTIONS:  -  Assess patient's ability to carry out ADLs; assess patient's baseline for ADL function and identify physical deficits which impact ability to perform ADLs (bathing, care of mouth/teeth, toileting, grooming, dressing, etc.)  - Assess/evaluate cause of self-care deficits   - Assess range of motion  - Assess patient's mobility; develop plan if impaired  - Assess patient's need for assistive devices and provide as appropriate  - Encourage maximum independence but intervene and supervise when necessary  - Involve family in performance of ADLs  - Assess for home care needs following discharge   - Consider OT consult to assist with ADL evaluation and planning for discharge  - Provide patient education as appropriate  Outcome: Progressing  Goal: Maintains/Returns to pre admission functional level  Description: INTERVENTIONS:  - Perform AM-PAC 6 Click Basic Mobility/ Daily Activity assessment daily.  - Set and communicate daily mobility goal to care team and patient/family/caregiver.   - Collaborate with rehabilitation services on mobility goals if consulted  - Out of bed to chair 1-2 times a day   - Out of bed for meals 1-2 times a day  - Out of bed for toileting  - Record patient progress and toleration of activity level   Outcome: Progressing     Problem: DISCHARGE PLANNING  Goal: Discharge to home or other facility with appropriate resources  Description: INTERVENTIONS:  - Identify barriers to discharge w/patient and caregiver  - Arrange for needed discharge resources and transportation as appropriate  - Identify discharge learning needs (meds, wound care, etc.)  - Arrange for interpretive services to assist at discharge as needed  - Refer to Case Management Department for coordinating discharge planning if the patient needs post-hospital services based on physician/advanced  practitioner order or complex needs related to functional status, cognitive ability, or social support system  Outcome: Progressing     Problem: Knowledge Deficit  Goal: Patient/family/caregiver demonstrates understanding of disease process, treatment plan, medications, and discharge instructions  Description: Complete learning assessment and assess knowledge base.  Interventions:  - Provide teaching at level of understanding  - Provide teaching via preferred learning methods  Outcome: Progressing     Problem: Nutrition/Hydration-ADULT  Goal: Nutrient/Hydration intake appropriate for improving, restoring or maintaining nutritional needs  Description: Monitor and assess patient's nutrition/hydration status for malnutrition. Collaborate with interdisciplinary team and initiate plan and interventions as ordered.  Monitor patient's weight and dietary intake as ordered or per policy. Utilize nutrition screening tool and intervene as necessary. Determine patient's food preferences and provide high-protein, high-caloric foods as appropriate.     INTERVENTIONS:  - Monitor oral intake, urinary output, labs, and treatment plans  - Assess nutrition and hydration status and recommend course of action  - Evaluate amount of meals eaten  - Assist patient with eating if necessary   - Allow adequate time for meals  - Recommend/ encourage appropriate diets, oral nutritional supplements, and vitamin/mineral supplements  - Order, calculate, and assess calorie counts as needed  - Recommend, monitor, and adjust tube feedings and TPN/PPN based on assessed needs  - Assess need for intravenous fluids  - Provide specific nutrition/hydration education as appropriate  - Include patient/family/caregiver in decisions related to nutrition  Outcome: Progressing     Problem: Prexisting or High Potential for Compromised Skin Integrity  Goal: Skin integrity is maintained or improved  Description: INTERVENTIONS:  - Identify patients at risk for skin  breakdown  - Assess and monitor skin integrity  - Assess and monitor nutrition and hydration status  - Monitor labs   - Assess for incontinence   - Turn and reposition patient  - Assist with mobility/ambulation  - Relieve pressure over bony prominences  - Avoid friction and shearing  - Provide appropriate hygiene as needed including keeping skin clean and dry  - Evaluate need for skin moisturizer/barrier cream  - Collaborate with interdisciplinary team   - Patient/family teaching  - Consider wound care consult   Outcome: Progressing     Problem: GASTROINTESTINAL - ADULT  Goal: Minimal or absence of nausea and/or vomiting  Description: INTERVENTIONS:  - Administer IV fluids if ordered to ensure adequate hydration  - Maintain NPO status until nausea and vomiting are resolved  - Nasogastric tube if ordered  - Administer ordered antiemetic medications as needed  - Provide nonpharmacologic comfort measures as appropriate  - Advance diet as tolerated, if ordered  - Consider nutrition services referral to assist patient with adequate nutrition and appropriate food choices  Outcome: Progressing  Goal: Maintains or returns to baseline bowel function  Description: INTERVENTIONS:  - Assess bowel function  - Encourage oral fluids to ensure adequate hydration  - Administer IV fluids if ordered to ensure adequate hydration  - Administer ordered medications as needed  - Encourage mobilization and activity  - Consider nutritional services referral to assist patient with adequate nutrition and appropriate food choices  Outcome: Progressing

## 2024-09-04 LAB
25(OH)D3 SERPL-MCNC: 16.7 NG/ML (ref 30–100)
ALBUMIN SERPL BCG-MCNC: 3.3 G/DL (ref 3.5–5)
ALP SERPL-CCNC: 60 U/L (ref 34–104)
ALT SERPL W P-5'-P-CCNC: 32 U/L (ref 7–52)
ANION GAP SERPL CALCULATED.3IONS-SCNC: 4 MMOL/L (ref 4–13)
AST SERPL W P-5'-P-CCNC: 17 U/L (ref 13–39)
BILIRUB SERPL-MCNC: 1.65 MG/DL (ref 0.2–1)
BUN SERPL-MCNC: 14 MG/DL (ref 5–25)
CALCIUM ALBUM COR SERPL-MCNC: 8.7 MG/DL (ref 8.3–10.1)
CALCIUM SERPL-MCNC: 8.1 MG/DL (ref 8.4–10.2)
CHLORIDE SERPL-SCNC: 101 MMOL/L (ref 96–108)
CO2 SERPL-SCNC: 33 MMOL/L (ref 21–32)
CREAT SERPL-MCNC: 0.77 MG/DL (ref 0.6–1.3)
CRP SERPL QL: 30 MG/L
ERYTHROCYTE [DISTWIDTH] IN BLOOD BY AUTOMATED COUNT: 12.9 % (ref 11.6–15.1)
FERRITIN SERPL-MCNC: 156 NG/ML (ref 24–336)
GFR SERPL CREATININE-BSD FRML MDRD: 85 ML/MIN/1.73SQ M
GLUCOSE SERPL-MCNC: 98 MG/DL (ref 65–140)
HCT VFR BLD AUTO: 38.8 % (ref 36.5–49.3)
HGB BLD-MCNC: 13 G/DL (ref 12–17)
IRON SATN MFR SERPL: 36 % (ref 15–50)
IRON SERPL-MCNC: 86 UG/DL (ref 50–212)
MAGNESIUM SERPL-MCNC: 2.1 MG/DL (ref 1.9–2.7)
MCH RBC QN AUTO: 30.1 PG (ref 26.8–34.3)
MCHC RBC AUTO-ENTMCNC: 33.5 G/DL (ref 31.4–37.4)
MCV RBC AUTO: 90 FL (ref 82–98)
PHOSPHATE SERPL-MCNC: 2.6 MG/DL (ref 2.3–4.1)
PLATELET # BLD AUTO: 187 THOUSANDS/UL (ref 149–390)
PMV BLD AUTO: 10.8 FL (ref 8.9–12.7)
POTASSIUM SERPL-SCNC: 3.8 MMOL/L (ref 3.5–5.3)
PROT SERPL-MCNC: 5.5 G/DL (ref 6.4–8.4)
RBC # BLD AUTO: 4.32 MILLION/UL (ref 3.88–5.62)
SODIUM SERPL-SCNC: 138 MMOL/L (ref 135–147)
TIBC SERPL-MCNC: 240 UG/DL (ref 250–450)
UIBC SERPL-MCNC: 154 UG/DL (ref 155–355)
WBC # BLD AUTO: 9.52 THOUSAND/UL (ref 4.31–10.16)

## 2024-09-04 PROCEDURE — 83550 IRON BINDING TEST: CPT

## 2024-09-04 PROCEDURE — 85027 COMPLETE CBC AUTOMATED: CPT | Performed by: PHYSICIAN ASSISTANT

## 2024-09-04 PROCEDURE — 82728 ASSAY OF FERRITIN: CPT

## 2024-09-04 PROCEDURE — 84100 ASSAY OF PHOSPHORUS: CPT | Performed by: PHYSICIAN ASSISTANT

## 2024-09-04 PROCEDURE — 97116 GAIT TRAINING THERAPY: CPT

## 2024-09-04 PROCEDURE — 99233 SBSQ HOSP IP/OBS HIGH 50: CPT | Performed by: STUDENT IN AN ORGANIZED HEALTH CARE EDUCATION/TRAINING PROGRAM

## 2024-09-04 PROCEDURE — 83735 ASSAY OF MAGNESIUM: CPT | Performed by: PHYSICIAN ASSISTANT

## 2024-09-04 PROCEDURE — 97110 THERAPEUTIC EXERCISES: CPT

## 2024-09-04 PROCEDURE — 83540 ASSAY OF IRON: CPT

## 2024-09-04 PROCEDURE — 86140 C-REACTIVE PROTEIN: CPT

## 2024-09-04 PROCEDURE — 99232 SBSQ HOSP IP/OBS MODERATE 35: CPT | Performed by: SURGERY

## 2024-09-04 PROCEDURE — 82306 VITAMIN D 25 HYDROXY: CPT

## 2024-09-04 PROCEDURE — 80053 COMPREHEN METABOLIC PANEL: CPT | Performed by: PHYSICIAN ASSISTANT

## 2024-09-04 RX ADMIN — LISINOPRIL 40 MG: 20 TABLET ORAL at 09:45

## 2024-09-04 RX ADMIN — APIXABAN 5 MG: 5 TABLET, FILM COATED ORAL at 20:54

## 2024-09-04 RX ADMIN — APIXABAN 5 MG: 5 TABLET, FILM COATED ORAL at 13:52

## 2024-09-04 RX ADMIN — AMLODIPINE BESYLATE 10 MG: 10 TABLET ORAL at 09:45

## 2024-09-04 RX ADMIN — PANTOPRAZOLE SODIUM 40 MG: 40 INJECTION, POWDER, FOR SOLUTION INTRAVENOUS at 09:48

## 2024-09-04 RX ADMIN — METHYLPREDNISOLONE SODIUM SUCCINATE 40 MG: 40 INJECTION, POWDER, FOR SOLUTION INTRAMUSCULAR; INTRAVENOUS at 09:45

## 2024-09-04 RX ADMIN — HEPARIN SODIUM 5000 UNITS: 5000 INJECTION INTRAVENOUS; SUBCUTANEOUS at 05:28

## 2024-09-04 NOTE — NURSING NOTE
Report from the prior shift, pt in bed at this time in no acute distress watching tv, callbell in reach of the pt

## 2024-09-04 NOTE — ASSESSMENT & PLAN NOTE
79 M hx A. Flutter on Eliquis, carotid stenosis, CAD, degenerative lumbar disease, and prior SBO 2/2 crohn's disease s/p SBR in 2011 presenting with s/sx SBO and terminal ileitis on CT now HD#10.    No complaints, no pain nausea vomiting, flatus without additional bowel movement since yesterday morning.  Abdomen is round soft and nontender to percussion or palpation.  Afebrile vital signs stable on room air.  K3.8, other electrolytes stable, WBC 9.    Assessment/Plan:  Will adjust full liquid diet to full liquids low residue diet currently.  Will stop IV fluids.  Continue daily labs.  Could consider transition to oral steroids today or tomorrow.  Will discuss with Dr. Dyer.

## 2024-09-04 NOTE — ASSESSMENT & PLAN NOTE
History of lumbar radiculopathy, ankylosing spondylitis, treated with injections  Follows with pain and spine  Also with bilateral foot neuropathy, recently initiated on gabapentin 100 mg p.o. 3 times daily on 8/7/2024

## 2024-09-04 NOTE — PROGRESS NOTES
"[facility]  Progress Note  Name: Tee Wadsworth I  MRN: [id number]  Unit/Bed#: -01 I Date of Admission: 8/26/2024   Date of Service: 9/4/2024 I Hospital Day: 9    Assessment & Plan   * SBO (small bowel obstruction) (HCC)  Assessment & Plan  79 M hx A. Flutter on Eliquis, carotid stenosis, CAD, degenerative lumbar disease, and prior SBO 2/2 crohn's disease s/p SBR in 2011 presenting with s/sx SBO and terminal ileitis on CT now HD#10.    No complaints, no pain nausea vomiting, flatus without additional bowel movement since yesterday morning.  Abdomen is round soft and nontender to percussion or palpation.  Afebrile vital signs stable on room air.  K3.8, other electrolytes stable, WBC 9.    Assessment/Plan:  Will adjust full liquid diet to full liquids low residue diet currently.  Will stop IV fluids.  Continue daily labs.  Could consider transition to oral steroids today or tomorrow.  Will discuss with Dr. Dyer.             Subjective/Objective   Chief Complaint: none    Subjective: No complaints this morning, tolerating full liquid diet, appetite returning, no nausea vomiting or increasing pain.  Still passing flatus but no additional BM since yesterday morning.    Objective:     Blood pressure 159/61, pulse 59, temperature 98.4 °F (36.9 °C), resp. rate 21, height 5' 9\" (1.753 m), weight 79.4 kg (175 lb), SpO2 96%.,Body mass index is 25.84 kg/m².      Intake/Output Summary (Last 24 hours) at 9/4/2024 0825  Last data filed at 9/4/2024 0535  Gross per 24 hour   Intake 1390 ml   Output 1400 ml   Net -10 ml       Invasive Devices       Peripheral Intravenous Line  Duration             Peripheral IV 09/03/24 Dorsal (posterior);Right Forearm 1 day              Drain  Duration             NG/OG/Enteral Tube Nasogastric 16 Fr Right nare 6 days                    Physical Exam  Vitals and nursing note reviewed.   Constitutional:       General: He is not in acute distress.     Appearance: " He is well-developed. He is not diaphoretic.   HENT:      Head: Normocephalic and atraumatic.   Eyes:      Extraocular Movements: EOM normal.      Conjunctiva/sclera: Conjunctivae normal.      Pupils: Pupils are equal, round, and reactive to light.   Pulmonary:      Effort: No respiratory distress.   Abdominal:      Comments: Round soft and nontender to percussion or palpation.   Musculoskeletal:         General: Normal range of motion.      Cervical back: Normal range of motion.   Skin:     General: Skin is warm and dry.      Capillary Refill: Capillary refill takes less than 2 seconds.   Neurological:      Mental Status: He is alert and oriented to person, place, and time.   Psychiatric:         Mood and Affect: Mood and affect normal.         Behavior: Behavior normal.           Lab, Imaging and other studies:I have personally reviewed pertinent lab results.  , CBC:   Lab Results   Component Value Date    WBC 9.52 09/04/2024    HGB 13.0 09/04/2024    HCT 38.8 09/04/2024    MCV 90 09/04/2024     09/04/2024    RBC 4.32 09/04/2024    MCH 30.1 09/04/2024    MCHC 33.5 09/04/2024    RDW 12.9 09/04/2024    MPV 10.8 09/04/2024   , CMP:   Lab Results   Component Value Date    SODIUM 138 09/04/2024    K 3.8 09/04/2024     09/04/2024    CO2 33 (H) 09/04/2024    BUN 14 09/04/2024    CREATININE 0.77 09/04/2024    CALCIUM 8.1 (L) 09/04/2024    AST 17 09/04/2024    ALT 32 09/04/2024    ALKPHOS 60 09/04/2024    EGFR 85 09/04/2024     VTE Pharmacologic Prophylaxis: Heparin  VTE Mechanical Prophylaxis: sequential compression device

## 2024-09-04 NOTE — PLAN OF CARE
Problem: PHYSICAL THERAPY ADULT  Goal: Performs mobility at highest level of function for planned discharge setting.  See evaluation for individualized goals.  Description: Treatment/Interventions: Functional transfer training, LE strengthening/ROM, Elevations, Therapeutic exercise, Endurance training, Gait training  Equipment Recommended: Walker       See flowsheet documentation for full assessment, interventions and recommendations.  9/4/2024 1153 by Gelacio Meza PT  Note: Prognosis: Good  Problem List: Decreased strength, Impaired balance  Assessment: Pt seen for PT treatment session this date with interventions consisting of gait training to normalize gait pattern to decrease fall risk and therapeutic exercise to improve strength to improve functional mobility. Pt agreeable to PT treatment session upon arrival, pt found on commode, in no apparent distress, A&O x 4, and responsive. Since previous session, pt has made good progress as evidenced by increased distance ambulated, increased activity tolerance, and improved balance  Barriers during this session include none.  Pt continues to be functioning below baseline level, and remains limited 2* factors listed above and including decreased strength and impaired  balance.  Pt prognosis for achieving goals is good, pending pt progress with hospitalization/medical status improvements, and indicated by motivated to participate in therapy, ability to follow cues, supportive family, and improvement with mobility status. PT will continue to see pt during current hospitalization in order to address the deficits listed above and provide interventions consistent w/ POC in effort to achieve goals. Current goals and POC remain appropriate, pt continues to have rehab potential  Upon conclusion pt  sitting on the edge of the bed . The patient's AM-PAC Basic Mobility Inpatient Short Form Raw Score is 22.  Based on patient presentations and impairments, pt would most  appropriately benefit from Level 1 resource intensity upon discharge.  Please also refer to the recommendation of the Physical Therapist for safe discharge planning. RN verbalized pt appropriate for PT session.  Barriers to Discharge: None     Rehab Resource Intensity Level, PT: III (Minimum Resource Intensity)    See flowsheet documentation for full assessment.     9/4/2024 1153 by Gelacio Meza PT  Outcome: Progressing  Note: Prognosis: Good  Problem List: Decreased strength, Impaired balance  Assessment: Pt seen for PT treatment session this date with interventions consisting of gait training to normalize gait pattern to decrease fall risk and therapeutic exercise to improve strength to improve functional mobility. Pt agreeable to PT treatment session upon arrival, pt found on commode, in no apparent distress, A&O x 4, and responsive. Since previous session, pt has made good progress as evidenced by increased distance ambulated, increased activity tolerance, and improved balance  Barriers during this session include none.  Pt continues to be functioning below baseline level, and remains limited 2* factors listed above and including decreased strength and impaired  balance.  Pt prognosis for achieving goals is good, pending pt progress with hospitalization/medical status improvements, and indicated by motivated to participate in therapy, ability to follow cues, supportive family, and improvement with mobility status. PT will continue to see pt during current hospitalization in order to address the deficits listed above and provide interventions consistent w/ POC in effort to achieve goals. Current goals and POC remain appropriate, pt continues to have rehab potential  Upon conclusion pt  sitting on the edge of the bed . The patient's AM-PAC Basic Mobility Inpatient Short Form Raw Score is 22.  Based on patient presentations and impairments, pt would most appropriately benefit from Level 1 resource intensity upon  discharge.  Please also refer to the recommendation of the Physical Therapist for safe discharge planning. RN verbalized pt appropriate for PT session.  Barriers to Discharge: None     Rehab Resource Intensity Level, PT: III (Minimum Resource Intensity)    See flowsheet documentation for full assessment.

## 2024-09-04 NOTE — PROGRESS NOTES
[facility]  Progress Note  Name: Tee Wadsworth I  MRN: [id number]  Unit/Bed#: -01 I Date of Admission: 8/26/2024   Date of Service: 9/4/2024 I Hospital Day: 9    Assessment & Plan   * SBO (small bowel obstruction) (HCC)  Assessment & Plan  Presented for abdominal pain and vomitiing  CT abdomen pelvis: Suggests acute small bowel obstruction  Patient was managed with continue NPO and IV fluids with D5 1/2 NS@125 mL/hour  S/p Solu-Cortef, transitioned to Solumedrol 40 mg IV daily  Gastroenterology recommended- okay to transition to prednisone as delineated by Dr. Duran's note once the patient is reliably tolerating PO  - ADAT; patient should be on low fiber diet at most given c/f stricture  - colonoscopy as an outpatient; will discuss medical therapy for Crohn's once his flare is in remission in the office but anticipate the patient may need surgical management of the possible stricture which attributed to his SBO   - follow up in GI office and colorectal surgery office    Electrolyte abnormality  Assessment & Plan  BMP, magnesium daily and replete as needed keep potassium more than 4 magnesium more than 2    Elevated bilirubin  Assessment & Plan  Improving    Confusion  Assessment & Plan  Per chart review, patient with confusion  Per patient's wife, he has been having short-term memory difficulty for the past several months   Mentation is now at baseline  Suspect confusion related to hospitalization versus cognitive decline   Maintain sleep-wake cycle, promote restful environment  Conduct serial assessments    Lumbar radiculopathy  Assessment & Plan  History of lumbar radiculopathy, ankylosing spondylitis, treated with injections  Follows with pain and spine  Also with bilateral foot neuropathy, recently initiated on gabapentin 100 mg p.o. 3 times daily on 8/7/2024    Crohn's disease of both small and large intestine without complication (HCC)  Assessment & Plan  With reported  history of Crohn's  GI input appreciated: Requires colonoscopy for evaluation and definitive diagnosis, However, would recommend this be done in 6 to 8 weeks following resolution of SBO  Plan is to discharge patient on p.o. steroids.  Patient needs to be on oral PPI to avoid steroids use gastritis and if he is going to be on steroid more than 3 weeks then patient needs to get vitamin D and also reported workup from his PCP    Pancreatic cyst  Assessment & Plan  CT abdomen pelvis: Re-identified prominence of the main pancreatic duct with several nonspecific subcentimeter cystic lesions, overall not significantly changed from previous CT examinations  MRCP: Noted cystic pancreatic lesions, recommended outpatient follow-up    Atrial fibrillation (HCC)  Assessment & Plan  Resumed Eliquis after getting clearance from general surgery  Patient's heart rate is controlled.      Essential hypertension  Assessment & Plan  Continue with amlodipine, lisinopril  Can resume hydrochlorothiazide on discharge    Bilateral carotid artery stenosis  Assessment & Plan  Noted to have high-grade right carotid stenosis   Per vascular surgery note in 2019, patient's anatomy not favorable for carotid stenting due to extensive bulky calcific plaque and due to cervical spine immobility and location of carotid stenosis difficult to achieve adequate exposure to perform endarterectomy in safe manner  He was started on DAPT however this was discontinued in favor of apixaban for atrial fibrillation  Carotid ultrasound unchanged from prior study 10/16/2020               VTE Pharmacologic Prophylaxis: VTE Score: 3 Moderate Risk (Score 3-4) - Pharmacological DVT Prophylaxis Ordered: apixaban (Eliquis).    Mobility:   Basic Mobility Inpatient Raw Score: 20  JH-HLM Goal: 6: Walk 10 steps or more  JH-HLM Achieved: 7: Walk 25 feet or more  JH-HLM Goal achieved. Continue to encourage appropriate mobility.    Patient Centered Rounds: I performed bedside  rounds with nursing staff today.   Discussions with Specialists or Other Care Team Provider: surgery    Education and Discussions with Family / Patient:  Updated patient and primary service.       Current Length of Stay: 9 day(s)  Current Patient Status: Inpatient   Certification Statement: The patient will continue to require additional inpatient hospital stay due to advancement of diet and transition to per oral steroids  Discharge Plan: [facility] is following this patient on consult. They are medically stable for discharge when deemed appropriate by primary service.    Code Status: Prior    Subjective:   Patient is lying comfortably in the bed without any abdominal pain, nausea vomiting diarrhea.  He is ready to go home he said    Objective:     Vitals:   Temp (24hrs), Av.4 °F (36.9 °C), Min:98.2 °F (36.8 °C), Max:98.6 °F (37 °C)    Temp:  [98.2 °F (36.8 °C)-98.6 °F (37 °C)] 98.4 °F (36.9 °C)  HR:  [50-72] 66  Resp:  [20-21] 21  BP: (149-165)/(61-83) 149/62  SpO2:  [94 %-97 %] 94 %  Body mass index is 25.84 kg/m².     Input and Output Summary (last 24 hours):     Intake/Output Summary (Last 24 hours) at 2024 1240  Last data filed at 2024 0900  Gross per 24 hour   Intake 1790 ml   Output 1700 ml   Net 90 ml       Physical Exam:   Constitutional: no Acute distress  HEENT: no Pallor or icterus  CVS: S1 plus S2  Respiratory: Normal vascular breathe without crackles and wheeze  Gastroenterology: Soft nontender without any palpable mass  Skin: No bruises or ecchymosis  Neurology: No focal logical deficit     Additional Data:     Labs:  Results from last 7 days   Lab Units 24  0522 24  0552   WBC Thousand/uL 9.52 10.47*   HEMOGLOBIN g/dL 13.0 12.7   HEMATOCRIT % 38.8 37.1   PLATELETS Thousands/uL 187 173   SEGS PCT %  --  86*   LYMPHO PCT %  --  8*   MONO PCT %  --  5   EOS PCT %  --  0     Results from last 7 days   Lab Units 24  0522   SODIUM mmol/L 138   POTASSIUM mmol/L 3.8   CHLORIDE mmol/L  101   CO2 mmol/L 33*   BUN mg/dL 14   CREATININE mg/dL 0.77   ANION GAP mmol/L 4   CALCIUM mg/dL 8.1*   ALBUMIN g/dL 3.3*   TOTAL BILIRUBIN mg/dL 1.65*   ALK PHOS U/L 60   ALT U/L 32   AST U/L 17   GLUCOSE RANDOM mg/dL 98                       Lines/Drains:  Invasive Devices       Peripheral Intravenous Line  Duration             Peripheral IV 09/03/24 Dorsal (posterior);Right Forearm 1 day                          Imaging: No pertinent imaging reviewed.    Recent Cultures (last 7 days):         Last 24 Hours Medication List:   Current Facility-Administered Medications   Medication Dose Route Frequency Provider Last Rate    amLODIPine  10 mg Oral Daily MYNOR Matos      apixaban  5 mg Oral BID Dwayne Matthews MD      hydrALAZINE  5 mg Intravenous Q6H PRN Kaelyn López PA-C      HYDROmorphone  0.2 mg Intravenous Q4H PRN Kaelyn López PA-C      lisinopril  40 mg Oral Daily MYNOR Matos      methylPREDNISolone sodium succinate  40 mg Intravenous Daily Yfn Griffin DO      ondansetron  4 mg Intravenous Q6H PRN Jarred Gallo MD      pantoprazole  40 mg Intravenous Q24H IRMA Jarred Gallo MD          Today, Patient Was Seen By: Dwayne Matthews MD    **Please Note: This note may have been constructed using a voice recognition system.**

## 2024-09-04 NOTE — ASSESSMENT & PLAN NOTE
Resumed Eliquis after getting clearance from general surgery  Patient's heart rate is controlled.

## 2024-09-04 NOTE — PHYSICAL THERAPY NOTE
PT Treatment Note    NAME:  Tee Forrest Jr.  DATE: 09/04/24    AGE:   79 y.o.  Mrn:   [id number]  ADMIT DX:  Bowel obstruction (HCC) [K56.609]  Abdominal pain [R10.9]  Performed at least 2 patient identifiers during session: Name and Epic photo         09/04/24 0840   PT Last Visit   PT Visit Date 09/04/24   Note Type   Note Type Treatment   Pain Assessment   Pain Assessment Tool 0-10   Pain Score No Pain   Restrictions/Precautions   Weight Bearing Precautions Per Order No   General   Chart Reviewed Yes   Cognition   Overall Cognitive Status WFL   Arousal/Participation Alert   Attention Within functional limits   Orientation Level Oriented X4   Memory Within functional limits   Following Commands Follows all commands and directions without difficulty   Subjective   Subjective Im feeling much better and ready to get home   Transfers   Sit to Stand 5  Supervision   Additional items Assist x 1;Increased time required;Verbal cues   Stand to Sit 5  Supervision   Additional items Assist x 1;Increased time required;Verbal cues   Ambulation/Elevation   Gait pattern Improper Weight shift;Forward Flexion;Decreased foot clearance;Inconsistent sandoval   Gait Assistance 5  Supervision   Additional items Assist x 1;Verbal cues   Assistive Device Rolling walker   Distance 335 ft   Balance   Static Sitting Normal   Dynamic Sitting Good   Static Standing Good   Dynamic Standing Fair +   Ambulatory Fair   Endurance Deficit   Endurance Deficit No   Activity Tolerance   Activity Tolerance Patient tolerated treatment well   Nurse Made Aware RN made aware of session   Exercises   Knee AROM Sitting;20 reps;Bilateral  (LAQ)   Ankle Pumps 10 reps;Sitting;Bilateral   Squat 10 reps;Standing;Bilateral  (Mini squats with walker placed in front for assistance)   Marching 20 reps;Standing;Bilateral   Assessment   Prognosis Good   Problem List Decreased strength;Impaired balance   Assessment Pt seen for PT treatment session this date with  interventions consisting of gait training to normalize gait pattern to decrease fall risk and therapeutic exercise to improve strength to improve functional mobility. Pt agreeable to PT treatment session upon arrival, pt found on commode, in no apparent distress, A&O x 4, and responsive. Since previous session, pt has made good progress as evidenced by increased distance ambulated, increased activity tolerance, and improved balance  Barriers during this session include none.  Pt continues to be functioning below baseline level, and remains limited 2* factors listed above and including decreased strength and impaired  balance.  Pt prognosis for achieving goals is good, pending pt progress with hospitalization/medical status improvements, and indicated by motivated to participate in therapy, ability to follow cues, supportive family, and improvement with mobility status. PT will continue to see pt during current hospitalization in order to address the deficits listed above and provide interventions consistent w/ POC in effort to achieve goals. Current goals and POC remain appropriate, pt continues to have rehab potential  Upon conclusion pt  sitting on the edge of the bed . [facility] Basic [facility] Inpatient Short Form Raw Score is 22.  Based on patient presentations and impairments, pt would most appropriately benefit from Level 1 resource intensity upon discharge.  Please also refer to the recommendation of the Physical Therapist for safe discharge planning. RN verbalized pt appropriate for PT session.   Goals   Patient Goals To get home   PT Treatment Day 2   Plan   Treatment/Interventions Functional transfer training;LE strengthening/ROM;Elevations;Therapeutic exercise;Endurance training;Gait training   Progress Progressing toward goals   PT Frequency 3-5x/wk   Discharge Recommendation   Rehab Resource Intensity Level, PT III (Minimum Resource Intensity)   Equipment Recommended Walker   AM-PAC Basic  Mobility Inpatient   Turning in Flat Bed Without Bedrails 4   Lying on Back to Sitting on Edge of Flat Bed Without Bedrails 4   Moving Bed to Chair 4   Standing Up From Chair Using Arms 4   Walk in Room 3   Climb 3-5 Stairs With Railing 3   Basic Mobility Inpatient Raw Score 22   Basic Mobility Standardized Score 47.4   [facility] Highest Level Of Mobility   -HLM Goal 7: Walk 25 feet or more   JH-HLM Achieved 8: Walk 250 feet ot more       Time In: 0840  Time Out: 0905  Total Treatment Minutes: 25    Gleacio Meza, PT

## 2024-09-04 NOTE — ASSESSMENT & PLAN NOTE
Presented for abdominal pain and vomitiing  CT abdomen pelvis: Suggests acute small bowel obstruction  Patient was managed with continue NPO and IV fluids with D5 1/2 NS@125 mL/hour  S/p Solu-Cortef, transitioned to Solumedrol 40 mg IV daily  Gastroenterology recommended- okay to transition to prednisone as delineated by Dr. Duran's note once the patient is reliably tolerating PO  - ADAT; patient should be on low fiber diet at most given c/f stricture  - colonoscopy as an outpatient; will discuss medical therapy for Crohn's once his flare is in remission in the office but anticipate the patient may need surgical management of the possible stricture which attributed to his SBO   - follow up in GI office and colorectal surgery office

## 2024-09-05 ENCOUNTER — TELEPHONE (OUTPATIENT)
Dept: SURGERY | Facility: CLINIC | Age: 79
End: 2024-09-05

## 2024-09-05 ENCOUNTER — TRANSITIONAL CARE MANAGEMENT (OUTPATIENT)
Dept: FAMILY MEDICINE CLINIC | Facility: CLINIC | Age: 79
End: 2024-09-05

## 2024-09-05 VITALS
RESPIRATION RATE: 16 BRPM | WEIGHT: 175 LBS | DIASTOLIC BLOOD PRESSURE: 69 MMHG | OXYGEN SATURATION: 98 % | TEMPERATURE: 98.7 F | HEART RATE: 58 BPM | SYSTOLIC BLOOD PRESSURE: 152 MMHG | BODY MASS INDEX: 25.92 KG/M2 | HEIGHT: 69 IN

## 2024-09-05 LAB
ANION GAP SERPL CALCULATED.3IONS-SCNC: 5 MMOL/L (ref 4–13)
BUN SERPL-MCNC: 13 MG/DL (ref 5–25)
CALCIUM SERPL-MCNC: 8.1 MG/DL (ref 8.4–10.2)
CHLORIDE SERPL-SCNC: 102 MMOL/L (ref 96–108)
CO2 SERPL-SCNC: 30 MMOL/L (ref 21–32)
CREAT SERPL-MCNC: 0.73 MG/DL (ref 0.6–1.3)
ERYTHROCYTE [DISTWIDTH] IN BLOOD BY AUTOMATED COUNT: 12.6 % (ref 11.6–15.1)
GFR SERPL CREATININE-BSD FRML MDRD: 86 ML/MIN/1.73SQ M
GLUCOSE SERPL-MCNC: 96 MG/DL (ref 65–140)
HCT VFR BLD AUTO: 35.6 % (ref 36.5–49.3)
HGB BLD-MCNC: 12.2 G/DL (ref 12–17)
MAGNESIUM SERPL-MCNC: 1.8 MG/DL (ref 1.9–2.7)
MCH RBC QN AUTO: 30.3 PG (ref 26.8–34.3)
MCHC RBC AUTO-ENTMCNC: 34.3 G/DL (ref 31.4–37.4)
MCV RBC AUTO: 89 FL (ref 82–98)
PHOSPHATE SERPL-MCNC: 2.8 MG/DL (ref 2.3–4.1)
PLATELET # BLD AUTO: 183 THOUSANDS/UL (ref 149–390)
PMV BLD AUTO: 10.5 FL (ref 8.9–12.7)
POTASSIUM SERPL-SCNC: 3.4 MMOL/L (ref 3.5–5.3)
RBC # BLD AUTO: 4.02 MILLION/UL (ref 3.88–5.62)
SODIUM SERPL-SCNC: 137 MMOL/L (ref 135–147)
WBC # BLD AUTO: 8.81 THOUSAND/UL (ref 4.31–10.16)

## 2024-09-05 PROCEDURE — 84100 ASSAY OF PHOSPHORUS: CPT | Performed by: PHYSICIAN ASSISTANT

## 2024-09-05 PROCEDURE — 80048 BASIC METABOLIC PNL TOTAL CA: CPT | Performed by: PHYSICIAN ASSISTANT

## 2024-09-05 PROCEDURE — 85027 COMPLETE CBC AUTOMATED: CPT | Performed by: PHYSICIAN ASSISTANT

## 2024-09-05 PROCEDURE — 99238 HOSP IP/OBS DSCHRG MGMT 30/<: CPT | Performed by: PHYSICIAN ASSISTANT

## 2024-09-05 PROCEDURE — 99233 SBSQ HOSP IP/OBS HIGH 50: CPT | Performed by: STUDENT IN AN ORGANIZED HEALTH CARE EDUCATION/TRAINING PROGRAM

## 2024-09-05 PROCEDURE — 83735 ASSAY OF MAGNESIUM: CPT | Performed by: PHYSICIAN ASSISTANT

## 2024-09-05 RX ORDER — PANTOPRAZOLE SODIUM 40 MG/1
40 TABLET, DELAYED RELEASE ORAL
Status: DISCONTINUED | OUTPATIENT
Start: 2024-09-05 | End: 2024-09-05 | Stop reason: HOSPADM

## 2024-09-05 RX ORDER — POTASSIUM CHLORIDE 14.9 MG/ML
20 INJECTION INTRAVENOUS
Status: COMPLETED | OUTPATIENT
Start: 2024-09-05 | End: 2024-09-05

## 2024-09-05 RX ORDER — MAGNESIUM SULFATE HEPTAHYDRATE 40 MG/ML
2 INJECTION, SOLUTION INTRAVENOUS ONCE
Status: COMPLETED | OUTPATIENT
Start: 2024-09-05 | End: 2024-09-05

## 2024-09-05 RX ORDER — LANOLIN ALCOHOL/MO/W.PET/CERES
800 CREAM (GRAM) TOPICAL DAILY
Status: DISCONTINUED | OUTPATIENT
Start: 2024-09-05 | End: 2024-09-05 | Stop reason: HOSPADM

## 2024-09-05 RX ORDER — LANOLIN ALCOHOL/MO/W.PET/CERES
800 CREAM (GRAM) TOPICAL DAILY
Qty: 14 TABLET | Refills: 0 | Status: SHIPPED | OUTPATIENT
Start: 2024-09-06 | End: 2024-09-13

## 2024-09-05 RX ORDER — POTASSIUM CHLORIDE 1500 MG/1
40 TABLET, EXTENDED RELEASE ORAL DAILY
Status: DISCONTINUED | OUTPATIENT
Start: 2024-09-05 | End: 2024-09-05 | Stop reason: HOSPADM

## 2024-09-05 RX ORDER — PREDNISONE 20 MG/1
40 TABLET ORAL DAILY
Status: DISCONTINUED | OUTPATIENT
Start: 2024-09-05 | End: 2024-09-05 | Stop reason: HOSPADM

## 2024-09-05 RX ORDER — CHLORTHALIDONE 25 MG/1
25 TABLET ORAL DAILY
Status: DISCONTINUED | OUTPATIENT
Start: 2024-09-05 | End: 2024-09-05 | Stop reason: HOSPADM

## 2024-09-05 RX ORDER — PREDNISONE 10 MG/1
TABLET ORAL
Qty: 70 TABLET | Refills: 0 | Status: SHIPPED | OUTPATIENT
Start: 2024-09-05 | End: 2024-10-03

## 2024-09-05 RX ORDER — PANTOPRAZOLE SODIUM 40 MG/1
40 TABLET, DELAYED RELEASE ORAL DAILY
Qty: 90 TABLET | Refills: 0 | Status: SHIPPED | OUTPATIENT
Start: 2024-09-05 | End: 2024-12-04

## 2024-09-05 RX ADMIN — MAGNESIUM SULFATE HEPTAHYDRATE 2 G: 40 INJECTION, SOLUTION INTRAVENOUS at 11:47

## 2024-09-05 RX ADMIN — POTASSIUM CHLORIDE 20 MEQ: 14.9 INJECTION, SOLUTION INTRAVENOUS at 09:25

## 2024-09-05 RX ADMIN — PREDNISONE 40 MG: 20 TABLET ORAL at 09:22

## 2024-09-05 RX ADMIN — LISINOPRIL 40 MG: 20 TABLET ORAL at 09:22

## 2024-09-05 RX ADMIN — POTASSIUM CHLORIDE 20 MEQ: 14.9 INJECTION, SOLUTION INTRAVENOUS at 06:54

## 2024-09-05 RX ADMIN — PANTOPRAZOLE SODIUM 40 MG: 40 TABLET, DELAYED RELEASE ORAL at 09:23

## 2024-09-05 RX ADMIN — APIXABAN 5 MG: 5 TABLET, FILM COATED ORAL at 09:22

## 2024-09-05 RX ADMIN — CHLORTHALIDONE 25 MG: 25 TABLET ORAL at 09:22

## 2024-09-05 RX ADMIN — POTASSIUM CHLORIDE 40 MEQ: 1500 TABLET, EXTENDED RELEASE ORAL at 09:23

## 2024-09-05 RX ADMIN — Medication 800 MG: at 09:22

## 2024-09-05 RX ADMIN — AMLODIPINE BESYLATE 10 MG: 10 TABLET ORAL at 09:22

## 2024-09-05 NOTE — TELEPHONE ENCOUNTER
Called pt and left message to give our office a call to schedule an appt next week Monday or Wednesday either with Dr. Gallo (preferably) or [facility] if Dr. Gallo doesn't have any openings

## 2024-09-05 NOTE — ASSESSMENT & PLAN NOTE
Per chart review, patient with confusion  Per patient's wife, he has been having short-term memory difficulty for the past several months   Mentation is now at baseline  Suspect confusion related to hospitalization versus cognitive decline   Maintain sleep-wake cycle, promote restful environment  Resolved

## 2024-09-05 NOTE — TELEPHONE ENCOUNTER
----- Message from Keith Storey PA-C sent at 9/5/2024  9:53 AM EDT -----  Pt d/c home today, needs follow-up next Mon or Wednesday, preferably with Sabino, but me if not available. Will have blood work to complete before hand. For SBO.

## 2024-09-05 NOTE — DISCHARGE INSTR - AVS FIRST PAGE
Please follow a full liquid diet that is low in fiber. Please supplement protein shakes three times daily. Please notify your doctor or come back to the ER if you develop reduced appetite, nausea, vomiting, constipation, abdominal pain, fever or chills. Please get blood work done as directed and follow up with your PCP, surgery team, gastroenterology team, and continue physical therapy for your mobility.

## 2024-09-05 NOTE — CASE MANAGEMENT
Case Management Discharge Planning Note    Patient name Tee Forrest Jr.  Location /-01 MRN [id number]  : 1945 Date 2024       Current Admission Date: 2024  Current Admission Diagnosis:SBO (small bowel obstruction) (formerly [facility])   Patient Active Problem List    Diagnosis Date Noted Date Diagnosed    Electrolyte abnormality 2024     SBO (small bowel obstruction) (formerly [facility]) 2024     Terminal ileitis, with intestinal obstruction (formerly [facility]) 2024     Confusion 2024     Elevated bilirubin 2024     Lumbar radiculopathy 2024     Chronic low back pain 2024     Crohn's disease of both small and large intestine without complication (formerly [facility]) 2024     Age-related cataract of left eye 2021     Pulmonary hypertension (formerly [facility]) 2021     Pancreatic cyst 10/20/2020     Atrial fibrillation (formerly [facility]) 10/10/2020     Diverticulitis 10/10/2020     Needs flu shot 2020     Gait instability 2020     Essential hypertension 2019     Pernicious anemia 2019     Vertebral artery stenosis, asymptomatic 2019     Cervical radiculopathy 2018     Lumbosacral spondylosis without myelopathy 2018     Bilateral carotid artery stenosis 10/19/2017     Ankylosing spondylitis (formerly [facility]) 2015     Esophageal reflux 05/15/2012       LOS (days): 10  Geometric Mean LOS (GMLOS) (days): 3.1  Days to GMLOS:-7.2     OBJECTIVE:  Risk of Unplanned Readmission Score: 17.46         Current admission status: Inpatient   Preferred Pharmacy:   [facility] - [address]  [address] [address]  Phone: [phone] Fax: [phone]    Primary Care Provider: Champ Roldan MD    Primary Insurance: MEDICARE  Secondary Insurance: [employer]    DISCHARGE DETAILS:  Pt has  been cleared by surgery and is stable to be DC.  Reviewed the IMM with the pt.  Pt wife aware of DC and is coming to  pick him up.       IMM Given (Date):: 09/05/24  IMM Given to:: Patient (Reviewed the IMM with the pt who is in agreement with same)

## 2024-09-05 NOTE — DISCHARGE SUMMARY
Formerly [facility]  Discharge- Tee Forrest Jr. 1945, 79 y.o. male MRN: [id number]  Unit/Bed#: -01 Encounter: [id number]  Primary Care Provider: Champ Roldan MD   Date and time admitted to hospital: 8/26/2024 12:50 AM    * SBO (small bowel obstruction) (HCC)  Assessment & Plan  79 M hx A. Flutter on Eliquis, carotid stenosis, CAD, degenerative lumbar disease, and prior SBO 2/2 crohn's disease s/p SBR in 2011 presenting with s/sx SBO and terminal ileitis on CT now HD#11.    Assessment/Plan:  Continues to do well.  Clinically hemodynamically hematologically stable with mild electrolyte abnormalities being corrected today.  Continues to tolerate his full liquid, low residue diet with protein supplementation.  Abdomen soft and nontender, moving his bowels twice daily.  Have transitioned him back onto his home Eliquis as well as starting his oral prednisone therapy and empiric Protonix as advised by Jose.  Reviewed the case with Dr. Dyer who is in agreement with discharge home today.  Notified patient's wife who is on board with the plan.  Outlined the new prescriptions, diet plan, and advised to follow-up with blood work and surgery office visit within the next week and GI follow-up within the next month.              Medical Problems       Resolved Problems  Date Reviewed: 8/30/2024   None         Admission Date:   Admission Orders (From admission, onward)       Ordered        08/26/24 0401  INPATIENT ADMISSION  Once                            Admitting Diagnosis: Bowel obstruction (HCC) [K56.609]  Abdominal pain [R10.9]    HPI: Pleasant 79-year-old male presenting with signs and symptoms of a small bowel obstruction.    Procedures Performed: No orders of the defined types were placed in this encounter.      Summary of Hospital Course: ER workup with a CT confirmed a small bowel obstruction, findings suggestive of bowel obstruction secondary to terminal ileitis.  He has a remote story  of Crohn's status post resection and so GI was consulted and patient initiated on glucocorticoid therapy for suspected Crohn's stricture.  NG tube initially placed bedside, was incidentally removed by the patient and we were unable to replace it bedside and so GI was able to replace it endoscopically.  NG tube remained high output and was kept in place for approximately 1 week during which time he had IV fluid and electrolyte replacement.  On 9/1/2024 he had a large bowel movement and improvement in his symptoms for which the NG tube was removed and clear liquid diet started.  Over the next few days he had abnormalities in his potassium magnesium and phosphorus which were corrected and he was slowly advanced to a full liquid low residue diet with protein supplementation.  Continue to have regular bowel movements with loose stools twice daily.  On the day of discharge he was found to be clinically hemodynamically and hematologically stable for discharge home on oral prednisone therapy, Protonix, and outpatient follow-up with PCP, general surgery, and GI.  Discharge plan for a full liquid low fiber diet with protein supplementation was explained to the patient and his wife and resources will be made available on his AVS.    Significant Findings, Care, Treatment and Services Provided: Small bowel obstruction secondary to Crohn's stricture of the terminal ileum resolved with medical therapy supportive care.    Complications: None    Condition at Discharge: stable         Discharge instructions/Information to patient and family:   See after visit summary for information provided to patient and family.      Provisions for Follow-Up Care:  See after visit summary for information related to follow-up care and any pertinent home health orders.      PCP: Champ Roldan MD    Disposition: Home    Planned Readmission: No    Discharge Statement   I spent 15 minutes discharging the patient. This time was spent on the day of  discharge. I had direct contact with the patient on the day of discharge. Additional documentation is required if more than 30 minutes were spent on discharge.     Discharge Medications:  See after visit summary for reconciled discharge medications provided to patient and family.

## 2024-09-05 NOTE — ASSESSMENT & PLAN NOTE
Discharge him on 40 mEq of KCl daily and magnesium oxide 400 mg and follow close electrolytes as an outpatient with PCP

## 2024-09-05 NOTE — PROGRESS NOTES
[facility]  Progress Note  Name: Tee Wadsworth I  MRN: [id number]  Unit/Bed#: -01 I Date of Admission: 8/26/2024   Date of Service: 9/5/2024 I Hospital Day: 10    Assessment & Plan   * SBO (small bowel obstruction) (HCC)  Assessment & Plan  Presented for abdominal pain and vomitiing  CT abdomen pelvis: Suggests acute small bowel obstruction  Patient was managed with continue NPO and IV fluids with D5 1/2 NS@125 mL/hour  S/p Solu-Cortef, transitioned to Solumedrol 40 mg IV daily  Gastroenterology recommended- okay to transition to prednisone as delineated by Dr. Duran's note once the patient is reliably tolerating PO  - ADAT; patient should be on low fiber diet at most given c/f stricture  - colonoscopy as an outpatient; will discuss medical therapy for Crohn's once his flare is in remission in the office but anticipate the patient may need surgical management of the possible stricture which attributed to his SBO   - follow up in GI office and colorectal surgery office    Electrolyte abnormality  Assessment & Plan  Discharge him on 40 mEq of KCl daily and magnesium oxide 400 mg and follow close electrolytes as an outpatient with PCP    Elevated bilirubin  Assessment & Plan  Improving    Confusion  Assessment & Plan  Per chart review, patient with confusion  Per patient's wife, he has been having short-term memory difficulty for the past several months   Mentation is now at baseline  Suspect confusion related to hospitalization versus cognitive decline   Maintain sleep-wake cycle, promote restful environment  Resolved    Lumbar radiculopathy  Assessment & Plan  History of lumbar radiculopathy, ankylosing spondylitis, treated with injections  Follows with pain and spine  Also with bilateral foot neuropathy, recently initiated on gabapentin 100 mg p.o. 3 times daily on 8/7/2024    Crohn's disease of both small and large intestine without complication (HCC)  Assessment & Plan  With  reported history of Crohn's  GI input appreciated: Requires colonoscopy for evaluation and definitive diagnosis, However, would recommend this be done in 6 to 8 weeks following resolution of SBO  Plan is to discharge patient on p.o. steroids.  Patient needs to be on oral PPI to avoid steroids use gastritis and if he is going to be on steroid more than 3 weeks then patient needs to get vitamin D and also reported workup from his PCP    Pancreatic cyst  Assessment & Plan  CT abdomen pelvis: Re-identified prominence of the main pancreatic duct with several nonspecific subcentimeter cystic lesions, overall not significantly changed from previous CT examinations  MRCP: Noted cystic pancreatic lesions, recommended outpatient follow-up    Atrial fibrillation (HCC)  Assessment & Plan  Resumed Eliquis after getting clearance from general surgery  Patient's heart rate is controlled.      Essential hypertension  Assessment & Plan  Continue with amlodipine, lisinopril  Resumed home dose of chlorthalidone    Bilateral carotid artery stenosis  Assessment & Plan  Noted to have high-grade right carotid stenosis   Per vascular surgery note in 2019, patient's anatomy not favorable for carotid stenting due to extensive bulky calcific plaque and due to cervical spine immobility and location of carotid stenosis difficult to achieve adequate exposure to perform endarterectomy in safe manner  He was started on DAPT however this was discontinued in favor of apixaban for atrial fibrillation  Carotid ultrasound unchanged from prior study 10/16/2020               VTE Pharmacologic Prophylaxis: VTE Score: 3 Moderate Risk (Score 3-4) - Pharmacological DVT Prophylaxis Ordered: apixaban (Eliquis).    Mobility:   Basic Mobility Inpatient Raw Score: 20  JH-HLM Goal: 6: Walk 10 steps or more  JH-HLM Achieved: 6: Walk 10 steps or more  JH-HLM Goal achieved. Continue to encourage appropriate mobility.    Patient Centered Rounds: I performed bedside  rounds with nursing staff today.   Discussions with Specialists or Other Care Team Provider:     Education and Discussions with Family / Patient:  Updated patient and primary team.       Current Length of Stay: 10 day(s)  Current Patient Status: Inpatient   Discharge Plan: [facility] is following this patient on consult. They are medically stable for discharge when deemed appropriate by primary service.    Code Status: Prior    Subjective:   Patient is sitting comfortably in the chair.  He denied complaint of chest pain, shortness of breath.  He is able to eat or drink    Objective:     Vitals:   Temp (24hrs), Av.7 °F (37.1 °C), Min:98.6 °F (37 °C), Max:98.9 °F (37.2 °C)    Temp:  [98.6 °F (37 °C)-98.9 °F (37.2 °C)] 98.7 °F (37.1 °C)  HR:  [58-67] 58  Resp:  [16-22] 16  BP: (139-164)/(68-84) 152/69  SpO2:  [94 %-98 %] 95 %  Body mass index is 25.84 kg/m².     Input and Output Summary (last 24 hours):     Intake/Output Summary (Last 24 hours) at 2024 1035  Last data filed at 2024 0833  Gross per 24 hour   Intake 880 ml   Output 1250 ml   Net -370 ml       Physical Exam:   Constitutional:no Acute distress  HEENT: no Pallor or icterus  CVS: S1 plus S2  Respiratory: Normal vascular breathe without crackles and wheeze  Gastroenterology: Soft nontender without any palpable mass  Skin: No bruises or ecchymosis  Neurology: No focal logical deficit     Additional Data:     Labs:  Results from last 7 days   Lab Units 24  0552   WBC Thousand/uL 8.81   < > 10.47*   HEMOGLOBIN g/dL 12.2   < > 12.7   HEMATOCRIT % 35.6*   < > 37.1   PLATELETS Thousands/uL 183   < > 173   SEGS PCT %  --   --  86*   LYMPHO PCT %  --   --  8*   MONO PCT %  --   --  5   EOS PCT %  --   --  0    < > = values in this interval not displayed.     Results from last 7 days   Lab Units 24   SODIUM mmol/L 137 138   POTASSIUM mmol/L 3.4* 3.8   CHLORIDE mmol/L 102 101   CO2 mmol/L 30 33*    BUN mg/dL 13 14   CREATININE mg/dL 0.73 0.77   ANION GAP mmol/L 5 4   CALCIUM mg/dL 8.1* 8.1*   ALBUMIN g/dL  --  3.3*   TOTAL BILIRUBIN mg/dL  --  1.65*   ALK PHOS U/L  --  60   ALT U/L  --  32   AST U/L  --  17   GLUCOSE RANDOM mg/dL 96 98                       Lines/Drains:  Invasive Devices       Peripheral Intravenous Line  Duration             Peripheral IV 09/05/24 Dorsal (posterior);Right Forearm <1 day                          Imaging: No pertinent imaging reviewed.    Recent Cultures (last 7 days):         Last 24 Hours Medication List:   Current Facility-Administered Medications   Medication Dose Route Frequency Provider Last Rate    amLODIPine  10 mg Oral Daily MYNOR Matos      apixaban  5 mg Oral BID Dwayne Matthews MD      chlorthalidone  25 mg Oral Daily Dwayne Matthews MD      hydrALAZINE  5 mg Intravenous Q6H PRN Kaelyn López PA-C      HYDROmorphone  0.2 mg Intravenous Q4H PRN Kaelyn López PA-C      lisinopril  40 mg Oral Daily MYNOR Matos      magnesium Oxide  800 mg Oral Daily Dwayne Matthews MD      magnesium sulfate  2 g Intravenous Once Evin Dyer MD      ondansetron  4 mg Intravenous Q6H PRN Jarred Gallo MD      pantoprazole  40 mg Oral Early Morning Keith Storey PA-C      potassium chloride  40 mEq Oral Daily Dwayne Matthews MD      potassium chloride  20 mEq Intravenous Q2H Evin Dyer MD 20 mEq (09/05/24 0925)    predniSONE  40 mg Oral Daily Keith Storey PA-C          Today, Patient Was Seen By: Dwayne Matthews MD    **Please Note: This note may have been constructed using a voice recognition system.**

## 2024-09-05 NOTE — ASSESSMENT & PLAN NOTE
79 M hx A. Flutter on Eliquis, carotid stenosis, CAD, degenerative lumbar disease, and prior SBO 2/2 crohn's disease s/p SBR in 2011 presenting with s/sx SBO and terminal ileitis on CT now HD#11.    Assessment/Plan:  Continues to do well.  Clinically hemodynamically hematologically stable with mild electrolyte abnormalities being corrected today.  Continues to tolerate his full liquid, low residue diet with protein supplementation.  Abdomen soft and nontender, moving his bowels twice daily.  Have transitioned him back onto his home Eliquis as well as starting his oral prednisone therapy and empiric Protonix as advised by Jose.  Reviewed the case with Dr. Dyer who is in agreement with discharge home today.  Notified patient's wife who is on board with the plan.  Outlined the new prescriptions, diet plan, and advised to follow-up with blood work and surgery office visit within the next week and GI follow-up within the next month.

## 2024-09-05 NOTE — NURSING NOTE
Pt DC to home via wife in stable condition. All belongings packed and sent home with pt. IV catheter removed fully intact. AVS reviewed with pt face-to-face, reviewed pt's diet restrictions, new medications, and follow-up appts. All questions answered.

## 2024-09-05 NOTE — ASSESSMENT & PLAN NOTE
History of lumbar radiculopathy, ankylosing spondylitis, treated with injections  Follows with pain and spine  Also with bilateral foot neuropathy, recently initiated on gabapentin 100 mg p.o. 3 times daily on 8/7/2024   Subjective   Patient ID: Jenny is a 15 year old female with mother.  New patient to my practice.  See initial history questionnaire.  : 2005   MRN: [id number]    CHIEF COMPLAINT: Well exam    HPI:  Social: has many friends, active socially  Exercise: regular activity  Sex: not sexually active, 0 lifetime partner  Substance use: none  Driving: has license, wears seatbelt  Grade level: entering [employer], [employer]  Sports: none  Extracurricular activities: none  Work:  at [employer]  Periods: regular, no concerns    Additional concerns today: None    Past Medical History:   Diagnosis Date   • Allergic rhinitis, seasonal    • Atopic eczema 2010    Betamethasone   • Iron deficiency anemia        There is no problem list on file for this patient.      History reviewed. No pertinent surgical history.    Family History   Problem Relation Age of Onset   • Hypertension Maternal Grandmother    • Hyperlipidemia Maternal Grandmother    • Hypertension Maternal Grandfather    • Hyperlipidemia Maternal Grandfather    • Migraine Mother    • Patient is unaware of any medical problems Father    • Patient is unaware of any medical problems Sister        Pediatric History   Patient Parents   • MAUDE ONTIVEROS (Mother)   • ASAF ONTIVEROS (Father)     Other Topics Concern   • Not on file   Social History Narrative    Lives with mom, dad, maternal grandmother, older brother, and younger sister.       Outpatient Medications Marked as Taking for the 20 encounter (Office Visit) with Emelyn Jackson MD   Medication Sig Dispense Refill   • [DISCONTINUED] ferrous sulfate 325 (65 FE) MG tablet Tab TID         ALLERGIES:  No Known Allergies    Review of Systems   All other systems reviewed and are negative.      Immunization History   Administered Date(s) Administered   • DTaP(INFANRIX) 2003, 2003, 2007, 2007, 10/28/2008   • DTaP/IPV 2008   • HIB Hep B 2007    • HIB, Unspecified Formulation 2003, 2003, 08/17/2007, 08/25/2008   • HPV 9-Valent 07/14/2017, 07/10/2019   • Hep A, Pediatric, Unspecified Formulation 08/25/2008, 03/02/2009   • Hep B, adolescent or pediatric 2003, 2003, 2003, 2003   • Influenza, Unspecified Formulation 10/28/2008   • Influenza, injectable, quadrivalent, preservative-free 11/06/2013, 11/14/2018   • Influenza, live, intranasal, quadrivalent 10/09/2009, 10/05/2012   • MMR 06/04/2007   • Measles Mumps Rubella Varicella 08/17/2007   • Meningococcal Conjugate MCV4P (Menactra) 07/16/2014, 07/10/2019   • Meningococcal Group B 3 Dose(Trumenba) 07/10/2019   • Meningococcal Group B OMV 2 Dose(Bexsero) 08/06/2020   • PPD 06/04/2007, 08/25/2008   • Pneumococcal Conjugate 13 valent 2003   • Polio, INACTIVATED 2003, 2003, 06/04/2007, 08/17/2007   • Tdap 07/16/2014   • Varicella 06/04/2007       Objective     Office Visit on 08/06/2020   Component Date Value Ref Range Status   • Chlamydia Trachomatis by Nucleic A* 08/06/2020 NEGATIVE  NEGATIVE Final    Comment: The expected normal reference range is negative. Positive results are reported to the [facility] Department of Public Health.  The Aptima Combo 2 Assay is not intended for the evaluation of suspected sexual abuse or for other medico-legal indications, nor has it been evaluated in adolescents less than 14 years of age. In these scenarios, and in clinical settings where the   prevalence of infection is low, confirmatory testing on positive results is recommended.     • Specimen Source 08/06/2020 URINE   Final       Visit Vitals  BP 96/62   Pulse 86   Temp 98.3 °F (36.8 °C) (Temporal)   Resp 16   Ht 5' 2.6\" (1.59 m)   Wt (!) 43.3 kg (95 lb 6 oz)   LMP 07/10/2020 (Exact Date)   SpO2 100%   BMI 17.11 kg/m²      Visual Acuity Screening    Right eye Left eye Both eyes   Without correction:      With correction: 20/25 20/20        Growth percentiles:   2 %ile (Z=  -2.00) based on CDC (Girls, 2-20 Years) weight-for-age data using vitals from 8/6/2020.  27 %ile (Z= -0.62) based on CDC (Girls, 2-20 Years) Stature-for-age data based on Stature recorded on 8/6/2020.   4 %ile (Z= -1.81) based on [facility] (Girls, 2-20 Years) BMI-for-age based on BMI available as of 8/6/2020.  Blood pressure reading is in the normal blood pressure range based on the 2017 AAP Clinical Practice Guideline.    PHYSICAL EXAMINATION:   Vitals and growth parameters are noted and are appropriate for age.    General appearance: well-developed, well-nourished, awake, alert, active  Skin: no rash, no cyanosis, no pallor  Head: normocephalic   Eyes: red reflex present bilaterally, PERRLA, conjunctivae normal, EOM intact   ENT: right tympanic membrane normal, left tympanic membrane normal, nose normal, nares patent and clear, oropharynx clear   Mouth: moist mucous membranes, no erythema or exudates, no ulcers,  Neck: supple, no masses  Cardiovascular: regular rate and rhythm, normal S1 S2, no murmur heard  Chest/Lungs: no retractions, effort normal, breath sounds normal, no wheezes   Abdomen: soft, no distension, no masses, no hepatosplenomegaly, no hernia, bowel sounds are normal   Genitourinary: normal female external genitalia, Tyree 5  Back: straight spine  Extremities: no clubbing, no edema, pulses 2+ bilaterally  Neurological: normal cranial nerves II-XII, normal tone and motor development, normal sensory system and reflexes, gait normal    ASSESSMENT:  1. Encounter for well adolescent visit    2. Encounter for vision examination with abnormal findings    3. Special screening examination for chlamydial disease    4. Screening examination for pulmonary tuberculosis    5. Need for vaccination        PLAN:    Diagnoses and all orders for this visit:  Encounter for well adolescent visit  -     CBC WITH DIFFERENTIAL; Future  -     VITAMIN D -25 HYDROXY; Future  -     THYROID STIMULATING HORMONE; Future  -      COMPREHENSIVE METABOLIC PANEL; Future  -     LIPID PANEL WITH REFLEX; Future  Encounter for vision examination with abnormal findings  Special screening examination for chlamydial disease  -     CHLAMYDIA BY NUCLEIC ACID AMPLIFICATION  Screening examination for pulmonary tuberculosis  -     QUANTIFERON TB PLUS; Future  Need for vaccination  -     MENINGOCOCCAL SEROGROUP B RECOMBINANT VACC 2 DOSE (BEXSERO)     Screening tests: See media  Visual acuity screen: slight refractive error in right eye, normal in left eye with corrective lenses (referred to eye specialist)  Recent PHQ 2/9 Score    PHQ 2: 1. Feeling down, depressed, or hopeless - 0                 2. Little interest or pleasure in doing things - 0      PHQ 9: 2 (not significant for depression)    Immunizations:  per order  History of previous adverse reactions to immunizations: none    VIS was given to patient.  The risks and benefits of the vaccine were discussed and questions answered. Patient verbalized understanding and agreement, and consent was given to immunize.  Patient was observed after administration and no side effects noted.    Counseling:  Anticipatory guidance given regarding dental hygiene, car safety/seat belts, nutrition/weight, eating/body image, exercise, and safe sex and behavior.  Avoid tobacco, drugs, and alcohol.  Limit television/internet time.    Instructions provided as documented in the AVS.     The patient and mother indicated understanding of the diagnoses and agreed with the plan of care.    Return in about 1 year (around 8/6/2021) for well exam.      Emelyn Jackson MD

## 2024-09-05 NOTE — PLAN OF CARE
Problem: PAIN - ADULT  Goal: Verbalizes/displays adequate comfort level or baseline comfort level  Description: Interventions:  - Encourage patient to monitor pain and request assistance  - Assess pain using appropriate pain scale  - Administer analgesics based on type and severity of pain and evaluate response  - Implement non-pharmacological measures as appropriate and evaluate response  - Consider cultural and social influences on pain and pain management  - Notify physician/advanced practitioner if interventions unsuccessful or patient reports new pain  Outcome: Adequate for Discharge     Problem: INFECTION - ADULT  Goal: Absence or prevention of progression during hospitalization  Description: INTERVENTIONS:  - Assess and monitor for signs and symptoms of infection  - Monitor lab/diagnostic results  - Monitor all insertion sites, i.e. indwelling lines, tubes, and drains  - Monitor endotracheal if appropriate and nasal secretions for changes in amount and color  - [place] appropriate cooling/warming therapies per order  - Administer medications as ordered  - Instruct and encourage patient and family to use good hand hygiene technique  - Identify and instruct in appropriate isolation precautions for identified infection/condition  Outcome: Adequate for Discharge     Problem: SAFETY ADULT  Goal: Patient will remain free of falls  Description: INTERVENTIONS:  - Educate patient/family on patient safety including physical limitations  - Instruct patient to call for assistance with activity   - Consult OT/PT to assist with strengthening/mobility   - Keep Call bell within reach  - Keep bed low and locked with side rails adjusted as appropriate  - Keep care items and personal belongings within reach  - Initiate and maintain comfort rounds  - Make Fall Risk Sign visible to staff  - Offer Toileting every 2 Hours, in advance of need  - Initiate/Maintain bed alarm  - Apply yellow socks and bracelet for high fall risk  patients  - Consider moving patient to room near nurses station  Outcome: Adequate for Discharge  Goal: Maintain or return to baseline ADL function  Description: INTERVENTIONS:  -  Assess patient's ability to carry out ADLs; assess patient's baseline for ADL function and identify physical deficits which impact ability to perform ADLs (bathing, care of mouth/teeth, toileting, grooming, dressing, etc.)  - Assess/evaluate cause of self-care deficits   - Assess range of motion  - Assess patient's mobility; develop plan if impaired  - Assess patient's need for assistive devices and provide as appropriate  - Encourage maximum independence but intervene and supervise when necessary  - Involve family in performance of ADLs  - Assess for home care needs following discharge   - Consider OT consult to assist with ADL evaluation and planning for discharge  - Provide patient education as appropriate  Outcome: Adequate for Discharge  Goal: Maintains/Returns to pre admission functional level  Description: INTERVENTIONS:  - Perform AM-PAC 6 Click Basic Mobility/ Daily Activity assessment daily.  - Set and communicate daily mobility goal to care team and patient/family/caregiver.   - Collaborate with rehabilitation services on mobility goals if consulted  - Out of bed to chair 1-2 times a day   - Out of bed for meals 1-2 times a day  - Out of bed for toileting  - Record patient progress and toleration of activity level   Outcome: Adequate for Discharge     Problem: DISCHARGE PLANNING  Goal: Discharge to home or other facility with appropriate resources  Description: INTERVENTIONS:  - Identify barriers to discharge w/patient and caregiver  - Arrange for needed discharge resources and transportation as appropriate  - Identify discharge learning needs (meds, wound care, etc.)  - Arrange for interpretive services to assist at discharge as needed  - Refer to Case Management Department for coordinating discharge planning if the patient  needs post-hospital services based on physician/advanced practitioner order or complex needs related to functional status, cognitive ability, or social support system  Outcome: Adequate for Discharge     Problem: Knowledge Deficit  Goal: Patient/family/caregiver demonstrates understanding of disease process, treatment plan, medications, and discharge instructions  Description: Complete learning assessment and assess knowledge base.  Interventions:  - Provide teaching at level of understanding  - Provide teaching via preferred learning methods  Outcome: Adequate for Discharge     Problem: Nutrition/Hydration-ADULT  Goal: Nutrient/Hydration intake appropriate for improving, restoring or maintaining nutritional needs  Description: Monitor and assess patient's nutrition/hydration status for malnutrition. Collaborate with interdisciplinary team and initiate plan and interventions as ordered.  Monitor patient's weight and dietary intake as ordered or per policy. Utilize nutrition screening tool and intervene as necessary. Determine patient's food preferences and provide high-protein, high-caloric foods as appropriate.     INTERVENTIONS:  - Monitor oral intake, urinary output, labs, and treatment plans  - Assess nutrition and hydration status and recommend course of action  - Evaluate amount of meals eaten  - Assist patient with eating if necessary   - Allow adequate time for meals  - Recommend/ encourage appropriate diets, oral nutritional supplements, and vitamin/mineral supplements  - Order, calculate, and assess calorie counts as needed  - Recommend, monitor, and adjust tube feedings and TPN/PPN based on assessed needs  - Assess need for intravenous fluids  - Provide specific nutrition/hydration education as appropriate  - Include patient/family/caregiver in decisions related to nutrition  Outcome: Adequate for Discharge     Problem: Prexisting or High Potential for Compromised Skin Integrity  Goal: Skin integrity is  maintained or improved  Description: INTERVENTIONS:  - Identify patients at risk for skin breakdown  - Assess and monitor skin integrity  - Assess and monitor nutrition and hydration status  - Monitor labs   - Assess for incontinence   - Turn and reposition patient  - Assist with mobility/ambulation  - Relieve pressure over bony prominences  - Avoid friction and shearing  - Provide appropriate hygiene as needed including keeping skin clean and dry  - Evaluate need for skin moisturizer/barrier cream  - Collaborate with interdisciplinary team   - Patient/family teaching  - Consider wound care consult   Outcome: Adequate for Discharge     Problem: GASTROINTESTINAL - ADULT  Goal: Minimal or absence of nausea and/or vomiting  Description: INTERVENTIONS:  - Administer IV fluids if ordered to ensure adequate hydration  - Maintain NPO status until nausea and vomiting are resolved  - Nasogastric tube if ordered  - Administer ordered antiemetic medications as needed  - Provide nonpharmacologic comfort measures as appropriate  - Advance diet as tolerated, if ordered  - Consider nutrition services referral to assist patient with adequate nutrition and appropriate food choices  Outcome: Adequate for Discharge  Goal: Maintains or returns to baseline bowel function  Description: INTERVENTIONS:  - Assess bowel function  - Encourage oral fluids to ensure adequate hydration  - Administer IV fluids if ordered to ensure adequate hydration  - Administer ordered medications as needed  - Encourage mobilization and activity  - Consider nutritional services referral to assist patient with adequate nutrition and appropriate food choices  Outcome: Adequate for Discharge

## 2024-09-05 NOTE — ASSESSMENT & PLAN NOTE
With reported history of Crohn's  GI input appreciated: Requires colonoscopy for evaluation and definitive diagnosis, However, would recommend this be done in 6 to 8 weeks following resolution of SBO  Plan is to discharge patient on p.o. steroids.  Patient needs to be on oral PPI to avoid steroids use gastritis and if he is going to be on steroid more than 3 weeks then patient needs to get vitamin D and also reported workup from his PCP

## 2024-09-09 NOTE — PROGRESS NOTES
Ambulatory Visit  Name: Tee Forrest Jr.      : 1945      MRN: [id number]  Encounter Provider: Keith Storey PA-C  Encounter Date: 2024   Encounter department: [facility] [place]    Assessment & Plan   1. SBO (small bowel obstruction) ([facility])  Assessment & Plan:  Overall doing well following admissions for what appears to be a Crohn's related small bowel obstruction at the level of the terminal ileum.  Since discharge reports good appetite, eating normally without nausea vomiting and having normal bowel movements.  On exam abdomen soft and nontender without distention.  Has seen his PCP in follow-up since discharge and has upcoming appointment with GI.  Continues on Protonix and prednisone currently.  The nature of his condition explained again and a current treatment plan with medical therapy outlined, will defer to GI for further recommendations including timing of colonoscopy to investigate this area of obstruction.  Currently no role for surgical intervention at this time, we will plan routine follow-up in 3 months to reassess.      History of Present Illness     Tee Forrest Jr. is a 79 y.o. male who presents for f/u after being in the hospital on 24 for a SBO. Patient states he has been feeling much better since his hospital stay but he did lose 10 lbs. Patient denies nausea/vomiting, diarrhea/constipation, issues with urination, abd pain, trouble eating/drinking/swallowing or fevers/chills. WARREN Tafoya    Review of Systems   All other systems reviewed and are negative.    Past Medical History   Past Medical History:   Diagnosis Date    Arthritis     Atrial flutter (HCC)     Cholecystitis     Coronary artery disease     Hypertension     RBBB     Spinal stenosis      Past Surgical History:   Procedure Laterality Date    CHOLECYSTECTOMY      COLON SURGERY      bwel resection    COLONOSCOPY      ESOPHAGOGASTRODUODENOSCOPY      2007  ONSET    EYE SURGERY       HIP SURGERY      JOINT REPLACEMENT      ME LAPAROSCOPY SURG CHOLECYSTECTOMY N/A 12/05/2017    Procedure: CHOLECYSTECTOMY LAPAROSCOPIC;  Surgeon: Ez Lainez MD;  Location: [facility];  Service: General    SMALL INTESTINE SURGERY      ONSEC DEC 2011     Family History   Problem Relation Age of Onset    Arthritis Mother     Heart attack Father     Coronary artery disease Family     Diabetes Family      Current Outpatient Medications on File Prior to Visit   Medication Sig Dispense Refill    amLODIPine (NORVASC) 10 mg tablet Take 1 tablet (10 mg total) by mouth daily 90 tablet 0    apixaban (Eliquis) 5 mg Take 1 tablet by mouth twice daily 60 tablet 5    atorvastatin (LIPITOR) 40 mg tablet Take 1 tablet (40 mg total) by mouth daily 90 tablet 1    Cholecalciferol (VITAMIN D3) 1,000 units tablet Take 1 tablet (1,000 Units total) by mouth daily for 14 days 14 tablet 0    ferrous sulfate 325 (65 Fe) mg tablet Take 325 mg by mouth daily with breakfast      gabapentin (NEURONTIN) 100 mg capsule Take 1 capsule (100 mg total) by mouth 3 (three) times a day 90 capsule 5    lisinopril (ZESTRIL) 40 mg tablet Take 1 tablet by mouth once daily 90 tablet 3    magnesium Oxide (MAG-OX) 400 mg TABS Take 2 tablets (800 mg total) by mouth daily for 7 days 14 tablet 0    pantoprazole (PROTONIX) 40 mg tablet Take 1 tablet (40 mg total) by mouth daily 90 tablet 0    potassium chloride (K-DUR,KLOR-CON) 20 mEq tablet Take 1 tablet by mouth twice daily 60 tablet 5    predniSONE 10 mg tablet Take 4 tablets (40 mg total) by mouth daily for 7 days, THEN 3 tablets (30 mg total) daily for 7 days, THEN 2 tablets (20 mg total) daily for 7 days, THEN 1 tablet (10 mg total) daily for 7 days. 70 tablet 0    chlorthalidone 25 mg tablet Take 1 tablet (25 mg total) by mouth daily 90 tablet 3     Current Facility-Administered Medications on File Prior to Visit   Medication Dose Route Frequency Provider Last Rate Last Admin    cyanocobalamin injection  "1,000 mcg  1,000 mcg Intramuscular Q30 Days Arlen DO Hellen   1,000 mcg at 02/15/24 1010    cyanocobalamin injection 1,000 mcg  1,000 mcg Intramuscular Q30 Days Champ Roldan MD   1,000 mcg at 05/15/24 0956   No Known Allergies   Objective     /60 (BP Location: Left arm, Patient Position: Sitting, Cuff Size: Standard)   Pulse 60   Temp 98.7 °F (37.1 °C) (Temporal)   Resp 16   Ht 5' 9\" (1.753 m)   Wt 75.8 kg (167 lb)   SpO2 98%   BMI 24.66 kg/m²     Physical Exam  Vitals and nursing note reviewed.   Constitutional:       General: He is not in acute distress.     Appearance: He is well-developed. He is not diaphoretic.   HENT:      Head: Normocephalic and atraumatic.   Eyes:      Extraocular Movements: EOM normal.      Conjunctiva/sclera: Conjunctivae normal.      Pupils: Pupils are equal, round, and reactive to light.   Pulmonary:      Effort: No respiratory distress.   Abdominal:      Comments: Flat soft nondistended and nontender.   Musculoskeletal:         General: Normal range of motion.      Cervical back: Normal range of motion.   Skin:     General: Skin is warm and dry.      Capillary Refill: Capillary refill takes less than 2 seconds.   Neurological:      Mental Status: He is alert and oriented to person, place, and time.   Psychiatric:         Mood and Affect: Mood and affect normal.         Behavior: Behavior normal.       Administrative Statements   I have spent a total time of 10 minutes in caring for this patient on the day of the visit/encounter including Documenting in the medical record and Reviewing / ordering tests, medicine, procedures  .        "

## 2024-09-10 ENCOUNTER — APPOINTMENT (OUTPATIENT)
Dept: LAB | Facility: HOSPITAL | Age: 79
End: 2024-09-10
Payer: MEDICARE

## 2024-09-10 ENCOUNTER — OFFICE VISIT (OUTPATIENT)
Dept: FAMILY MEDICINE CLINIC | Facility: CLINIC | Age: 79
End: 2024-09-10
Payer: MEDICARE

## 2024-09-10 VITALS
OXYGEN SATURATION: 99 % | HEIGHT: 69 IN | BODY MASS INDEX: 24.88 KG/M2 | DIASTOLIC BLOOD PRESSURE: 80 MMHG | TEMPERATURE: 97.2 F | WEIGHT: 168 LBS | HEART RATE: 53 BPM | SYSTOLIC BLOOD PRESSURE: 130 MMHG

## 2024-09-10 DIAGNOSIS — K50.80 CROHN'S DISEASE OF BOTH SMALL AND LARGE INTESTINE WITHOUT COMPLICATION (HCC): ICD-10-CM

## 2024-09-10 DIAGNOSIS — E83.42 HYPOMAGNESEMIA: ICD-10-CM

## 2024-09-10 DIAGNOSIS — K56.609 SBO (SMALL BOWEL OBSTRUCTION) (HCC): ICD-10-CM

## 2024-09-10 DIAGNOSIS — Z76.89 ENCOUNTER FOR SUPPORT AND COORDINATION OF TRANSITION OF CARE: Primary | ICD-10-CM

## 2024-09-10 LAB
ALBUMIN SERPL BCG-MCNC: 4 G/DL (ref 3.5–5)
ALP SERPL-CCNC: 77 U/L (ref 34–104)
ALT SERPL W P-5'-P-CCNC: 32 U/L (ref 7–52)
ANION GAP SERPL CALCULATED.3IONS-SCNC: 7 MMOL/L (ref 4–13)
AST SERPL W P-5'-P-CCNC: 14 U/L (ref 13–39)
BASOPHILS # BLD AUTO: 0.01 THOUSANDS/ÂΜL (ref 0–0.1)
BASOPHILS NFR BLD AUTO: 0 % (ref 0–1)
BILIRUB SERPL-MCNC: 1.53 MG/DL (ref 0.2–1)
BUN SERPL-MCNC: 19 MG/DL (ref 5–25)
CALCIUM SERPL-MCNC: 9.1 MG/DL (ref 8.4–10.2)
CHLORIDE SERPL-SCNC: 97 MMOL/L (ref 96–108)
CO2 SERPL-SCNC: 32 MMOL/L (ref 21–32)
CREAT SERPL-MCNC: 1 MG/DL (ref 0.6–1.3)
EOSINOPHIL # BLD AUTO: 0.04 THOUSAND/ÂΜL (ref 0–0.61)
EOSINOPHIL NFR BLD AUTO: 0 % (ref 0–6)
ERYTHROCYTE [DISTWIDTH] IN BLOOD BY AUTOMATED COUNT: 13.2 % (ref 11.6–15.1)
GFR SERPL CREATININE-BSD FRML MDRD: 70 ML/MIN/1.73SQ M
GLUCOSE P FAST SERPL-MCNC: 84 MG/DL (ref 65–99)
HCT VFR BLD AUTO: 44.2 % (ref 36.5–49.3)
HGB BLD-MCNC: 14.4 G/DL (ref 12–17)
IMM GRANULOCYTES # BLD AUTO: 0.09 THOUSAND/UL (ref 0–0.2)
IMM GRANULOCYTES NFR BLD AUTO: 1 % (ref 0–2)
LYMPHOCYTES # BLD AUTO: 1.6 THOUSANDS/ÂΜL (ref 0.6–4.47)
LYMPHOCYTES NFR BLD AUTO: 16 % (ref 14–44)
MAGNESIUM SERPL-MCNC: 1.6 MG/DL (ref 1.9–2.7)
MCH RBC QN AUTO: 30 PG (ref 26.8–34.3)
MCHC RBC AUTO-ENTMCNC: 32.6 G/DL (ref 31.4–37.4)
MCV RBC AUTO: 92 FL (ref 82–98)
MONOCYTES # BLD AUTO: 0.63 THOUSAND/ÂΜL (ref 0.17–1.22)
MONOCYTES NFR BLD AUTO: 6 % (ref 4–12)
NEUTROPHILS # BLD AUTO: 7.85 THOUSANDS/ÂΜL (ref 1.85–7.62)
NEUTS SEG NFR BLD AUTO: 77 % (ref 43–75)
NRBC BLD AUTO-RTO: 0 /100 WBCS
PHOSPHATE SERPL-MCNC: 3.2 MG/DL (ref 2.3–4.1)
PLATELET # BLD AUTO: 293 THOUSANDS/UL (ref 149–390)
PMV BLD AUTO: 10.4 FL (ref 8.9–12.7)
POTASSIUM SERPL-SCNC: 4.1 MMOL/L (ref 3.5–5.3)
PROT SERPL-MCNC: 6.3 G/DL (ref 6.4–8.4)
RBC # BLD AUTO: 4.8 MILLION/UL (ref 3.88–5.62)
SODIUM SERPL-SCNC: 136 MMOL/L (ref 135–147)
WBC # BLD AUTO: 10.22 THOUSAND/UL (ref 4.31–10.16)

## 2024-09-10 PROCEDURE — 84100 ASSAY OF PHOSPHORUS: CPT

## 2024-09-10 PROCEDURE — 80053 COMPREHEN METABOLIC PANEL: CPT

## 2024-09-10 PROCEDURE — 36415 COLL VENOUS BLD VENIPUNCTURE: CPT

## 2024-09-10 PROCEDURE — 83735 ASSAY OF MAGNESIUM: CPT

## 2024-09-10 PROCEDURE — 85025 COMPLETE CBC W/AUTO DIFF WBC: CPT

## 2024-09-10 PROCEDURE — 99496 TRANSJ CARE MGMT HIGH F2F 7D: CPT | Performed by: STUDENT IN AN ORGANIZED HEALTH CARE EDUCATION/TRAINING PROGRAM

## 2024-09-10 NOTE — ASSESSMENT & PLAN NOTE
Resolved, continue follow up with Surgery   New medications reviewed, continue current medications

## 2024-09-10 NOTE — PROGRESS NOTES
Transition of Care Visit  Name: Tee Forrest Jr.      : 1945      MRN: [id number]  Encounter Provider: Champ Roldan MD  Encounter Date: 9/10/2024   Encounter department: [facility] PRIMARY CARE    Assessment & Plan  Encounter for support and coordination of transition of care         SBO (small bowel obstruction) (HCC)  Resolved, continue follow up with Surgery   New medications reviewed, continue current medications          Hypomagnesemia  Recent labs reviewed, patient is taking mg supplement              History of Present Illness     Transitional Care Management Review:   Tee Forrest Jr. is a 79 y.o. male here for TCM follow up.     During the TCM phone call patient stated:  TCM Call       Date and time call was made  2024  3:23 PM    Hospital care reviewed  Records reviewed    Patient was hospitialized at  [facility]    Date of Admission  24    Date of discharge  24    Diagnosis  SBO (small bowel obstruction)    Disposition  Home    Were the patients medications reviewed and updated  No    Current Symptoms  None          TCM Call       Post hospital issues  None    Should patient be enrolled in anticoag monitoring?  No    Scheduled for follow up?  Yes    Did you obtain your prescribed medications  Yes    Do you need help managing your prescriptions or medications  No    Is transportation to your appointment needed  No    I have advised the patient to call PCP with any new or worsening symptoms  Lashawn Muller,  II    Living Arrangements  Family members; Spouse or Significiant other    Support System  Spouse    The type of support provided  Physical    Do you have social support  Yes, quite a bit    Are you recieving any outpatient services  No    Are you recieving home care services  No    Are you using any community resources  No    Current waiver services  No    Have you fallen in the last 12 months  No    Interperter language line  "needed  No    Counseling  Patient          HPI    79 yom presents for TCM following recent hospitalization for SBO.  Initially presented to ED on 9/25 secondary to abdominal pain, N/V and diarrhea and was found to have SBO on CT. He was subsequently admitted and monitored with General surgery consult and slow profgression of diet, and NG tube. He was ultimately stabilized and discharged with protonix and a steroid taper.  He has been doing well since discharge, and denies any new issues.       Review of Systems   Constitutional:  Negative for activity change, appetite change, chills, fatigue and fever.   HENT:  Negative for congestion, dental problem, drooling, ear discharge, ear pain, facial swelling, postnasal drip, rhinorrhea and sinus pain.    Eyes:  Negative for photophobia, pain, discharge and itching.   Respiratory:  Negative for apnea, cough, chest tightness and shortness of breath.    Cardiovascular:  Negative for chest pain and leg swelling.   Gastrointestinal:  Negative for abdominal distention, abdominal pain, anal bleeding, constipation, diarrhea and nausea.   Endocrine: Negative for cold intolerance, heat intolerance and polydipsia.   Genitourinary:  Negative for difficulty urinating.   Musculoskeletal:  Negative for arthralgias, gait problem, joint swelling and myalgias.   Skin:  Negative for color change and pallor.   Allergic/Immunologic: Negative for immunocompromised state.   Neurological:  Negative for dizziness, seizures, facial asymmetry, weakness, light-headedness, numbness and headaches.   Psychiatric/Behavioral:  Negative for agitation, behavioral problems, confusion, decreased concentration and dysphoric mood.    All other systems reviewed and are negative.    Objective     /80 (BP Location: Left arm, Patient Position: Sitting, Cuff Size: Adult)   Pulse (!) 53 Comment: irregular  Temp (!) 97.2 °F (36.2 °C) (Tympanic)   Ht 5' 9\" (1.753 m)   Wt 76.2 kg (168 lb)   SpO2 99%   BMI " 24.81 kg/m²     Physical Exam  Constitutional:       Appearance: He is well-developed.   HENT:      Head: Normocephalic.   Eyes:      Pupils: Pupils are equal, round, and reactive to light.   Cardiovascular:      Rate and Rhythm: Normal rate and regular rhythm.   Pulmonary:      Effort: Pulmonary effort is normal.      Breath sounds: Normal breath sounds.   Abdominal:      General: Bowel sounds are normal.      Palpations: Abdomen is soft.   Musculoskeletal:         General: Normal range of motion.      Cervical back: Normal range of motion and neck supple.   Skin:     General: Skin is warm.       Medications have been reviewed by provider in current encounter

## 2024-09-11 ENCOUNTER — OFFICE VISIT (OUTPATIENT)
Dept: SURGERY | Facility: CLINIC | Age: 79
End: 2024-09-11
Payer: MEDICARE

## 2024-09-11 VITALS
WEIGHT: 167 LBS | SYSTOLIC BLOOD PRESSURE: 124 MMHG | RESPIRATION RATE: 16 BRPM | BODY MASS INDEX: 24.73 KG/M2 | HEIGHT: 69 IN | DIASTOLIC BLOOD PRESSURE: 60 MMHG | HEART RATE: 60 BPM | TEMPERATURE: 98.7 F | OXYGEN SATURATION: 98 %

## 2024-09-11 DIAGNOSIS — K56.609 SBO (SMALL BOWEL OBSTRUCTION) (HCC): Primary | ICD-10-CM

## 2024-09-11 PROCEDURE — 99213 OFFICE O/P EST LOW 20 MIN: CPT | Performed by: PHYSICIAN ASSISTANT

## 2024-09-11 NOTE — ASSESSMENT & PLAN NOTE
Overall doing well following admissions for what appears to be a Crohn's related small bowel obstruction at the level of the terminal ileum.  Since discharge reports good appetite, eating normally without nausea vomiting and having normal bowel movements.  On exam abdomen soft and nontender without distention.  Has seen his PCP in follow-up since discharge and has upcoming appointment with GI.  Continues on Protonix and prednisone currently.  The nature of his condition explained again and a current treatment plan with medical therapy outlined, will defer to GI for further recommendations including timing of colonoscopy to investigate this area of obstruction.  Currently no role for surgical intervention at this time, we will plan routine follow-up in 3 months to reassess.

## 2024-09-29 NOTE — PROGRESS NOTES
"Ambulatory Visit  Name: Tee Forrest Jr.      : 1945      MRN: [id number]  Encounter Provider: Mehrdad Hidalgo MD  Encounter Date: 2024   Encounter department: [facility]    Assessment & Plan  Crohn's disease of small intestine with intestinal obstruction (HCC)  Given hx of Crohn's, will restage his Crohn's at this time especially with recent SBO which resolved conservative vs steroids. Patient received IV steroids and prednisone taper. Currently completing prednisone taper (3 more days left). If restaging shows Crohn's disease, patient will benefit from biologics such as risankizumab given hx of 5 episodes of SBO with one requiring surgical resection of part of his small intestine.   - obtain MRE  - schedule ileocolonoscopy with segmental biopsies, need to hold Eliquis for 2 days prior to the procedure  - obtain fecal calprotectin  - complete prebiologic workup including quant gold; not immune to HBV.   - recommend HBV vaccination; patient agreeable   Orders:    MRI enterography w wo; Future    Calprotectin,Fecal; Future    Quantiferon TB Gold Plus Assay; Future    Colonoscopy; Future    IPMN (intraductal papillary mucinous neoplasm)  MRI/MRCP 2024: 11 mm cystic lesion at the pancreatic body and an 8 mm cystic lesion at the pancreatic tail, irregular pancreatic ductal dilatation at the distal pancreatic body/tail up to 6 mm  Per chart review, CT A/P in 2020 mentioned \"12 x 7 mm oval cystic lesion in the proximal portion of the pancreatic tail which, in retrospect, is probably unchanged as far back as 2017. Similarly, there is questionable cystic prominence of the distal aspect of the pancreatic tail with an appearnce that is likely similar ar far back as 2017.\"  - given irregular pancreatic ductal dilatation at the distal pancreatic body/tail, recommend referral to advanced GI for EUS +/- FNA; will rediscuss this next visit as it may " "have overwhelmed the patient and the patient's wife with lots of information regarding Crohn's disease today         Pancreatic lesion         Unclear why the patient is on PPI. Recommend taking it every other day and will reassess his symptoms. Will most likely discontinue it next visit.       History of Present Illness     Tee Forrest Jr. is a 79 y.o. male PMH recurrent SBO s/p resection (2011) and recent hospitalization (9/2024) for SBO which was conservatively managed, CCY, HTN, Aflutter on Eliquis, questionable hx of Crohn's disease, cystic pancreatic lesions, here for follow up.    Recent hospitalization 8/26/2024-9/5/2024 2/2 n/v/abd pain, found to have SBO. CT A/P with contrast 8/26/2024 showed acute SBO with suspected transition at the level of the terminal ileum which appears acutely inflamed.    IBD history  Dx: formally diagnosed with Crohn's disease in 2011 in the setting of small bowel resection (side-to-side anastomosis) at [facility] (please see record under media, brought in from the patient's wife). Path showed \"small bowel with a central area of luminal narrowing which, histologically, shows chronic inflammation with some increased fibrosis. The overlying mucosa shows focal acute colitis.\" Path report mentioned that the specimen was 43 cm in length.   Notably he had two more bouts of SBO in 1990s and 2000s which were treated conservatively prior to this surgery in 2011. He also had another episode of SBO in January 2018 which was treated conservatively.   Also, the patient had blood transfusion in the past sometime in 2000s for anemia without overt GIB  Prior meds: steroid taper x2?   Never been on biologics  Prior scopes: more than 10 yrs ago    Postprandial BM. Solid soft BM or softer BM. Brown. Once or twice daily.   Has 3 more days of prednisone.   Eating everything without any abdominal pain.   Lost about 10 lbs but gained it back.   No vision changes or rash  Tingling/numbness " in his hands and feet. Not taking gabapentin currently.   Wt Readings from Last 10 Encounters:   09/30/24 80.3 kg (177 lb)   09/11/24 75.8 kg (167 lb)   09/10/24 76.2 kg (168 lb)   08/28/24 79.4 kg (175 lb)   08/27/24 79.6 kg (175 lb 7.8 oz)   08/07/24 79.4 kg (175 lb)   07/23/24 81.6 kg (180 lb)   06/06/24 79.8 kg (176 lb)   05/08/24 80.7 kg (178 lb)   04/02/24 80.3 kg (177 lb)     Has been on pantoprazole for many years. No heartburn. No hx of severe esophagitis, peptic stricture or Lopez's esophagus.  Ibuprofen daily for arthritis      History obtained from : patient and patient's Significant Other    Review of Systems - reports being lethargic since discharge    Current Outpatient Medications on File Prior to Visit   Medication Sig Dispense Refill    amLODIPine (NORVASC) 10 mg tablet Take 1 tablet (10 mg total) by mouth daily 90 tablet 0    apixaban (Eliquis) 5 mg Take 1 tablet by mouth twice daily 60 tablet 5    atorvastatin (LIPITOR) 40 mg tablet Take 1 tablet (40 mg total) by mouth daily 90 tablet 1    ferrous sulfate 325 (65 Fe) mg tablet Take 325 mg by mouth daily with breakfast      gabapentin (NEURONTIN) 100 mg capsule Take 1 capsule (100 mg total) by mouth 3 (three) times a day 90 capsule 5    lisinopril (ZESTRIL) 40 mg tablet Take 1 tablet by mouth once daily 90 tablet 3    pantoprazole (PROTONIX) 40 mg tablet Take 1 tablet (40 mg total) by mouth daily 90 tablet 0    potassium chloride (K-DUR,KLOR-CON) 20 mEq tablet Take 1 tablet by mouth twice daily 60 tablet 5    predniSONE 10 mg tablet Take 4 tablets (40 mg total) by mouth daily for 7 days, THEN 3 tablets (30 mg total) daily for 7 days, THEN 2 tablets (20 mg total) daily for 7 days, THEN 1 tablet (10 mg total) daily for 7 days. 70 tablet 0    chlorthalidone 25 mg tablet Take 1 tablet (25 mg total) by mouth daily 90 tablet 3    Cholecalciferol (VITAMIN D3) 1,000 units tablet Take 1 tablet (1,000 Units total) by mouth daily for 14 days 14 tablet 0     "[DISCONTINUED] magnesium Oxide (MAG-OX) 400 mg TABS Take 2 tablets (800 mg total) by mouth daily for 7 days (Patient not taking: Reported on 9/30/2024) 14 tablet 0     Current Facility-Administered Medications on File Prior to Visit   Medication Dose Route Frequency Provider Last Rate Last Admin    cyanocobalamin injection 1,000 mcg  1,000 mcg Intramuscular Q30 Days Arlen Macedo DO   1,000 mcg at 09/30/24 1120    cyanocobalamin injection 1,000 mcg  1,000 mcg Intramuscular Q30 Days Champ Roldan MD   1,000 mcg at 05/15/24 0956          Objective     /76 (BP Location: Right arm, Patient Position: Sitting, Cuff Size: Standard)   Pulse 56   Temp 98 °F (36.7 °C) (Temporal)   Ht 5' 9\" (1.753 m)   Wt 80.3 kg (177 lb)   SpO2 99%   BMI 26.14 kg/m²     Physical Exam  Vitals reviewed.   Constitutional:       General: He is not in acute distress.     Appearance: Normal appearance.   HENT:      Head: Normocephalic and atraumatic.   Eyes:      Extraocular Movements: Extraocular movements intact.   Cardiovascular:      Rate and Rhythm: Normal rate.   Abdominal:      General: There is no distension.      Palpations: Abdomen is soft.      Tenderness: There is no abdominal tenderness.      Comments: Surgical scar present   Musculoskeletal:         General: No swelling.      Cervical back: Normal range of motion.   Skin:     General: Skin is warm and dry.   Neurological:      Mental Status: He is alert. Mental status is at baseline.       Administrative Statements     "

## 2024-09-29 NOTE — ASSESSMENT & PLAN NOTE
Given hx of Crohn's, will restage his Crohn's at this time especially with recent SBO which resolved conservative vs steroids. Patient received IV steroids and prednisone taper. Currently completing prednisone taper (3 more days left). If restaging shows Crohn's disease, patient will benefit from biologics such as risankizumab given hx of 5 episodes of SBO with one requiring surgical resection of part of his small intestine.   - obtain MRE  - schedule ileocolonoscopy with segmental biopsies, need to hold Eliquis for 2 days prior to the procedure  - obtain fecal calprotectin  - complete prebiologic workup including quant gold; not immune to HBV.   - recommend HBV vaccination; patient agreeable   Orders:    MRI enterography w wo; Future    Calprotectin,Fecal; Future    Quantiferon TB Gold Plus Assay; Future    Colonoscopy; Future

## 2024-09-29 NOTE — H&P (VIEW-ONLY)
"Ambulatory Visit  Name: Tee Forrest Jr.      : 1945      MRN: [id number]  Encounter Provider: Mehrdad Hidalgo MD  Encounter Date: 2024   Encounter department: [facility]    Assessment & Plan  Crohn's disease of small intestine with intestinal obstruction (HCC)  Given hx of Crohn's, will restage his Crohn's at this time especially with recent SBO which resolved conservative vs steroids. Patient received IV steroids and prednisone taper. Currently completing prednisone taper (3 more days left). If restaging shows Crohn's disease, patient will benefit from biologics such as risankizumab given hx of 5 episodes of SBO with one requiring surgical resection of part of his small intestine.   - obtain MRE  - schedule ileocolonoscopy with segmental biopsies, need to hold Eliquis for 2 days prior to the procedure  - obtain fecal calprotectin  - complete prebiologic workup including quant gold; not immune to HBV.   - recommend HBV vaccination; patient agreeable   Orders:    MRI enterography w wo; Future    Calprotectin,Fecal; Future    Quantiferon TB Gold Plus Assay; Future    Colonoscopy; Future    IPMN (intraductal papillary mucinous neoplasm)  MRI/MRCP 2024: 11 mm cystic lesion at the pancreatic body and an 8 mm cystic lesion at the pancreatic tail, irregular pancreatic ductal dilatation at the distal pancreatic body/tail up to 6 mm  Per chart review, CT A/P in 2020 mentioned \"12 x 7 mm oval cystic lesion in the proximal portion of the pancreatic tail which, in retrospect, is probably unchanged as far back as 2017. Similarly, there is questionable cystic prominence of the distal aspect of the pancreatic tail with an appearnce that is likely similar ar far back as 2017.\"  - given irregular pancreatic ductal dilatation at the distal pancreatic body/tail, recommend referral to advanced GI for EUS +/- FNA; will rediscuss this next visit as it may " "have overwhelmed the patient and the patient's wife with lots of information regarding Crohn's disease today         Pancreatic lesion         Unclear why the patient is on PPI. Recommend taking it every other day and will reassess his symptoms. Will most likely discontinue it next visit.       History of Present Illness     Tee Forrest Jr. is a 79 y.o. male PMH recurrent SBO s/p resection (2011) and recent hospitalization (9/2024) for SBO which was conservatively managed, CCY, HTN, Aflutter on Eliquis, questionable hx of Crohn's disease, cystic pancreatic lesions, here for follow up.    Recent hospitalization 8/26/2024-9/5/2024 2/2 n/v/abd pain, found to have SBO. CT A/P with contrast 8/26/2024 showed acute SBO with suspected transition at the level of the terminal ileum which appears acutely inflamed.    IBD history  Dx: formally diagnosed with Crohn's disease in 2011 in the setting of small bowel resection (side-to-side anastomosis) at [facility] (please see record under media, brought in from the patient's wife). Path showed \"small bowel with a central area of luminal narrowing which, histologically, shows chronic inflammation with some increased fibrosis. The overlying mucosa shows focal acute colitis.\" Path report mentioned that the specimen was 43 cm in length.   Notably he had two more bouts of SBO in 1990s and 2000s which were treated conservatively prior to this surgery in 2011. He also had another episode of SBO in January 2018 which was treated conservatively.   Also, the patient had blood transfusion in the past sometime in 2000s for anemia without overt GIB  Prior meds: steroid taper x2?   Never been on biologics  Prior scopes: more than 10 yrs ago    Postprandial BM. Solid soft BM or softer BM. Brown. Once or twice daily.   Has 3 more days of prednisone.   Eating everything without any abdominal pain.   Lost about 10 lbs but gained it back.   No vision changes or rash  Tingling/numbness " in his hands and feet. Not taking gabapentin currently.   Wt Readings from Last 10 Encounters:   09/30/24 80.3 kg (177 lb)   09/11/24 75.8 kg (167 lb)   09/10/24 76.2 kg (168 lb)   08/28/24 79.4 kg (175 lb)   08/27/24 79.6 kg (175 lb 7.8 oz)   08/07/24 79.4 kg (175 lb)   07/23/24 81.6 kg (180 lb)   06/06/24 79.8 kg (176 lb)   05/08/24 80.7 kg (178 lb)   04/02/24 80.3 kg (177 lb)     Has been on pantoprazole for many years. No heartburn. No hx of severe esophagitis, peptic stricture or Lopez's esophagus.  Ibuprofen daily for arthritis      History obtained from : patient and patient's Significant Other    Review of Systems - reports being lethargic since discharge    Current Outpatient Medications on File Prior to Visit   Medication Sig Dispense Refill    amLODIPine (NORVASC) 10 mg tablet Take 1 tablet (10 mg total) by mouth daily 90 tablet 0    apixaban (Eliquis) 5 mg Take 1 tablet by mouth twice daily 60 tablet 5    atorvastatin (LIPITOR) 40 mg tablet Take 1 tablet (40 mg total) by mouth daily 90 tablet 1    ferrous sulfate 325 (65 Fe) mg tablet Take 325 mg by mouth daily with breakfast      gabapentin (NEURONTIN) 100 mg capsule Take 1 capsule (100 mg total) by mouth 3 (three) times a day 90 capsule 5    lisinopril (ZESTRIL) 40 mg tablet Take 1 tablet by mouth once daily 90 tablet 3    pantoprazole (PROTONIX) 40 mg tablet Take 1 tablet (40 mg total) by mouth daily 90 tablet 0    potassium chloride (K-DUR,KLOR-CON) 20 mEq tablet Take 1 tablet by mouth twice daily 60 tablet 5    predniSONE 10 mg tablet Take 4 tablets (40 mg total) by mouth daily for 7 days, THEN 3 tablets (30 mg total) daily for 7 days, THEN 2 tablets (20 mg total) daily for 7 days, THEN 1 tablet (10 mg total) daily for 7 days. 70 tablet 0    chlorthalidone 25 mg tablet Take 1 tablet (25 mg total) by mouth daily 90 tablet 3    Cholecalciferol (VITAMIN D3) 1,000 units tablet Take 1 tablet (1,000 Units total) by mouth daily for 14 days 14 tablet 0     "[DISCONTINUED] magnesium Oxide (MAG-OX) 400 mg TABS Take 2 tablets (800 mg total) by mouth daily for 7 days (Patient not taking: Reported on 9/30/2024) 14 tablet 0     Current Facility-Administered Medications on File Prior to Visit   Medication Dose Route Frequency Provider Last Rate Last Admin    cyanocobalamin injection 1,000 mcg  1,000 mcg Intramuscular Q30 Days Arlen Macedo DO   1,000 mcg at 09/30/24 1120    cyanocobalamin injection 1,000 mcg  1,000 mcg Intramuscular Q30 Days Champ Roldan MD   1,000 mcg at 05/15/24 0956          Objective     /76 (BP Location: Right arm, Patient Position: Sitting, Cuff Size: Standard)   Pulse 56   Temp 98 °F (36.7 °C) (Temporal)   Ht 5' 9\" (1.753 m)   Wt 80.3 kg (177 lb)   SpO2 99%   BMI 26.14 kg/m²     Physical Exam  Vitals reviewed.   Constitutional:       General: He is not in acute distress.     Appearance: Normal appearance.   HENT:      Head: Normocephalic and atraumatic.   Eyes:      Extraocular Movements: Extraocular movements intact.   Cardiovascular:      Rate and Rhythm: Normal rate.   Abdominal:      General: There is no distension.      Palpations: Abdomen is soft.      Tenderness: There is no abdominal tenderness.      Comments: Surgical scar present   Musculoskeletal:         General: No swelling.      Cervical back: Normal range of motion.   Skin:     General: Skin is warm and dry.   Neurological:      Mental Status: He is alert. Mental status is at baseline.       Administrative Statements     "

## 2024-09-30 ENCOUNTER — CLINICAL SUPPORT (OUTPATIENT)
Dept: FAMILY MEDICINE CLINIC | Facility: CLINIC | Age: 79
End: 2024-09-30
Payer: MEDICARE

## 2024-09-30 ENCOUNTER — OFFICE VISIT (OUTPATIENT)
Age: 79
End: 2024-09-30
Payer: MEDICARE

## 2024-09-30 VITALS
HEIGHT: 69 IN | OXYGEN SATURATION: 99 % | HEART RATE: 56 BPM | DIASTOLIC BLOOD PRESSURE: 76 MMHG | BODY MASS INDEX: 26.22 KG/M2 | SYSTOLIC BLOOD PRESSURE: 132 MMHG | TEMPERATURE: 98 F | WEIGHT: 177 LBS

## 2024-09-30 DIAGNOSIS — Z23 ENCOUNTER FOR IMMUNIZATION: ICD-10-CM

## 2024-09-30 DIAGNOSIS — K50.012 CROHN'S DISEASE OF SMALL INTESTINE WITH INTESTINAL OBSTRUCTION (HCC): Primary | ICD-10-CM

## 2024-09-30 DIAGNOSIS — K86.9 PANCREATIC LESION: ICD-10-CM

## 2024-09-30 DIAGNOSIS — D49.0 IPMN (INTRADUCTAL PAPILLARY MUCINOUS NEOPLASM): ICD-10-CM

## 2024-09-30 DIAGNOSIS — E53.8 B12 DEFICIENCY: Primary | ICD-10-CM

## 2024-09-30 PROCEDURE — G0010 ADMIN HEPATITIS B VACCINE: HCPCS | Performed by: STUDENT IN AN ORGANIZED HEALTH CARE EDUCATION/TRAINING PROGRAM

## 2024-09-30 PROCEDURE — 99214 OFFICE O/P EST MOD 30 MIN: CPT | Performed by: INTERNAL MEDICINE

## 2024-09-30 PROCEDURE — 90746 HEPB VACCINE 3 DOSE ADULT IM: CPT | Performed by: STUDENT IN AN ORGANIZED HEALTH CARE EDUCATION/TRAINING PROGRAM

## 2024-09-30 RX ADMIN — CYANOCOBALAMIN 1000 MCG: 1000 INJECTION, SOLUTION INTRAMUSCULAR; SUBCUTANEOUS at 11:20

## 2024-10-10 ENCOUNTER — ANESTHESIA EVENT (OUTPATIENT)
Dept: GASTROENTEROLOGY | Facility: HOSPITAL | Age: 79
End: 2024-10-10
Payer: MEDICARE

## 2024-10-10 ENCOUNTER — ANESTHESIA (OUTPATIENT)
Dept: GASTROENTEROLOGY | Facility: HOSPITAL | Age: 79
End: 2024-10-10
Payer: MEDICARE

## 2024-10-10 ENCOUNTER — HOSPITAL ENCOUNTER (OUTPATIENT)
Dept: GASTROENTEROLOGY | Facility: HOSPITAL | Age: 79
Setting detail: OUTPATIENT SURGERY
End: 2024-10-10
Attending: INTERNAL MEDICINE
Payer: MEDICARE

## 2024-10-10 VITALS
RESPIRATION RATE: 20 BRPM | DIASTOLIC BLOOD PRESSURE: 72 MMHG | TEMPERATURE: 98.5 F | SYSTOLIC BLOOD PRESSURE: 134 MMHG | HEART RATE: 62 BPM | OXYGEN SATURATION: 97 %

## 2024-10-10 DIAGNOSIS — K50.012 CROHN'S DISEASE OF SMALL INTESTINE WITH INTESTINAL OBSTRUCTION (HCC): ICD-10-CM

## 2024-10-10 PROCEDURE — 45385 COLONOSCOPY W/LESION REMOVAL: CPT | Performed by: INTERNAL MEDICINE

## 2024-10-10 PROCEDURE — 88342 IMHCHEM/IMCYTCHM 1ST ANTB: CPT | Performed by: PATHOLOGY

## 2024-10-10 PROCEDURE — 45380 COLONOSCOPY AND BIOPSY: CPT | Performed by: INTERNAL MEDICINE

## 2024-10-10 PROCEDURE — 88305 TISSUE EXAM BY PATHOLOGIST: CPT | Performed by: PATHOLOGY

## 2024-10-10 RX ORDER — LIDOCAINE HYDROCHLORIDE 10 MG/ML
INJECTION, SOLUTION EPIDURAL; INFILTRATION; INTRACAUDAL; PERINEURAL AS NEEDED
Status: DISCONTINUED | OUTPATIENT
Start: 2024-10-10 | End: 2024-10-10

## 2024-10-10 RX ORDER — SODIUM CHLORIDE, SODIUM LACTATE, POTASSIUM CHLORIDE, CALCIUM CHLORIDE 600; 310; 30; 20 MG/100ML; MG/100ML; MG/100ML; MG/100ML
125 INJECTION, SOLUTION INTRAVENOUS CONTINUOUS
Status: DISCONTINUED | OUTPATIENT
Start: 2024-10-10 | End: 2024-10-14 | Stop reason: HOSPADM

## 2024-10-10 RX ORDER — PROPOFOL 10 MG/ML
INJECTION, EMULSION INTRAVENOUS AS NEEDED
Status: DISCONTINUED | OUTPATIENT
Start: 2024-10-10 | End: 2024-10-10

## 2024-10-10 RX ADMIN — SODIUM CHLORIDE, SODIUM LACTATE, POTASSIUM CHLORIDE, AND CALCIUM CHLORIDE 125 ML/HR: .6; .31; .03; .02 INJECTION, SOLUTION INTRAVENOUS at 06:51

## 2024-10-10 RX ADMIN — PROPOFOL 50 MG: 10 INJECTION, EMULSION INTRAVENOUS at 08:21

## 2024-10-10 RX ADMIN — PROPOFOL 50 MG: 10 INJECTION, EMULSION INTRAVENOUS at 08:02

## 2024-10-10 RX ADMIN — PROPOFOL 100 MG: 10 INJECTION, EMULSION INTRAVENOUS at 07:35

## 2024-10-10 RX ADMIN — PROPOFOL 50 MG: 10 INJECTION, EMULSION INTRAVENOUS at 07:48

## 2024-10-10 RX ADMIN — PROPOFOL 50 MG: 10 INJECTION, EMULSION INTRAVENOUS at 07:40

## 2024-10-10 RX ADMIN — PROPOFOL 50 MG: 10 INJECTION, EMULSION INTRAVENOUS at 08:10

## 2024-10-10 RX ADMIN — LIDOCAINE HYDROCHLORIDE 50 MG: 10 INJECTION, SOLUTION EPIDURAL; INFILTRATION; INTRACAUDAL; PERINEURAL at 07:35

## 2024-10-10 NOTE — INTERVAL H&P NOTE
H&P reviewed. After examining the patient I find no changes in the patients condition since the H&P had been written.    Vitals:    10/10/24 0650   BP: 152/75   Pulse:    Resp:    Temp:    SpO2:

## 2024-10-10 NOTE — ANESTHESIA POSTPROCEDURE EVALUATION
Post-Op Assessment Note    CV Status:  Stable    Pain management: adequate       Mental Status:  Alert and awake   Hydration Status:  Euvolemic   PONV Controlled:  Controlled   Airway Patency:  Patent     Post Op Vitals Reviewed: Yes    No anethesia notable event occurred.    Staff: CRNA           Last Filed PACU Vitals:  Vitals Value Taken Time   Temp 98.0 F 10/10/24 0915   Pulse 62 10/10/24 0915   /72 10/10/24 0915   Resp 20 10/10/24 0915   SpO2 97 % 10/10/24 0915       Modified Durga:  Activity: 2 (10/10/2024  9:53 AM)  Respiration: 2 (10/10/2024  9:53 AM)  Circulation: 2 (10/10/2024  9:53 AM)  Consciousness: 2 (10/10/2024  9:53 AM)  Oxygen Saturation: 2 (10/10/2024  9:53 AM)  Modified Durga Score: 10 (10/10/2024  9:53 AM)

## 2024-10-10 NOTE — ANESTHESIA PREPROCEDURE EVALUATION
Procedure:  COLONOSCOPY    Relevant Problems   CARDIO   (+) Atrial fibrillation (HCC)   (+) Essential hypertension      GI/HEPATIC   (+) Esophageal reflux   (+) Pancreatic cyst   (+) SBO (small bowel obstruction) (HCC)      HEMATOLOGY   (+) Pernicious anemia      MUSCULOSKELETAL   (+) Ankylosing spondylitis (HCC)   (+) Chronic low back pain   (+) Lumbosacral spondylosis without myelopathy      NEURO/PSYCH   (+) Chronic low back pain      TTE (8.27.24):    Left Ventricle: Left ventricular cavity size is normal. Wall thickness is moderately increased. The left ventricular ejection fraction is 55%. Systolic function is normal.    Left Atrium: The atrium is mild-moderately dilated.    Right Atrium: The atrium is mild-moderately dilated.    Aortic Valve: The leaflets are thickened. The leaflets are calcified. There is reduced mobility. There is mild regurgitation. There is moderate stenosis. The aortic valve peak velocity is 2.49 m/s. The aortic valve peak gradient is 27 mmHg. The aortic valve mean gradient is 16 mmHg. The dimensionless velocity index is 0.31. The stroke volume index is 23.10 ml/m2.    Tricuspid Valve: There is mild regurgitation.    Aorta: The aortic root is mildly dilated. The aortic root is 3.70 cm.    Physical Exam    Airway    Mallampati score: II  TM Distance: >3 FB  Neck ROM: full     Dental        Cardiovascular  Rhythm: irregular, Rate: normal    Pulmonary  Pulmonary exam normal     Other Findings        Anesthesia Plan  ASA Score- 3     Anesthesia Type- IV sedation with anesthesia with ASA Monitors.         Additional Monitors:     Airway Plan:            Plan Factors-Exercise tolerance (METS): >4 METS.    Chart reviewed. EKG reviewed. Imaging results reviewed. Existing labs reviewed. Patient summary reviewed.    Patient is not a current smoker.      Obstructive sleep apnea risk education given perioperatively.        Induction- intravenous.    Postoperative Plan-         Informed Consent-  Anesthetic plan and risks discussed with patient.  I personally reviewed this patient with the CRNA. Discussed and agreed on the Anesthesia Plan with the CRNA..

## 2024-10-10 NOTE — ANESTHESIA POSTPROCEDURE EVALUATION
Post-Op Assessment Note    CV Status:  Stable  Pain Score: 0    Pain management: adequate       Mental Status:  Sleepy   Hydration Status:  Euvolemic   PONV Controlled:  Controlled   Airway Patency:  Patent     Post Op Vitals Reviewed: Yes    No anethesia notable event occurred.    Staff: CRNA       Last Filed PACU Vitals:  Vitals Value Taken Time   Temp     Pulse     BP     Resp     SpO2         Modified Durga:  No data recorded

## 2024-10-10 NOTE — ANESTHESIA POSTPROCEDURE EVALUATION
Post-Op Assessment Note    Last Filed PACU Vitals:  Vitals Value Taken Time   Temp     Pulse58     /68     Resp12     [id number]         Modified Durga:  No data recorded

## 2024-10-14 ENCOUNTER — HOSPITAL ENCOUNTER (OUTPATIENT)
Dept: MRI IMAGING | Facility: HOSPITAL | Age: 79
Discharge: HOME | End: 2024-10-14
Attending: INTERNAL MEDICINE
Payer: MEDICARE

## 2024-10-14 DIAGNOSIS — K50.012 CROHN'S DISEASE OF SMALL INTESTINE WITH INTESTINAL OBSTRUCTION (HCC): ICD-10-CM

## 2024-10-14 PROCEDURE — A9585 GADOBUTROL INJECTION: HCPCS | Performed by: INTERNAL MEDICINE

## 2024-10-14 PROCEDURE — 74183 MRI ABD W/O CNTR FLWD CNTR: CPT

## 2024-10-14 PROCEDURE — 72197 MRI PELVIS W/O & W/DYE: CPT

## 2024-10-14 RX ORDER — GADOBUTROL 604.72 MG/ML
8 INJECTION INTRAVENOUS
Status: COMPLETED | OUTPATIENT
Start: 2024-10-14 | End: 2024-10-14

## 2024-10-14 RX ADMIN — GADOBUTROL 8 ML: 604.72 INJECTION INTRAVENOUS at 09:22

## 2024-10-14 RX ADMIN — GLUCAGON 1 MG: 1 INJECTION, POWDER, LYOPHILIZED, FOR SOLUTION INTRAMUSCULAR; INTRAVENOUS at 09:19

## 2024-10-16 DIAGNOSIS — C18.9 ADENOCARCINOMA OF COLON (HCC): Primary | ICD-10-CM

## 2024-10-16 PROCEDURE — 88342 IMHCHEM/IMCYTCHM 1ST ANTB: CPT | Performed by: PATHOLOGY

## 2024-10-16 PROCEDURE — 88305 TISSUE EXAM BY PATHOLOGIST: CPT | Performed by: PATHOLOGY

## 2024-10-16 NOTE — PROGRESS NOTES
Spoke with the patient over the phone regarding his pathology results.     Polyp in the rectosigmoid (jar L, this was from rectosigmoid, not rectum) came back as adenocarcinoma. Clean margin from tissue edge standpoint but submucosal level cannot be accurately evaluated due to absence of muscularis propria. Recommend Chest CT for complete staging. He already had CT A/P recently. Obtain CEA. Recommend flex sig in 1-2 months to re-evaluate this polypectomy site. Will bx this prior polypectomy site and tattoo the area.     2 small TAs noted as well.    Segmental biopsy negative for inflammation. Of note, TI bx showed colonic mucosa with reactive changes. Probably sampling error because of the stricture at ICV which was not traversed with the PCF. Will await MRE results prior to further management of his Crohn's disease.       Will call back the patient in the evening to discuss the results with his wife as well.      Addendum: spoke with the wife over the phone as well regarding the above findings and recommendations. All questions answered.

## 2024-10-24 ENCOUNTER — HOSPITAL ENCOUNTER (OUTPATIENT)
Dept: CT IMAGING | Facility: HOSPITAL | Age: 79
End: 2024-10-24
Attending: INTERNAL MEDICINE
Payer: MEDICARE

## 2024-10-24 ENCOUNTER — APPOINTMENT (OUTPATIENT)
Dept: LAB | Facility: HOSPITAL | Age: 79
End: 2024-10-24
Payer: MEDICARE

## 2024-10-24 DIAGNOSIS — K50.012 CROHN'S DISEASE OF SMALL INTESTINE WITH INTESTINAL OBSTRUCTION (HCC): ICD-10-CM

## 2024-10-24 DIAGNOSIS — C18.9 ADENOCARCINOMA OF COLON (HCC): ICD-10-CM

## 2024-10-24 LAB — CEA SERPL-MCNC: 1.5 NG/ML (ref 0–3)

## 2024-10-24 PROCEDURE — 82378 CARCINOEMBRYONIC ANTIGEN: CPT

## 2024-10-24 PROCEDURE — 36415 COLL VENOUS BLD VENIPUNCTURE: CPT

## 2024-10-24 PROCEDURE — 86480 TB TEST CELL IMMUN MEASURE: CPT

## 2024-10-24 PROCEDURE — 74177 CT ABD & PELVIS W/CONTRAST: CPT

## 2024-10-24 PROCEDURE — 71260 CT THORAX DX C+: CPT

## 2024-10-24 RX ADMIN — IOHEXOL 100 ML: 350 INJECTION, SOLUTION INTRAVENOUS at 09:59

## 2024-10-25 ENCOUNTER — APPOINTMENT (OUTPATIENT)
Dept: LAB | Facility: HOSPITAL | Age: 79
End: 2024-10-25
Payer: MEDICARE

## 2024-10-25 DIAGNOSIS — K50.012 CROHN'S DISEASE OF SMALL INTESTINE WITH INTESTINAL OBSTRUCTION (HCC): ICD-10-CM

## 2024-10-25 LAB
GAMMA INTERFERON BACKGROUND BLD IA-ACNC: 0 IU/ML
M TB IFN-G BLD-IMP: NEGATIVE
M TB IFN-G CD4+ BCKGRND COR BLD-ACNC: 0 IU/ML
M TB IFN-G CD4+ BCKGRND COR BLD-ACNC: 0 IU/ML
MITOGEN IGNF BCKGRD COR BLD-ACNC: 10 IU/ML

## 2024-10-25 PROCEDURE — 83993 ASSAY FOR CALPROTECTIN FECAL: CPT

## 2024-10-28 LAB — CALPROTECTIN STL-MCNC: 68.4 ΜG/G

## 2024-10-29 ENCOUNTER — TELEPHONE (OUTPATIENT)
Dept: FAMILY MEDICINE CLINIC | Facility: CLINIC | Age: 79
End: 2024-10-29

## 2024-10-31 ENCOUNTER — TELEPHONE (OUTPATIENT)
Dept: FAMILY MEDICINE CLINIC | Facility: CLINIC | Age: 79
End: 2024-10-31

## 2024-10-31 ENCOUNTER — CLINICAL SUPPORT (OUTPATIENT)
Dept: FAMILY MEDICINE CLINIC | Facility: CLINIC | Age: 79
End: 2024-10-31
Payer: MEDICARE

## 2024-10-31 DIAGNOSIS — Z23 ENCOUNTER FOR IMMUNIZATION: Primary | ICD-10-CM

## 2024-10-31 PROCEDURE — 90746 HEPB VACCINE 3 DOSE ADULT IM: CPT | Performed by: FAMILY MEDICINE

## 2024-10-31 PROCEDURE — G0010 ADMIN HEPATITIS B VACCINE: HCPCS | Performed by: FAMILY MEDICINE

## 2024-11-11 ENCOUNTER — TELEPHONE (OUTPATIENT)
Age: 79
End: 2024-11-11

## 2024-11-11 ENCOUNTER — OFFICE VISIT (OUTPATIENT)
Age: 79
End: 2024-11-11
Payer: MEDICARE

## 2024-11-11 VITALS
SYSTOLIC BLOOD PRESSURE: 152 MMHG | DIASTOLIC BLOOD PRESSURE: 80 MMHG | HEIGHT: 69 IN | BODY MASS INDEX: 26.22 KG/M2 | WEIGHT: 177 LBS | TEMPERATURE: 97.9 F

## 2024-11-11 DIAGNOSIS — K50.012 CROHN'S DISEASE OF SMALL INTESTINE WITH INTESTINAL OBSTRUCTION (HCC): ICD-10-CM

## 2024-11-11 DIAGNOSIS — E87.6 HYPOPOTASSEMIA: ICD-10-CM

## 2024-11-11 DIAGNOSIS — D49.0 IPMN (INTRADUCTAL PAPILLARY MUCINOUS NEOPLASM): ICD-10-CM

## 2024-11-11 DIAGNOSIS — C18.9 ADENOCARCINOMA OF COLON (HCC): Primary | ICD-10-CM

## 2024-11-11 PROCEDURE — 99214 OFFICE O/P EST MOD 30 MIN: CPT | Performed by: INTERNAL MEDICINE

## 2024-11-11 NOTE — TELEPHONE ENCOUNTER
Our mutual patient is scheduled for procedure: FLEXIBLE SIGMOIDOSCOPY     On: 12/5/2024     With: Dr. Hidalgo    He/She is taking the following blood thinner: eliquis    Can this be stopped 2 days prior to the procedure?      Physician Approving clearance: Arlen Macedo

## 2024-11-11 NOTE — PROGRESS NOTES
Ambulatory Visit  Name: Tee Forrest Jr.      : 1945      MRN: [id number]  Encounter Provider: Mehrdad Hidalgo MD  Encounter Date: 2024   Encounter department: [facility]    Assessment & Plan  Adenocarcinoma of colon (HCC)  Colonoscopy 10/10/2024 with 15 mm polyp in rectosigmoid s/p hot snare and clips with path showing adenocarcinoma (2 mm), moderately differentiated, arising in a tubular adenoma, pT1, negative for lymphovascular and perineural invasion, low tumor budding, margins negative (invasive carcinoma is 1 mm from cauterized deep margin).   CEA 1.5, CT C/A/P without LAD or concern for metastasis  Patient most likely had stage 1 colon cancer which was endoscopically removed. Plan for flex sig next month to re-biopsy the polypectomy site and tattoo the area.   - plan for flex sig next month as scheduled. Hold Eliquis 2 days prior to the procedure; if no recurrence, will repeat colonoscopy in 1 year from flex sig         Crohn's disease of small intestine with intestinal obstruction (HCC)  Patient has a history of stricturing small bowel Crohn's disease c/b 5 episodes of SBO requiring surgical resection of his small bowel x1 in .   Colonoscopy 10/10/2024: ICV stricture (not traversable with PCF), 3 mm TA in TC, 2 mm TA in DC s/p polypectomy, 15 mm polyp in rectosigmoid s/p hot snare polypectomy s/p 2 clips (path with cancer as above), diverticula in left colon, hemorrhoids; segmental path negative for inflammation  MRE 10/14/2024  mild hyperenhancement in ICV, cecum and AC most likely reactive from recent biopsies via colonoscopy  Fecal calprotectin 10/25/2024  68.4 (borderline)    Given patient had multiple SBO and required surgery, discussed the utility of ppx therapy with biologics such as risankizumab given safety profile. Also discussed the option of checking fecal calprotectin and MRE every 6-12 months and considering a course of budesonide  "prn. And also discussed the option of referral to colorectal surgery given fibrotic stricture in the ICV. Patient preferred to proceed with monitoring with fecal calprotectin and MRE every 6-12 months with a course of budesonide prn.     - will obtain fecal calprotectin and MRE in 6-12 months   - script for GI PCR and C diff provided to use if the patient develops symptoms of Crohn's flare    Orders:    C-reactive protein; Future    Calprotectin,Fecal; Future    Clostridium difficile toxin by PCR with EIA; Future     GI BACTERIA, STOOL, PCR; Future    IPMN (intraductal papillary mucinous neoplasm)  MRI/MRCP 8/27/2024: 11 mm cystic lesion at the pancreatic body and an 8 mm cystic lesion at the pancreatic tail, irregular pancreatic ductal dilatation at the distal pancreatic body/tail up to 6 mm  Per chart review, CT A/P in 4/2020 mentioned \"12 x 7 mm oval cystic lesion in the proximal portion of the pancreatic tail which, in retrospect, is probably unchanged as far back as October 2017. Similarly, there is questionable cystic prominence of the distal aspect of the pancreatic tail with an appearnce that is likely similar ar far back as October 2017.\"  - given irregular pancreatic ductal dilatation at the distal pancreatic body/tail, recommend referral to advanced GI for EUS +/- FNA. Discussed the r/b/a of EUS +/- FNA and the patient and the patient's wife would like to hold off on EUS at this time.        Hypopotassemia  Will check potassium level. He was taking potassium supplementation up until 5-6 days ago but stopped taking it.   Orders:    Basic metabolic panel; Future      RTC in 4 months    History of Present Illness     Tee Forrest . is a 79 y.o. male PMH recurrent SBO s/p resection (2011) and recent hospitalization (9/2024) for SBO which was conservatively managed, CCY, HTN, Aflutter on Eliquis, questionable hx of Crohn's disease, cystic pancreatic lesions, here for follow up.    IBD history  Dx: " "formally diagnosed with Crohn's disease in 2011 in the setting of small bowel resection (side-to-side anastomosis) at [facility] (please see record under media, brought in from the patient's wife). Path showed \"small bowel with a central area of luminal narrowing which, histologically, shows chronic inflammation with some increased fibrosis. The overlying mucosa shows focal acute colitis.\" Path report mentioned that the specimen was 43 cm in length.   Hx of SBO in 1990s and 2000s which were treated conservatively prior to this surgery in 2011. He also had another episode of SBO in January 2018 which was treated conservatively.   Hx of SBO (hospitalized 8/26/2024-9/5/2024) s/p steroids, did not require surgery  Also, the patient had blood transfusion in the past sometime in 2000s for anemia without overt GIB  Prior meds: steroid taper x2?   Never been on biologics  Prior scopes: more than 10 yrs ago      Has been doing well. Has postprandial BM. Solid soft BM or softer BM. Brown. Twice daily.   Eating without any abdominal pain. Weight stable.     Wt Readings from Last 10 Encounters:   11/11/24 80.3 kg (177 lb)   09/30/24 80.3 kg (177 lb)   09/11/24 75.8 kg (167 lb)   09/10/24 76.2 kg (168 lb)   08/28/24 79.4 kg (175 lb)   08/27/24 79.6 kg (175 lb 7.8 oz)   08/07/24 79.4 kg (175 lb)   07/23/24 81.6 kg (180 lb)   06/06/24 79.8 kg (176 lb)   05/08/24 80.7 kg (178 lb)         History obtained from : patient and patient's Significant Other    Review of Systems - reports being lethargic since discharge    Current Outpatient Medications on File Prior to Visit   Medication Sig Dispense Refill    amLODIPine (NORVASC) 10 mg tablet Take 1 tablet (10 mg total) by mouth daily 90 tablet 0    apixaban (Eliquis) 5 mg Take 1 tablet by mouth twice daily 60 tablet 5    atorvastatin (LIPITOR) 40 mg tablet Take 1 tablet (40 mg total) by mouth daily 90 tablet 1    ferrous sulfate 325 (65 Fe) mg tablet Take 325 mg by mouth daily " "with breakfast      gabapentin (NEURONTIN) 100 mg capsule Take 1 capsule (100 mg total) by mouth 3 (three) times a day 90 capsule 5    lisinopril (ZESTRIL) 40 mg tablet Take 1 tablet by mouth once daily 90 tablet 3    potassium chloride (K-DUR,KLOR-CON) 20 mEq tablet Take 1 tablet by mouth twice daily 60 tablet 5    chlorthalidone 25 mg tablet Take 1 tablet (25 mg total) by mouth daily 90 tablet 3    Cholecalciferol (VITAMIN D3) 1,000 units tablet Take 1 tablet (1,000 Units total) by mouth daily for 14 days 14 tablet 0    pantoprazole (PROTONIX) 40 mg tablet Take 1 tablet (40 mg total) by mouth daily (Patient not taking: Reported on 11/11/2024) 90 tablet 0     Current Facility-Administered Medications on File Prior to Visit   Medication Dose Route Frequency Provider Last Rate Last Admin    cyanocobalamin injection 1,000 mcg  1,000 mcg Intramuscular Q30 Days Arlen Macedo DO   1,000 mcg at 09/30/24 1120    cyanocobalamin injection 1,000 mcg  1,000 mcg Intramuscular Q30 Days Champ Roldan MD   1,000 mcg at 05/15/24 0956          Objective     /80 (BP Location: Right arm, Patient Position: Sitting, Cuff Size: Standard)   Temp 97.9 °F (36.6 °C) (Temporal)   Ht 5' 9\" (1.753 m)   Wt 80.3 kg (177 lb)   BMI 26.14 kg/m²     Physical Exam  Vitals reviewed.   Constitutional:       General: He is not in acute distress.     Appearance: Normal appearance.   HENT:      Head: Normocephalic and atraumatic.   Eyes:      Extraocular Movements: Extraocular movements intact.   Cardiovascular:      Rate and Rhythm: Normal rate.   Abdominal:      General: There is no distension.      Palpations: Abdomen is soft.      Tenderness: There is no abdominal tenderness.      Comments: Surgical scar present   Musculoskeletal:         General: No swelling.      Cervical back: Normal range of motion.   Skin:     General: Skin is warm and dry.   Neurological:      Mental Status: He is alert. Mental status is at baseline. "       Administrative Statements

## 2024-11-11 NOTE — H&P (VIEW-ONLY)
Ambulatory Visit  Name: Tee Forrest Jr.      : 1945      MRN: [id number]  Encounter Provider: Mehrdad Hidalgo MD  Encounter Date: 2024   Encounter department: [facility]    Assessment & Plan  Adenocarcinoma of colon (HCC)  Colonoscopy 10/10/2024 with 15 mm polyp in rectosigmoid s/p hot snare and clips with path showing adenocarcinoma (2 mm), moderately differentiated, arising in a tubular adenoma, pT1, negative for lymphovascular and perineural invasion, low tumor budding, margins negative (invasive carcinoma is 1 mm from cauterized deep margin).   CEA 1.5, CT C/A/P without LAD or concern for metastasis  Patient most likely had stage 1 colon cancer which was endoscopically removed. Plan for flex sig next month to re-biopsy the polypectomy site and tattoo the area.   - plan for flex sig next month as scheduled. Hold Eliquis 2 days prior to the procedure; if no recurrence, will repeat colonoscopy in 1 year from flex sig         Crohn's disease of small intestine with intestinal obstruction (HCC)  Patient has a history of stricturing small bowel Crohn's disease c/b 5 episodes of SBO requiring surgical resection of his small bowel x1 in .   Colonoscopy 10/10/2024: ICV stricture (not traversable with PCF), 3 mm TA in TC, 2 mm TA in DC s/p polypectomy, 15 mm polyp in rectosigmoid s/p hot snare polypectomy s/p 2 clips (path with cancer as above), diverticula in left colon, hemorrhoids; segmental path negative for inflammation  MRE 10/14/2024  mild hyperenhancement in ICV, cecum and AC most likely reactive from recent biopsies via colonoscopy  Fecal calprotectin 10/25/2024  68.4 (borderline)    Given patient had multiple SBO and required surgery, discussed the utility of ppx therapy with biologics such as risankizumab given safety profile. Also discussed the option of checking fecal calprotectin and MRE every 6-12 months and considering a course of budesonide  "prn. And also discussed the option of referral to colorectal surgery given fibrotic stricture in the ICV. Patient preferred to proceed with monitoring with fecal calprotectin and MRE every 6-12 months with a course of budesonide prn.     - will obtain fecal calprotectin and MRE in 6-12 months   - script for GI PCR and C diff provided to use if the patient develops symptoms of Crohn's flare    Orders:    C-reactive protein; Future    Calprotectin,Fecal; Future    Clostridium difficile toxin by PCR with EIA; Future     GI BACTERIA, STOOL, PCR; Future    IPMN (intraductal papillary mucinous neoplasm)  MRI/MRCP 8/27/2024: 11 mm cystic lesion at the pancreatic body and an 8 mm cystic lesion at the pancreatic tail, irregular pancreatic ductal dilatation at the distal pancreatic body/tail up to 6 mm  Per chart review, CT A/P in 4/2020 mentioned \"12 x 7 mm oval cystic lesion in the proximal portion of the pancreatic tail which, in retrospect, is probably unchanged as far back as October 2017. Similarly, there is questionable cystic prominence of the distal aspect of the pancreatic tail with an appearnce that is likely similar ar far back as October 2017.\"  - given irregular pancreatic ductal dilatation at the distal pancreatic body/tail, recommend referral to advanced GI for EUS +/- FNA. Discussed the r/b/a of EUS +/- FNA and the patient and the patient's wife would like to hold off on EUS at this time.        Hypopotassemia  Will check potassium level. He was taking potassium supplementation up until 5-6 days ago but stopped taking it.   Orders:    Basic metabolic panel; Future      RTC in 4 months    History of Present Illness     Tee Forrest . is a 79 y.o. male PMH recurrent SBO s/p resection (2011) and recent hospitalization (9/2024) for SBO which was conservatively managed, CCY, HTN, Aflutter on Eliquis, questionable hx of Crohn's disease, cystic pancreatic lesions, here for follow up.    IBD history  Dx: " "formally diagnosed with Crohn's disease in 2011 in the setting of small bowel resection (side-to-side anastomosis) at [facility] (please see record under media, brought in from the patient's wife). Path showed \"small bowel with a central area of luminal narrowing which, histologically, shows chronic inflammation with some increased fibrosis. The overlying mucosa shows focal acute colitis.\" Path report mentioned that the specimen was 43 cm in length.   Hx of SBO in 1990s and 2000s which were treated conservatively prior to this surgery in 2011. He also had another episode of SBO in January 2018 which was treated conservatively.   Hx of SBO (hospitalized 8/26/2024-9/5/2024) s/p steroids, did not require surgery  Also, the patient had blood transfusion in the past sometime in 2000s for anemia without overt GIB  Prior meds: steroid taper x2?   Never been on biologics  Prior scopes: more than 10 yrs ago      Has been doing well. Has postprandial BM. Solid soft BM or softer BM. Brown. Twice daily.   Eating without any abdominal pain. Weight stable.     Wt Readings from Last 10 Encounters:   11/11/24 80.3 kg (177 lb)   09/30/24 80.3 kg (177 lb)   09/11/24 75.8 kg (167 lb)   09/10/24 76.2 kg (168 lb)   08/28/24 79.4 kg (175 lb)   08/27/24 79.6 kg (175 lb 7.8 oz)   08/07/24 79.4 kg (175 lb)   07/23/24 81.6 kg (180 lb)   06/06/24 79.8 kg (176 lb)   05/08/24 80.7 kg (178 lb)         History obtained from : patient and patient's Significant Other    Review of Systems - reports being lethargic since discharge    Current Outpatient Medications on File Prior to Visit   Medication Sig Dispense Refill    amLODIPine (NORVASC) 10 mg tablet Take 1 tablet (10 mg total) by mouth daily 90 tablet 0    apixaban (Eliquis) 5 mg Take 1 tablet by mouth twice daily 60 tablet 5    atorvastatin (LIPITOR) 40 mg tablet Take 1 tablet (40 mg total) by mouth daily 90 tablet 1    ferrous sulfate 325 (65 Fe) mg tablet Take 325 mg by mouth daily " "with breakfast      gabapentin (NEURONTIN) 100 mg capsule Take 1 capsule (100 mg total) by mouth 3 (three) times a day 90 capsule 5    lisinopril (ZESTRIL) 40 mg tablet Take 1 tablet by mouth once daily 90 tablet 3    potassium chloride (K-DUR,KLOR-CON) 20 mEq tablet Take 1 tablet by mouth twice daily 60 tablet 5    chlorthalidone 25 mg tablet Take 1 tablet (25 mg total) by mouth daily 90 tablet 3    Cholecalciferol (VITAMIN D3) 1,000 units tablet Take 1 tablet (1,000 Units total) by mouth daily for 14 days 14 tablet 0    pantoprazole (PROTONIX) 40 mg tablet Take 1 tablet (40 mg total) by mouth daily (Patient not taking: Reported on 11/11/2024) 90 tablet 0     Current Facility-Administered Medications on File Prior to Visit   Medication Dose Route Frequency Provider Last Rate Last Admin    cyanocobalamin injection 1,000 mcg  1,000 mcg Intramuscular Q30 Days Arlen Macedo DO   1,000 mcg at 09/30/24 1120    cyanocobalamin injection 1,000 mcg  1,000 mcg Intramuscular Q30 Days Champ Roldan MD   1,000 mcg at 05/15/24 0956          Objective     /80 (BP Location: Right arm, Patient Position: Sitting, Cuff Size: Standard)   Temp 97.9 °F (36.6 °C) (Temporal)   Ht 5' 9\" (1.753 m)   Wt 80.3 kg (177 lb)   BMI 26.14 kg/m²     Physical Exam  Vitals reviewed.   Constitutional:       General: He is not in acute distress.     Appearance: Normal appearance.   HENT:      Head: Normocephalic and atraumatic.   Eyes:      Extraocular Movements: Extraocular movements intact.   Cardiovascular:      Rate and Rhythm: Normal rate.   Abdominal:      General: There is no distension.      Palpations: Abdomen is soft.      Tenderness: There is no abdominal tenderness.      Comments: Surgical scar present   Musculoskeletal:         General: No swelling.      Cervical back: Normal range of motion.   Skin:     General: Skin is warm and dry.   Neurological:      Mental Status: He is alert. Mental status is at baseline. "       Administrative Statements

## 2024-11-18 ENCOUNTER — APPOINTMENT (OUTPATIENT)
Dept: LAB | Facility: HOSPITAL | Age: 79
End: 2024-11-18
Payer: MEDICARE

## 2024-11-18 ENCOUNTER — RESULTS FOLLOW-UP (OUTPATIENT)
Dept: GASTROENTEROLOGY | Facility: MEDICAL CENTER | Age: 79
End: 2024-11-18

## 2024-11-18 DIAGNOSIS — E87.6 HYPOPOTASSEMIA: ICD-10-CM

## 2024-11-18 DIAGNOSIS — K50.012 CROHN'S DISEASE OF SMALL INTESTINE WITH INTESTINAL OBSTRUCTION (HCC): ICD-10-CM

## 2024-11-18 LAB
ANION GAP SERPL CALCULATED.3IONS-SCNC: 9 MMOL/L (ref 4–13)
BUN SERPL-MCNC: 13 MG/DL (ref 5–25)
CALCIUM SERPL-MCNC: 9.3 MG/DL (ref 8.4–10.2)
CHLORIDE SERPL-SCNC: 103 MMOL/L (ref 96–108)
CO2 SERPL-SCNC: 31 MMOL/L (ref 21–32)
CREAT SERPL-MCNC: 0.91 MG/DL (ref 0.6–1.3)
CRP SERPL QL: 1 MG/L
GFR SERPL CREATININE-BSD FRML MDRD: 78 ML/MIN/1.73SQ M
GLUCOSE SERPL-MCNC: 93 MG/DL (ref 65–140)
POTASSIUM SERPL-SCNC: 3.4 MMOL/L (ref 3.5–5.3)
SODIUM SERPL-SCNC: 143 MMOL/L (ref 135–147)

## 2024-11-18 PROCEDURE — 36415 COLL VENOUS BLD VENIPUNCTURE: CPT

## 2024-11-18 PROCEDURE — 80048 BASIC METABOLIC PNL TOTAL CA: CPT

## 2024-11-18 PROCEDURE — 86140 C-REACTIVE PROTEIN: CPT

## 2024-12-04 ENCOUNTER — RA CDI HCC (OUTPATIENT)
Dept: OTHER | Facility: HOSPITAL | Age: 79
End: 2024-12-04

## 2024-12-05 ENCOUNTER — ANESTHESIA (OUTPATIENT)
Dept: GASTROENTEROLOGY | Facility: HOSPITAL | Age: 79
End: 2024-12-05
Payer: MEDICARE

## 2024-12-05 ENCOUNTER — HOSPITAL ENCOUNTER (OUTPATIENT)
Dept: GASTROENTEROLOGY | Facility: HOSPITAL | Age: 79
Setting detail: OUTPATIENT SURGERY
End: 2024-12-05
Attending: INTERNAL MEDICINE
Payer: MEDICARE

## 2024-12-05 ENCOUNTER — ANESTHESIA EVENT (OUTPATIENT)
Dept: GASTROENTEROLOGY | Facility: HOSPITAL | Age: 79
End: 2024-12-05
Payer: MEDICARE

## 2024-12-05 VITALS
HEART RATE: 66 BPM | OXYGEN SATURATION: 98 % | RESPIRATION RATE: 14 BRPM | DIASTOLIC BLOOD PRESSURE: 93 MMHG | SYSTOLIC BLOOD PRESSURE: 179 MMHG | TEMPERATURE: 98.6 F

## 2024-12-05 DIAGNOSIS — C18.9 ADENOCARCINOMA OF COLON (HCC): ICD-10-CM

## 2024-12-05 PROCEDURE — 45331 SIGMOIDOSCOPY AND BIOPSY: CPT | Performed by: INTERNAL MEDICINE

## 2024-12-05 PROCEDURE — 88342 IMHCHEM/IMCYTCHM 1ST ANTB: CPT | Performed by: STUDENT IN AN ORGANIZED HEALTH CARE EDUCATION/TRAINING PROGRAM

## 2024-12-05 PROCEDURE — 88305 TISSUE EXAM BY PATHOLOGIST: CPT | Performed by: STUDENT IN AN ORGANIZED HEALTH CARE EDUCATION/TRAINING PROGRAM

## 2024-12-05 PROCEDURE — 45335 SIGMOIDOSCOPY W/SUBMUC INJ: CPT | Performed by: INTERNAL MEDICINE

## 2024-12-05 RX ORDER — PROPOFOL 10 MG/ML
INJECTION, EMULSION INTRAVENOUS AS NEEDED
Status: DISCONTINUED | OUTPATIENT
Start: 2024-12-05 | End: 2024-12-05

## 2024-12-05 RX ORDER — SODIUM CHLORIDE, SODIUM LACTATE, POTASSIUM CHLORIDE, CALCIUM CHLORIDE 600; 310; 30; 20 MG/100ML; MG/100ML; MG/100ML; MG/100ML
125 INJECTION, SOLUTION INTRAVENOUS CONTINUOUS
Status: DISCONTINUED | OUTPATIENT
Start: 2024-12-05 | End: 2024-12-09 | Stop reason: HOSPADM

## 2024-12-05 RX ORDER — LIDOCAINE HYDROCHLORIDE 10 MG/ML
INJECTION, SOLUTION EPIDURAL; INFILTRATION; INTRACAUDAL; PERINEURAL AS NEEDED
Status: DISCONTINUED | OUTPATIENT
Start: 2024-12-05 | End: 2024-12-05

## 2024-12-05 RX ADMIN — LIDOCAINE HYDROCHLORIDE 50 MG: 10 INJECTION, SOLUTION EPIDURAL; INFILTRATION; INTRACAUDAL; PERINEURAL at 07:36

## 2024-12-05 RX ADMIN — SODIUM CHLORIDE, SODIUM LACTATE, POTASSIUM CHLORIDE, AND CALCIUM CHLORIDE 125 ML/HR: .6; .31; .03; .02 INJECTION, SOLUTION INTRAVENOUS at 06:50

## 2024-12-05 RX ADMIN — PROPOFOL 60 MG: 10 INJECTION, EMULSION INTRAVENOUS at 07:36

## 2024-12-05 RX ADMIN — PROPOFOL 100 MCG/KG/MIN: 10 INJECTION, EMULSION INTRAVENOUS at 07:35

## 2024-12-05 NOTE — ANESTHESIA PREPROCEDURE EVALUATION
Procedure:  FLEXIBLE SIGMOIDOSCOPY    Had colonoscopy 2 months ago with no anesthesia complications     Relevant Problems   CARDIO   (+) Atrial fibrillation (HCC)   (+) Bilateral carotid artery stenosis   (+) Essential hypertension   (+) Pulmonary hypertension (HCC)   (+) Vertebral artery stenosis, asymptomatic      GI/HEPATIC   (+) Esophageal reflux   (+) Pancreatic cyst   (+) SBO (small bowel obstruction) (HCC)      HEMATOLOGY   (+) Pernicious anemia      MUSCULOSKELETAL   (+) Ankylosing spondylitis (HCC)   (+) Chronic low back pain   (+) Lumbosacral spondylosis without myelopathy      NEURO/PSYCH   (+) Chronic low back pain      TTE (8.27.24):  •  Left Ventricle: Left ventricular cavity size is normal. Wall thickness is moderately increased. The left ventricular ejection fraction is 55%. Systolic function is normal.  •  Left Atrium: The atrium is mild-moderately dilated.  •  Right Atrium: The atrium is mild-moderately dilated.  •  Aortic Valve: The leaflets are thickened. The leaflets are calcified. There is reduced mobility. There is mild regurgitation. There is moderate stenosis. The aortic valve peak velocity is 2.49 m/s. The aortic valve peak gradient is 27 mmHg. The aortic valve mean gradient is 16 mmHg. The dimensionless velocity index is 0.31. The stroke volume index is 23.10 ml/m2.  •  Tricuspid Valve: There is mild regurgitation.  •  Aorta: The aortic root is mildly dilated. The aortic root is 3.70 cm.    Physical Exam    Airway    Mallampati score: II  TM Distance: >3 FB  Neck ROM: limited     Dental   Comment: Poor dental hygiene, denies any loose teeth      Cardiovascular  Rhythm: irregular, Rate: normal    Pulmonary  Pulmonary exam normal     Other Findings      Anesthesia Plan  ASA Score- 3     Anesthesia Type- IV sedation with anesthesia with ASA Monitors.         Additional Monitors:     Airway Plan:            Plan Factors-Exercise tolerance (METS): >4 METS.    Chart reviewed. EKG reviewed.   Existing labs reviewed. Patient summary reviewed.    Patient is not a current smoker.      Obstructive sleep apnea risk education given perioperatively.        Induction-     Postoperative Plan-         Informed Consent- Anesthetic plan and risks discussed with patient and spouse.  I personally reviewed this patient with the CRNA. Discussed and agreed on the Anesthesia Plan with the CRNA..

## 2024-12-05 NOTE — INTERVAL H&P NOTE
H&P reviewed. After examining the patient I find no changes in the patients condition since the H&P had been written.    Vitals:    12/05/24 0640   BP: (!) 180/83   Pulse: 67   Resp: 18   Temp: 99.2 °F (37.3 °C)   SpO2: 97%

## 2024-12-05 NOTE — ANESTHESIA POSTPROCEDURE EVALUATION
Post-Op Assessment Note    CV Status:  Stable    Pain management: satisfactory to patient       Mental Status:  Sleepy and arousable   Hydration Status:  Euvolemic and stable   PONV Controlled:  None   Airway Patency:  Patent     Post Op Vitals Reviewed: Yes    No anethesia notable event occurred.    Staff: Anesthesiologist, CRNA           Last Filed PACU Vitals:  Vitals Value Taken Time   Temp     Pulse     BP     Resp     SpO2         Modified Durga:  Activity: 2 (12/5/2024  8:25 AM)  Respiration: 2 (12/5/2024  8:25 AM)  Circulation: 2 (12/5/2024  8:25 AM)  Consciousness: 1 (12/5/2024  8:25 AM)  Oxygen Saturation: 2 (12/5/2024  8:25 AM)  Modified Durga Score: 9 (12/5/2024  8:25 AM)

## 2024-12-10 PROCEDURE — 88305 TISSUE EXAM BY PATHOLOGIST: CPT | Performed by: STUDENT IN AN ORGANIZED HEALTH CARE EDUCATION/TRAINING PROGRAM

## 2024-12-10 PROCEDURE — 88342 IMHCHEM/IMCYTCHM 1ST ANTB: CPT | Performed by: STUDENT IN AN ORGANIZED HEALTH CARE EDUCATION/TRAINING PROGRAM

## 2024-12-11 ENCOUNTER — OFFICE VISIT (OUTPATIENT)
Dept: FAMILY MEDICINE CLINIC | Facility: CLINIC | Age: 79
End: 2024-12-11
Payer: MEDICARE

## 2024-12-11 ENCOUNTER — RESULTS FOLLOW-UP (OUTPATIENT)
Dept: GASTROENTEROLOGY | Facility: MEDICAL CENTER | Age: 79
End: 2024-12-11

## 2024-12-11 VITALS
WEIGHT: 180 LBS | HEIGHT: 69 IN | HEART RATE: 59 BPM | BODY MASS INDEX: 26.66 KG/M2 | SYSTOLIC BLOOD PRESSURE: 140 MMHG | DIASTOLIC BLOOD PRESSURE: 70 MMHG | OXYGEN SATURATION: 94 %

## 2024-12-11 DIAGNOSIS — Z23 ENCOUNTER FOR IMMUNIZATION: ICD-10-CM

## 2024-12-11 DIAGNOSIS — E53.8 B12 DEFICIENCY: ICD-10-CM

## 2024-12-11 DIAGNOSIS — M54.16 LUMBAR RADICULOPATHY: ICD-10-CM

## 2024-12-11 DIAGNOSIS — I48.91 ATRIAL FIBRILLATION, UNSPECIFIED TYPE (HCC): ICD-10-CM

## 2024-12-11 DIAGNOSIS — I10 ESSENTIAL HYPERTENSION: ICD-10-CM

## 2024-12-11 DIAGNOSIS — C18.9 ADENOCARCINOMA, COLON (HCC): ICD-10-CM

## 2024-12-11 DIAGNOSIS — G62.9 POLYNEUROPATHY: Primary | ICD-10-CM

## 2024-12-11 DIAGNOSIS — R26.81 GAIT INSTABILITY: ICD-10-CM

## 2024-12-11 DIAGNOSIS — K50.80 CROHN'S DISEASE OF BOTH SMALL AND LARGE INTESTINE WITHOUT COMPLICATION (HCC): ICD-10-CM

## 2024-12-11 PROBLEM — C80.1 ADENOCARCINOID TUMOR (HCC): Status: RESOLVED | Noted: 2024-12-11 | Resolved: 2024-12-11

## 2024-12-11 PROBLEM — C80.1 ADENOCARCINOID TUMOR (HCC): Status: ACTIVE | Noted: 2024-12-11

## 2024-12-11 PROCEDURE — 90471 IMMUNIZATION ADMIN: CPT | Performed by: STUDENT IN AN ORGANIZED HEALTH CARE EDUCATION/TRAINING PROGRAM

## 2024-12-11 PROCEDURE — 99214 OFFICE O/P EST MOD 30 MIN: CPT | Performed by: STUDENT IN AN ORGANIZED HEALTH CARE EDUCATION/TRAINING PROGRAM

## 2024-12-11 PROCEDURE — 90662 IIV NO PRSV INCREASED AG IM: CPT | Performed by: STUDENT IN AN ORGANIZED HEALTH CARE EDUCATION/TRAINING PROGRAM

## 2024-12-11 RX ORDER — GABAPENTIN 100 MG/1
100 CAPSULE ORAL 3 TIMES DAILY
Start: 2024-12-11 | End: 2025-06-09

## 2024-12-11 NOTE — ASSESSMENT & PLAN NOTE
Rate controlled without medications  Continue Eliquis 5 mg  Does follow with cardiology  Notes reviewed

## 2024-12-11 NOTE — ASSESSMENT & PLAN NOTE
Unclear etiology may be secondary low back issue cervical radiculopathy  Regardless he has polyneuropathy discussed driving restrictions at last appointment.    Orders:    gabapentin (NEURONTIN) 100 mg capsule; Take 1 capsule (100 mg total) by mouth 3 (three) times a day

## 2024-12-11 NOTE — ASSESSMENT & PLAN NOTE
Currently on amlodipine, chlorthalidone, lisinopril  Fluctuating blood pressures  Patient endorses not taking medication as prescribed.

## 2024-12-11 NOTE — PROGRESS NOTES
Name: Tee Forrest Jr.      : 1945      MRN: [id number]  Encounter Provider: Champ Roldan MD  Encounter Date: 2024   Encounter department: [facility]  :  Assessment & Plan  Polyneuropathy  Was on gabapentin  Which he self discontinued due to pill purden        At this time based on my clinical examination of the patient it is my medical/legal recommendation that they cease driving at this time due to concerns of safety and functional impairment.  At this time I did discuss with patient that [place] is a mandated reporting state regarding driving impairments and as such any concerns regarding driving safety must be reported to the DMV by healthcare providers.  Advised that we will rediscuss this in approximately 30 days to allow us to evaluate whether there is any reversible causes that may affect their ability to drive.  Additionally we did discuss in length alternatives to driving including public transportation, professional evaluation by Occupational Therapy/driving rehab as well as adaptive equipment.   If there are no obvious changes or improvements during the above-noted interval we will formally report condition to the [place] Department of Motor Vehicles.      Orders:    Vitamin B12; Future    Encounter for immunization    Orders:    influenza vaccine, high-dose, PF 0.5 mL (Fluzone High Dose)    Essential hypertension  Currently on amlodipine, chlorthalidone, lisinopril  Fluctuating blood pressures  Patient endorses not taking medication as prescribed.          Atrial fibrillation, unspecified type (HCC)  Rate controlled without medications  Continue Eliquis 5 mg  Does follow with cardiology  Notes reviewed         Crohn's disease of both small and large intestine without complication (HCC)  No acute symptoms today  Follows with GI  Not on any maintenance medications         Adenocarcinoma, colon (HCC)  Fairly new diagnosis  Found on routine  colonoscopy  Status post resection follows with GI.       Gait instability  Unclear etiology may be secondary low back issue cervical radiculopathy  Regardless he has polyneuropathy discussed driving restrictions at last appointment.    Orders:    gabapentin (NEURONTIN) 100 mg capsule; Take 1 capsule (100 mg total) by mouth 3 (three) times a day    Lumbar radiculopathy    Orders:    gabapentin (NEURONTIN) 100 mg capsule; Take 1 capsule (100 mg total) by mouth 3 (three) times a day    B12 deficiency    Orders:    Vitamin B12; Future           History of Present Illness     HPI    This is a 79 y.o. male who presents to the office for routine follow-up of chronic medical conditions.  Overall they report feeling well they deny any significant interval history since her last appointment.  They deny any new issues and they are taking her prescribed medications as instructed without any side effects or concerns.              Review of Systems   Constitutional:  Negative for activity change, appetite change, chills, fatigue and fever.   HENT:  Negative for congestion, dental problem, drooling, ear discharge, ear pain, facial swelling, postnasal drip, rhinorrhea and sinus pain.    Eyes:  Negative for photophobia, pain, discharge and itching.   Respiratory:  Negative for apnea, cough, chest tightness and shortness of breath.    Cardiovascular:  Negative for chest pain and leg swelling.   Gastrointestinal:  Negative for abdominal distention, abdominal pain, anal bleeding, constipation, diarrhea and nausea.   Endocrine: Negative for cold intolerance, heat intolerance and polydipsia.   Genitourinary:  Negative for difficulty urinating.   Musculoskeletal:  Positive for gait problem. Negative for arthralgias, joint swelling and myalgias.   Skin:  Negative for color change and pallor.   Allergic/Immunologic: Negative for immunocompromised state.   Neurological:  Positive for weakness. Negative for dizziness, seizures, facial  "asymmetry, light-headedness, numbness and headaches.   Psychiatric/Behavioral:  Negative for agitation, behavioral problems, confusion, decreased concentration and dysphoric mood.    All other systems reviewed and are negative.      Objective   /70 (BP Location: Left arm, Patient Position: Sitting, Cuff Size: Adult)   Pulse 59   Ht 5' 9\" (1.753 m)   Wt 81.6 kg (180 lb)   SpO2 94%   BMI 26.58 kg/m²      Physical Exam  Constitutional:       Appearance: He is well-developed.   HENT:      Head: Normocephalic.   Eyes:      Pupils: Pupils are equal, round, and reactive to light.   Cardiovascular:      Rate and Rhythm: Normal rate and regular rhythm.   Pulmonary:      Effort: Pulmonary effort is normal.      Breath sounds: Normal breath sounds.   Abdominal:      General: Bowel sounds are normal.      Palpations: Abdomen is soft.   Musculoskeletal:         General: Normal range of motion.      Cervical back: Normal range of motion and neck supple.   Skin:     General: Skin is warm.      Capillary Refill: Capillary refill takes less than 2 seconds.   Neurological:      General: No focal deficit present.      Cranial Nerves: No cranial nerve deficit.      Motor: Weakness present.      Gait: Gait abnormal.         "

## 2024-12-27 DIAGNOSIS — I10 ESSENTIAL HYPERTENSION: ICD-10-CM

## 2024-12-27 DIAGNOSIS — I48.92 NEW ONSET ATRIAL FLUTTER (HCC): ICD-10-CM

## 2024-12-27 RX ORDER — CHLORTHALIDONE 25 MG/1
25 TABLET ORAL DAILY
Qty: 90 TABLET | Refills: 1 | Status: SHIPPED | OUTPATIENT
Start: 2024-12-27 | End: 2025-03-27

## 2025-01-06 DIAGNOSIS — I10 ESSENTIAL HYPERTENSION: ICD-10-CM

## 2025-01-06 DIAGNOSIS — R26.81 GAIT INSTABILITY: ICD-10-CM

## 2025-01-06 DIAGNOSIS — M54.16 LUMBAR RADICULOPATHY: ICD-10-CM

## 2025-01-06 RX ORDER — LISINOPRIL 40 MG/1
40 TABLET ORAL DAILY
Qty: 90 TABLET | Refills: 3 | Status: CANCELLED | OUTPATIENT
Start: 2025-01-06

## 2025-01-06 RX ORDER — GABAPENTIN 100 MG/1
100 CAPSULE ORAL 3 TIMES DAILY
Qty: 90 CAPSULE | Refills: 5 | Status: CANCELLED | OUTPATIENT
Start: 2025-01-06 | End: 2025-07-05

## 2025-01-07 DIAGNOSIS — R26.81 GAIT INSTABILITY: ICD-10-CM

## 2025-01-07 DIAGNOSIS — M54.16 LUMBAR RADICULOPATHY: ICD-10-CM

## 2025-01-07 DIAGNOSIS — I10 ESSENTIAL HYPERTENSION: ICD-10-CM

## 2025-01-07 RX ORDER — LISINOPRIL 40 MG/1
40 TABLET ORAL DAILY
Qty: 90 TABLET | Refills: 3 | Status: SHIPPED | OUTPATIENT
Start: 2025-01-07

## 2025-01-07 RX ORDER — GABAPENTIN 100 MG/1
100 CAPSULE ORAL 3 TIMES DAILY
Qty: 90 CAPSULE | Refills: 5 | Status: SHIPPED | OUTPATIENT
Start: 2025-01-07 | End: 2025-07-06

## 2025-03-16 NOTE — PROGRESS NOTES
Ambulatory Visit  Name: Tee Forrest Jr.      : 1945      MRN: [id number]  Encounter Provider: Mehrdad Hidalgo MD  Encounter Date: 3/17/2025   Encounter department: [facility]    Assessment & Plan  Adenocarcinoma of colon (HCC)  Colonoscopy 10/10/2024 with 15 mm polyp in rectosigmoid s/p hot snare and clips with path showing adenocarcinoma (2 mm), moderately differentiated, arising in a tubular adenoma, pT1, negative for lymphovascular and perineural invasion, low tumor budding, margins negative (invasive carcinoma is 1 mm from cauterized deep margin).   CEA 1.5, CT C/A/P without LAD or concern for metastasis  Patient most likely had stage 1 colon cancer which was endoscopically removed.  Flex sig 2025: no residual/recurrence of cancer with biopsy of the polypectomy site showing colonic mucosa with focal active colitis and tattoo was placed near the polypectomy site  - recommend colonoscopy in 10/2025 for surveillance      Crohn's disease of small intestine with intestinal obstruction (HCC)  Patient has a history of stricturing small bowel Crohn's disease c/b 5 episodes of SBO requiring surgical resection of his small bowel x1 in .   Colonoscopy 10/10/2024: ICV stricture (not traversable with PCF), 3 mm TA in TC, 2 mm TA in DC s/p polypectomy, 15 mm polyp in rectosigmoid s/p hot snare polypectomy s/p 2 clips (path with cancer as above), diverticula in left colon, hemorrhoids; segmental path negative for inflammation  MRE 10/14/2024  mild hyperenhancement in ICV, cecum and AC most likely reactive from recent biopsies via colonoscopy  Fecal calprotectin 10/25/2024  68.4 (borderline)     Given patient had multiple SBO and required surgery, discussed the utility of ppx therapy with biologics such as risankizumab given safety profile. Also discussed the option of checking fecal calprotectin and MRE every 6-12 months and considering a course of budesonide prn. And  "also discussed the option of referral to colorectal surgery given fibrotic stricture in the ICV. Patient preferred to proceed with possibly monitoring with fecal calprotectin and MRE every 6-12 months with a course of budesonide prn.     - recommend fecal calprotectin and MRE in 6 months but will rediscuss with the patient and the patient's wife in 6 months  - script for GI PCR and C diff provided to use if the patient develops symptoms of Crohn's flare       IPMN (intraductal papillary mucinous neoplasm)  MRI/MRCP 8/27/2024: 11 mm cystic lesion at the pancreatic body and an 8 mm cystic lesion at the pancreatic tail, irregular pancreatic ductal dilatation at the distal pancreatic body/tail up to 6 mm  Per chart review, CT A/P in 4/2020 mentioned \"12 x 7 mm oval cystic lesion in the proximal portion of the pancreatic tail which, in retrospect, is probably unchanged as far back as October 2017. Similarly, there is questionable cystic prominence of the distal aspect of the pancreatic tail with an appearnce that is likely similar ar far back as October 2017.\"  - given irregular pancreatic ductal dilatation at the distal pancreatic body/tail, recommend referral to advanced GI for EUS +/- FNA to evaluate for precancerous lesion or malignancy. Discussed the r/b/a of EUS +/- FNA and the patient and the patient's wife would like to not pursue any further surveillance or workup regarding this.       Arthralgia, unspecified joint  Possible hx of ankylosing spondylitis but negative HLA-B27. Reports seeing a rheumatologist in the past but unclear what came out of it. Given hx of Crohn's disease and possible ankylosing spondylitis, recommend rheumatology consultation because if he does have ankylosing spondylitis, may benefit from treatment. The patient and the patient's wife would like to speak with his PCP regarding this prior to referral to rheumatology.        Patient carries a diagnosis of pernicious anemia on his chart but " "unclear how it was diagnosed. Hx of Vit B12 inj with PCP.     RTC in 6 months    History of Present Illness     Tee Forrest Jr. is a 80 y.o. 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    IBD history  Dx: formally diagnosed with Crohn's disease in 2011 in the setting of small bowel resection (side-to-side anastomosis) at [facility] (please see record under media, brought in from the patient's wife). Path showed \"small bowel with a central area of luminal narrowing which, histologically, shows chronic inflammation with some increased fibrosis. The overlying mucosa shows focal acute colitis.\" Path report mentioned that the specimen was 43 cm in length.   Hx of SBO in 1990s and 2000s which were treated conservatively prior to this surgery in 2011. He also had another episode of SBO in January 2018 which was treated conservatively.   Hx of SBO (hospitalized 8/26/2024-9/5/2024) s/p steroids, did not require surgery  Also, the patient had blood transfusion in the past sometime in 2000s for anemia without overt GIB  Prior meds: steroid taper x2?   Never been on biologics  Prior scopes: more than 10 yrs ago    Rectosigmoid adenocarcinoma s/p endoscopic removal via hot snare (10/2024) and subsequent flex sig 12/2024 without residual or recurrence of the cancer and bx of the prior polypectomy site showed \"colonic mucosa with focal active colitis\"    Interval:  No feeling in his feet and hands are cold all the time.   No BM issues. No BRBPR. No abdominal pain. No nausea or vomiting.   His main concern is his joint pain. Also reports no feeling in his feet and hands are cold all the time. Uses Bengay every night in his joint because of pain.       Wt Readings from Last 10 Encounters:   03/17/25 83.2 kg " "(183 lb 6.4 oz)   12/11/24 81.6 kg (180 lb)   11/11/24 80.3 kg (177 lb)   09/30/24 80.3 kg (177 lb)   09/11/24 75.8 kg (167 lb)   09/10/24 76.2 kg (168 lb)   08/28/24 79.4 kg (175 lb)   08/27/24 79.6 kg (175 lb 7.8 oz)   08/07/24 79.4 kg (175 lb)   07/23/24 81.6 kg (180 lb)         History obtained from : patient and patient's Significant Other    Review of Systems     Current Outpatient Medications on File Prior to Visit   Medication Sig Dispense Refill    amLODIPine (NORVASC) 10 mg tablet Take 1 tablet (10 mg total) by mouth daily 90 tablet 0    apixaban (Eliquis) 5 mg Take 1 tablet (5 mg total) by mouth 2 (two) times a day 60 tablet 5    atorvastatin (LIPITOR) 40 mg tablet Take 1 tablet (40 mg total) by mouth daily 90 tablet 1    chlorthalidone 25 mg tablet Take 1 tablet (25 mg total) by mouth daily 90 tablet 1    ferrous sulfate 325 (65 Fe) mg tablet Take 325 mg by mouth daily with breakfast      gabapentin (NEURONTIN) 100 mg capsule Take 1 capsule (100 mg total) by mouth 3 (three) times a day 90 capsule 5    lisinopril (ZESTRIL) 40 mg tablet Take 1 tablet (40 mg total) by mouth daily 90 tablet 3     Current Facility-Administered Medications on File Prior to Visit   Medication Dose Route Frequency Provider Last Rate Last Admin    cyanocobalamin injection 1,000 mcg  1,000 mcg Intramuscular Q30 Days Arlen Macedo DO   1,000 mcg at 09/30/24 1120    cyanocobalamin injection 1,000 mcg  1,000 mcg Intramuscular Q30 Days Champ Roldan MD   1,000 mcg at 05/15/24 0956          Objective     /62 (BP Location: Right arm, Patient Position: Sitting, Cuff Size: Standard)   Pulse 72   Temp 97.6 °F (36.4 °C) (Temporal)   Ht 5' 9\" (1.753 m)   Wt 83.2 kg (183 lb 6.4 oz)   SpO2 94%   BMI 27.08 kg/m²     Physical Exam  Vitals reviewed.   Constitutional:       General: He is not in acute distress.     Appearance: Normal appearance.   HENT:      Head: Normocephalic and atraumatic.   Eyes:      Extraocular Movements: " Extraocular movements intact.   Cardiovascular:      Rate and Rhythm: Normal rate.   Abdominal:      General: There is no distension.      Palpations: Abdomen is soft.      Tenderness: There is no abdominal tenderness.      Comments: Surgical scar present   Musculoskeletal:         General: No swelling.      Cervical back: Normal range of motion.   Skin:     General: Skin is warm and dry.   Neurological:      Mental Status: He is alert. Mental status is at baseline.       Administrative Statements

## 2025-03-17 ENCOUNTER — OFFICE VISIT (OUTPATIENT)
Age: 80
End: 2025-03-17
Payer: MEDICARE

## 2025-03-17 VITALS
WEIGHT: 183.4 LBS | OXYGEN SATURATION: 94 % | BODY MASS INDEX: 27.16 KG/M2 | HEART RATE: 72 BPM | TEMPERATURE: 97.6 F | HEIGHT: 69 IN | DIASTOLIC BLOOD PRESSURE: 62 MMHG | SYSTOLIC BLOOD PRESSURE: 124 MMHG

## 2025-03-17 DIAGNOSIS — M25.50 ARTHRALGIA, UNSPECIFIED JOINT: ICD-10-CM

## 2025-03-17 DIAGNOSIS — D49.0 IPMN (INTRADUCTAL PAPILLARY MUCINOUS NEOPLASM): ICD-10-CM

## 2025-03-17 DIAGNOSIS — C18.9 ADENOCARCINOMA OF COLON (HCC): Primary | ICD-10-CM

## 2025-03-17 DIAGNOSIS — K50.012 CROHN'S DISEASE OF SMALL INTESTINE WITH INTESTINAL OBSTRUCTION (HCC): ICD-10-CM

## 2025-03-17 PROCEDURE — 99214 OFFICE O/P EST MOD 30 MIN: CPT | Performed by: INTERNAL MEDICINE

## 2025-04-04 DIAGNOSIS — I10 ESSENTIAL HYPERTENSION: ICD-10-CM

## 2025-04-04 RX ORDER — AMLODIPINE BESYLATE 10 MG/1
10 TABLET ORAL DAILY
Qty: 90 TABLET | Refills: 1 | Status: SHIPPED | OUTPATIENT
Start: 2025-04-04

## 2025-04-07 ENCOUNTER — APPOINTMENT (OUTPATIENT)
Dept: LAB | Facility: HOSPITAL | Age: 80
End: 2025-04-07
Payer: MEDICARE

## 2025-04-07 DIAGNOSIS — E53.8 B12 DEFICIENCY: ICD-10-CM

## 2025-04-07 DIAGNOSIS — G62.9 POLYNEUROPATHY: ICD-10-CM

## 2025-04-07 DIAGNOSIS — M25.512 LEFT SHOULDER PAIN, UNSPECIFIED CHRONICITY: ICD-10-CM

## 2025-04-07 LAB
CRP SERPL QL: 1.4 MG/L
ERYTHROCYTE [SEDIMENTATION RATE] IN BLOOD: 7 MM/HOUR (ref 0–19)
RHEUMATOID FACT SERPL-ACNC: <10 IU/ML
VIT B12 SERPL-MCNC: 242 PG/ML (ref 180–914)

## 2025-04-07 PROCEDURE — 86225 DNA ANTIBODY NATIVE: CPT

## 2025-04-07 PROCEDURE — 86038 ANTINUCLEAR ANTIBODIES: CPT

## 2025-04-07 PROCEDURE — 82607 VITAMIN B-12: CPT

## 2025-04-07 PROCEDURE — 86431 RHEUMATOID FACTOR QUANT: CPT

## 2025-04-07 PROCEDURE — 86140 C-REACTIVE PROTEIN: CPT

## 2025-04-07 PROCEDURE — 85652 RBC SED RATE AUTOMATED: CPT

## 2025-04-07 PROCEDURE — 36415 COLL VENOUS BLD VENIPUNCTURE: CPT

## 2025-04-08 LAB
DSDNA IGG SERPL IA-ACNC: <0.9 IU/ML (ref ?–15)
NUCLEAR IGG SER IA-RTO: 0.2 RATIO (ref ?–1)

## 2025-04-11 ENCOUNTER — OFFICE VISIT (OUTPATIENT)
Dept: FAMILY MEDICINE CLINIC | Facility: CLINIC | Age: 80
End: 2025-04-11
Payer: MEDICARE

## 2025-04-11 VITALS
HEART RATE: 69 BPM | OXYGEN SATURATION: 99 % | SYSTOLIC BLOOD PRESSURE: 142 MMHG | BODY MASS INDEX: 27.76 KG/M2 | TEMPERATURE: 97.9 F | HEIGHT: 69 IN | DIASTOLIC BLOOD PRESSURE: 78 MMHG | WEIGHT: 187.4 LBS

## 2025-04-11 DIAGNOSIS — R26.81 GAIT INSTABILITY: ICD-10-CM

## 2025-04-11 DIAGNOSIS — R52 BODY ACHES: Primary | ICD-10-CM

## 2025-04-11 DIAGNOSIS — M45.9 ANKYLOSING SPONDYLITIS, UNSPECIFIED SITE OF SPINE (HCC): ICD-10-CM

## 2025-04-11 DIAGNOSIS — M54.16 LUMBAR RADICULOPATHY: ICD-10-CM

## 2025-04-11 DIAGNOSIS — R26.2 AMBULATORY DYSFUNCTION: ICD-10-CM

## 2025-04-11 DIAGNOSIS — I48.91 ATRIAL FIBRILLATION, UNSPECIFIED TYPE (HCC): ICD-10-CM

## 2025-04-11 PROBLEM — I27.20 PULMONARY HYPERTENSION (HCC): Status: RESOLVED | Noted: 2021-06-01 | Resolved: 2025-04-11

## 2025-04-11 PROCEDURE — G2211 COMPLEX E/M VISIT ADD ON: HCPCS | Performed by: STUDENT IN AN ORGANIZED HEALTH CARE EDUCATION/TRAINING PROGRAM

## 2025-04-11 PROCEDURE — 99213 OFFICE O/P EST LOW 20 MIN: CPT | Performed by: STUDENT IN AN ORGANIZED HEALTH CARE EDUCATION/TRAINING PROGRAM

## 2025-04-11 RX ORDER — GABAPENTIN 100 MG/1
300 CAPSULE ORAL 2 TIMES DAILY
Qty: 180 CAPSULE | Refills: 0 | Status: SHIPPED | OUTPATIENT
Start: 2025-04-11 | End: 2025-10-08

## 2025-04-11 NOTE — PROGRESS NOTES
Name: Tee Forrest Jr.      : 1945      MRN: [id number]  Encounter Provider: Champ Roldan MD  Encounter Date: 2025   Encounter department: [facility]  :  Assessment & Plan  Body aches  Likely OA and deconditioning          Ambulatory dysfunction      Likely 2/2 to physical deconditioning  Recommend increasing ambulation and PT  Pt defering PT at this point       Gait instability    Orders:    gabapentin (NEURONTIN) 100 mg capsule; Take 3 capsules (300 mg total) by mouth 2 (two) times a day    Lumbar radiculopathy    Orders:    gabapentin (NEURONTIN) 100 mg capsule; Take 3 capsules (300 mg total) by mouth 2 (two) times a day    Ankylosing spondylitis, unspecified site of spine (HCC)         Atrial fibrillation, unspecified type (HCC)                History of Present Illness       Follows with Dr. Chang from [facility]. Patient several months of progressive joint pain and ambulatory dysfunction.  He is here to discuss the labs that Dr. Chang ordered.     Review of Systems   Constitutional:  Positive for fatigue. Negative for activity change, appetite change, chills and fever.   HENT:  Negative for congestion, dental problem, drooling, ear discharge, ear pain, facial swelling, postnasal drip, rhinorrhea and sinus pain.    Eyes:  Negative for photophobia, pain, discharge and itching.   Respiratory:  Negative for apnea, cough, chest tightness and shortness of breath.    Cardiovascular:  Negative for chest pain and leg swelling.   Gastrointestinal:  Negative for abdominal distention, abdominal pain, anal bleeding, constipation, diarrhea and nausea.   Endocrine: Negative for cold intolerance, heat intolerance and polydipsia.   Genitourinary:  Negative for difficulty urinating.   Musculoskeletal:  Negative for arthralgias, gait problem, joint swelling and myalgias.   Skin:  Negative for color change and pallor.   Allergic/Immunologic: Negative for immunocompromised state.  "  Neurological:  Negative for dizziness, seizures, facial asymmetry, weakness, light-headedness, numbness and headaches.   Psychiatric/Behavioral:  Negative for agitation, behavioral problems, confusion, decreased concentration and dysphoric mood.    All other systems reviewed and are negative.      Objective   /78 (BP Location: Left arm, Patient Position: Sitting, Cuff Size: Adult)   Pulse 69   Temp 97.9 °F (36.6 °C)   Ht 5' 9\" (1.753 m)   Wt 85 kg (187 lb 6.4 oz)   SpO2 99%   BMI 27.67 kg/m²      Physical Exam  Constitutional:       Appearance: He is well-developed.   HENT:      Head: Normocephalic.   Eyes:      Pupils: Pupils are equal, round, and reactive to light.   Cardiovascular:      Rate and Rhythm: Normal rate and regular rhythm.   Pulmonary:      Effort: Pulmonary effort is normal.      Breath sounds: Normal breath sounds.   Abdominal:      General: Bowel sounds are normal.      Palpations: Abdomen is soft.   Musculoskeletal:         General: Normal range of motion.      Cervical back: Normal range of motion and neck supple.   Skin:     General: Skin is warm.         "

## 2025-04-11 NOTE — ASSESSMENT & PLAN NOTE
Orders:    gabapentin (NEURONTIN) 100 mg capsule; Take 3 capsules (300 mg total) by mouth 2 (two) times a day

## 2025-04-14 DIAGNOSIS — I65.21 STENOSIS OF RIGHT CAROTID ARTERY: ICD-10-CM

## 2025-04-15 RX ORDER — ATORVASTATIN CALCIUM 40 MG/1
40 TABLET, FILM COATED ORAL DAILY
Qty: 90 TABLET | Refills: 1 | Status: SHIPPED | OUTPATIENT
Start: 2025-04-15

## 2025-05-05 ENCOUNTER — TELEPHONE (OUTPATIENT)
Dept: FAMILY MEDICINE CLINIC | Facility: CLINIC | Age: 80
End: 2025-05-05

## 2025-05-06 ENCOUNTER — CLINICAL SUPPORT (OUTPATIENT)
Dept: FAMILY MEDICINE CLINIC | Facility: CLINIC | Age: 80
End: 2025-05-06
Payer: MEDICARE

## 2025-05-06 DIAGNOSIS — Z23 ENCOUNTER FOR IMMUNIZATION: Primary | ICD-10-CM

## 2025-05-06 PROCEDURE — G0010 ADMIN HEPATITIS B VACCINE: HCPCS | Performed by: STUDENT IN AN ORGANIZED HEALTH CARE EDUCATION/TRAINING PROGRAM

## 2025-05-06 PROCEDURE — 90746 HEPB VACCINE 3 DOSE ADULT IM: CPT | Performed by: STUDENT IN AN ORGANIZED HEALTH CARE EDUCATION/TRAINING PROGRAM

## 2025-05-26 DIAGNOSIS — R26.81 GAIT INSTABILITY: ICD-10-CM

## 2025-05-26 DIAGNOSIS — M54.16 LUMBAR RADICULOPATHY: ICD-10-CM

## 2025-05-27 RX ORDER — GABAPENTIN 100 MG/1
300 CAPSULE ORAL 2 TIMES DAILY
Qty: 180 CAPSULE | Refills: 1 | Status: SHIPPED | OUTPATIENT
Start: 2025-05-27 | End: 2025-11-23

## 2025-06-10 ENCOUNTER — RA CDI HCC (OUTPATIENT)
Dept: OTHER | Facility: HOSPITAL | Age: 80
End: 2025-06-10

## 2025-06-17 ENCOUNTER — OFFICE VISIT (OUTPATIENT)
Dept: FAMILY MEDICINE CLINIC | Facility: CLINIC | Age: 80
End: 2025-06-17
Payer: MEDICARE

## 2025-06-17 VITALS
HEART RATE: 55 BPM | WEIGHT: 188 LBS | TEMPERATURE: 98.6 F | SYSTOLIC BLOOD PRESSURE: 130 MMHG | OXYGEN SATURATION: 99 % | DIASTOLIC BLOOD PRESSURE: 68 MMHG | BODY MASS INDEX: 27.85 KG/M2 | HEIGHT: 69 IN

## 2025-06-17 DIAGNOSIS — Z00.00 ENCOUNTER FOR ANNUAL WELLNESS VISIT (AWV) IN MEDICARE PATIENT: Primary | ICD-10-CM

## 2025-06-17 PROCEDURE — G0439 PPPS, SUBSEQ VISIT: HCPCS | Performed by: STUDENT IN AN ORGANIZED HEALTH CARE EDUCATION/TRAINING PROGRAM

## 2025-06-17 NOTE — PROGRESS NOTES
Name: Tee Forrest Jr.      : 1945      MRN: [id number]  Encounter Provider: Champ Roldan MD  Encounter Date: 2025   Encounter department: [facility]  :  Assessment & Plan  Encounter for annual wellness visit (AWV) in Medicare patient            Preventive health issues were discussed with patient, and age appropriate screening tests were ordered as noted in patient's After Visit Summary. Personalized health advice and appropriate referrals for health education or preventive services given if needed, as noted in patient's After Visit Summary.    History of Present Illness     HPI   Patient Care Team:  Champ Roldan MD as PCP - General (Family Medicine)  DO Marleni Carty PA-C Bankim Bhatt, MD Alan Listhaus, MD (Ophthalmology)  Trang Patel DO as Consulting Physician (General Surgery)    Review of Systems   Constitutional:  Negative for activity change, appetite change, chills, fatigue and fever.   HENT:  Negative for congestion, dental problem, drooling, ear discharge, ear pain, facial swelling, postnasal drip, rhinorrhea and sinus pain.    Eyes:  Negative for photophobia, pain, discharge and itching.   Respiratory:  Negative for apnea, cough, chest tightness and shortness of breath.    Cardiovascular:  Negative for chest pain and leg swelling.   Gastrointestinal:  Negative for abdominal distention, abdominal pain, anal bleeding, constipation, diarrhea and nausea.   Endocrine: Negative for cold intolerance, heat intolerance and polydipsia.   Genitourinary:  Negative for difficulty urinating.   Musculoskeletal:  Negative for arthralgias, gait problem, joint swelling and myalgias.   Skin:  Negative for color change and pallor.   Allergic/Immunologic: Negative for immunocompromised state.   Neurological:  Negative for dizziness, seizures, facial asymmetry, weakness, light-headedness, numbness and headaches.   Psychiatric/Behavioral:  Negative for  agitation, behavioral problems, confusion, decreased concentration and dysphoric mood.    All other systems reviewed and are negative.    Medical History Reviewed by provider this encounter:       Annual Wellness Visit Questionnaire   Tee is here for his Subsequent Wellness visit.     Health Risk Assessment:   Patient rates overall health as fair. Patient feels that their physical health rating is slightly worse. Patient is dissatisfied with their life. Eyesight was rated as same. Hearing was rated as same. Patient feels that their emotional and mental health rating is same. Patients states they are never, rarely angry. Patient states they are often unusually tired/fatigued. Pain experienced in the last 7 days has been a lot. Patient's pain rating has been 5/10. Patient states that he has experienced no weight loss or gain in last 6 months.     Depression Screening:   PHQ-2 Score: 0      Fall Risk Screening:   In the past year, patient has experienced: history of falling in past year      Home Safety:  Patient does not have trouble with stairs inside or outside of their home. Patient has working smoke alarms and has no working carbon monoxide detector. Home safety hazards include: none.     Nutrition:   Current diet is Frequent junk food.     Medications:   Patient is not currently taking any over-the-counter supplements. Patient is able to manage medications.     Activities of Daily Living (ADLs)/Instrumental Activities of Daily Living (IADLs):   Walk and transfer into and out of bed and chair?: Yes  Dress and groom yourself?: Yes    Bathe or shower yourself?: Yes    Feed yourself? Yes  Do your laundry/housekeeping?: Yes  Manage your money, pay your bills and track your expenses?: Yes  Make your own meals?: Yes    Do your own shopping?: Yes    Previous Hospitalizations:   Any hospitalizations or ED visits within the last 12 months?: Yes    How many hospitalizations have you had in the last year?: 1-2    Advance  Care Planning:   Living will: No    Durable POA for healthcare: No    Advanced directive: Yes      Preventive Screenings      Cardiovascular Screening:    General: Screening Current      Diabetes Screening:     General: Screening Current      Colorectal Cancer Screening:     General: History Colorectal Cancer      Prostate Cancer Screening:    General: Screening Not Indicated      Abdominal Aortic Aneurysm (AAA) Screening:    Risk factors include: tobacco use        Lung Cancer Screening:     General: Screening Not Indicated      Hepatitis C Screening:    General: Screening Current    Immunizations:  - Immunizations due: Prevnar 20 and Zoster (Shingrix)    Screening, Brief Intervention, and Referral to Treatment (SBIRT)     Screening  Typical number of drinks in a day: 0  Typical number of drinks in a week: 2  Interpretation: Low risk drinking behavior.    AUDIT-C Screenin) How often did you have a drink containing alcohol in the past year? monthly or less  2) How many drinks did you have on a typical day when you were drinking in the past year? 0  3) How often did you have 6 or more drinks on one occasion in the past year? never    AUDIT-C Score: 1  Interpretation: Score 0-3 (male): Negative screen for alcohol misuse    Single Item Drug Screening:  How often have you used an illegal drug (including marijuana) or a prescription medication for non-medical reasons in the past year? never    Single Item Drug Screen Score: 0  Interpretation: Negative screen for possible drug use disorder    Social Drivers of Health     Financial Resource Strain: Low Risk  (2022)    Overall Financial Resource Strain (CARDIA)     Difficulty of Paying Living Expenses: Not very hard   Food Insecurity: No Food Insecurity (2025)    Nursing - Inadequate Food Risk Classification     Worried About Running Out of Food in the Last Year: Never true     Ran Out of Food in the Last Year: Never true   Transportation Needs: No  "Transportation Needs (6/17/2025)    PRAPARE - Transportation     Lack of Transportation (Medical): No     Lack of Transportation (Non-Medical): No   Housing Stability: Low Risk  (6/17/2025)    Housing Stability Vital Sign     Unable to Pay for Housing in the Last Year: No     Number of Times Moved in the Last Year: 0     Homeless in the Last Year: No   Utilities: Not At Risk (6/17/2025)    [facility] Utilities     Threatened with loss of utilities: No     No results found.    Objective   /68 (BP Location: Left arm, Patient Position: Sitting, Cuff Size: Large)   Pulse 55   Temp 98.6 °F (37 °C) (Tympanic)   Ht 5' 9\" (1.753 m)   Wt 85.3 kg (188 lb)   SpO2 99%   BMI 27.76 kg/m²     Physical Exam  Constitutional:       Appearance: He is well-developed.   HENT:      Head: Normocephalic.     Eyes:      Pupils: Pupils are equal, round, and reactive to light.       Cardiovascular:      Rate and Rhythm: Normal rate and regular rhythm.   Pulmonary:      Effort: Pulmonary effort is normal.      Breath sounds: Normal breath sounds.   Abdominal:      General: Bowel sounds are normal.      Palpations: Abdomen is soft.     Musculoskeletal:         General: Normal range of motion.      Cervical back: Normal range of motion and neck supple.     Skin:     General: Skin is warm.         "

## 2025-06-17 NOTE — PATIENT INSTRUCTIONS
Medicare Preventive Visit Patient Instructions  Thank you for completing your Welcome to Medicare Visit or Medicare Annual Wellness Visit today. Your next wellness visit will be due in one year (6/18/2026).  The screening/preventive services that you may require over the next 5-10 years are detailed below. Some tests may not apply to you based off risk factors and/or age. Screening tests ordered at today's visit but not completed yet may show as past due. Also, please note that scanned in results may not display below.  Preventive Screenings:  Service Recommendations Previous Testing/Comments   Colorectal Cancer Screening  Colonoscopy    Fecal Occult Blood Test (FOBT)/Fecal Immunochemical Test (FIT)  Fecal DNA/Cologuard Test  Flexible Sigmoidoscopy Age: 45-75 years old   Colonoscopy: every 10 years (May be performed more frequently if at higher risk)  OR  FOBT/FIT: every 1 year  OR  Cologuard: every 3 years  OR  Sigmoidoscopy: every 5 years  Screening may be recommended earlier than age 45 if at higher risk for colorectal cancer. Also, an individualized decision between you and your healthcare provider will decide whether screening between the ages of 76-85 would be appropriate. Colonoscopy: 10/10/2024  FOBT/FIT: Not on file  Cologuard: Not on file  Sigmoidoscopy: 12/05/2024    History Colorectal Cancer     Prostate Cancer Screening Individualized decision between patient and health care provider in men between ages of 55-69   Medicare will cover every 12 months beginning on the day after your 50th birthday PSA: No results in last 5 years     Screening Not Indicated     Hepatitis C Screening Once for adults born between 1945 and 1965  More frequently in patients at high risk for Hepatitis C Hep C Antibody: 08/30/2024    Screening Current   Diabetes Screening 1-2 times per year if you're at risk for diabetes or have pre-diabetes Fasting glucose: 84 mg/dL (9/10/2024)  A1C: No results in last 5 years (No results in  last 5 years)  Screening Current   Cholesterol Screening Once every 5 years if you don't have a lipid disorder. May order more often based on risk factors. Lipid panel: 08/30/2024  Screening Current      Other Preventive Screenings Covered by Medicare:  Abdominal Aortic Aneurysm (AAA) Screening: covered once if your at risk. You're considered to be at risk if you have a family history of AAA or a male between the age of 65-75 who smoking at least 100 cigarettes in your lifetime.  Lung Cancer Screening: covers low dose CT scan once per year if you meet all of the following conditions: (1) Age 55-77; (2) No signs or symptoms of lung cancer; (3) Current smoker or have quit smoking within the last 15 years; (4) You have a tobacco smoking history of at least 20 pack years (packs per day x number of years you smoked); (5) You get a written order from a healthcare provider.  Glaucoma Screening: covered annually if you're considered high risk: (1) You have diabetes OR (2) Family history of glaucoma OR (3)  aged 50 and older OR (4)  American aged 65 and older  Osteoporosis Screening: covered every 2 years if you meet one of the following conditions: (1) Have a vertebral abnormality; (2) On glucocorticoid therapy for more than 3 months; (3) Have primary hyperparathyroidism; (4) On osteoporosis medications and need to assess response to drug therapy.  HIV Screening: covered annually if you're between the age of 15-65. Also covered annually if you are younger than 15 and older than 65 with risk factors for HIV infection. For pregnant patients, it is covered up to 3 times per pregnancy.    Immunizations:  Immunization Recommendations   Influenza Vaccine Annual influenza vaccination during flu season is recommended for all persons aged >= 6 months who do not have contraindications   Pneumococcal Vaccine   * Pneumococcal conjugate vaccine = PCV13 (Prevnar 13), PCV15 (Vaxneuvance), PCV20 (Prevnar 20)  *  Pneumococcal polysaccharide vaccine = PPSV23 (Pneumovax) Adults 19-63 yo with certain risk factors or if 65+ yo  If never received any pneumonia vaccine: recommend Prevnar 20 (PCV20)  Give PCV20 if previously received 1 dose of PCV13 or PPSV23   Hepatitis B Vaccine 3 dose series if at intermediate or high risk (ex: diabetes, end stage renal disease, liver disease)   Respiratory syncytial virus (RSV) Vaccine - COVERED BY MEDICARE PART D  * RSVPreF3 (Arexvy) CDC recommends that adults 60 years of age and older may receive a single dose of RSV vaccine using shared clinical decision-making (SCDM)   Tetanus (Td) Vaccine - COST NOT COVERED BY MEDICARE PART B Following completion of primary series, a booster dose should be given every 10 years to maintain immunity against tetanus. Td may also be given as tetanus wound prophylaxis.   Tdap Vaccine - COST NOT COVERED BY MEDICARE PART B Recommended at least once for all adults. For pregnant patients, recommended with each pregnancy.   Shingles Vaccine (Shingrix) - COST NOT COVERED BY MEDICARE PART B  2 shot series recommended in those 19 years and older who have or will have weakened immune systems or those 50 years and older     Health Maintenance Due:      Topic Date Due   • Colorectal Cancer Screening  12/06/2024   • Hepatitis C Screening  Completed     Immunizations Due:      Topic Date Due   • Pneumococcal Vaccine: 50+ Years (1 of 2 - PCV) Never done   • COVID-19 Vaccine (4 - 2024-25 season) 09/01/2024     Advance Directives   What are advance directives?  Advance directives are legal documents that state your wishes and plans for medical care. These plans are made ahead of time in case you lose your ability to make decisions for yourself. Advance directives can apply to any medical decision, such as the treatments you want, and if you want to donate organs.   What are the types of advance directives?  There are many types of advance directives, and each state has rules  about how to use them. You may choose a combination of any of the following:  Living will:  This is a written record of the treatment you want. You can also choose which treatments you do not want, which to limit, and which to stop at a certain time. This includes surgery, medicine, IV fluid, and tube feedings.   Durable power of  for healthcare (DPAHC):  This is a written record that states who you want to make healthcare choices for you when you are unable to make them for yourself. This person, called a proxy, is usually a family member or a friend. You may choose more than 1 proxy.  Do not resuscitate (DNR) order:  A DNR order is used in case your heart stops beating or you stop breathing. It is a request not to have certain forms of treatment, such as CPR. A DNR order may be included in other types of advance directives.  Medical directive:  This covers the care that you want if you are in a coma, near death, or unable to make decisions for yourself. You can list the treatments you want for each condition. Treatment may include pain medicine, surgery, blood transfusions, dialysis, IV or tube feedings, and a ventilator (breathing machine).  Values history:  This document has questions about your views, beliefs, and how you feel and think about life. This information can help others choose the care that you would choose.  Why are advance directives important?  An advance directive helps you control your care. Although spoken wishes may be used, it is better to have your wishes written down. Spoken wishes can be misunderstood, or not followed. Treatments may be given even if you do not want them. An advance directive may make it easier for your family to make difficult choices about your care.   Fall Prevention    Fall prevention  includes ways to make your home and other areas safer. It also includes ways you can move more carefully to prevent a fall. Health conditions that cause changes in your blood  pressure, vision, or muscle strength and coordination may increase your risk for falls. Medicines may also increase your risk for falls if they make you dizzy, weak, or sleepy.   Fall prevention tips:   Stand or sit up slowly.    Use assistive devices as directed.    Wear shoes that fit well and have soles that .    Wear a personal alarm.    Stay active.    Manage your medical conditions.    Home Safety Tips:  Add items to prevent falls in the bathroom.    Keep paths clear.    Install bright lights in your home.    Keep items you use often on shelves within reach.    Paint or place reflective tape on the edges of your stairs.    Weight Management   Why it is important to manage your weight:  Being overweight increases your risk of health conditions such as heart disease, high blood pressure, type 2 diabetes, and certain types of cancer. It can also increase your risk for osteoarthritis, sleep apnea, and other respiratory problems. Aim for a slow, steady weight loss. Even a small amount of weight loss can lower your risk of health problems.  How to lose weight safely:  A safe and healthy way to lose weight is to eat fewer calories and get regular exercise. You can lose up about 1 pound a week by decreasing the number of calories you eat by 500 calories each day.   Healthy meal plan for weight management:  A healthy meal plan includes a variety of foods, contains fewer calories, and helps you stay healthy. A healthy meal plan includes the following:  Eat whole-grain foods more often.  A healthy meal plan should contain fiber. Fiber is the part of grains, fruits, and vegetables that is not broken down by your body. Whole-grain foods are healthy and provide extra fiber in your diet. Some examples of whole-grain foods are whole-wheat breads and pastas, oatmeal, brown rice, and bulgur.  Eat a variety of vegetables every day.  Include dark, leafy greens such as spinach, kale, nick greens, and mustard greens. Eat  yellow and orange vegetables such as carrots, sweet potatoes, and winter squash.   Eat a variety of fruits every day.  Choose fresh or canned fruit (canned in its own juice or light syrup) instead of juice. Fruit juice has very little or no fiber.  Eat low-fat dairy foods.  Drink fat-free (skim) milk or 1% milk. Eat fat-free yogurt and low-fat cottage cheese. Try low-fat cheeses such as mozzarella and other reduced-fat cheeses.  Choose meat and other protein foods that are low in fat.  Choose beans or other legumes such as split peas or lentils. Choose fish, skinless poultry (chicken or turkey), or lean cuts of red meat (beef or pork). Before you cook meat or poultry, cut off any visible fat.   Use less fat and oil.  Try baking foods instead of frying them. Add less fat, such as margarine, sour cream, regular salad dressing and mayonnaise to foods. Eat fewer high-fat foods. Some examples of high-fat foods include french fries, doughnuts, ice cream, and cakes.  Eat fewer sweets.  Limit foods and drinks that are high in sugar. This includes candy, cookies, regular soda, and sweetened drinks.  Exercise:  Exercise at least 30 minutes per day on most days of the week. Some examples of exercise include walking, biking, dancing, and swimming. You can also fit in more physical activity by taking the stairs instead of the elevator or parking farther away from stores. Ask your healthcare provider about the best exercise plan for you.    © Copyright [employer] 2018 Information is for End User's use only and may not be sold, redistributed or otherwise used for commercial purposes. All illustrations and images included in CareNotes® are the copyrighted property of [employer] or [employer]

## 2025-06-26 ENCOUNTER — OFFICE VISIT (OUTPATIENT)
Dept: FAMILY MEDICINE CLINIC | Facility: CLINIC | Age: 80
End: 2025-06-26
Payer: MEDICARE

## 2025-06-26 VITALS
SYSTOLIC BLOOD PRESSURE: 140 MMHG | HEIGHT: 69 IN | BODY MASS INDEX: 26.81 KG/M2 | OXYGEN SATURATION: 97 % | DIASTOLIC BLOOD PRESSURE: 70 MMHG | WEIGHT: 181 LBS | HEART RATE: 78 BPM

## 2025-06-26 DIAGNOSIS — M48.00 SPINAL STENOSIS, UNSPECIFIED SPINAL REGION: ICD-10-CM

## 2025-06-26 DIAGNOSIS — G89.29 CHRONIC BILATERAL LOW BACK PAIN WITH BILATERAL SCIATICA: ICD-10-CM

## 2025-06-26 DIAGNOSIS — R26.81 GAIT INSTABILITY: ICD-10-CM

## 2025-06-26 DIAGNOSIS — M54.41 CHRONIC BILATERAL LOW BACK PAIN WITH BILATERAL SCIATICA: ICD-10-CM

## 2025-06-26 DIAGNOSIS — M54.42 CHRONIC BILATERAL LOW BACK PAIN WITH BILATERAL SCIATICA: ICD-10-CM

## 2025-06-26 DIAGNOSIS — Z99.89 AMBULATES WITH CANE: ICD-10-CM

## 2025-06-26 DIAGNOSIS — G62.9 PERIPHERAL POLYNEUROPATHY: Primary | ICD-10-CM

## 2025-06-26 DIAGNOSIS — Z91.89 DRIVING SAFETY ISSUE: ICD-10-CM

## 2025-06-26 PROCEDURE — G2211 COMPLEX E/M VISIT ADD ON: HCPCS | Performed by: STUDENT IN AN ORGANIZED HEALTH CARE EDUCATION/TRAINING PROGRAM

## 2025-06-26 PROCEDURE — 99214 OFFICE O/P EST MOD 30 MIN: CPT | Performed by: STUDENT IN AN ORGANIZED HEALTH CARE EDUCATION/TRAINING PROGRAM

## 2025-06-26 NOTE — PROGRESS NOTES
Name: Tee Forrest Jr.      : 1945      MRN: [id number]  Encounter Provider: Champ Roldan MD  Encounter Date: 2025   Encounter department: [facility] PRIMARY [facility]ARE  :  Assessment & Plan  Peripheral polyneuropathy    Orders:    Vitamin B12; Future    Vitamin B1, whole blood; Future    CBC and differential; Future    Comprehensive metabolic panel; Future    Ambulatory Referral to Neurosurgery; Future    Gait instability    Orders:    Vitamin B12; Future    Vitamin B1, whole blood; Future    CBC and differential; Future    Comprehensive metabolic panel; Future    Ambulatory Referral to Neurosurgery; Future    Chronic bilateral low back pain with bilateral sciatica    Orders:    Vitamin B12; Future    Vitamin B1, whole blood; Future    CBC and differential; Future    Comprehensive metabolic panel; Future    Ambulatory Referral to Neurosurgery; Future    Ambulates with cane       Discussed with patient that his symptoms are likely secondary from his spinal stenosis and facet joint degenerative changes.  We reviewed his MRI in extensive detail.  Patient has previously seen spine pain for injections.  Of note he also did have a diagnosis of B12 deficiency which was replenished and his B12 levels were normal as of this past April.  Driving safety issue         Spinal stenosis, unspecified spinal region             I did offer him a referral to neurosurgery to see if they have any sort of acute intervention that they be willing to perform to help with decompression.  We also discussed referring to physical therapy for gait training which may help with both the gait and the balance.    He is on gabapentin 300 mg twice daily, discussed possibly increasing this as well to help with the neuropathy.    Although patient told me he previously does not drive today he endorses he drove himself to the office.  Discussed with him given his neuropathy he should not be driving in the [facility] in  fact this is actually a reportable issue to the DMV    At this time based on my clinical examination of the patient it is my medical/legal recommendation that they cease driving at this time due to concerns of safety and functional impairment.  At this time I did discuss with patient that [place] is a mandated reporting state regarding driving impairments and as such any concerns regarding driving safety must be reported to the DMV by healthcare providers.  Advised that we will rediscuss this in approximately 30 days to allow us to evaluate whether there is any reversible causes that may affect their ability to drive.  Additionally we did discuss in length alternatives to driving including public transportation, professional evaluation by Occupational Therapy/driving rehab as well as adaptive equipment.   If there are no obvious changes or improvements during the above-noted interval we will formally report condition to the [place] Department of Motor Vehicles.        History of Present Illness   HPI    80-year-old male presents to the office with what he describes as progressively worsening numbness tingling and weakness in his bilateral lower legs.  Patient states that he is starting to really affect his gait and he is concerned about falling.    Review of Systems   Constitutional:  Negative for activity change, appetite change, chills, fatigue and fever.   HENT:  Negative for congestion, dental problem, drooling, ear discharge, ear pain, facial swelling, postnasal drip, rhinorrhea and sinus pain.    Eyes:  Negative for photophobia, pain, discharge and itching.   Respiratory:  Negative for apnea, cough, chest tightness and shortness of breath.    Cardiovascular:  Negative for chest pain and leg swelling.   Gastrointestinal:  Negative for abdominal distention, abdominal pain, anal bleeding, constipation, diarrhea and nausea.   Endocrine: Negative for cold intolerance, heat intolerance and polydipsia.  "  Genitourinary:  Negative for difficulty urinating.   Musculoskeletal:  Positive for back pain and gait problem. Negative for arthralgias, joint swelling and myalgias.   Skin:  Negative for color change and pallor.   Allergic/Immunologic: Negative for immunocompromised state.   Neurological:  Positive for numbness. Negative for dizziness, seizures, facial asymmetry, weakness, light-headedness and headaches.   Psychiatric/Behavioral:  Negative for agitation, behavioral problems, confusion, decreased concentration and dysphoric mood.    All other systems reviewed and are negative.      Objective   /70 (BP Location: Left arm, Patient Position: Sitting, Cuff Size: Adult)   Pulse 78   Ht 5' 9\" (1.753 m)   Wt 82.1 kg (181 lb)   SpO2 97%   BMI 26.73 kg/m²      Physical Exam  Constitutional:       Appearance: He is well-developed.   HENT:      Head: Normocephalic.     Eyes:      Pupils: Pupils are equal, round, and reactive to light.       Cardiovascular:      Rate and Rhythm: Normal rate and regular rhythm.   Pulmonary:      Effort: Pulmonary effort is normal.      Breath sounds: Normal breath sounds.   Abdominal:      General: Bowel sounds are normal.      Palpations: Abdomen is soft.     Musculoskeletal:         General: Normal range of motion.      Cervical back: Normal range of motion and neck supple.     Skin:     General: Skin is warm.     Neurological:      Cranial Nerves: No cranial nerve deficit.      Sensory: Sensory deficit present.      Motor: No weakness.      Coordination: Coordination normal.      Gait: Gait abnormal.      Deep Tendon Reflexes: Reflexes normal.         "

## 2025-06-26 NOTE — ASSESSMENT & PLAN NOTE
Orders:    Vitamin B12; Future    Vitamin B1, whole blood; Future    CBC and differential; Future    Comprehensive metabolic panel; Future    Ambulatory Referral to Neurosurgery; Future

## 2025-07-08 ENCOUNTER — APPOINTMENT (OUTPATIENT)
Dept: LAB | Facility: HOSPITAL | Age: 80
End: 2025-07-08
Payer: MEDICARE

## 2025-07-08 DIAGNOSIS — M54.42 CHRONIC BILATERAL LOW BACK PAIN WITH BILATERAL SCIATICA: ICD-10-CM

## 2025-07-08 DIAGNOSIS — G62.9 PERIPHERAL POLYNEUROPATHY: ICD-10-CM

## 2025-07-08 DIAGNOSIS — G89.29 CHRONIC BILATERAL LOW BACK PAIN WITH BILATERAL SCIATICA: ICD-10-CM

## 2025-07-08 DIAGNOSIS — R26.81 GAIT INSTABILITY: ICD-10-CM

## 2025-07-08 DIAGNOSIS — M54.41 CHRONIC BILATERAL LOW BACK PAIN WITH BILATERAL SCIATICA: ICD-10-CM

## 2025-07-08 LAB
ALBUMIN SERPL BCG-MCNC: 4.6 G/DL (ref 3.5–5)
ALP SERPL-CCNC: 103 U/L (ref 34–104)
ALT SERPL W P-5'-P-CCNC: 12 U/L (ref 7–52)
ANION GAP SERPL CALCULATED.3IONS-SCNC: 9 MMOL/L (ref 4–13)
AST SERPL W P-5'-P-CCNC: 14 U/L (ref 13–39)
BASOPHILS # BLD AUTO: 0.02 THOUSANDS/ÂΜL (ref 0–0.1)
BASOPHILS NFR BLD AUTO: 0 % (ref 0–1)
BILIRUB SERPL-MCNC: 2.64 MG/DL (ref 0.2–1)
BUN SERPL-MCNC: 27 MG/DL (ref 5–25)
CALCIUM SERPL-MCNC: 9 MG/DL (ref 8.4–10.2)
CHLORIDE SERPL-SCNC: 102 MMOL/L (ref 96–108)
CO2 SERPL-SCNC: 28 MMOL/L (ref 21–32)
CREAT SERPL-MCNC: 1.1 MG/DL (ref 0.6–1.3)
EOSINOPHIL # BLD AUTO: 0.07 THOUSAND/ÂΜL (ref 0–0.61)
EOSINOPHIL NFR BLD AUTO: 1 % (ref 0–6)
ERYTHROCYTE [DISTWIDTH] IN BLOOD BY AUTOMATED COUNT: 13.2 % (ref 11.6–15.1)
GFR SERPL CREATININE-BSD FRML MDRD: 62 ML/MIN/1.73SQ M
GLUCOSE P FAST SERPL-MCNC: 119 MG/DL (ref 65–99)
HCT VFR BLD AUTO: 40.4 % (ref 36.5–49.3)
HGB BLD-MCNC: 13.3 G/DL (ref 12–17)
IMM GRANULOCYTES # BLD AUTO: 0.02 THOUSAND/UL (ref 0–0.2)
IMM GRANULOCYTES NFR BLD AUTO: 0 % (ref 0–2)
LYMPHOCYTES # BLD AUTO: 1.71 THOUSANDS/ÂΜL (ref 0.6–4.47)
LYMPHOCYTES NFR BLD AUTO: 26 % (ref 14–44)
MCH RBC QN AUTO: 30.4 PG (ref 26.8–34.3)
MCHC RBC AUTO-ENTMCNC: 32.9 G/DL (ref 31.4–37.4)
MCV RBC AUTO: 92 FL (ref 82–98)
MONOCYTES # BLD AUTO: 0.4 THOUSAND/ÂΜL (ref 0.17–1.22)
MONOCYTES NFR BLD AUTO: 6 % (ref 4–12)
NEUTROPHILS # BLD AUTO: 4.42 THOUSANDS/ÂΜL (ref 1.85–7.62)
NEUTS SEG NFR BLD AUTO: 67 % (ref 43–75)
NRBC BLD AUTO-RTO: 0 /100 WBCS
PLATELET # BLD AUTO: 169 THOUSANDS/UL (ref 149–390)
PMV BLD AUTO: 11.1 FL (ref 8.9–12.7)
POTASSIUM SERPL-SCNC: 3.5 MMOL/L (ref 3.5–5.3)
PROT SERPL-MCNC: 6.9 G/DL (ref 6.4–8.4)
RBC # BLD AUTO: 4.37 MILLION/UL (ref 3.88–5.62)
SODIUM SERPL-SCNC: 139 MMOL/L (ref 135–147)
VIT B12 SERPL-MCNC: 198 PG/ML (ref 180–914)
WBC # BLD AUTO: 6.64 THOUSAND/UL (ref 4.31–10.16)

## 2025-07-08 PROCEDURE — 80053 COMPREHEN METABOLIC PANEL: CPT

## 2025-07-08 PROCEDURE — 36415 COLL VENOUS BLD VENIPUNCTURE: CPT

## 2025-07-08 PROCEDURE — 82607 VITAMIN B-12: CPT

## 2025-07-08 PROCEDURE — 85025 COMPLETE CBC W/AUTO DIFF WBC: CPT

## 2025-07-08 PROCEDURE — 84425 ASSAY OF VITAMIN B-1: CPT

## 2025-07-11 LAB — VIT B1 BLD-SCNC: 86.9 NMOL/L (ref 66.5–200)

## 2025-07-12 DIAGNOSIS — I10 ESSENTIAL HYPERTENSION: ICD-10-CM

## 2025-07-12 DIAGNOSIS — I48.92 NEW ONSET ATRIAL FLUTTER (HCC): ICD-10-CM

## 2025-07-14 NOTE — PLAN OF CARE
DISCHARGE PLANNING - CARE MANAGEMENT     Discharge to post-acute care or home with appropriate resources Adequate for Discharge        GASTROINTESTINAL - ADULT     Minimal or absence of nausea and/or vomiting Adequate for Discharge     Maintains or returns to baseline bowel function Adequate for Discharge     Maintains adequate nutritional intake Adequate for Discharge        METABOLIC, FLUID AND ELECTROLYTES - ADULT     Electrolytes maintained within normal limits Adequate for Discharge     Fluid balance maintained Adequate for Discharge        Nutrition/Hydration-ADULT     Nutrient/Hydration intake appropriate for improving, restoring or maintaining nutritional needs Adequate for Discharge        PAIN - ADULT     Verbalizes/displays adequate comfort level or baseline comfort level Adequate for Discharge Faculty Sign-Out Attestation  Handoff taken on the following patient from prior Attending Physician: Paige    Note Started: 4:17 PM EDT    I was available and discussed any additional care issues that arose and coordinated the management plans with the resident(s) caring for the patient during my duty period. Any areas of disagreement with resident’s documentation of care or procedures are noted on the chart. I was personally present for the key portions of any/all procedures during my duty period. I have documented in the chart those procedures where I was not present during the martinez portions.    \    Jh Powell MD  Attending Physician        Jh Powell MD  07/14/25 [id number]

## 2025-07-15 RX ORDER — CHLORTHALIDONE 25 MG/1
25 TABLET ORAL DAILY
Qty: 90 TABLET | Refills: 1 | Status: SHIPPED | OUTPATIENT
Start: 2025-07-15 | End: 2025-10-13

## 2025-07-25 DIAGNOSIS — R26.81 GAIT INSTABILITY: ICD-10-CM

## 2025-07-25 DIAGNOSIS — M54.16 LUMBAR RADICULOPATHY: ICD-10-CM

## 2025-07-25 RX ORDER — GABAPENTIN 100 MG/1
300 CAPSULE ORAL 2 TIMES DAILY
Qty: 270 CAPSULE | Refills: 1 | Status: SHIPPED | OUTPATIENT
Start: 2025-07-25

## 2025-07-31 PROBLEM — I35.0 MODERATE AORTIC STENOSIS: Status: ACTIVE | Noted: 2025-07-31

## 2025-08-01 ENCOUNTER — TELEPHONE (OUTPATIENT)
Dept: ADMINISTRATIVE | Facility: OTHER | Age: 80
End: 2025-08-01

## 2025-08-01 DIAGNOSIS — I35.0 MODERATE AORTIC STENOSIS: Primary | ICD-10-CM

## (undated) DEVICE — ENDOPOUCH RETRIEVER SPECIMEN RETRIEVAL BAGS: Brand: ENDOPOUCH RETRIEVER

## (undated) DEVICE — ENDOPATH XCEL UNIVERSAL TROCAR STABLILITY SLEEVES: Brand: ENDOPATH XCEL

## (undated) DEVICE — SUT VICRYL 0 UR-6 27 IN J603H

## (undated) DEVICE — SUT MONOCRYL 4-0 PS-2 18 IN Y496G

## (undated) DEVICE — SCD SEQUENTIAL COMPRESSION COMFORT SLEEVE MEDIUM KNEE LENGTH: Brand: KENDALL SCD

## (undated) DEVICE — AEM CORD

## (undated) DEVICE — ENDOPATH XCEL BLADELESS TROCARS WITH STABILITY SLEEVES: Brand: ENDOPATH XCEL

## (undated) DEVICE — ADHESIVE SKN CLSR HISTOACRYL FLEX 0.5ML LF

## (undated) DEVICE — INTENDED FOR TISSUE SEPARATION, AND OTHER PROCEDURES THAT REQUIRE A SHARP SURGICAL BLADE TO PUNCTURE OR CUT.: Brand: BARD-PARKER SAFETY BLADES SIZE 11, STERILE

## (undated) DEVICE — 3000CC GUARDIAN II: Brand: GUARDIAN

## (undated) DEVICE — LIGAMAX 5 MM ENDOSCOPIC MULTIPLE CLIP APPLIER: Brand: LIGAMAX

## (undated) DEVICE — GLOVE SRG BIOGEL 7.5

## (undated) DEVICE — CHLORAPREP HI-LITE 26ML ORANGE

## (undated) DEVICE — NEEDLE 25G X 1 1/2

## (undated) DEVICE — PACK PBDS LAP CHOLE RF